# Patient Record
Sex: OTHER/UNKNOWN | Race: WHITE | Employment: UNEMPLOYED | URBAN - METROPOLITAN AREA
[De-identification: names, ages, dates, MRNs, and addresses within clinical notes are randomized per-mention and may not be internally consistent; named-entity substitution may affect disease eponyms.]

---

## 2017-01-05 ENCOUNTER — APPOINTMENT (OUTPATIENT)
Dept: LAB | Facility: MEDICAL CENTER | Age: 52
End: 2017-01-05
Payer: COMMERCIAL

## 2017-01-05 DIAGNOSIS — I71.2 THORACIC AORTIC ANEURYSM WITHOUT RUPTURE (HCC): ICD-10-CM

## 2017-01-05 LAB
BUN SERPL-MCNC: 15 MG/DL (ref 5–25)
CREAT SERPL-MCNC: 1.31 MG/DL (ref 0.6–1.3)
GFR SERPL CREATININE-BSD FRML MDRD: 57.5 ML/MIN/1.73SQ M

## 2017-01-05 PROCEDURE — 84520 ASSAY OF UREA NITROGEN: CPT

## 2017-01-05 PROCEDURE — 36415 COLL VENOUS BLD VENIPUNCTURE: CPT

## 2017-01-05 PROCEDURE — 82565 ASSAY OF CREATININE: CPT

## 2017-01-10 ENCOUNTER — HOSPITAL ENCOUNTER (OUTPATIENT)
Dept: RADIOLOGY | Facility: MEDICAL CENTER | Age: 52
Discharge: HOME | End: 2017-01-10
Payer: COMMERCIAL

## 2017-01-10 DIAGNOSIS — I71.2 THORACIC ANEURYSM WITHOUT MENTION OF RUPTURE: ICD-10-CM

## 2017-01-10 PROCEDURE — 71260 CT THORAX DX C+: CPT

## 2017-01-10 RX ADMIN — IOHEXOL 85 ML: 350 INJECTION, SOLUTION INTRAVENOUS at 13:12

## 2017-01-11 ENCOUNTER — ALLSCRIPTS OFFICE VISIT (OUTPATIENT)
Dept: OTHER | Facility: OTHER | Age: 52
End: 2017-01-11

## 2017-01-13 ENCOUNTER — ALLSCRIPTS OFFICE VISIT (OUTPATIENT)
Dept: OTHER | Facility: OTHER | Age: 52
End: 2017-01-13

## 2017-01-18 ENCOUNTER — HOSPITAL ENCOUNTER (OUTPATIENT)
Dept: RADIOLOGY | Facility: MEDICAL CENTER | Age: 52
Discharge: HOME | End: 2017-01-18
Payer: COMMERCIAL

## 2017-01-18 DIAGNOSIS — R10.11 RIGHT UPPER QUADRANT PAIN: ICD-10-CM

## 2017-01-18 PROCEDURE — 76705 ECHO EXAM OF ABDOMEN: CPT

## 2017-02-03 DIAGNOSIS — K76.0 FATTY (CHANGE OF) LIVER, NOT ELSEWHERE CLASSIFIED: ICD-10-CM

## 2017-02-04 ENCOUNTER — APPOINTMENT (OUTPATIENT)
Dept: LAB | Facility: MEDICAL CENTER | Age: 52
End: 2017-02-04
Payer: COMMERCIAL

## 2017-02-04 DIAGNOSIS — K76.0 FATTY (CHANGE OF) LIVER, NOT ELSEWHERE CLASSIFIED: ICD-10-CM

## 2017-02-04 LAB
ALBUMIN SERPL BCP-MCNC: 3.5 G/DL (ref 3.5–5)
ALP SERPL-CCNC: 89 U/L (ref 46–116)
ALT SERPL W P-5'-P-CCNC: 60 U/L (ref 12–78)
ANION GAP SERPL CALCULATED.3IONS-SCNC: 9 MMOL/L (ref 4–13)
AST SERPL W P-5'-P-CCNC: 36 U/L (ref 5–45)
BILIRUB SERPL-MCNC: 0.83 MG/DL (ref 0.2–1)
BUN SERPL-MCNC: 15 MG/DL (ref 5–25)
CALCIUM SERPL-MCNC: 9.2 MG/DL (ref 8.3–10.1)
CHLORIDE SERPL-SCNC: 104 MMOL/L (ref 100–108)
CHOLEST SERPL-MCNC: 140 MG/DL (ref 50–200)
CO2 SERPL-SCNC: 26 MMOL/L (ref 21–32)
CREAT SERPL-MCNC: 1.37 MG/DL (ref 0.6–1.3)
GFR SERPL CREATININE-BSD FRML MDRD: 54.6 ML/MIN/1.73SQ M
GLUCOSE SERPL-MCNC: 109 MG/DL (ref 65–140)
HDLC SERPL-MCNC: 33 MG/DL (ref 40–60)
LDLC SERPL CALC-MCNC: 49 MG/DL (ref 0–100)
POTASSIUM SERPL-SCNC: 4.1 MMOL/L (ref 3.5–5.3)
PROT SERPL-MCNC: 7.1 G/DL (ref 6.4–8.2)
SODIUM SERPL-SCNC: 139 MMOL/L (ref 136–145)
TRIGL SERPL-MCNC: 290 MG/DL

## 2017-02-04 PROCEDURE — 80053 COMPREHEN METABOLIC PANEL: CPT

## 2017-02-04 PROCEDURE — 36415 COLL VENOUS BLD VENIPUNCTURE: CPT

## 2017-02-04 PROCEDURE — 80061 LIPID PANEL: CPT

## 2017-02-07 ENCOUNTER — ALLSCRIPTS OFFICE VISIT (OUTPATIENT)
Dept: OTHER | Facility: OTHER | Age: 52
End: 2017-02-07

## 2017-03-08 ENCOUNTER — ALLSCRIPTS OFFICE VISIT (OUTPATIENT)
Dept: OTHER | Facility: OTHER | Age: 52
End: 2017-03-08

## 2017-04-03 DIAGNOSIS — E78.1 PURE HYPERGLYCERIDEMIA: ICD-10-CM

## 2017-04-06 ENCOUNTER — APPOINTMENT (OUTPATIENT)
Dept: LAB | Facility: MEDICAL CENTER | Age: 52
End: 2017-04-06
Payer: COMMERCIAL

## 2017-04-06 DIAGNOSIS — E78.1 PURE HYPERGLYCERIDEMIA: ICD-10-CM

## 2017-04-06 LAB
CHOLEST SERPL-MCNC: 149 MG/DL (ref 50–200)
HDLC SERPL-MCNC: 31 MG/DL (ref 40–60)
LDLC SERPL CALC-MCNC: 64 MG/DL (ref 0–100)
TRIGL SERPL-MCNC: 271 MG/DL

## 2017-04-06 PROCEDURE — 36415 COLL VENOUS BLD VENIPUNCTURE: CPT

## 2017-04-06 PROCEDURE — 80061 LIPID PANEL: CPT

## 2017-04-07 ENCOUNTER — ALLSCRIPTS OFFICE VISIT (OUTPATIENT)
Dept: OTHER | Facility: OTHER | Age: 52
End: 2017-04-07

## 2017-05-05 DIAGNOSIS — R42 DIZZINESS AND GIDDINESS: ICD-10-CM

## 2017-05-05 DIAGNOSIS — R51 HEADACHE(784.0): ICD-10-CM

## 2017-05-08 ENCOUNTER — ALLSCRIPTS OFFICE VISIT (OUTPATIENT)
Dept: OTHER | Facility: OTHER | Age: 52
End: 2017-05-08

## 2017-05-08 LAB
BILIRUB UR QL STRIP: NORMAL
CLARITY UR: NORMAL
COLOR UR: YELLOW
GLUCOSE (HISTORICAL): NORMAL
HGB UR QL STRIP.AUTO: NORMAL
KETONES UR STRIP-MCNC: NORMAL MG/DL
LEUKOCYTE ESTERASE UR QL STRIP: NORMAL
NITRITE UR QL STRIP: NORMAL
PH UR STRIP.AUTO: 5.5 [PH]
PROT UR STRIP-MCNC: NORMAL MG/DL
SP GR UR STRIP.AUTO: 1.03
UROBILINOGEN UR QL STRIP.AUTO: 0.2

## 2017-05-12 ENCOUNTER — HOSPITAL ENCOUNTER (OUTPATIENT)
Dept: RADIOLOGY | Facility: MEDICAL CENTER | Age: 52
Discharge: HOME | End: 2017-05-12
Payer: COMMERCIAL

## 2017-05-12 ENCOUNTER — GENERIC CONVERSION - ENCOUNTER (OUTPATIENT)
Dept: OTHER | Facility: OTHER | Age: 52
End: 2017-05-12

## 2017-05-12 ENCOUNTER — APPOINTMENT (OUTPATIENT)
Dept: PHYSICAL THERAPY | Facility: MEDICAL CENTER | Age: 52
End: 2017-05-12
Payer: COMMERCIAL

## 2017-05-12 DIAGNOSIS — R51 HEADACHE(784.0): ICD-10-CM

## 2017-05-12 PROCEDURE — 70220 X-RAY EXAM OF SINUSES: CPT

## 2017-05-12 PROCEDURE — 97162 PT EVAL MOD COMPLEX 30 MIN: CPT

## 2017-05-24 ENCOUNTER — APPOINTMENT (OUTPATIENT)
Dept: PHYSICAL THERAPY | Facility: MEDICAL CENTER | Age: 52
End: 2017-05-24
Payer: COMMERCIAL

## 2017-05-24 PROCEDURE — 97112 NEUROMUSCULAR REEDUCATION: CPT

## 2017-05-31 ENCOUNTER — APPOINTMENT (OUTPATIENT)
Dept: PHYSICAL THERAPY | Facility: MEDICAL CENTER | Age: 52
End: 2017-05-31
Payer: COMMERCIAL

## 2017-06-16 ENCOUNTER — HOSPITAL ENCOUNTER (EMERGENCY)
Facility: HOSPITAL | Age: 52
Discharge: HOME | End: 2017-06-16
Attending: EMERGENCY MEDICINE | Admitting: EMERGENCY MEDICINE
Payer: COMMERCIAL

## 2017-06-16 ENCOUNTER — APPOINTMENT (EMERGENCY)
Dept: CT IMAGING | Facility: HOSPITAL | Age: 52
End: 2017-06-16
Payer: COMMERCIAL

## 2017-06-16 VITALS
DIASTOLIC BLOOD PRESSURE: 77 MMHG | SYSTOLIC BLOOD PRESSURE: 110 MMHG | TEMPERATURE: 97 F | WEIGHT: 225 LBS | HEART RATE: 62 BPM | BODY MASS INDEX: 30.48 KG/M2 | RESPIRATION RATE: 17 BRPM | OXYGEN SATURATION: 98 % | HEIGHT: 72 IN

## 2017-06-16 DIAGNOSIS — R42 DIZZINESS: Primary | ICD-10-CM

## 2017-06-16 LAB
ALBUMIN SERPL BCP-MCNC: 3.7 G/DL (ref 3.5–5)
ALP SERPL-CCNC: 78 U/L (ref 46–116)
ALT SERPL W P-5'-P-CCNC: 41 U/L (ref 12–78)
AMPHETAMINES SERPL QL SCN: NEGATIVE
ANION GAP SERPL CALCULATED.3IONS-SCNC: 9 MMOL/L (ref 4–13)
AST SERPL W P-5'-P-CCNC: 29 U/L (ref 5–45)
ATRIAL RATE: 68 BPM
BARBITURATES UR QL: NEGATIVE
BASOPHILS # BLD AUTO: 0.06 THOUSANDS/ΜL (ref 0–0.1)
BASOPHILS NFR BLD AUTO: 1 % (ref 0–1)
BENZODIAZ UR QL: NEGATIVE
BILIRUB SERPL-MCNC: 0.6 MG/DL (ref 0.2–1)
BUN SERPL-MCNC: 23 MG/DL (ref 5–25)
CALCIUM SERPL-MCNC: 9.5 MG/DL (ref 8.3–10.1)
CHLORIDE SERPL-SCNC: 104 MMOL/L (ref 100–108)
CLARITY, POC: CLEAR
CO2 SERPL-SCNC: 26 MMOL/L (ref 21–32)
COCAINE UR QL: NEGATIVE
COLOR, POC: YELLOW
CREAT SERPL-MCNC: 1.56 MG/DL (ref 0.6–1.3)
EOSINOPHIL # BLD AUTO: 0.07 THOUSAND/ΜL (ref 0–0.61)
EOSINOPHIL NFR BLD AUTO: 1 % (ref 0–6)
ERYTHROCYTE [DISTWIDTH] IN BLOOD BY AUTOMATED COUNT: 12 % (ref 11.6–15.1)
ETHANOL SERPL-MCNC: <3 MG/DL (ref 0–3)
EXT BILIRUBIN, UA: NORMAL
EXT BLOOD URINE: NEGATIVE
EXT GLUCOSE, UA: NEGATIVE
EXT KETONES: NEGATIVE
EXT NITRITE, UA: NEGATIVE
EXT PH, UA: 5
EXT PROTEIN, UA: NEGATIVE
EXT SPECIFIC GRAVITY, UA: 1.02
EXT UROBILINOGEN: 0.2
GFR SERPL CREATININE-BSD FRML MDRD: 46.8 ML/MIN/1.73SQ M
GLUCOSE SERPL-MCNC: 112 MG/DL (ref 65–140)
HCT VFR BLD AUTO: 48.4 % (ref 36.5–49.3)
HGB BLD-MCNC: 16.5 G/DL (ref 12–17)
LYMPHOCYTES # BLD AUTO: 2.27 THOUSANDS/ΜL (ref 0.6–4.47)
LYMPHOCYTES NFR BLD AUTO: 36 % (ref 14–44)
MAGNESIUM SERPL-MCNC: 1.9 MG/DL (ref 1.6–2.6)
MCH RBC QN AUTO: 30.7 PG (ref 26.8–34.3)
MCHC RBC AUTO-ENTMCNC: 34.1 G/DL (ref 31.4–37.4)
MCV RBC AUTO: 90 FL (ref 82–98)
METHADONE UR QL: NEGATIVE
MONOCYTES # BLD AUTO: 0.52 THOUSAND/ΜL (ref 0.17–1.22)
MONOCYTES NFR BLD AUTO: 8 % (ref 4–12)
NEUTROPHILS # BLD AUTO: 3.44 THOUSANDS/ΜL (ref 1.85–7.62)
NEUTS SEG NFR BLD AUTO: 54 % (ref 43–75)
NRBC BLD AUTO-RTO: 0 /100 WBCS
OPIATES UR QL SCN: NEGATIVE
P AXIS: 60 DEGREES
PCP UR QL: NEGATIVE
PLATELET # BLD AUTO: 246 THOUSANDS/UL (ref 149–390)
PMV BLD AUTO: 10.5 FL (ref 8.9–12.7)
POTASSIUM SERPL-SCNC: 4.1 MMOL/L (ref 3.5–5.3)
PR INTERVAL: 154 MS
PROT SERPL-MCNC: 7.5 G/DL (ref 6.4–8.2)
QRS AXIS: 4 DEGREES
QRSD INTERVAL: 84 MS
QT INTERVAL: 384 MS
QTC INTERVAL: 408 MS
RBC # BLD AUTO: 5.38 MILLION/UL (ref 3.88–5.62)
SODIUM SERPL-SCNC: 139 MMOL/L (ref 136–145)
T WAVE AXIS: -55 DEGREES
THC UR QL: NEGATIVE
TROPONIN I SERPL-MCNC: <0.02 NG/ML
TSH SERPL DL<=0.05 MIU/L-ACNC: 2.8 UIU/ML (ref 0.36–3.74)
VENTRICULAR RATE: 68 BPM
WBC # BLD AUTO: 6.38 THOUSAND/UL (ref 4.31–10.16)
WBC # BLD EST: NEGATIVE 10*3/UL

## 2017-06-16 PROCEDURE — 80053 COMPREHEN METABOLIC PANEL: CPT | Performed by: EMERGENCY MEDICINE

## 2017-06-16 PROCEDURE — 84484 ASSAY OF TROPONIN QUANT: CPT | Performed by: EMERGENCY MEDICINE

## 2017-06-16 PROCEDURE — 81002 URINALYSIS NONAUTO W/O SCOPE: CPT | Performed by: EMERGENCY MEDICINE

## 2017-06-16 PROCEDURE — 80320 DRUG SCREEN QUANTALCOHOLS: CPT | Performed by: EMERGENCY MEDICINE

## 2017-06-16 PROCEDURE — 36415 COLL VENOUS BLD VENIPUNCTURE: CPT | Performed by: EMERGENCY MEDICINE

## 2017-06-16 PROCEDURE — 99285 EMERGENCY DEPT VISIT HI MDM: CPT

## 2017-06-16 PROCEDURE — 80307 DRUG TEST PRSMV CHEM ANLYZR: CPT | Performed by: EMERGENCY MEDICINE

## 2017-06-16 PROCEDURE — 83735 ASSAY OF MAGNESIUM: CPT | Performed by: EMERGENCY MEDICINE

## 2017-06-16 PROCEDURE — 84443 ASSAY THYROID STIM HORMONE: CPT | Performed by: EMERGENCY MEDICINE

## 2017-06-16 PROCEDURE — 85025 COMPLETE CBC W/AUTO DIFF WBC: CPT | Performed by: EMERGENCY MEDICINE

## 2017-06-16 PROCEDURE — 70450 CT HEAD/BRAIN W/O DYE: CPT

## 2017-06-16 PROCEDURE — 93005 ELECTROCARDIOGRAM TRACING: CPT | Performed by: EMERGENCY MEDICINE

## 2017-06-16 RX ORDER — CLINDAMYCIN PHOSPHATE 10 MG/G
GEL TOPICAL
Status: ON HOLD | COMMUNITY
Start: 2017-03-08 | End: 2017-10-31 | Stop reason: ALTCHOICE

## 2017-06-16 RX ORDER — ALBUTEROL SULFATE 90 UG/1
AEROSOL, METERED RESPIRATORY (INHALATION)
COMMUNITY
End: 2017-06-16 | Stop reason: SDUPTHER

## 2017-06-16 RX ORDER — MONTELUKAST SODIUM 10 MG/1
TABLET ORAL
COMMUNITY
End: 2019-04-10 | Stop reason: SDUPTHER

## 2017-06-16 RX ORDER — PANTOPRAZOLE SODIUM 40 MG/1
TABLET, DELAYED RELEASE ORAL
COMMUNITY
Start: 2016-04-20 | End: 2017-06-16 | Stop reason: SDUPTHER

## 2017-06-16 RX ORDER — BUPROPION HYDROCHLORIDE 75 MG/1
TABLET ORAL
COMMUNITY
End: 2018-02-26 | Stop reason: SDUPTHER

## 2017-06-16 RX ORDER — METOPROLOL SUCCINATE 25 MG/1
TABLET, EXTENDED RELEASE ORAL
Status: ON HOLD | COMMUNITY
Start: 2016-05-16 | End: 2017-10-31 | Stop reason: ALTCHOICE

## 2017-06-16 RX ORDER — MECLIZINE HCL 12.5 MG/1
12.5 TABLET ORAL 3 TIMES DAILY PRN
Qty: 30 TABLET | Refills: 0 | Status: ON HOLD | OUTPATIENT
Start: 2017-06-16 | End: 2017-10-31 | Stop reason: ALTCHOICE

## 2017-06-16 RX ORDER — HYDROCHLOROTHIAZIDE 12.5 MG/1
TABLET ORAL
Status: ON HOLD | COMMUNITY
End: 2017-10-31 | Stop reason: ALTCHOICE

## 2017-07-01 DIAGNOSIS — E78.1 PURE HYPERGLYCERIDEMIA: ICD-10-CM

## 2017-07-12 ENCOUNTER — APPOINTMENT (OUTPATIENT)
Dept: LAB | Facility: MEDICAL CENTER | Age: 52
End: 2017-07-12
Payer: COMMERCIAL

## 2017-07-12 DIAGNOSIS — E78.1 PURE HYPERGLYCERIDEMIA: ICD-10-CM

## 2017-07-12 LAB
ALT SERPL W P-5'-P-CCNC: 47 U/L (ref 12–78)
AST SERPL W P-5'-P-CCNC: 32 U/L (ref 5–45)
CHOLEST SERPL-MCNC: 120 MG/DL (ref 50–200)
HDLC SERPL-MCNC: 30 MG/DL (ref 40–60)
LDLC SERPL CALC-MCNC: 51 MG/DL (ref 0–100)
TRIGL SERPL-MCNC: 197 MG/DL

## 2017-07-12 PROCEDURE — 36415 COLL VENOUS BLD VENIPUNCTURE: CPT

## 2017-07-12 PROCEDURE — 80061 LIPID PANEL: CPT

## 2017-07-12 PROCEDURE — 84450 TRANSFERASE (AST) (SGOT): CPT

## 2017-07-12 PROCEDURE — 84460 ALANINE AMINO (ALT) (SGPT): CPT

## 2017-07-19 ENCOUNTER — ALLSCRIPTS OFFICE VISIT (OUTPATIENT)
Dept: OTHER | Facility: OTHER | Age: 52
End: 2017-07-19

## 2017-07-20 ENCOUNTER — GENERIC CONVERSION - ENCOUNTER (OUTPATIENT)
Dept: OTHER | Facility: OTHER | Age: 52
End: 2017-07-20

## 2017-08-02 ENCOUNTER — ALLSCRIPTS OFFICE VISIT (OUTPATIENT)
Dept: OTHER | Facility: OTHER | Age: 52
End: 2017-08-02

## 2017-08-06 ENCOUNTER — ANESTHESIA EVENT (OUTPATIENT)
Dept: PERIOP | Facility: HOSPITAL | Age: 52
End: 2017-08-06
Payer: COMMERCIAL

## 2017-08-07 ENCOUNTER — HOSPITAL ENCOUNTER (OUTPATIENT)
Facility: HOSPITAL | Age: 52
Setting detail: OUTPATIENT SURGERY
Discharge: HOME | End: 2017-08-07
Attending: INTERNAL MEDICINE | Admitting: INTERNAL MEDICINE
Payer: COMMERCIAL

## 2017-08-07 ENCOUNTER — GENERIC CONVERSION - ENCOUNTER (OUTPATIENT)
Dept: OTHER | Facility: OTHER | Age: 52
End: 2017-08-07

## 2017-08-07 ENCOUNTER — ANESTHESIA (OUTPATIENT)
Dept: PERIOP | Facility: HOSPITAL | Age: 52
End: 2017-08-07
Payer: COMMERCIAL

## 2017-08-07 VITALS
HEART RATE: 57 BPM | HEIGHT: 73 IN | SYSTOLIC BLOOD PRESSURE: 116 MMHG | TEMPERATURE: 97.5 F | DIASTOLIC BLOOD PRESSURE: 63 MMHG | RESPIRATION RATE: 24 BRPM | BODY MASS INDEX: 29.55 KG/M2 | OXYGEN SATURATION: 95 % | WEIGHT: 223 LBS

## 2017-08-07 DIAGNOSIS — Z12.11 ENCOUNTER FOR SCREENING FOR MALIGNANT NEOPLASM OF COLON: ICD-10-CM

## 2017-08-07 PROCEDURE — 88305 TISSUE EXAM BY PATHOLOGIST: CPT | Performed by: INTERNAL MEDICINE

## 2017-08-07 RX ORDER — SODIUM CHLORIDE, SODIUM LACTATE, POTASSIUM CHLORIDE, CALCIUM CHLORIDE 600; 310; 30; 20 MG/100ML; MG/100ML; MG/100ML; MG/100ML
125 INJECTION, SOLUTION INTRAVENOUS CONTINUOUS
Status: DISCONTINUED | OUTPATIENT
Start: 2017-08-07 | End: 2017-08-07 | Stop reason: HOSPADM

## 2017-08-07 RX ORDER — PROPOFOL 10 MG/ML
INJECTION, EMULSION INTRAVENOUS AS NEEDED
Status: DISCONTINUED | OUTPATIENT
Start: 2017-08-07 | End: 2017-08-07 | Stop reason: SURG

## 2017-08-07 RX ORDER — FENOFIBRATE 145 MG/1
145 TABLET, COATED ORAL DAILY
COMMUNITY
End: 2018-10-26 | Stop reason: SDUPTHER

## 2017-08-07 RX ORDER — GLYCOPYRROLATE 0.2 MG/ML
INJECTION INTRAMUSCULAR; INTRAVENOUS AS NEEDED
Status: DISCONTINUED | OUTPATIENT
Start: 2017-08-07 | End: 2017-08-07 | Stop reason: SURG

## 2017-08-07 RX ADMIN — PROPOFOL 50 MG: 10 INJECTION, EMULSION INTRAVENOUS at 11:28

## 2017-08-07 RX ADMIN — PROPOFOL 150 MG: 10 INJECTION, EMULSION INTRAVENOUS at 11:20

## 2017-08-07 RX ADMIN — GLYCOPYRROLATE 0.2 MG: 0.2 INJECTION, SOLUTION INTRAMUSCULAR; INTRAVENOUS at 11:20

## 2017-08-07 RX ADMIN — SODIUM CHLORIDE, SODIUM LACTATE, POTASSIUM CHLORIDE, AND CALCIUM CHLORIDE: .6; .31; .03; .02 INJECTION, SOLUTION INTRAVENOUS at 11:15

## 2017-08-07 RX ADMIN — SODIUM CHLORIDE, SODIUM LACTATE, POTASSIUM CHLORIDE, AND CALCIUM CHLORIDE 125 ML/HR: .6; .31; .03; .02 INJECTION, SOLUTION INTRAVENOUS at 11:10

## 2017-08-07 RX ADMIN — PROPOFOL 50 MG: 10 INJECTION, EMULSION INTRAVENOUS at 11:25

## 2017-08-30 ENCOUNTER — ALLSCRIPTS OFFICE VISIT (OUTPATIENT)
Dept: OTHER | Facility: OTHER | Age: 52
End: 2017-08-30

## 2017-08-30 ENCOUNTER — APPOINTMENT (OUTPATIENT)
Dept: LAB | Facility: MEDICAL CENTER | Age: 52
End: 2017-08-30
Payer: COMMERCIAL

## 2017-08-30 DIAGNOSIS — Z12.5 ENCOUNTER FOR SCREENING FOR MALIGNANT NEOPLASM OF PROSTATE: ICD-10-CM

## 2017-08-30 LAB — PSA SERPL-MCNC: 0.4 NG/ML (ref 0–4)

## 2017-08-30 PROCEDURE — 36415 COLL VENOUS BLD VENIPUNCTURE: CPT

## 2017-08-30 PROCEDURE — G0103 PSA SCREENING: HCPCS

## 2017-10-24 ENCOUNTER — APPOINTMENT (OUTPATIENT)
Dept: LAB | Facility: CLINIC | Age: 52
End: 2017-10-24
Payer: COMMERCIAL

## 2017-10-24 ENCOUNTER — TRANSCRIBE ORDERS (OUTPATIENT)
Dept: LAB | Facility: CLINIC | Age: 52
End: 2017-10-24

## 2017-10-24 ENCOUNTER — GENERIC CONVERSION - ENCOUNTER (OUTPATIENT)
Dept: OTHER | Facility: OTHER | Age: 52
End: 2017-10-24

## 2017-10-24 DIAGNOSIS — K76.0 FATTY (CHANGE OF) LIVER, NOT ELSEWHERE CLASSIFIED: ICD-10-CM

## 2017-10-24 DIAGNOSIS — G43.109 MIGRAINE WITH AURA AND WITHOUT STATUS MIGRAINOSUS, NOT INTRACTABLE: ICD-10-CM

## 2017-10-24 LAB
ALBUMIN SERPL BCP-MCNC: 3.9 G/DL (ref 3.5–5)
ALP SERPL-CCNC: 58 U/L (ref 46–116)
ALT SERPL W P-5'-P-CCNC: 59 U/L (ref 12–78)
AST SERPL W P-5'-P-CCNC: 32 U/L (ref 5–45)
BILIRUB DIRECT SERPL-MCNC: 0.12 MG/DL (ref 0–0.2)
BILIRUB SERPL-MCNC: 0.4 MG/DL (ref 0.2–1)
LIPASE SERPL-CCNC: 224 U/L (ref 73–393)
PROT SERPL-MCNC: 7.5 G/DL (ref 6.4–8.2)

## 2017-10-24 PROCEDURE — 83690 ASSAY OF LIPASE: CPT

## 2017-10-24 PROCEDURE — 36415 COLL VENOUS BLD VENIPUNCTURE: CPT

## 2017-10-24 PROCEDURE — 80076 HEPATIC FUNCTION PANEL: CPT

## 2017-10-30 ENCOUNTER — ANESTHESIA EVENT (OUTPATIENT)
Dept: PERIOP | Facility: HOSPITAL | Age: 52
End: 2017-10-30
Payer: COMMERCIAL

## 2017-10-30 RX ORDER — SILDENAFIL CITRATE 20 MG/1
20 TABLET ORAL 3 TIMES DAILY
Status: ON HOLD | COMMUNITY
End: 2017-10-31 | Stop reason: ALTCHOICE

## 2017-10-30 RX ORDER — TIZANIDINE 4 MG/1
4 TABLET ORAL EVERY 8 HOURS PRN
Status: ON HOLD | COMMUNITY
End: 2017-10-31 | Stop reason: ALTCHOICE

## 2017-10-31 ENCOUNTER — GENERIC CONVERSION - ENCOUNTER (OUTPATIENT)
Dept: OTHER | Facility: OTHER | Age: 52
End: 2017-10-31

## 2017-10-31 ENCOUNTER — ANESTHESIA (OUTPATIENT)
Dept: PERIOP | Facility: HOSPITAL | Age: 52
End: 2017-10-31
Payer: COMMERCIAL

## 2017-10-31 ENCOUNTER — HOSPITAL ENCOUNTER (OUTPATIENT)
Facility: HOSPITAL | Age: 52
Setting detail: OUTPATIENT SURGERY
Discharge: HOME | End: 2017-10-31
Attending: INTERNAL MEDICINE | Admitting: INTERNAL MEDICINE
Payer: COMMERCIAL

## 2017-10-31 VITALS
RESPIRATION RATE: 20 BRPM | DIASTOLIC BLOOD PRESSURE: 79 MMHG | HEIGHT: 73 IN | HEART RATE: 66 BPM | WEIGHT: 229.28 LBS | OXYGEN SATURATION: 95 % | SYSTOLIC BLOOD PRESSURE: 115 MMHG | BODY MASS INDEX: 30.39 KG/M2 | TEMPERATURE: 97.7 F

## 2017-10-31 DIAGNOSIS — K76.0 HEPATIC STEATOSIS: ICD-10-CM

## 2017-10-31 DIAGNOSIS — R10.11 ABDOMINAL PAIN, RUQ: ICD-10-CM

## 2017-10-31 PROCEDURE — 88305 TISSUE EXAM BY PATHOLOGIST: CPT | Performed by: INTERNAL MEDICINE

## 2017-10-31 PROCEDURE — 88342 IMHCHEM/IMCYTCHM 1ST ANTB: CPT | Performed by: INTERNAL MEDICINE

## 2017-10-31 RX ORDER — SODIUM CHLORIDE, SODIUM LACTATE, POTASSIUM CHLORIDE, CALCIUM CHLORIDE 600; 310; 30; 20 MG/100ML; MG/100ML; MG/100ML; MG/100ML
125 INJECTION, SOLUTION INTRAVENOUS CONTINUOUS
Status: DISCONTINUED | OUTPATIENT
Start: 2017-10-31 | End: 2017-10-31 | Stop reason: HOSPADM

## 2017-10-31 RX ORDER — FAMOTIDINE 20 MG/1
40 TABLET, FILM COATED ORAL DAILY
COMMUNITY
End: 2018-01-06 | Stop reason: ALTCHOICE

## 2017-10-31 RX ORDER — PROPOFOL 10 MG/ML
INJECTION, EMULSION INTRAVENOUS AS NEEDED
Status: DISCONTINUED | OUTPATIENT
Start: 2017-10-31 | End: 2017-10-31 | Stop reason: SURG

## 2017-10-31 RX ADMIN — SODIUM CHLORIDE, SODIUM LACTATE, POTASSIUM CHLORIDE, AND CALCIUM CHLORIDE: .6; .31; .03; .02 INJECTION, SOLUTION INTRAVENOUS at 10:19

## 2017-10-31 RX ADMIN — SODIUM CHLORIDE, SODIUM LACTATE, POTASSIUM CHLORIDE, AND CALCIUM CHLORIDE 125 ML/HR: .6; .31; .03; .02 INJECTION, SOLUTION INTRAVENOUS at 09:42

## 2017-10-31 RX ADMIN — PROPOFOL 30 MG: 10 INJECTION, EMULSION INTRAVENOUS at 10:25

## 2017-10-31 RX ADMIN — PROPOFOL 120 MG: 10 INJECTION, EMULSION INTRAVENOUS at 10:22

## 2017-10-31 NOTE — ANESTHESIA PREPROCEDURE EVALUATION
Review of Systems/Medical History  Patient summary reviewed        Cardiovascular  EKG reviewed, Negative cardio ROS Aortic disease,   Comment: SUMMARY     LEFT VENTRICLE:  Systolic function was normal  Ejection fraction was estimated to be 60 %  There were no regional wall motion abnormalities      AORTA:  The root exhibited dilatation, with a maximum measured diameter of 4 3 cm      HISTORY: PRIOR HISTORY: Aortic aneurysm  COPD  Former smoker  ,  Pulmonary  Negative pulmonary ROS COPD mild- PRN medicaiton , Asthma: , Sleep apnea , ,        GI/Hepatic  Negative GI/hepatic ROS          Negative  ROS        Endo/Other  Negative endo/other ROS      GYN  Negative gynecology ROS          Hematology  Negative hematology ROS      Musculoskeletal  Obesity ,        Neurology  Negative neurology ROS      Psychology   Negative psychology ROS            Physical Exam    Airway    Mallampati score: II  TM Distance: >3 FB  Neck ROM: full     Dental       Cardiovascular  Comment: Negative ROS, Cardiovascular exam normal    Pulmonary  Pulmonary exam normal     Other Findings        Anesthesia Plan  ASA Score- 2       Anesthesia Type- IV sedation with anesthesia with ASA Monitors  Additional Monitors:   Airway Plan:           Induction- intravenous  Informed Consent- Anesthetic plan and risks discussed with patient

## 2017-10-31 NOTE — DISCHARGE INSTRUCTIONS
Upper Endoscopy   WHAT YOU NEED TO KNOW:   An upper endoscopy is also called an upper gastrointestinal (GI) endoscopy, or an esophagogastroduodenoscopy (EGD)  You may feel bloated, gassy, or have some abdominal discomfort after your procedure  Your throat may be sore for 24 to 36 hours  You may burp or pass gas from air that is still inside your body  DISCHARGE INSTRUCTIONS:   Call 911 for any of the following:   · You have sudden chest pain or trouble breathing  Seek care immediately if:   · You feel dizzy or faint  · You have trouble swallowing  · Your bowel movements are very dark or black  · Your abdomen is hard and firm and you have severe pain  · You vomit blood  Contact your healthcare provider if:   · You feel full or bloated and cannot burp or pass gas  · You have not had a bowel movement for 3 days after your procedure  · You have neck pain  · You have a fever or chills  · You have nausea or are vomiting  · You have a rash or hives  · You have questions or concerns about your endoscopy  Relieve a sore throat:  Suck on throat lozenges or crushed ice  Gargle with a small amount of warm salt water  Mix 1 teaspoon of salt and 1 cup of warm water to make salt water  Relieve gas and discomfort from bloating:  Lie on your right side with a heating pad on your abdomen  Take short walks to help pass gas  Eat small meals until bloating is relieved  Rest after your procedure: You have been given medicine to relax you  Do not  drive or make important decisions until the day after your procedure  Return to your normal activity as directed  You can usually return to work the day after your procedure  Follow up with your healthcare provider as directed:  Write down your questions so you remember to ask them during your visits     © 2017 [address] is for End User's use only and may not be sold, redistributed or otherwise used for commercial purposes  All illustrations and images included in CareNotes® are the copyrighted property of A D A M [employer] Inc  or Telly Stokes  The above information is an  only  It is not intended as medical advice for individual conditions or treatments  Talk to your doctor, nurse or pharmacist before following any medical regimen to see if it is safe and effective for you

## 2017-10-31 NOTE — ANESTHESIA POSTPROCEDURE EVALUATION
Post-Op Assessment Note      CV Status:  Stable    Mental Status:  Awake    Hydration Status:  Stable    PONV Controlled:  None    Airway Patency:  Patent and adequate    Post Op Vitals Reviewed: Yes          Staff: Anesthesiologist, CRNA           BP   98/63   Temp      Pulse 88   Resp   18   SpO2 99%

## 2017-10-31 NOTE — OP NOTE
**** GI/ENDOSCOPY REPORT ****     PATIENT NAME: CRISTINO OH - VISIT ID:  Patient ID: [id number]   YOB: 1965     INTRODUCTION: Esophagogastroduodenoscopy - A 47 male patient presents for   an outpatient Esophagogastroduodenoscopy at [facility]  INDICATIONS: Pain located in the right upper quadrant of the abdomen  CONSENT: The benefits, risks, and alternatives to the procedure were   discussed and informed consent was obtained from the patient  PREPARATION:  EKG, pulse, pulse oximetry and blood pressure were monitored   throughout the procedure  MEDICATIONS:asa 2     PROCEDURE:  The endoscope was passed without difficulty through the mouth   under direct visualization and advanced to the 3rd portion of the   duodenum  The scope was withdrawn and the mucosa was carefully examined  FINDINGS:   Esophagus: The esophagus appeared to be normal  The Z line was   visualized at 39 cm from the entry site  There was a 2 cm hiatus hernia   visible in the esophagus  Stomach: A few small polyps was found in the   stomach  A biopsy was taken  The antrum, cardia, fundus, incisura, and   pylorus appeared to be normal  A biopsy was taken from the antrum, body of   the stomach, and fundus  Duodenum: The duodenal bulb, 2nd portion of the   duodenum, and 3rd portion of the duodenum appeared to be normal      COMPLICATIONS: There were no complications  IMPRESSIONS: Normal esophagus  Z line visualized  A hiatus hernia found  Gastric polyps  Biopsy taken  Normal antrum, cardia, fundus, incisura, and   pylorus  Biopsy taken  Normal duodenal bulb, 2nd portion of the duodenum,   and 3rd portion of the duodenum  RECOMMENDATIONS: Follow-up on the results of the biopsy specimens in 1   week  Continue current medications  ESTIMATED BLOOD LOSS: Insignificant  PATHOLOGY SPECIMENS: Biopsy taken  Associated finding: Polyp   Random   biopsy taken from the antrum, body of the stomach, and fundus  PROCEDURE CODES: 63604 - EGD flexible; with biopsy     ICD-9 Codes: 789 01 Abdominal pain, right upper quadrant 553 3   Diaphragmatic hernia without mention of obstruction or gangrene 211 1   Benign neoplasm of stomach     ICD-10 Codes: R10 11 Right upper quadrant pain K44 Diaphragmatic hernia   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

## 2017-11-01 ENCOUNTER — HOSPITAL ENCOUNTER (OUTPATIENT)
Dept: RADIOLOGY | Facility: MEDICAL CENTER | Age: 52
Discharge: HOME | End: 2017-11-01
Payer: COMMERCIAL

## 2017-11-01 DIAGNOSIS — K76.0 FATTY (CHANGE OF) LIVER, NOT ELSEWHERE CLASSIFIED: ICD-10-CM

## 2017-11-01 PROCEDURE — 76705 ECHO EXAM OF ABDOMEN: CPT

## 2017-11-21 ENCOUNTER — ALLSCRIPTS OFFICE VISIT (OUTPATIENT)
Dept: OTHER | Facility: OTHER | Age: 52
End: 2017-11-21

## 2017-11-21 ENCOUNTER — APPOINTMENT (OUTPATIENT)
Dept: LAB | Facility: CLINIC | Age: 52
End: 2017-11-21
Payer: COMMERCIAL

## 2017-11-21 ENCOUNTER — TRANSCRIBE ORDERS (OUTPATIENT)
Dept: ADMINISTRATIVE | Facility: HOSPITAL | Age: 52
End: 2017-11-21

## 2017-11-21 DIAGNOSIS — G43.109 MIGRAINE WITH AURA AND WITHOUT STATUS MIGRAINOSUS, NOT INTRACTABLE: Primary | ICD-10-CM

## 2017-11-21 DIAGNOSIS — G43.109 MIGRAINE WITH AURA AND WITHOUT STATUS MIGRAINOSUS, NOT INTRACTABLE: ICD-10-CM

## 2017-11-21 LAB — ERYTHROCYTE [SEDIMENTATION RATE] IN BLOOD: 2 MM/HOUR (ref 0–10)

## 2017-11-21 PROCEDURE — 36415 COLL VENOUS BLD VENIPUNCTURE: CPT

## 2017-11-21 PROCEDURE — 86617 LYME DISEASE ANTIBODY: CPT

## 2017-11-21 PROCEDURE — 85652 RBC SED RATE AUTOMATED: CPT

## 2017-11-21 PROCEDURE — 86038 ANTINUCLEAR ANTIBODIES: CPT

## 2017-11-22 ENCOUNTER — HOSPITAL ENCOUNTER (OUTPATIENT)
Dept: NEUROLOGY | Facility: HOSPITAL | Age: 52
Discharge: HOME | End: 2017-11-22
Attending: PSYCHIATRY & NEUROLOGY
Payer: COMMERCIAL

## 2017-11-22 DIAGNOSIS — G43.109 MIGRAINE WITH AURA AND WITHOUT STATUS MIGRAINOSUS, NOT INTRACTABLE: ICD-10-CM

## 2017-11-22 DIAGNOSIS — R42 DIZZINESS: ICD-10-CM

## 2017-11-22 LAB
B BURGDOR IGG PATRN SER IB-IMP: NEGATIVE
B BURGDOR IGM PATRN SER IB-IMP: NEGATIVE
B BURGDOR18KD IGG SER QL IB: NORMAL
B BURGDOR23KD IGG SER QL IB: NORMAL
B BURGDOR23KD IGM SER QL IB: NORMAL
B BURGDOR28KD IGG SER QL IB: NORMAL
B BURGDOR30KD IGG SER QL IB: NORMAL
B BURGDOR39KD IGG SER QL IB: NORMAL
B BURGDOR39KD IGM SER QL IB: NORMAL
B BURGDOR41KD IGG SER QL IB: NORMAL
B BURGDOR41KD IGM SER QL IB: NORMAL
B BURGDOR45KD IGG SER QL IB: NORMAL
B BURGDOR58KD IGG SER QL IB: NORMAL
B BURGDOR66KD IGG SER QL IB: NORMAL
B BURGDOR93KD IGG SER QL IB: NORMAL
RYE IGE QN: NEGATIVE

## 2017-11-22 PROCEDURE — 95819 EEG AWAKE AND ASLEEP: CPT
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
## 2017-11-28 ENCOUNTER — GENERIC CONVERSION - ENCOUNTER (OUTPATIENT)
Dept: OTHER | Facility: OTHER | Age: 52
End: 2017-11-28

## 2017-12-04 ENCOUNTER — ALLSCRIPTS OFFICE VISIT (OUTPATIENT)
Dept: OTHER | Facility: OTHER | Age: 52
End: 2017-12-04

## 2017-12-05 NOTE — PROGRESS NOTES
Assessment    1  Essential hypertriglyceridemia (272 1) (E78 1)   2  Hepatic steatosis (571 8) (K76 0)   3  Adjustment reaction with anxiety and depression (309 28) (F43 23)   4  Hiatal hernia (553 3) (K44 9)   5  Allergic rhinitis (477 9) (J30 9)    Plan  Adjustment reaction with anxiety and depression, Depression, Essentialhypertriglyceridemia, Hiatal hernia    · Follow-up visit in 3 months Evaluation and Treatment  Follow-up  Status: Hold For -Scheduling  Requested for: [id number]  Allergic rhinitis    · Fluticasone Propionate 50 MCG/ACT Nasal Suspension; USE 2 SPRAYS IN EACHNOSTRIL ONCE DAILY  Depression    · BuPROPion HCl - 75 MG Oral Tablet; TAKE 1 TABLET TWICE DAILY  Essential hypertriglyceridemia, Hepatic steatosis    · Fenofibrate 145 MG Oral Tablet; take 1 tablet by mouth once daily  Hiatal hernia    · Pantoprazole Sodium 40 MG Oral Tablet Delayed Release; take 1 tablet bymouth once daily    Chief Complaint  The patient is here today for a follow up  History of Present Illness  Patient is a 50-year-old  He is here today for routine follow-up   has an aortic root aneurysm with a bicuspid aortic valve  Because of this he was advised not to do any heavy lifting  Therefore he is not able to work  He follows up with Dr Mary Ann Hogan once a year  He also sees Cardiology regularly  His cardiac status is stable  has fatty liver  That was causing some significant right-sided flank and right upper quadrant discomfort  We have treated him with fenofibrate and he has been on a low carbon low fat diet  The pain in the right upper quadrant has been resolved  He does see Gastroenterology  And they are following that as well   has gastroesophageal reflux  He takes pantoprazole  This has been effective for controlling symptoms  has depression  What we are treating with Wellbutrin 75 mg b i d  He says that is somewhat helpful  We had tried a higher dose but he did not tolerated    is seeing a neurologist for vestibular migraines  He is taking topiramate and sumatriptan  These have been somewhat successful in controlling those bouts of vertigo that he gets  If it does not completely prevent the vestibular migraines, but he does feel that treatment has been helpful  is feeling lightheaded occasionally  He is wondering if his blood pressure is going low  He asked about buying a blood pressure cuff  He is not on any blood pressure medicine  However some of these medications could in fact dropped his blood pressure  sees a pulmonologist for COPD  He is complaining of waking up at night coughing and spitting and clearing his throat  He does have a history of allergic rhinitis  Active Problems  1  Abdominal cramping, generalized (789 07) (R10 84)   2  Abdominal pain, RUQ (789 01) (R10 11)   3  Acute bacterial prostatitis (601 0) (N41 0)   4  Adjustment reaction with anxiety and depression (309 28) (F43 23)   5  Allergic rhinitis (477 9) (J30 9)   6  Ascending aortic aneurysm (441 2) (I71 2)   7  Asthma (493 90) (J45 909)   8  Back pain (724 5) (M54 9)   9  Benign colon polyp (211 3) (K63 5)   10  Bicuspid aortic valve (746 4) (Q23 1)   11  Carpal tunnel syndrome, unspecified laterality (354 0) (G56 00)   12  Chest pain, precordial (786 51) (R07 2)   13  Chills (780 64) (R68 83)   14  Colon cancer screening (V76 51) (Z12 11)   15  COPD (chronic obstructive pulmonary disease) (496) (J44 9)   16  Depression (311) (F32 9)   17  Diarrhea (787 91) (R19 7)   18  Diverticulosis of large intestine without hemorrhage (562 10) (K57 30)   19  Dizziness (780 4) (R42)   20  Edema (782 3) (R60 9)   21  Encounter for screening colonoscopy (V76 51) (Z12 11)   22  Epididymitis (604 90) (N45 1)   23  Erectile dysfunction of non-organic origin (302 72) (F52 21)   24  Essential hypertriglyceridemia (272 1) (E78 1)   25  Facial rash (782 1) (R21)   26  Family history of malignant neoplasm of prostate (V16 42) (Z80 42)   27   Frontal headache (784 0) (R51)   28  Hard of hearing (389 9) (H91 90)   29  Hepatic steatosis (571 8) (K76 0)   30  Hiatal hernia (553 3) (K44 9)   31  Joint pain, knee (719 46) (M25 569)   32  Left-sided tinnitus (388 30) (H93 12)   33  Lyme disease (088 81) (A69 20)   34  Muscle tension headache (307 81) (G44 209)   35  Myalgia (729 1) (M79 1)   36  Other acute sinusitis (461 8) (J01 80)   37  PAC (premature atrial contraction) (427 61) (I49 1)   38  Palpitations (785 1) (R00 2)   39  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)   40  Right knee pain (719 46) (M25 561)   41  Screening for colon cancer (V76 51) (Z12 11)   42  Screening for prostate cancer (V76 44) (Z12 5)   43  Sleep disorder (780 50) (G47 9)   44  Sore throat (462) (J02 9)   45  Vertigo (780 4) (R42)   46  Vestibular migraine (346 00) (G43 109)    Past Medical History  1  History of Arm DVT (deep venous thromboembolism), acute (453 82) (I82 629)   2  History of Colon cancer screening (V76 51) (Z12 11)   3  History of Denial Of Any Significant Medical History   4  History of Flu vaccine need (V04 81) (Z23)   5  History of renal calculi (V13 01) (D86 253)    Surgical History  1  History of Foot Surgery   2  History of Hernia Repair   3  History of Neuroplasty Median Nerve At Carpal Tunnel    The surgical history was reviewed and updated today  Family History  Mother    1  Family history of Arthritis (V17 7)  Brother    2  Family history of malignant neoplasm of prostate (V16 42) (Z80 42)   3  Family history of Leukemia (V16 6)  Grandfather    4  Family history of abdominal aortic aneurysm (V17 49) (Z82 49)  Family History    5  Family history of Family Health Status Of Father - Alive   6  Family history of Family Health Status Of Mother - Alive    The family history was reviewed and updated today         Social History     · Denied: Alcohol use (V49 89) (Z78 9)   · Caffeine Use   ·    · Denied: Drug use (305 90) (F19 90)   · Former smoker (K16 21) (Z69 240)   · Living alone (V60 3) (Z60 2)   · Single   · Two children   · Unemployed (V62 0) (Z56 0)  The social history was reviewed and updated today  The social history was reviewed and is unchanged  Current Meds   1  BuPROPion HCl - 75 MG Oral Tablet; TAKE 1 TABLET TWICE DAILY  Requested for: 22Nov2017; Last Rx:20Nov2017 Ordered   2  Dulera 200-5 MCG/ACT Inhalation Aerosol; INHALE 2 PUFFS Twice daily Recorded   3  Fenofibrate 145 MG Oral Tablet; take 1 tablet by mouth once daily; Therapy: 27RKM7239 to (Byron Perez)  Requested for: 26Oct2017; Last Rx:26Oct2017 Ordered   4  Montelukast Sodium 10 MG Oral Tablet; Take 1 tablet daily Recorded   5  Pantoprazole Sodium 40 MG Oral Tablet Delayed Release; take 1 tablet by mouth once daily; Therapy: 20Apr2016 to (Evaluate:28Oyg8859)  Requested for: 26Oct2017; Last Rx:26Oct2017 Ordered   6  Sildenafil Citrate 20 MG Oral Tablet; take 2-3 tabs as needed no more than once a day; Therapy: 79Orw3093 to (Last Rx:30Aug2017) Ordered   7  SUMAtriptan Succinate 100 MG Oral Tablet; TAKE 1 TABLET FOR MIGRAINE RELIEF  MAY REPEAT 2 HOURS LATER  MAXIMUM 200MG/DAY; Therapy: 95ONQ6367 to (Evaluate:21Mar2018)  Requested for: 21Nov2017; Last Rx:21Nov2017 Ordered   8  Topiramate 25 MG Oral Tablet; TAKE 1 TABLET Bedtime; Therapy: 09DCI0985 to (Evaluate:20Jan2018)  Requested for: 21Nov2017; Last Rx:21Nov2017 Ordered   9  Ventolin  (90 Base) MCG/ACT Inhalation Aerosol Solution; INHALE 1 TO 2 PUFFS EVERY 4 TO 6 HOURS AS NEEDED Recorded    Allergies  1  No Known Drug Allergies  2  No Known Environmental Allergies   3  No Known Food Allergies    Vitals  Vital Signs    Recorded: 73FLO3195 10:46AM   Heart Rate 64   Respiration 16   Systolic 838   Diastolic 68   Weight 028 lb 8 oz   BMI Calculated 30 7   BSA Calculated 2 27       Physical Exam   Constitutional  General appearance: No acute distress, well appearing and well nourished     Ears, Nose, Mouth, and Throat  Otoscopic examination: Tympanic membrance translucent with normal light reflex  Canals patent without erythema  Nasal mucosa, septum, and turbinates: Normal without edema or erythema  Oropharynx: Normal with no erythema, edema, exudate or lesions  Pulmonary  Auscultation of lungs: Clear to auscultation, equal breath sounds bilaterally, no wheezes, no rales, no rhonci  Health Management  Colon cancer screening   COLONOSCOPY; every 5 years; Last 07Aug2017; Next Due: 93STU6692; Active    Future Appointments    Date/Time Provider Specialty Site   02/26/2018 02:00 PM Jose Luis Petersen MD [facility]   09/11/2018 08:00 AM ANN Leung   [facility]       Signatures   Electronically signed by : ANN Summers ; Dec  4 2017 11:26AM EST                       (Author)

## 2018-01-06 ENCOUNTER — APPOINTMENT (EMERGENCY)
Dept: RADIOLOGY | Facility: HOSPITAL | Age: 53
End: 2018-01-06
Payer: COMMERCIAL

## 2018-01-06 ENCOUNTER — APPOINTMENT (EMERGENCY)
Dept: CT IMAGING | Facility: HOSPITAL | Age: 53
End: 2018-01-06
Payer: COMMERCIAL

## 2018-01-06 ENCOUNTER — HOSPITAL ENCOUNTER (EMERGENCY)
Facility: HOSPITAL | Age: 53
Discharge: HOME | End: 2018-01-07
Attending: EMERGENCY MEDICINE | Admitting: EMERGENCY MEDICINE
Payer: COMMERCIAL

## 2018-01-06 DIAGNOSIS — G57.10 MERALGIA PARAESTHETICA: ICD-10-CM

## 2018-01-06 DIAGNOSIS — J01.90 ACUTE SINUSITIS: Primary | ICD-10-CM

## 2018-01-06 LAB
ALBUMIN SERPL BCP-MCNC: 3.4 G/DL (ref 3.5–5)
ALP SERPL-CCNC: 60 U/L (ref 46–116)
ALT SERPL W P-5'-P-CCNC: 58 U/L (ref 12–78)
ANION GAP SERPL CALCULATED.3IONS-SCNC: 7 MMOL/L (ref 4–13)
APTT PPP: 25 SECONDS (ref 23–35)
AST SERPL W P-5'-P-CCNC: 40 U/L (ref 5–45)
BASOPHILS # BLD AUTO: 0.03 THOUSANDS/ΜL (ref 0–0.1)
BASOPHILS NFR BLD AUTO: 0 % (ref 0–1)
BILIRUB SERPL-MCNC: 0.4 MG/DL (ref 0.2–1)
BUN SERPL-MCNC: 19 MG/DL (ref 5–25)
CALCIUM SERPL-MCNC: 9.2 MG/DL (ref 8.3–10.1)
CHLORIDE SERPL-SCNC: 105 MMOL/L (ref 100–108)
CLARITY, POC: CLEAR
CO2 SERPL-SCNC: 29 MMOL/L (ref 21–32)
COLOR, POC: YELLOW
CREAT SERPL-MCNC: 1.68 MG/DL (ref 0.6–1.3)
EOSINOPHIL # BLD AUTO: 0.19 THOUSAND/ΜL (ref 0–0.61)
EOSINOPHIL NFR BLD AUTO: 2 % (ref 0–6)
ERYTHROCYTE [DISTWIDTH] IN BLOOD BY AUTOMATED COUNT: 12.8 % (ref 11.6–15.1)
EXT BILIRUBIN, UA: NEGATIVE
EXT BLOOD URINE: NEGATIVE
EXT GLUCOSE, UA: NEGATIVE
EXT KETONES: NORMAL
EXT NITRITE, UA: NEGATIVE
EXT PH, UA: 6
EXT PROTEIN, UA: NEGATIVE
EXT SPECIFIC GRAVITY, UA: 1.03
EXT UROBILINOGEN: 0.2
GFR SERPL CREATININE-BSD FRML MDRD: 46 ML/MIN/1.73SQ M
GLUCOSE SERPL-MCNC: 122 MG/DL (ref 65–140)
HCT VFR BLD AUTO: 45.1 % (ref 36.5–49.3)
HGB BLD-MCNC: 14.8 G/DL (ref 12–17)
INR PPP: 0.98 (ref 0.86–1.16)
LYMPHOCYTES # BLD AUTO: 2.84 THOUSANDS/ΜL (ref 0.6–4.47)
LYMPHOCYTES NFR BLD AUTO: 36 % (ref 14–44)
MAGNESIUM SERPL-MCNC: 2.1 MG/DL (ref 1.6–2.6)
MCH RBC QN AUTO: 30.1 PG (ref 26.8–34.3)
MCHC RBC AUTO-ENTMCNC: 32.8 G/DL (ref 31.4–37.4)
MCV RBC AUTO: 92 FL (ref 82–98)
MONOCYTES # BLD AUTO: 0.74 THOUSAND/ΜL (ref 0.17–1.22)
MONOCYTES NFR BLD AUTO: 10 % (ref 4–12)
NEUTROPHILS # BLD AUTO: 4.03 THOUSANDS/ΜL (ref 1.85–7.62)
NEUTS SEG NFR BLD AUTO: 52 % (ref 43–75)
NT-PROBNP SERPL-MCNC: 51 PG/ML
PLATELET # BLD AUTO: 216 THOUSANDS/UL (ref 149–390)
PMV BLD AUTO: 10.7 FL (ref 8.9–12.7)
POTASSIUM SERPL-SCNC: 4.3 MMOL/L (ref 3.5–5.3)
PROT SERPL-MCNC: 6.9 G/DL (ref 6.4–8.2)
PROTHROMBIN TIME: 13.3 SECONDS (ref 12.1–14.4)
RBC # BLD AUTO: 4.92 MILLION/UL (ref 3.88–5.62)
SODIUM SERPL-SCNC: 141 MMOL/L (ref 136–145)
TROPONIN I SERPL-MCNC: <0.02 NG/ML
WBC # BLD AUTO: 7.83 THOUSAND/UL (ref 4.31–10.16)
WBC # BLD EST: NEGATIVE 10*3/UL

## 2018-01-06 PROCEDURE — 96360 HYDRATION IV INFUSION INIT: CPT

## 2018-01-06 PROCEDURE — 93005 ELECTROCARDIOGRAM TRACING: CPT

## 2018-01-06 PROCEDURE — 85730 THROMBOPLASTIN TIME PARTIAL: CPT | Performed by: EMERGENCY MEDICINE

## 2018-01-06 PROCEDURE — 70450 CT HEAD/BRAIN W/O DYE: CPT

## 2018-01-06 PROCEDURE — 83735 ASSAY OF MAGNESIUM: CPT | Performed by: EMERGENCY MEDICINE

## 2018-01-06 PROCEDURE — 83880 ASSAY OF NATRIURETIC PEPTIDE: CPT | Performed by: EMERGENCY MEDICINE

## 2018-01-06 PROCEDURE — 71045 X-RAY EXAM CHEST 1 VIEW: CPT

## 2018-01-06 PROCEDURE — 81002 URINALYSIS NONAUTO W/O SCOPE: CPT | Performed by: EMERGENCY MEDICINE

## 2018-01-06 PROCEDURE — 85610 PROTHROMBIN TIME: CPT | Performed by: EMERGENCY MEDICINE

## 2018-01-06 PROCEDURE — 84484 ASSAY OF TROPONIN QUANT: CPT | Performed by: EMERGENCY MEDICINE

## 2018-01-06 PROCEDURE — 93005 ELECTROCARDIOGRAM TRACING: CPT | Performed by: EMERGENCY MEDICINE

## 2018-01-06 PROCEDURE — 36415 COLL VENOUS BLD VENIPUNCTURE: CPT | Performed by: EMERGENCY MEDICINE

## 2018-01-06 PROCEDURE — 85025 COMPLETE CBC W/AUTO DIFF WBC: CPT | Performed by: EMERGENCY MEDICINE

## 2018-01-06 PROCEDURE — 80053 COMPREHEN METABOLIC PANEL: CPT | Performed by: EMERGENCY MEDICINE

## 2018-01-06 RX ORDER — GUAIFENESIN 600 MG
1200 TABLET, EXTENDED RELEASE 12 HR ORAL EVERY 12 HOURS SCHEDULED
Qty: 14 TABLET | Refills: 0 | Status: SHIPPED | OUTPATIENT
Start: 2018-01-06 | End: 2018-03-20 | Stop reason: ALTCHOICE

## 2018-01-06 RX ORDER — FLUTICASONE PROPIONATE 50 MCG
1 SPRAY, SUSPENSION (ML) NASAL DAILY
Qty: 16 G | Refills: 0 | Status: SHIPPED | OUTPATIENT
Start: 2018-01-06 | End: 2018-08-07 | Stop reason: ALTCHOICE

## 2018-01-06 RX ADMIN — SODIUM CHLORIDE 1000 ML: 0.9 INJECTION, SOLUTION INTRAVENOUS at 22:15

## 2018-01-07 VITALS
DIASTOLIC BLOOD PRESSURE: 79 MMHG | BODY MASS INDEX: 30.85 KG/M2 | OXYGEN SATURATION: 97 % | SYSTOLIC BLOOD PRESSURE: 130 MMHG | RESPIRATION RATE: 18 BRPM | TEMPERATURE: 97.9 F | WEIGHT: 232.81 LBS | HEIGHT: 73 IN | HEART RATE: 62 BPM

## 2018-01-07 PROCEDURE — 99284 EMERGENCY DEPT VISIT MOD MDM: CPT

## 2018-01-07 NOTE — DISCHARGE INSTRUCTIONS
Meralgia Paresthetica   WHAT YOU NEED TO KNOW:   Meralgia paresthetica (MP) is a condition that causes numbness, tingling, and burning pain in your outer thigh  MP occurs when the nerve that provides feeling to the area is pinched  DISCHARGE INSTRUCTIONS:   Medicines:   · Medicines  may be given to relieve pain or decrease inflammation  · Take your medicine as directed  Contact your healthcare provider if you think your medicine is not helping or if you have side effects  Tell him of her if you are allergic to any medicine  Keep a list of the medicines, vitamins, and herbs you take  Include the amounts, and when and why you take them  Bring the list or the pill bottles to follow-up visits  Carry your medicine list with you in case of an emergency  Follow up with your healthcare provider as directed:  Write down your questions so you remember to ask them during your visits  Self-care:   · Maintain a healthy weight  This will decrease pressure on your nerve  Ask your healthcare provider how much you should weigh  Ask him to help you create a weight loss plan if you are overweight  · Decrease pressure on your nerve  Wear loose clothing  Do not wear tight pants, belts, or other tight clothes  Do not walk or stand for long periods of time  · Go to physical therapy  A physical therapist teaches you exercises to help improve movement and strength, and to decrease pain  Contact your healthcare provider if:   · Your symptoms do not improve with treatment  · You have questions or concerns about your condition or care  Return to the emergency department if:   · You have severe leg pain  · You cannot feel or move your legs  © 2017 [address] [facility] Information is for End User's use only and may not be sold, redistributed or otherwise used for commercial purposes   All illustrations and images included in CareNotes® are the copyrighted property of A D A M , Inc  or [facility] [employer]  The above information is an  only  It is not intended as medical advice for individual conditions or treatments  Talk to your doctor, nurse or pharmacist before following any medical regimen to see if it is safe and effective for you  Sinusitis, Ambulatory Care   GENERAL INFORMATION:   Sinusitis  is inflammation or infection of your sinuses  It is most often caused by a virus  Acute sinusitis may last up to 12 weeks  Chronic sinusitis lasts longer than 12 weeks  Recurrent sinusitis is when you have 3 or more episodes of sinusitis in 1 year  Common symptoms include the following:   · Fever    · Pain, pressure, redness, or swelling around the forehead, cheeks, or eyes    · Thick yellow or green discharge from your nose    · Tenderness when you touch your face over your sinuses    · Dry cough that happens mostly at night or when you lie down    · Headache and face pain that is worse when you lean forward    · Teeth pain or pain when you chew  Seek immediate care for the following symptoms:   · Vision changes such as double vision    · Confusion or trouble thinking clearly    · Headache and stiff neck    · Trouble breathing  Treatment for sinusitis  may include medicines to relieve nasal and sinus congestion or to decrease pain and fever  Ask your healthcare provider which medicines you should take and how much is safe  Manage sinusitis:   · Drink liquids as directed  Ask your healthcare provider how much liquid to drink each day and which liquids are best for you  Liquids will help loosen and drain the mucus in your sinuses  · Breathe in steam   Heat a bowl of water until you see steam  Lean over the bowl and make a tent over your head with a large towel  Breathe deeply for about 20 minutes  Be careful not to get too close to the steam or burn yourself  Do this 3 times a day  You can also breathe deeply when you take a hot shower  · Rinse your sinuses    Use a sinus rinse device to rinse your nasal passages with a saline (salt water) solution  This will help thin the mucus in your nose and rinse away pollen and dirt  It will also help reduce swelling so you can breathe normally  Ask how often to do this  · Use heat on your sinuses  to decrease pain  Apply heat for 15 to 20 minutes every hour for as many days as directed  · Sleep with your head elevated  Place an extra pillow under your head before you go to sleep to help your sinuses drain  · Do not smoke and avoid secondhand smoke  If you smoke, it is never too late to quit  Ask for information about how to stop smoking if you need help  Prevent the spread of germs that cause sinusitis:  Wash your hands often with soap and water  Wash your hands after you use the bathroom, change a child's diaper, or sneeze  Wash your hands before you prepare or eat food  Follow up with your healthcare provider as directed:  Write down your questions so you remember to ask them during your visits  CARE AGREEMENT:   You have the right to help plan your care  Learn about your health condition and how it may be treated  Discuss treatment options with your caregivers to decide what care you want to receive  You always have the right to refuse treatment  The above information is an  only  It is not intended as medical advice for individual conditions or treatments  Talk to your doctor, nurse or pharmacist before following any medical regimen to see if it is safe and effective for you  © 2014 [address] is for End User's use only and may not be sold, redistributed or otherwise used for commercial purposes  All illustrations and images included in CareNotes® are the copyrighted property of [employer]  or Telly Stokes

## 2018-01-07 NOTE — ED PROVIDER NOTES
History  Chief Complaint   Patient presents with    Numbness     Pt presents to ED for evaluation and treatment of intermittent right lower leg numbness for the past few months  Worse this evening  Right arm numbness noted this evening which has since resolved     18-year-old male comes in with complaints of months of intermittent right lower leg numbness patient describes it as a sharp stabbing pain and then pins and needles like it fell asleep  Patient will not have to walk around to get it to feel normal   Patient can have multiple episodes a day or no episodes at all  Symptoms usually last less than 30 minutes  Became concerned today when he had symptoms and his right upper extremity that he had not had before  Patient has not had the symptoms evaluated prior to this        Medical Problem   Location:  Intermittent leg pain and numbness  Quality:  Sharp stabbing tingling  Severity:  Moderate  Onset quality:  Gradual  Timing:  Intermittent  Progression:  Waxing and waning  Chronicity:  Recurrent  Context:  Please see above  Ineffective treatments:  None tried  Associated symptoms: no abdominal pain, no chest pain, no congestion, no cough, no headaches, no nausea, no rash, no shortness of breath and no wheezing        Prior to Admission Medications   Prescriptions Last Dose Informant Patient Reported? Taking? Mometasone Furo-Formoterol Fum (DULERA) 200-5 MCG/ACT AERO 1/6/2018 at Unknown time  Yes Yes   Sig: Inhale 2 puffs 2 (two) times a day  albuterol (PROVENTIL HFA,VENTOLIN HFA) 90 mcg/act inhaler 1/6/2018 at Unknown time  Yes Yes   Sig: Inhale 2 puffs every 6 (six) hours as needed for wheezing     buPROPion [facility]) 75 mg tablet 1/6/2018 at Unknown time  Yes Yes   Sig: Take by mouth   fenofibrate (TRICOR) 145 mg tablet 1/6/2018 at Unknown time  Yes Yes   Sig: Take 145 mg by mouth daily   montelukast (SINGULAIR) 10 mg tablet 1/6/2018 at Unknown time  Yes Yes   Sig: Take by mouth   pantoprazole (PROTONIX) 40 mg tablet 1/6/2018 at Unknown time  Yes Yes   Sig: Take 40 mg by mouth daily  Facility-Administered Medications: None       Past Medical History:   Diagnosis Date    Aorta aneurysm ([address] )     4 3    Asthma     COPD (chronic obstructive pulmonary disease) (HCC)     Depression     GERD (gastroesophageal reflux disease)     Headache     Hiatal hernia     Liver disease     FATTY LIVER    Sleep apnea        Past Surgical History:   Procedure Laterality Date    CARPAL TUNNEL RELEASE Left     COLONOSCOPY      FOOT SURGERY Left     FOREIGN BODY REMOVAL    HERNIA REPAIR      MN COLONOSCOPY FLX DX W/COLLJ SPEC WHEN PFRMD N/A 8/7/2017    Procedure: COLONOSCOPY;  Surgeon: Bethany Warner MD;  Location: [facility]; Service: Gastroenterology    MN ESOPHAGOGASTRODUODENOSCOPY TRANSORAL DIAGNOSTIC N/A 10/31/2017    Procedure: ESOPHAGOGASTRODUODENOSCOPY (EGD); Surgeon: Bethany Warner MD;  Location: [facility]; Service: Gastroenterology       Family History   Problem Relation Age of Onset    Cancer Brother      I have reviewed and agree with the history as documented  Social History   Substance Use Topics    Smoking status: Former Smoker     Types: Cigars     Quit date: 8/7/2014    Smokeless tobacco: Never Used    Alcohol use No        Review of Systems   Constitutional: Negative for activity change and appetite change  HENT: Negative for congestion and facial swelling  Eyes: Negative for discharge and redness  Respiratory: Negative for cough, shortness of breath and wheezing  Cardiovascular: Negative for chest pain and leg swelling  Gastrointestinal: Negative for abdominal distention, abdominal pain, blood in stool and nausea  Endocrine: Negative for cold intolerance and polydipsia  Genitourinary: Negative for difficulty urinating and hematuria  Musculoskeletal: Negative for arthralgias and gait problem  Skin: Negative for color change and rash  Allergic/Immunologic: Negative for food allergies and immunocompromised state  Neurological: Negative for dizziness, seizures and headaches  Hematological: Negative for adenopathy  Does not bruise/bleed easily  Psychiatric/Behavioral: Negative for agitation, confusion and decreased concentration  All other systems reviewed and are negative  Physical Exam  ED Triage Vitals   Temperature Pulse Respirations Blood Pressure SpO2   01/07/18 0003 01/06/18 2057 01/06/18 2057 01/06/18 2057 01/06/18 2057   97 9 °F (36 6 °C) 69 18 127/80 98 %      Temp Source Heart Rate Source Patient Position - Orthostatic VS BP Location FiO2 (%)   01/07/18 0003 01/06/18 2057 01/06/18 2057 01/06/18 2057 --   Oral Monitor Lying Right arm       Pain Score       01/06/18 2057       No Pain           Orthostatic Vital Signs  Vitals:    01/06/18 2057 01/06/18 2258 01/06/18 2345   BP: 127/80 127/78 130/79   Pulse: 69 61 62   Patient Position - Orthostatic VS: Lying Lying Sitting       Physical Exam   Constitutional: He is oriented to person, place, and time  He appears well-developed and well-nourished  Non-toxic appearance  HENT:   Head: Normocephalic and atraumatic  Right Ear: Tympanic membrane normal    Left Ear: Tympanic membrane normal    Nose: Nose normal    Mouth/Throat: Oropharynx is clear and moist    Eyes: Conjunctivae, EOM and lids are normal  Pupils are equal, round, and reactive to light  Neck: Trachea normal and normal range of motion  Neck supple  No JVD present  Carotid bruit is not present  Cardiovascular: Normal rate, regular rhythm, normal heart sounds and intact distal pulses  No extrasystoles are present  Pulmonary/Chest: Effort normal  He has no decreased breath sounds  He has no wheezes  He has no rhonchi  He has no rales  He exhibits no tenderness and no deformity  Abdominal: Soft  Bowel sounds are normal  There is no tenderness  There is no rebound, no guarding and no CVA tenderness  Musculoskeletal:        Right shoulder: He exhibits normal range of motion, no tenderness, no swelling and no deformity  Cervical back: Normal  He exhibits normal range of motion, no tenderness, no bony tenderness and no deformity  Lymphadenopathy:     He has no cervical adenopathy  He has no axillary adenopathy  Neurological: He is alert and oriented to person, place, and time  He has normal strength and normal reflexes  No cranial nerve deficit or sensory deficit  He displays a negative Romberg sign  Skin: Skin is warm and dry  Psychiatric: He has a normal mood and affect  His speech is normal and behavior is normal  Judgment and thought content normal  Cognition and memory are normal    Nursing note and vitals reviewed  ED Medications  Medications   sodium chloride 0 9 % bolus 1,000 mL (0 mL Intravenous Stopped 1/6/18 5251)       Diagnostic Studies  Results Reviewed     Procedure Component Value Units Date/Time    Comprehensive metabolic panel [[id number]]  (Abnormal) Collected:  01/06/18 2212    Lab Status:  Final result Specimen:  Blood from Arm, Right Updated:  01/06/18 2309     Sodium 141 mmol/L      Potassium 4 3 mmol/L      Chloride 105 mmol/L      CO2 29 mmol/L      Anion Gap 7 mmol/L      BUN 19 mg/dL      Creatinine 1 68 (H) mg/dL      Glucose 122 mg/dL      Calcium 9 2 mg/dL      AST 40 U/L      ALT 58 U/L      Alkaline Phosphatase 60 U/L      Total Protein 6 9 g/dL      Albumin 3 4 (L) g/dL      Total Bilirubin 0 40 mg/dL      eGFR 46 ml/min/1 73sq m     Narrative:         National Kidney Disease Education Program recommendations are as follows:  GFR calculation is accurate only with a steady state creatinine  Chronic Kidney disease less than 60 ml/min/1 73 sq  meters  Kidney failure less than 15 ml/min/1 73 sq  meters      Magnesium [[id number]]  (Normal) Collected:  01/06/18 2212    Lab Status:  Final result Specimen:  Blood from Arm, Right Updated:  01/06/18 2302     Magnesium 2 1 mg/dL     B-type natriuretic peptide [[id number]]  (Normal) Collected:  01/06/18 2212    Lab Status:  Final result Specimen:  Blood from Arm, Right Updated:  01/06/18 2302     NT-proBNP 51 pg/mL     POCT urinalysis dipstick [[id number]]  (Normal) Resulted:  01/06/18 2256    Lab Status:  Final result Specimen:  Urine Updated:  01/06/18 2258     Color, UA yellow     Clarity, UA clear     EXT Glucose, UA negative     EXT Bilirubin, UA (Ref: Negative) negative     EXT Ketones, UA (Ref: Negative) small     EXT Spec Grav, UA 1 030     EXT Blood, UA (Ref: Negative) negative     EXT pH, UA 6 0     EXT Protein, UA (Ref: Negative) negative     EXT Urobilinogen, UA (Ref: 0 2- 1 0) 0 2     EXT Leukocytes, UA (Ref: Negative) negative     EXT Nitrite, UA (Ref: Negative) negative    Troponin I [[id number]]  (Normal) Collected:  01/06/18 2212    Lab Status:  Final result Specimen:  Blood from Arm, Right Updated:  01/06/18 2252     Troponin I <0 02 ng/mL     Narrative:         [employer] Chemistry analyzer 99% cutoff is > 0 04 ng/mL in network labs    o cTnI 99% cutoff is useful only when applied to patients in the clinical setting of myocardial ischemia  o cTnI 99% cutoff should be interpreted in the context of clinical history, ECG findings and possibly cardiac imaging to establish correct diagnosis  o cTnI 99% cutoff may be suggestive but clearly not indicative of a coronary event without the clinical setting of myocardial ischemia  Protime-INR [[id number]]  (Normal) Collected:  01/06/18 2212    Lab Status:  Final result Specimen:  Blood from Arm, Right Updated:  01/06/18 2245     Protime 13 3 seconds      INR 0 98    APTT [[id number]]  (Normal) Collected:  01/06/18 2212    Lab Status:  Final result Specimen:  Blood from Arm, Right Updated:  01/06/18 2245     PTT 25 seconds     Narrative:          Therapeutic Heparin Range = 60-90 seconds    CBC and differential [[id number]]  (Normal) Collected:  01/06/18 2212    Lab Status:  Final result Specimen:  Blood from Arm, Right Updated:  01/06/18 2235     WBC 7 83 Thousand/uL      RBC 4 92 Million/uL      Hemoglobin 14 8 g/dL      Hematocrit 45 1 %      MCV 92 fL      MCH 30 1 pg      MCHC 32 8 g/dL      RDW 12 8 %      MPV 10 7 fL      Platelets 382 Thousands/uL      Neutrophils Relative 52 %      Lymphocytes Relative 36 %      Monocytes Relative 10 %      Eosinophils Relative 2 %      Basophils Relative 0 %      Neutrophils Absolute 4 03 Thousands/µL      Lymphocytes Absolute 2 84 Thousands/µL      Monocytes Absolute 0 74 Thousand/µL      Eosinophils Absolute 0 19 Thousand/µL      Basophils Absolute 0 03 Thousands/µL                  XR chest 1 view portable   Final Result by John Kramer MD (01/07 7780)      No active pulmonary disease  Workstation performed: [id number]         CT head without contrast   Final Result by Glory Oviedo MD (01/07 7074)      No acute intracranial abnormality        Findings are consistent with the preliminary report from [facility] which was provided shortly after completion of the exam                       Workstation performed: [id number]                    Procedures  ECG 12 Lead Documentation  Date/Time: 1/7/2018 10:02 PM  Performed by: Luciano Handing by: Mohit WOOD     Rate:     ECG rate:  62  Rhythm:     Rhythm: sinus rhythm    Ectopy:     Ectopy: none    QRS:     QRS axis:  Normal           Phone Contacts  ED Phone Contact    ED Course  ED Course                                MDM  Number of Diagnoses or Management Options  Acute sinusitis: new and requires workup  Meralgia paraesthetica: new and requires workup     Amount and/or Complexity of Data Reviewed  Clinical lab tests: ordered and reviewed  Tests in the radiology section of CPT®: ordered and reviewed  Tests in the medicine section of CPT®: ordered and reviewed  Independent visualization of images, tracings, or specimens: yes (FINDINGS:  Brain: Mild, diffuse, age-related cortical atrophy and ventriculomegaly  No significant acute abnormality identified  No acute hemorrhage seen within the brain  No acute  extra-axial fluid collections visualized  No evidence of significant mass effect within the brain  Ventricles: See above  Bones/joints: Old fracture involving the left medial orbital wall  Soft tissues: No acute abnormality of the visualized soft tissues is seen  Sinuses: Small fluid level in the left maxillary sinus, suspicious for acute sinusitis  Remaining  visualized paranasal sinuses appear clear  Negative  Mastoid air cells: Mastoid air cells appear clear  IMPRESSION:  1  No acute findings seen within the brain  2  See above for remaining findings)    Patient Progress  Patient progress: improved    CritCare Time    Disposition  Final diagnoses:   Acute sinusitis   Meralgia paraesthetica     Time reflects when diagnosis was documented in both MDM as applicable and the Disposition within this note     Time User Action Codes Description Comment    1/6/2018 11:30 PM Prabha WOOD Add [J01 90] Acute sinusitis     1/6/2018 11:31 PM Prabha WOOD Add [G57 10] Meralgia paraesthetica       ED Disposition     ED Disposition Condition Comment    Discharge  [place] discharge to home/self care  Condition at discharge: Good        Follow-up Information     Follow up With Specialties Details Why Ara Fuller MD Family Medicine Schedule an appointment as soon as possible for a visit  [address] [place] [address] [address]  [phone]          Discharge Medication List as of 1/6/2018 11:32 PM      START taking these medications    Details   fluticasone (FLONASE) 50 mcg/act nasal spray 1 spray into each nostril daily, Starting Sat 1/6/2018, Print      guaiFENesin (MUCINEX) 600 mg 12 hr tablet Take 2 tablets by mouth every 12 (twelve) hours, Starting Sat 1/6/2018, Print         CONTINUE these medications which have NOT CHANGED    Details   albuterol (PROVENTIL HFA,VENTOLIN HFA) 90 mcg/act inhaler Inhale 2 puffs every 6 (six) hours as needed for wheezing , Until Discontinued, Historical Med      buPROPion (WELLBUTRIN) 75 mg tablet Take by mouth, Historical Med      fenofibrate (TRICOR) 145 mg tablet Take 145 mg by mouth daily, Historical Med      Mometasone Furo-Formoterol Fum (DULERA) 200-5 MCG/ACT AERO Inhale 2 puffs 2 (two) times a day , Until Discontinued, Historical Med      montelukast (SINGULAIR) 10 mg tablet Take by mouth, Historical Med      pantoprazole (PROTONIX) 40 mg tablet Take 40 mg by mouth daily  , Until Discontinued, Historical Med           No discharge procedures on file      ED Provider  Electronically Signed by           Marie Hatch DO  01/07/18 1077

## 2018-01-07 NOTE — ED NOTES
ORTHOSTATIC VITAL SIGNS BLOOD PRESSURE HEART RATE           LYING 127/78 61           SITTING 137/77 63           STANDING 134/77 72       Pt denied any dizziness or lightheadedness when placed in the laying, seated and standing position        Candelario Kirby  01/06/18 3742

## 2018-01-08 LAB
ATRIAL RATE: 65 BPM
P AXIS: 53 DEGREES
PR INTERVAL: 154 MS
QRS AXIS: 4 DEGREES
QRSD INTERVAL: 82 MS
QT INTERVAL: 404 MS
QTC INTERVAL: 411 MS
T WAVE AXIS: 14 DEGREES
VENTRICULAR RATE: 62 BPM

## 2018-01-09 ENCOUNTER — TRANSCRIBE ORDERS (OUTPATIENT)
Dept: ADMINISTRATIVE | Facility: HOSPITAL | Age: 53
End: 2018-01-09

## 2018-01-09 ENCOUNTER — ALLSCRIPTS OFFICE VISIT (OUTPATIENT)
Dept: OTHER | Facility: OTHER | Age: 53
End: 2018-01-09

## 2018-01-09 DIAGNOSIS — G57.52 TARSAL TUNNEL SYNDROME OF LEFT SIDE: ICD-10-CM

## 2018-01-09 DIAGNOSIS — R20.0 TACTILE ANESTHESIA: Primary | ICD-10-CM

## 2018-01-10 NOTE — PROGRESS NOTES
Assessment   1  Left leg numbness (782 0) (R20 0)    Plan   Left leg numbness    · EMG, performed same day as NCS, Limited Study 4 or less muscles; Status:Need    Information - Financial Authorization; Requested GUERA:[id number];     Chief Complaint   F/up ED visit on 1/6/18 for leg numbness in his left leg that had lasted for 30minutes  History of Present Illness   For the past four months, patient has been having intermittent numbness and heaviness in his left leg  It is usually worse after sitting for an extended time  Occasionally he gets severe sharp pain in the lateral aspect of his left ankle which is short-lived but intense  He was seen in the emergency department over the weekend  Evaluation of a CT scan of the head was normal  They also did a chest x-ray  precipitated his last trip to the emergency room was when his leg state numb and heavy for a longer period than usual  Approximately 30 minutes  history of ongoing back pain though he does get pain over the left sacroiliac  Review of Systems        Constitutional: No fever or chills, feels well, no tiredness, no recent weight gain or weight loss  ENT: no complaints of earache, no hearing loss, no nosebleeds, no nasal discharge, no sore throat, no hoarseness  Cardiovascular: No complaints of slow heart rate, no fast heart rate, no chest pain, no palpitations, no leg claudication, no lower extremity  Respiratory: No complaints of shortness of breath, no wheezing, no cough, no SOB on exertion, no orthopnea or PND  Gastrointestinal: No complaints of abdominal pain, no constipation, no nausea or vomiting, no diarrhea or bloody stools  Active Problems   1  Abdominal cramping, generalized (789 07) (R10 84)   2  Abdominal pain, RUQ (789 01) (R10 11)   3  Acute bacterial prostatitis (601 0) (N41 0)   4  Adjustment reaction with anxiety and depression (309 28) (F43 23)   5  Allergic rhinitis (477 9) (J30 9)   6   Ascending aortic aneurysm (441 2) (I71 2)   7  Asthma (493 90) (J45 909)   8  Back pain (724 5) (M54 9)   9  Benign colon polyp (211 3) (K63 5)   10  Bicuspid aortic valve (746 4) (Q23 1)   11  Carpal tunnel syndrome, unspecified laterality (354 0) (G56 00)   12  Chest pain, precordial (786 51) (R07 2)   13  Chills (780 64) (R68 83)   14  Colon cancer screening (V76 51) (Z12 11)   15  COPD (chronic obstructive pulmonary disease) (496) (J44 9)   16  Depression (311) (F32 9)   17  Diarrhea (787 91) (R19 7)   18  Diverticulosis of large intestine without hemorrhage (562 10) (K57 30)   19  Dizziness (780 4) (R42)   20  Edema (782 3) (R60 9)   21  Encounter for screening colonoscopy (V76 51) (Z12 11)   22  Epididymitis (604 90) (N45 1)   23  Erectile dysfunction of non-organic origin (302 72) (F52 21)   24  Essential hypertriglyceridemia (272 1) (E78 1)   25  Facial rash (782 1) (R21)   26  Family history of malignant neoplasm of prostate (V16 42) (Z80 42)   27  Frontal headache (784 0) (R51)   28  Hard of hearing (389 9) (H91 90)   29  Hepatic steatosis (571 8) (K76 0)   30  Hiatal hernia (553 3) (K44 9)   31  Joint pain, knee (719 46) (M25 569)   32  Left-sided tinnitus (388 30) (H93 12)   33  Lyme disease (088 81) (A69 20)   34  Muscle tension headache (307 81) (G44 209)   35  Myalgia (729 1) (M79 1)   36  Other acute sinusitis (461 8) (J01 80)   37  PAC (premature atrial contraction) (427 61) (I49 1)   38  Palpitations (785 1) (R00 2)   39  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)   40  Right knee pain (719 46) (M25 561)   41  Screening for colon cancer (V76 51) (Z12 11)   42  Screening for prostate cancer (V76 44) (Z12 5)   43  Sleep disorder (780 50) (G47 9)   44  Sore throat (462) (J02 9)   45  Vertigo (780 4) (R42)   46  Vestibular migraine (346 00) (G43 109)    Past Medical History   1  History of Arm DVT (deep venous thromboembolism), acute (453 82) (I82 629)   2  History of Colon cancer screening (V76 51) (Z12 11)   3  History of Denial Of Any Significant Medical History   4  History of Flu vaccine need (V04 81) (Z23)   5  History of renal calculi (V13 01) (L17 871)    Surgical History   1  History of Foot Surgery   2  History of Hernia Repair   3  History of Neuroplasty Median Nerve At Carpal Tunnel    Family History   Mother    1  Family history of Arthritis (V17 7)  Sister    2  Family history of Schizo-affective psychosis  Brother    3  Family history of malignant neoplasm of prostate (V16 42) (Z80 42)   4  Family history of Leukemia (V16 6)  Grandfather    5  Family history of abdominal aortic aneurysm (V17 49) (Z82 49)  Family History    6  Family history of Family Health Status Of Father - Alive   7  Family history of Family Health Status Of Mother - Alive    Social History    · Denied: Alcohol use (V49 89) (Z78 9)   · Caffeine Use   ·    · Denied: Drug use (305 90) (F19 90)   · Former smoker (V15 82) (Z87 891)   · Living alone (V60 3) (Z60 2)   · Single   · Two children   · Unemployed (V62 0) (Z56 0)    Current Meds    1  BuPROPion HCl - 75 MG Oral Tablet; TAKE 1 TABLET TWICE DAILY  Requested for:     65RIP4418; Last Rx:[id number] Ordered   2  Dulera 200-5 MCG/ACT Inhalation Aerosol; INHALE 2 PUFFS Twice daily Recorded   3  Fenofibrate 145 MG Oral Tablet; take 1 tablet by mouth once daily; Therapy: 26NBP8029 to (João Skinner)  Requested for: 77ZDG0808; Last     Rx:26Oct2017 Ordered   4  Fluticasone Propionate 50 MCG/ACT Nasal Suspension; USE 2 SPRAYS IN EACH     NOSTRIL ONCE DAILY; Therapy: 98KYV0006 to (Last Rx:34Wtq6015)  Requested for: 98MKI3250 Ordered   5  Montelukast Sodium 10 MG Oral Tablet; Take 1 tablet daily Recorded   6  Pantoprazole Sodium 40 MG Oral Tablet Delayed Release; take 1 tablet by mouth once     daily; Therapy: 20Apr2016 to (Evaluate:51Yym5269)  Requested for: 32YKK7374; Last     Rx:26Oct2017 Ordered   7   Sildenafil Citrate 20 MG Oral Tablet; take 2-3 tabs as needed no more than once a day; Therapy: 08Zrt2314 to (Last Rx:30Aug2017) Ordered   8  SUMAtriptan Succinate 100 MG Oral Tablet; TAKE 1 TABLET FOR MIGRAINE RELIEF  MAY     REPEAT 2 HOURS LATER  MAXIMUM 200MG/DAY; Therapy: 48RDF4244 to (Evaluate:21Mar2018)  Requested for: 09EHS1949; Last     Rx:21Nov2017 Ordered   9  Topiramate 25 MG Oral Tablet; TAKE 1 TABLET Bedtime; Therapy: 30ITW8547 to (Evaluate:20Jan2018)  Requested for: 21Nov2017; Last     Rx:21Nov2017 Ordered   10  Ventolin  (90 Base) MCG/ACT Inhalation Aerosol Solution; INHALE 1 TO 2 PUFFS      EVERY 4 TO 6 HOURS AS NEEDED Recorded    Allergies   1  No Known Drug Allergies  2  No Known Environmental Allergies   3  No Known Food Allergies    Vitals   Vital Signs    Recorded: 15OSV9421 07:50AM   Heart Rate 60   Respiration 16   Systolic 094   Diastolic 70   Height 6 ft 0 5 in   Weight 234 lb 8 oz   BMI Calculated 31 37   BSA Calculated 2 29     Physical Exam        Constitutional      General appearance: No acute distress, well appearing and well nourished  Pulmonary      Auscultation of lungs: Clear to auscultation, equal breath sounds bilaterally, no wheezes, no rales, no rhonci  Cardiovascular      Auscultation of heart: Normal rate and rhythm, normal S1 and S2, without murmurs  Musculoskeletal      Gait and station: Normal        Digits and nails: Normal without clubbing or cyanosis  Inspection/palpation of joints, bones, and muscles: Normal  -- SLR negative  Health Management   Colon cancer screening   COLONOSCOPY; every 5 years; Last 07Aug2017; Next Due: 01QKG3505; Active    Future Appointments      Date/Time Provider Specialty Site   02/26/2018 02:00 PM Manuel Galaviz MD Neurology [facility] [address] [place] [address]   03/20/2018 10:00 AM ANN Hart  [address] [facility]   09/11/2018 08:00 AM ANN Hart   [address] [facility] [employer]   01/23/2018 09:20 AM Sindy Roland ANN Roy    Electronically signed by : Quin Oppenheim, M D ; Jan 9 2018  8:27AM EST                       (Author)

## 2018-01-10 NOTE — PROGRESS NOTES
Assessment  Assessed    1  Ascending aortic aneurysm (441 2) (I71 2)   2  Bicuspid aortic valve (746 4) (Q23 1)   3  Palpitations (785 1) (R00 2)    Plan  Bicuspid aortic valve    · ECHO COMPLETE WITH CONTRAST IF INDICATED, TTE / TRANSTHORACIC;  Status:Need Information - Financial Authorization; Requested for:03May2016;    Perform:[facility]; BVA:[id number];GCRSPWM; For:Bicuspid aortic valve; Ordered By:Eric Bo;  Palpitations    · HOLTER MONITOR - 48 HOUR; Status:Hold For - Scheduling; Requested  for:03May2016;    Perform:[facility]; SHAN:[id number];ZFTFBAI; For:Palpitations; Ordered By:Eric Bo;   · Follow-up visit in 1 year Evaluation and Treatment  Follow-up  Status: Hold For -  Scheduling  Requested for: 21MIM3352   Ordered; For: Palpitations; Ordered By: Jeremias Reardon Performed:  Due: 47QRQ3522   · A diet that is low in fat, cholesterol, and sodium is considered a cardiac diet ;  Status:Complete;   Done: 17PNV6681 11:15AM   Ordered; For:Palpitations; Ordered By:Eric Bo;   · Begin or continue regular aerobic exercise  Gradually work up to at least count1  sessions of dur1 of exercise a week ; Status:Complete;   Done: 74JGP5391 11:15AM   Ordered; For:Palpitations; Ordered By:Eric Bo; Discussion/Summary  Cardiology Discussion Summary Free Text Note Form [facility]:   Bicuspid AV: with no evidence of stenosis or regurgitation of any significance  Continue surveillance with echocardiograms  Will recheck at this time with new symptoms of CP, SOB, and palpitations  Chest Pain: evaluated with a stress test that ruled out CAD  Further evaluation with echocardiogram     Ascending Aortic Aneurysm: measuring 4 3cm on echo, had repeat CTA iin 6 months as directed by Dr Uche Acevedo, with stable size  Palpitations: with COPD, will evaluate further with a Holter to rule out arrhythmias  I asked the patient to call in 2-3 days after the Holter for results  Counseling Documentation With Imm: The patient was counseled regarding diagnostic results, instructions for management, risk factor reductions, impressions  total time of encounter was 25 minutes and 15 minutes was spent counseling  Chief Complaint  Chief Complaint Free Text Note Form: Patient is here for twelve month follow up  Ordered stress test done 5/22/15  Patient is under a great deal of stress, he has center chest pain, and heart races  He also has daily dizziness and 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  He also has COPD  History of Present Illness  Cardiology (Follow-Up): The patient states he has been generally doing well since the last visit  Comorbid Illnesses: Diagnosed with a bicuspid AV and an ascending aortic root aneurysm measuring 4 3cm  Interval Events: Has been under a great deal of stress and has had chest pain, shortness of breath and palpitations that are new  His palpitations occur with SOB and can happen with exertion or rest  They happen almost every day  Symptoms: stable chest pain at rest, stable exertional chest pain, denies fatigue, denies exercise intolerance, new onset of palpitations, denies edema, denies orthopnea, denies claudication, denies dizziness and denies orthostatic dizziness    The patient presents with complaints of sudden onset of intermittent episodes of moderate stable dyspnea  Episodes started 2 months ago  Symptoms are improved by rest, but not by sitting up  Symptoms are made worse by walking, climbing stairs and exercise  Symptoms are unchanged  Associated symptoms: no syncope, no PND, no tendency for easy bleeding, no tendency for easy bruising, no TIA symptoms and no recent weight gain  Disease Monitoring:   Medications: The patient is not currently on any medications        Review of Systems  Cardiology Male ROS:     Cardiac: chest pain and palpitations present , but no rhythm problems, no fainting/blackouts, no heart murmur present, no signs of swelling, no syncope/fainting, no AM fatigue and no witnessed apnea episodes  Skin: No complaints of nonhealing sores or skin rash  Genitourinary: kidney stones and erectile dysfunction, but no recurrent urinary tract infections, no frequent urination at night, no difficult urination, no blood in urine, no loss of bladder control, no kidney problems and no prostate problems   Psychological: No complaints of feeling depressed, anxiety, panic attacks, or difficulty concentrating  General: trouble sleeping, appetite changes and lack of energy/fatigue, but no changes in weight, no fever, no night sweats and no frequent infections  Respiratory: No complaints of shortness of breath, cough with sputum, or wheezing  HEENT: No complaints of serious problems, hearing problems, nose problems, throat problems, or snoring  Gastrointestinal: nausea, vomiting, heartburn and diarrhea, but no liver problems, no bloody stools, no constipation, no abdonimal pain and no rectal bleeding   Hematologic: No complaints of bleeding disorders, anemia, blood clots, or excessive brusing  Neurological: numbnes, tingling, headaches and daytime sleepiness, but no weakness, no seizures, no dizziness and no diplopia   Musculoskeletal: back pain, but no arthritis and no swelling/pain   ROS Reviewed:   ROS reviewed  Active Problems  Problems    1  Acute bacterial prostatitis (601 0) (N41 0)   2  Adjustment reaction with anxiety and depression (309 28) (F43 23)   3  Allergic rhinitis (477 9) (J30 9)   4  Ascending aortic aneurysm (441 2) (I71 2)   5  Back pain (724 5) (M54 9)   6  Bicuspid aortic valve (746 4) (Q23 1)   7  Carpal tunnel syndrome, unspecified laterality (354 0) (G56 00)   8  Chest pain, precordial (786 51) (R07 2)   9  Chills (780 64) (R68 83)   10  COPD (chronic obstructive pulmonary disease) (496) (J44 9)   11  Diarrhea (787 91) (R19 7)   12   Dizziness (780 4) (R42)   13  Epididymitis (604 90) (N45 1)   14  Erectile dysfunction of non-organic origin (302 72) (F52 21)   15  Essential hypertriglyceridemia (272 1) (E78 1)   16  Hard of hearing (389 9) (H91 90)   17  Headache (784 0) (R51)   18  Hiatal hernia (553 3) (K44 9)   19  Joint pain, knee (719 46) (M25 569)   20  Left-sided tinnitus (388 30) (H93 12)   21  Lyme disease (088 81) (A69 20)   22  Myalgia (729 1) (M79 1)   23  Other acute sinusitis (461 8) (J01 80)   24  Palpitations (785 1) (R00 2)   25  Right knee pain (719 46) (M25 561)   26  Screening for colon cancer (V76 51) (Z12 11)   27  Sleep disorder (780 50) (G47 9)   28  Vertigo (780 4) (R42)    Past Medical History  Problems    1  History of Arm DVT (deep venous thromboembolism), acute (453 82) (I82 629)   2  History of Colon cancer screening (V76 51) (Z12 11)   3  History of Denial Of Any Significant Medical History   4  History of Flu vaccine need (V04 81) (Z23)   5  History of renal calculi (V13 01) (H93 214)  Active Problems And Past Medical History Reviewed: The active problems and past medical history were reviewed and updated today  Surgical History  Problems    1  History of Foot Surgery   2  History of Hernia Repair  Surgical History Reviewed: The surgical history was reviewed and updated today  Family History  Mother    1  Family history of Arthritis (V17 7)  Brother    2  Family history of Leukemia (V16 6)  Grandfather    3  Family history of abdominal aortic aneurysm (V17 49) (Z82 49)  Family History    4  Family history of Family Health Status Of Father - Alive   5  Family history of Family Health Status Of Mother - Alive  Family History Reviewed: The family history was reviewed and updated today  Social History  Problems    · Denied: Alcohol use (V49 89) (Z78 9)   · Caffeine Use   ·    · Denied: Drug use (305 90) (F19 90)   · Former smoker (I52 82) (A00 270)  Social History Reviewed: The social history was reviewed and updated today  Current Meds   1  Dulera 200-5 MCG/ACT Inhalation Aerosol; INHALE 2 PUFFS Twice daily Recorded   2  Pantoprazole Sodium 40 MG Oral Tablet Delayed Release; take 1 tablet by mouth every   day; Therapy: 20Apr2016 to (Evaluate:17Oct2016)  Requested for: 20Apr2016; Last   Rx:20Apr2016 Ordered   3  Triamterene-HCTZ 37 5-25 MG Oral Tablet; TAKE 1 TABLET DAILY AS DIRECTED; Therapy: 27Apr2016 to (Evaluate:84Ezo0013)  Requested for: 27Apr2016; Last   Rx:27Apr2016 Ordered   4  Ventolin  (90 Base) MCG/ACT Inhalation Aerosol Solution; INHALE 1 TO 2 PUFFS   EVERY 4 TO 6 HOURS AS NEEDED Recorded  Medication List Reviewed: The medication list was reviewed and updated today  Allergies  Medication    1  No Known Drug Allergies    Vitals  Vital Signs [Data Includes: Current Encounter]    Recorded: 77SGH6002 11:04AM   Heart Rate 82   Systolic 427, RUE, Sitting   Diastolic 84, RUE, Sitting   BP Cuff Size Large   Height 6 ft 1 in   Weight 226 lb 5 oz   BMI Calculated 29 86   BSA Calculated 2 27     Physical Exam    Constitutional - General appearance: No acute distress, well appearing and well nourished  Eyes - Conjunctiva and Sclera examination: Conjunctiva pink, sclera anicteric  Neck - Normal, no JVD   Pulmonary - Respiratory effort: No signs of respiratory distress  Auscultation of lungs: Clear to auscultation  Cardiovascular - Auscultation of heart: Normal rate and rhythm, normal S1 and S2, no murmurs  Pedal pulses: Normal, 2+ bilaterally  Examination of extremities for edema and/or varicosities: Normal     Abdomen - Soft  Musculoskeletal - Gait and station: Normal gait  Skin - Skin: Normal without rashes  Skin is warm and well perfused  Neurologic - Speech normal  No focal deficits  Psychiatric - Orientation to person, place, and time: Normal       Results/Data  ECG Report: A 12 lead ECG was performed and was normal    Rhythm and rate:  normal sinus rhythm  P-waves:   the P wave is normal  NH interval is normal    Axis: the QRS axis is normal    QRS: the QRS is normal   ST segment: the ST segments are normal    T waves:   normal  Results   CT Angiography Chest With and Without 86NAC4048 09:49AM [place] Holter     Test Name Result Flag Reference   CT Angio Chest W & W/O (Report)     [facility];[address];;[place];[address]   12/09/2015 1013   12/09/2015 1025       CT ANGIOGRAM OF THE CHEST, WITH AND WITHOUT IV CONTRAST     INDICATION- Follow up thoracic aortic aneurysm      COMPARISON- September 8, 2015     TECHNIQUE- CT angiogram examination of the chest was performed   according to standard protocol  Contrast as well as noncontrast images   were obtained  100 ml of Omnipaque 350 was injected intravenously  3D reconstructions   were performed an independent workstation, and are supplied for review  FINDINGS-     VASCULAR STRUCTURES- Ascending aorta is aneurysmal measuring a maximum   of 42 1 mm in transverse diameter which is unchanged from the previous   study  The aortic arch and descending thoracic aorta are   nonaneurysmal  A bovine branching pattern of the aortic arch is   present without stenoses at the origins of the great vessels  The   visualized abdominal aorta is unremarkable  OTHER FINDINGS-    HEART-   Normal cardiac size  No pericardial effusion  LUNGS- Unremarkable  PLEURA- No pleural effusion  MEDIASTINUM AND CHAYO- No mass or significant lymphadenopathy  CHEST WALL AND LOWER NECK- Unremarkable  VISUALIZED STRUCTURES IN THE UPPER ABDOMEN- Large hiatal hernia,   unchanged  Hypovascular mass in the medial segment of the left lobe of   the liver measuring 27 0 mm, unchanged  IMPRESSION-      Stable ascending aortic aneurysm             Transcribed on- RYAN Ruvalcbaa MD   Reading Radiologist- RYAN Fletcher MD   Electronically RYAN Julien MD Released Date Time- 12/09/15 1518   ------------------------------------------------------------------------------   9006^FARRAH MÉNDEZ   9006^FARRAH Esquivel 1465     Message  Return to work or school:   Mitch Kay is under my professional care  He was seen in my office on May 12, 2015       Weight Bearing Status: Partial Weight-Bearing  Patient may not lift over 50 lbs at this time  Eric Armas MD       Health Management  Screening for colon cancer   COLONOSCOPY; every 10 years; Next Due: 64WTV4201; Overdue    Future Appointments    Date/Time Provider Specialty Site   05/10/2016 08:30 AM ANN Harrington   [address]   09/15/2016 12:10 PM Specialty Clinic, ENT  [facility]     Signatures   Electronically signed by : ANN Celeste ; May  3 2016 11:21AM EST                       (Author)

## 2018-01-12 VITALS
HEART RATE: 70 BPM | BODY MASS INDEX: 29.68 KG/M2 | TEMPERATURE: 97.4 F | RESPIRATION RATE: 18 BRPM | SYSTOLIC BLOOD PRESSURE: 116 MMHG | DIASTOLIC BLOOD PRESSURE: 76 MMHG | WEIGHT: 225 LBS

## 2018-01-12 VITALS
SYSTOLIC BLOOD PRESSURE: 102 MMHG | RESPIRATION RATE: 16 BRPM | HEART RATE: 68 BPM | WEIGHT: 227.25 LBS | BODY MASS INDEX: 29.98 KG/M2 | DIASTOLIC BLOOD PRESSURE: 74 MMHG

## 2018-01-12 VITALS
BODY MASS INDEX: 29.62 KG/M2 | SYSTOLIC BLOOD PRESSURE: 110 MMHG | RESPIRATION RATE: 16 BRPM | DIASTOLIC BLOOD PRESSURE: 70 MMHG | HEART RATE: 60 BPM | WEIGHT: 224.5 LBS

## 2018-01-12 VITALS
HEIGHT: 73 IN | HEART RATE: 68 BPM | BODY MASS INDEX: 30.48 KG/M2 | WEIGHT: 230 LBS | DIASTOLIC BLOOD PRESSURE: 90 MMHG | SYSTOLIC BLOOD PRESSURE: 122 MMHG

## 2018-01-12 VITALS
HEIGHT: 73 IN | HEART RATE: 64 BPM | DIASTOLIC BLOOD PRESSURE: 78 MMHG | WEIGHT: 232 LBS | BODY MASS INDEX: 30.75 KG/M2 | SYSTOLIC BLOOD PRESSURE: 108 MMHG

## 2018-01-12 VITALS
BODY MASS INDEX: 29.87 KG/M2 | HEART RATE: 58 BPM | DIASTOLIC BLOOD PRESSURE: 82 MMHG | HEIGHT: 73 IN | SYSTOLIC BLOOD PRESSURE: 104 MMHG | WEIGHT: 225.38 LBS

## 2018-01-12 VITALS
SYSTOLIC BLOOD PRESSURE: 112 MMHG | HEART RATE: 68 BPM | DIASTOLIC BLOOD PRESSURE: 80 MMHG | HEIGHT: 73 IN | RESPIRATION RATE: 16 BRPM | WEIGHT: 232.25 LBS | BODY MASS INDEX: 30.78 KG/M2

## 2018-01-12 NOTE — CONSULTS
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  He also has COPD  History of Present Illness  The patient states he has been generally doing well since the last visit  Comorbid Illnesses: Diagnosed with a bicuspid AV and an ascending aortic root aneurysm measuring 4 3cm  Interval Events: Has been under a great deal of stress and has had chest pain, shortness of breath and palpitations that are new  His palpitations occur with SOB and can happen with exertion or rest  They happen almost every day  Symptoms: stable chest pain at rest, stable exertional chest pain, denies fatigue, denies exercise intolerance, new onset of palpitations, denies edema, denies orthopnea, denies claudication, denies dizziness and denies orthostatic dizziness    The patient presents with complaints of sudden onset of intermittent episodes of moderate stable dyspnea  Episodes started 2 months ago  Symptoms are improved by rest, but not by sitting up  Symptoms are made worse by walking, climbing stairs and exercise  Symptoms are unchanged  Associated symptoms: no syncope, no PND, no tendency for easy bleeding, no tendency for easy bruising, no TIA symptoms and no recent weight gain  Disease Monitoring:   Medications: The patient is not currently on any medications  Review of Systems      Cardiac: chest pain and palpitations present , but no rhythm problems, no fainting/blackouts, no heart murmur present, no signs of swelling, no syncope/fainting, no AM fatigue and no witnessed apnea episodes  Skin: No complaints of nonhealing sores or skin rash     Genitourinary: kidney stones and erectile dysfunction, but no recurrent urinary tract infections, no frequent urination at night, no difficult urination, no blood in urine, no loss of bladder control, no kidney problems and no prostate problems   Psychological: No complaints of feeling depressed, anxiety, panic attacks, or difficulty concentrating  General: trouble sleeping, appetite changes and lack of energy/fatigue, but no changes in weight, no fever, no night sweats and no frequent infections  Respiratory: No complaints of shortness of breath, cough with sputum, or wheezing  HEENT: No complaints of serious problems, hearing problems, nose problems, throat problems, or snoring  Gastrointestinal: nausea, vomiting, heartburn and diarrhea, but no liver problems, no bloody stools, no constipation, no abdonimal pain and no rectal bleeding   Hematologic: No complaints of bleeding disorders, anemia, blood clots, or excessive brusing  Neurological: numbnes, tingling, headaches and daytime sleepiness, but no weakness, no seizures, no dizziness and no diplopia   Musculoskeletal: back pain, but no arthritis and no swelling/pain     ROS reviewed  Active Problems    1  Acute bacterial prostatitis (601 0) (N41 0)   2  Adjustment reaction with anxiety and depression (309 28) (F43 23)   3  Allergic rhinitis (477 9) (J30 9)   4  Ascending aortic aneurysm (441 2) (I71 2)   5  Back pain (724 5) (M54 9)   6  Bicuspid aortic valve (746 4) (Q23 1)   7  Carpal tunnel syndrome, unspecified laterality (354 0) (G56 00)   8  Chest pain, precordial (786 51) (R07 2)   9  Chills (780 64) (R68 83)   10  COPD (chronic obstructive pulmonary disease) (496) (J44 9)   11  Diarrhea (787 91) (R19 7)   12  Dizziness (780 4) (R42)   13  Epididymitis (604 90) (N45 1)   14  Erectile dysfunction of non-organic origin (302 72) (F52 21)   15  Essential hypertriglyceridemia (272 1) (E78 1)   16  Hard of hearing (389 9) (H91 90)   17  Headache (784 0) (R51)   18   Hiatal hernia (553 3) (K44 9)   19  Joint pain, knee (719 46) (M25 569)   20  Left-sided tinnitus (388 30) (H93 12)   21  Lyme disease (088 81) (A69 20)   22  Myalgia (729 1) (M79 1)   23  Other acute sinusitis (461 8) (J01 80)   24  Palpitations (785 1) (R00 2)   25  Right knee pain (719 46) (M25 561)   26  Screening for colon cancer (V76 51) (Z12 11)   27  Sleep disorder (780 50) (G47 9)   28  Vertigo (780 4) (R42)    Past Medical History    · History of Arm DVT (deep venous thromboembolism), acute (453 82) (I82 629)   · History of Colon cancer screening (V76 51) (Z12 11)   · History of Denial Of Any Significant Medical History   · History of Flu vaccine need (V04 81) (Z23)   · History of renal calculi (V13 01) (H96 326)    The active problems and past medical history were reviewed and updated today  Surgical History    · History of Foot Surgery   · History of Hernia Repair    The surgical history was reviewed and updated today  Family History    · Family history of Arthritis (V17 7)    · Family history of Leukemia (V16 6)    · Family history of abdominal aortic aneurysm (V17 49) (Z82 49)    · Family history of Family Health Status Of Father - Alive   · Family history of Family Health Status Of Mother - Alive    The family history was reviewed and updated today  Social History    · Denied: Alcohol use (V49 89) (Z78 9)   · Caffeine Use   ·    · Denied: Drug use (305 90) (F19 90)   · Former smoker (K49 71) (Z87 891)  The social history was reviewed and updated today  Current Meds   1  Dulera 200-5 MCG/ACT Inhalation Aerosol; INHALE 2 PUFFS Twice daily Recorded   2  Pantoprazole Sodium 40 MG Oral Tablet Delayed Release; take 1 tablet by mouth every   day; Therapy: 22Fgg0326 to (Evaluate:17Oct2016)  Requested for: 20Apr2016; Last   Rx:20Apr2016 Ordered   3  Triamterene-HCTZ 37 5-25 MG Oral Tablet; TAKE 1 TABLET DAILY AS DIRECTED;    Therapy: 27Apr2016 to (Evaluate:00Aty9899)  Requested for: 27Apr2016; Last   Rx:27Apr2016 Ordered   4  Ventolin  (90 Base) MCG/ACT Inhalation Aerosol Solution; INHALE 1 TO 2 PUFFS   EVERY 4 TO 6 HOURS AS NEEDED Recorded    The medication list was reviewed and updated today  Allergies    1  No Known Drug Allergies    Vitals   Recorded: 56XBM6629 11:04AM   Heart Rate 82   Systolic 493, RUE, Sitting   Diastolic 84, RUE, Sitting   BP Cuff Size Large   Height 6 ft 1 in   Weight 226 lb 5 oz   BMI Calculated 29 86   BSA Calculated 2 27     Physical Exam    Constitutional - General appearance: No acute distress, well appearing and well nourished  Eyes - Conjunctiva and Sclera examination: Conjunctiva pink, sclera anicteric  Neck - Normal, no JVD   Pulmonary - Respiratory effort: No signs of respiratory distress  Auscultation of lungs: Clear to auscultation  Cardiovascular - Auscultation of heart: Normal rate and rhythm, normal S1 and S2, no murmurs  Pedal pulses: Normal, 2+ bilaterally  Examination of extremities for edema and/or varicosities: Normal     Abdomen - Soft  Musculoskeletal - Gait and station: Normal gait  Skin - Skin: Normal without rashes  Skin is warm and well perfused  Neurologic - Speech normal  No focal deficits  Psychiatric - Orientation to person, place, and time: Normal       Results/Data  A 12 lead ECG was performed and was normal    Rhythm and rate:  normal sinus rhythm  P-waves:   the P wave is normal  OR interval is normal    Axis: the QRS axis is normal    QRS: the QRS is normal   ST segment: the ST segments are normal    T waves:   normal  CT Angiography Chest With and Without 37VKC8412 09:49AM [facility]     Test Name Result Flag Reference   CT Angio Chest W & W/O (Report)     [facility];[address] [place] [address] Road;;[place];[address]   12/09/2015 1013   12/09/2015 1025       CT ANGIOGRAM OF THE CHEST, WITH AND WITHOUT IV CONTRAST     INDICATION- Follow up thoracic aortic aneurysm      COMPARISON- September 8, 2015 TECHNIQUE- CT angiogram examination of the chest was performed   according to standard protocol  Contrast as well as noncontrast images   were obtained  100 ml of Omnipaque 350 was injected intravenously  3D reconstructions   were performed an independent workstation, and are supplied for review  FINDINGS-     VASCULAR STRUCTURES- Ascending aorta is aneurysmal measuring a maximum   of 42 1 mm in transverse diameter which is unchanged from the previous   study  The aortic arch and descending thoracic aorta are   nonaneurysmal  A bovine branching pattern of the aortic arch is   present without stenoses at the origins of the great vessels  The   visualized abdominal aorta is unremarkable  OTHER FINDINGS-    HEART-   Normal cardiac size  No pericardial effusion  LUNGS- Unremarkable  PLEURA- No pleural effusion  MEDIASTINUM AND CHAYO- No mass or significant lymphadenopathy  CHEST WALL AND LOWER NECK- Unremarkable  VISUALIZED STRUCTURES IN THE UPPER ABDOMEN- Large hiatal hernia,   unchanged  Hypovascular mass in the medial segment of the left lobe of   the liver measuring 27 0 mm, unchanged  IMPRESSION-      Stable ascending aortic aneurysm  Transcribed on- RYAN Rodriguez MD   Reading Radiologist- RYAN Demarco MD   Electronically Signed- RYAN Demarco MD   Released Date Time- 12/09/15 1518   ------------------------------------------------------------------------------   9006^FARRAH MÉNDEZ   9006^FARRAH MÉNDEZ     Assessment    1  Ascending aortic aneurysm (441 2) (I71 2)   2  Bicuspid aortic valve (746 4) (Q23 1)   3  Palpitations (785 1) (R00 2)    Plan  Bicuspid aortic valve    · ECHO COMPLETE WITH CONTRAST IF INDICATED, TTE / TRANSTHORACIC;  Status:Need Information - Financial Authorization; Requested for:[id number];    Perform:[facility]; QXY:[id number];ZQZNFYQ;   For:Bicuspid aortic valve; Ordered By:Anupam Eric;  Palpitations    · HOLTER MONITOR - 48 HOUR; Status:Hold For - Scheduling; Requested  for:34Njs5400;    Perform:[facility]; ZWQ:[id number];BTYFDSS; For:Palpitations; Ordered By:Eric Bo;   · Follow-up visit in 1 year Evaluation and Treatment  Follow-up  Status: Hold For -  Scheduling  Requested for: 21AZO3378   Ordered; For: Palpitations; Ordered By: Aaliyah Alfred Performed:  Due: 47XPG3159   · A diet that is low in fat, cholesterol, and sodium is considered a cardiac diet ;  Status:Complete;   Done: 58TTU5086 11:15AM   Ordered; For:Palpitations; Ordered By:Eric Bo;   · Begin or continue regular aerobic exercise  Gradually work up to at least count1  sessions of dur1 of exercise a week ; Status:Complete;   Done: 50NGL7858 11:15AM   Ordered; For:Palpitations; Ordered By:Eric Bo; Discussion/Summary    Bicuspid AV: with no evidence of stenosis or regurgitation of any significance  Continue surveillance with echocardiograms  Will recheck at this time with new symptoms of CP, SOB, and palpitations  Chest Pain: evaluated with a stress test that ruled out CAD  Further evaluation with echocardiogram     Ascending Aortic Aneurysm: measuring 4 3cm on echo, had repeat CTA iin 6 months as directed by Dr Matthew Fink, with stable size  Palpitations: with COPD, will evaluate further with a Holter to rule out arrhythmias  I asked the patient to call in 2-3 days after the Holter for results  The patient was counseled regarding diagnostic results, instructions for management, risk factor reductions, impressions  total time of encounter was 25 minutes and 15 minutes was spent counseling  Thank you very much for allowing me to participate in the care of this patient  If you have any questions, please do not hesitate to contact me        Future Appointments    Signatures   Electronically signed by : ANN Jarrell ; May  3 2016 11:21AM EST (Author)

## 2018-01-13 VITALS
SYSTOLIC BLOOD PRESSURE: 122 MMHG | HEART RATE: 78 BPM | HEIGHT: 73 IN | WEIGHT: 235.38 LBS | DIASTOLIC BLOOD PRESSURE: 70 MMHG | TEMPERATURE: 98 F | BODY MASS INDEX: 31.2 KG/M2 | RESPIRATION RATE: 16 BRPM

## 2018-01-13 VITALS
BODY MASS INDEX: 31.41 KG/M2 | DIASTOLIC BLOOD PRESSURE: 80 MMHG | HEIGHT: 73 IN | HEART RATE: 63 BPM | OXYGEN SATURATION: 96 % | SYSTOLIC BLOOD PRESSURE: 115 MMHG | WEIGHT: 237 LBS | TEMPERATURE: 96.7 F

## 2018-01-13 VITALS
HEART RATE: 60 BPM | WEIGHT: 225 LBS | DIASTOLIC BLOOD PRESSURE: 70 MMHG | RESPIRATION RATE: 16 BRPM | BODY MASS INDEX: 30.52 KG/M2 | SYSTOLIC BLOOD PRESSURE: 104 MMHG

## 2018-01-15 ENCOUNTER — HOSPITAL ENCOUNTER (OUTPATIENT)
Dept: RADIOLOGY | Facility: CLINIC | Age: 53
Discharge: HOME | End: 2018-01-15
Attending: PHYSICAL MEDICINE & REHABILITATION
Payer: COMMERCIAL

## 2018-01-15 DIAGNOSIS — R20.0 TACTILE ANESTHESIA: ICD-10-CM

## 2018-01-15 PROCEDURE — 95909 NRV CNDJ TST 5-6 STUDIES: CPT

## 2018-01-15 PROCEDURE — 95886 MUSC TEST DONE W/N TEST COMP: CPT

## 2018-01-15 NOTE — PROGRESS NOTES
Assessment    1  Muscle tension headache (307 81) (G44 209)   2  Erectile dysfunction of non-organic origin (302 72) (F52 21)   3  Diverticulosis of large intestine without hemorrhage (562 10) (K57 30)   4  Benign colon polyp (211 3) (K63 5)   5  Screening for prostate cancer (V76 44) (Z12 5)   6  Family history of malignant neoplasm of prostate (V16 42) (W32 42) : Brother   · Brother  of Prostate Cancer at 55 yo    Plan  COPD (chronic obstructive pulmonary disease)    · Dulera 200-5 MCG/ACT Inhalation Aerosol; INHALE 2 PUFFS Twice daily   · Ventolin  (90 Base) MCG/ACT Inhalation Aerosol Solution; INHALE 1  TO 2 PUFFS EVERY 4 TO 6 HOURS AS NEEDED  Depression    · BuPROPion HCl - 75 MG Oral Tablet; TAKE 1 TABLET TWICE DAILY  Depression, Essential hypertriglyceridemia, Hepatic steatosis, Muscle tension headache    · Follow-up visit in 1 year Evaluation and Treatment  Follow-up  Status: Hold For -  Scheduling  Requested for: 16Nxq3120   · Follow-up visit in 3 months Evaluation and Treatment  Follow-up  Status: Hold For -  Scheduling  Requested for: 54Yyl5283  Erectile dysfunction of non-organic origin    · Sildenafil Citrate 20 MG Oral Tablet; take 2-3 tabs as needed no more than once a  day  Essential hypertriglyceridemia, Hepatic steatosis    · Fenofibrate 145 MG Oral Tablet; Take 1 tablet daily  Hiatal hernia    · Pantoprazole Sodium 40 MG Oral Tablet Delayed Release; TAKE 1 TABLET  DAILY  Muscle tension headache    · TiZANidine HCl - 4 MG Oral Tablet; TAKE 1 OR 2 TABLETS EVERY 8 HOURS AS  NEEDED  Screening for prostate cancer    · (1) PSA (SCREEN) (Dx V76 44 Screen for Prostate Cancer); Status:Active; Requested  for:66Gtv6137;     Discussion/Summary  health maintenance visit Currently, he eats a healthy diet and has an inadequate exercise regimen   Prostate cancer screening: the risks and benefits of prostate cancer screening were discussed, prostate cancer screening is managed by Myself and PSA was ordered  Testicular cancer screening: monthly self testicular exam was advised  Colorectal cancer screening: the risks and benefits of colorectal cancer screening were discussed, colorectal cancer screening is current, colonoscopy is needed every five years, colorectal cancer screening is managed by Janette Aviles and the next colonoscopy is due 2022  History of Present Illness  HM, Adult Male: The patient is being seen for a health maintenance evaluation  Social History: Household members include adult children  He is   Work status: currently on disability and Dilated Aortic Root  General Health: The patient's health since the last visit is described as good  Lifestyle:  He consumes a diverse and healthy diet  He has weight concerns  He does not exercise regularly  He does not use tobacco  He denies alcohol use  He denies drug use  Reproductive health:  the patient is not sexually active  He complains of erectile dysfunction  Screening:      Review of Systems    Constitutional: feeling tired, but no fever and no chills  Eyes: No complaints of eye pain, no red eyes, no discharge from eyes, no itchy eyes  ENT: no complaints of earache, no hearing loss, no nosebleeds, no nasal discharge, no sore throat, no hoarseness  Cardiovascular: No complaints of slow heart rate, no fast heart rate, no chest pain, no palpitations, no leg claudication, no lower extremity  Respiratory: No complaints of shortness of breath, no wheezing, no cough, no SOB on exertion, no orthopnea or PND  Gastrointestinal: No complaints of abdominal pain, no constipation, no nausea or vomiting, no diarrhea or bloody stools  Genitourinary: No complaints of dysuria, no incontinence, no hesitancy, no nocturia, no genital lesion, no testicular pain  Musculoskeletal: No complaints of arthralgia, no myalgias, no joint swelling or stiffness, no limb pain or swelling     The patient presents with complaints of gradual onset of moderate bilateral frontal and bilateral occipital headache, described as tight, aching and band-like  Active Problems    1  Abdominal pain, RUQ (789 01) (R10 11)   2  Acute bacterial prostatitis (601 0) (N41 0)   3  Adjustment reaction with anxiety and depression (309 28) (F43 23)   4  Allergic rhinitis (477 9) (J30 9)   5  Ascending aortic aneurysm (441 2) (I71 2)   6  Asthma (493 90) (J45 909)   7  Back pain (724 5) (M54 9)   8  Bicuspid aortic valve (746 4) (Q23 1)   9  Carpal tunnel syndrome, unspecified laterality (354 0) (G56 00)   10  Chest pain, precordial (786 51) (R07 2)   11  Chills (780 64) (R68 83)   12  Colon cancer screening (V76 51) (Z12 11)   13  COPD (chronic obstructive pulmonary disease) (496) (J44 9)   14  Depression (311) (F32 9)   15  Diarrhea (787 91) (R19 7)   16  Dizziness (780 4) (R42)   17  Edema (782 3) (R60 9)   18  Encounter for screening colonoscopy (V76 51) (Z12 11)   19  Epididymitis (604 90) (N45 1)   20  Erectile dysfunction of non-organic origin (302 72) (F52 21)   21  Essential hypertriglyceridemia (272 1) (E78 1)   22  Facial rash (782 1) (R21)   23  Frontal headache (784 0) (R51)   24  Hard of hearing (389 9) (H91 90)   25  Hepatic steatosis (571 8) (K76 0)   26  Hiatal hernia (553 3) (K44 9)   27  Joint pain, knee (719 46) (M25 569)   28  Left-sided tinnitus (388 30) (H93 12)   29  Lyme disease (088 81) (A69 20)   30  Myalgia (729 1) (M79 1)   31  Other acute sinusitis (461 8) (J01 80)   32  PAC (premature atrial contraction) (427 61) (I49 1)   33  Palpitations (785 1) (R00 2)   34  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)   35  Right knee pain (719 46) (M25 561)   36  Screening for colon cancer (V76 51) (Z12 11)   37  Sleep disorder (780 50) (G47 9)   38  Sore throat (462) (J02 9)   39   Vertigo (780 4) (R42)    Past Medical History    · History of Arm DVT (deep venous thromboembolism), acute (453 82) (I82 629)   · History of Colon cancer screening (V76 51) (Z12 11)   · History of Denial Of Any Significant Medical History   · History of Flu vaccine need (V04 81) (Z23)   · History of renal calculi (V13 01) (Y10 375)    The past medical history was reviewed and updated today  Surgical History    · History of Foot Surgery   · History of Hernia Repair    The surgical history was reviewed and updated today  Family History  Mother    · Family history of Arthritis (V17 7)  Brother    · Family history of malignant neoplasm of prostate (V16 42) (Z80 45)   · Family history of Leukemia (V16 6)  Grandfather    · Family history of abdominal aortic aneurysm (V17 49) (Z82 49)  Family History    · Family history of Family Health Status Of Father - Alive   · Family history of Family Health Status Of Mother - Alive    The family history was reviewed and updated today  Social History    · Denied: Alcohol use (V49 89) (Z78 9)   · Caffeine Use   ·    · Denied: Drug use (305 90) (F19 90)   · Former smoker (G31 95) (E53 541)    Current Meds   1  BuPROPion HCl - 75 MG Oral Tablet; TAKE 1 TABLET TWICE DAILY  Requested for:   74Sfa9505; Last Rx:[id number] Ordered   2  Dulera 200-5 MCG/ACT Inhalation Aerosol; INHALE 2 PUFFS Twice daily Recorded   3  Fenofibrate 145 MG Oral Tablet; Take 1 tablet daily; Therapy: 14XTW2639 to (Juliette Jennings)  Requested for: 74Spz0091; Last   MH:[id number] Ordered   4  Montelukast Sodium 10 MG Oral Tablet; Take 1 tablet daily Recorded   5  Pantoprazole Sodium 40 MG Oral Tablet Delayed Release; TAKE 1 TABLET DAILY; Therapy: 20Apr2016 to (Evaluate:04Oct2017)  Requested for: 53Ufp3235; Last   Rx:07Apr2017 Ordered   6  Ventolin  (90 Base) MCG/ACT Inhalation Aerosol Solution; INHALE 1 TO 2   PUFFS EVERY 4 TO 6 HOURS AS NEEDED Recorded    Allergies    1   No Known Drug Allergies    Vitals   Recorded: 30Aug2017 12:43PM   Heart Rate 60   Respiration 16   Systolic 039   Diastolic 70   Weight 422 lb 2 oz   BMI Calculated 29 97   BSA Calculated 2 27     Physical Exam    Constitutional   General appearance: No acute distress, well appearing and well nourished  Eyes   Conjunctiva and lids: No erythema, swelling or discharge  Pupils and irises: Equal, round, reactive to light  Ophthalmoscopic examination: Normal fundi and optic discs  Ears, Nose, Mouth, and Throat   External inspection of ears and nose: Normal     Otoscopic examination: Tympanic membranes translucent with normal light reflex  Canals patent without erythema  Hearing: Normal     Nasal mucosa, septum, and turbinates: Normal without edema or erythema  Lips, teeth, and gums: Normal, good dentition  Oropharynx: Normal with no erythema, edema, exudate or lesions  Neck   Thyroid: Normal, no thyromegaly  Pulmonary   Percussion of chest: Normal     Cardiovascular   Auscultation of heart: Normal rate and rhythm, normal S1 and S2, no murmurs  Abdomen   Abdomen: Non-tender, no masses  Liver and spleen: No hepatomegaly or splenomegaly  Skin   Skin and subcutaneous tissue: Normal without rashes or lesions  Health Management  Colon cancer screening   COLONOSCOPY; every 5 years; Last 34Gii3803; Next Due: 65VRH6758; Active    Future Appointments    Date/Time Provider Specialty Site   10/23/2017 03:00 PM ANN Delgado   [address] [facility]     Signatures   Electronically signed by : ANN Mcahado ; Aug 30 2017  1:36PM EST                       (Author)

## 2018-01-22 VITALS
SYSTOLIC BLOOD PRESSURE: 110 MMHG | DIASTOLIC BLOOD PRESSURE: 70 MMHG | BODY MASS INDEX: 29.97 KG/M2 | WEIGHT: 227.13 LBS | RESPIRATION RATE: 16 BRPM | HEART RATE: 60 BPM

## 2018-01-22 VITALS
BODY MASS INDEX: 30.35 KG/M2 | SYSTOLIC BLOOD PRESSURE: 104 MMHG | DIASTOLIC BLOOD PRESSURE: 66 MMHG | HEIGHT: 73 IN | WEIGHT: 229 LBS

## 2018-01-22 VITALS
WEIGHT: 230 LBS | DIASTOLIC BLOOD PRESSURE: 70 MMHG | BODY MASS INDEX: 30.48 KG/M2 | HEIGHT: 73 IN | SYSTOLIC BLOOD PRESSURE: 120 MMHG

## 2018-01-22 VITALS
DIASTOLIC BLOOD PRESSURE: 68 MMHG | BODY MASS INDEX: 30.7 KG/M2 | HEART RATE: 64 BPM | SYSTOLIC BLOOD PRESSURE: 126 MMHG | RESPIRATION RATE: 16 BRPM | WEIGHT: 229.5 LBS

## 2018-01-23 ENCOUNTER — ALLSCRIPTS OFFICE VISIT (OUTPATIENT)
Dept: OTHER | Facility: OTHER | Age: 53
End: 2018-01-23

## 2018-01-23 ENCOUNTER — TRANSCRIBE ORDERS (OUTPATIENT)
Dept: ADMINISTRATIVE | Facility: HOSPITAL | Age: 53
End: 2018-01-23

## 2018-01-23 VITALS
HEIGHT: 73 IN | DIASTOLIC BLOOD PRESSURE: 70 MMHG | SYSTOLIC BLOOD PRESSURE: 102 MMHG | RESPIRATION RATE: 16 BRPM | WEIGHT: 234.5 LBS | BODY MASS INDEX: 31.08 KG/M2 | HEART RATE: 60 BPM

## 2018-01-23 DIAGNOSIS — Q23.1 CONGENITAL INSUFFICIENCY OF AORTIC VALVE: Primary | ICD-10-CM

## 2018-01-24 NOTE — CONSULTS
I had the pleasure of evaluating your patient, Jacquelyn Hill  My full evaluation follows:      Chief Complaint  Patient is here today for 12 mo f/u  Patient c/o SOB, occ Palpitations, lightheadedness and Headache  EKG today  History of Present Illness  The patient states he has been generally doing well since the last visit  Comorbid Illnesses: Diagnosed with a bicuspid AV and an ascending aortic root aneurysm measuring 4 3cm  Interval Events: feels well, no complaints; fatty liver disease diagnosed and renal function has been declining  Symptoms: denies chest pain at rest, denies exertional chest pain, denies dyspnea, denies fatigue, denies exercise intolerance, denies palpitations, denies edema, denies orthopnea, denies claudication, denies dizziness and denies orthostatic dizziness  Associated symptoms: no syncope, no PND, no tendency for easy bleeding, no tendency for easy bruising, no TIA symptoms and no recent weight gain  Disease Monitoring:   Medications: The patient is not currently on any medications  Review of Systems      Cardiac: chest pain and palpitations present , but no rhythm problems, no fainting/blackouts, no heart murmur present, no signs of swelling, no syncope/fainting, no AM fatigue and no witnessed apnea episodes  Skin: No complaints of nonhealing sores or skin rash  Genitourinary: kidney stones and erectile dysfunction, but no recurrent urinary tract infections, no frequent urination at night, no difficult urination, no blood in urine, no loss of bladder control, no kidney problems and no prostate problems   Psychological: No complaints of feeling depressed, anxiety, panic attacks, or difficulty concentrating  General: trouble sleeping, appetite changes and lack of energy/fatigue, but no changes in weight, no fever, no night sweats and no frequent infections  Respiratory: No complaints of shortness of breath, cough with sputum, or wheezing     HEENT: No complaints of serious problems, hearing problems, nose problems, throat problems, or snoring  Gastrointestinal: nausea, vomiting, heartburn and diarrhea, but no liver problems, no bloody stools, no constipation, no abdonimal pain and no rectal bleeding   Hematologic: No complaints of bleeding disorders, anemia, blood clots, or excessive brusing  Neurological: numbnes, tingling, headaches and daytime sleepiness, but no weakness, no seizures, no dizziness and no diplopia   Musculoskeletal: back pain, but no arthritis and no swelling/pain     ROS reviewed  Active Problems    1  Abdominal cramping, generalized (789 07) (R10 84)   2  Abdominal pain, RUQ (789 01) (R10 11)   3  Acute bacterial prostatitis (601 0) (N41 0)   4  Adjustment reaction with anxiety and depression (309 28) (F43 23)   5  Allergic rhinitis (477 9) (J30 9)   6  Ascending aortic aneurysm (441 2) (I71 2)   7  Asthma (493 90) (J45 909)   8  Back pain (724 5) (M54 9)   9  Benign colon polyp (211 3) (K63 5)   10  Bicuspid aortic valve (746 4) (Q23 1)   11  Carpal tunnel syndrome, unspecified laterality (354 0) (G56 00)   12  Chest pain, precordial (786 51) (R07 2)   13  Chills (780 64) (R68 83)   14  Colon cancer screening (V76 51) (Z12 11)   15  COPD (chronic obstructive pulmonary disease) (496) (J44 9)   16  Depression (311) (F32 9)   17  Diarrhea (787 91) (R19 7)   18  Diverticulosis of large intestine without hemorrhage (562 10) (K57 30)   19  Dizziness (780 4) (R42)   20  Edema (782 3) (R60 9)   21  Encounter for screening colonoscopy (V76 51) (Z12 11)   22  Epididymitis (604 90) (N45 1)   23  Erectile dysfunction of non-organic origin (302 72) (F52 21)   24  Essential hypertriglyceridemia (272 1) (E78 1)   25  Facial rash (782 1) (R21)   26  Family history of malignant neoplasm of prostate (V16 42) (Z80 42)   27  Frontal headache (784 0) (R51)   28  Hard of hearing (389 9) (H91 90)   29  Hepatic steatosis (571 8) (K76 0)   30   Hiatal hernia (553 3) (K44 9)   31  Joint pain, knee (719 46) (M25 569)   32  Left leg numbness (782 0) (R20 0)   33  Left-sided tinnitus (388 30) (H93 12)   34  Lyme disease (088 81) (A69 20)   35  Muscle tension headache (307 81) (G44 209)   36  Myalgia (729 1) (M79 1)   37  Other acute sinusitis (461 8) (J01 80)   38  PAC (premature atrial contraction) (427 61) (I49 1)   39  Palpitations (785 1) (R00 2)   40  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)   41  Right knee pain (719 46) (M25 561)   42  Screening for colon cancer (V76 51) (Z12 11)   43  Screening for prostate cancer (V76 44) (Z12 5)   44  Sleep disorder (780 50) (G47 9)   45  Sore throat (462) (J02 9)   46  Tarsal tunnel syndrome, left (355 5) (G57 52)   47  Vertigo (780 4) (R42)   48  Vestibular migraine (346 00) (G43 109)    Past Medical History    · History of Arm DVT (deep venous thromboembolism), acute (453 82) (I82 629)   · History of Colon cancer screening (V76 51) (Z12 11)   · History of Denial Of Any Significant Medical History   · History of Flu vaccine need (V04 81) (Z23)   · History of renal calculi (V13 01) (L69 024)    The active problems and past medical history were reviewed and updated today  Surgical History    · History of Foot Surgery   · History of Hernia Repair   · History of Neuroplasty Median Nerve At Carpal Tunnel    The surgical history was reviewed and updated today  Family History    · Family history of Arthritis (V17 7)    · Family history of Schizo-affective psychosis    · Family history of malignant neoplasm of prostate (V16 42) (Z80 45)   · Family history of Leukemia (V16 6)    · Family history of abdominal aortic aneurysm (V17 49) (Z82 49)    · Family history of Family Health Status Of Father - Alive   · Family history of Family Health Status Of Mother - Alive    The family history was reviewed and updated today         Social History    · Denied: Alcohol use (V49 89) (Z78 9)   · Caffeine Use   ·    · Denied: Drug use (305 90) (F19 90)   · Former smoker (V15 82) (F30 121)   · Living alone (V60 3) (Z60 2)   · Single   · Two children   · Unemployed (V62 0) (Z56 0)  The social history was reviewed and updated today  The social history was reviewed and is unchanged  Current Meds   1  BuPROPion HCl - 75 MG Oral Tablet; TAKE 1 TABLET TWICE DAILY  Requested for:   60INO0021; Last Rx:20Nov2017 Ordered   2  Dulera 200-5 MCG/ACT Inhalation Aerosol; INHALE 2 PUFFS Twice daily Recorded   3  Fenofibrate 145 MG Oral Tablet; take 1 tablet by mouth once daily; Therapy: 01DMW3436 to ([id number])  Requested for: 56GDV8795; Last   Rx:26Oct2017 Ordered   4  Fluticasone Propionate 50 MCG/ACT Nasal Suspension; USE 2 SPRAYS IN EACH   NOSTRIL ONCE DAILY; Therapy: 11RLK0021 to (Last Rx:56Chl1265)  Requested for: 49GAH7865 Ordered   5  Montelukast Sodium 10 MG Oral Tablet; Take 1 tablet daily Recorded   6  Pantoprazole Sodium 40 MG Oral Tablet Delayed Release; take 1 tablet by mouth once   daily; Therapy: 90Uas1692 to (Evaluate:28Biz4414)  Requested for: 24YEZ1750; Last   Rx:26Oct2017 Ordered   7  Sildenafil Citrate 20 MG Oral Tablet; take 2-3 tabs as needed no more than once a day; Therapy: 49Ndf5604 to (Last Rx:79Jny3170) Ordered   8  SUMAtriptan Succinate 100 MG Oral Tablet; TAKE 1 TABLET FOR MIGRAINE RELIEF  MAY   REPEAT 2 HOURS LATER  MAXIMUM 200MG/DAY; Therapy: 87DXC0730 to (Evaluate:21Mar2018)  Requested for: 02IXG8005; Last   Rx:21Nov2017 Ordered   9  Ventolin  (90 Base) MCG/ACT Inhalation Aerosol Solution; INHALE 1 TO 2 PUFFS   EVERY 4 TO 6 HOURS AS NEEDED Recorded    The medication list was reviewed and updated today  Allergies    1  No Known Drug Allergies    2  No Known Environmental Allergies   3   No Known Food Allergies    Vitals   Recorded: 71BMP3319 09:12AM   Heart Rate 65, Apical   Systolic 744, LUE, Sitting   Diastolic 66, LUE, Sitting   Height 6 ft 0 5 in   Weight 235 lb 1 oz   BMI Calculated 31 44 BSA Calculated 2 29     Physical Exam    Constitutional - General appearance: No acute distress, well appearing and well nourished  Eyes - Conjunctiva and Sclera examination: Conjunctiva pink, sclera anicteric  Neck - Normal, no JVD   Pulmonary - Respiratory effort: No signs of respiratory distress  Auscultation of lungs: Clear to auscultation  Cardiovascular - Auscultation of heart: Normal rate and rhythm, normal S1 and S2, no murmurs  Pedal pulses: Normal, 2+ bilaterally  Examination of extremities for edema and/or varicosities: Normal     Abdomen - Soft  Musculoskeletal - Gait and station: Normal gait  Skin - Skin: Normal without rashes  Skin is warm and well perfused  Neurologic - Speech normal  No focal deficits  Psychiatric - Orientation to person, place, and time: Normal       Results/Data  A 12 lead ECG was performed and was normal    Rhythm and rate:  normal sinus rhythm  P-waves:   the P wave is normal  ID interval is normal    Axis: the QRS axis is normal    QRS: the QRS is normal   ST segment: the ST segments are normal    T waves:   normal  CT CHEST W CONTRAST [id number] 11:50AM Rose Hines     Test Name Result Flag Reference   CT CHEST W CONTRAST (Report)     CT CHEST WITH IV CONTRAST     INDICATION: Follow-up for ascending aortic aneurysm  COMPARISON: CT 12/9/2015     TECHNIQUE: CT examination of the chest was performed  Axial, sagittal and coronal reformatted projections were created  This examination, like all CT scans performed in the [facility], was performed utilizing techniques to minimize    radiation dose exposure, including the use of iterative reconstruction and automated exposure control  IV Contrast: iohexol (OMNIPAQUE) 350 MG/ML injection (SINGLE-DOSE) 85 mL Note: (SINGLE DOSE/MULTI DOSE) information refers to the container from which the contrast was acquired   Contrast was injected one time intravenously without immediate complication  FINDINGS:     LUNGS: Stable 2 mm right middle lobe nodule series 3 image 35  No endotracheal/endobronchial lesion  PLEURA: Unremarkable  HEART/GREAT VESSELS: Heart is stable in size  Stable 4 3 cm ascending thoracic aortic aneurysm measured at the level of the right main pulmonary artery  MEDIASTINUM AND CHAYO: Unremarkable  CHEST WALL AND LOWER NECK: Unremarkable  VISUALIZED STRUCTURES IN THE UPPER ABDOMEN: Stable hiatal hernia containing a portion of the stomach and abdominal fat  Stable hepatic cysts  Otherwise unremarkable  OSSEOUS STRUCTURES: No acute fracture  No destructive osseous lesion  IMPRESSION:     Stable ascending thoracic aortic aneurysm measuring 4 3 cm  Stable hiatal hernia containing a portion of the stomach and abdominal fat  Stable simple hepatic cysts  Workstation performed: [id number]     Signed by: Mo Barrera MD   17     ECHO COMPLETE WITH CONTRAST IF INDICATED 64YXD3155 07:46AM Pj Cuevas     Test Name Result Flag Reference   ECHO COMPLETE WITH CONTRAST IF INDICATED (Report)     [place] [address] [address] [place] [address]   [phone]     Transthoracic Echocardiogram   2D, M-mode, Doppler, and Color Doppler     Study date: 12-May-2016     Patient: Pinky Thomas   MR number: [id number]   Account number: [de-identified]   : 1965   Age: 45 years   Gender: Male   Status: Outpatient   Location: [place] [address]    Height: 73 in   Weight: 226 lb   BP: 110/ 84 mmHg     Indications: Bicuspid aortic valve  Diagnoses: Q23 1 - Congenital insufficiency of aortic valve     Sonographer: Rapp RCS   Primary Physician: Rissa Nation MD   Referring Physician: Natalie Green MD   Group: [facility]   Interpreting Physician: Natalie Green MD     SUMMARY     LEFT VENTRICLE:   Systolic function was normal  Ejection fraction was estimated to be 60 %     There were no regional wall motion abnormalities  AORTA:   The root exhibited dilatation, with a maximum measured diameter of 4 3 cm  HISTORY: PRIOR HISTORY: Aortic aneurysm  COPD  Former smoker  PROCEDURE: The study was performed in the [place] [place]  This was a   routine study  The transthoracic approach was used  The study included complete   2D imaging, M-mode, complete spectral Doppler, and color Doppler  The heart   rate was 50 bpm, at the start of the study  Images were obtained from the   parasternal, apical, subcostal, and suprasternal notch acoustic windows  Image   quality was adequate  LEFT VENTRICLE: Size was normal  Systolic function was normal  Ejection   fraction was estimated to be 60 %  There were no regional wall motion   abnormalities  Wall thickness was normal  DOPPLER: The transmitral flow pattern   was normal  Left ventricular diastolic function parameters were normal      RIGHT VENTRICLE: The size was normal  Systolic function was normal  Wall   thickness was normal      LEFT ATRIUM: Size was normal      RIGHT ATRIUM: Size was normal      MITRAL VALVE: Valve structure was normal  There was normal leaflet separation  DOPPLER: The transmitral velocity was within the normal range  There was no   evidence for stenosis  There was no significant regurgitation  AORTIC VALVE: The valve was trileaflet  Leaflets exhibited normal thickness and   normal cuspal separation  DOPPLER: Transaortic velocity was within the normal   range  There was no evidence for stenosis  There was no significant   regurgitation  TRICUSPID VALVE: The valve structure was normal  There was normal leaflet   separation  DOPPLER: The transtricuspid velocity was within the normal range  There was no evidence for stenosis  There was trace regurgitation  Pulmonary   artery systolic pressure was within the normal range  Estimated peak PA   pressure was 22 mmHg       PULMONIC VALVE: Leaflets exhibited normal thickness, no calcification, and   normal cuspal separation  DOPPLER: The transpulmonic velocity was within the   normal range  There was trace regurgitation  PERICARDIUM: There was no pericardial effusion  The pericardium was normal in   appearance  AORTA: The root exhibited dilatation, with a maximum measured diameter of 4 3   cm  SYSTEMIC VEINS: IVC: The inferior vena cava was not well visualized  SYSTEM MEASUREMENT TABLES     2D   %FS: 31 79 %   AV Diam: 4 18 cm   EDV(Teich): 94 56 ml   EF(Cube): 68 26 %   EF(Teich): 59 92 %   ESV(Cube): 29 78 ml   ESV(Teich): 37 9 ml   IVSd: 1 02 cm   LA Area: 19 79 cm2   LA Diam: 3 1 cm   LVEDV MOD A4C: 68 16 ml   LVEF MOD A4C: 68 74 %   LVESV MOD A4C: 21 31 ml   LVIDd: 4 54 cm   LVIDs: 3 1 cm   LVLd A4C: 8 23 cm   LVLs A4C: 6 64 cm   LVPWd: 0 96 cm   RA Area: 17 58 cm2   RV Diam: 3 46 cm   SV MOD A4C: 46 86 ml   SV(Cube): 64 03 ml   SV(Teich): 56 67 ml     CW   TR MaxP 15 mmHg   TR Vmax: 2 19 m/s     MM   TAPSE: 2 17 cm     PW   E': 0 09 m/s   MV A Juan: 0 53 m/s   MV Dec Waldo: 2 91 m/s2   MV DecT: 265 17 ms   MV E Juan: 0 77 m/s   MV E/A Ratio: 1 46   MV PHT: 73 18 ms   MVA By PHT: 3 01 cm2     [facility] Accredited Echocardiography Laboratory     Prepared and electronically signed by     Christopher Magana MD   Signed 12-May-2016 09:35:52     Assessment    1  Ascending aortic aneurysm (441 2) (I71 2)   2  Bicuspid aortic valve (746 4) (Q23 1)   3  Essential hypertriglyceridemia (272 1) (E78 1)   4  PAC (premature atrial contraction) (427 61) (I49 1)    Plan  Bicuspid aortic valve    · ECHO COMPLETE WITH CONTRAST IF INDICATED; Status:Need Information - Financial  Authorization; Requested for:[id number]; Perform:[facility]; KQF:[id number];QFMBLVB;   For:Bicuspid aortic valve; Ordered By:Anupam, Dwithiya;   · Follow-up visit in 1 year Evaluation and Treatment  Follow-up  Status: Hold For -  Scheduling  Requested for: 2018   Ordered; For: Bicuspid aortic valve; Ordered By: Alexis Chauhan Performed:  Due: 15UFC1241   · A diet that is low in fat, cholesterol, and sodium is considered a cardiac diet ;  Status:Complete;   Done: 31RXW9485 09:28AM   Ordered; For:Bicuspid aortic valve; Ordered By:[place] Bo;   · Begin or continue regular aerobic exercise  Gradually work up to at least {count1}  sessions of {dur1} of exercise a week ; Status:Complete;   Done: 71UVV6817 09:28AM   Ordered; For:Bicuspid aortic valve; Ordered By:[place] Bo; Dizziness, Health Maintenance, Palpitations    · EKG/ECG- POC; Status:Complete;   Done: 13ERB5881   Perform: In Office; YCQ:48DXN5253; Last Updated Earleen Lamp; 1/23/2018 9:17:10 AM;Ordered; For:Dizziness, Health Maintenance, Palpitations; Ordered By:[place] Bo; Discussion/Summary    Bicuspid AV: with no evidence of stenosis or regurgitation of any significance  Continue surveillance with echocardiograms  Will recheck in 2 years time - May 2018  Chest Pain: evaluated with a stress test that ruled out CAD  Further evaluation with echocardiogram and Holter was also unrevealing except for frequent PACs (which did not correlate with symptoms)  Ascending Aortic Aneurysm: measuring 4 3cm on echo, had repeat CTA in 6 months as directed by Dr Bebe Douglass, with stable size  Stable at 4 3cm in Jan 2017 Jan 2019 is when he'll be due again to have this evaluated  Palpitations: with COPD, we evaluated further with a Holter that ruled out significant arrhythmias - he did have PACs and 1 brief 7 beat run of PAT, which is not unexpected with his lung disease  Lipids: , LDL 51, HDL 30, and   Would continue dietary efforts to reduce carbohydrates and sugars in diet to help with TG levels  The patient was counseled regarding diagnostic results, instructions for management, risk factor reductions, impressions   total time of encounter was 25 minutes and 15 minutes was spent counseling  Thank you very much for allowing me to participate in the care of this patient  If you have any questions, please do not hesitate to contact me        Future Appointments    Signatures   Electronically signed by : ANN Garrison ; Jan 23 2018  9:40AM EST                       (Author)

## 2018-01-24 NOTE — PROGRESS NOTES
Assessment  Assessed    1  Ascending aortic aneurysm (441 2) (I71 2)   2  Bicuspid aortic valve (746 4) (Q23 1)   3  Essential hypertriglyceridemia (272 1) (E78 1)   4  PAC (premature atrial contraction) (427 61) (I49 1)    Plan  Bicuspid aortic valve    · ECHO COMPLETE WITH CONTRAST IF INDICATED; Status:Need Information - Financial  Authorization; Requested for:[id number]; Perform:[facility] Radiology; JWC:[id number];PODOVRV; For:Bicuspid aortic valve; Ordered By:Eric Bo;   · Follow-up visit in 1 year Evaluation and Treatment  Follow-up  Status: Hold For -  Scheduling  Requested for: 23Jan2018   Ordered; For: Bicuspid aortic valve; Ordered By: Jay Pina Performed:  Due: 22ECU5756   · A diet that is low in fat, cholesterol, and sodium is considered a cardiac diet ;  Status:Complete;   Done: 49TPT7236 09:28AM   Ordered; For:Bicuspid aortic valve; Ordered By:Eric Bo;   · Begin or continue regular aerobic exercise  Gradually work up to at least {count1}  sessions of {dur1} of exercise a week ; Status:Complete;   Done: 70RFI6765 09:28AM   Ordered; For:Bicuspid aortic valve; Ordered By:Eric Bo; Dizziness, Health Maintenance, Palpitations    · EKG/ECG- POC; Status:Complete;   Done: 88JYU8385   Perform: In Office; SDB:47NHE0932; Last Updated Tiera Newell; 1/23/2018 9:17:10 AM;Ordered; For:Dizziness, Health Maintenance, Palpitations; Ordered By:Eric Bo; Discussion/Summary  Cardiology Discussion Summary Free Text Note Form [facility]:   Bicuspid AV: with no evidence of stenosis or regurgitation of any significance  Continue surveillance with echocardiograms  Will recheck in 2 years time - May 2018  Chest Pain: evaluated with a stress test that ruled out CAD  Further evaluation with echocardiogram and Holter was also unrevealing except for frequent PACs (which did not correlate with symptoms)      Ascending Aortic Aneurysm: measuring 4 3cm on echo, had repeat CTA in 6 months as directed by Dr Alisa Marie, with stable size  Stable at 4 3cm in Jan 2017 Jan 2019 is when he'll be due again to have this evaluated  Palpitations: with COPD, we evaluated further with a Holter that ruled out significant arrhythmias - he did have PACs and 1 brief 7 beat run of PAT, which is not unexpected with his lung disease  Lipids: , LDL 51, HDL 30, and   Would continue dietary efforts to reduce carbohydrates and sugars in diet to help with TG levels  Counseling Documentation With Imm: The patient was counseled regarding diagnostic results, instructions for management, risk factor reductions, impressions  total time of encounter was 25 minutes and 15 minutes was spent counseling  Chief Complaint  Chief Complaint Free Text Note Form: Patient is here today for 12 mo f/u  Patient c/o SOB, occ Palpitations, lightheadedness and Headache  EKG today  History of Present Illness  Cardiology (Follow-Up): The patient states he has been generally doing well since the last visit  Comorbid Illnesses: Diagnosed with a bicuspid AV and an ascending aortic root aneurysm measuring 4 3cm  Interval Events: feels well, no complaints; fatty liver disease diagnosed and renal function has been declining  Symptoms: denies chest pain at rest, denies exertional chest pain, denies dyspnea, denies fatigue, denies exercise intolerance, denies palpitations, denies edema, denies orthopnea, denies claudication, denies dizziness and denies orthostatic dizziness  Associated symptoms: no syncope, no PND, no tendency for easy bleeding, no tendency for easy bruising, no TIA symptoms and no recent weight gain  Disease Monitoring:   Medications: The patient is not currently on any medications        Review of Systems  Cardiology Male ROS:     Cardiac: chest pain and palpitations present , but no rhythm problems, no fainting/blackouts, no heart murmur present, no signs of swelling, no syncope/fainting, no AM fatigue and no witnessed apnea episodes  Skin: No complaints of nonhealing sores or skin rash  Genitourinary: kidney stones and erectile dysfunction, but no recurrent urinary tract infections, no frequent urination at night, no difficult urination, no blood in urine, no loss of bladder control, no kidney problems and no prostate problems   Psychological: No complaints of feeling depressed, anxiety, panic attacks, or difficulty concentrating  General: trouble sleeping, appetite changes and lack of energy/fatigue, but no changes in weight, no fever, no night sweats and no frequent infections  Respiratory: No complaints of shortness of breath, cough with sputum, or wheezing  HEENT: No complaints of serious problems, hearing problems, nose problems, throat problems, or snoring  Gastrointestinal: nausea, vomiting, heartburn and diarrhea, but no liver problems, no bloody stools, no constipation, no abdonimal pain and no rectal bleeding   Hematologic: No complaints of bleeding disorders, anemia, blood clots, or excessive brusing  Neurological: numbnes, tingling, headaches and daytime sleepiness, but no weakness, no seizures, no dizziness and no diplopia   Musculoskeletal: back pain, but no arthritis and no swelling/pain   ROS Reviewed:   ROS reviewed  Active Problems  Problems    1  Abdominal cramping, generalized (789 07) (R10 84)   2  Abdominal pain, RUQ (789 01) (R10 11)   3  Acute bacterial prostatitis (601 0) (N41 0)   4  Adjustment reaction with anxiety and depression (309 28) (F43 23)   5  Allergic rhinitis (477 9) (J30 9)   6  Ascending aortic aneurysm (441 2) (I71 2)   7  Asthma (493 90) (J45 909)   8  Back pain (724 5) (M54 9)   9  Benign colon polyp (211 3) (K63 5)   10  Bicuspid aortic valve (746 4) (Q23 1)   11  Carpal tunnel syndrome, unspecified laterality (354 0) (G56 00)   12  Chest pain, precordial (786 51) (R07 2)   13  Chills (780 64) (R68 83)   14   Colon cancer screening (V76 51) (Z12 11)   15  COPD (chronic obstructive pulmonary disease) (496) (J44 9)   16  Depression (311) (F32 9)   17  Diarrhea (787 91) (R19 7)   18  Diverticulosis of large intestine without hemorrhage (562 10) (K57 30)   19  Dizziness (780 4) (R42)   20  Edema (782 3) (R60 9)   21  Encounter for screening colonoscopy (V76 51) (Z12 11)   22  Epididymitis (604 90) (N45 1)   23  Erectile dysfunction of non-organic origin (302 72) (F52 21)   24  Essential hypertriglyceridemia (272 1) (E78 1)   25  Facial rash (782 1) (R21)   26  Family history of malignant neoplasm of prostate (V16 42) (Z80 42)   27  Frontal headache (784 0) (R51)   28  Hard of hearing (389 9) (H91 90)   29  Hepatic steatosis (571 8) (K76 0)   30  Hiatal hernia (553 3) (K44 9)   31  Joint pain, knee (719 46) (M25 569)   32  Left leg numbness (782 0) (R20 0)   33  Left-sided tinnitus (388 30) (H93 12)   34  Lyme disease (088 81) (A69 20)   35  Muscle tension headache (307 81) (G44 209)   36  Myalgia (729 1) (M79 1)   37  Other acute sinusitis (461 8) (J01 80)   38  PAC (premature atrial contraction) (427 61) (I49 1)   39  Palpitations (785 1) (R00 2)   40  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)   41  Right knee pain (719 46) (M25 561)   42  Screening for colon cancer (V76 51) (Z12 11)   43  Screening for prostate cancer (V76 44) (Z12 5)   44  Sleep disorder (780 50) (G47 9)   45  Sore throat (462) (J02 9)   46  Tarsal tunnel syndrome, left (355 5) (G57 52)   47  Vertigo (780 4) (R42)   48  Vestibular migraine (346 00) (G43 109)    Past Medical History  Problems    1  History of Arm DVT (deep venous thromboembolism), acute (453 82) (I82 629)   2  History of Colon cancer screening (V76 51) (Z12 11)   3  History of Denial Of Any Significant Medical History   4  History of Flu vaccine need (V04 81) (Z23)   5  History of renal calculi (V13 01) (I84 082)  Active Problems And Past Medical History Reviewed:    The active problems and past medical history were reviewed and updated today  Surgical History  Problems    1  History of Foot Surgery   2  History of Hernia Repair   3  History of Neuroplasty Median Nerve At Carpal Tunnel  Surgical History Reviewed: The surgical history was reviewed and updated today  Family History  Mother    1  Family history of Arthritis (V17 7)  Sister    2  Family history of Schizo-affective psychosis  Brother    3  Family history of malignant neoplasm of prostate (V16 42) (Z80 42)   4  Family history of Leukemia (V16 6)  Grandfather    5  Family history of abdominal aortic aneurysm (V17 49) (Z82 49)  Family History    6  Family history of Family Health Status Of Father - Alive   7  Family history of Family Health Status Of Mother - Alive  Family History Reviewed: The family history was reviewed and updated today  Social History  Problems    · Denied: Alcohol use (V49 89) (Z78 9)   · Caffeine Use   ·    · Denied: Drug use (305 90) (F19 90)   · Former smoker (V15 82) (Z87 891)   · Living alone (V60 3) (Z60 2)   · Single   · Two children   · Unemployed (V62 0) (Z56 0)  Social History Reviewed: The social history was reviewed and updated today  The social history was reviewed and is unchanged  Current Meds   1  BuPROPion HCl - 75 MG Oral Tablet; TAKE 1 TABLET TWICE DAILY  Requested for:   80QAM3629; Last Rx:20Nov2017 Ordered   2  Dulera 200-5 MCG/ACT Inhalation Aerosol; INHALE 2 PUFFS Twice daily Recorded   3  Fenofibrate 145 MG Oral Tablet; take 1 tablet by mouth once daily; Therapy: 34FKK4700 to (Skyla Rutledge)  Requested for: 40LBO8138; Last   Rx:26Oct2017 Ordered   4  Fluticasone Propionate 50 MCG/ACT Nasal Suspension; USE 2 SPRAYS IN EACH   NOSTRIL ONCE DAILY; Therapy: 27EDU3960 to (Last Rx:06Aep5557)  Requested for: 25SSD8495 Ordered   5  Montelukast Sodium 10 MG Oral Tablet; Take 1 tablet daily Recorded   6   Pantoprazole Sodium 40 MG Oral Tablet Delayed Release; take 1 tablet by mouth once daily; Therapy: 30Pfo8819 to (Evaluate:39Jqe5668)  Requested for: 60CMD2339; Last   Rx:26Oct2017 Ordered   7  Sildenafil Citrate 20 MG Oral Tablet; take 2-3 tabs as needed no more than once a day; Therapy: 47Emh3343 to (Last Rx:84Wkp1196) Ordered   8  SUMAtriptan Succinate 100 MG Oral Tablet; TAKE 1 TABLET FOR MIGRAINE RELIEF  MAY   REPEAT 2 HOURS LATER  MAXIMUM 200MG/DAY; Therapy: 61DCR5400 to (Evaluate:21Mar2018)  Requested for: 65ZGT4865; Last   Rx:21Nov2017 Ordered   9  Ventolin  (90 Base) MCG/ACT Inhalation Aerosol Solution; INHALE 1 TO 2 PUFFS   EVERY 4 TO 6 HOURS AS NEEDED Recorded  Medication List Reviewed: The medication list was reviewed and updated today  Allergies  Medication    1  No Known Drug Allergies  Non-Medication    2  No Known Environmental Allergies   3  No Known Food Allergies    Vitals  Vital Signs    Recorded: 73IEO9322 09:12AM   Heart Rate 65, Apical   Systolic 198, LUE, Sitting   Diastolic 66, LUE, Sitting   Height 6 ft 0 5 in   Weight 235 lb 1 oz   BMI Calculated 31 44   BSA Calculated 2 29     Physical Exam    Constitutional - General appearance: No acute distress, well appearing and well nourished  Eyes - Conjunctiva and Sclera examination: Conjunctiva pink, sclera anicteric  Neck - Normal, no JVD   Pulmonary - Respiratory effort: No signs of respiratory distress  Auscultation of lungs: Clear to auscultation  Cardiovascular - Auscultation of heart: Normal rate and rhythm, normal S1 and S2, no murmurs  Pedal pulses: Normal, 2+ bilaterally  Examination of extremities for edema and/or varicosities: Normal     Abdomen - Soft  Musculoskeletal - Gait and station: Normal gait  Skin - Skin: Normal without rashes  Skin is warm and well perfused  Neurologic - Speech normal  No focal deficits     Psychiatric - Orientation to person, place, and time: Normal       Results/Data  ECG Report: A 12 lead ECG was performed and was normal    Rhythm and rate:  normal sinus rhythm  P-waves:   the P wave is normal  SC interval is normal    Axis: the QRS axis is normal    QRS: the QRS is normal   ST segment: the ST segments are normal    T waves:   normal  CT CHEST W CONTRAST 33CJF0433 11:50AM Bry Gordon     Test Name Result Flag Reference   CT CHEST W CONTRAST (Report)     CT CHEST WITH IV CONTRAST     INDICATION: Follow-up for ascending aortic aneurysm  COMPARISON: CT 12/9/2015     TECHNIQUE: CT examination of the chest was performed  Axial, sagittal and coronal reformatted projections were created  This examination, like all CT scans performed in the [facility], was performed utilizing techniques to minimize    radiation dose exposure, including the use of iterative reconstruction and automated exposure control  IV Contrast: iohexol (OMNIPAQUE) 350 MG/ML injection (SINGLE-DOSE) 85 mL Note: (SINGLE DOSE/MULTI DOSE) information refers to the container from which the contrast was acquired  Contrast was injected one time intravenously without immediate    complication  FINDINGS:     LUNGS: Stable 2 mm right middle lobe nodule series 3 image 35  No endotracheal/endobronchial lesion  PLEURA: Unremarkable  HEART/GREAT VESSELS: Heart is stable in size  Stable 4 3 cm ascending thoracic aortic aneurysm measured at the level of the right main pulmonary artery  MEDIASTINUM AND CHAYO: Unremarkable  CHEST WALL AND LOWER NECK: Unremarkable  VISUALIZED STRUCTURES IN THE UPPER ABDOMEN: Stable hiatal hernia containing a portion of the stomach and abdominal fat  Stable hepatic cysts  Otherwise unremarkable  OSSEOUS STRUCTURES: No acute fracture  No destructive osseous lesion  IMPRESSION:     Stable ascending thoracic aortic aneurysm measuring 4 3 cm  Stable hiatal hernia containing a portion of the stomach and abdominal fat  Stable simple hepatic cysts  Workstation performed: [id number]     Signed by:    J Luis Palumbo Yadi Garcia MD   17     ECHO COMPLETE WITH CONTRAST IF INDICATED 35FGA4819 07:46AM Mariaelena Holding     Test Name Result Flag Reference   ECHO COMPLETE WITH CONTRAST IF INDICATED (Report)     [address] [facility]   [phone]     Transthoracic Echocardiogram   2D, M-mode, Doppler, and Color Doppler     Study date: 12-May-2016     Patient: Edwar Garcia   MR number: [id number]   Account number: [de-identified]   : 1965   Age: 45 years   Gender: Male   Status: Outpatient   Location: [place] [address]    Height: 73 in   Weight: 226 lb   BP: 110/ 84 mmHg     Indications: Bicuspid aortic valve  Diagnoses: Q23 1 - Congenital insufficiency of aortic valve     Sonographer: Ching RCS   Primary Physician: Rex Garzon MD   Referring Physician: Janie Delacruz MD   Group: [facility]   Interpreting Physician: Jnaie Delacruz MD     SUMMARY     LEFT VENTRICLE:   Systolic function was normal  Ejection fraction was estimated to be 60 %  There were no regional wall motion abnormalities  AORTA:   The root exhibited dilatation, with a maximum measured diameter of 4 3 cm  HISTORY: PRIOR HISTORY: Aortic aneurysm  COPD  Former smoker  PROCEDURE: The study was performed in the [place]  This was a   routine study  The transthoracic approach was used  The study included complete   2D imaging, M-mode, complete spectral Doppler, and color Doppler  The heart   rate was 50 bpm, at the start of the study  Images were obtained from the   parasternal, apical, subcostal, and suprasternal notch acoustic windows  Image   quality was adequate  LEFT VENTRICLE: Size was normal  Systolic function was normal  Ejection   fraction was estimated to be 60 %  There were no regional wall motion   abnormalities   Wall thickness was normal  DOPPLER: The transmitral flow pattern   was normal  Left ventricular diastolic function parameters were normal      RIGHT VENTRICLE: The size was normal  Systolic function was normal  Wall   thickness was normal      LEFT ATRIUM: Size was normal      RIGHT ATRIUM: Size was normal      MITRAL VALVE: Valve structure was normal  There was normal leaflet separation  DOPPLER: The transmitral velocity was within the normal range  There was no   evidence for stenosis  There was no significant regurgitation  AORTIC VALVE: The valve was trileaflet  Leaflets exhibited normal thickness and   normal cuspal separation  DOPPLER: Transaortic velocity was within the normal   range  There was no evidence for stenosis  There was no significant   regurgitation  TRICUSPID VALVE: The valve structure was normal  There was normal leaflet   separation  DOPPLER: The transtricuspid velocity was within the normal range  There was no evidence for stenosis  There was trace regurgitation  Pulmonary   artery systolic pressure was within the normal range  Estimated peak PA   pressure was 22 mmHg  PULMONIC VALVE: Leaflets exhibited normal thickness, no calcification, and   normal cuspal separation  DOPPLER: The transpulmonic velocity was within the   normal range  There was trace regurgitation  PERICARDIUM: There was no pericardial effusion  The pericardium was normal in   appearance  AORTA: The root exhibited dilatation, with a maximum measured diameter of 4 3   cm  SYSTEMIC VEINS: IVC: The inferior vena cava was not well visualized       SYSTEM MEASUREMENT TABLES     2D   %FS: 31 79 %   AV Diam: 4 18 cm   EDV(Teich): 94 56 ml   EF(Cube): 68 26 %   EF(Teich): 59 92 %   ESV(Cube): 29 78 ml   ESV(Teich): 37 9 ml   IVSd: 1 02 cm   LA Area: 19 79 cm2   LA Diam: 3 1 cm   LVEDV MOD A4C: 68 16 ml   LVEF MOD A4C: 68 74 %   LVESV MOD A4C: 21 31 ml   LVIDd: 4 54 cm   LVIDs: 3 1 cm   LVLd A4C: 8 23 cm   LVLs A4C: 6 64 cm   LVPWd: 0 96 cm   RA Area: 17 58 cm2   RV Diam: 3 46 cm   SV MOD A4C: 46 86 ml   SV(Cube): 64 03 ml SV(Teich): 56 67 ml     CW   TR MaxP 15 mmHg   TR Vmax: 2 19 m/s     MM   TAPSE: 2 17 cm     PW   E': 0 09 m/s   MV A Juan: 0 53 m/s   MV Dec Parker: 2 91 m/s2   MV DecT: 265 17 ms   MV E Juan: 0 77 m/s   MV E/A Ratio: 1 46   MV PHT: 73 18 ms   MVA By PHT: 3 01 cm2     [employer] Accredited Echocardiography Laboratory     Prepared and electronically signed by     Prudence Selby MD   Signed 12-May-2016 09:35:52     Message  Return to work or school:   Catarino Shaffer is under my professional care  He was seen in my office on May 12, 2015       Weight Bearing Status: Partial Weight-Bearing  Patient may not lift over 50 lbs at this time  Eric Pastor MD       Health Management  Colon cancer screening   COLONOSCOPY; every 5 years; Last 29Lsk1031; Next Due: 89UCK5758; Active    Future Appointments    Date/Time Provider Specialty Site   2018 02:00 PM Sosa Rangel MD Neurology [facility]   2018 10:00 AM ANN Hitchcock  [address] [facility]   2018 08:00 AM ANN Hitchcock   [address] [facility]     Signatures   Electronically signed by : ANN Larose ; 2018  9:40AM EST                       (Author)

## 2018-01-31 ENCOUNTER — APPOINTMENT (OUTPATIENT)
Dept: RADIOLOGY | Facility: MEDICAL CENTER | Age: 53
End: 2018-01-31
Payer: COMMERCIAL

## 2018-01-31 ENCOUNTER — OFFICE VISIT (OUTPATIENT)
Dept: OBGYN CLINIC | Facility: MEDICAL CENTER | Age: 53
End: 2018-01-31
Payer: COMMERCIAL

## 2018-01-31 VITALS
HEIGHT: 73 IN | SYSTOLIC BLOOD PRESSURE: 110 MMHG | HEART RATE: 71 BPM | DIASTOLIC BLOOD PRESSURE: 72 MMHG | RESPIRATION RATE: 20 BRPM | BODY MASS INDEX: 31.41 KG/M2 | WEIGHT: 237 LBS

## 2018-01-31 DIAGNOSIS — G57.52 TARSAL TUNNEL SYNDROME OF LEFT SIDE: Primary | ICD-10-CM

## 2018-01-31 DIAGNOSIS — M25.572 PAIN, JOINT, ANKLE AND FOOT, LEFT: ICD-10-CM

## 2018-01-31 PROCEDURE — 73610 X-RAY EXAM OF ANKLE: CPT

## 2018-01-31 PROCEDURE — 99203 OFFICE O/P NEW LOW 30 MIN: CPT | Performed by: ORTHOPAEDIC SURGERY

## 2018-01-31 NOTE — PATIENT INSTRUCTIONS
Weight bearing as tolerated left lower extremity    [facility] PRN    Follow up with Dr Justine Erazo PRN    Follow up with [address] [place] [address]

## 2018-02-26 ENCOUNTER — OFFICE VISIT (OUTPATIENT)
Dept: NEUROLOGY | Facility: CLINIC | Age: 53
End: 2018-02-26
Payer: COMMERCIAL

## 2018-02-26 VITALS
SYSTOLIC BLOOD PRESSURE: 114 MMHG | WEIGHT: 235 LBS | DIASTOLIC BLOOD PRESSURE: 82 MMHG | BODY MASS INDEX: 31 KG/M2 | HEART RATE: 62 BPM

## 2018-02-26 DIAGNOSIS — G43.809 VESTIBULAR MIGRAINE: Primary | ICD-10-CM

## 2018-02-26 DIAGNOSIS — F43.23 ADJUSTMENT REACTION WITH ANXIETY AND DEPRESSION: Primary | ICD-10-CM

## 2018-02-26 PROCEDURE — 99213 OFFICE O/P EST LOW 20 MIN: CPT | Performed by: PSYCHIATRY & NEUROLOGY

## 2018-02-26 RX ORDER — BUPROPION HYDROCHLORIDE 75 MG/1
TABLET ORAL
Qty: 60 TABLET | Refills: 3 | Status: SHIPPED | OUTPATIENT
Start: 2018-02-26 | End: 2018-07-30 | Stop reason: SDUPTHER

## 2018-02-26 RX ORDER — SUMATRIPTAN 100 MG/1
100 TABLET, FILM COATED ORAL ONCE AS NEEDED
Qty: 9 TABLET | Refills: 3 | Status: SHIPPED | OUTPATIENT
Start: 2018-02-26 | End: 2018-05-29 | Stop reason: SDUPTHER

## 2018-02-26 RX ORDER — AMITRIPTYLINE HYDROCHLORIDE 25 MG/1
25 TABLET, FILM COATED ORAL
Qty: 30 TABLET | Refills: 3 | Status: SHIPPED | OUTPATIENT
Start: 2018-02-26 | End: 2018-05-29 | Stop reason: SDUPTHER

## 2018-02-26 NOTE — PROGRESS NOTES
Progress Note - Neurology   Jeremiah Rosraio 47 y o  male MRN: [id number]  Unit/Bed#:  Encounter: [id number]      Subjective:   Patient is here for a follow-up visit with a history of vestibular migraine headaches and continues to experience headaches on a frequent basis  During his previous visit he was started on topiramate 25 mg at bedtime which he did take for 1 month and then discontinued the same  Patient claims he was experiencing and continues to experience difficulty with short-term memory  He also had a recent CT scan of the brain in the emergency room which was unremarkable  Patient continues to experience headaches at least twice a week for which she uses sumatriptan and gets prompt relief of symptoms  Patient also admits to feeling depressed has been on Wellbutrin 75 mg which he takes in the mornings  He claims he does not sleep well at all  Since his last visit he also had a sed rate VILMA and a Lyme titer which were all unremarkable, EEG was also normal  Patient denies any other new neurological symptoms  ROS:   Review of Systems   Constitutional: Negative  HENT: Negative  Eyes: Negative  Respiratory: Positive for shortness of breath  Cardiovascular: Negative  Gastrointestinal: Negative  Endocrine: Positive for heat intolerance  Genitourinary: Negative  Musculoskeletal: Positive for joint swelling  Skin: Negative  Allergic/Immunologic: Negative  Neurological: Positive for dizziness, light-headedness, numbness and headaches  Hematological: Negative  Psychiatric/Behavioral: Positive for confusion and sleep disturbance  Vitals:   Vitals:    02/26/18 1322   BP: 114/82   Pulse: 62   ,Body mass index is 31 kg/m²      MEDS:      Current Outpatient Prescriptions:     albuterol (PROVENTIL HFA,VENTOLIN HFA) 90 mcg/act inhaler, Inhale 2 puffs every 6 (six) hours as needed for wheezing , Disp: , Rfl:     buPROPion (WELLBUTRIN) 75 mg tablet, Take by mouth, Disp: , Rfl:     fenofibrate (TRICOR) 145 mg tablet, Take 145 mg by mouth daily, Disp: , Rfl:     fluticasone (FLONASE) 50 mcg/act nasal spray, 1 spray into each nostril daily, Disp: 16 g, Rfl: 0    guaiFENesin (MUCINEX) 600 mg 12 hr tablet, Take 2 tablets by mouth every 12 (twelve) hours, Disp: 14 tablet, Rfl: 0    Mometasone Furo-Formoterol Fum (DULERA) 200-5 MCG/ACT AERO, Inhale 2 puffs 2 (two) times a day , Disp: , Rfl:     montelukast (SINGULAIR) 10 mg tablet, Take by mouth, Disp: , Rfl:     pantoprazole (PROTONIX) 40 mg tablet, Take 40 mg by mouth daily  , Disp: , Rfl:   :    Physical Exam:  General appearance: alert, appears stated age and cooperative  Head: Normocephalic, without obvious abnormality, atraumatic    Neurologic:  His examination shows no evidence of any cranial nerve deficit, no evidence of any motor or sensory deficits, has adequate strength in both upper and lower extremities, no dysmetria was noted, and there is no evidence of any spine tenderness  Lab Results: I have personally reviewed pertinent reports  Imaging Studies: I have personally reviewed pertinent reports  Assessment:  1  Vestibular migraine  Plan:  Patient is now advised to start Elavil 25 mg p o  at bedtime as a prophylactic agent for these headaches, and will continue with Imitrex on a p r n  basis  Patient will return back to see me in 2-3 months      2/26/2018,1:26 PM    Dictation voice to text software has been used in the creation of this document  Please consider this in light of any contextual or grammatical errors

## 2018-03-13 ENCOUNTER — EVALUATION (OUTPATIENT)
Dept: PHYSICAL THERAPY | Facility: CLINIC | Age: 53
End: 2018-03-13
Payer: COMMERCIAL

## 2018-03-13 DIAGNOSIS — G57.52 TARSAL TUNNEL SYNDROME, LEFT: Primary | ICD-10-CM

## 2018-03-13 PROCEDURE — G8979 MOBILITY GOAL STATUS: HCPCS | Performed by: PHYSICAL THERAPIST

## 2018-03-13 PROCEDURE — 97530 THERAPEUTIC ACTIVITIES: CPT | Performed by: PHYSICAL THERAPIST

## 2018-03-13 PROCEDURE — 97140 MANUAL THERAPY 1/> REGIONS: CPT | Performed by: PHYSICAL THERAPIST

## 2018-03-13 PROCEDURE — G8978 MOBILITY CURRENT STATUS: HCPCS | Performed by: PHYSICAL THERAPIST

## 2018-03-13 PROCEDURE — 97162 PT EVAL MOD COMPLEX 30 MIN: CPT | Performed by: PHYSICAL THERAPIST

## 2018-03-13 NOTE — PROGRESS NOTES
PT Evaluation     Today's date: 3/13/2018  Patient name: Mary Jane Hartley  : 1965  MRN: [id number]  Referring provider: Bairon Garcia DPM  Dx:   Encounter Diagnosis     ICD-10-CM    1  Tarsal tunnel syndrome, left G57 52                   Assessment  Impairments: abnormal gait and pain with function    Patient is irritable  Assessment details: Patient is a 47 y o  Male referred with diagnoses of peroneal tendonitis and tarsal tunnel syndrome  He presents with reports of constant ankle pain and plantar foot numbness, as well as, decreased gastroc flexibility and significant prontation with gait  He will benefit from trial of PT services in order to improve flexibility, quality of gait, and to decrease pain with standing and walking  Understanding of Dx/Px/POC: good   Prognosis: fair    Goals  STG (2 weeks):  1  Patient will report pain as a 6/10 at worst with ambulation  2  Patient demonstrate good technique with HEP in order to maintain gains made with skilled PT services  LTG (4-6 weeks):  1  Patient will report pain as a 3-4/10 at worst with stationary standing and walking to promote return to previous level of function  2  Patient will report numbness in left foot decreased by 50%    Plan  Patient would benefit from: skilled PT  Planned therapy interventions: joint mobilization, manual therapy, patient education, strengthening, stretching, therapeutic activities, therapeutic exercise and home exercise program  Frequency: 2x week  Duration in weeks: 6  Treatment plan discussed with: patient        Subjective Evaluation    History of Present Illness  Date of onset: 2017  Mechanism of injury: Patient reports insidious onset left foot numbness and lateral ankle pain  He reports seeing multiple physicians and had an EMG which was positive for tarsal tunnel syndrome  Patient is also to have a custom orthotic made  He is referred now to outpatient PT services    Recurrent probem    Quality of life: fair    Pain  Current pain ratin  At best pain ratin  At worst pain ratin  Location: Left lateral ankle pain  Numbness dorsum of foot at toes is constant  Aggravating factors: standing and walking  Progression: worsening    Social Support  Steps to enter house: yes  3  Lives in: [facility]  Lives with: alone    Employment status: not working  Hand dominance: right  Exercise history: sedentary      Diagnostic Tests  EMG: abnormal (Left tarsal tunnel)  Treatments  No previous or current treatments  Current treatment: physical therapy  Patient Goals  Patient goal: To decrease the pain        Objective     Static Posture     Ankle/Foot   Ankle/Foot (Left): Pes planus  Ankle/Foot (Right): Pes planus  Palpation     Additional Palpation Details  TTP left peroneus longus and tertius    Neurological Testing     Sensation     Ankle/Foot   Left Ankle/Foot   Diminished: light touch    Ambulation     Ambulation: Level Surfaces   Ambulation without assistive device: independent    Quality of Movement During Gait     Ankle    Ankle (Left): Positive pronated  Ankle (Right): Positive pronated  Foot Alignment    Foot Alignment (Left): Positive cavus  Foot Alignment (Right): Positive cavus             Precautions: COPD, Cardiac    Daily Treatment Diary     Manual  3/13            IASTM peroneal tendons 8'            Gastroc stretch                                                        Exercise Diary  3/13            Nustep L5x10'            Standing gastroc stretch             Heel raises             T-band ankle ex             Standing soleus stretch             Standing prostretch             Seated prostretch             Towel toe curls             [place]                                                                                                                                                                 Modalities

## 2018-03-13 NOTE — LETTER
2018    Ebonie Leija, [place] [address] [id number]    Patient: Lavonne Molina   YOB: 1965   Date of Visit: 3/13/2018     Encounter Diagnosis     ICD-10-CM    1  Tarsal tunnel syndrome, left G57 52        Dear Dr Bishnu Parsons:    Please review the attached Plan of Care from [facility] recent visit  Please verify that you agree therapy should continue by signing the attached document and sending it back to our office  If you have any questions or concerns, please don't hesitate to call  Sincerely,    Edmond Trevino, PT      Referring Provider:      I certify that I have read the below Plan of Care and certify the need for these services furnished under this plan of treatment while under my care  Ebonie Leija, [id number] [facility]: [phone]          PT Evaluation     Today's date: 3/13/2018  Patient name: Lavonne Molina  : 1965  MRN: [id number]  Referring provider: Terrance Mulligan DPM  Dx:   Encounter Diagnosis     ICD-10-CM    1  Tarsal tunnel syndrome, left G57 52                   Assessment  Impairments: abnormal gait and pain with function    Patient is irritable  Assessment details: Patient is a 47 y o  Male referred with diagnoses of peroneal tendonitis and tarsal tunnel syndrome  He presents with reports of constant ankle pain and plantar foot numbness, as well as, decreased gastroc flexibility and significant prontation with gait  He will benefit from trial of PT services in order to improve flexibility, quality of gait, and to decrease pain with standing and walking  Understanding of Dx/Px/POC: good   Prognosis: fair    Goals  STG (2 weeks):  1  Patient will report pain as a 6/10 at worst with ambulation  2  Patient demonstrate good technique with HEP in order to maintain gains made with skilled PT services  LTG (4-6 weeks):  1   Patient will report pain as a 3-4/10 at worst with stationary standing and walking to promote return to previous level of function  2  Patient will report numbness in left foot decreased by 50%    Plan  Patient would benefit from: skilled PT  Planned therapy interventions: joint mobilization, manual therapy, patient education, strengthening, stretching, therapeutic activities, therapeutic exercise and home exercise program  Frequency: 2x week  Duration in weeks: 6  Treatment plan discussed with: patient        Subjective Evaluation    History of Present Illness  Date of onset: 2017  Mechanism of injury: Patient reports insidious onset left foot numbness and lateral ankle pain  He reports seeing multiple physicians and had an EMG which was positive for tarsal tunnel syndrome  Patient is also to have a custom orthotic made  He is referred now to outpatient PT services  Recurrent probem    Quality of life: fair    Pain  Current pain ratin  At best pain ratin  At worst pain ratin  Location: Left lateral ankle pain  Numbness dorsum of foot at toes is constant  Aggravating factors: standing and walking  Progression: worsening    Social Support  Steps to enter house: yes  3  Lives in: [facility]  Lives with: alone    Employment status: not working  Hand dominance: right  Exercise history: sedentary      Diagnostic Tests  EMG: abnormal (Left tarsal tunnel)  Treatments  No previous or current treatments  Current treatment: physical therapy  Patient Goals  Patient goal: To decrease the pain        Objective     Static Posture     Ankle/Foot   Ankle/Foot (Left): Pes planus  Ankle/Foot (Right): Pes planus  Palpation     Additional Palpation Details  TTP left peroneus longus and tertius    Neurological Testing     Sensation     Ankle/Foot   Left Ankle/Foot   Diminished: light touch    Ambulation     Ambulation: Level Surfaces   Ambulation without assistive device: independent    Quality of Movement During Gait     Ankle    Ankle (Left): Positive pronated  Ankle (Right): Positive pronated  Foot Alignment    Foot Alignment (Left): Positive cavus  Foot Alignment (Right): Positive cavus             Precautions: COPD, Cardiac    Daily Treatment Diary     Manual  3/13            IASTM peroneal tendons 8'            Gastroc stretch                                                        Exercise Diary  3/13            Nustep L5x10'            Standing gastroc stretch             Heel raises             T-band ankle ex             Standing soleus stretch             Standing prostretch             Seated prostretch             Towel toe curls             [place]                                                                                                                                                                 Modalities

## 2018-03-14 ENCOUNTER — TRANSCRIBE ORDERS (OUTPATIENT)
Dept: PHYSICAL THERAPY | Facility: CLINIC | Age: 53
End: 2018-03-14

## 2018-03-14 DIAGNOSIS — M79.672 PAIN IN LEFT FOOT: ICD-10-CM

## 2018-03-14 DIAGNOSIS — G57.52 TARSAL TUNNEL SYNDROME OF LEFT SIDE: Primary | ICD-10-CM

## 2018-03-15 ENCOUNTER — OFFICE VISIT (OUTPATIENT)
Dept: PHYSICAL THERAPY | Facility: CLINIC | Age: 53
End: 2018-03-15
Payer: COMMERCIAL

## 2018-03-15 DIAGNOSIS — G57.52 TARSAL TUNNEL SYNDROME, LEFT: Primary | ICD-10-CM

## 2018-03-15 PROCEDURE — 97140 MANUAL THERAPY 1/> REGIONS: CPT | Performed by: PHYSICAL THERAPIST

## 2018-03-15 PROCEDURE — 97110 THERAPEUTIC EXERCISES: CPT | Performed by: PHYSICAL THERAPIST

## 2018-03-15 NOTE — PROGRESS NOTES
Daily Note     Today's date: 3/15/2018  Patient name: Shyla Ulloa  : 1965  MRN: [id number]  Referring provider: Deanna Mcmillan DPM  Dx:   Encounter Diagnosis     ICD-10-CM    1  Tarsal tunnel syndrome, left G57 52                   Subjective: Patient reports waking up at 1am due to pulsing pain at left lateral ankle  Pian at lateral ankle 10 today  Still has numbness plantar surface of toes    Objective: See treatment diary below  Manual  3/13  3/15                   IASTM peroneal tendons 8'  8'                   Gastroc stretch    3x30"                                                                                                 Exercise Diary  3/13  3/15                   Nustep L5x10' L5x10'                   Standing gastroc stretch    step 3x30"                   Heel raises   x20                   T-band ankle ex    yellow x20                   Standing soleus stretch    3x 30"                   Standing prostretch   Step 3x30"                   Seated prostretch    3x30"                   Towel toe curls                       [place]                                                                                                                                                                                                                                                                                                      Modalities                                                                                                      Assessment: Tolerated treatment well  Patient would benefit from continued PT  Greatly decreased pain after session today  Reports left lateral ankle pain as a 1/10 after IASTM  Plan: Continue per plan of care

## 2018-03-19 ENCOUNTER — OFFICE VISIT (OUTPATIENT)
Dept: PHYSICAL THERAPY | Facility: CLINIC | Age: 53
End: 2018-03-19
Payer: COMMERCIAL

## 2018-03-19 DIAGNOSIS — G57.52 TARSAL TUNNEL SYNDROME, LEFT: Primary | ICD-10-CM

## 2018-03-19 PROCEDURE — 97140 MANUAL THERAPY 1/> REGIONS: CPT | Performed by: PHYSICAL THERAPIST

## 2018-03-19 PROCEDURE — 97110 THERAPEUTIC EXERCISES: CPT | Performed by: PHYSICAL THERAPIST

## 2018-03-19 NOTE — PROGRESS NOTES
Daily Note     Today's date: 3/19/2018  Patient name: Keena Acevedo  : 1965  MRN: [id number]  Referring provider: Belkis Mora DPM  Dx:   Encounter Diagnosis     ICD-10-CM    1  Tarsal tunnel syndrome, left G57 52                   Subjective: Patient states he was able to walk a mile yesterday before having to stop  Pain wasn't as intense as previously and he rated it as a 2/10  Overall, there has been a decrease in the intensity of his pain  Rates left lateral ankle pain today as a 2/10      Objective: See treatment diary below  Manual  3/13  3/15  3/19                 IASTM peroneal tendons 8'  8'  8'                 Gastroc stretch    3x30" 3x30"                                                                                               Exercise Diary  3/13  3/15  3/19                 Nustep L5x10' L5x10' L5x10'                 Standing gastroc stretch    step 3x30"  step 3x30"                 Heel raises   x20 x20                 T-band ankle ex    yellow x20 yellow x20                 Standing soleus stretch    3x 30"  step 3x30"                 Standing prostretch   Step 3x30"  NP                 Seated prostretch    3x30"  3x30"                 Towel toe curls                       [place]                                                                                                                                                                                                                                                                                                      Modalities                                                                                                        Assessment: Tolerated treatment well  Patient would benefit from continued PT   Required VC's for correct performance of all Te  Reported muscle burn in bilateral calves after heel raises  Sympoms 3/10 after session    Plan: Progress treatment as tolerated

## 2018-03-20 ENCOUNTER — OFFICE VISIT (OUTPATIENT)
Dept: FAMILY MEDICINE CLINIC | Facility: MEDICAL CENTER | Age: 53
End: 2018-03-20
Payer: COMMERCIAL

## 2018-03-20 VITALS
DIASTOLIC BLOOD PRESSURE: 70 MMHG | BODY MASS INDEX: 31.06 KG/M2 | HEART RATE: 56 BPM | WEIGHT: 235.4 LBS | SYSTOLIC BLOOD PRESSURE: 110 MMHG | RESPIRATION RATE: 16 BRPM

## 2018-03-20 DIAGNOSIS — F43.23 ADJUSTMENT REACTION WITH ANXIETY AND DEPRESSION: Primary | ICD-10-CM

## 2018-03-20 DIAGNOSIS — I71.2 ASCENDING AORTIC ANEURYSM (HCC): ICD-10-CM

## 2018-03-20 DIAGNOSIS — J44.9 CHRONIC OBSTRUCTIVE PULMONARY DISEASE, UNSPECIFIED COPD TYPE (HCC): ICD-10-CM

## 2018-03-20 DIAGNOSIS — Q23.1 BICUSPID AORTIC VALVE: ICD-10-CM

## 2018-03-20 PROCEDURE — 99213 OFFICE O/P EST LOW 20 MIN: CPT | Performed by: FAMILY MEDICINE

## 2018-03-20 RX ORDER — SILDENAFIL CITRATE 20 MG/1
TABLET ORAL
COMMUNITY
Start: 2017-08-30 | End: 2018-08-07 | Stop reason: ALTCHOICE

## 2018-03-20 NOTE — ASSESSMENT & PLAN NOTE
Patient's last aortic root diameter was 4 3 cm  He also has the bicuspid valve  This is what has caused him to be disabled because his job involved loss of lifting    He is going to be due soon for another CT scan to major diameter of the aortic root      Continue follow-up with cardiothoracic surgeon

## 2018-03-20 NOTE — ASSESSMENT & PLAN NOTE
Patient is not smoking  He currently takes no medications except for Proventil  His use is rare and it has not increased lately    Continue p r n   Proventil

## 2018-03-20 NOTE — ASSESSMENT & PLAN NOTE
This has been a particularly difficult thing to treat with Felix Floor  His disability and new lifestyle as a result his causing him difficulty in adjusting  He does get some benefit from the Wellbutrin but it is difficult for him to tolerate therapeutic doses as it interferes with sleep    Since he is now taking amitriptyline at night for his migraine headaches, I suggested that he try again to get up to 150 mg of bupropion during the day  I have also urged him to begin and continue some type of a row big exercise program   He complains of sleepiness as well  I think this would helpful on many levels

## 2018-03-20 NOTE — PROGRESS NOTES
Assessment/Plan:    COPD (chronic obstructive pulmonary disease) ([facility] )  Patient is not smoking  He currently takes no medications except for Proventil  His use is rare and it has not increased lately    Continue p r n  Proventil    Ascending aortic aneurysm (HCC)  Patient's last aortic root diameter was 4 3 cm  He also has the bicuspid valve  This is what has caused him to be disabled because his job involved loss of lifting    He is going to be due soon for another CT scan to major diameter of the aortic root  Continue follow-up with cardiothoracic surgeon    Bicuspid aortic valve  Patient sees Cardiology  He has not had any significant shortness of breath or chest pain  Continue follow-up with Cardiology    Adjustment reaction with anxiety and depression  This has been a particularly difficult thing to treat with Devante Pena  His disability and new lifestyle as a result his causing him difficulty in adjusting  He does get some benefit from the Wellbutrin but it is difficult for him to tolerate therapeutic doses as it interferes with sleep    Since he is now taking amitriptyline at night for his migraine headaches, I suggested that he try again to get up to 150 mg of bupropion during the day  I have also urged him to begin and continue some type of a row big exercise program   He complains of sleepiness as well  I think this would helpful on many levels  Diagnoses and all orders for this visit:    Adjustment reaction with anxiety and depression    Ascending aortic aneurysm (HCC)    Bicuspid aortic valve    Chronic obstructive pulmonary disease, unspecified COPD type ([facility] )    Other orders  -     sildenafil (REVATIO) 20 mg tablet; Take by mouth          Subjective:      Patient ID: Natalie Boo is a 47 y o  male      HPI    The following portions of the patient's history were reviewed and updated as appropriate: allergies, current medications, past family history, past medical history, past social history, past surgical history and problem list     Review of Systems   Constitutional: Positive for fatigue (Patient has long-term history of fatigue  That has been fairly recalcitrant difficult to treat but)  Negative for activity change and fever  HENT: Negative for congestion, ear discharge, ear pain, postnasal drip, rhinorrhea, sinus pain, sneezing and sore throat  Eyes: Negative for photophobia, pain, discharge and redness  Respiratory: Positive for shortness of breath ( patient does get some dyspnea on exertion, there is some exercise deconditioning)  Negative for apnea, cough and wheezing  Cardiovascular: Negative for chest pain and palpitations  Gastrointestinal: Negative for abdominal pain, blood in stool, constipation, diarrhea, nausea and vomiting  Endocrine: Negative for polydipsia, polyphagia and polyuria  Genitourinary: Negative for decreased urine volume, difficulty urinating, discharge, dysuria, frequency, penile pain and urgency  Musculoskeletal: Negative for arthralgias, gait problem, joint swelling and neck pain  Skin: Negative for color change and rash  Neurological: Positive for headaches  Negative for dizziness, tremors, seizures and weakness  Psychiatric/Behavioral: Negative for agitation and sleep disturbance  The patient is not nervous/anxious  Objective:      /70   Pulse 56   Resp 16   Wt 107 kg (235 lb 6 4 oz)   BMI 31 06 kg/m²          Physical Exam   Constitutional: He is oriented to person, place, and time  Vital signs are normal  He appears well-developed and well-nourished  He is cooperative  HENT:   Head: Normocephalic  Right Ear: External ear normal    Left Ear: External ear normal    Nose: Nose normal    Mouth/Throat: Oropharynx is clear and moist    Eyes: Conjunctivae, EOM and lids are normal  Pupils are equal, round, and reactive to light  Neck: Normal range of motion  Neck supple  Carotid bruit is not present   No thyromegaly present  Cardiovascular: Normal rate, regular rhythm, S1 normal, S2 normal, normal heart sounds, intact distal pulses and normal pulses  No murmur heard  Pulmonary/Chest: Effort normal and breath sounds normal  No respiratory distress  He has no wheezes  He has no rales  Abdominal: Soft  Normal appearance and bowel sounds are normal  He exhibits no mass  There is no hepatosplenomegaly  There is no tenderness  Musculoskeletal: Normal range of motion  Lymphadenopathy:     He has no cervical adenopathy  Neurological: He is alert and oriented to person, place, and time  He has normal strength and normal reflexes  No cranial nerve deficit or sensory deficit  Skin: Skin is warm, dry and intact  No rash noted  No pallor  Psychiatric: He has a normal mood and affect  His behavior is normal  Judgment and thought content normal  Cognition and memory are normal    Nursing note and vitals reviewed

## 2018-03-20 NOTE — ASSESSMENT & PLAN NOTE
Patient sees Cardiology  He has not had any significant shortness of breath or chest pain      Continue follow-up with Cardiology

## 2018-03-22 ENCOUNTER — APPOINTMENT (OUTPATIENT)
Dept: PHYSICAL THERAPY | Facility: CLINIC | Age: 53
End: 2018-03-22
Payer: COMMERCIAL

## 2018-03-26 ENCOUNTER — OFFICE VISIT (OUTPATIENT)
Dept: PHYSICAL THERAPY | Facility: CLINIC | Age: 53
End: 2018-03-26
Payer: COMMERCIAL

## 2018-03-26 DIAGNOSIS — G57.52 TARSAL TUNNEL SYNDROME, LEFT: Primary | ICD-10-CM

## 2018-03-26 PROCEDURE — 97110 THERAPEUTIC EXERCISES: CPT | Performed by: PHYSICAL THERAPIST

## 2018-03-26 PROCEDURE — 97140 MANUAL THERAPY 1/> REGIONS: CPT | Performed by: PHYSICAL THERAPIST

## 2018-03-26 NOTE — PROGRESS NOTES
Daily Note     Today's date: 3/26/2018  Patient name: Shyla Ulloa  : 1965  MRN: [id number]  Referring provider: Deanna Mcmillan DPM  Dx:   Encounter Diagnosis     ICD-10-CM    1  Tarsal tunnel syndrome, left G57 52        Start Time: 915  Stop Time:   Total time in clinic (min): 40 minutes    Subjective: Patient is having pain only a 2/10 today  Symptoms have been at anterior ankle in region of peroneus tertius  Objective: See treatment diary below  Manual  3/13  3/15  3/19  3/26               IASTM peroneal tendons 8'  8'  8'  8'               Gastroc stretch    3x30" 3x30"  3x30"                                                                                             Exercise Diary  3/13  3/15  3/19  3/26               Nustep L5x10' L5x10' L5x10'  L5x10'               Standing gastroc stretch    step 3x30"  step 3x30"  step 3x30"               Heel raises   x20 x20  x20               T-band ankle ex    yellow x20 yellow x20  yellow x20               Standing soleus stretch    3x 30"  step 3x30" Step 3x30"               Standing prostretch   Step 3x30"  NP                 Seated prostretch    3x30"  3x30"  3x30"               Towel toe curls                       [place]                                                                                                                                                                                                                                                                                                      Modalities                                                                                                        Assessment: Tolerated treatment well  Patient would benefit from continued PT  Pain decreased to a 1/10 after session today  Plan: Continue per plan of care

## 2018-03-29 ENCOUNTER — APPOINTMENT (OUTPATIENT)
Dept: PHYSICAL THERAPY | Facility: CLINIC | Age: 53
End: 2018-03-29
Payer: COMMERCIAL

## 2018-03-29 NOTE — PROGRESS NOTES
Daily Note     Today's date: 3/29/2018  Patient name: Purvi Bello  : 1965  MRN: [id number]  Referring provider: Macario Farris DPM  Dx:   Encounter Diagnosis     ICD-10-CM    1  Tarsal tunnel syndrome, left G57 52                   Subjective: ***      Objective: See treatment diary below  Manual  3/13  3/15  3/19  3/26  3/29             IASTM peroneal tendons 8'  8'  8'  8'               Gastroc stretch    3x30" 3x30"  3x30"                                                                                             Exercise Diary  3/13  3/15  3/19  3/26  3/29             Nustep L5x10' L5x10' L5x10'  L5x10'               Standing gastroc stretch    step 3x30"  step 3x30"  step 3x30"               Heel raises   x20 x20  x20               T-band ankle ex    yellow x20 yellow x20  yellow x20               Standing soleus stretch    3x 30"  step 3x30" Step 3x30"               Standing prostretch   Step 3x30"  NP                 Seated prostretch    3x30"  3x30"  3x30"               Towel toe curls                       [place]                                                                                                                                                                                                                                                                                                      Modalities                                                                                                        Assessment: Tolerated treatment {Tolerated treatment :[id number]}   Patient {assessment:[id number]}      Plan: {PLAN:[id number]}

## 2018-03-30 ENCOUNTER — OFFICE VISIT (OUTPATIENT)
Dept: PHYSICAL THERAPY | Facility: CLINIC | Age: 53
End: 2018-03-30
Payer: COMMERCIAL

## 2018-03-30 DIAGNOSIS — G57.52 TARSAL TUNNEL SYNDROME, LEFT: Primary | ICD-10-CM

## 2018-03-30 PROCEDURE — 97530 THERAPEUTIC ACTIVITIES: CPT | Performed by: PHYSICAL THERAPIST

## 2018-03-30 PROCEDURE — 97140 MANUAL THERAPY 1/> REGIONS: CPT | Performed by: PHYSICAL THERAPIST

## 2018-03-30 PROCEDURE — 97110 THERAPEUTIC EXERCISES: CPT | Performed by: PHYSICAL THERAPIST

## 2018-03-30 NOTE — PROGRESS NOTES
Daily Note     Today's date: 3/30/2018  Patient name: Alberto Garrido  : 1965  MRN: [id number]  Referring provider: Caty Moss DPM  Dx:   Encounter Diagnosis     ICD-10-CM    1  Tarsal tunnel syndrome, left G57 52                   Subjective: Still has some pain with walking and doing stairs, but intensity is greatly decreased  Worst pain is a 5/10 and it resolves quickly with rest     Objective: See treatment diary below  Manual  3/13  3/15  3/19  3/26  3/30             IASTM peroneal tendons 8'  8'  8'  8'  8'             Gastroc stretch    3x30" 3x30"  3x30"  3x30"                                                                                           Exercise Diary  3/13  3/15  3/19  3/26  3/30             Nustep L5x10' L5x10' L5x10'  L5x10' L5x10'             Standing gastroc stretch    step 3x30"  step 3x30"  step 3x30"  3x30"             Heel raises   x20 x20  x20  x20             T-band ankle ex    yellow x20 yellow x20  yellow x20  [id number]             Standing soleus stretch    3x 30"  step 3x30" Step 3x30" 3x30"             Standing prostretch   Step 3x30"  NP                 Seated prostretch    3x30"  3x30"  3x30"  3x30"             Towel toe curls                       [place]                                                                                                                                                                                                                                                                                                      Modalities                                                                                                        Assessment: Tolerated treatment well  Patient would benefit from continued PT  Minimal tenderness at anterior tib  Tendon  No pain after session  Has some numbness in plantar aspect of foot  Plan: Progress treatment as tolerated

## 2018-04-02 ENCOUNTER — APPOINTMENT (OUTPATIENT)
Dept: PHYSICAL THERAPY | Facility: CLINIC | Age: 53
End: 2018-04-02
Payer: COMMERCIAL

## 2018-04-03 ENCOUNTER — EVALUATION (OUTPATIENT)
Dept: PHYSICAL THERAPY | Facility: CLINIC | Age: 53
End: 2018-04-03
Payer: COMMERCIAL

## 2018-04-03 DIAGNOSIS — G57.52 TARSAL TUNNEL SYNDROME, LEFT: Primary | ICD-10-CM

## 2018-04-03 PROCEDURE — 97164 PT RE-EVAL EST PLAN CARE: CPT | Performed by: PHYSICAL THERAPIST

## 2018-04-03 PROCEDURE — L3010 FOOT LONGITUDINAL ARCH SUPPO: HCPCS | Performed by: PHYSICAL THERAPIST

## 2018-04-03 NOTE — PROGRESS NOTES
PT Re-Evaluation     Today's date: 4/3/2018  Patient name: Rj Degroot  : 1965  MRN: [id number]  Referring provider: Kodak Sepulveda DPM  Dx:   Encounter Diagnosis     ICD-10-CM    1  Tarsal tunnel syndrome, left G57 52                   Assessment/Plan Pt should benefit from custom orthotics to reduce LE pain and improve L LE alignment  Subjective this pt presents for custom orthotics  He c/o pain and numbness in L forefoot and lateral ankle  Insidious onset about 4 mo  Ago  He has tried OTC inserts but these have caused pain in R heel and arch      Objective   Severe pes planus L, mild R  Forefoot squeeze (-)  Mild TTP 2nd met head    PROM ankle/STJ WFL  FF Varus, L > R    Hallux DF  R 80 deg  L 82 deg     LMI R 0 deg, L 8 deg ev    Precautions: none

## 2018-04-05 ENCOUNTER — OFFICE VISIT (OUTPATIENT)
Dept: PHYSICAL THERAPY | Facility: CLINIC | Age: 53
End: 2018-04-05
Payer: COMMERCIAL

## 2018-04-05 DIAGNOSIS — G57.52 TARSAL TUNNEL SYNDROME, LEFT: Primary | ICD-10-CM

## 2018-04-05 PROCEDURE — 97110 THERAPEUTIC EXERCISES: CPT

## 2018-04-05 PROCEDURE — 97140 MANUAL THERAPY 1/> REGIONS: CPT

## 2018-04-05 NOTE — PROGRESS NOTES
Daily Note     Today's date: 2018  Patient name: King Chou  : 1965  MRN: [id number]  Referring provider: Valeriy Zavala DPM  Dx:   Encounter Diagnosis     ICD-10-CM    1  Tarsal tunnel syndrome, left G57 52        Start Time: 846          Subjective: Pt c/o 2/10 in lateral L ankle with ambulation  He states that he feels something similar to a bee sting on the bottom of his L ft sometimes  Objective: See treatment diary below  Manual  3/13  3/15  3/19  3/26  3/30  4           IASTM peroneal tendons 8'  8'  8'  8'  8'  8'           Gastroc stretch    3x30" 3x30"  3x30"  3x30"  30" x3                                                                                         Exercise Diary  3/13  3/15  3/19  3/26  3/30  4/5           Nustep L5x10' L5x10' L5x10'  L5x10' L5x10'  l5 x10'           Standing gastroc stretch    step 3x30"  step 3x30"  step 3x30"  3x30"  30" 3x           Heel raises   x20 x20  x20  x20  20x           T-band ankle ex    yellow x20 yellow x20  yellow x20  [id number]  orange 20x           Standing soleus stretch    3x 30"  step 3x30" Step 3x30" 3x30"  30" 3x           Standing prostretch   Step 3x30"  NP      30" 3x           Seated prostretch    3x30"  3x30"  3x30"  3x30"  30" 3x           Towel toe curls                       [place]                                                                                                                                                                                                                                                                                                      Modalities                                                                                                             Assessment: Pt noted having minimal to no pain post [place] manual treatment today  He was educated on the importance of compliance with stretches @ home to decrease tension in L gastroc ms and numbness on the bottom of L ft   He will benefit from continued skilled therapy to decrease exacerbation of symptoms with ambulation  Plan: Progress treatment as tolerated

## 2018-04-09 ENCOUNTER — OFFICE VISIT (OUTPATIENT)
Dept: PHYSICAL THERAPY | Facility: CLINIC | Age: 53
End: 2018-04-09
Payer: COMMERCIAL

## 2018-04-09 DIAGNOSIS — G57.52 TARSAL TUNNEL SYNDROME, LEFT: Primary | ICD-10-CM

## 2018-04-09 PROCEDURE — 97110 THERAPEUTIC EXERCISES: CPT | Performed by: PHYSICAL THERAPIST

## 2018-04-09 PROCEDURE — 97530 THERAPEUTIC ACTIVITIES: CPT | Performed by: PHYSICAL THERAPIST

## 2018-04-09 PROCEDURE — 97140 MANUAL THERAPY 1/> REGIONS: CPT | Performed by: PHYSICAL THERAPIST

## 2018-04-09 NOTE — PROGRESS NOTES
Daily Note     Today's date: 2018  Patient name: Segun Don  : 1965  MRN: [id number]  Referring provider: David Medrano DPM  Dx:   Encounter Diagnosis     ICD-10-CM    1  Tarsal tunnel syndrome, left G57 52                   Subjective: No pain when arrived at therapy today  Occasionally gets shooting pain in bottom of his foot  Objective: See treatment diary below  Manual  3/13  3/15  3/19  3/26  3/30  4/5  4/9         IASTM peroneal tendons 8'  8'  8'  8'  8'  8'  plantar fascia x8'         Gastroc stretch    3x30" 3x30"  3x30"  3x30"  30" x3  3x30"                                                                                       Exercise Diary  3/13  3/15  3/19  3/26  3/30  4/5  4/9         Nustep L5x10' L5x10' L5x10'  L5x10' L5x10'  l5 x10'  L6x10'         Standing gastroc stretch    step 3x30"  step 3x30"  step 3x30"  3x30"  30" 3x  03"x3         Heel raises   x20 x20  x20  x20  20x  x20         T-band ankle ex    yellow x20 yellow x20  yellow x20  [id number]  orange 20x orange x 20         Standing soleus stretch    3x 30"  step 3x30" Step 3x30" 3x30"  30" 3x  3x30"         Standing prostretch   Step 3x30"  NP      30" 3x  3x30"         Seated prostretch    3x30"  3x30"  3x30"  3x30"  30" 3x  3x30"         Towel toe curls                       [place]                                                                                                                                                                                                                                                                                                      Modalities                                                                                                        Assessment: Tolerated treatment well  Patient would benefit from continued PT  Initiated IASTM on plantar fascia today  No reports of pain after session  Plan: Progress note during next visit

## 2018-04-12 ENCOUNTER — OFFICE VISIT (OUTPATIENT)
Dept: PHYSICAL THERAPY | Facility: CLINIC | Age: 53
End: 2018-04-12
Payer: COMMERCIAL

## 2018-04-12 DIAGNOSIS — G57.52 TARSAL TUNNEL SYNDROME, LEFT: Primary | ICD-10-CM

## 2018-04-12 PROCEDURE — G8979 MOBILITY GOAL STATUS: HCPCS | Performed by: PHYSICAL THERAPIST

## 2018-04-12 PROCEDURE — 97110 THERAPEUTIC EXERCISES: CPT | Performed by: PHYSICAL THERAPIST

## 2018-04-12 PROCEDURE — 97140 MANUAL THERAPY 1/> REGIONS: CPT | Performed by: PHYSICAL THERAPIST

## 2018-04-12 PROCEDURE — 97530 THERAPEUTIC ACTIVITIES: CPT | Performed by: PHYSICAL THERAPIST

## 2018-04-12 PROCEDURE — G8978 MOBILITY CURRENT STATUS: HCPCS | Performed by: PHYSICAL THERAPIST

## 2018-04-12 NOTE — PROGRESS NOTES
PT Re-Evaluation     Today's date: 2018  Patient name: Anderson You  : 1965  MRN: [id number]  Referring provider: Benjamín Miller DPM  Dx:   Encounter Diagnosis     ICD-10-CM    1  Tarsal tunnel syndrome, left G57 52                   Assessment  Impairments: pain with function    Patient is irritable  Assessment details: Patient has made significant gains since starting PT services  He reports an overall decrease in frequency, intensity and duration of his pain  [facility] and he demonstrates a normal gait pattern  He continues to have numbness at the metatarsal heads on the plantar aspect of his left foot  Patient requires occasional VC's for correct performance of a HEP and will benefit from continuation of skilled PT services in order to further decrease his pain and promote improved tolerance to gait and functional activities at home and in the community  Understanding of Dx/Px/POC: good   Prognosis: fair    Goals  STG (2 weeks):  1  Patient will report pain as a 6/10 at worst with ambulation - met  2  Patient demonstrate good technique with HEP in order to maintain gains made with skilled PT services - not met  Occasional VC's needed  LTG (4-6 weeks):  1  Patient will report pain as a 3-4/10 at worst with stationary standing and walking to promote return to previous level of function - met  2  Patient will report numbness in left foot decreased by 50% - not met    Plan  Patient would benefit from: skilled PT  Planned therapy interventions: joint mobilization, manual therapy, patient education, strengthening, stretching, therapeutic activities, therapeutic exercise and home exercise program  Frequency: 2x week  Duration in weeks: 4  Treatment plan discussed with: patient        Subjective Evaluation    History of Present Illness  Date of onset: 2017  Mechanism of injury: Patient reports feeling 70% improved since starting PT services    Pain is less intense, is of shorter duration, and occurs less frequently  Still has numbness at plantar surface of met heads  Recurrent probem    Quality of life: fair    Pain  No pain reported  Current pain ratin  At best pain ratin  At worst pain ratin  Location: Left lateral ankle pain  Numbness dorsum of foot at toes is constant  Quality: bee sting feeling  Aggravating factors: standing and walking  Progression: worsening    Social Support  Steps to enter house: yes  3  L[facility] in: [facility]  Lives with: alone    Employment status: not working  Hand dominance: right  Exercise history: sedentary      Diagnostic Tests  EMG: abnormal (Left tarsal tunnel)  Treatments  No previous or current treatments  Current treatment: physical therapy  Patient Goals  Patient goal: To decrease the pain        Objective     Static Posture     Ankle/Foot   Ankle/Foot (Left): Pes planus  Ankle/Foot (Right): Pes planus  Palpation     Additional Palpation Details  Minimal TTP left peroneus tertius    Tenderness   Left Ankle/Foot   Tenderness in the peroneal tendon  Neurological Testing     Sensation     Ankle/Foot   Left Ankle/Foot   Diminished: light touch    Active Range of Motion   Left Ankle/Foot   Dorsiflexion (ke): 10 degrees   Plantar flexion: WFL  Inversion: 15 degrees with pain  Eversion: 20 degrees     Strength/Myotome Testing     Left Ankle/Foot   Dorsiflexion: 4+  Plantar flexion: 4+  Inversion: 4+  Eversion: 4+    Ambulation     Ambulation: Level Surfaces   Ambulation without assistive device: independent    Quality of Movement During Gait     Ankle    Ankle (Left): Positive pronated  Ankle (Right): Positive pronated  Foot Alignment    Foot Alignment (Left): Positive cavus  Foot Alignment (Right): Positive cavus             Precautions: COPD, Cardiac    Daily Treatment Diary     Exercise Diary  3/13  3/15  3/19  3/26  3/30  4/5  4/9  4/12       Nustep L5x10' L5x10' L5x10'  L5x10' L5x10'  l5 x10'  L6x10' L6x10'       Standing gastroc stretch    step 3x30"  step 3x30"  step 3x30"  3x30"  30" 3x  03"x3  3x30"       Heel raises   x20 x20  x20  x20  20x  x20  x20       T-band ankle ex    yellow x20 yellow x20  yellow x20  [id number]  orange 20x orange x 20 Orange x20       Standing soleus stretch    3x 30"  step 3x30" Step 3x30" 3x30"  30" 3x  3x30"  3x30"       Standing prostretch   Step 3x30"  NP      30" 3x  3x30"  3x30"       Seated prostretch    3x30"  3x30"  3x30"  3x30"  30" 3x  3x30"  3x30"       Towel toe curls                       [place]                                                                                                                                                                                                                                                                                                      Modalities

## 2018-04-16 ENCOUNTER — APPOINTMENT (OUTPATIENT)
Dept: PHYSICAL THERAPY | Facility: CLINIC | Age: 53
End: 2018-04-16
Payer: COMMERCIAL

## 2018-04-18 ENCOUNTER — OFFICE VISIT (OUTPATIENT)
Dept: PHYSICAL THERAPY | Facility: CLINIC | Age: 53
End: 2018-04-18
Payer: COMMERCIAL

## 2018-04-18 DIAGNOSIS — G57.52 TARSAL TUNNEL SYNDROME, LEFT: Primary | ICD-10-CM

## 2018-04-18 PROCEDURE — 97110 THERAPEUTIC EXERCISES: CPT

## 2018-04-18 PROCEDURE — 97140 MANUAL THERAPY 1/> REGIONS: CPT

## 2018-04-18 NOTE — PROGRESS NOTES
Daily Note     Today's date: 2018  Patient name: Segun Don  : 1965  MRN: [id number]  Referring provider: David Medrano DPM  Dx:   Encounter Diagnosis     ICD-10-CM    1  Tarsal tunnel syndrome, left G57 52                   Subjective: Patient denies pain in ankle, complains of continued paresthesia in dorsal aspect of foot  Objective: See treatment diary below  Precautions: COPD, Cardiac     Daily Treatment Diary      Exercise Diary  3/13  3/15  3/19  3/26  3/30  4/5  4/9  4/12  4 18     Nustep L5x10' L5x10' L5x10'  L5x10' L5x10'  l5 x10'  L6x10' L6x10' L6x10'     Standing gastroc stretch    step 3x30"  step 3x30"  step 3x30"  3x30"  30" 3x  03"x3  3x30"  3x30"     Heel raises   x20 x20  x20  x20  20x  x20  x20  x20     T-band ankle ex    yellow x20 yellow x20  yellow x20  [id number]  orange 20x orange x 20 Orange x20 Orange x20 ea     Standing soleus stretch    3x 30"  step 3x30" Step 3x30" 3x30"  30" 3x  3x30"  3x30"  3x30"     Standing prostretch   Step 3x30"  NP      30" 3x  3x30"  3x30"  3x30"     Seated prostretch    3x30"  3x30"  3x30"  3x30"  30" 3x  3x30"  3x30"  NP     Towel toe curls                       [place]                                                                                                                                                                                                                                                                                                      Modalities                                                                               Assessment: Tolerated treatment well  Patient denies tenderness along peroneals with IASTM  Patient exhibited good technique with therapeutic exercises  No increase in pain throughout session  *unsupervised exercise for 15 minutes of session      Plan: Progress treatment as tolerated

## 2018-04-25 ENCOUNTER — OFFICE VISIT (OUTPATIENT)
Dept: PHYSICAL THERAPY | Facility: CLINIC | Age: 53
End: 2018-04-25
Payer: COMMERCIAL

## 2018-04-25 DIAGNOSIS — G57.52 TARSAL TUNNEL SYNDROME, LEFT: Primary | ICD-10-CM

## 2018-04-25 PROCEDURE — 97110 THERAPEUTIC EXERCISES: CPT

## 2018-04-25 NOTE — PROGRESS NOTES
Daily Note     Today's date: 2018  Patient name: Raymon Altamirano  : 1965  MRN: [id number]  Referring provider: Keke Macedo DPM  Dx:   Encounter Diagnosis     ICD-10-CM    1  Tarsal tunnel syndrome, left G57 52                   Subjective: Pt c/o 2/10 pain in L ankle when sitting  He states that when he is standing he feels fine  Objective: See treatment diary below   Precautions: COPD, Cardiac     Daily Treatment Diary      Exercise Diary  4/25  3/15  3/19  3/26  3/30  4/5  4/9  4/12  4 18     Nustep L5x10' L5x10' L5x10'  L5x10' L5x10'  l5 x10'  L6x10' L6x10' L6x10'     Standing gastroc stretch  30" 3x  step 3x30"  step 3x30"  step 3x30"  3x30"  30" 3x  03"x3  3x30"  3x30"     Heel raises  20x x20 x20  x20  x20  20x  x20  x20  x20     T-band ankle ex  green 20x  yellow x20 yellow x20  yellow x20  [id number]  orange 20x orange x 20 Orange x20 Orange x20 ea     Standing soleus stretch  30" 3x  3x 30"  step 3x30" Step 3x30" 3x30"  30" 3x  3x30"  3x30"  3x30"     Standing prostretch  NP Step 3x30"  NP      30" 3x  3x30"  3x30"  3x30"     Seated prostretch  30" 3x  3x30"  3x30"  3x30"  3x30"  30" 3x  3x30"  3x30"  NP     Towel toe curls                       [place]                                                                                                                                                                                                                                                                                                      Modalities                                                                                  Assessment: Pt had no change in pain post treatment today  He noted feeling as if the green theraband was more challenging today  He was educated on stretches to decrease tension and improve ROM  Pt will benefit from continued skilled PT to improve L ankle ROM  Plan: Progress treatment as tolerated

## 2018-04-26 ENCOUNTER — TRANSCRIBE ORDERS (OUTPATIENT)
Dept: PHYSICAL THERAPY | Facility: CLINIC | Age: 53
End: 2018-04-26

## 2018-04-26 DIAGNOSIS — G57.52 TARSAL TUNNEL SYNDROME OF LEFT SIDE: Primary | ICD-10-CM

## 2018-04-27 ENCOUNTER — OFFICE VISIT (OUTPATIENT)
Dept: PHYSICAL THERAPY | Facility: CLINIC | Age: 53
End: 2018-04-27
Payer: COMMERCIAL

## 2018-04-27 DIAGNOSIS — G57.52 TARSAL TUNNEL SYNDROME, LEFT: Primary | ICD-10-CM

## 2018-04-27 PROCEDURE — 97110 THERAPEUTIC EXERCISES: CPT | Performed by: PHYSICAL THERAPIST

## 2018-04-27 PROCEDURE — 97112 NEUROMUSCULAR REEDUCATION: CPT | Performed by: PHYSICAL THERAPIST

## 2018-04-27 PROCEDURE — 97530 THERAPEUTIC ACTIVITIES: CPT | Performed by: PHYSICAL THERAPIST

## 2018-04-30 ENCOUNTER — OFFICE VISIT (OUTPATIENT)
Dept: PHYSICAL THERAPY | Facility: CLINIC | Age: 53
End: 2018-04-30
Payer: COMMERCIAL

## 2018-04-30 DIAGNOSIS — G57.52 TARSAL TUNNEL SYNDROME, LEFT: Primary | ICD-10-CM

## 2018-04-30 PROCEDURE — 97530 THERAPEUTIC ACTIVITIES: CPT

## 2018-04-30 PROCEDURE — 97110 THERAPEUTIC EXERCISES: CPT

## 2018-04-30 PROCEDURE — 97112 NEUROMUSCULAR REEDUCATION: CPT

## 2018-04-30 NOTE — PROGRESS NOTES
Daily Note     Today's date: 2018  Patient name: Juan Lee  : 1965  MRN: [id number]  Referring provider: Veronika Dial DPM  Dx:   Encounter Diagnosis     ICD-10-CM    1  Tarsal tunnel syndrome, left G57 52                   Subjective: Pt c/o 3/10 pain in L ankle upon arrival today  He states that he has a non-consistent ache  Objective: See treatment diary below     Exercise Diary  4/25  4/26  4/30  3/26  3/30  4/5  4/9  4/12  4 18     Nustep L5x10' L5x10' L6x10'  L5x10' L5x10'  l5 x10'  L6x10' L6x10' L6x10'     Standing gastroc stretch  30" 3x  step 3x30"  step 3x30"  step 3x30"  3x30"  30" 3x  03"x3  3x30"  3x30"     Heel raises  20x x20 x20  x20  x20  20x  x20  x20  x20     T-band ankle ex  green 20x  Green x20 Blue   x20  yellow x20  [id number]  orange 20x orange x 20 Orange x20 Orange x20 ea     Standing soleus stretch  30" 3x  3x 30"  step 3x30" Step 3x30" 3x30"  30" 3x  3x30"  3x30"  3x30"     Standing prostretch  NP Step 3x30"  NP      30" 3x  3x30"  3x30"  3x30"     Seated prostretch  30" 3x  3x30"  3x30"  3x30"  3x30"  30" 3x  3x30"  3x30"  NP     Towel toe curls                       [place]                                                                                                                                                                                                                                                                                                      Modalities                                                                                   Assessment: Pt had no change in pain throughout nor post treatment  He noted that he will be getting insoles this week and he is hopeful that they will make a difference  He was educated on DC to HEP next week  Plan: Potential discharge next visit

## 2018-05-01 ENCOUNTER — OFFICE VISIT (OUTPATIENT)
Dept: PHYSICAL THERAPY | Facility: CLINIC | Age: 53
End: 2018-05-01
Payer: COMMERCIAL

## 2018-05-01 DIAGNOSIS — G57.52 TARSAL TUNNEL SYNDROME, LEFT: Primary | ICD-10-CM

## 2018-05-01 PROCEDURE — 97760 ORTHOTIC MGMT&TRAING 1ST ENC: CPT | Performed by: PHYSICAL THERAPIST

## 2018-05-01 NOTE — PROGRESS NOTES
Daily Note     Today's date: 2018  Patient name: Anderson You  : 1965  MRN: [id number]  Referring provider: Benjamín Miller DPM  Dx:   Encounter Diagnosis     ICD-10-CM    1  Tarsal tunnel syndrome, left G57 52                   Subjective: no new reports      Objective:  Dispensed orthotics, IP in break in period  Observed good fit with good correction of L foot position  Plan:  f/u for orthotics adjustment prn

## 2018-05-02 ENCOUNTER — APPOINTMENT (OUTPATIENT)
Dept: PHYSICAL THERAPY | Facility: CLINIC | Age: 53
End: 2018-05-02
Payer: COMMERCIAL

## 2018-05-03 ENCOUNTER — HOSPITAL ENCOUNTER (OUTPATIENT)
Dept: NON INVASIVE DIAGNOSTICS | Facility: MEDICAL CENTER | Age: 53
Discharge: HOME | End: 2018-05-03
Payer: COMMERCIAL

## 2018-05-03 ENCOUNTER — TELEPHONE (OUTPATIENT)
Dept: CARDIOLOGY CLINIC | Facility: CLINIC | Age: 53
End: 2018-05-03

## 2018-05-03 DIAGNOSIS — Q23.1 CONGENITAL INSUFFICIENCY OF AORTIC VALVE: ICD-10-CM

## 2018-05-03 PROCEDURE — 93306 TTE W/DOPPLER COMPLETE: CPT

## 2018-05-03 PROCEDURE — 93306 TTE W/DOPPLER COMPLETE: CPT | Performed by: INTERNAL MEDICINE

## 2018-05-03 NOTE — TELEPHONE ENCOUNTER
Spoke with patient regard echo test results  ----- Message from Josefina Sibley MD sent at 5/3/2018 11:27 AM EDT -----  Please call the patient regarding his echo result  Please let him know that there has been no change to his aortic valve from prior studies

## 2018-05-07 ENCOUNTER — OFFICE VISIT (OUTPATIENT)
Dept: FAMILY MEDICINE CLINIC | Facility: CLINIC | Age: 53
End: 2018-05-07
Payer: COMMERCIAL

## 2018-05-07 ENCOUNTER — EVALUATION (OUTPATIENT)
Dept: PHYSICAL THERAPY | Facility: CLINIC | Age: 53
End: 2018-05-07
Payer: COMMERCIAL

## 2018-05-07 ENCOUNTER — APPOINTMENT (OUTPATIENT)
Dept: LAB | Facility: CLINIC | Age: 53
End: 2018-05-07
Payer: COMMERCIAL

## 2018-05-07 ENCOUNTER — TRANSCRIBE ORDERS (OUTPATIENT)
Dept: ADMINISTRATIVE | Facility: HOSPITAL | Age: 53
End: 2018-05-07

## 2018-05-07 VITALS — HEART RATE: 72 BPM | OXYGEN SATURATION: 98 % | BODY MASS INDEX: 31.02 KG/M2 | WEIGHT: 235.13 LBS

## 2018-05-07 DIAGNOSIS — R53.83 FATIGUE, UNSPECIFIED TYPE: ICD-10-CM

## 2018-05-07 DIAGNOSIS — J30.1 SEASONAL ALLERGIC RHINITIS DUE TO POLLEN: ICD-10-CM

## 2018-05-07 DIAGNOSIS — R10.84 GENERALIZED ABDOMINAL PAIN: ICD-10-CM

## 2018-05-07 DIAGNOSIS — K21.9 GASTROESOPHAGEAL REFLUX DISEASE WITHOUT ESOPHAGITIS: ICD-10-CM

## 2018-05-07 DIAGNOSIS — N18.30 CKD (CHRONIC KIDNEY DISEASE) STAGE 3, GFR 30-59 ML/MIN (HCC): ICD-10-CM

## 2018-05-07 DIAGNOSIS — R73.03 PREDIABETES: ICD-10-CM

## 2018-05-07 DIAGNOSIS — J30.1 NON-SEASONAL ALLERGIC RHINITIS DUE TO POLLEN: ICD-10-CM

## 2018-05-07 DIAGNOSIS — E78.2 HYPERLIPIDEMIA, MIXED: ICD-10-CM

## 2018-05-07 DIAGNOSIS — Z76.89 ENCOUNTER TO ESTABLISH CARE: Primary | ICD-10-CM

## 2018-05-07 DIAGNOSIS — R63.5 WEIGHT GAIN: ICD-10-CM

## 2018-05-07 DIAGNOSIS — K44.9 HIATAL HERNIA: ICD-10-CM

## 2018-05-07 DIAGNOSIS — R20.0 NUMBNESS OF FINGERS OF BOTH HANDS: ICD-10-CM

## 2018-05-07 DIAGNOSIS — G57.52 TARSAL TUNNEL SYNDROME, LEFT: Primary | ICD-10-CM

## 2018-05-07 DIAGNOSIS — Z76.89 ENCOUNTER TO ESTABLISH CARE: ICD-10-CM

## 2018-05-07 DIAGNOSIS — J91.8 PLEURAL EFFUSION ASSOCIATED WITH HEPATIC DISORDER: Primary | ICD-10-CM

## 2018-05-07 DIAGNOSIS — K76.9 PLEURAL EFFUSION ASSOCIATED WITH HEPATIC DISORDER: Primary | ICD-10-CM

## 2018-05-07 DIAGNOSIS — J45.20 MILD INTERMITTENT ASTHMA WITHOUT COMPLICATION: ICD-10-CM

## 2018-05-07 DIAGNOSIS — K76.9 LIVER DISEASE: ICD-10-CM

## 2018-05-07 DIAGNOSIS — F41.8 DEPRESSION WITH ANXIETY: ICD-10-CM

## 2018-05-07 DIAGNOSIS — R14.0 ABDOMINAL BLOATING: ICD-10-CM

## 2018-05-07 PROBLEM — Z13.220 LIPID SCREENING: Status: ACTIVE | Noted: 2018-05-07

## 2018-05-07 LAB
25(OH)D3 SERPL-MCNC: 16.9 NG/ML (ref 30–100)
ALBUMIN SERPL BCP-MCNC: 3.8 G/DL (ref 3.5–5)
ALP SERPL-CCNC: 52 U/L (ref 46–116)
ALT SERPL W P-5'-P-CCNC: 47 U/L (ref 12–78)
AMMONIA PLAS-SCNC: 11 UMOL/L (ref 11–35)
ANION GAP SERPL CALCULATED.3IONS-SCNC: 9 MMOL/L (ref 4–13)
AST SERPL W P-5'-P-CCNC: 43 U/L (ref 5–45)
BASOPHILS # BLD AUTO: 0.03 THOUSANDS/ΜL (ref 0–0.1)
BASOPHILS NFR BLD AUTO: 1 % (ref 0–1)
BILIRUB DIRECT SERPL-MCNC: 0.16 MG/DL (ref 0–0.2)
BILIRUB SERPL-MCNC: 0.67 MG/DL (ref 0.2–1)
BUN SERPL-MCNC: 18 MG/DL (ref 5–25)
CALCIUM SERPL-MCNC: 9.1 MG/DL (ref 8.3–10.1)
CHLORIDE SERPL-SCNC: 105 MMOL/L (ref 100–108)
CHOLEST SERPL-MCNC: 124 MG/DL (ref 50–200)
CO2 SERPL-SCNC: 24 MMOL/L (ref 21–32)
CREAT SERPL-MCNC: 1.56 MG/DL (ref 0.6–1.3)
EOSINOPHIL # BLD AUTO: 0.08 THOUSAND/ΜL (ref 0–0.61)
EOSINOPHIL NFR BLD AUTO: 1 % (ref 0–6)
ERYTHROCYTE [DISTWIDTH] IN BLOOD BY AUTOMATED COUNT: 13.1 % (ref 11.6–15.1)
EST. AVERAGE GLUCOSE BLD GHB EST-MCNC: 131 MG/DL
GFR SERPL CREATININE-BSD FRML MDRD: 50 ML/MIN/1.73SQ M
GLUCOSE P FAST SERPL-MCNC: 88 MG/DL (ref 65–99)
HBA1C MFR BLD: 6.2 % (ref 4.2–6.3)
HCT VFR BLD AUTO: 47.4 % (ref 36.5–49.3)
HDLC SERPL-MCNC: 31 MG/DL (ref 40–60)
HGB BLD-MCNC: 16 G/DL (ref 12–17)
LDLC SERPL CALC-MCNC: 52 MG/DL (ref 0–100)
LYMPHOCYTES # BLD AUTO: 2.4 THOUSANDS/ΜL (ref 0.6–4.47)
LYMPHOCYTES NFR BLD AUTO: 39 % (ref 14–44)
MCH RBC QN AUTO: 30.5 PG (ref 26.8–34.3)
MCHC RBC AUTO-ENTMCNC: 33.8 G/DL (ref 31.4–37.4)
MCV RBC AUTO: 91 FL (ref 82–98)
MONOCYTES # BLD AUTO: 0.63 THOUSAND/ΜL (ref 0.17–1.22)
MONOCYTES NFR BLD AUTO: 10 % (ref 4–12)
NEUTROPHILS # BLD AUTO: 2.97 THOUSANDS/ΜL (ref 1.85–7.62)
NEUTS SEG NFR BLD AUTO: 49 % (ref 43–75)
NONHDLC SERPL-MCNC: 93 MG/DL
NRBC BLD AUTO-RTO: 0 /100 WBCS
PLATELET # BLD AUTO: 228 THOUSANDS/UL (ref 149–390)
PMV BLD AUTO: 11.4 FL (ref 8.9–12.7)
POTASSIUM SERPL-SCNC: 4.2 MMOL/L (ref 3.5–5.3)
PROT SERPL-MCNC: 7.4 G/DL (ref 6.4–8.2)
RBC # BLD AUTO: 5.24 MILLION/UL (ref 3.88–5.62)
SODIUM SERPL-SCNC: 138 MMOL/L (ref 136–145)
TRIGL SERPL-MCNC: 207 MG/DL
TSH SERPL DL<=0.05 MIU/L-ACNC: 2.35 UIU/ML (ref 0.36–3.74)
WBC # BLD AUTO: 6.13 THOUSAND/UL (ref 4.31–10.16)

## 2018-05-07 PROCEDURE — 97140 MANUAL THERAPY 1/> REGIONS: CPT | Performed by: PHYSICAL THERAPIST

## 2018-05-07 PROCEDURE — 82306 VITAMIN D 25 HYDROXY: CPT

## 2018-05-07 PROCEDURE — 82140 ASSAY OF AMMONIA: CPT

## 2018-05-07 PROCEDURE — 85025 COMPLETE CBC W/AUTO DIFF WBC: CPT

## 2018-05-07 PROCEDURE — G8979 MOBILITY GOAL STATUS: HCPCS | Performed by: PHYSICAL THERAPIST

## 2018-05-07 PROCEDURE — G8980 MOBILITY D/C STATUS: HCPCS | Performed by: PHYSICAL THERAPIST

## 2018-05-07 PROCEDURE — 83036 HEMOGLOBIN GLYCOSYLATED A1C: CPT

## 2018-05-07 PROCEDURE — 80061 LIPID PANEL: CPT

## 2018-05-07 PROCEDURE — 82248 BILIRUBIN DIRECT: CPT

## 2018-05-07 PROCEDURE — 36415 COLL VENOUS BLD VENIPUNCTURE: CPT

## 2018-05-07 PROCEDURE — 80053 COMPREHEN METABOLIC PANEL: CPT

## 2018-05-07 PROCEDURE — 97110 THERAPEUTIC EXERCISES: CPT | Performed by: PHYSICAL THERAPIST

## 2018-05-07 PROCEDURE — 84443 ASSAY THYROID STIM HORMONE: CPT

## 2018-05-07 PROCEDURE — 99204 OFFICE O/P NEW MOD 45 MIN: CPT | Performed by: NURSE PRACTITIONER

## 2018-05-07 NOTE — PROGRESS NOTES
PT Discharge    Today's date: 2018  Patient name: Corry Ross  : 1965  MRN: [id number]  Referring provider: Natalie Kramer DPM  Dx:   Encounter Diagnosis     ICD-10-CM    1  Tarsal tunnel syndrome, left G57 52                   Assessment  Impairments: pain with function    Patient is irritable  Assessment details: Patient has made significant gains since starting PT services, however,  Progress has plateaued over the past month  He reports an overall decrease in frequency, intensity and duration of his pain  Ankle ROM and strength is [facility] and he demonstrates a normal gait pattern  He continues to have numbness at the metatarsal heads on the plantar aspect of his left foot  Patient is independent and compliant and has maximized the benefit of skilled PT services at this time  Understanding of Dx/Px/POC: good   Prognosis: fair    Goals  STG (2 weeks):  1  Patient will report pain as a 6/10 at worst with ambulation - met  2  Patient demonstrate good technique with HEP in order to maintain gains made with skilled PT services - not met  Occasional VC's needed  LTG (4-6 weeks):  1  Patient will report pain as a 3-4/10 at worst with stationary standing and walking to promote return to previous level of function - met  2  Patient will report numbness in left foot decreased by 50% - not met    Plan  Treatment plan discussed with: patient        Subjective Evaluation    History of Present Illness  Date of onset: 2017  Mechanism of injury: Patient's Patient reports feeling 60% improved since starting PT services  Pain is less intense, is of shorter duration, and occurs less frequently  Still has numbness at plantar surface of met heads  Patient does not have pain at night anymore  Just got custom orthotics last week and has noticed some discomfort with wearing them    Recurrent probem    Quality of life: fair    Pain  Current pain ratin  At best pain ratin  At worst pain rating: 7  Location: Left lateral ankle pain  Numbness dorsum of foot at toes is constant  Quality: sharp (bee sting feeling)  Aggravating factors: standing and walking  Progression: worsening    Social Support  Steps to enter house: yes  3  Lives in: [place] [facility]  Lives with: alone    Employment status: not working  Hand dominance: right  Exercise history: sedentary      Diagnostic Tests  EMG: abnormal (Left tarsal tunnel)  Treatments  No previous or current treatments  Current treatment: physical therapy  Patient Goals  Patient goal: To decrease the pain        Objective     Static Posture     Ankle/Foot   Ankle/Foot (Left): Pes planus  Ankle/Foot (Right): Pes planus  Palpation     Additional Palpation Details  No TTP peroneal tendons    Neurological Testing     Sensation     Ankle/Foot   Left Ankle/Foot   Diminished: light touch    Active Range of Motion   Left Ankle/Foot   Dorsiflexion (ke): 15 degrees   Plantar flexion: WFL  Inversion: 25 degrees with pain  Eversion: 15 degrees     Strength/Myotome Testing     Left Ankle/Foot   Dorsiflexion: 5  Plantar flexion: 5  Inversion: 5  Eversion: 5    Ambulation     Ambulation: Level Surfaces   Ambulation without assistive device: independent    Quality of Movement During Gait     Ankle    Ankle (Left): Positive pronated  Ankle (Right): Positive pronated  Foot Alignment    Foot Alignment (Left): Positive cavus  Foot Alignment (Right): Positive cavus             Precautions: COPD, Cardiac    Daily Treatment Diary     Exercise Diary  4/25  4/26  4/30  5/7  3/30  4/5  4/9  4/12  4 18     Nustep L5x10' L5x10' L6x10'  L5x10' L5x10'  l5 x10'  L6x10' L6x10' L6x10'     Standing gastroc stretch  30" 3x  step 3x30"  step 3x30"  step 3x30"  3x30"  30" 3x  03"x3  3x30"  3x30"     Heel raises  20x x20 x20  x20  x20  20x  x20  x20  x20     T-band ankle ex  green 20x  Green x20 Blue   x20  yellow x20  [id number]  orange 20x orange x 20 Orange x20 Orange x20 ea     Standing soleus stretch  30" 3x  3x 30"  step 3x30" Step 3x30" 3x30"  30" 3x  3x30"  3x30"  3x30"     Standing prostretch  NP Step 3x30"  NP      30" 3x  3x30"  3x30"  3x30"     Seated prostretch  30" 3x  3x30"  3x30"  3x30"  3x30"  30" 3x  3x30"  3x30"  NP     Towel toe curkye [place]                                                                                                                                                                                                                                                                                                      Modalities

## 2018-05-07 NOTE — PROGRESS NOTES
Assessment/Plan:    No problem-specific Assessment & Plan notes found for this encounter  Diagnoses and all orders for this visit:    Encounter to establish care  -     CBC and differential; Future  -     Comprehensive metabolic panel; Future  -     Lipid panel; Future  -     Vitamin D 25 hydroxy; Future  -     TSH, 3rd generation; Future    Gastroesophageal reflux disease without esophagitis  -     CBC and differential; Future  -     Comprehensive metabolic panel; Future  -     Lipid panel; Future  -     Vitamin D 25 hydroxy; Future  -     TSH, 3rd generation; Future    Liver disease  -     CBC and differential; Future  -     Comprehensive metabolic panel; Future  -     Lipid panel; Future  -     Vitamin D 25 hydroxy; Future  -     TSH, 3rd generation; Future  -     CT abdomen pelvis w wo contrast; Future  -     Ammonia; Future    Hiatal hernia  -     CBC and differential; Future  -     Comprehensive metabolic panel; Future  -     Lipid panel; Future  -     Vitamin D 25 hydroxy; Future  -     TSH, 3rd generation; Future    Prediabetes  -     CBC and differential; Future  -     Comprehensive metabolic panel; Future  -     HEMOGLOBIN A1C W/ EAG ESTIMATION; Future  -     Lipid panel; Future  -     Vitamin D 25 hydroxy; Future  -     TSH, 3rd generation; Future    CKD (chronic kidney disease) stage 3, GFR 30-59 ml/min  -     CBC and differential; Future  -     Comprehensive metabolic panel; Future  -     Lipid panel; Future  -     Vitamin D 25 hydroxy; Future  -     TSH, 3rd generation; Future    Hyperlipidemia, mixed  -     CBC and differential; Future  -     Comprehensive metabolic panel; Future  -     Lipid panel; Future  -     Vitamin D 25 hydroxy; Future  -     TSH, 3rd generation; Future    Weight gain  -     Vitamin D 25 hydroxy; Future  -     TSH, 3rd generation; Future  -     CT abdomen pelvis w wo contrast; Future    Fatigue, unspecified type  -     Vitamin D 25 hydroxy;  Future  -     TSH, 3rd generation; Future  -     CT abdomen pelvis w wo contrast; Future    Generalized abdominal pain  -     CT abdomen pelvis w wo contrast; Future    Mild intermittent asthma without complication    Seasonal allergic rhinitis due to pollen    Numbness of fingers of both hands  -     EMG 2 Limb Upper Extremity; Future    Non-seasonal allergic rhinitis due to pollen    Depression with anxiety    Abdominal bloating          Subjective:      Patient ID: Purvi Bello is a 47 y o  male  Pt is here to become established  Pt explains that 6 months ago, he went to see Dr Bina Blanton because of bloating  He underwent EGD  He has a large hiatal hernia  He feels like he eats and feels very bloated  This feeling lasts for many hours  Last week, on his left side, below the rib cage, he had pain and every time he ate, it made it worse  He had pain on the right side over the past few days, but the pain is gone today  He does not notice of foods trigger this  He has pending blood work  BY hx, pt has AAA-managed by Cardiology  Pt also reports that he was recently told he had kidney problems, but he doesn't know anything more  Asthma- pt reports that he was told he had COPD, but testing indicated he actually had asthma  HLD- he was advised to follow a low carb diet  Migraine headaches- followed by neurology  Numbness of both hands-present for " along time"  H/O prior carpal tunnel surgery on left hand  Pt says he walks on occasion  Diet- tries "my best"  Drinks a 6 pack of soda daily  Pt reports that his prior PCP told him this was OK  Pt reports that his previous PCP told him his dizziness was "because I am tall"  The following portions of the patient's history were reviewed and updated as appropriate:   He  has a past medical history of Aorta aneurysm ([facility] [address] ); Arm DVT (deep venous thromboembolism), acute ([facility] [address] ) (2015); Asthma; COPD (chronic obstructive pulmonary disease) ([facility] [address] );  Depression; GERD (gastroesophageal reflux disease); Headache; Hiatal hernia; Hyperlipidemia, mixed (5/7/2018); Liver disease; Renal calculi; and Sleep apnea  He   Patient Active Problem List    Diagnosis Date Noted    GERD (gastroesophageal reflux disease) 05/07/2018    Liver disease 05/07/2018    Hiatal hernia 05/07/2018    Encounter to establish care 05/07/2018    Prediabetes 05/07/2018    Lipid screening 05/07/2018    CKD (chronic kidney disease) stage 3, GFR 30-59 ml/min 05/07/2018    Hyperlipidemia, mixed 05/07/2018    Weight gain 05/07/2018    Fatigue 05/07/2018    Generalized abdominal pain 05/07/2018    Mild intermittent asthma without complication 07/95/6556    Seasonal allergic rhinitis due to pollen 05/07/2018    Numbness of fingers of both hands 05/07/2018    Depression with anxiety 05/07/2018    Abdominal bloating 05/07/2018    Dizziness 05/06/2016    COPD (chronic obstructive pulmonary disease) ([facility] [address] ) 05/06/2016    Ascending aortic aneurysm (HCC) 05/06/2016    Bicuspid aortic valve 05/06/2016    Adjustment reaction with anxiety and depression 07/10/2015    Allergic rhinitis 09/23/2013     He  has a past surgical history that includes Carpal tunnel release (Left); Hernia repair; pr colonoscopy flx dx w/collj spec when pfrmd (N/A, 8/7/2017); Colonoscopy; Foot surgery (Left); and pr esophagogastroduodenoscopy transoral diagnostic (N/A, 10/31/2017)  His family history includes Aortic aneurysm in his other; Arthritis in his mother; Cancer in his brother; Leukemia in his brother; Prostate cancer in his brother; Schizoaffective Disorder  in his sister  He  reports that he quit smoking about 3 years ago  His smoking use included Cigars  He has never used smokeless tobacco  He reports that he does not drink alcohol or use drugs  Current Outpatient Prescriptions   Medication Sig Dispense Refill    albuterol (PROVENTIL HFA,VENTOLIN HFA) 90 mcg/act inhaler Inhale 2 puffs every 6 (six) hours as needed for wheezing        amitriptyline (ELAVIL) 25 mg tablet Take 1 tablet (25 mg total) by mouth daily at bedtime for 30 days 30 tablet 3    buPROPion (WELLBUTRIN) 75 mg tablet take 1 tablet by mouth twice a day 60 tablet 3    fenofibrate (TRICOR) 145 mg tablet Take 145 mg by mouth daily      fluticasone (FLONASE) 50 mcg/act nasal spray 1 spray into each nostril daily 16 g 0    Mometasone Furo-Formoterol Fum (DULERA) 200-5 MCG/ACT AERO Inhale 2 puffs 2 (two) times a day   montelukast (SINGULAIR) 10 mg tablet Take by mouth      pantoprazole (PROTONIX) 40 mg tablet Take 40 mg by mouth daily   sildenafil (REVATIO) 20 mg tablet Take by mouth      SUMAtriptan (IMITREX) 100 mg tablet Take 1 tablet (100 mg total) by mouth once as needed for migraine for up to 1 dose 9 tablet 3     No current facility-administered medications for this visit  Current Outpatient Prescriptions on File Prior to Visit   Medication Sig    albuterol (PROVENTIL HFA,VENTOLIN HFA) 90 mcg/act inhaler Inhale 2 puffs every 6 (six) hours as needed for wheezing   amitriptyline (ELAVIL) 25 mg tablet Take 1 tablet (25 mg total) by mouth daily at bedtime for 30 days    buPROPion (WELLBUTRIN) 75 mg tablet take 1 tablet by mouth twice a day    fenofibrate (TRICOR) 145 mg tablet Take 145 mg by mouth daily    fluticasone (FLONASE) 50 mcg/act nasal spray 1 spray into each nostril daily    Mometasone Furo-Formoterol Fum (DULERA) 200-5 MCG/ACT AERO Inhale 2 puffs 2 (two) times a day   montelukast (SINGULAIR) 10 mg tablet Take by mouth    pantoprazole (PROTONIX) 40 mg tablet Take 40 mg by mouth daily   sildenafil (REVATIO) 20 mg tablet Take by mouth    SUMAtriptan (IMITREX) 100 mg tablet Take 1 tablet (100 mg total) by mouth once as needed for migraine for up to 1 dose     No current facility-administered medications on file prior to visit  He has No Known Allergies       Review of Systems   Constitutional: Positive for fatigue and unexpected weight change  Negative for fever  Weight gain and fatigue   HENT: Positive for postnasal drip and rhinorrhea  Negative for congestion  Eyes: Negative for visual disturbance  Respiratory: Negative  Negative for cough and shortness of breath  Cardiovascular: Negative  Negative for chest pain, palpitations and leg swelling  Gastrointestinal: Positive for abdominal distention and abdominal pain  Negative for constipation, diarrhea, nausea and vomiting  Endocrine: Negative for cold intolerance, polydipsia and polyuria  Genitourinary: Negative  Negative for difficulty urinating  Musculoskeletal: Negative for back pain and joint swelling  Skin: Negative for color change and rash  Allergic/Immunologic: Negative  Negative for immunocompromised state  Neurological: Positive for dizziness, numbness and headaches  Numbness of both hands and both feet  Hematological: Negative  Negative for adenopathy  Psychiatric/Behavioral: Negative for behavioral problems and sleep disturbance  The patient is nervous/anxious  All other systems reviewed and are negative  Objective:      Pulse 72   Wt 107 kg (235 lb 2 oz)   SpO2 98%   BMI 31 02 kg/m²          Physical Exam   Constitutional: He is oriented to person, place, and time  He appears well-developed and well-nourished  No distress  HENT:   Head: Normocephalic and atraumatic  Right Ear: External ear normal    Left Ear: External ear normal    Nose: Nose normal    Mouth/Throat: Oropharynx is clear and moist  No oropharyngeal exudate  Eyes: Conjunctivae and EOM are normal  Pupils are equal, round, and reactive to light  Right eye exhibits no discharge  Left eye exhibits no discharge  No scleral icterus  Neck: Normal range of motion  Neck supple  No JVD present  No thyromegaly present  Cardiovascular: Normal rate, regular rhythm and normal heart sounds  Exam reveals no gallop and no friction rub      No murmur heard   Pulmonary/Chest: Effort normal and breath sounds normal  No respiratory distress  Abdominal: Soft  Bowel sounds are normal  He exhibits distension  He exhibits no mass  There is no tenderness  There is no rebound and no guarding  Musculoskeletal: Normal range of motion  He exhibits no edema, tenderness or deformity  Lymphadenopathy:     He has no cervical adenopathy  Neurological: He is alert and oriented to person, place, and time  Skin: Skin is warm and dry  He is not diaphoretic  Psychiatric: He has a normal mood and affect  His behavior is normal  Judgment and thought content normal    Nursing note and vitals reviewed

## 2018-05-07 NOTE — PATIENT INSTRUCTIONS
Abdominal pain- I am not seeing labs showing elevated liver enzymes, but pt does have dx in his PMH  I will check labs and CT of abdomen  Liver disease- by hx, check ammonia level  Hyperglycemia- pt was unaware of this  Check A1C for possible diabetes  Chronic kidney disease- check current labs  MAY need to see Nephrology  Fatigue and weight gain- check labs including diabetes testing, thyroid and liver function  Numbness of hands- check EMG  Abdominal bloating- limit carbonated beverages  Drink more water

## 2018-05-09 ENCOUNTER — APPOINTMENT (OUTPATIENT)
Dept: PHYSICAL THERAPY | Facility: CLINIC | Age: 53
End: 2018-05-09
Payer: COMMERCIAL

## 2018-05-09 DIAGNOSIS — R10.84 GENERALIZED ABDOMINAL PAIN: Primary | ICD-10-CM

## 2018-05-14 ENCOUNTER — HOSPITAL ENCOUNTER (OUTPATIENT)
Dept: RADIOLOGY | Facility: MEDICAL CENTER | Age: 53
Discharge: HOME | End: 2018-05-14
Payer: COMMERCIAL

## 2018-05-14 ENCOUNTER — APPOINTMENT (OUTPATIENT)
Dept: PHYSICAL THERAPY | Facility: CLINIC | Age: 53
End: 2018-05-14
Payer: COMMERCIAL

## 2018-05-14 DIAGNOSIS — R10.84 GENERALIZED ABDOMINAL PAIN: ICD-10-CM

## 2018-05-14 PROCEDURE — 76700 US EXAM ABDOM COMPLETE: CPT

## 2018-05-15 ENCOUNTER — TELEPHONE (OUTPATIENT)
Dept: FAMILY MEDICINE CLINIC | Facility: CLINIC | Age: 53
End: 2018-05-15

## 2018-05-15 ENCOUNTER — OFFICE VISIT (OUTPATIENT)
Dept: FAMILY MEDICINE CLINIC | Facility: CLINIC | Age: 53
End: 2018-05-15
Payer: COMMERCIAL

## 2018-05-15 VITALS
DIASTOLIC BLOOD PRESSURE: 98 MMHG | RESPIRATION RATE: 18 BRPM | BODY MASS INDEX: 30.35 KG/M2 | WEIGHT: 229 LBS | HEART RATE: 66 BPM | OXYGEN SATURATION: 98 % | HEIGHT: 73 IN | SYSTOLIC BLOOD PRESSURE: 130 MMHG

## 2018-05-15 DIAGNOSIS — K76.89 HEPATIC CYST: ICD-10-CM

## 2018-05-15 DIAGNOSIS — N18.30 CKD (CHRONIC KIDNEY DISEASE) STAGE 3, GFR 30-59 ML/MIN (HCC): Primary | ICD-10-CM

## 2018-05-15 DIAGNOSIS — G89.29 CHRONIC PAIN OF RIGHT KNEE: ICD-10-CM

## 2018-05-15 DIAGNOSIS — K76.0 FATTY LIVER DISEASE, NONALCOHOLIC: ICD-10-CM

## 2018-05-15 DIAGNOSIS — M25.561 CHRONIC PAIN OF RIGHT KNEE: ICD-10-CM

## 2018-05-15 DIAGNOSIS — F41.8 DEPRESSION WITH ANXIETY: ICD-10-CM

## 2018-05-15 DIAGNOSIS — N28.1 RENAL CYST, LEFT: ICD-10-CM

## 2018-05-15 DIAGNOSIS — R14.0 ABDOMINAL BLOATING: ICD-10-CM

## 2018-05-15 DIAGNOSIS — R20.0 NUMBNESS OF FINGERS OF BOTH HANDS: ICD-10-CM

## 2018-05-15 DIAGNOSIS — R10.84 GENERALIZED ABDOMINAL PAIN: ICD-10-CM

## 2018-05-15 DIAGNOSIS — N52.9 ERECTILE DYSFUNCTION, UNSPECIFIED ERECTILE DYSFUNCTION TYPE: ICD-10-CM

## 2018-05-15 DIAGNOSIS — G56.03 BILATERAL CARPAL TUNNEL SYNDROME: ICD-10-CM

## 2018-05-15 DIAGNOSIS — E78.2 HYPERLIPIDEMIA, MIXED: ICD-10-CM

## 2018-05-15 DIAGNOSIS — E11.9 TYPE 2 DIABETES MELLITUS WITHOUT COMPLICATION, WITHOUT LONG-TERM CURRENT USE OF INSULIN (HCC): ICD-10-CM

## 2018-05-15 DIAGNOSIS — E55.9 VITAMIN D DEFICIENCY: ICD-10-CM

## 2018-05-15 DIAGNOSIS — I71.2 ASCENDING AORTIC ANEURYSM (HCC): ICD-10-CM

## 2018-05-15 PROCEDURE — 99214 OFFICE O/P EST MOD 30 MIN: CPT | Performed by: NURSE PRACTITIONER

## 2018-05-15 RX ORDER — ERGOCALCIFEROL 1.25 MG/1
50000 CAPSULE ORAL WEEKLY
Qty: 4 CAPSULE | Refills: 3 | Status: SHIPPED | OUTPATIENT
Start: 2018-05-15 | End: 2018-08-06

## 2018-05-15 RX ORDER — METFORMIN HYDROCHLORIDE 500 MG/1
500 TABLET, EXTENDED RELEASE ORAL
Qty: 30 TABLET | Refills: 3 | Status: SHIPPED | OUTPATIENT
Start: 2018-05-15 | End: 2018-05-15

## 2018-05-15 RX ORDER — SILDENAFIL CITRATE 20 MG/1
20 TABLET ORAL DAILY
Qty: 30 TABLET | Refills: 2 | Status: SHIPPED | OUTPATIENT
Start: 2018-05-15 | End: 2018-06-18

## 2018-05-15 NOTE — PROGRESS NOTES
Assessment/Plan:    No problem-specific Assessment & Plan notes found for this encounter  Diagnoses and all orders for this visit:    CKD (chronic kidney disease) stage 3, GFR 30-59 ml/min  -     Comprehensive metabolic panel; Future  -     HEMOGLOBIN A1C W/ EAG ESTIMATION; Future  -     Ambulatory referral to Nephrology; Future    Hyperlipidemia, mixed  -     Comprehensive metabolic panel; Future  -     HEMOGLOBIN A1C W/ EAG ESTIMATION; Future  -     Lipid panel; Future    Generalized abdominal pain  -     Comprehensive metabolic panel; Future  -     HEMOGLOBIN A1C W/ EAG ESTIMATION; Future    Numbness of fingers of both hands  -     Comprehensive metabolic panel; Future  -     HEMOGLOBIN A1C W/ EAG ESTIMATION; Future    Depression with anxiety  -     Comprehensive metabolic panel; Future  -     HEMOGLOBIN A1C W/ EAG ESTIMATION; Future    Abdominal bloating  -     Comprehensive metabolic panel; Future  -     HEMOGLOBIN A1C W/ EAG ESTIMATION; Future    Ascending aortic aneurysm (HCC)  -     Comprehensive metabolic panel; Future  -     HEMOGLOBIN A1C W/ EAG ESTIMATION; Future    Type 2 diabetes mellitus without complication, without long-term current use of insulin (HCC)  -     Discontinue: metFORMIN (GLUCOPHAGE-XR) 500 mg 24 hr tablet; Take 1 tablet (500 mg total) by mouth daily with dinner for 30 days  -     Comprehensive metabolic panel; Future  -     HEMOGLOBIN A1C W/ EAG ESTIMATION; Future    Vitamin D deficiency  -     ergocalciferol (VITAMIN D2) 50,000 units; Take 1 capsule (50,000 Units total) by mouth once a week for 30 days  -     Comprehensive metabolic panel; Future  -     HEMOGLOBIN A1C W/ EAG ESTIMATION; Future  -     Vitamin D 25 hydroxy; Future    Renal cyst, left    Hepatic cyst    Fatty liver disease, nonalcoholic    Erectile dysfunction, unspecified erectile dysfunction type  -     sildenafil (REVATIO) 20 mg tablet;  Take 1 tablet (20 mg total) by mouth daily for 30 days    Bilateral carpal tunnel syndrome    Chronic pain of right knee  -     XR knee 3 vw right non injury; Future          Subjective:      Patient ID: Shanta Simon is a 47 y o  male  Pt is here for follow up and review of labs and diagnostics  He was seen as a new pateint last week with complaints of abdominal bloating after eating any meal, which lasts for many hours  He also had developed pain on his left side under the rib cage and newer pain on the right side  No pain at time of exam  He was advised to limit his caffeine/soda intake  BY hx, pt has AAA-managed by Cardiology  CKD- stage 3  Asthma- pt reports that he was told he had COPD, but testing indicated he actually had asthma  HLD- he was advised to follow a low carb diet  Migraine headaches- followed by neurology  Numbness of both hands-present for " along time"  H/O prior carpal tunnel surgery on left hand   ED- pt started using Sildenafil over the past few months  He has had problems since his double hernia operation  He saw the Podiatrist last week  He says the foot doctor "never touched my foot"  He did get Orthotics  The following portions of the patient's history were reviewed and updated as appropriate:   He  has a past medical history of Aorta aneurysm ([facility] [address] ); Arm DVT (deep venous thromboembolism), acute ([address] ) (2015); Asthma; COPD (chronic obstructive pulmonary disease) ([facility] [address] ); Depression; GERD (gastroesophageal reflux disease); Headache; Hiatal hernia; Hyperlipidemia, mixed (5/7/2018); Liver disease; Renal calculi; and Sleep apnea    He   Patient Active Problem List    Diagnosis Date Noted    Type 2 diabetes mellitus without complication, without long-term current use of insulin ([facility] [address] ) 05/15/2018    Vitamin D deficiency 05/15/2018    Renal cyst, left 05/15/2018    Hepatic cyst 05/15/2018    Fatty liver disease, nonalcoholic 77/75/1272    Erectile dysfunction 05/15/2018    Bilateral carpal tunnel syndrome 05/15/2018    Chronic pain of right knee 05/15/2018    GERD (gastroesophageal reflux disease) 05/07/2018    Liver disease 05/07/2018    Hiatal hernia 05/07/2018    Encounter to establish care 05/07/2018    Prediabetes 05/07/2018    Lipid screening 05/07/2018    CKD (chronic kidney disease) stage 3, GFR 30-59 ml/min 05/07/2018    Hyperlipidemia, mixed 05/07/2018    Weight gain 05/07/2018    Fatigue 05/07/2018    Generalized abdominal pain 05/07/2018    Mild intermittent asthma without complication 54/28/3305    Seasonal allergic rhinitis due to pollen 05/07/2018    Numbness of fingers of both hands 05/07/2018    Depression with anxiety 05/07/2018    Abdominal bloating 05/07/2018    Dizziness 05/06/2016    [facility] 05/06/2016    Ascending aortic aneurysm (HCC) 05/06/2016    Bicuspid aortic valve 05/06/2016    Adjustment reaction with anxiety and depression 07/10/2015    Allergic rhinitis 09/23/2013     He  has a past surgical history that includes Carpal tunnel release (Left); Hernia repair; pr colonoscopy flx dx w/collj spec when pfrmd (N/A, 8/7/2017); Colonoscopy; Foot surgery (Left); and pr esophagogastroduodenoscopy transoral diagnostic (N/A, 10/31/2017)  His family history includes Aortic aneurysm in his other; Arthritis in his mother; Cancer in his brother; Leukemia in his brother; Prostate cancer in his brother; Schizoaffective Disorder  in his sister  He  reports that he quit smoking about 3 years ago  His smoking use included Cigars  He has never used smokeless tobacco  He reports that he does not drink alcohol or use drugs  Current Outpatient Prescriptions   Medication Sig Dispense Refill    albuterol (PROVENTIL HFA,VENTOLIN HFA) 90 mcg/act inhaler Inhale 2 puffs every 6 (six) hours as needed for wheezing        amitriptyline (ELAVIL) 25 mg tablet Take 1 tablet (25 mg total) by mouth daily at bedtime for 30 days 30 tablet 3    buPROPion (WELLBUTRIN) 75 mg tablet take 1 tablet by mouth twice a day 60 tablet 3    ergocalciferol (VITAMIN D2) 50,000 units Take 1 capsule (50,000 Units total) by mouth once a week for 30 days 4 capsule 3    fenofibrate (TRICOR) 145 mg tablet Take 145 mg by mouth daily      fluticasone (FLONASE) 50 mcg/act nasal spray 1 spray into each nostril daily 16 g 0    Mometasone Furo-Formoterol Fum (DULERA) 200-5 MCG/ACT AERO Inhale 2 puffs 2 (two) times a day   montelukast (SINGULAIR) 10 mg tablet Take by mouth      pantoprazole (PROTONIX) 40 mg tablet Take 40 mg by mouth daily   sildenafil (REVATIO) 20 mg tablet Take by mouth      sildenafil (REVATIO) 20 mg tablet Take 1 tablet (20 mg total) by mouth daily for 30 days 30 tablet 2    SUMAtriptan (IMITREX) 100 mg tablet Take 1 tablet (100 mg total) by mouth once as needed for migraine for up to 1 dose 9 tablet 3     No current facility-administered medications for this visit  Current Outpatient Prescriptions on File Prior to Visit   Medication Sig    albuterol (PROVENTIL HFA,VENTOLIN HFA) 90 mcg/act inhaler Inhale 2 puffs every 6 (six) hours as needed for wheezing   amitriptyline (ELAVIL) 25 mg tablet Take 1 tablet (25 mg total) by mouth daily at bedtime for 30 days    buPROPion (WELLBUTRIN) 75 mg tablet take 1 tablet by mouth twice a day    fenofibrate (TRICOR) 145 mg tablet Take 145 mg by mouth daily    fluticasone (FLONASE) 50 mcg/act nasal spray 1 spray into each nostril daily    Mometasone Furo-Formoterol Fum (DULERA) 200-5 MCG/ACT AERO Inhale 2 puffs 2 (two) times a day   montelukast (SINGULAIR) 10 mg tablet Take by mouth    pantoprazole (PROTONIX) 40 mg tablet Take 40 mg by mouth daily   sildenafil (REVATIO) 20 mg tablet Take by mouth    SUMAtriptan (IMITREX) 100 mg tablet Take 1 tablet (100 mg total) by mouth once as needed for migraine for up to 1 dose     No current facility-administered medications on file prior to visit  He has No Known Allergies       Review of Systems Constitutional: Negative for fatigue and unexpected weight change  HENT: Negative  Eyes: Negative  Cardiovascular: Negative  Gastrointestinal: Positive for abdominal distention and abdominal pain  Endocrine: Negative  Musculoskeletal: Negative  Allergic/Immunologic: Negative  Neurological: Positive for numbness  Hematological: Negative  Psychiatric/Behavioral: Negative  All other systems reviewed and are negative  Objective:      /98 (BP Location: Left arm, Patient Position: Sitting)   Pulse 66   Resp 18   Ht 6' 1" (1 854 m)   Wt 104 kg (229 lb)   SpO2 98%   BMI 30 21 kg/m²          Physical Exam   Constitutional: He is oriented to person, place, and time  He appears well-developed and well-nourished  No distress  HENT:   Head: Normocephalic and atraumatic  Eyes: Conjunctivae and EOM are normal  Pupils are equal, round, and reactive to light  Neck: Normal range of motion  Cardiovascular: Normal rate  Exam reveals no gallop and no friction rub  No murmur heard  Pulmonary/Chest: Effort normal    Abdominal: Soft  Musculoskeletal: Normal range of motion  Neurological: He is alert and oriented to person, place, and time  Skin: Skin is warm and dry  Psychiatric: He has a normal mood and affect  His behavior is normal  Judgment and thought content normal    Vitals reviewed

## 2018-05-15 NOTE — TELEPHONE ENCOUNTER
Pharmacy called and Sildenafil is not covered nor are any other ED meds on [facility] insurance

## 2018-05-15 NOTE — PATIENT INSTRUCTIONS
CKD- refer to Nephrology for preliminary workup  Elevated creat with decreased GFR  Renal cyst- benign appearing  Diabetes- new dx  Hold off on starting metformin  Change diet first--cut out soda and increase veggies  Limit carbohydrates in diet  Limit sugar, breads, pasta  Encourage daily exercise  Elevated triglycerides- limit soda, breads, pasta  Strive for 5 servings of fruits and veggies daily  AAA- f/b Cardiology;  Kidney disease- follow up with Nephrologist  Referral given  Vitamin d def- start supplementation  ED- refill Sildenafil  Chronic right knee pain- suspect arthritis- check xray  Carpal tunnel syndrome- both hands  Will have re-evaluated when pt is ready  Obtain labs in 4 months  Chronic Kidney Disease Diet   WHAT YOU NEED TO KNOW:   A chronic kidney disease diet limits protein, phosphorus, sodium, and potassium  Liquids may also need to be limited in later stages of chronic kidney disease  This diet can help slow down the rate of damage to your kidneys  Your diet may change over time as your health condition changes  You may also need to make other diet changes if you have other health problems, such as diabetes  DISCHARGE INSTRUCTIONS:   Diet changes: There are 5 stages of chronic kidney disease  The diet changes you need to make are based on your stage of kidney disease  Work with your dietitian or healthcare provider to plan meals that are right for you  You may need any of the following:  · Limit protein  in all stages of kidney disease  Limit the portion sizes of protein you eat to limit the amount of work your kidneys have to do  Foods that are high in protein are meat, poultry (chicken and turkey), fish, eggs, and dairy (milk, cheese, yogurt)  Your healthcare provider will tell you how much protein to eat each day  · Limit sodium  if you have high blood pressure  Limit your sodium intake to less than 2,300 milligrams (mg) each day   Ask your dietitian or healthcare provider how much sodium you can have each day  The amount of sodium you should have depends on your stage of kidney disease  Table salt, canned foods, soups, salted snacks, and processed meats, like deli meats and sausage, are high in sodium  · Limit the amount of phosphorus  you eat  Your kidneys cannot get rid of extra phosphorus that builds up in your blood  This may cause your bones to lose calcium and weaken  Foods that are high in phosphorus are dairy products, beans, peas, nuts, and whole grains  Phosphorus is also found in cocoa, beer, and cola drinks  Your healthcare provider will tell you how much phosphorus you can have each day  · Limit potassium  if your potassium blood levels are too high  Your dietitian or healthcare provider will tell you if you need to limit potassium  Potassium is found in fruits and vegetables  · Limit liquids  as directed  Your healthcare provider may recommend that you limit liquids in stages 4 and 5 of kidney disease  If your body retains fluids, you will have swelling and fluid may build up in your lungs  This can cause other health problems, such as shortness of breath  Foods you may include: Your dietitian will tell you how many servings you can have from each of the food groups below  The approximate amount of these nutrients is listed next to each food group  Read the food label to find the exact amount  · Bread, cereal, and grains: These foods contain about 80 calories, 2 grams (g) of protein, 150 mg of sodium, 50 mg of potassium, and 30 mg of phosphorus      ¨ 1 slice (1 ounce) of bread ([place], [place], raisin, light rye, or sourdough white), small dinner roll, or 6-inch tortilla    ¨ ½ of a hamburger bun, hot dog bun, or English muffin or ¼ of a bagel    ¨ 1 cup of unsweetened cereal or ½ cup of cooked cereal, such as cream of wheat    ¨ ? cup of cooked pasta (noodles, macaroni, or spaghetti) or rice    ¨ 4 (2-inch) unsalted crackers or 3 squares of massiel crackers    ¨ 3 cups of air-popped, unsalted popcorn    ¨ ¾ ounce of unsalted pretzels    · Vegetables:  A serving of these foods contains about 30 calories, 2 g of protein, 50 mg of sodium, and 50 mg of phosphorus  ¨ Low potassium (less than 150 mg):      ¨ ½ cup cooked green beans, cabbage, cauliflower, beets, or corn    ¨ 1 cup raw cucumber, endive, alfalfa sprouts, cabbage, cauliflower, or watercress    ¨ 1 cup of all types of lettuce    ¨ ¼ cup cooked or ½ cup raw mushrooms or onions    ¨ 1 cup cooked eggplant    ¨ Medium potassium (150 to 250 mg):      ¨ 1 cup raw broccoli, celery, or zucchini    ¨ ½ cup cooked asparagus, broccoli, celery, green peas, summer squash, zucchini, or peppers    ¨ 1 cup cooked kale or turnips    · Fruits:  A serving of these foods contains about 60 calories, 0 g protein, 0 mg sodium, and 150 mg of phosphorus  Each serving is ½ cup, unless another amount is given  ¨ Low potassium (less than 150 mg): ¨ Apple juice, applesauce, or 1 small apple    ¨ Blueberries    ¨ Cranberries or cranberry juice cocktail    ¨ Fresh or canned pears (light syrup or packed in water)    ¨ Grapes or grape juice    ¨ Canned peaches (light syrup or packed in water)    ¨ Pineapple or strawberries    ¨ 1 tangerine     ¨ Watermelon    ¨ Medium potassium (150 to 250 mg):      ¨ Fresh peaches or pears    ¨ Cherries    ¨ Cantaloupe, janie, or papaya    ¨ Grapefruit or grapefruit juice    · Meat, poultry, and fish: These foods have about 75 calories, 7 g of protein, an average of 65 mg of sodium, 115 mg of potassium, and 70 mg of phosphorus  Do not use salt to prepare these foods  ¨ 1 ounce of cooked beef, pork, or poultry    ¨ 1 ounce of any fresh or frozen fish, lobster, shrimp, crab, clams, tuna, unsalted canned salmon, or unsalted sardines    · Other protein foods: These foods have about 90 calories, 7 g of protein, an average of 100 mg of sodium, 100 mg of potassium, and 120 mg of phosphorus  ¨ 1 large whole egg or ¼ cup of low-cholesterol egg substitute    ¨ 1 ounce of cheese    ¨ ¼ cup of cottage cheese or tofu    ¨ 1 ounce of unsalted nuts or 2 tablespoons of peanut butter    · Fats: These foods have very little protein and about 45 calories, 55 milligrams of sodium, 10 milligrams of potassium, and 5 milligrams of phosphorus  Include healthy fats, such as unsaturated fats, which are listed below  ¨ 1 teaspoon margarine or mayonnaise     ¨ 1 teaspoon oil (safflower, sunflower, corn, soybean, olive, peanut, canola)    ¨ 1 tablespoon oil-based salad dressing (such as [place]) or 2 tablespoons mayonnaise-based salad dressing (such as ranch)  Foods to limit or avoid:   · Starches: The following foods have higher amounts of sodium, potassium, or phosphorus  ¨ Biscuits, muffins, pancakes, and waffles     ¨ Cake and cornbread from boxed mixes    ¨ Oatmeal and whole-wheat cereals    ¨ Salted pretzel sticks or rings and sandwich cookies    · Meat and protein foods: The following are high in sodium and phosphorus  ¨ Deli-style meat, such as roast beef, ham, and turkey    ¨ Canned salmon and sardines    ¨ Processed cheese, such as American cheese and cheese spreads    ¨ Smoked or cured meat, such as corned beef, jeong, ham, hot dogs, and sausage    · Legumes: These foods have about 90 calories, 6 g of protein, less than 10 mg of sodium, 250 mg of potassium, and 100 mg of phosphorus  ¨ ? cup of black beans, red kidney beans, black-eye peas, garbanzos, and lentils    ¨ ¼ cup of green or mature soybeans    · Dairy: The following foods have about 8 g of protein, an average of 120 mg of sodium, 350 mg of potassium, and 220 mg of phosphorus  ¨ 1 cup of milk (fat-free, low-fat, whole, buttermilk, or chocolate milk)    ¨ 1 cup of low-fat plain or sugar-free yogurt or ice cream    ¨ ½ cup of pudding or custard    ¨ Nondairy milk substitutes:   These foods have 75 calories, 1 gram of protein, and an average of 40 mg of sodium, 60 mg of potassium, and 60 mg of phosphorus  A serving is ½ cup of almond, rice, or soy milk, or nondairy creamer  · Vegetables: The following vegetables are high in potassium  Each serving has more than 250 mg of potassium  A serving is ½ cup, unless another amount is given  ¨ Artichoke or ¼ of a medium avocado    ¨ [place] sprouts, beets, chard, ern[place] or mustard greens    ¨ Potatoes, sweet potatoes, pumpkin, and yams    ¨ ¾ cup of okra    ¨ Raw tomatoes and low-sodium tomato juice, or tomato sauce    ¨ Winter squash, cooked asparagus, and cooked spinach    · Fruit:  The following fruits are high in potassium  Each serving has more than 250 mg of potassium  ¨ 3 fresh apricots    ¨ 1 small nectarine (2 inches across)    ¨ 1 small orange and ½ cup of orange juice    ¨ ¼ cup of dates     ¨ ? of a small honeydew melon    ¨ 1 six-inch banana     ¨ ½ cup of prune juice or prunes and kiwifruit    · Fats:  Limit unhealthy fats, such as saturated fats, which are listed below  ¨ Butter, lard, cream cheese, whipped cream, and sour cream    ¨ Powdered coffee creamer    · Other: The following foods are high in sodium  ¨ Frozen dinners, soups, and fast foods, such as hamburgers and pizza (see the food label for serving sizes)    ¨ Table salt and seasoned salts, such as onion or garlic salt    ¨ Barbecue sauce, ketchup, mustard, soy sauce, steak sauce, and teriyaki sauce  Contact your healthcare provider if:   · You are gaining or losing weight very quickly  · You have shortness of breath  · You have nausea and are vomiting  · You feel very weak and tired  · You are having trouble following the chronic kidney disease diet  © 2017 [employer] Information is for End User's use only and may not be sold, redistributed or otherwise used for commercial purposes   All illustrations and images included in CareNotes® are the copyrighted property of [employer]  or Telly Stokes  The above information is an  only  It is not intended as medical advice for individual conditions or treatments  Talk to your doctor, nurse or pharmacist before following any medical regimen to see if it is safe and effective for you  Vitamin D Deficiency   WHAT YOU NEED TO KNOW:   What is vitamin D deficiency? Vitamin D deficiency is a low level of vitamin D in your body  Vitamin D helps your body absorb calcium from foods  Your body makes vitamin D when your skin is exposed to sunlight  You can also get vitamin D from certain foods such as fish, eggs, and meat  Most of the vitamin D in your body comes from sunlight exposure  What increases my risk for vitamin D deficiency? · Low amount of sun exposure    · Low intake of foods that contain vitamin D    · Having dark skin    · Age older than 72    · Pregnancy or breastfeeding    · Infants who are     · Inability to absorb vitamin D from food    · Obesity    · Use of certain medicines such as antiseizure, steroid, or antifungal medicines  What are the signs and symptoms of vitamin D deficiency? Low levels of vitamin D can lead to weak and brittle bones that are more likely to fracture  You may not have any signs and symptoms, or you may have any of the following:  · Bone pain or discomfort in your lower back, pelvis, or legs    · Muscle aches and weakness    · Low back pain in women    · Poor growth, irritability, and frequent respiratory tract infections in infants    · Deformed bones and slow growth in children  How is vitamin D deficiency diagnosed and treated? Blood tests will be done to measure the amount of vitamin D in your blood  Your healthcare provider may give you high doses of vitamin D for 8 to 12 weeks to increase your levels  Your levels will then be rechecked  If your levels are still low, you will need to take vitamin D supplements for another 8 weeks   After your levels have gone back to normal, you may need to continue to take a vitamin D supplement  How much vitamin D do I need each day? The amount of vitamin D you need depends on your age  You may need more than the recommended amounts below if you take certain medicines or you are obese  Ask your healthcare provider how much vitamin D you need  · Infants up to 1 year of age: 0 international units (IU)    · Children 1 year and older: 600 IU    · Adults aged 23to 79years old: 600 IU    · Adults older than 70 years: 800 IU  How can I help prevent vitamin D deficiency? · Eat foods that are high in vitamin D  Fatty fish such as mackerel, canned tuna and sardines, and salmon are good sources of vitamin D  Certain foods such as milk, juice, and cereal are fortified with vitamin D      · Give your  infant a vitamin D supplement  of 400 IU each day  · Take vitamin D supplements as directed  High doses of vitamin D can be toxic  Your healthcare provider will tell you how much vitamin D you should take each day  Vitamin D is best absorbed when taken with food  · Expose your skin to sunlight as directed  Ask your healthcare provider how you can safely expose your skin to sunlight and for how long  Too much exposure to sunlight can cause skin cancer  When should I contact my healthcare provider? · You or your child still have symptoms, or your symptoms get worse  · You think you took too much of a vitamin D supplement, and you have nausea, vomiting, or a headache  · You have questions or concerns about your condition or care  CARE AGREEMENT:   You have the right to help plan your care  Learn about your health condition and how it may be treated  Discuss treatment options with your caregivers to decide what care you want to receive  You always have the right to refuse treatment  The above information is an  only  It is not intended as medical advice for individual conditions or treatments   Talk to your doctor, nurse or pharmacist before following any medical regimen to see if it is safe and effective for you  © 2017 [employer] [employer] Information is for End User's use only and may not be sold, redistributed or otherwise used for commercial purposes  All illustrations and images included in CareNotes® are the copyrighted property of [employer]  or Telly Stokes  Non-Alcoholic Fatty Liver Disease   WHAT YOU NEED TO KNOW:   Non-alcoholic fatty liver disease (NAFLD) is a buildup of fat in your liver from a condition other than alcoholism  DISCHARGE INSTRUCTIONS:   Medicines:   · Medicines  may be given to manage blood sugar or cholesterol levels  · Take your medicine as directed  Contact your healthcare provider if you think your medicine is not helping or if you have side effects  Tell him or her if you are allergic to any medicine  Keep a list of the medicines, vitamins, and herbs you take  Include the amounts, and when and why you take them  Bring the list or the pill bottles to follow-up visits  Carry your medicine list with you in case of an emergency  Follow up with your healthcare provider as directed: You may need to return for more tests  You may also be referred to a specialist  Write down your questions so you remember to ask them during your visits  Manage NAFLD:   · Maintain a healthy weight  Ask your healthcare provider how much you should weigh  Ask him to help you create a weight loss plan if you are overweight  · Exercise  Aerobic exercise 3 times a week for 20 to 45 minutes can help decrease fat buildup in your liver  Examples are cycling, brisk walking, and jogging  Ask your healthcare provider about the best exercise plan for you  · Eat healthy foods  Examples are vegetables, fruit, whole-grain breads, low-fat dairy products, beans, lean meats, and fish   Foods low in simple carbohydrates, high fructose corn syrup, and trans fat may help decrease fat buildup in your liver  · Do not drink alcohol  Alcohol may make NAFLD worse and harm your liver  Contact your healthcare provider if:   · You have increased pain or swelling in your abdomen  · You feel more tired than usual     · You bruise or bleed easily  · Your skin or the whites of your eyes look yellow  · You have questions or concerns about your condition or care  Return to the emergency department if:   · You have shortness of breath  · You have trouble thinking clearly or are confused  · You feel lightheaded or faint  · You have shaking, chills, and a fever  © 2017 [address] [employer]  Information is for End User's use only and may not be sold, redistributed or otherwise used for commercial purposes  All illustrations and images included in CareNotes® are the copyrighted property of [employer]  or Telly Stokes  The above information is an  only  It is not intended as medical advice for individual conditions or treatments  Talk to your doctor, nurse or pharmacist before following any medical regimen to see if it is safe and effective for you

## 2018-05-15 NOTE — TELEPHONE ENCOUNTER
Please inform patient that Sildenafil and no other ED meds are covered under his insurance   He can ask the pharmacist to fill a portion of his script but I have no idea how much this cost

## 2018-05-16 ENCOUNTER — APPOINTMENT (OUTPATIENT)
Dept: PHYSICAL THERAPY | Facility: CLINIC | Age: 53
End: 2018-05-16
Payer: COMMERCIAL

## 2018-05-22 ENCOUNTER — APPOINTMENT (OUTPATIENT)
Dept: RADIOLOGY | Facility: CLINIC | Age: 53
End: 2018-05-22
Payer: COMMERCIAL

## 2018-05-22 DIAGNOSIS — G89.29 CHRONIC PAIN OF RIGHT KNEE: ICD-10-CM

## 2018-05-22 DIAGNOSIS — M25.561 CHRONIC PAIN OF RIGHT KNEE: ICD-10-CM

## 2018-05-22 PROCEDURE — 73562 X-RAY EXAM OF KNEE 3: CPT

## 2018-05-28 DIAGNOSIS — K76.0 FATTY (CHANGE OF) LIVER, NOT ELSEWHERE CLASSIFIED: ICD-10-CM

## 2018-05-29 ENCOUNTER — OFFICE VISIT (OUTPATIENT)
Dept: NEUROLOGY | Facility: CLINIC | Age: 53
End: 2018-05-29
Payer: COMMERCIAL

## 2018-05-29 VITALS
HEIGHT: 74 IN | WEIGHT: 228 LBS | BODY MASS INDEX: 29.26 KG/M2 | DIASTOLIC BLOOD PRESSURE: 80 MMHG | HEART RATE: 61 BPM | SYSTOLIC BLOOD PRESSURE: 106 MMHG

## 2018-05-29 DIAGNOSIS — G43.809 VESTIBULAR MIGRAINE: ICD-10-CM

## 2018-05-29 DIAGNOSIS — M25.562 ACUTE PAIN OF LEFT KNEE: Primary | ICD-10-CM

## 2018-05-29 PROCEDURE — 99213 OFFICE O/P EST LOW 20 MIN: CPT | Performed by: PSYCHIATRY & NEUROLOGY

## 2018-05-29 RX ORDER — SUMATRIPTAN 100 MG/1
100 TABLET, FILM COATED ORAL ONCE AS NEEDED
Qty: 9 TABLET | Refills: 3 | Status: SHIPPED | OUTPATIENT
Start: 2018-05-29 | End: 2019-02-14

## 2018-05-29 RX ORDER — AMITRIPTYLINE HYDROCHLORIDE 25 MG/1
25 TABLET, FILM COATED ORAL
Qty: 30 TABLET | Refills: 6 | Status: SHIPPED | OUTPATIENT
Start: 2018-05-29 | End: 2018-08-06

## 2018-05-29 NOTE — PROGRESS NOTES
Progress Note - Neurology   Dustin Jauregui 46 y o  male MRN: [id number]  Unit/Bed#:  Encounter: [id number]      Subjective:   Patient is here for a follow-up visit With a history of vestibular migraines, with significant improvement with the help of Elavil 25 mg at bedtime and uses sumatriptan on a p r n  basis  He has noted relief of symptoms, has been sleeping better, and since his last visit he was also detected to have vitamin-D deficiency, borderline diabetes, and CKD  Patient denies any new neurological symptoms  ROS:   Review of Systems   Constitutional: Negative  HENT: Negative  Eyes: Negative  Respiratory: Positive for shortness of breath  Cardiovascular: Negative  Gastrointestinal: Negative  Endocrine: Positive for heat intolerance  Genitourinary: Negative  Musculoskeletal: Positive for joint swelling  Skin: Negative  Allergic/Immunologic: Negative  Neurological: Positive for dizziness, light-headedness, numbness and headaches  Hematological: Negative  Psychiatric/Behavioral: Negative  Vitals:   Vitals:    05/29/18 0841   BP: 106/80   Pulse: 61   ,Body mass index is 29 67 kg/m²      MEDS:      Current Outpatient Prescriptions:     albuterol (PROVENTIL HFA,VENTOLIN HFA) 90 mcg/act inhaler, Inhale 2 puffs every 6 (six) hours as needed for wheezing , Disp: , Rfl:     amitriptyline (ELAVIL) 25 mg tablet, Take 1 tablet (25 mg total) by mouth daily at bedtime for 30 days, Disp: 30 tablet, Rfl: 3    buPROPion (WELLBUTRIN) 75 mg tablet, take 1 tablet by mouth twice a day, Disp: 60 tablet, Rfl: 3    ergocalciferol (VITAMIN D2) 50,000 units, Take 1 capsule (50,000 Units total) by mouth once a week for 30 days, Disp: 4 capsule, Rfl: 3    fenofibrate (TRICOR) 145 mg tablet, Take 145 mg by mouth daily, Disp: , Rfl:     fluticasone (FLONASE) 50 mcg/act nasal spray, 1 spray into each nostril daily, Disp: 16 g, Rfl: 0    Mometasone Furo-Formoterol Fum (DULERA) 200-5 MCG/ACT AERO, Inhale 2 puffs 2 (two) times a day , Disp: , Rfl:     montelukast (SINGULAIR) 10 mg tablet, Take by mouth, Disp: , Rfl:     pantoprazole (PROTONIX) 40 mg tablet, Take 40 mg by mouth daily  , Disp: , Rfl:     sildenafil (REVATIO) 20 mg tablet, Take 1 tablet (20 mg total) by mouth daily for 30 days, Disp: 30 tablet, Rfl: 2    SUMAtriptan (IMITREX) 100 mg tablet, Take 1 tablet (100 mg total) by mouth once as needed for migraine for up to 1 dose, Disp: 9 tablet, Rfl: 3    sildenafil (REVATIO) 20 mg tablet, Take by mouth, Disp: , Rfl:   :    Physical Exam:  General appearance: alert, appears stated age and cooperative  Head: Normocephalic, without obvious abnormality, atraumatic    Neurologic:  His examination shows no new deficits on motor and sensory exam, his gait is normal based and no evidence of any dysmetria  Lab Results: I have personally reviewed pertinent reports  Imaging Studies: I have personally reviewed pertinent reports  Assessment:  1  Vestibular migraine  Plan:  Patient is advised to continue Elavil 25 mg at bedtime, continue sumatriptan on a p r n  basis, overall has done well from the neuro standpoint, and will return back to see me in 4 months     5/29/2018,8:47 AM    Dictation voice to text software has been used in the creation of this document  Please consider this in light of any contextual or grammatical errors

## 2018-06-18 ENCOUNTER — TELEPHONE (OUTPATIENT)
Dept: OBGYN CLINIC | Facility: HOSPITAL | Age: 53
End: 2018-06-18

## 2018-06-18 ENCOUNTER — OFFICE VISIT (OUTPATIENT)
Dept: OBGYN CLINIC | Facility: MEDICAL CENTER | Age: 53
End: 2018-06-18
Payer: COMMERCIAL

## 2018-06-18 VITALS
WEIGHT: 224.8 LBS | DIASTOLIC BLOOD PRESSURE: 86 MMHG | HEART RATE: 67 BPM | BODY MASS INDEX: 29.79 KG/M2 | HEIGHT: 73 IN | SYSTOLIC BLOOD PRESSURE: 121 MMHG

## 2018-06-18 DIAGNOSIS — M62.89 HAMSTRING TIGHTNESS OF RIGHT LOWER EXTREMITY: ICD-10-CM

## 2018-06-18 DIAGNOSIS — M25.562 ACUTE PAIN OF LEFT KNEE: ICD-10-CM

## 2018-06-18 DIAGNOSIS — M76.51 PATELLAR TENDINITIS OF RIGHT KNEE: Primary | ICD-10-CM

## 2018-06-18 PROCEDURE — 99213 OFFICE O/P EST LOW 20 MIN: CPT | Performed by: FAMILY MEDICINE

## 2018-06-18 NOTE — PROGRESS NOTES
Assessment:     1  Patellar tendinitis of right knee    2  Acute pain of left knee    3  Hamstring tightness of right lower extremity        Plan:     Problem List Items Addressed This Visit     Patellar tendinitis of right knee - Primary    Relevant Medications    diclofenac sodium (VOLTAREN) 1 %    Other Relevant Orders    Ambulatory referral to Physical Therapy    Hamstring tightness of right lower extremity      Other Visit Diagnoses     Acute pain of left knee        Relevant Medications    diclofenac sodium (VOLTAREN) 1 %    Other Relevant Orders    Ambulatory referral to Physical Therapy         Subjective:     Patient ID: Daryn Davis is a 46 y o  male  Chief Complaint:  Patient is a 50-year-old male presenting today for evaluation of right knee pain  He reports having pain along the anterior medial aspect of the right knee for the past few months  Pain is a throbbing, achy pain that is reproduced with prolonged sitting and when lying in the supine position  He does not report any injuries and states that symptoms began gradually with no clear precipitating factors  He denies any swelling in the knee  He denies any locking, clicking or giving out of the knee  He has been using ibuprofen which has provided relief for about 2 hours and then his pain returns  He denies any numbness or tingling  He denies any warmth or crepitus        Allergy:  No Known Allergies  Medications:  all current active meds have been reviewed  Past Medical History:  Past Medical History:   Diagnosis Date    Aorta aneurysm ([facility] [address] )     4 3    Arm DVT (deep venous thromboembolism), acute ([facility] [address] ) 7594    complications of cardiac cath    Asthma     CKD (chronic kidney disease), stage III     COPD (chronic obstructive pulmonary disease) (HCC)     Depression     GERD (gastroesophageal reflux disease)     Headache     Hiatal hernia     Hyperlipidemia, mixed 5/7/2018    Liver disease     FATTY LIVER    Prediabetes     Renal calculi     Sleep apnea     Vestibular migraine      Past Surgical History:  Past Surgical History:   Procedure Laterality Date    CARPAL TUNNEL RELEASE Left     COLONOSCOPY      FOOT SURGERY Left     FOREIGN BODY REMOVAL    HERNIA REPAIR      [place] COLONOSCOPY FLX DX W/COLLJ SPEC WHEN PFRMD N/A 8/7/2017    Procedure: COLONOSCOPY;  Surgeon: West Velasquez MD;  Location: [facility]; Service: Gastroenterology    [place] ESOPHAGOGASTRODUODENOSCOPY TRANSORAL DIAGNOSTIC N/A 10/31/2017    Procedure: ESOPHAGOGASTRODUODENOSCOPY (EGD); Surgeon: West Velasquez MD;  Location: [facility]; Service: Gastroenterology     Family History:  Family History   Problem Relation Age of Onset    Cancer Brother     Prostate cancer Brother     Leukemia Brother         his only brother dies from [address] or leukemia pt unsure he was only 46    Arthritis Mother     Heart attack Father     Clotting disorder Father     Schizoaffective Disorder  Sister     Aortic aneurysm Other         abdominal     Social History:  History   Alcohol Use No     History   Drug Use No     History   Smoking Status    Former Smoker    Types: Cigars    Quit date: 8/7/2014   Smokeless Tobacco    Never Used     Review of Systems   Constitutional: Negative  HENT: Negative  Eyes: Negative  Respiratory: Negative  Cardiovascular: Negative  Gastrointestinal: Negative  Genitourinary: Negative  Musculoskeletal: Positive for arthralgias and myalgias  Skin: Negative  Allergic/Immunologic: Negative  Neurological: Negative  Hematological: Negative  Psychiatric/Behavioral: Negative  Objective:  BP Readings from Last 1 Encounters:   06/18/18 121/86      Wt Readings from Last 1 Encounters:   06/18/18 102 kg (224 lb 12 8 oz)      BMI:   Estimated body mass index is 29 66 kg/m² as calculated from the following:    Height as of this encounter: 6' 1" (1 854 m)  Weight as of this encounter: 102 kg (224 lb 12 8 oz)    BSA: Estimated body surface area is 2 26 meters squared as calculated from the following:    Height as of this encounter: 6' 1" (1 854 m)  Weight as of this encounter: 102 kg (224 lb 12 8 oz)  Physical Exam   Constitutional: He is oriented to person, place, and time  He appears well-developed and well-nourished  HENT:   Head: Normocephalic  Eyes: Pupils are equal, round, and reactive to light  Neck: Normal range of motion  Pulmonary/Chest: Effort normal    Musculoskeletal: He exhibits tenderness  He exhibits no edema or deformity  Right knee: He exhibits no effusion  Neurological: He is alert and oriented to person, place, and time  Skin: Skin is warm  Psychiatric: He has a normal mood and affect  Right Knee Exam     Tenderness   The patient is experiencing tenderness in the MCL and medial joint line  Range of Motion   Extension: abnormal   Flexion: normal     Tests   Zachary:  Medial - negative Lateral - negative  Lachman:  Anterior - negative    Posterior - negative  Drawer:       Anterior - negative    Posterior - negative  Varus: negative  Valgus: negative  Pivot Shift: negative  Patellar Apprehension: trace    Other   Erythema: absent  Sensation: normal  Pulse: present  Swelling: none  Other tests: no effusion present            I have personally reviewed pertinent films in PACS

## 2018-07-02 ENCOUNTER — EVALUATION (OUTPATIENT)
Dept: PHYSICAL THERAPY | Facility: CLINIC | Age: 53
End: 2018-07-02
Payer: COMMERCIAL

## 2018-07-02 DIAGNOSIS — M76.51 PATELLAR TENDINITIS OF RIGHT KNEE: Primary | ICD-10-CM

## 2018-07-02 DIAGNOSIS — M25.562 ACUTE PAIN OF LEFT KNEE: ICD-10-CM

## 2018-07-02 PROCEDURE — G8978 MOBILITY CURRENT STATUS: HCPCS | Performed by: PHYSICAL THERAPIST

## 2018-07-02 PROCEDURE — G8979 MOBILITY GOAL STATUS: HCPCS | Performed by: PHYSICAL THERAPIST

## 2018-07-02 PROCEDURE — 97162 PT EVAL MOD COMPLEX 30 MIN: CPT | Performed by: PHYSICAL THERAPIST

## 2018-07-02 PROCEDURE — 97110 THERAPEUTIC EXERCISES: CPT | Performed by: PHYSICAL THERAPIST

## 2018-07-02 PROCEDURE — 97530 THERAPEUTIC ACTIVITIES: CPT | Performed by: PHYSICAL THERAPIST

## 2018-07-02 NOTE — PROGRESS NOTES
PT Evaluation     Today's date: 2018  Patient name: Salvatore Moe  : 1965  MRN: [id number]  Referring provider: Martha Spraks DO  Dx:   Encounter Diagnosis     ICD-10-CM    1  Patellar tendinitis of right knee M76 51 Ambulatory referral to Physical Therapy   2  Acute pain of left knee M25 562 Ambulatory referral to Physical Therapy                  Assessment  Impairments: pain with function  Functional limitations: Pain with prolonged standing and walking  Assessment details: Patient is a 46 y o  Male referred with diagnosis of acute onset patellar tendonitis right knee  He demonstrates right knee strength and ROM WFL, although he does have significant tightness in his hamstrings and quadricep  Ligamentous and meniscal testing was negative  Symptoms consistent with lateral patellofemoral joint irritation  Patient will benefit from physical therapy services to decrease pain and promote a return to his previous level of function  Understanding of Dx/Px/POC: good   Prognosis: good    Goals  STG (3-4 weeks)  1  Patient will report pain as a 5/10 at worst with waking  2  Patient will report the ability to sleep through the night without disturbances related to pain  LTG (6-8 weeks)  1  Patient will report pain as a 0-1/10 at worst with normal activity  2   Patient will be independent and compliant with a HEP in order to maintain gains made with physical therapy    Plan  Patient would benefit from: PT eval  Planned modality interventions: cryotherapy  Planned therapy interventions: manual therapy, joint mobilization, home exercise program, therapeutic exercise, therapeutic activities, stretching, strengthening and patient education  Frequency: 2x week  Duration in weeks: 8  Plan of Care beginning date: 2018  Plan of Care expiration date: 2018  Treatment plan discussed with: patient        Subjective Evaluation    History of Present Illness  Date of onset: 2018  Mechanism of injury: Patient reports insidious onset of right knee pain about 2 months ago  He has a history of right knee pain about 8 years ago which went away on it's own  He saw his physician and had an x-ray which showed minor arthritis of his right knee  He is referred now to outpatient PT services  Pain  Current pain ratin  At best pain ratin  At worst pain rating: 10  Location: medial and lateral knee joint, patellar tendon  Quality: sharp  Relieving factors: medications  Progression: worsening    Social Support  Steps to enter house: yes  2  Stairs in house: no     Exercise history: Ride bicycle minh other day for 10-15 minutes  Diagnostic Tests  No diagnostic tests performed  X-ray: abnormal  Treatments  No previous or current treatments  Patient Goals  Patient goals for therapy: decreased pain  Patient goal: To resume his previous level of function  Objective     Tenderness     Right Knee   Tenderness in the lateral patella  No tenderness in the lateral joint line, medial joint line and medial patella  Active Range of Motion     Right Knee   Normal active range of motion    Mobility   Patellar Mobility:     Right Knee   WFL: medial, lateral, superior and inferior  Tibiofemoral Mobility:   Right knee Tibiofemoral tendons within functional limits include the anterior and posterior  Tibiofibular Mobility:   Right knee Tibiofibular tendons within functional limits include the anterior and posterior  Strength/Myotome Testing     Left Knee   Flexion: 4+  Extension: 4+  Quadriceps contraction: good    Right Knee   Flexion: 4+  Extension: 4+  Quadriceps contraction: good    Tests     Right Knee   Negative active quad, anterior drawer, Apley's compression, Apley's distraction, lateral Zachary, medial Zachary, valgus stress test at 0 degrees and varus stress test at 0 degrees             Precautions COPD, Aortic aneurysm, h/o DVT, Anxiety, depression, kidney disease, pre-diabetes    Specialty Daily Treatment Diary     Manual  7/2       Prone quad stretch right 3x30"       Patellar mobs for lateral tightness                                    Exercise Diary  7/2       Bike  L1x8'       Nustep        Total gym squats        Standing HS stretch right 3x30"       Standing HS curls        Seated LAQ right        Seated T-band HS curls        QS        SLR        Prone quad stretch with strap                                                                                            Modalities        CP prn right knee

## 2018-07-05 ENCOUNTER — OFFICE VISIT (OUTPATIENT)
Dept: PHYSICAL THERAPY | Facility: CLINIC | Age: 53
End: 2018-07-05
Payer: COMMERCIAL

## 2018-07-05 DIAGNOSIS — M25.562 ACUTE PAIN OF LEFT KNEE: ICD-10-CM

## 2018-07-05 DIAGNOSIS — M76.51 PATELLAR TENDINITIS OF RIGHT KNEE: Primary | ICD-10-CM

## 2018-07-05 PROCEDURE — 97530 THERAPEUTIC ACTIVITIES: CPT

## 2018-07-05 PROCEDURE — 97110 THERAPEUTIC EXERCISES: CPT

## 2018-07-05 PROCEDURE — 97140 MANUAL THERAPY 1/> REGIONS: CPT

## 2018-07-05 NOTE — PROGRESS NOTES
Daily Note     Today's date: 2018  Patient name: Raymon Altamirano  : 1965  MRN: [id number]  Referring provider: Rock Isabela DO  Dx:   Encounter Diagnosis     ICD-10-CM    1  Patellar tendinitis of right knee M76 51    2  Acute pain of left knee M25 562                   Subjective: Pt c/o 4/10 pain in R knee upon arrival today  He states that his R knee always aches and sometimes makes it hard to walk  Objective: See treatment diary below  Precautions COPD, Aortic aneurysm, h/o DVT, Anxiety, depression, kidney disease, pre-diabetes     Specialty Daily Treatment Diary      Manual           Prone quad stretch right 3x30"  SA         Patellar mobs for lateral tightness                                                             Exercise Diary           Bike  L1x8'  L1 10'         Nustep             Total gym squats    3x10         Standing HS stretch right 3x30"  30" 3x         Standing HS curls    20x         Seated LAQ right    20x         Seated T-band HS curls             QS    5" 20x         SLR    20x         Prone quad stretch with strap    30" 3x                                                                                                                                                           Modalities             CP prn right knee                                                Assessment: Pt had an increase in pain post Anna and manual today noting a 6/10  Pt was heavily educated on the importance of mobility and HEP to decrease knee pain outside of therapy  Pt was advised to increase physical activity outside of therapy to decrease risk of further deconditioning  Plan: Progress treatment as tolerated

## 2018-07-10 ENCOUNTER — OFFICE VISIT (OUTPATIENT)
Dept: FAMILY MEDICINE CLINIC | Facility: CLINIC | Age: 53
End: 2018-07-10
Payer: COMMERCIAL

## 2018-07-10 ENCOUNTER — OFFICE VISIT (OUTPATIENT)
Dept: PHYSICAL THERAPY | Facility: CLINIC | Age: 53
End: 2018-07-10
Payer: COMMERCIAL

## 2018-07-10 VITALS
BODY MASS INDEX: 29.74 KG/M2 | DIASTOLIC BLOOD PRESSURE: 84 MMHG | HEART RATE: 68 BPM | OXYGEN SATURATION: 98 % | TEMPERATURE: 99.1 F | WEIGHT: 224.38 LBS | RESPIRATION RATE: 18 BRPM | SYSTOLIC BLOOD PRESSURE: 122 MMHG | HEIGHT: 73 IN

## 2018-07-10 DIAGNOSIS — E11.9 TYPE 2 DIABETES MELLITUS WITHOUT COMPLICATION, WITHOUT LONG-TERM CURRENT USE OF INSULIN (HCC): Primary | ICD-10-CM

## 2018-07-10 DIAGNOSIS — J45.20 MILD INTERMITTENT ASTHMA WITHOUT COMPLICATION: ICD-10-CM

## 2018-07-10 DIAGNOSIS — H81.13 BENIGN PAROXYSMAL POSITIONAL VERTIGO DUE TO BILATERAL VESTIBULAR DISORDER: Primary | ICD-10-CM

## 2018-07-10 DIAGNOSIS — K76.0 FATTY LIVER DISEASE, NONALCOHOLIC: ICD-10-CM

## 2018-07-10 DIAGNOSIS — M76.51 PATELLAR TENDINITIS OF RIGHT KNEE: Primary | ICD-10-CM

## 2018-07-10 DIAGNOSIS — R42 DIZZINESS: ICD-10-CM

## 2018-07-10 DIAGNOSIS — J44.9 CHRONIC OBSTRUCTIVE PULMONARY DISEASE, UNSPECIFIED COPD TYPE (HCC): ICD-10-CM

## 2018-07-10 DIAGNOSIS — K21.9 GERD WITHOUT ESOPHAGITIS: ICD-10-CM

## 2018-07-10 PROCEDURE — 97140 MANUAL THERAPY 1/> REGIONS: CPT

## 2018-07-10 PROCEDURE — 99214 OFFICE O/P EST MOD 30 MIN: CPT | Performed by: NURSE PRACTITIONER

## 2018-07-10 PROCEDURE — 97530 THERAPEUTIC ACTIVITIES: CPT

## 2018-07-10 PROCEDURE — 97110 THERAPEUTIC EXERCISES: CPT

## 2018-07-10 NOTE — PATIENT INSTRUCTIONS
Refer to Phys therapy for treatment    Benign Paroxysmal Positional Vertigo   Jing Bejarano, Martha TOLBERT, Samara Cole, et al: Clinical Practice Guideline: benign paroxysmal positional vertigo (update)  Otolaryngol Head Neck Surg 2017; 156(3 suppl):S1-S47  Eric Dowd: Benign paroxysmal positional vertigo and its variants  Handb Clin Neurol 2016; 313:644-355   familydoctor  org: Benign paroxysmal positional vertigo (BPPV)  American Academy of Family Physicians (AAFP)  Yaa Branch  2014  Available from URL: [email or web address] [email or web address]  As accessed 2017-03-03  Haja Pinzon MA , & Lizzette T : Vertigo, Benign Paroxysmal Positional  In: Lv OG, ed  The 5 Minute Clinical Consult Standard 2015, 23rd ed  [address] [facility] [place], [place], 2014 © 2017 [address] [facility] Information is for End User's use only and may not be sold, redistributed or otherwise used for commercial purposes  All illustrations and images included in CareNotes® are the copyrighted property of [employer]  or Telly Stokes  The above information is an  only  It is not intended as medical advice for individual conditions or treatments  Talk to your doctor, nurse or pharmacist before following any medical regimen to see if it is safe and effective for you

## 2018-07-10 NOTE — PROGRESS NOTES
Daily Note     Today's date: 7/10/2018  Patient name: King Chou  : 1965  MRN: [id number]  Referring provider: Solo See DO  Dx:   Encounter Diagnosis     ICD-10-CM    1  Patellar tendinitis of right knee M76 51                    Subjective: Pt c/o 2/10 pain in R knee today  He states that his pain is always worse at night but he is only having pain his knee about twice a week  Objective: See treatment diary below  Precautions COPD, Aortic aneurysm, h/o DVT, Anxiety, depression, kidney disease, pre-diabetes     Specialty Daily Treatment Diary      Manual  7/2  7/5  7/10       Prone quad stretch right 3x30"  SA  SA       Patellar mobs for lateral tightness                                                             Exercise Diary  7/2  7/5  7/10       Bike  L1x8'  L1 10'  L1 10'       Nustep             Total gym squats    3x10  3x10       Standing HS stretch right 3x30"  30" 3x  30" 3x       Standing HS curls    20x  20x       Seated LAQ right    20x  20x       Seated T-band HS curls             QS    5" 20x  5" 20x       SLR    20x  20x       Prone quad stretch with strap    30" 3x  30" 3x                                                                                                                                                         Modalities             CP prn right knee                                               Assessment: Pt had no change in pain throughout nor post treatment today  He noted that right after therapy he feels tight but an hr later he feels much better than he had felt all day  He was educated on the importance of compliance with HEP and mobility to decrease tension in knee  Plan: Progress treatment as tolerated

## 2018-07-10 NOTE — PROGRESS NOTES
Assessment/Plan:    No problem-specific Assessment & Plan notes found for this encounter  Diagnoses and all orders for this visit:    Benign paroxysmal positional vertigo due to bilateral vestibular disorder  -     Ambulatory referral to Physical Therapy; Future    Dizziness          Subjective:      Patient ID: Lizzy Moreno is a 46 y o  male  Pt is here today because he is having dizziness  This started yesterday am   When he awakened from sleep, he noticed his head was spinning, he walked to the bathroom and he felt nauseated  This last about 3 hours  Today, he awakened from sleep and he felt very dizzy  A year ago, he had similar symptoms and went to PT and he improved  The following portions of the patient's history were reviewed and updated as appropriate:   He  has a past medical history of Aorta aneurysm ([facility] [address] ); Arm DVT (deep venous thromboembolism), acute ([address] ) (2015); Asthma; CKD (chronic kidney disease), stage III; COPD (chronic obstructive pulmonary disease) ([facility] [address] ); Depression; GERD (gastroesophageal reflux disease); Headache; Hiatal hernia; Hyperlipidemia, mixed (5/7/2018); Liver disease; Prediabetes; Renal calculi; Sleep apnea; and Vestibular migraine    He   Patient Active Problem List    Diagnosis Date Noted    Benign paroxysmal positional vertigo due to bilateral vestibular disorder 07/10/2018    Patellar tendinitis of right knee 06/18/2018    Hamstring tightness of right lower extremity 06/18/2018    Vestibular migraine 05/29/2018    Type 2 diabetes mellitus without complication, without long-term current use of insulin ([facility] [address] ) 05/15/2018    Vitamin D deficiency 05/15/2018    Renal cyst, left 05/15/2018    Hepatic cyst 05/15/2018    Fatty liver disease, nonalcoholic 82/52/1770    Erectile dysfunction 05/15/2018    Bilateral carpal tunnel syndrome 05/15/2018    Chronic pain of right knee 05/15/2018    GERD (gastroesophageal reflux disease) 05/07/2018    Liver disease 05/07/2018    Hiatal hernia 05/07/2018    Encounter to establish care 05/07/2018    Prediabetes 05/07/2018    Lipid screening 05/07/2018    CKD (chronic kidney disease) stage 3, GFR 30-59 ml/min 05/07/2018    Hyperlipidemia, mixed 05/07/2018    Weight gain 05/07/2018    Fatigue 05/07/2018    Generalized abdominal pain 05/07/2018    Mild intermittent asthma without complication 94/57/7477    Seasonal allergic rhinitis due to pollen 05/07/2018    Numbness of fingers of both hands 05/07/2018    Depression with anxiety 05/07/2018    Abdominal bloating 05/07/2018    Dizziness 05/06/2016    COPD (chronic obstructive pulmonary disease) ([facility] [address] ) 05/06/2016    Ascending aortic aneurysm (HCC) 05/06/2016    Bicuspid aortic valve 05/06/2016    Adjustment reaction with anxiety and depression 07/10/2015    Allergic rhinitis 09/23/2013     He  has a past surgical history that includes Carpal tunnel release (Left); Hernia repair; pr colonoscopy flx dx w/collj spec when pfrmd (N/A, 8/7/2017); Colonoscopy; Foot surgery (Left); and pr esophagogastroduodenoscopy transoral diagnostic (N/A, 10/31/2017)  His family history includes Aortic aneurysm in his other; Arthritis in his mother; Cancer in his brother; Clotting disorder in his father; Heart attack in his father; Leukemia in his brother; Prostate cancer in his brother; Schizoaffective Disorder  in his sister  He  reports that he quit smoking about 3 years ago  His smoking use included Cigars  He has never used smokeless tobacco  He reports that he does not drink alcohol or use drugs  Current Outpatient Prescriptions   Medication Sig Dispense Refill    albuterol (PROVENTIL HFA,VENTOLIN HFA) 90 mcg/act inhaler Inhale 2 puffs every 6 (six) hours as needed for wheezing        amitriptyline (ELAVIL) 25 mg tablet Take 1 tablet (25 mg total) by mouth daily at bedtime for 30 days 30 tablet 6    buPROPion (WELLBUTRIN) 75 mg tablet take 1 tablet by mouth twice a day 60 tablet 3    diclofenac sodium (VOLTAREN) 1 % Apply 2 g topically 4 (four) times a day 1 Tube 0    ergocalciferol (VITAMIN D2) 50,000 units Take 1 capsule (50,000 Units total) by mouth once a week for 30 days 4 capsule 3    fenofibrate (TRICOR) 145 mg tablet Take 145 mg by mouth daily      fluticasone (FLONASE) 50 mcg/act nasal spray 1 spray into each nostril daily 16 g 0    Mometasone Furo-Formoterol Fum (DULERA) 200-5 MCG/ACT AERO Inhale 2 puffs 2 (two) times a day   montelukast (SINGULAIR) 10 mg tablet Take by mouth      pantoprazole (PROTONIX) 40 mg tablet Take 40 mg by mouth daily   sildenafil (REVATIO) 20 mg tablet Take by mouth      SUMAtriptan (IMITREX) 100 mg tablet Take 1 tablet (100 mg total) by mouth once as needed for migraine for up to 1 dose 9 tablet 3     No current facility-administered medications for this visit  Current Outpatient Prescriptions on File Prior to Visit   Medication Sig    albuterol (PROVENTIL HFA,VENTOLIN HFA) 90 mcg/act inhaler Inhale 2 puffs every 6 (six) hours as needed for wheezing   amitriptyline (ELAVIL) 25 mg tablet Take 1 tablet (25 mg total) by mouth daily at bedtime for 30 days    buPROPion (WELLBUTRIN) 75 mg tablet take 1 tablet by mouth twice a day    diclofenac sodium (VOLTAREN) 1 % Apply 2 g topically 4 (four) times a day    ergocalciferol (VITAMIN D2) 50,000 units Take 1 capsule (50,000 Units total) by mouth once a week for 30 days    fenofibrate (TRICOR) 145 mg tablet Take 145 mg by mouth daily    fluticasone (FLONASE) 50 mcg/act nasal spray 1 spray into each nostril daily    Mometasone Furo-Formoterol Fum (DULERA) 200-5 MCG/ACT AERO Inhale 2 puffs 2 (two) times a day   montelukast (SINGULAIR) 10 mg tablet Take by mouth    pantoprazole (PROTONIX) 40 mg tablet Take 40 mg by mouth daily      sildenafil (REVATIO) 20 mg tablet Take by mouth    SUMAtriptan (IMITREX) 100 mg tablet Take 1 tablet (100 mg total) by mouth once as needed for migraine for up to 1 dose     No current facility-administered medications on file prior to visit  He has No Known Allergies       Review of Systems   Constitutional: Positive for fatigue  Negative for fever  HENT: Negative  Eyes: Negative for photophobia and visual disturbance  Respiratory: Negative  Negative for chest tightness, shortness of breath and wheezing  Cardiovascular: Negative  Negative for chest pain, palpitations and leg swelling  Gastrointestinal: Negative  Endocrine: Negative  Musculoskeletal: Negative  Skin: Negative  Allergic/Immunologic: Negative for immunocompromised state  Neurological: Positive for dizziness  Negative for syncope, numbness and headaches  Hematological: Negative  Negative for adenopathy  All other systems reviewed and are negative  Objective:      /84 (BP Location: Left arm, Patient Position: Sitting)   Pulse 68   Temp 99 1 °F (37 3 °C)   Resp 18   Ht 6' 1" (1 854 m)   Wt 102 kg (224 lb 6 oz)   SpO2 98%   BMI 29 60 kg/m²          Physical Exam   Constitutional: He is oriented to person, place, and time  He appears well-developed and well-nourished  No distress  HENT:   Head: Normocephalic and atraumatic  Right Ear: External ear normal    Left Ear: External ear normal    Mouth/Throat: Oropharynx is clear and moist    Eyes: Conjunctivae and EOM are normal  Pupils are equal, round, and reactive to light  Neck: Normal range of motion  Cardiovascular: Normal rate, regular rhythm and normal heart sounds  Exam reveals no gallop and no friction rub  No murmur heard  Pulmonary/Chest: Effort normal and breath sounds normal  No respiratory distress  Abdominal: Soft  There is no tenderness  Musculoskeletal: Normal range of motion  Lymphadenopathy:     He has no cervical adenopathy  Neurological: He is alert and oriented to person, place, and time  No cranial nerve deficit   Coordination normal    Skin: Skin is warm and dry  Psychiatric: He has a normal mood and affect  His behavior is normal  Judgment and thought content normal    Nursing note and vitals reviewed

## 2018-07-12 ENCOUNTER — EVALUATION (OUTPATIENT)
Dept: PHYSICAL THERAPY | Facility: CLINIC | Age: 53
End: 2018-07-12
Payer: COMMERCIAL

## 2018-07-12 DIAGNOSIS — H81.13 BENIGN PAROXYSMAL POSITIONAL VERTIGO DUE TO BILATERAL VESTIBULAR DISORDER: Primary | ICD-10-CM

## 2018-07-12 PROCEDURE — 97164 PT RE-EVAL EST PLAN CARE: CPT | Performed by: PHYSICAL THERAPIST

## 2018-07-12 PROCEDURE — 97140 MANUAL THERAPY 1/> REGIONS: CPT | Performed by: PHYSICAL THERAPIST

## 2018-07-12 NOTE — PROGRESS NOTES
PT Evaluation     Today's date: 2018  Patient name: Sukumar Rivero  : 1965  MRN: [id number]  Referring provider: Madalyn Ansari DO  Dx:   No diagnosis found  Assessment  Impairments: pain with function  Functional limitations: Pain with prolonged standing and walking  Assessment details: Patient is a 46 y o  Male referred with diagnosis of vertigo  He had insidious onset of dizziness and vertigo three days ago  Since that time, symptoms have been getting better  No significant findings during evaluation today  Left CRT performed  Patient declined treatment on knees today due to not feeling well  Will follow up with patient today to determine additional need for therapy to address issues of vertigo  Understanding of Dx/Px/POC: good   Prognosis: good    Goals  STG (3-4 weeks)  1  Patient will report pain as a 5/10 at worst with waking  2  Patient will report the ability to sleep through the night without disturbances related to pain  3  Patient will report the ability to lay on his back and look up at his car without getting dizzy  LTG (6-8 weeks)  1  Patient will report pain as a 0-1/10 at worst with normal activity  2  Patient will be independent and compliant with a HEP in order to maintain gains made with physical therapy    Plan  Patient would benefit from: PT eval  Planned modality interventions: cryotherapy  Planned therapy interventions: manual therapy, joint mobilization, home exercise program, therapeutic exercise, therapeutic activities, stretching, strengthening and patient education  Frequency: 2x week  Duration in weeks: 8  Plan of Care beginning date: 2018  Plan of Care expiration date: 2018  Treatment plan discussed with: patient        Subjective Evaluation    History of Present Illness  Date of onset: 2018  Mechanism of injury: He states he noticed spinning and nausea when lying in bed on 7/10/18  He is feeling a little bit better today    No longer getting nausea when moving  He saw his PCP who referred him to outpatient PT services  Patient reports insidious onset of right knee pain about 2 months ago  He has a history of right knee pain about 8 years ago which went away on it's own  He saw his physician and had an x-ray which showed minor arthritis of his right knee  He is referred now to outpatient PT services  Recurrent probem    Quality of life: fair    Pain  Current pain ratin  At best pain ratin  At worst pain rating: 10  Location: medial and lateral knee joint, patellar tendon  Quality: sharp  Relieving factors: medications  Progression: worsening    Social Support  Steps to enter house: yes  2  Stairs in house: no     Employment status: not working  Hand dominance: right  Exercise history: Ride bicycle minh other day for 10-15 minutes  Diagnostic Tests  No diagnostic tests performed  X-ray: abnormal  Treatments  No previous or current treatments  Patient Goals  Patient goals for therapy: decreased pain  Patient goal: To resume his previous level of function  Objective     Tenderness     Right Knee   Tenderness in the lateral patella  No tenderness in the lateral joint line, medial joint line and medial patella  Active Range of Motion   Cervical/Thoracic Spine   Normal active range of motion    Right Knee   Normal active range of motion    Mobility   Patellar Mobility:     Right Knee   WFL: medial, lateral, superior and inferior  Tibiofemoral Mobility:   Right knee Tibiofemoral tendons within functional limits include the anterior and posterior  Tibiofibular Mobility:   Right knee Tibiofibular tendons within functional limits include the anterior and posterior       Strength/Myotome Testing     Left Knee   Flexion: 4+  Extension: 4+  Quadriceps contraction: good    Right Knee   Flexion: 4+  Extension: 4+  Quadriceps contraction: good    Tests     Right Knee   Negative active quad, anterior drawer, Apley's compression, Apley's distraction, lateral Zachary, medial Zachary, valgus stress test at 0 degrees and varus stress test at 0 degrees  Additional Tests Details  (-) smooth pursuit, saccadic eye movements, VOR fast/slow and head thrust   Horizontal roll left and right, right karoline hallpike    Questionable (+) left karoline hallpike without nystagmus or vertigo    Didn't feel "right" with test           Precautions COPD, Aortic aneurysm, h/o DVT, Anxiety, depression, kidney disease, pre-diabetes     Specialty Daily Treatment Diary      Manual  7/2  7/5  7/10  7/12     Prone quad stretch right 3x30"  SA  SA       Patellar mobs for lateral tightness              Left CRT       JF                                       Exercise Diary  7/2  7/5  7/10       Bike  L1x8'  L1 10'  L1 10'       Nustep             Total gym squats    3x10  3x10       Standing HS stretch right 3x30"  30" 3x  30" 3x       Standing HS curls    20x  20x       Seated LAQ right    20x  20x       Seated T-band HS curls             QS    5" 20x  5" 20x       SLR    20x  20x       Prone quad stretch with strap    30" 3x  30" 3x                                                                                                                                                         Modalities             CP prn right knee

## 2018-07-17 ENCOUNTER — APPOINTMENT (OUTPATIENT)
Dept: PHYSICAL THERAPY | Facility: CLINIC | Age: 53
End: 2018-07-17
Payer: COMMERCIAL

## 2018-07-19 ENCOUNTER — OFFICE VISIT (OUTPATIENT)
Dept: PHYSICAL THERAPY | Facility: CLINIC | Age: 53
End: 2018-07-19
Payer: COMMERCIAL

## 2018-07-19 DIAGNOSIS — M76.51 PATELLAR TENDINITIS OF RIGHT KNEE: Primary | ICD-10-CM

## 2018-07-19 DIAGNOSIS — M25.562 ACUTE PAIN OF LEFT KNEE: ICD-10-CM

## 2018-07-19 PROCEDURE — 97110 THERAPEUTIC EXERCISES: CPT | Performed by: PHYSICAL THERAPIST

## 2018-07-19 PROCEDURE — 97112 NEUROMUSCULAR REEDUCATION: CPT | Performed by: PHYSICAL THERAPIST

## 2018-07-19 NOTE — PROGRESS NOTES
Daily Note     Today's date: 2018  Patient name: King Chou  : 1965  MRN: [id number]  Referring provider: Solo See DO  Dx:   Encounter Diagnosis     ICD-10-CM    1  Patellar tendinitis of right knee M76 51    2  Acute pain of left knee M25 562                   Subjective: Patient stated no dizziness today; c/o soreness from working on his driveway at home  Objective: See treatment diary below  Precautions COPD, Aortic aneurysm, h/o DVT, Anxiety, depression, kidney disease, pre-diabetes     Specialty Daily Treatment Diary      Manual  7/2  7/5  7/10  7/12     Prone quad stretch right 3x30"  SA  SA       Patellar mobs for lateral tightness              Left CRT       JF                                       Exercise Diary  7/2  7/5  7/10  7/19     Bike  L1x8'  L1 10'  L1 10'  L1 10'     Nustep             Total gym squats    3x10  3x10  3x10     Standing HS stretch right 3x30"  30" 3x  30" 3x  30" 3x     Standing HS curls    20x  20x 30x     Seated LAQ right    20x  20x  30x     Seated T-band HS curls             QS    5" 20x  5" 20x  5" 20x     SLR    20x  20x  20x     Prone quad stretch with strap    30" 3x  30" 3x  3 x 30"                                                                                                                                                       Modalities             CP prn right knee                                                 Assessment: Patient c/o muscle cramping in his left Quadriceps after performing TG squats; performed increased reps with exercises as listed without c/o pain  Plan: Continue per plan of care

## 2018-07-24 ENCOUNTER — OFFICE VISIT (OUTPATIENT)
Dept: PHYSICAL THERAPY | Facility: CLINIC | Age: 53
End: 2018-07-24
Payer: COMMERCIAL

## 2018-07-24 DIAGNOSIS — M76.51 PATELLAR TENDINITIS OF RIGHT KNEE: Primary | ICD-10-CM

## 2018-07-24 PROCEDURE — 97112 NEUROMUSCULAR REEDUCATION: CPT

## 2018-07-24 PROCEDURE — 97110 THERAPEUTIC EXERCISES: CPT

## 2018-07-24 NOTE — PROGRESS NOTES
Daily Note     Today's date: 2018  Patient name: Javy Tyler  : 1965  MRN: [id number]  Referring provider: Karen Senior DO  Dx:   Encounter Diagnosis     ICD-10-CM    1  Patellar tendinitis of right knee M76 51                   Subjective: Pt c/o 5/10 in R knee  He states that he only does his exercises sometimes  Objective: See treatment diary below  Precautions COPD, Aortic aneurysm, h/o DVT, Anxiety, depression, kidney disease, pre-diabetes     Specialty Daily Treatment Diary      Manual  7/2  7/5  7/10  7/12  7/24   Prone quad stretch right 3x30"  SA  SA    SA   Patellar mobs for lateral tightness              Left CRT       JF                                       Exercise Diary  7/2  7/5  7/10  7/19  7/24   Bike  L1x8'  L1 10'  L1 10'  L1 10'     Nustep          L7 10'   Total gym squats    3x10  3x10  3x10  3x10   Standing HS stretch right 3x30"  30" 3x  30" 3x  30" 3x  30" 3x   Standing HS curls    20x  20x 30x  30x   Seated LAQ right    20x  20x  30x  30x   Seated T-band HS curls             QS    5" 20x  5" 20x  5" 20x  5" 20x   SLR    20x  20x  20x  20x   Prone quad stretch with strap    30" 3x  30" 3x  3 x 30"  30" 3x                                                                                                                                                     Modalities             CP prn right knee                                               Assessment: Pt had no pain post treatment today despite noting some ms soreness  He was educated on the importance of compliance with HEP to decrease knee pain  Pt will DC to HEP next week  Plan: Potential discharge next visit

## 2018-07-26 ENCOUNTER — OFFICE VISIT (OUTPATIENT)
Dept: PHYSICAL THERAPY | Facility: CLINIC | Age: 53
End: 2018-07-26
Payer: COMMERCIAL

## 2018-07-26 DIAGNOSIS — H81.13 BENIGN PAROXYSMAL POSITIONAL VERTIGO DUE TO BILATERAL VESTIBULAR DISORDER: ICD-10-CM

## 2018-07-26 DIAGNOSIS — M76.51 PATELLAR TENDINITIS OF RIGHT KNEE: Primary | ICD-10-CM

## 2018-07-26 PROCEDURE — 97110 THERAPEUTIC EXERCISES: CPT | Performed by: PHYSICAL THERAPIST

## 2018-07-26 PROCEDURE — 97112 NEUROMUSCULAR REEDUCATION: CPT | Performed by: PHYSICAL THERAPIST

## 2018-07-26 NOTE — PROGRESS NOTES
Daily Note     Today's date: 2018  Patient name: Negro Sen  : 1965  MRN: [id number]  Referring provider: Skyler Monet DO  Dx:   Encounter Diagnosis     ICD-10-CM    1  Patellar tendinitis of right knee M76 51    2  Benign paroxysmal positional vertigo due to bilateral vestibular disorder H81 13                   Subjective: Patient states knee pain was a 2/10 when he came in today  Objective: See treatment diary below  Precautions COPD, Aortic aneurysm, h/o DVT, Anxiety, depression, kidney disease, pre-diabetes     Specialty Daily Treatment Diary      Manual  7/2  7/5  7/10  7/12  7/24   Prone quad stretch right 3x30"  SA  SA    SA   Patellar mobs for lateral tightness              Left CRT       JF                                       Exercise Diary  7/26  7/5  7/10  7/19  7/24   Bike    L1 10'  L1 10'  L1 10'     Nustep  L7x10'        L7 10'   Total gym squats  3x10  3x10  3x10  3x10  3x10   Standing HS stretch right 3x30"  30" 3x  30" 3x  30" 3x  30" 3x   Standing HS curls  x30  20x  20x 30x  30x   Seated LAQ right  30x 2#  20x  20x  30x  30x   Seated T-band HS curls             QS  5"x20  5" 20x  5" 20x  5" 20x  5" 20x   SLR x20  20x  20x  20x  20x   Prone quad stretch with strap  3x30"  30" 3x  30" 3x  3 x 30"  30" 3x                                                                                                                                                     Modalities             CP prn right knee                                               Assessment: Tolerated treatment poor  Patient demonstrated fatigue post treatment  Patient struggled to perform SLR x20 and had to take frequent rest breaks to complete them  Pain in knee 2/10 after sesion today  Plan: Continue per plan of care

## 2018-07-27 DIAGNOSIS — F43.23 ADJUSTMENT REACTION WITH ANXIETY AND DEPRESSION: ICD-10-CM

## 2018-07-30 DIAGNOSIS — F43.23 ADJUSTMENT REACTION WITH ANXIETY AND DEPRESSION: ICD-10-CM

## 2018-07-30 RX ORDER — BUPROPION HYDROCHLORIDE 75 MG/1
TABLET ORAL
Qty: 60 TABLET | Refills: 3 | OUTPATIENT
Start: 2018-07-30

## 2018-07-30 RX ORDER — BUPROPION HYDROCHLORIDE 75 MG/1
TABLET ORAL
Qty: 60 TABLET | Refills: 3 | Status: SHIPPED | OUTPATIENT
Start: 2018-07-30 | End: 2018-11-26 | Stop reason: SDUPTHER

## 2018-07-31 ENCOUNTER — EVALUATION (OUTPATIENT)
Dept: PHYSICAL THERAPY | Facility: CLINIC | Age: 53
End: 2018-07-31
Payer: COMMERCIAL

## 2018-07-31 DIAGNOSIS — M76.51 PATELLAR TENDINITIS OF RIGHT KNEE: Primary | ICD-10-CM

## 2018-07-31 DIAGNOSIS — H81.13 BENIGN PAROXYSMAL POSITIONAL VERTIGO DUE TO BILATERAL VESTIBULAR DISORDER: ICD-10-CM

## 2018-07-31 DIAGNOSIS — M25.562 ACUTE PAIN OF LEFT KNEE: ICD-10-CM

## 2018-07-31 PROCEDURE — G8980 MOBILITY D/C STATUS: HCPCS | Performed by: PHYSICAL THERAPIST

## 2018-07-31 PROCEDURE — 97112 NEUROMUSCULAR REEDUCATION: CPT | Performed by: PHYSICAL THERAPIST

## 2018-07-31 PROCEDURE — 97110 THERAPEUTIC EXERCISES: CPT | Performed by: PHYSICAL THERAPIST

## 2018-07-31 PROCEDURE — G8979 MOBILITY GOAL STATUS: HCPCS | Performed by: PHYSICAL THERAPIST

## 2018-07-31 NOTE — PROGRESS NOTES
PT Discharge    Today's date: 2018  Patient name: Shanta Simon  : 1965  MRN: [id number]  Referring provider: Silva Jane DO  Dx:   Encounter Diagnosis     ICD-10-CM    1  Patellar tendinitis of right knee M76 51    2  Benign paroxysmal positional vertigo due to bilateral vestibular disorder H81 13    3  Acute pain of left knee M25 562                   Assessment  Impairments: pain with function  Functional limitations: Pain with prolonged standing and walking  Assessment details: Patient feels 50% improved since starting physical therapy  Pain is no longer constant and is overall decreased in intensity  He demonstrates normal gait pattern  All meniscal and ligamentous testing is negative  He demonstrates normal ROM and strength in his right knee  Further progress has been limited by continued non-adherence with HEP  Patient requests DC of PT services at this time  Understanding of Dx/Px/POC: good  Goals  STG (3-4 weeks)  1  Patient will report pain as a 5/10 at worst with waking - not met  2  Patient will report the ability to sleep through the night without disturbances related to pain - met  LTG (6-8 weeks)  1  Patient will report pain as a 0-1/10 at worst with normal activity - not met  2  Patient will be independent and compliant with a HEP in order to maintain gains made with physical therapy - not adherent with a HEP    Plan  Patient would benefit from: PT eval  Planned therapy interventions: home exercise program and patient education  Frequency: 2x week  Plan of Care beginning date: 2018  Plan of Care expiration date: 2018  Treatment plan discussed with: patient        Subjective Evaluation    History of Present Illness  Date of onset: 2018  Mechanism of injury: Patient feels 50% improved since starting physical therapy  He no longer has sleep disturbances  Pain is intermittent now as opposed to being constant when he started physical therapy    Patient also notices an overall decrease in intensity of his pain  He requests DC of PT services at this time  Pain  Current pain rating: 3  At best pain ratin  At worst pain ratin  Location: medial and lateral knee joint, patellar tendon  Quality: sharp  Relieving factors: medications  Progression: worsening    Social Support  Steps to enter house: yes  2  Stairs in house: no     Exercise history: Ride bicycle minh other day for 10-15 minutes  Diagnostic Tests  No diagnostic tests performed  X-ray: abnormal  Treatments  No previous or current treatments  Patient Goals  Patient goals for therapy: decreased pain  Patient goal: To resume his previous level of function  Objective     Tenderness     Right Knee   No tenderness in the lateral joint line, lateral patella, medial joint line and medial patella  Active Range of Motion     Right Knee   Normal active range of motion    Mobility   Patellar Mobility:     Right Knee   WFL: medial, lateral, superior and inferior  Tibiofemoral Mobility:   Right knee Tibiofemoral tendons within functional limits include the anterior and posterior  Tibiofibular Mobility:   Right knee Tibiofibular tendons within functional limits include the anterior and posterior  Strength/Myotome Testing     Left Knee   Flexion: 4+  Extension: 4+  Quadriceps contraction: good    Right Knee   Flexion: 4+  Extension: 4+  Quadriceps contraction: good    Tests     Right Knee   Negative active quad, anterior drawer, Apley's compression, Apley's distraction, lateral Zachary, medial Zachary, valgus stress test at 0 degrees and varus stress test at 0 degrees             Precautions COPD, Aortic aneurysm, h/o DVT, Anxiety, depression, kidney disease, pre-diabetes     Specialty Daily Treatment Diary      Manual  7/2  7/5  7/10  7/12  7/24   Prone quad stretch right 3x30"  SA  SA    SA   Patellar mobs for lateral tightness              Left CRT       JF                                     Exercise Diary  7/26  7/31  7/10  7/19  7/24   Bike     L1 10'  L1 10'  L1 10'     Nustep  L7x10'        L7 10'   Total gym squats  3x10  3x10  3x10  3x10  3x10   Standing HS stretch right 3x30"  30" 3x  30" 3x  30" 3x  30" 3x   Standing HS curls  x30  20x  20x 30x  30x   Seated LAQ right  30x 2#  2# x30  20x  30x  30x   Seated T-band HS curls             QS  5"x20  5" 20x  5" 20x  5" 20x  5" 20x   SLR x20  20x  20x  20x  20x   Prone quad stretch with strap  3x30"  30" 3x  30" 3x  3 x 30"  30" 3x                                                                                                                                                     Modalities             CP prn right knee

## 2018-08-06 ENCOUNTER — APPOINTMENT (OUTPATIENT)
Dept: RADIOLOGY | Facility: MEDICAL CENTER | Age: 53
End: 2018-08-06
Payer: COMMERCIAL

## 2018-08-06 ENCOUNTER — OFFICE VISIT (OUTPATIENT)
Dept: OBGYN CLINIC | Facility: MEDICAL CENTER | Age: 53
End: 2018-08-06
Payer: COMMERCIAL

## 2018-08-06 VITALS — HEIGHT: 73 IN | BODY MASS INDEX: 30.06 KG/M2 | WEIGHT: 226.8 LBS

## 2018-08-06 DIAGNOSIS — M25.552 BILATERAL HIP PAIN: Primary | ICD-10-CM

## 2018-08-06 DIAGNOSIS — M25.552 BILATERAL HIP PAIN: ICD-10-CM

## 2018-08-06 DIAGNOSIS — M76.51 PATELLAR TENDINITIS OF RIGHT KNEE: ICD-10-CM

## 2018-08-06 DIAGNOSIS — M25.551 BILATERAL HIP PAIN: ICD-10-CM

## 2018-08-06 DIAGNOSIS — M62.89 HAMSTRING TIGHTNESS OF RIGHT LOWER EXTREMITY: ICD-10-CM

## 2018-08-06 DIAGNOSIS — M25.551 BILATERAL HIP PAIN: Primary | ICD-10-CM

## 2018-08-06 DIAGNOSIS — M25.851 HIP IMPINGEMENT SYNDROME, RIGHT: ICD-10-CM

## 2018-08-06 PROCEDURE — 73502 X-RAY EXAM HIP UNI 2-3 VIEWS: CPT

## 2018-08-06 PROCEDURE — 99214 OFFICE O/P EST MOD 30 MIN: CPT | Performed by: FAMILY MEDICINE

## 2018-08-06 RX ORDER — AZELASTINE HYDROCHLORIDE, FLUTICASONE PROPIONATE 137; 50 UG/1; UG/1
1 SPRAY, METERED NASAL
COMMUNITY
Start: 2018-07-17 | End: 2018-10-01 | Stop reason: ALTCHOICE

## 2018-08-06 RX ORDER — AMITRIPTYLINE HYDROCHLORIDE 25 MG/1
25 TABLET, FILM COATED ORAL
Refills: 0 | COMMUNITY
Start: 2018-07-23 | End: 2018-12-27 | Stop reason: SDUPTHER

## 2018-08-06 RX ORDER — ERGOCALCIFEROL 1.25 MG/1
50000 CAPSULE ORAL WEEKLY
COMMUNITY
Start: 2018-05-15 | End: 2019-04-08 | Stop reason: ALTCHOICE

## 2018-08-06 NOTE — PROGRESS NOTES
Assessment:     1  Bilateral hip pain    2  Patellar tendinitis of right knee    3  Hamstring tightness of right lower extremity    4  Hip impingement syndrome, right        Plan:     Problem List Items Addressed This Visit     Patellar tendinitis of right knee    Relevant Orders    MRI arthrogram right hip    FL injection right hip (arthrogram)    Hamstring tightness of right lower extremity    Hip impingement syndrome, right     Right knee pain with findings of right hip impingement likely the source of his knee pain  X-ray demonstrates extensive osteophyte formation along the superior and inferior margins of the right glenoid labrum  Will recommend MRI arthrogram of the right hip at this time to evaluate for possible labrum tear  Follow-up after completion of MRI  Other Visit Diagnoses     Bilateral hip pain    -  Primary    Relevant Orders    XR hip/pelv 2-3 vws left if performed    XR hip/pelv 2-3 vws right if performed    MRI arthrogram right hip    FL injection right hip (arthrogram)         Subjective:     Patient ID: Shanta Simon is a 46 y o  male  Chief Complaint:  Patient today reports less than 50% improvement of his right knee pain since completing physical therapy  He states that he continues to have pain in the knee most noticeable during the hours of sleep  He also does report having pain in the right hip radiating into the right groin  Symptoms are reproduced with prolonged standing ambulation  He reports no alleviating factors including physical therapy and NSAIDs  He denies any locking or clicking in the hip or knee  He denies any instability or giving out of the knee  He denies any joint swelling        Allergy:  No Known Allergies  Medications:  all current active meds have been reviewed  Past Medical History:  Past Medical History:   Diagnosis Date    Aorta aneurysm ([facility] [address] )     4 3    Arm DVT (deep venous thromboembolism), acute ([facility] [address] ) 2177    complications of cardiac cath   Shanta Thornton Asthma     CKD (chronic kidney disease), stage III     COPD (chronic obstructive pulmonary disease) (HCC)     Depression     GERD (gastroesophageal reflux disease)     Headache     Hiatal hernia     Hyperlipidemia, mixed 5/7/2018    Liver disease     FATTY LIVER    Prediabetes     Renal calculi     Sleep apnea     Vestibular migraine      Past Surgical History:  Past Surgical History:   Procedure Laterality Date    CARPAL TUNNEL RELEASE Left     COLONOSCOPY      FOOT SURGERY Left     FOREIGN BODY REMOVAL    HERNIA REPAIR      ME COLONOSCOPY FLX DX W/COLLJ SPEC WHEN PFRMD N/A 8/7/2017    Procedure: COLONOSCOPY;  Surgeon: Abelino Lee MD;  Location: [facility]; Service: Gastroenterology    ME ESOPHAGOGASTRODUODENOSCOPY TRANSORAL DIAGNOSTIC N/A 10/31/2017    Procedure: ESOPHAGOGASTRODUODENOSCOPY (EGD); Surgeon: Abelino Lee MD;  Location: [facility]; Service: Gastroenterology     Family History:  Family History   Problem Relation Age of Onset    Cancer Brother     Prostate cancer Brother     Leukemia Brother         his only brother dies from [address] or leukemia pt unsure he was only 46    Arthritis Mother     Heart attack Father     Clotting disorder Father     Schizoaffective Disorder  Sister     Aortic aneurysm Other         abdominal     Social History:  History   Alcohol Use No     History   Drug Use No     History   Smoking Status    Former Smoker    Types: Cigars    Quit date: 8/7/2014   Smokeless Tobacco    Never Used     Review of Systems   Constitutional: Negative  HENT: Negative  Eyes: Negative  Respiratory: Negative  Cardiovascular: Negative  Gastrointestinal: Negative  Genitourinary: Negative  Musculoskeletal: Positive for arthralgias and myalgias  Skin: Negative  Allergic/Immunologic: Negative  Neurological: Negative  Hematological: Negative  Psychiatric/Behavioral: Negative            Objective:  BP Readings from Last 1 Encounters:   07/10/18 122/84      Wt Readings from Last 1 Encounters:   08/06/18 103 kg (226 lb 12 8 oz)      BMI:   Estimated body mass index is 29 92 kg/m² as calculated from the following:    Height as of this encounter: 6' 1" (1 854 m)  Weight as of this encounter: 103 kg (226 lb 12 8 oz)  BSA:   Estimated body surface area is 2 27 meters squared as calculated from the following:    Height as of this encounter: 6' 1" (1 854 m)  Weight as of this encounter: 103 kg (226 lb 12 8 oz)  Physical Exam   Constitutional: He appears well-developed  Eyes: Pupils are equal, round, and reactive to light  Neck: Normal range of motion  Pulmonary/Chest: Effort normal    Musculoskeletal: He exhibits tenderness  He exhibits no edema  Right knee: He exhibits no effusion  Neurological: He is alert  Skin: Skin is warm  Psychiatric: He has a normal mood and affect  Right Knee Exam     Tenderness   The patient is experiencing tenderness in the medial joint line and lateral joint line  Range of Motion   Extension: normal   Flexion: normal     Tests   Zachary:  Medial - negative Lateral - negative  Lachman:  Anterior - negative    Posterior - negative  Drawer:       Anterior - negative    Posterior - negative  Varus: negative  Valgus: negative  Pivot Shift: negative  Patellar Apprehension: negative    Other   Erythema: absent  Pulse: present  Swelling: mild  Other tests: no effusion present      Left Knee Exam   Left knee exam is normal     Tenderness   The patient is experiencing no tenderness  Range of Motion   The patient has normal left knee ROM  Muscle Strength     The patient has normal left knee strength  Right Hip Exam     Tenderness   The patient is experiencing tenderness in the anterior      Range of Motion   Internal Rotation: 20   External Rotation: 20     Muscle Strength   Abduction: 4/5   Adduction: 4/5   Flexion: 4/5     Tests   ALICIA: positive    Other   Erythema: absent  Sensation: normal  Pulse: present      Left Hip Exam     Tenderness   The patient is experiencing no tenderness  Range of Motion   Internal Rotation: 20   External Rotation: 20     Muscle Strength   Abduction: 5/5   Adduction: 5/5   Flexion: 5/5     Tests   ALICIA: negative    Other   Erythema: absent  Sensation: normal  Pulse: present            I have personally reviewed pertinent films in PACS  Joint space narrowing with osteophyte formation along the superior and inferior glenoid labrum

## 2018-08-07 ENCOUNTER — OFFICE VISIT (OUTPATIENT)
Dept: NEPHROLOGY | Facility: CLINIC | Age: 53
End: 2018-08-07
Payer: COMMERCIAL

## 2018-08-07 VITALS
WEIGHT: 225 LBS | DIASTOLIC BLOOD PRESSURE: 70 MMHG | RESPIRATION RATE: 16 BRPM | HEART RATE: 80 BPM | TEMPERATURE: 99.5 F | BODY MASS INDEX: 29.82 KG/M2 | HEIGHT: 73 IN | SYSTOLIC BLOOD PRESSURE: 100 MMHG

## 2018-08-07 DIAGNOSIS — N28.1 RENAL CYST, LEFT: ICD-10-CM

## 2018-08-07 DIAGNOSIS — K21.9 GASTROESOPHAGEAL REFLUX DISEASE WITHOUT ESOPHAGITIS: ICD-10-CM

## 2018-08-07 DIAGNOSIS — R42 DIZZINESS: ICD-10-CM

## 2018-08-07 DIAGNOSIS — E11.9 TYPE 2 DIABETES MELLITUS WITHOUT COMPLICATION, WITHOUT LONG-TERM CURRENT USE OF INSULIN (HCC): ICD-10-CM

## 2018-08-07 DIAGNOSIS — N18.30 CKD (CHRONIC KIDNEY DISEASE) STAGE 3, GFR 30-59 ML/MIN (HCC): Primary | ICD-10-CM

## 2018-08-07 DIAGNOSIS — J44.9 CHRONIC OBSTRUCTIVE PULMONARY DISEASE, UNSPECIFIED COPD TYPE (HCC): ICD-10-CM

## 2018-08-07 PROCEDURE — 99244 OFF/OP CNSLTJ NEW/EST MOD 40: CPT | Performed by: INTERNAL MEDICINE

## 2018-08-07 NOTE — PROGRESS NOTES
NEPHROLOGY OFFICE CONSULT  Jeane Oakley 46 y o  male MRN: [id number]    Encounter: [id number] 8/7/2018    REASON FOR VISIT: Jeane Oakley is a 46 y o male who was referred by Todd Ford MD for evaluation of Consult and CKD III       HPI:    Js Pierce came in today for evaluation and management of CKD  60-year-old gentleman who do not have much significant medical history except some orthopedic pain  Recently was hospitalized because of pain in ankle  At that time he was told that he has some kidney problem which was persistent the past   He went to see his primary doctor and lateral switch his primary doctor who advised him to see me  He is overall feeling well  Seems to be anxious  No acute complaint other than pain in the joint both in the back and some numbness in both lower extremity  He denies taking any nonsteroidal pain killer  He denies alcohol or any smoking  He does have some abdominal complain with nausea vomiting and workup in past has been negative  He is complaining some dizziness of and on        REVIEW OF SYSTEMS:    Review of Systems   Constitutional: Negative for activity change, appetite change, chills, fatigue and fever  HENT: Negative for congestion, ear discharge, rhinorrhea and sinus pain  Eyes: Negative for photophobia, pain and visual disturbance  Respiratory: Negative for apnea, cough, choking, chest tightness, shortness of breath and stridor  Cardiovascular: Negative for chest pain, palpitations and leg swelling  Gastrointestinal: Negative for abdominal distention, abdominal pain, blood in stool, constipation, diarrhea, nausea and vomiting  Endocrine: Negative for heat intolerance and polyphagia  Genitourinary: Negative for flank pain and urgency  Musculoskeletal: Negative for neck pain and neck stiffness  Skin: Negative for color change and wound  Allergic/Immunologic: Negative for food allergies and immunocompromised state     Neurological: Positive for light-headedness, numbness and headaches  Negative for seizures, facial asymmetry and weakness  Hematological: Negative for adenopathy  Does not bruise/bleed easily  Psychiatric/Behavioral: Negative for self-injury and suicidal ideas  PAST MEDICAL HISTORY:  Past Medical History:   Diagnosis Date    Aorta aneurysm ([facility] )     4 3    Arm DVT (deep venous thromboembolism), acute ([facility] [facility] ) 5429    complications of cardiac cath    Asthma     CKD (chronic kidney disease), stage III     COPD (chronic obstructive pulmonary disease) (HCC)     Depression     GERD (gastroesophageal reflux disease)     Headache     Hiatal hernia     Hyperlipidemia, mixed 5/7/2018    Liver disease     FATTY LIVER    Prediabetes     Renal calculi     Sleep apnea     Vestibular migraine        PAST SURGICAL HISTORY:  Past Surgical History:   Procedure Laterality Date    CARPAL TUNNEL RELEASE Left     COLONOSCOPY      FOOT SURGERY Left     FOREIGN BODY REMOVAL    HERNIA REPAIR      ME COLONOSCOPY FLX DX W/COLLJ SPEC WHEN PFRMD N/A 8/7/2017    Procedure: COLONOSCOPY;  Surgeon: Marques Gil MD;  [facility]; Service: Gastroenterology    ME ESOPHAGOGASTRODUODENOSCOPY TRANSORAL DIAGNOSTIC N/A 10/31/2017    Procedure: ESOPHAGOGASTRODUODENOSCOPY (EGD); Surgeon: Marques Gil MD;  [facility];   Service: Gastroenterology       SOCIAL HISTORY:  History   Alcohol Use No     History   Drug Use No     History   Smoking Status    Former Smoker    Types: Cigars    Quit date: 8/7/2014   Smokeless Tobacco    Never Used       FAMILY HISTORY:  Family History   Problem Relation Age of Onset    Cancer Brother     Prostate cancer Brother     Leukemia Brother         his only brother dies from lymphoma or leukemia pt unsure he was only 47    Arthritis Mother     Heart attack Father     Clotting disorder Father     Schizoaffective Disorder  Sister     Aortic aneurysm Other         abdominal MEDICATIONS:    Current Outpatient Prescriptions:     albuterol (PROVENTIL HFA,VENTOLIN HFA) 90 mcg/act inhaler, Inhale 2 puffs every 6 (six) hours as needed for wheezing , Disp: , Rfl:     amitriptyline (ELAVIL) 25 mg tablet, , Disp: , Rfl: 0    Azelastine-Fluticasone 137-50 MCG/ACT SUSP, 1 spray into each nostril, Disp: , Rfl:     buPROPion (WELLBUTRIN) 75 mg tablet, take 1 tablet by mouth twice a day, Disp: 60 tablet, Rfl: 3    ergocalciferol (VITAMIN D2) 50,000 units, Take 50,000 Units by mouth, Disp: , Rfl:     fenofibrate (TRICOR) 145 mg tablet, Take 145 mg by mouth daily, Disp: , Rfl:     mometasone-formoterol (DULERA) 200-5 MCG/ACT inhaler, Inhale 2 puffs 2 (two) times a day , Disp: , Rfl:     montelukast (SINGULAIR) 10 mg tablet, Take by mouth, Disp: , Rfl:     pantoprazole (PROTONIX) 40 mg tablet, Take 40 mg by mouth daily  , Disp: , Rfl:     SUMAtriptan (IMITREX) 100 mg tablet, Take 1 tablet (100 mg total) by mouth once as needed for migraine for up to 1 dose, Disp: 9 tablet, Rfl: 3    PHYSICAL EXAM:  Vitals:    08/07/18 1516   BP: 100/70   BP Location: Right arm   Patient Position: Sitting   Pulse: 80   Resp: 16   Temp: 99 5 °F (37 5 °C)   TempSrc: Tympanic   Weight: 102 kg (225 lb)   Height: 6' 1" (1 854 m)     Body mass index is 29 69 kg/m²  Physical Exam   Constitutional: He is oriented to person, place, and time  He appears well-developed and well-nourished  No distress  HENT:   Head: Normocephalic and atraumatic  Right Ear: External ear normal    Mouth/Throat: Oropharynx is clear and moist    Eyes: Conjunctivae and EOM are normal  Pupils are equal, round, and reactive to light  No scleral icterus  Neck: Normal range of motion  Neck supple  No JVD present  Cardiovascular: Normal rate, regular rhythm and normal heart sounds  No murmur heard  Pulmonary/Chest: Effort normal and breath sounds normal  No respiratory distress  He has no wheezes  He has no rales     Abdominal: Soft  Bowel sounds are normal  He exhibits no mass  There is no tenderness  Musculoskeletal: Normal range of motion  He exhibits no edema  Neurological: He is alert and oriented to person, place, and time  Skin: Skin is warm  No rash noted  Psychiatric: He has a normal mood and affect   His behavior is normal        LAB RESULTS:  Results for orders placed or performed in visit on 05/07/18   CBC and differential   Result Value Ref Range    WBC 6 13 4 31 - 10 16 Thousand/uL    RBC 5 24 3 88 - 5 62 Million/uL    Hemoglobin 16 0 12 0 - 17 0 g/dL    Hematocrit 47 4 36 5 - 49 3 %    MCV 91 82 - 98 fL    MCH 30 5 26 8 - 34 3 pg    MCHC 33 8 31 4 - 37 4 g/dL    RDW 13 1 11 6 - 15 1 %    MPV 11 4 8 9 - 12 7 fL    Platelets 391 653 - 665 Thousands/uL    nRBC 0 /100 WBCs    Neutrophils Relative 49 43 - 75 %    Lymphocytes Relative 39 14 - 44 %    Monocytes Relative 10 4 - 12 %    Eosinophils Relative 1 0 - 6 %    Basophils Relative 1 0 - 1 %    Neutrophils Absolute 2 97 1 85 - 7 62 Thousands/µL    Lymphocytes Absolute 2 40 0 60 - 4 47 Thousands/µL    Monocytes Absolute 0 63 0 17 - 1 22 Thousand/µL    Eosinophils Absolute 0 08 0 00 - 0 61 Thousand/µL    Basophils Absolute 0 03 0 00 - 0 10 Thousands/µL   Comprehensive metabolic panel   Result Value Ref Range    Sodium 138 136 - 145 mmol/L    Potassium 4 2 3 5 - 5 3 mmol/L    Chloride 105 100 - 108 mmol/L    CO2 24 21 - 32 mmol/L    Anion Gap 9 4 - 13 mmol/L    BUN 18 5 - 25 mg/dL    Creatinine 1 56 (H) 0 60 - 1 30 mg/dL    Glucose, Fasting 88 65 - 99 mg/dL    Calcium 9 1 8 3 - 10 1 mg/dL    AST 43 5 - 45 U/L    ALT 47 12 - 78 U/L    Alkaline Phosphatase 52 46 - 116 U/L    Total Protein 7 4 6 4 - 8 2 g/dL    Albumin 3 8 3 5 - 5 0 g/dL    Total Bilirubin 0 67 0 20 - 1 00 mg/dL    eGFR 50 ml/min/1 73sq m   Lipid panel   Result Value Ref Range    Cholesterol 124 50 - 200 mg/dL    Triglycerides 207 (H) <=150 mg/dL    HDL, Direct 31 (L) 40 - 60 mg/dL    LDL Calculated 52 0 - 100 mg/dL    Non-HDL-Chol (CHOL-HDL) 93 mg/dl   Vitamin D 25 hydroxy   Result Value Ref Range    Vit D, 25-Hydroxy 16 9 (L) 30 0 - 100 0 ng/mL   TSH, 3rd generation   Result Value Ref Range    TSH 3RD GENERATON 2 350 0 358 - 3 740 uIU/mL   Ammonia   Result Value Ref Range    Ammonia 11 11 - 35 umol/L   HEMOGLOBIN A1C W/ EAG ESTIMATION   Result Value Ref Range    Hemoglobin A1C 6 2 4 2 - 6 3 %     mg/dl   Bilirubin, direct   Result Value Ref Range    Bilirubin, Direct 0 16 0 00 - 0 20 mg/dL       ASSESSMENT and PLAN:    CKD (chronic kidney disease) stage 3, GFR 30-59 ml/min  He does seems to have CKD stage 3 with baseline GFR of about 50  Etiology seems unclear  Kidney ultrasound in past has been negative  Couple of urine test did not show any proteinuria or hematuria suggesting some sort of glomerulonephritis  He does not have any risk factor of kidney disease like diabetes or high blood pressure  He is also denied taking any nonsteroidal pain killer or any other nephrotoxic medicine  I advise hydration with him  Pathophysiology of kidney disease discussed with him at length  I will repeat blood test and urine test in month or so after hydration  Asymptomatic and progressive nature of kidney disease discussed with him also    GERD (gastroesophageal reflux disease)  He does have some stomach discomfort with seems to be likely secondary to GERD or irritable bowel syndrome  Advised hydration    Type 2 diabetes mellitus without complication, without long-term current use of insulin ([facility] )  Lab Results   Component Value Date    HGBA1C 6 2 05/07/2018       No results for input(s): POCGLU in the last 72 hours  Blood Sugar Average: Last 72 hrs:     Seems to be very well controlled without medication        He does seems to have CKD  I advised hydration with electrolytes  I will repeat blood test and urine test in 1 month  I will see him back in 2 months    Thank you very much for the consultation I will monitor the patient with you        Portions of the record may have been created with voice recognition software  Occasional wrong word or "sound a like" substitutions may have occurred due to the inherent limitations of voice recognition software  Read the chart carefully and recognize, using context, where substitutions have occurred  If you have any questions, please contact the dictating provider

## 2018-08-07 NOTE — LETTER
August 7, 2018     Mimi Shah MD  [address]  [address] [facility]  [address]    Patient: Daryn Davis   YOB: 1965   Date of Visit: 8/7/2018       Dear Dr Khalil Sit: Thank you for referring Clark Parra to me for evaluation  Below are my notes for this consultation  If you have questions, please do not hesitate to call me  I look forward to following your patient along with you  Sincerely,        Angel Means MD        CC: No Recipients  Angel Means MD  8/7/2018  4:25 PM  Sign at close encounter  [address] A Osiris 46 y o  male MRN: [id number]    Encounter: [id number] 8/7/2018    REASON FOR VISIT: Daryn Davis is a 46 y o male who was referred by Mimi Shah MD for evaluation of Consult and CKD III       HPI:    Giselle Ingram came in today for evaluation and management of CKD  65-year-old gentleman who do not have much significant medical history except some orthopedic pain  Recently was hospitalized because of pain in ankle  At that time he was told that he has some kidney problem which was persistent the past   He went to see his primary doctor and lateral switch his primary doctor who advised him to see me  He is overall feeling well  Seems to be anxious  No acute complaint other than pain in the joint both in the back and some numbness in both lower extremity  He denies taking any nonsteroidal pain killer  He denies alcohol or any smoking  He does have some abdominal complain with nausea vomiting and workup in past has been negative  He is complaining some dizziness of and on        REVIEW OF SYSTEMS:    Review of Systems   Constitutional: Negative for activity change, appetite change, chills, fatigue and fever  HENT: Negative for congestion, ear discharge, rhinorrhea and sinus pain  Eyes: Negative for photophobia, pain and visual disturbance     Respiratory: Negative for apnea, cough, choking, chest tightness, shortness of breath and stridor  Cardiovascular: Negative for chest pain, palpitations and leg swelling  Gastrointestinal: Negative for abdominal distention, abdominal pain, blood in stool, constipation, diarrhea, nausea and vomiting  Endocrine: Negative for heat intolerance and polyphagia  Genitourinary: Negative for flank pain and urgency  Musculoskeletal: Negative for neck pain and neck stiffness  Skin: Negative for color change and wound  Allergic/Immunologic: Negative for food allergies and immunocompromised state  Neurological: Positive for light-headedness, numbness and headaches  Negative for seizures, facial asymmetry and weakness  Hematological: Negative for adenopathy  Does not bruise/bleed easily  Psychiatric/Behavioral: Negative for self-injury and suicidal ideas  PAST MEDICAL HISTORY:  Past Medical History:   Diagnosis Date    Aorta aneurysm ([facility] [address] )     4 3    [facility] 3937    complications of cardiac cath    Asthma     CKD (chronic kidney disease), stage III     COPD (chronic obstructive pulmonary disease) (HCC)     Depression     GERD (gastroesophageal reflux disease)     Headache     Hiatal hernia     Hyperlipidemia, mixed 5/7/2018    Liver disease     FATTY LIVER    Prediabetes     Renal calculi     Sleep apnea     Vestibular migraine        PAST SURGICAL HISTORY:  Past Surgical History:   Procedure Laterality Date    CARPAL TUNNEL RELEASE Left     COLONOSCOPY      FOOT SURGERY Left     FOREIGN BODY REMOVAL    HERNIA REPAIR      MS COLONOSCOPY FLX DX W/COLLJ SPEC WHEN PFRMD N/A 8/7/2017    Procedure: COLONOSCOPY;  Surgeon: David Chino MD;  [facility]: [facility]; Service: Gastroenterology    MS ESOPHAGOGASTRODUODENOSCOPY TRANSORAL DIAGNOSTIC N/A 10/31/2017    Procedure: ESOPHAGOGASTRODUODENOSCOPY (EGD); Surgeon: David Chino MD;  [facility];   Service: Gastroenterology       SOCIAL HISTORY:  History   Alcohol Use No     History   Drug Use No     History   Smoking Status    Former Smoker    Types: Cigars    Quit date: 8/7/2014   Smokeless Tobacco    Never Used       FAMILY HISTORY:  Family History   Problem Relation Age of Onset    Cancer Brother     Prostate cancer Brother     Leukemia Brother         his only brother dies from [address] or leukemia pt unsure he was only 47    Arthritis Mother     Heart attack Father     Clotting disorder Father     Schizoaffective Disorder  Sister     Aortic aneurysm Other         abdominal       MEDICATIONS:    Current Outpatient Prescriptions:     albuterol (PROVENTIL HFA,VENTOLIN HFA) 90 mcg/act inhaler, Inhale 2 puffs every 6 (six) hours as needed for wheezing , Disp: , Rfl:     amitriptyline (ELAVIL) 25 mg tablet, , Disp: , Rfl: 0    Azelastine-Fluticasone 137-50 MCG/ACT SUSP, 1 spray into each nostril, Disp: , Rfl:     buPROPion (WELLBUTRIN) 75 mg tablet, take 1 tablet by mouth twice a day, Disp: 60 tablet, Rfl: 3    ergocalciferol (VITAMIN D2) 50,000 units, Take 50,000 Units by mouth, Disp: , Rfl:     fenofibrate (TRICOR) 145 mg tablet, Take 145 mg by mouth daily, Disp: , Rfl:     mometasone-formoterol (DULERA) 200-5 MCG/ACT inhaler, Inhale 2 puffs 2 (two) times a day , Disp: , Rfl:     montelukast (SINGULAIR) 10 mg tablet, Take by mouth, Disp: , Rfl:     pantoprazole (PROTONIX) 40 mg tablet, Take 40 mg by mouth daily  , Disp: , Rfl:     SUMAtriptan (IMITREX) 100 mg tablet, Take 1 tablet (100 mg total) by mouth once as needed for migraine for up to 1 dose, Disp: 9 tablet, Rfl: 3    PHYSICAL EXAM:  Vitals:    08/07/18 1516   BP: 100/70   BP Location: Right arm   Patient Position: Sitting   Pulse: 80   Resp: 16   Temp: 99 5 °F (37 5 °C)   TempSrc: Tympanic   Weight: 102 kg (225 lb)   Height: 6' 1" (1 854 m)     Body mass index is 29 69 kg/m²  Physical Exam   Constitutional: He is oriented to person, place, and time  He appears well-developed and well-nourished  No distress  HENT:   Head: Normocephalic and atraumatic  Right Ear: External ear normal    Mouth/Throat: Oropharynx is clear and moist    Eyes: Conjunctivae and EOM are normal  Pupils are equal, round, and reactive to light  No scleral icterus  Neck: Normal range of motion  Neck supple  No JVD present  Cardiovascular: Normal rate, regular rhythm and normal heart sounds  No murmur heard  Pulmonary/Chest: Effort normal and breath sounds normal  No respiratory distress  He has no wheezes  He has no rales  Abdominal: Soft  Bowel sounds are normal  He exhibits no mass  There is no tenderness  Musculoskeletal: Normal range of motion  He exhibits no edema  Neurological: He is alert and oriented to person, place, and time  Skin: Skin is warm  No rash noted  Psychiatric: He has a normal mood and affect   His behavior is normal        LAB RESULTS:  Results for orders placed or performed in visit on 05/07/18   CBC and differential   Result Value Ref Range    WBC 6 13 4 31 - 10 16 Thousand/uL    RBC 5 24 3 88 - 5 62 Million/uL    Hemoglobin 16 0 12 0 - 17 0 g/dL    Hematocrit 47 4 36 5 - 49 3 %    MCV 91 82 - 98 fL    MCH 30 5 26 8 - 34 3 pg    MCHC 33 8 31 4 - 37 4 g/dL    RDW 13 1 11 6 - 15 1 %    MPV 11 4 8 9 - 12 7 fL    Platelets 858 911 - 426 Thousands/uL    nRBC 0 /100 WBCs    Neutrophils Relative 49 43 - 75 %    Lymphocytes Relative 39 14 - 44 %    Monocytes Relative 10 4 - 12 %    Eosinophils Relative 1 0 - 6 %    Basophils Relative 1 0 - 1 %    Neutrophils Absolute 2 97 1 85 - 7 62 Thousands/µL    Lymphocytes Absolute 2 40 0 60 - 4 47 Thousands/µL    Monocytes Absolute 0 63 0 17 - 1 22 Thousand/µL    Eosinophils Absolute 0 08 0 00 - 0 61 Thousand/µL    Basophils Absolute 0 03 0 00 - 0 10 Thousands/µL   Comprehensive metabolic panel   Result Value Ref Range    Sodium 138 136 - 145 mmol/L    Potassium 4 2 3 5 - 5 3 mmol/L    Chloride 105 100 - 108 mmol/L    CO2 24 21 - 32 mmol/L    Anion Gap 9 4 - 13 mmol/L    BUN 18 5 - 25 mg/dL    Creatinine 1 56 (H) 0 60 - 1 30 mg/dL    Glucose, Fasting 88 65 - 99 mg/dL    Calcium 9 1 8 3 - 10 1 mg/dL    AST 43 5 - 45 U/L    ALT 47 12 - 78 U/L    Alkaline Phosphatase 52 46 - 116 U/L    Total Protein 7 4 6 4 - 8 2 g/dL    Albumin 3 8 3 5 - 5 0 g/dL    Total Bilirubin 0 67 0 20 - 1 00 mg/dL    eGFR 50 ml/min/1 73sq m   Lipid panel   Result Value Ref Range    Cholesterol 124 50 - 200 mg/dL    Triglycerides 207 (H) <=150 mg/dL    HDL, Direct 31 (L) 40 - 60 mg/dL    LDL Calculated 52 0 - 100 mg/dL    Non-HDL-Chol (CHOL-HDL) 93 mg/dl   Vitamin D 25 hydroxy   Result Value Ref Range    Vit D, 25-Hydroxy 16 9 (L) 30 0 - 100 0 ng/mL   TSH, 3rd generation   Result Value Ref Range    TSH 3RD GENERATON 2 350 0 358 - 3 740 uIU/mL   Ammonia   Result Value Ref Range    Ammonia 11 11 - 35 umol/L   HEMOGLOBIN A1C W/ EAG ESTIMATION   Result Value Ref Range    Hemoglobin A1C 6 2 4 2 - 6 3 %     mg/dl   Bilirubin, direct   Result Value Ref Range    Bilirubin, Direct 0 16 0 00 - 0 20 mg/dL       ASSESSMENT and PLAN:    CKD (chronic kidney disease) stage 3, GFR 30-59 ml/min  He does seems to have CKD stage 3 with baseline GFR of about 50  Etiology seems unclear  Kidney ultrasound in past has been negative  Couple of urine test did not show any proteinuria or hematuria suggesting some sort of glomerulonephritis  He does not have any risk factor of kidney disease like diabetes or high blood pressure  He is also denied taking any nonsteroidal pain killer or any other nephrotoxic medicine  I advise hydration with him  Pathophysiology of kidney disease discussed with him at length  I will repeat blood test and urine test in month or so after hydration  Asymptomatic and progressive nature of kidney disease discussed with him also    GERD (gastroesophageal reflux disease)  He does have some stomach discomfort with seems to be likely secondary to GERD or irritable bowel syndrome  Advised hydration    Type 2 diabetes mellitus without complication, without long-term current use of insulin ([facility] [address] )  Lab Results   Component Value Date    HGBA1C 6 2 05/07/2018       No results for input(s): POCGLU in the last 72 hours  Blood Sugar Average: Last 72 hrs:     Seems to be very well controlled without medication        He does seems to have CKD  I advised hydration with electrolytes  I will repeat blood test and urine test in 1 month  I will see him back in 2 months  Thank you very much for the consultation I will monitor the patient with you        Portions of the record may have been created with voice recognition software  Occasional wrong word or "sound a like" substitutions may have occurred due to the inherent limitations of voice recognition software  Read the chart carefully and recognize, using context, where substitutions have occurred  If you have any questions, please contact the dictating provider

## 2018-08-07 NOTE — ASSESSMENT & PLAN NOTE
He does seems to have CKD stage 3 with baseline GFR of about 50  Etiology seems unclear  Kidney ultrasound in past has been negative  Couple of urine test did not show any proteinuria or hematuria suggesting some sort of glomerulonephritis  He does not have any risk factor of kidney disease like diabetes or high blood pressure  He is also denied taking any nonsteroidal pain killer or any other nephrotoxic medicine  I advise hydration with him  Pathophysiology of kidney disease discussed with him at length  I will repeat blood test and urine test in month or so after hydration    Asymptomatic and progressive nature of kidney disease discussed with him also

## 2018-08-07 NOTE — PATIENT INSTRUCTIONS
Chronic Kidney Disease   AMBULATORY CARE:   Chronic kidney disease (CKD)  is the gradual and permanent loss of kidney function  Normally, the kidneys remove fluid, chemicals, and waste from your blood  These wastes are turned into urine by your kidneys  CKD may worsen over time and lead to kidney failure  Common symptoms include the following:   · Changes in how often you need to urinate    · Swelling in your arms, legs, or feet    · Shortness of breath    · Fatigue or weakness    · Bad or bitter taste in your mouth    · Nausea, vomiting, or loss of appetite  Seek care immediately if:   · You are confused and very drowsy  · You have a seizure  · You have shortness of breath  Contact your healthcare provider if:   · You suddenly gain or lose more weight than your healthcare provider has told you is okay  · You have itchy skin or a rash  · You urinate more or less than you normally do  · You have blood in your urine  · You have nausea and repeated vomiting  · You have fatigue or muscle weakness  · You have hiccups that will not stop  · You have questions or concerns about your condition or care  Treatment for CKD:  Medicines may be given to decrease blood pressure and get rid of extra fluid  You may also receive medicine to manage health conditions that may occur with CKD  Dialysis is a treatment to remove chemicals and waste from your blood when your kidneys can no longer do this  Surgery may be needed to create an arteriovenous fistula (AVF) in your arm or insert a catheter into your abdomen so that you can receive dialysis  A kidney transplant may be done if your CKD becomes severe  Manage CKD:   · Maintain a healthy weight  Ask your healthcare provider how much you should weigh  Ask him to help you create a weight loss plan if you are overweight  · Exercise 30 to 60 minutes a day, 4 to 7 times a week, or as directed  Ask about the best exercise plan for you   Regular exercise can help you manage CKD, high blood pressure, and diabetes  · Follow your healthcare provider's advice about what to eat and drink  He may tell you to eat food low in sodium (salt), potassium, phosphorus, or protein  You may need to see a dietitian if you need help planning meals  Ask how much liquid to drink each day and which liquids are best for you  · Limit alcohol  Ask how much alcohol is safe for you to drink  A drink of alcohol is 12 ounces of beer, 5 ounces of wine, or 1½ ounces of liquor  · Do not smoke  Nicotine and other chemicals in cigarettes and cigars can cause lung and kidney damage  Ask your healthcare provider for information if you currently smoke and need help to quit  E-cigarettes or smokeless tobacco still contain nicotine  Talk to your healthcare provider before you use these products  · Ask your healthcare provider if you need vaccines  Infections such as pneumonia, influenza, and hepatitis can be more harmful or more likely to occur in a person who has CKD  Vaccines reduce your risk of infection with these viruses  Follow up with your healthcare provider as directed:  Write down your questions so you remember to ask them during your visits  © 2017 [address] [employer] Information is for End User's use only and may not be sold, redistributed or otherwise used for commercial purposes  All illustrations and images included in CareNotes® are the copyrighted property of [employer]  or Telly Stokes  The above information is an  only  It is not intended as medical advice for individual conditions or treatments  Talk to your doctor, nurse or pharmacist before following any medical regimen to see if it is safe and effective for you

## 2018-08-07 NOTE — ASSESSMENT & PLAN NOTE
Lab Results   Component Value Date    HGBA1C 6 2 05/07/2018       No results for input(s): POCGLU in the last 72 hours      Blood Sugar Average: Last 72 hrs:     Seems to be very well controlled without medication

## 2018-08-07 NOTE — ASSESSMENT & PLAN NOTE
He does have some stomach discomfort with seems to be likely secondary to GERD or irritable bowel syndrome    Advised hydration

## 2018-08-10 DIAGNOSIS — Z77.018: Primary | ICD-10-CM

## 2018-08-13 ENCOUNTER — TELEPHONE (OUTPATIENT)
Dept: OBGYN CLINIC | Facility: MEDICAL CENTER | Age: 53
End: 2018-08-13

## 2018-08-13 NOTE — TELEPHONE ENCOUNTER
Left message as a reminder to patient to have orbital xray done before MRI  Walk in an hour earlier before schedule visit to be cleared for poss metal in the eyes

## 2018-08-20 ENCOUNTER — HOSPITAL ENCOUNTER (OUTPATIENT)
Dept: RADIOLOGY | Facility: HOSPITAL | Age: 53
Discharge: HOME | End: 2018-08-20
Attending: FAMILY MEDICINE
Payer: COMMERCIAL

## 2018-08-20 ENCOUNTER — HOSPITAL ENCOUNTER (OUTPATIENT)
Dept: MRI IMAGING | Facility: HOSPITAL | Age: 53
Discharge: HOME | End: 2018-08-20
Attending: FAMILY MEDICINE
Payer: COMMERCIAL

## 2018-08-20 ENCOUNTER — HOSPITAL ENCOUNTER (OUTPATIENT)
Dept: RADIOLOGY | Facility: HOSPITAL | Age: 53
Discharge: HOME | End: 2018-08-20
Attending: FAMILY MEDICINE | Admitting: RADIOLOGY
Payer: COMMERCIAL

## 2018-08-20 DIAGNOSIS — M76.51 PATELLAR TENDINITIS OF RIGHT KNEE: ICD-10-CM

## 2018-08-20 DIAGNOSIS — Z77.018: ICD-10-CM

## 2018-08-20 DIAGNOSIS — M25.551 BILATERAL HIP PAIN: ICD-10-CM

## 2018-08-20 DIAGNOSIS — M25.552 BILATERAL HIP PAIN: ICD-10-CM

## 2018-08-20 PROCEDURE — A9585 GADOBUTROL INJECTION: HCPCS | Performed by: RADIOLOGY

## 2018-08-20 PROCEDURE — 70030 X-RAY EYE FOR FOREIGN BODY: CPT

## 2018-08-20 PROCEDURE — 73722 MRI JOINT OF LWR EXTR W/DYE: CPT

## 2018-08-20 PROCEDURE — 27095 INJECTION FOR HIP X-RAY: CPT

## 2018-08-20 PROCEDURE — 77002 NEEDLE LOCALIZATION BY XRAY: CPT

## 2018-08-20 RX ORDER — LIDOCAINE HYDROCHLORIDE 10 MG/ML
15 INJECTION, SOLUTION EPIDURAL; INFILTRATION; INTRACAUDAL; PERINEURAL
Status: COMPLETED | OUTPATIENT
Start: 2018-08-20 | End: 2018-08-20

## 2018-08-20 RX ORDER — 0.9 % SODIUM CHLORIDE 0.9 %
50 VIAL (ML) INJECTION
Status: COMPLETED | OUTPATIENT
Start: 2018-08-20 | End: 2018-08-20

## 2018-08-20 RX ADMIN — IOHEXOL 2 ML: 300 INJECTION, SOLUTION INTRAVENOUS at 11:00

## 2018-08-20 RX ADMIN — LIDOCAINE HYDROCHLORIDE 11 ML: 10 INJECTION, SOLUTION EPIDURAL; INFILTRATION; INTRACAUDAL; PERINEURAL at 11:00

## 2018-08-20 RX ADMIN — SODIUM CHLORIDE 12 ML: 9 INJECTION, SOLUTION INTRAMUSCULAR; INTRAVENOUS; SUBCUTANEOUS at 11:00

## 2018-08-20 RX ADMIN — GADOBUTROL 2 ML: 604.72 INJECTION INTRAVENOUS at 11:58

## 2018-08-21 ENCOUNTER — HOSPITAL ENCOUNTER (EMERGENCY)
Facility: HOSPITAL | Age: 53
Discharge: HOME | End: 2018-08-21
Attending: EMERGENCY MEDICINE
Payer: COMMERCIAL

## 2018-08-21 ENCOUNTER — APPOINTMENT (EMERGENCY)
Dept: ULTRASOUND IMAGING | Facility: HOSPITAL | Age: 53
End: 2018-08-21
Payer: COMMERCIAL

## 2018-08-21 ENCOUNTER — APPOINTMENT (EMERGENCY)
Dept: CT IMAGING | Facility: HOSPITAL | Age: 53
End: 2018-08-21
Payer: COMMERCIAL

## 2018-08-21 VITALS
HEART RATE: 64 BPM | RESPIRATION RATE: 16 BRPM | WEIGHT: 224.87 LBS | OXYGEN SATURATION: 97 % | BODY MASS INDEX: 29.67 KG/M2 | DIASTOLIC BLOOD PRESSURE: 77 MMHG | TEMPERATURE: 97.7 F | SYSTOLIC BLOOD PRESSURE: 120 MMHG

## 2018-08-21 DIAGNOSIS — M25.559 HIP PAIN: ICD-10-CM

## 2018-08-21 DIAGNOSIS — I86.1 VARICOCELE: ICD-10-CM

## 2018-08-21 DIAGNOSIS — N43.40 SPERMATOCELE: Primary | ICD-10-CM

## 2018-08-21 DIAGNOSIS — N43.3 HYDROCELE: ICD-10-CM

## 2018-08-21 LAB
BILIRUB UR QL STRIP: NEGATIVE
CLARITY UR: CLEAR
COLOR UR: YELLOW
GLUCOSE UR STRIP-MCNC: NEGATIVE MG/DL
HGB UR QL STRIP.AUTO: NEGATIVE
KETONES UR STRIP-MCNC: NEGATIVE MG/DL
LEUKOCYTE ESTERASE UR QL STRIP: NEGATIVE
NITRITE UR QL STRIP: NEGATIVE
PH UR STRIP.AUTO: 5.5 [PH] (ref 4.5–8)
PROT UR STRIP-MCNC: NEGATIVE MG/DL
SP GR UR STRIP.AUTO: >=1.03 (ref 1–1.03)
UROBILINOGEN UR QL STRIP.AUTO: 0.2 E.U./DL

## 2018-08-21 PROCEDURE — 76870 US EXAM SCROTUM: CPT

## 2018-08-21 PROCEDURE — 81003 URINALYSIS AUTO W/O SCOPE: CPT

## 2018-08-21 PROCEDURE — 99284 EMERGENCY DEPT VISIT MOD MDM: CPT

## 2018-08-21 PROCEDURE — 87591 N.GONORRHOEAE DNA AMP PROB: CPT | Performed by: EMERGENCY MEDICINE

## 2018-08-21 PROCEDURE — 74176 CT ABD & PELVIS W/O CONTRAST: CPT

## 2018-08-21 PROCEDURE — 87491 CHLMYD TRACH DNA AMP PROBE: CPT | Performed by: EMERGENCY MEDICINE

## 2018-08-21 RX ORDER — ACETAMINOPHEN 325 MG/1
650 TABLET ORAL ONCE
Status: COMPLETED | OUTPATIENT
Start: 2018-08-21 | End: 2018-08-21

## 2018-08-21 RX ORDER — OXYCODONE HYDROCHLORIDE AND ACETAMINOPHEN 5; 325 MG/1; MG/1
1 TABLET ORAL EVERY 6 HOURS PRN
Qty: 9 TABLET | Refills: 0 | Status: SHIPPED | OUTPATIENT
Start: 2018-08-21 | End: 2018-08-24

## 2018-08-21 RX ADMIN — ACETAMINOPHEN 650 MG: 325 TABLET, FILM COATED ORAL at 14:48

## 2018-08-21 NOTE — ED NOTES
Discharge instruction given by provider  Patient able to ambulate out without difficulty        Santa Banegas RN  08/21/18 6228

## 2018-08-21 NOTE — DISCHARGE INSTRUCTIONS
Hip Pain   WHAT YOU NEED TO KNOW:   Hip pain can be caused by a number of conditions, such as bursitis, arthritis, or muscle or tendon strain  X-rays do not show broken bones  You may have swelling in the fluid-filled sacs that protect your muscles and tendons  Hip pain can also be caused by a lower back problem  Hip pain may be caused by trauma, playing sports, or running  Your pain may start in your hip and go to your thigh, buttock, or groin  DISCHARGE INSTRUCTIONS:   Medicines:   · NSAIDs , such as ibuprofen, help decrease swelling, pain, and fever  This medicine is available with or without a doctor's order  NSAIDs can cause stomach bleeding or kidney problems in certain people  If you take blood thinner medicine, always ask your healthcare provider if NSAIDs are safe for you  Always read the medicine label and follow directions  · Take your medicine as directed  Contact your healthcare provider if you think your medicine is not helping or if you have side effects  Tell him of her if you are allergic to any medicine  Keep a list of the medicines, vitamins, and herbs you take  Include the amounts, and when and why you take them  Bring the list or the pill bottles to follow-up visits  Carry your medicine list with you in case of an emergency  Return to the emergency department if:   · Your pain gets worse  · You have numbness in your leg or toes  · You cannot put any weight on or move your hip  Contact your healthcare provider if:   · You have a fever  · Your pain does not decrease, even after treatment  · You have questions or concerns about your condition or care  Follow up with your healthcare provider as directed: You may need physical therapy, an injection, or more testing  You may need to see an orthopedic specialist  Write down your questions so you remember to ask them during your visits    Manage your hip pain:   · Rest  your injured hip so that it can heal  You may need to avoid putting any weight on your hip for at least 48 hours  Return to normal activities as directed  · Ice  the injury for 20 minutes every 4 hours, or as directed  Use an ice pack, or put crushed ice in a plastic bag  Cover it with a towel to protect your skin  Ice helps prevent tissue damage and decreases swelling and pain  · Elevate  your injured hip above the level of your heart as often as you can  This will help decrease swelling and pain  If possible, prop your hip and leg on pillows or blankets to keep the area elevated comfortably  · Maintain a healthy weight  Extra body weight can cause pressure and pain in your hip, knee, and ankle joints  Ask your healthcare provider how much you should weigh  Ask him to help you create a weight loss plan if you are overweight  · Use assistive devices as directed  You may need to use a cane or crutches  Assistive devices help decrease pain and pressure on your hip when you walk  Ask your healthcare provider for more information about assistive devices and how to use them correctly  © 2017 26[address] [facility] Information is for End User's use only and may not be sold, redistributed or otherwise used for commercial purposes  All illustrations and images included in CareNotes® are the copyrighted property of A D A M , Inc  or [employer]  The above information is an  only  It is not intended as medical advice for individual conditions or treatments  Talk to your doctor, nurse or pharmacist before following any medical regimen to see if it is safe and effective for you  Hydrocele   WHAT YOU NEED TO KNOW:   A hydrocele is a collection of fluid inside the scrotum  The scrotum holds the testicles  Hydroceles are simple or communicating  A simple hydrocele stays the same size  A communicating hydrocele gets bigger and smaller as fluid flows into and out of the scrotum    DISCHARGE INSTRUCTIONS:   Medicines:   · Pain medicine: You may need medicine to take away or decrease pain  ¨ Learn how to take your medicine  Ask what medicine and how much you should take  Be sure you know how, when, and how often to take it  ¨ Do not wait until the pain is severe before you take your medicine  Tell caregivers if your pain does not decrease  ¨ Pain medicine can make you dizzy or sleepy  Prevent falls by calling someone when you get out of bed or if you need help  · Take your medicine as directed  Contact your healthcare provider if you think your medicine is not helping or if you have side effects  Tell him of her if you are allergic to any medicine  Keep a list of the medicines, vitamins, and herbs you take  Include the amounts, and when and why you take them  Bring the list or the pill bottles to follow-up visits  Carry your medicine list with you in case of an emergency  Follow up with your healthcare provider or urologist as directed:  Write down your questions so you remember to ask them during your visits  Support: You may need to wear a fabric support device similar to a jock strap to decrease swelling  Contact your healthcare provider or urologist if:   · The swelling gets worse or does not go away  · You have questions or concerns about your condition or care  Return to the emergency department if:   · You have severe pain in your scrotum  Hydrocele   WHAT YOU NEED TO KNOW:   A hydrocele is a collection of fluid inside the scrotum  The scrotum holds the testicles  Hydroceles are simple or communicating  A simple hydrocele stays the same size  A communicating hydrocele gets bigger and smaller as fluid flows into and out of the scrotum  DISCHARGE INSTRUCTIONS:   Medicines:   Pain medicine: You may need medicine to take away or decrease pain  Learn how to take your medicine  Ask what medicine and how much you should take  Be sure you know how, when, and how often to take it      Do not wait until the pain is severe before you take your medicine  Tell caregivers if your pain does not decrease  Pain medicine can make you dizzy or sleepy  Prevent falls by calling someone when you get out of bed or if you need help  Take your medicine as directed  Contact your healthcare provider if you think your medicine is not helping or if you have side effects  Tell him of her if you are allergic to any medicine  Keep a list of the medicines, vitamins, and herbs you take  Include the amounts, and when and why you take them  Bring the list or the pill bottles to follow-up visits  Carry your medicine list with you in case of an emergency  Follow up with your healthcare provider or urologist as directed:  Write down your questions so you remember to ask them during your visits  Support: You may need to wear a fabric support device similar to a jock strap to decrease swelling  Contact your healthcare provider or urologist if:   The swelling gets worse or does not go away  You have questions or concerns about your condition or care  Return to the emergency department if:   You have severe pain in your scrotum  You have a fever and your scrotum is red and swollen  © 2017 [address] [employer] Information is for End User's use only and may not be sold, redistributed or otherwise used for commercial purposes  All illustrations and images included in CareNotes® are the copyrighted property of A D A M , Inc  or [employer]  The above information is an  only  It is not intended as medical advice for individual conditions or treatments  Talk to your doctor, nurse or pharmacist before following any medical regimen to see if it is safe and effective for you  ·     · You have a fever and your scrotum is red and swollen  © 2017 [address] [employer] Information is for End User's use only and may not be sold, redistributed or otherwise used for commercial purposes   All illustrations and images included in CareNotes® are the copyrighted property of A D A M , Inc  or Telly Stokes  The above information is an  only  It is not intended as medical advice for individual conditions or treatments  Talk to your doctor, nurse or pharmacist before following any medical regimen to see if it is safe and effective for you

## 2018-08-21 NOTE — ED PROVIDER NOTES
History  Chief Complaint   Patient presents with    Hip Pain     right hip pain, pt had MRI and injection of same yesterday no better       History provided by:  Patient   used: No    Hip Pain   Associated symptoms: no abdominal pain, no chest pain, no congestion, no cough, no diarrhea, no fever, no headaches, no nausea, no rash, no shortness of breath, no sore throat, no vomiting and no wheezing      Patient is a 62-year-old male presenting to emergency department with pain and swelling in his scrotum  Pain in the inguinal area  No fevers or chills  No nausea vomiting  No abdominal pain  No back pain  Patient had MRI of the hip done yesterday, had an injection done with MRI  Patient to follow up with his orthopedics doctor in 2 days  MDM ultrasound scrotum, CT to evaluate the right hip and pelvic region after recent injection  Check UA and gonorrhea chlamydia          Prior to Admission Medications   Prescriptions Last Dose Informant Patient Reported? Taking? Azelastine-Fluticasone 137-50 MCG/ACT SUSP 2018 at Unknown time Self Yes Yes   Si spray into each nostril   SUMAtriptan (IMITREX) 100 mg tablet 2018 at Unknown time Self No Yes   Sig: Take 1 tablet (100 mg total) by mouth once as needed for migraine for up to 1 dose   albuterol (PROVENTIL HFA,VENTOLIN HFA) 90 mcg/act inhaler Past Week at Unknown time Self Yes Yes   Sig: Inhale 2 puffs every 6 (six) hours as needed for wheezing     amitriptyline (ELAVIL) 25 mg tablet 2018 at Unknown time Self Yes Yes   buPROPion (WELLBUTRIN) 75 mg tablet 2018 at Unknown time Self No Yes   Sig: take 1 tablet by mouth twice a day   ergocalciferol (VITAMIN D2) 50,000 units 2018 at Unknown time Self Yes Yes   Sig: Take 50,000 Units by mouth   fenofibrate (TRICOR) 145 mg tablet 2018 at Unknown time Self Yes Yes   Sig: Take 145 mg by mouth daily   mometasone-formoterol (DULERA) 200-5 MCG/ACT inhaler 2018 at Unknown time Self Yes Yes   Sig: Inhale 2 puffs 2 (two) times a day  montelukast (SINGULAIR) 10 mg tablet 8/21/2018 at Unknown time Self Yes Yes   Sig: Take by mouth   pantoprazole (PROTONIX) 40 mg tablet 8/21/2018 at Unknown time Self Yes Yes   Sig: Take 40 mg by mouth daily  Facility-Administered Medications: None       Past Medical History:   Diagnosis Date    Aorta aneurysm ([facility] [address] )     4 3    Arm DVT (deep venous thromboembolism), acute ([facility] [address] ) 2640    complications of cardiac cath    Asthma     CKD (chronic kidney disease), stage III     COPD (chronic obstructive pulmonary disease) (HCC)     Depression     GERD (gastroesophageal reflux disease)     Headache     Hiatal hernia     Hyperlipidemia, mixed 5/7/2018    Liver disease     FATTY LIVER    Prediabetes     Renal calculi     Sleep apnea     Vestibular migraine        Past Surgical History:   Procedure Laterality Date    CARPAL TUNNEL RELEASE Left     COLONOSCOPY      FL INJECTION RIGHT HIP (ARTHROGRAM)  8/20/2018    FOOT SURGERY Left     FOREIGN BODY REMOVAL    HERNIA REPAIR      TN COLONOSCOPY FLX DX W/COLLJ SPEC WHEN PFRMD N/A 8/7/2017    Procedure: COLONOSCOPY;  Surgeon: Vianca Marcum MD;  Location: [facility]; Service: Gastroenterology    TN ESOPHAGOGASTRODUODENOSCOPY TRANSORAL DIAGNOSTIC N/A 10/31/2017    Procedure: ESOPHAGOGASTRODUODENOSCOPY (EGD); Surgeon: Vianca Marcum MD;  Location: [facility]; Service: Gastroenterology       Family History   Problem Relation Age of Onset    Cancer Brother     Prostate cancer Brother     Leukemia Brother         his only brother dies from [address] or leukemia pt unsure he was only 46    Arthritis Mother     Heart attack Father     Clotting disorder Father     Schizoaffective Disorder  Sister     Aortic aneurysm Other         abdominal     I have reviewed and agree with the history as documented      Social History   Substance Use Topics    Smoking status: Former Smoker Types: Cigars     Quit date: 8/7/2014    Smokeless tobacco: Never Used    Alcohol use No        Review of Systems   Constitutional: Negative for chills, diaphoresis and fever  HENT: Negative for congestion and sore throat  Respiratory: Negative for cough, shortness of breath, wheezing and stridor  Cardiovascular: Negative for chest pain, palpitations and leg swelling  Gastrointestinal: Negative for abdominal pain, blood in stool, diarrhea, nausea and vomiting  Genitourinary: Positive for testicular pain  Negative for dysuria, frequency and urgency  Musculoskeletal: Negative for neck pain and neck stiffness  Skin: Negative for pallor and rash  Neurological: Negative for dizziness, syncope, weakness, light-headedness and headaches  All other systems reviewed and are negative  Physical Exam  Physical Exam   Constitutional: He is oriented to person, place, and time  He appears well-developed and well-nourished  HENT:   Head: Normocephalic and atraumatic  Eyes: Pupils are equal, round, and reactive to light  Neck: Neck supple  Cardiovascular: Normal rate, regular rhythm, normal heart sounds and intact distal pulses  Pulmonary/Chest: Effort normal and breath sounds normal  No respiratory distress  Abdominal: Soft  Bowel sounds are normal  There is no tenderness  Genitourinary: Penis normal    Genitourinary Comments: No erythema or swelling of the scrotum, no masses appreciated exam   Musculoskeletal: Normal range of motion  He exhibits no edema or tenderness  Neurological: He is alert and oriented to person, place, and time  Skin: Skin is warm and dry  Capillary refill takes less than 2 seconds  No erythema  No pallor  Vitals reviewed        Vital Signs  ED Triage Vitals   Temperature Pulse Respirations Blood Pressure SpO2   08/21/18 1428 08/21/18 1423 08/21/18 1423 08/21/18 1423 08/21/18 1423   97 7 °F (36 5 °C) 67 16 120/77 98 %      Temp Source Heart Rate Source Patient Position - Orthostatic VS BP Location FiO2 (%)   08/21/18 1428 08/21/18 1430 -- 08/21/18 1430 --   Oral Monitor  Right arm       Pain Score       08/21/18 1423       8           Vitals:    08/21/18 1423 08/21/18 1430   BP: 120/77 120/77   Pulse: 67 64       Visual Acuity      ED Medications  Medications   acetaminophen (TYLENOL) tablet 650 mg (650 mg Oral Given 8/21/18 1448)       Diagnostic Studies  Results Reviewed     Procedure Component Value Units Date/Time    Chlamydia/GC amplified DNA by PCR [[id number]] Collected:  08/21/18 1608    Lab Status: In process Specimen:  Urine from Urine, Other Updated:  08/21/18 1615    ED Urine Macroscopic [[id number]] Collected:  08/21/18 1615    Lab Status:  Final result Specimen:  Urine Updated:  08/21/18 1606     Color, UA Yellow     Clarity, UA Clear     pH, UA 5 5     Leukocytes, UA Negative     Nitrite, UA Negative     Protein, UA Negative mg/dl      Glucose, UA Negative mg/dl      Ketones, UA Negative mg/dl      Urobilinogen, UA 0 2 E U /dl      Bilirubin, UA Negative     Blood, UA Negative     Specific Gravity, UA >=1 030    Narrative:       CLINITEK RESULT                 CT abdomen pelvis wo contrast   Final Result by Slime Cherry MD (08/21 1617)      No acute inflammatory stranding seen   No evidence of bowel obstruction               Workstation performed: [id number]         US scrotum and testicles   Final Result by Jaya Mon DO (08/21 1606)   Normal testicles  No evidence for testicular torsion, orchitis, or testicular mass  Bilateral epididymal head cysts or spermatoceles  No sonographic evidence for epididymitis  Small bilateral mildly complex hydroceles  Borderline right side varicocele (scrotal veins with maximal diameter of 2 5 mm with Valsalva)  Small fatty lymph node in the right groin  Otherwise no hernia or adenopathy seen        Workstation performed: [id number]                    Procedures  Procedures       Phone Contacts  ED Phone Contact    ED Course  ED Course as of Aug 21 1639   Tue Aug 21, 2018   1447 Mri 8/20 - 1  Moderate to severe chondrosis of the right hip  2  Degenerative tearing throughout the right labrum                                [facility]  CritCare Time    Disposition  Final diagnoses:   Spermatocele   Hydrocele   Varicocele   Hip pain     Time reflects when diagnosis was documented in both MDM as applicable and the Disposition within this note     Time User Action Codes Description Comment    8/21/2018  4:20 PM Tadevosyan, Betsy Add [N43 40] Spermatocele     8/21/2018  4:20 PM Tadevosyan, Betsy Add [N43 3] Hydrocele     8/21/2018  4:20 PM Tadevosyan, Betsy Add [I86 1] Varicocele     8/21/2018  4:20 PM Tadevosyan, Betsy Add [M25 559] Hip pain       ED Disposition     ED Disposition Condition Comment    Discharge  [place] discharge to home/self care      Condition at discharge: Good        Follow-up Information     Follow up With Specialties Details Why Contact Info Additional Information    NICK Salinas Nurse Practitioner In 3 days re-evaluation [facility] [address] Nurse Practitioner   21   [address] [facility]  [address] [place] [address] Emergency Department Emergency Medicine  As needed, If symptoms worsen [address] [facility]  AN ED, [address], [place], [place], [address] [facility]  testicular discomfort/abnormal US findings [address]  [phone] [address] [facility]  please follow up with your orthopedics doctor, if unable you can call ours [address] [facility]  [phone]           Discharge Medication List as of 8/21/2018  4:24 PM      CONTINUE these medications which have NOT CHANGED    Details   albuterol (PROVENTIL HFA,VENTOLIN HFA) 90 mcg/act inhaler Inhale 2 puffs every 6 (six) hours as needed for wheezing , Historical Med      amitriptyline (ELAVIL) 25 mg tablet Starting Mon 7/23/2018, Historical Med      Azelastine-Fluticasone 137-50 MCG/ACT SUSP 1 spray into each nostril, Starting Tue 7/17/2018, Historical Med      buPROPion (WELLBUTRIN) 75 mg tablet take 1 tablet by mouth twice a day, Normal      ergocalciferol (VITAMIN D2) 50,000 units Take 50,000 Units by mouth, Starting Tue 5/15/2018, Historical Med      fenofibrate (TRICOR) 145 mg tablet Take 145 mg by mouth daily, Historical Med      mometasone-formoterol (DULERA) 200-5 MCG/ACT inhaler Inhale 2 puffs 2 (two) times a day , Historical Med      montelukast (SINGULAIR) 10 mg tablet Take by mouth, Historical Med      pantoprazole (PROTONIX) 40 mg tablet Take 40 mg by mouth daily  , Historical Med      SUMAtriptan (IMITREX) 100 mg tablet Take 1 tablet (100 mg total) by mouth once as needed for migraine for up to 1 dose, Starting Tue 5/29/2018, Normal           No discharge procedures on file      ED Provider  Electronically Signed by           Teto Machuca MD  08/21/18 7652

## 2018-08-21 NOTE — PROGRESS NOTES
Mr Amanda Manuel presented to [address] [place] [address] on 8/20/18 to undergo a right hip arthrogram due to persistent right hip pain  Please see separate dictation for full procedure details  The patient called today stating that since his right hip arthrogram he has had increased right hip pain  He has taken several doses of NSAIDs  since yesterday  He states the NSAIDs have not improved his pain complaints  He has also noted increased erythema at the site of the hip injection as well as scrotal erythema  He believes the scrotal erythema was not present before the hip injection  The patient denies any acute fevers, chills, skin induration, or drainage from the injection site  Due to the patient's above complaints I recommend that he report to the ED for an evaluation  The patient asked, "if it would be ok to watch it and see what happens " I instructed him, that due to the recent injection and his new symptoms I would recommend that he be evaluated today due to the possibility of an underlying infection  The patient verbalized his understanding and concurred  I will discuss the above with Dr Conchis Hopkins

## 2018-08-23 ENCOUNTER — TELEPHONE (OUTPATIENT)
Dept: OBGYN CLINIC | Facility: MEDICAL CENTER | Age: 53
End: 2018-08-23

## 2018-08-23 ENCOUNTER — TELEPHONE (OUTPATIENT)
Dept: UROLOGY | Facility: MEDICAL CENTER | Age: 53
End: 2018-08-23

## 2018-08-23 LAB
CHLAMYDIA DNA CVX QL NAA+PROBE: NORMAL
N GONORRHOEA DNA GENITAL QL NAA+PROBE: NORMAL

## 2018-08-23 NOTE — TELEPHONE ENCOUNTER
Spoke w/ pt and rescheduled his appt  Also verified his pharmacy and called in the medication as directed by Dr Titus Flores  Thank you

## 2018-08-23 NOTE — TELEPHONE ENCOUNTER
Patient should be scheduled for the next new patient appointment at his desired location  No urgency to appointment per his recent imaging and urine sample  Thank you

## 2018-08-23 NOTE — TELEPHONE ENCOUNTER
Spoke w/ Dr Rell Fritz  Per Dr CORNELIUS will call in Tramadol 50 mg tid prn #90  Will call pt and schedule for a f/u in 2 wks

## 2018-08-23 NOTE — TELEPHONE ENCOUNTER
CT abdomen pelvis wo contrast   Final Result by Jenifer Poole MD (08/21 1617)       No acute inflammatory stranding seen   No evidence of bowel obstruction                   Workstation performed: [id number]           US scrotum and testicles   Final Result by Daisy Hahn DO (08/21 1606)   Normal testicles  No evidence for testicular torsion, orchitis, or testicular mass        Bilateral epididymal head cysts or spermatoceles  No sonographic evidence for epididymitis        Small bilateral mildly complex hydroceles        Borderline right side varicocele (scrotal veins with maximal diameter of 2 5 mm with Valsalva)        Small fatty lymph node in the right groin  Otherwise no hernia or adenopathy seen

## 2018-08-23 NOTE — TELEPHONE ENCOUNTER
Complaint/Diagnosis:Hydrocele/Varicocele    Insurance:[employer]    History of Cancer:Family HX CAP    Previous Urologist:no    Outside testing/where:     If yes,what kind:    Records requested/where:    [facility]

## 2018-08-24 ENCOUNTER — TELEPHONE (OUTPATIENT)
Dept: OBGYN CLINIC | Facility: HOSPITAL | Age: 53
End: 2018-08-24

## 2018-08-24 DIAGNOSIS — M25.562 ACUTE PAIN OF LEFT KNEE: Primary | ICD-10-CM

## 2018-08-24 RX ORDER — HYDROCODONE BITARTRATE AND ACETAMINOPHEN 5; 325 MG/1; MG/1
1 TABLET ORAL EVERY 6 HOURS PRN
Qty: 30 TABLET | Refills: 0 | Status: SHIPPED | OUTPATIENT
Start: 2018-08-24 | End: 2018-08-28 | Stop reason: SDUPTHER

## 2018-08-24 NOTE — TELEPHONE ENCOUNTER
Caller: Juan [facility]  Callback# [phone]  Dr Chantel Hunt        I received a voice message from [facility] 08/24 need prior authorization for Tramadol 50mg please advise thanks

## 2018-08-26 RX ORDER — PANTOPRAZOLE SODIUM 40 MG/1
TABLET, DELAYED RELEASE ORAL
Qty: 90 TABLET | Refills: 1 | OUTPATIENT
Start: 2018-08-26

## 2018-08-27 ENCOUNTER — TELEPHONE (OUTPATIENT)
Dept: FAMILY MEDICINE CLINIC | Facility: CLINIC | Age: 53
End: 2018-08-27

## 2018-08-27 NOTE — TELEPHONE ENCOUNTER
Called patient giving him an update just letting him know that the doctor is out of the office for a personal matter  I told him if he had any questions to call me back

## 2018-08-27 NOTE — TELEPHONE ENCOUNTER
Edith Herrera states that dr Adelita Christie is out of the office for personal reasons and they changed his appt to the 6 of next month and then changed it again to the 10th  He said that he is in a lot of pain and wondered if you would call in percocet for him

## 2018-08-28 ENCOUNTER — TELEPHONE (OUTPATIENT)
Dept: FAMILY MEDICINE CLINIC | Facility: CLINIC | Age: 53
End: 2018-08-28

## 2018-08-28 DIAGNOSIS — M25.562 ACUTE PAIN OF LEFT KNEE: ICD-10-CM

## 2018-08-28 RX ORDER — HYDROCODONE BITARTRATE AND ACETAMINOPHEN 5; 325 MG/1; MG/1
1 TABLET ORAL EVERY 6 HOURS PRN
Qty: 30 TABLET | Refills: 0 | Status: SHIPPED | OUTPATIENT
Start: 2018-08-28 | End: 2018-08-29 | Stop reason: SDUPTHER

## 2018-08-28 NOTE — TELEPHONE ENCOUNTER
Pt has  rx for 5/325 hydrocodone w/ acephetmetacine it needs a prior auth - he has [employer]  rx'ed by Srinivasa Roland his  id [id number] phone [phone]

## 2018-08-28 NOTE — TELEPHONE ENCOUNTER
I did reorder the medicine, this ONE time  It looks like Ortho gave him enough for 7 days and he ran out early  I have refilled the amount they gave him  He should take  mg in between if needed

## 2018-08-29 DIAGNOSIS — M25.562 ACUTE PAIN OF LEFT KNEE: ICD-10-CM

## 2018-08-29 RX ORDER — HYDROCODONE BITARTRATE AND ACETAMINOPHEN 5; 325 MG/1; MG/1
1 TABLET ORAL EVERY 8 HOURS PRN
Qty: 30 TABLET | Refills: 0 | Status: SHIPPED | OUTPATIENT
Start: 2018-08-29 | End: 2018-09-10

## 2018-08-29 NOTE — TELEPHONE ENCOUNTER
Patient is calling back stating that the medication was not approved  He has been on the phone with his insurance and they are still needing additional information in order to approve it for him

## 2018-08-29 NOTE — TELEPHONE ENCOUNTER
Spoke w/ pharmacist at the [facility] who states that the PCP's office misread the form from the insurance company so as of now the medication has not been approved  He also stated that he deleted the scripts from our office and this is being handled primarily by the pt's PCP and this is still being worked on  Please advise the pt that his PCP is taking care of this and to contact them w/ any issues and we will see him at his appt on 8/10 w/ Dr Henna Horn  Thank you

## 2018-08-29 NOTE — TELEPHONE ENCOUNTER
LMOM for pt to call the office  It looks like his PCP was able to get pain medication approved for him  I just want to touch base w/ the pt

## 2018-08-30 ENCOUNTER — OFFICE VISIT (OUTPATIENT)
Dept: UROLOGY | Facility: AMBULATORY SURGERY CENTER | Age: 53
End: 2018-08-30
Payer: COMMERCIAL

## 2018-08-30 VITALS
BODY MASS INDEX: 30.11 KG/M2 | WEIGHT: 227.2 LBS | HEIGHT: 73 IN | SYSTOLIC BLOOD PRESSURE: 132 MMHG | DIASTOLIC BLOOD PRESSURE: 70 MMHG | HEART RATE: 74 BPM

## 2018-08-30 DIAGNOSIS — N52.9 ERECTILE DYSFUNCTION, UNSPECIFIED ERECTILE DYSFUNCTION TYPE: Primary | ICD-10-CM

## 2018-08-30 PROCEDURE — 99244 OFF/OP CNSLTJ NEW/EST MOD 40: CPT | Performed by: UROLOGY

## 2018-08-30 RX ORDER — SILDENAFIL CITRATE 20 MG/1
TABLET ORAL
Qty: 30 TABLET | Refills: 2 | Status: SHIPPED | OUTPATIENT
Start: 2018-08-30 | End: 2018-09-17

## 2018-08-30 RX ORDER — TADALAFIL 20 MG/1
20 TABLET ORAL DAILY PRN
Qty: 10 TABLET | Refills: 12 | Status: SHIPPED | OUTPATIENT
Start: 2018-08-30 | End: 2018-09-17

## 2018-08-30 NOTE — PROGRESS NOTES
8/30/2018    Syble Bodily  1965  [id number]    Discussion and Plan    Etiology of patient's testicular and lower pelvic discomfort unclear however symptoms have resolved completely at this time  Physical examination is otherwise unremarkable except for the presence of a small varicocele along with bilateral testicular atrophy  Etiology of his erectile dysfunction are reviewed at length including side effects of his existing medications  Risks and benefits of Cialis reviewed and script for 20 mg provided  He will otherwise return in 1 year with PSA prior to visit  He is to call office if symptoms recur  All questions answered at this time  1  Erectile dysfunction, unspecified erectile dysfunction type  - PSA Total, Diagnostic; Future  - tadalafil (CIALIS) 20 MG tablet; Take 1 tablet (20 mg total) by mouth daily as needed for erectile dysfunction  Dispense: 10 tablet;  Refill: 12    Assessment      Patient Active Problem List   Diagnosis    Dizziness    COPD (chronic obstructive pulmonary disease) ([facility] )    Ascending aortic aneurysm (HCC)    Bicuspid aortic valve    Adjustment reaction with anxiety and depression    Allergic rhinitis    GERD (gastroesophageal reflux disease)    Liver disease    Hiatal hernia    Encounter to establish care    Prediabetes    Lipid screening    CKD (chronic kidney disease) stage 3, GFR 30-59 ml/min    Hyperlipidemia, mixed    Weight gain    Fatigue    Generalized abdominal pain    Mild intermittent asthma without complication    Seasonal allergic rhinitis due to pollen    Numbness of fingers of both hands    Depression with anxiety    Abdominal bloating    Type 2 diabetes mellitus without complication, without long-term current use of insulin (HCC)    Vitamin D deficiency    Renal cyst, left    Hepatic cyst    Fatty liver disease, nonalcoholic    Erectile dysfunction    Bilateral carpal tunnel syndrome    Chronic pain of right knee    Vestibular migraine    Patellar tendinitis of right knee    Hamstring tightness of right lower extremity    Benign paroxysmal positional vertigo due to bilateral vestibular disorder    Pain in right hip    Hip impingement syndrome, right       History of Present Illness    Jessica Lea is a 46 y o  male seen today in regards to a history of bilateral groin and testicular pain  This apparently occurred shortly after having an MRI performed  He denies any associated urinary complaints noting fair flow with complete bladder emptying sensation and nocturia times 1-2  No history of urinary tract infection or hematuria  Notes the symptoms radiated slightly to the suprapubic region but have since resolved completely  No prior such episodes  CT scan and scrotal ultrasound was performed in the emergency room this demonstrated no significant urologic findings other than small epididymal cysts and a mild right varicocele  Testes were otherwise normal   Patient has a history of longstanding erectile dysfunction      Urinary Symptom Assessment        Past Medical History  Past Medical History:   Diagnosis Date    Aorta aneurysm ([address] )     4 3    Arm DVT (deep venous thromboembolism), acute ([address] ) 9486    complications of cardiac cath    Asthma     CKD (chronic kidney disease), stage III     COPD (chronic obstructive pulmonary disease) (HCC)     Depression     GERD (gastroesophageal reflux disease)     Headache     Hiatal hernia     Hyperlipidemia, mixed 5/7/2018    Liver disease     FATTY LIVER    Prediabetes     Renal calculi     Sleep apnea     Vestibular migraine        Past Social History  Past Surgical History:   Procedure Laterality Date    CARPAL TUNNEL RELEASE Left     COLONOSCOPY      FL INJECTION RIGHT HIP (ARTHROGRAM)  8/20/2018    FOOT SURGERY Left     FOREIGN BODY REMOVAL    HERNIA REPAIR      [place] COLONOSCOPY FLX DX W/COLLJ SPEC WHEN PFRMD N/A 8/7/2017    Procedure: COLONOSCOPY;  Surgeon: Harsha Bronson MD;  Location: [facility]; Service: Gastroenterology    MO ESOPHAGOGASTRODUODENOSCOPY TRANSORAL DIAGNOSTIC N/A 10/31/2017    Procedure: ESOPHAGOGASTRODUODENOSCOPY (EGD); Surgeon: Harsha Bronson MD;  Location: [facility]; Service: Gastroenterology       Past Family History  Family History   Problem Relation Age of Onset    Cancer Brother     Prostate cancer Brother     Leukemia Brother         his only brother dies from [address] or leukemia pt unsure he was only 46    Arthritis Mother     Heart attack Father     Clotting disorder Father     Schizoaffective Disorder  Sister     Aortic aneurysm Other         abdominal       Past Social history  Social History     Social History    Marital status:      Spouse name: N/A    Number of children: 2    Years of education: N/A     Occupational History    unemployed      Social History Main Topics    Smoking status: Former Smoker     Types: Cigars     Quit date: 8/7/2014    Smokeless tobacco: Never Used    Alcohol use No    Drug use: No    Sexual activity: Not on file     Other Topics Concern    Not on file     Social History Narrative    Caffeine use    Lives alone           Current Medications  Current Outpatient Prescriptions   Medication Sig Dispense Refill    albuterol (PROVENTIL HFA,VENTOLIN HFA) 90 mcg/act inhaler Inhale 2 puffs every 6 (six) hours as needed for wheezing        amitriptyline (ELAVIL) 25 mg tablet   0    Azelastine-Fluticasone 137-50 MCG/ACT SUSP 1 spray into each nostril      buPROPion (WELLBUTRIN) 75 mg tablet take 1 tablet by mouth twice a day 60 tablet 3    ergocalciferol (VITAMIN D2) 50,000 units Take 50,000 Units by mouth      fenofibrate (TRICOR) 145 mg tablet Take 145 mg by mouth daily      HYDROcodone-acetaminophen (NORCO) 5-325 mg per tablet Take 1 tablet by mouth every 8 (eight) hours as needed for pain for up to 30 days Max Daily Amount: 3 tablets 30 tablet 0    mometasone-formoterol (DULERA) 200-5 MCG/ACT inhaler Inhale 2 puffs 2 (two) times a day   montelukast (SINGULAIR) 10 mg tablet Take by mouth      pantoprazole (PROTONIX) 40 mg tablet Take 40 mg by mouth daily   SUMAtriptan (IMITREX) 100 mg tablet Take 1 tablet (100 mg total) by mouth once as needed for migraine for up to 1 dose 9 tablet 3    tadalafil (CIALIS) 20 MG tablet Take 1 tablet (20 mg total) by mouth daily as needed for erectile dysfunction 10 tablet 12     No current facility-administered medications for this visit  Allergies  No Known Allergies    Past Medical History, Social History, Family History, medications and allergies were reviewed  Review of Systems  Review of Systems   Constitutional: Negative  HENT: Negative  Eyes: Negative  Respiratory: Negative  Cardiovascular: Negative  Gastrointestinal: Negative  Endocrine: Negative  Genitourinary: Negative for decreased urine volume, difficulty urinating, hematuria and urgency  Musculoskeletal: Negative  Skin: Negative  Neurological: Negative  Hematological: Negative  Psychiatric/Behavioral: Negative  Vitals  Vitals:    08/30/18 0952   BP: 132/70   BP Location: Left arm   Patient Position: Sitting   Cuff Size: Adult   Pulse: 74   Weight: 103 kg (227 lb 3 2 oz)   Height: 6' 1" (1 854 m)         Physical Exam    Physical Exam   Constitutional: He is oriented to person, place, and time  He appears well-developed and well-nourished  HENT:   Head: Normocephalic and atraumatic  Eyes: Pupils are equal, round, and reactive to light  Neck: Normal range of motion  Cardiovascular: Normal rate, regular rhythm and normal heart sounds  Pulmonary/Chest: Effort normal and breath sounds normal  No accessory muscle usage  No respiratory distress  Abdominal: Soft  Normal appearance and bowel sounds are normal  There is no tenderness     Genitourinary: Prostate normal    Genitourinary Comments: Bilateral testicular atrophy noted with no masses appreciable  Mild right varicocele appreciated  Nontender  Musculoskeletal: Normal range of motion  Neurological: He is alert and oriented to person, place, and time  Skin: Skin is warm, dry and intact  Psychiatric: He has a normal mood and affect  His speech is normal  Cognition and memory are normal    Nursing note and vitals reviewed  Results    Below listed labs, pathology results, and radiology images were personally reviewed:    Lab Results   Component Value Date/Time    PSA 0 4 08/30/2017 01:52 PM     Lab Results   Component Value Date    GLUCOSE 92 03/17/2015    CALCIUM 9 1 05/07/2018     05/07/2018    K 4 2 05/07/2018    CO2 24 05/07/2018     05/07/2018    BUN 18 05/07/2018    CREATININE 1 56 (H) 05/07/2018     Lab Results   Component Value Date    WBC 6 13 05/07/2018    HGB 16 0 05/07/2018    HCT 47 4 05/07/2018    MCV 91 05/07/2018     05/07/2018       No results found for this or any previous visit (from the past 1 hour(s)) ]    CT ABDOMEN AND PELVIS WITHOUT IV CONTRAST     INDICATION:   lower abdominal pain/pelvic pain/recent joint injection      COMPARISON:  None      TECHNIQUE:  CT examination of the abdomen and pelvis was performed without intravenous contrast   Axial, sagittal, and coronal 2D reformatted images were created from the source data and submitted for interpretation       Radiation dose length product (DLP) for this visit:  819 mGy-cm     This examination, like all CT scans performed in the [facility], was performed utilizing techniques to minimize radiation dose exposure, including the use of iterative   reconstruction and automated exposure control       Enteric contrast was administered       FINDINGS:     ABDOMEN     LOWER CHEST:  No clinically significant abnormality identified in the visualized lower chest      LIVER/BILIARY TREE:  A rounded hypodensity seen in the left hepatic lobe which measures about 3 cm with attenuation of fluid, suggest cyst ,  Stable  Additional hypodensity seen in the segment 6 of the liver with attenuation of fluid suggest cyst   A too small to x-rays hypodensity seen in the lateral segment of the left hepatic lobe could statistically represent cyst      GALLBLADDER:  No calcified gallstones  No pericholecystic inflammatory change      SPLEEN:  Unremarkable      PANCREAS:  Unremarkable      ADRENAL GLANDS:  Unremarkable      KIDNEYS/URETERS:  A rounded hypodensity seen in the lower pole of the left kidney with attenuation of fluid suggest cyst      STOMACH AND BOWEL:  Diverticulosis seen  Hiatal hernia seen containing fat  APPENDIX:  No findings to suggest appendicitis      ABDOMINOPELVIC CAVITY:  No significant inguinal or pelvic sidewall lymph node enlargement seen  No significant retroperitoneal lymph node enlargement seen     VESSELS:  The visualized abdominal aorta is nonaneurysmal     PELVIS     REPRODUCTIVE ORGANS:  Unremarkable for patient's age      URINARY BLADDER:  Unremarkable      ABDOMINAL WALL/INGUINAL REGIONS:  Unremarkable      OSSEOUS STRUCTURES:  No acute fracture or destructive osseous lesion  Degenerative changes are seen within the hip joints  No hip effusion seen  IMPRESSION:     No acute inflammatory stranding seen  No evidence of bowel obstruction    SCROTAL ULTRASOUND     INDICATION:    testicular and groin pain 1 day  Groin pain which radiates into the scrotum (right greater than left)  Swelling      COMPARISON: None     TECHNIQUE:   Ultrasound the scrotal contents was performed with a high frequency linear transducer utilizing volumetric sweep imaging as well as standard still image techniques  Imaging performed in longitudinal and transverse orientation   Color and   spectral Doppler evaluation also performed bilaterally      FINDINGS:  TESTES:   Testes are symmetric and normal in size      RIGHT testis = 3 5 x 2 7 x 2 3 cm   Normal contour with homogeneous smooth echotexture  No intratesticular mass lesion or calcifications      LEFT testis = 3 2 x 3 0 x 2 2 cm  Normal contour with homogeneous smooth echotexture  No intratesticular mass lesion or calcifications      Doppler flow within both testes is present and symmetric      EPIDIDYMIDES:   Right epididymal head measures 1 0 x 1 3 x 1 2 cm  Right side epididymal head cyst or spermatocele measuring 5 mm      Left epididymal head measures 2 7 x 2 4 x 1 5 cm  Left side epididymal head cyst or spermatocele measuring up to 2 2 cm  Doppler ultrasound demonstrates normal blood flow  No epididymal lesions      HYDROCELE:  Small mildly complex right side hydrocele  Small mildly complex left side hydrocele      VARICOCELE:  Right side scrotal veins measuring up to 2 5 mm with Valsalva      SCROTUM:  Scrotal thickness and appearance within normal limits  No evidence for extratesticular mass or hernia demonstrated      Small fatty lymph node observed in the right groin at the site of pain measuring only 1 2 x 1 0 x 0 7 cm         IMPRESSION:  Normal testicles  No evidence for testicular torsion, orchitis, or testicular mass      Bilateral epididymal head cysts or spermatoceles  No sonographic evidence for epididymitis      Small bilateral mildly complex hydroceles      Borderline right side varicocele (scrotal veins with maximal diameter of 2 5 mm with Valsalva)      Small fatty lymph node in the right groin    Otherwise no hernia or adenopathy seen      Workstation performed: [id number]      Imaging     US scrotum and testicles (Order #[id number]) on 8/21/2018 - Imaging Information

## 2018-09-07 ENCOUNTER — TELEPHONE (OUTPATIENT)
Dept: UROLOGY | Facility: AMBULATORY SURGERY CENTER | Age: 53
End: 2018-09-07

## 2018-09-07 NOTE — TELEPHONE ENCOUNTER
[place] [employer] [place] signed medical record release for records for 2018-present  Records faxed to [phone]   Request to be scanned

## 2018-09-10 ENCOUNTER — OFFICE VISIT (OUTPATIENT)
Dept: OBGYN CLINIC | Facility: MEDICAL CENTER | Age: 53
End: 2018-09-10
Payer: COMMERCIAL

## 2018-09-10 ENCOUNTER — TELEPHONE (OUTPATIENT)
Dept: OBGYN CLINIC | Facility: HOSPITAL | Age: 53
End: 2018-09-10

## 2018-09-10 VITALS — BODY MASS INDEX: 30.51 KG/M2 | HEIGHT: 73 IN | WEIGHT: 230.2 LBS

## 2018-09-10 DIAGNOSIS — M25.851 HIP IMPINGEMENT SYNDROME, RIGHT: Primary | ICD-10-CM

## 2018-09-10 DIAGNOSIS — M25.551 PAIN IN RIGHT HIP: ICD-10-CM

## 2018-09-10 DIAGNOSIS — G89.29 CHRONIC PAIN OF RIGHT KNEE: ICD-10-CM

## 2018-09-10 DIAGNOSIS — M25.561 CHRONIC PAIN OF RIGHT KNEE: ICD-10-CM

## 2018-09-10 PROCEDURE — 99214 OFFICE O/P EST MOD 30 MIN: CPT | Performed by: FAMILY MEDICINE

## 2018-09-10 RX ORDER — TRAMADOL HYDROCHLORIDE 50 MG/1
50 TABLET ORAL EVERY 8 HOURS PRN
Qty: 90 TABLET | Refills: 0 | Status: SHIPPED | OUTPATIENT
Start: 2018-09-10 | End: 2018-09-17

## 2018-09-10 NOTE — TELEPHONE ENCOUNTER
Needs prior auth  Phone number to do this is 1 [phone] ([employer])  Pt's ID# is [id number]

## 2018-09-10 NOTE — TELEPHONE ENCOUNTER
Spoke w/ pt and told him I did fax the request to his insurance and I will get back to him when I hear back  Also he wanted to know what to do in the meantime and per Dr Luis Loredo he can take OTC Nsaids for pain relief

## 2018-09-10 NOTE — PROGRESS NOTES
Assessment:     1  Hip impingement syndrome, right    2  Chronic pain of right knee    3  Pain in right hip        Plan:     Problem List Items Addressed This Visit     Chronic pain of right knee    Pain in right hip    Hip impingement syndrome, right - Primary         Subjective:     Patient ID: Ying Flores is a 46 y o  male  Chief Complaint:  Patient presents today for follow-up of right hip MRI with pain described as sharp, stabbing pain in the right groin  He has been unable to refill his tramadol prescription due to insurance authorization  He has been using ibuprofen which provides no relief  Pain continues as a sharp, stabbing pain made worse during sex  He reports no alleviating factors  He denies any numbness or tingling  He denies any warmth or crepitus  He denies any hip snapping or clicking  Allergy:  No Known Allergies  Medications:  all current active meds have been reviewed  Past Medical History:  Past Medical History:   Diagnosis Date    Aorta aneurysm ([facility] [address] )     4 3    Arm DVT (deep venous thromboembolism), acute ([facility] [address] ) 7674    complications of cardiac cath    Asthma     CKD (chronic kidney disease), stage III     COPD (chronic obstructive pulmonary disease) (HCC)     Depression     GERD (gastroesophageal reflux disease)     Headache     Hiatal hernia     Hyperlipidemia, mixed 5/7/2018    Liver disease     FATTY LIVER    Prediabetes     Renal calculi     Sleep apnea     Vestibular migraine      Past Surgical History:  Past Surgical History:   Procedure Laterality Date    CARPAL TUNNEL RELEASE Left     COLONOSCOPY      FL INJECTION RIGHT HIP (ARTHROGRAM)  8/20/2018    FOOT SURGERY Left     FOREIGN BODY REMOVAL    HERNIA REPAIR      [place] COLONOSCOPY FLX DX W/COLLJ SPEC WHEN PFRMD N/A 8/7/2017    Procedure: COLONOSCOPY;  Surgeon: Tate Rai MD;  Location: [facility];   Service: Gastroenterology    GA ESOPHAGOGASTRODUODENOSCOPY TRANSORAL DIAGNOSTIC N/A 10/31/2017 Procedure: ESOPHAGOGASTRODUODENOSCOPY (EGD); Surgeon: Lobo Meier MD;  Location: [facility]; Service: Gastroenterology     Family History:  Family History   Problem Relation Age of Onset    Cancer Brother     Prostate cancer Brother     Leukemia Brother         his only brother dies from [address] or leukemia pt unsure he was only 46    Arthritis Mother     Heart attack Father     Clotting disorder Father     Schizoaffective Disorder  Sister     Aortic aneurysm Other         abdominal     Social History:  History   Alcohol Use No     History   Drug Use No     History   Smoking Status    Former Smoker    Types: Cigars    Quit date: 8/7/2014   Smokeless Tobacco    Never Used     Review of Systems   Constitutional: Negative  HENT: Negative  Eyes: Negative  Respiratory: Negative  Cardiovascular: Negative  Gastrointestinal: Negative  Genitourinary: Negative  Musculoskeletal: Positive for arthralgias and myalgias  Skin: Negative  Allergic/Immunologic: Negative  Neurological: Negative  Hematological: Negative  Psychiatric/Behavioral: Negative  Objective:  BP Readings from Last 1 Encounters:   08/30/18 132/70      Wt Readings from Last 1 Encounters:   09/10/18 104 kg (230 lb 3 2 oz)      BMI:   Estimated body mass index is 30 37 kg/m² as calculated from the following:    Height as of this encounter: 6' 1" (1 854 m)  Weight as of this encounter: 104 kg (230 lb 3 2 oz)  BSA:   Estimated body surface area is 2 28 meters squared as calculated from the following:    Height as of this encounter: 6' 1" (1 854 m)  Weight as of this encounter: 104 kg (230 lb 3 2 oz)  Physical Exam   Constitutional: He appears well-developed  Eyes: Pupils are equal, round, and reactive to light  Neck: Normal range of motion  Pulmonary/Chest: Effort normal    Musculoskeletal: Normal range of motion  Right knee: He exhibits no effusion  Neurological: He is alert  Skin: Skin is warm  Psychiatric: He has a normal mood and affect  Right Knee Exam     Tenderness   The patient is experiencing tenderness in the medial joint line and lateral joint line  Range of Motion   Extension: normal   Flexion: normal     Tests   Zachary:  Medial - negative Lateral - negative  Lachman:  Anterior - negative    Posterior - negative  Drawer:       Anterior - negative    Posterior - negative  Varus: negative  Valgus: negative  Pivot Shift: negative  Patellar Apprehension: negative    Other   Erythema: absent  Pulse: present  Swelling: mild  Other tests: no effusion present      Left Knee Exam   Left knee exam is normal     Tenderness   The patient is experiencing no tenderness  Range of Motion   The patient has normal left knee ROM  Muscle Strength     The patient has normal left knee strength  Right Hip Exam     Tenderness   The patient is experiencing tenderness in the anterior  Range of Motion   Internal Rotation: 20   External Rotation: 20     Muscle Strength   Abduction: 4/5   Adduction: 4/5   Flexion: 4/5     Tests   ALICIA: positive    Other   Erythema: absent  Sensation: normal  Pulse: present      Left Hip Exam     Tenderness   The patient is experiencing no tenderness  Range of Motion   Internal Rotation: 20   External Rotation: 20     Muscle Strength   Abduction: 5/5   Adduction: 5/5   Flexion: 5/5     Tests   ALICIA: negative    Other   Erythema: absent  Sensation: normal  Pulse: present            I have personally reviewed pertinent films in PACS  Moderate to severe chondrosis of the right hip  Degenerative tear through the right labrum

## 2018-09-10 NOTE — TELEPHONE ENCOUNTER
Caller: patient  Callback# [phone]  Dr Matheus Jim        Patient called stated pharmacy need more information for Tramadol ?  Patient didn't know what information needed please advise thanks

## 2018-09-11 NOTE — TELEPHONE ENCOUNTER
Spoke w/ pt and informed him that the insurance has denied this request  [facility] him to continue using OTC Nsaids prn until his appt with pain management on 9/17/2018  I will scan denial letter into the chart

## 2018-09-14 DIAGNOSIS — E55.9 VITAMIN D DEFICIENCY: ICD-10-CM

## 2018-09-17 ENCOUNTER — CONSULT (OUTPATIENT)
Dept: PAIN MEDICINE | Facility: CLINIC | Age: 53
End: 2018-09-17
Payer: COMMERCIAL

## 2018-09-17 VITALS
WEIGHT: 233 LBS | DIASTOLIC BLOOD PRESSURE: 82 MMHG | BODY MASS INDEX: 30.88 KG/M2 | HEART RATE: 67 BPM | HEIGHT: 73 IN | SYSTOLIC BLOOD PRESSURE: 131 MMHG

## 2018-09-17 DIAGNOSIS — G89.29 CHRONIC PAIN OF RIGHT KNEE: ICD-10-CM

## 2018-09-17 DIAGNOSIS — M25.561 CHRONIC PAIN OF RIGHT KNEE: ICD-10-CM

## 2018-09-17 DIAGNOSIS — M16.11 PRIMARY OSTEOARTHRITIS OF RIGHT HIP: Primary | ICD-10-CM

## 2018-09-17 PROCEDURE — 99204 OFFICE O/P NEW MOD 45 MIN: CPT | Performed by: ANESTHESIOLOGY

## 2018-09-17 RX ORDER — IBUPROFEN 800 MG/1
800 TABLET ORAL EVERY 4 HOURS PRN
COMMUNITY
End: 2018-10-01 | Stop reason: ALTCHOICE

## 2018-09-17 RX ORDER — ERGOCALCIFEROL 1.25 MG/1
CAPSULE ORAL
Qty: 4 CAPSULE | Refills: 3 | Status: SHIPPED | OUTPATIENT
Start: 2018-09-17 | End: 2018-09-17

## 2018-09-17 NOTE — PROGRESS NOTES
Assessment:  1  Primary osteoarthritis of right hip - Right  FL spine and pain procedure   2  Chronic pain of right knee - Right         Plan: This is a 50-year-old male who presents today for initial consultation for management of right-sided hip and knee pain  Patient has completed PT and is on NSAIDS without much pain  MRI of the right hip demonstrates degenerative arthritis  Xray of the right knee also shows evidence of osteoarthritis  At this time, I recommend  a trial of a right hip injection under fluoroscopy with cortisone to help relieve inflammation  In addition, I will also perform a right knee injection at a later time to help relieve right knee pain  He verbalizes understanding  Complete risks and benefits including bleeding, infection, tissue reaction, nerve injury and allergic reaction were discussed  The approach was demonstrated using models and literature was provided  Verbal and written consent was obtained  My impressions and treatment recommendations were discussed in detail with the patient who verbalized understanding and had no further questions  Discharge instructions were provided  I personally saw and examined the patient and I agree with the above discussed plan of care  New Medications Ordered This Visit   Medications    ibuprofen (MOTRIN) 800 mg tablet     Sig: Take 800 mg by mouth every 4 (four) hours as needed for mild pain       History of Present Illness:    Epifanio Lua is a 46 y o  male who presents with right knee and hip pain  Today, patient reports severe right-sided hip and knee pain  His pain is nearly constant, with no typical pattern  Patient further describes pain as sharp, shooting, cutting in nature  Patient states his pain is aggravated with prolonged standing, bending, sitting, walking  He states that he has done 4 weeks of PT w/o much pain relief  He states that he is on nsaids as needed   He states that he has imaging studies of the knee and the hip which demonstrated degenerative arthritis and labrum tear  He states that he was referred to me for an injection  Pain is rated 10/10  He had hip MRI of the hip shows Moderate to severe arthritis/chondrosis of the right hip  I have personally reviewed and/or updated the patient's past medical history, past surgical history, family history, social history, current medications, allergies, and vital signs today  Review of Systems:    Review of Systems   Constitutional: Negative for fever and unexpected weight change  HENT: Negative for trouble swallowing  Eyes: Negative for visual disturbance  Respiratory: Negative for shortness of breath and wheezing  Cardiovascular: Negative for chest pain and palpitations  Gastrointestinal: Negative for constipation, diarrhea, nausea and vomiting  Endocrine: Negative for cold intolerance, heat intolerance and polydipsia  Genitourinary: Negative for difficulty urinating and frequency  Musculoskeletal: Negative for arthralgias, gait problem, joint swelling and myalgias  Skin: Negative for rash  Neurological: Negative for dizziness, seizures, syncope, weakness and headaches  Hematological: Does not bruise/bleed easily  Psychiatric/Behavioral: Negative for dysphoric mood  All other systems reviewed and are negative        Patient Active Problem List   Diagnosis    Dizziness    COPD (chronic obstructive pulmonary disease) ([address] )    Ascending aortic aneurysm (HCC)    Bicuspid aortic valve    Adjustment reaction with anxiety and depression    Allergic rhinitis    GERD (gastroesophageal reflux disease)    Liver disease    Hiatal hernia    Encounter to establish care    Prediabetes    Lipid screening    CKD (chronic kidney disease) stage 3, GFR 30-59 ml/min    Hyperlipidemia, mixed    Weight gain    Fatigue    Generalized abdominal pain    Mild intermittent asthma without complication    Seasonal allergic rhinitis due to pollen   [facility] Numbness of fingers of both hands    Depression with anxiety    Abdominal bloating    Type 2 diabetes mellitus without complication, without long-term current use of insulin (HCC)    Vitamin D deficiency    Renal cyst, left    Hepatic cyst    Fatty liver disease, nonalcoholic    Erectile dysfunction    Bilateral carpal tunnel syndrome    Chronic pain of right knee    Vestibular migraine    Patellar tendinitis of right knee    Hamstring tightness of right lower extremity    Benign paroxysmal positional vertigo due to bilateral vestibular disorder    Pain in right hip    Hip impingement syndrome, right       Past Medical History:   Diagnosis Date    Aorta aneurysm (HCC)     4 3    Arm DVT (deep venous thromboembolism), acute ([facility] [address] ) 6223    complications of cardiac cath    Asthma     CKD (chronic kidney disease), stage III     COPD (chronic obstructive pulmonary disease) (HCC)     Depression     GERD (gastroesophageal reflux disease)     Headache     Hiatal hernia     Hyperlipidemia, mixed 5/7/2018    Liver disease     FATTY LIVER    Prediabetes     Renal calculi     Sleep apnea     Vestibular migraine        Past Surgical History:   Procedure Laterality Date    CARPAL TUNNEL RELEASE Left     COLONOSCOPY      FL INJECTION RIGHT HIP (ARTHROGRAM)  8/20/2018    FOOT SURGERY Left     FOREIGN BODY REMOVAL    HERNIA REPAIR      [place] COLONOSCOPY FLX DX W/COLLJ SPEC WHEN PFRMD N/A 8/7/2017    Procedure: COLONOSCOPY;  Surgeon: Mony Mcduffie MD;  Location: [facility]; Service: Gastroenterology    [place] ESOPHAGOGASTRODUODENOSCOPY TRANSORAL DIAGNOSTIC N/A 10/31/2017    Procedure: ESOPHAGOGASTRODUODENOSCOPY (EGD); Surgeon: Mony Mcduffie MD;  Location: [facility];   Service: Gastroenterology       Family History   Problem Relation Age of Onset    Cancer Brother     Prostate cancer Brother     Leukemia Brother         his only brother dies from lymphoma or leukemia pt unsure he was only 47  Arthritis Mother     Heart attack Father     Clotting disorder Father     Schizoaffective Disorder  Sister     Aortic aneurysm Other         abdominal       Social History     Occupational History    unemployed      Social History Main Topics    Smoking status: Former Smoker     Types: Cigars     Quit date: 8/7/2014    Smokeless tobacco: Never Used    Alcohol use Yes      Comment: occasional    Drug use: No    Sexual activity: Not on file       Current Outpatient Prescriptions on File Prior to Visit   Medication Sig    albuterol (PROVENTIL HFA,VENTOLIN HFA) 90 mcg/act inhaler Inhale 2 puffs every 6 (six) hours as needed for wheezing   amitriptyline (ELAVIL) 25 mg tablet     Azelastine-Fluticasone 137-50 MCG/ACT SUSP 1 spray into each nostril    buPROPion (WELLBUTRIN) 75 mg tablet take 1 tablet by mouth twice a day    ergocalciferol (VITAMIN D2) 50,000 units Take 50,000 Units by mouth    fenofibrate (TRICOR) 145 mg tablet Take 145 mg by mouth daily    mometasone-formoterol (DULERA) 200-5 MCG/ACT inhaler Inhale 2 puffs 2 (two) times a day   montelukast (SINGULAIR) 10 mg tablet Take by mouth    pantoprazole (PROTONIX) 40 mg tablet Take 40 mg by mouth daily   SUMAtriptan (IMITREX) 100 mg tablet Take 1 tablet (100 mg total) by mouth once as needed for migraine for up to 1 dose    [DISCONTINUED] ergocalciferol (VITAMIN D2) 50,000 units take 1 capsule by mouth every week    [DISCONTINUED] sildenafil (REVATIO) 20 mg tablet TAKE 1 TABLET EVERY DAY AS DIRECTED    [DISCONTINUED] tadalafil (CIALIS) 20 MG tablet Take 1 tablet (20 mg total) by mouth daily as needed for erectile dysfunction    [DISCONTINUED] traMADol (ULTRAM) 50 mg tablet Take 1 tablet (50 mg total) by mouth every 8 (eight) hours as needed for moderate pain or severe pain     No current facility-administered medications on file prior to visit          No Known Allergies    Physical Exam:    /82   Pulse 67   Ht 6' 1" (1 854 m)   Wt 106 kg (233 lb)   BMI 30 74 kg/m²     Constitutional: normal, well developed, well nourished, alert, in no distress and non-toxic and no overt pain behavior  Eyes: anicteric  HEENT: grossly intact  Neck: supple, symmetric, trachea midline and no masses   Pulmonary:even and unlabored  Cardiovascular:No edema or pitting edema present  Skin:Normal without rashes or lesions and well hydrated  Psychiatric:Mood and affect appropriate  Neurologic:Cranial Nerves II-XII grossly intact  Musculoskeletal:   Pain with internal rotation of the right hip  Imaging  MRI ARTHROGRAM RIGHT HIP     INDICATION:   M25 551: Pain in right hip  M25 552: Pain in left hip  M76 51: Patellar tendinitis, right knee      COMPARISON: X-ray 8/6/2018     TECHNIQUE:  MR sequences were obtained of the right hip and pelvis including: Localizer, coronal pelvis T1, coronal pelvis T2 fat sat  Smaller field of view sequences of the affected hip were obtained with axial oblique T1 fat sat, axial oblique T2 fat   sat, coronal T1 fat sat, sagittal T2 fat sat, sagittal T1 fat sat  Images were acquired on a 1 5 Jeanie unit  Images were acquired after intra-articular injection of gadolinium utilizing direct MR arthrography technique      FINDINGS:     RIGHT HIP:     -JOINT EFFUSION: There is good distention of the right hip joint with injected contrast      -BONES: Normal marrow signal demonstrated without hip fracture or AVN     -ARTICULAR SURFACES: There is full-thickness loss of cartilage at the superior femoral head and superior lateral acetabulum     -ACETABULAR LABRUM: Tearing is seen throughout the labrum with posterior para labral cysts     -TROCHANTERIC BURSA: Normal      LEFT HIP: (please note dedicated small field of view images were not made of the left hip joint limiting its evaluation )      -JOINT EFFUSION: None      -BONES: Normal marrow signal demonstrated without hip fracture or AVN        -ARTICULAR SURFACES: There is mild osteoarthritis      -ACETABULAR LABRUM: No gross abnormalities although evaluation is very limited      -TROCHANTERIC BURSA: Normal      REST OF PELVIS:     -BONES: Normal      -SI JOINTS AND SYMPHYSIS PUBIS:  Intact      -VISUALIZED LUMBAR SPINE:  unremarkable      -MUSCULATURE: Intact with intact hamstring origins bilaterally      -PELVIC SOFT TISSUES: Normal      SUBCUTANEOUS TISSUES: Normal     IMPRESSION:     1  Moderate to severe chondrosis of the right hip  2    Degenerative tearing throughout the right labrum

## 2018-09-20 DIAGNOSIS — K21.9 GASTROESOPHAGEAL REFLUX DISEASE WITHOUT ESOPHAGITIS: Primary | ICD-10-CM

## 2018-09-20 RX ORDER — PANTOPRAZOLE SODIUM 40 MG/1
40 TABLET, DELAYED RELEASE ORAL DAILY
Qty: 30 TABLET | Refills: 5 | Status: SHIPPED | OUTPATIENT
Start: 2018-09-20 | End: 2019-01-25

## 2018-09-26 ENCOUNTER — APPOINTMENT (OUTPATIENT)
Dept: LAB | Facility: MEDICAL CENTER | Age: 53
End: 2018-09-26
Payer: COMMERCIAL

## 2018-09-26 ENCOUNTER — HOSPITAL ENCOUNTER (OUTPATIENT)
Dept: NEUROLOGY | Facility: AMBULATORY SURGERY CENTER | Age: 53
Discharge: HOME | End: 2018-09-26
Payer: COMMERCIAL

## 2018-09-26 DIAGNOSIS — R20.0 NUMBNESS OF FINGERS OF BOTH HANDS: ICD-10-CM

## 2018-09-26 DIAGNOSIS — R10.84 GENERALIZED ABDOMINAL PAIN: ICD-10-CM

## 2018-09-26 DIAGNOSIS — E55.9 VITAMIN D DEFICIENCY: ICD-10-CM

## 2018-09-26 DIAGNOSIS — R14.0 ABDOMINAL BLOATING: ICD-10-CM

## 2018-09-26 DIAGNOSIS — N18.30 CKD (CHRONIC KIDNEY DISEASE) STAGE 3, GFR 30-59 ML/MIN (HCC): ICD-10-CM

## 2018-09-26 DIAGNOSIS — K76.0 FATTY LIVER DISEASE, NONALCOHOLIC: ICD-10-CM

## 2018-09-26 DIAGNOSIS — E11.9 TYPE 2 DIABETES MELLITUS WITHOUT COMPLICATION, WITHOUT LONG-TERM CURRENT USE OF INSULIN (HCC): ICD-10-CM

## 2018-09-26 DIAGNOSIS — J44.9 CHRONIC OBSTRUCTIVE PULMONARY DISEASE, UNSPECIFIED COPD TYPE (HCC): ICD-10-CM

## 2018-09-26 DIAGNOSIS — K21.9 GERD WITHOUT ESOPHAGITIS: ICD-10-CM

## 2018-09-26 DIAGNOSIS — F41.8 DEPRESSION WITH ANXIETY: ICD-10-CM

## 2018-09-26 DIAGNOSIS — K76.0 FATTY (CHANGE OF) LIVER, NOT ELSEWHERE CLASSIFIED: ICD-10-CM

## 2018-09-26 DIAGNOSIS — J45.20 MILD INTERMITTENT ASTHMA WITHOUT COMPLICATION: ICD-10-CM

## 2018-09-26 DIAGNOSIS — E78.2 HYPERLIPIDEMIA, MIXED: ICD-10-CM

## 2018-09-26 DIAGNOSIS — I71.2 ASCENDING AORTIC ANEURYSM (HCC): ICD-10-CM

## 2018-09-26 PROBLEM — G56.01 CARPAL TUNNEL SYNDROME OF RIGHT WRIST: Status: ACTIVE | Noted: 2018-05-15

## 2018-09-26 LAB
25(OH)D3 SERPL-MCNC: 47.7 NG/ML (ref 30–100)
ALBUMIN SERPL BCP-MCNC: 3.7 G/DL (ref 3.5–5)
ALP SERPL-CCNC: 50 U/L (ref 46–116)
ALT SERPL W P-5'-P-CCNC: 36 U/L (ref 12–78)
ANION GAP SERPL CALCULATED.3IONS-SCNC: 8 MMOL/L (ref 4–13)
AST SERPL W P-5'-P-CCNC: 27 U/L (ref 5–45)
BASOPHILS # BLD AUTO: 0.04 THOUSANDS/ΜL (ref 0–0.1)
BASOPHILS NFR BLD AUTO: 1 % (ref 0–1)
BILIRUB DIRECT SERPL-MCNC: 0.07 MG/DL (ref 0–0.2)
BILIRUB SERPL-MCNC: 0.31 MG/DL (ref 0.2–1)
BILIRUB UR QL STRIP: NEGATIVE
BUN SERPL-MCNC: 16 MG/DL (ref 5–25)
CALCIUM SERPL-MCNC: 9.2 MG/DL (ref 8.3–10.1)
CHLORIDE SERPL-SCNC: 110 MMOL/L (ref 100–108)
CHOLEST SERPL-MCNC: 127 MG/DL (ref 50–200)
CLARITY UR: CLEAR
CO2 SERPL-SCNC: 25 MMOL/L (ref 21–32)
COLOR UR: YELLOW
CREAT SERPL-MCNC: 1.5 MG/DL (ref 0.6–1.3)
CREAT UR-MCNC: 212 MG/DL
EOSINOPHIL # BLD AUTO: 0.1 THOUSAND/ΜL (ref 0–0.61)
EOSINOPHIL NFR BLD AUTO: 2 % (ref 0–6)
ERYTHROCYTE [DISTWIDTH] IN BLOOD BY AUTOMATED COUNT: 13 % (ref 11.6–15.1)
EST. AVERAGE GLUCOSE BLD GHB EST-MCNC: 123 MG/DL
GFR SERPL CREATININE-BSD FRML MDRD: 52 ML/MIN/1.73SQ M
GLUCOSE P FAST SERPL-MCNC: 110 MG/DL (ref 65–99)
GLUCOSE UR STRIP-MCNC: NEGATIVE MG/DL
HBA1C MFR BLD: 5.9 % (ref 4.2–6.3)
HCT VFR BLD AUTO: 47.8 % (ref 36.5–49.3)
HDLC SERPL-MCNC: 33 MG/DL (ref 40–60)
HGB BLD-MCNC: 15.4 G/DL (ref 12–17)
HGB UR QL STRIP.AUTO: NEGATIVE
IMM GRANULOCYTES # BLD AUTO: 0.02 THOUSAND/UL (ref 0–0.2)
IMM GRANULOCYTES NFR BLD AUTO: 0 % (ref 0–2)
KETONES UR STRIP-MCNC: NEGATIVE MG/DL
LDLC SERPL CALC-MCNC: 53 MG/DL (ref 0–100)
LEUKOCYTE ESTERASE UR QL STRIP: NEGATIVE
LYMPHOCYTES # BLD AUTO: 1.6 THOUSANDS/ΜL (ref 0.6–4.47)
LYMPHOCYTES NFR BLD AUTO: 30 % (ref 14–44)
MCH RBC QN AUTO: 30.9 PG (ref 26.8–34.3)
MCHC RBC AUTO-ENTMCNC: 32.2 G/DL (ref 31.4–37.4)
MCV RBC AUTO: 96 FL (ref 82–98)
MONOCYTES # BLD AUTO: 0.59 THOUSAND/ΜL (ref 0.17–1.22)
MONOCYTES NFR BLD AUTO: 11 % (ref 4–12)
NEUTROPHILS # BLD AUTO: 2.99 THOUSANDS/ΜL (ref 1.85–7.62)
NEUTS SEG NFR BLD AUTO: 56 % (ref 43–75)
NITRITE UR QL STRIP: NEGATIVE
NONHDLC SERPL-MCNC: 94 MG/DL
NRBC BLD AUTO-RTO: 0 /100 WBCS
PH UR STRIP.AUTO: 6 [PH] (ref 4.5–8)
PHOSPHATE SERPL-MCNC: 1.9 MG/DL (ref 2.7–4.5)
PLATELET # BLD AUTO: 205 THOUSANDS/UL (ref 149–390)
PMV BLD AUTO: 11.3 FL (ref 8.9–12.7)
POTASSIUM SERPL-SCNC: 4.3 MMOL/L (ref 3.5–5.3)
PROT SERPL-MCNC: 6.9 G/DL (ref 6.4–8.2)
PROT UR STRIP-MCNC: NEGATIVE MG/DL
PROT UR-MCNC: 11 MG/DL
PROT/CREAT UR: 0.05 MG/G{CREAT} (ref 0–0.1)
PTH-INTACT SERPL-MCNC: 66.4 PG/ML (ref 18.4–80.1)
RBC # BLD AUTO: 4.98 MILLION/UL (ref 3.88–5.62)
SODIUM SERPL-SCNC: 143 MMOL/L (ref 136–145)
SP GR UR STRIP.AUTO: 1.02 (ref 1–1.03)
TRIGL SERPL-MCNC: 203 MG/DL
UROBILINOGEN UR QL STRIP.AUTO: 1 E.U./DL
WBC # BLD AUTO: 5.34 THOUSAND/UL (ref 4.31–10.16)

## 2018-09-26 PROCEDURE — 82306 VITAMIN D 25 HYDROXY: CPT

## 2018-09-26 PROCEDURE — 80061 LIPID PANEL: CPT

## 2018-09-26 PROCEDURE — 95912 NRV CNDJ TEST 11-12 STUDIES: CPT | Performed by: PSYCHIATRY & NEUROLOGY

## 2018-09-26 PROCEDURE — 95886 MUSC TEST DONE W/N TEST COMP: CPT | Performed by: PSYCHIATRY & NEUROLOGY

## 2018-09-26 PROCEDURE — 83036 HEMOGLOBIN GLYCOSYLATED A1C: CPT

## 2018-09-26 PROCEDURE — 85025 COMPLETE CBC W/AUTO DIFF WBC: CPT

## 2018-09-26 PROCEDURE — 84156 ASSAY OF PROTEIN URINE: CPT | Performed by: INTERNAL MEDICINE

## 2018-09-26 PROCEDURE — 82248 BILIRUBIN DIRECT: CPT

## 2018-09-26 PROCEDURE — 80053 COMPREHEN METABOLIC PANEL: CPT

## 2018-09-26 PROCEDURE — 84100 ASSAY OF PHOSPHORUS: CPT

## 2018-09-26 PROCEDURE — 95912 NRV CNDJ TEST 11-12 STUDIES: CPT

## 2018-09-26 PROCEDURE — 83970 ASSAY OF PARATHORMONE: CPT

## 2018-09-26 PROCEDURE — 95886 MUSC TEST DONE W/N TEST COMP: CPT

## 2018-09-26 PROCEDURE — 36415 COLL VENOUS BLD VENIPUNCTURE: CPT

## 2018-09-26 PROCEDURE — 81003 URINALYSIS AUTO W/O SCOPE: CPT | Performed by: INTERNAL MEDICINE

## 2018-09-26 PROCEDURE — 82570 ASSAY OF URINE CREATININE: CPT | Performed by: INTERNAL MEDICINE

## 2018-10-01 ENCOUNTER — OFFICE VISIT (OUTPATIENT)
Dept: NEUROLOGY | Facility: CLINIC | Age: 53
End: 2018-10-01
Payer: COMMERCIAL

## 2018-10-01 ENCOUNTER — APPOINTMENT (OUTPATIENT)
Dept: RADIOLOGY | Facility: CLINIC | Age: 53
End: 2018-10-01
Payer: COMMERCIAL

## 2018-10-01 VITALS
BODY MASS INDEX: 30.48 KG/M2 | WEIGHT: 230 LBS | HEART RATE: 58 BPM | SYSTOLIC BLOOD PRESSURE: 136 MMHG | DIASTOLIC BLOOD PRESSURE: 86 MMHG | HEIGHT: 73 IN

## 2018-10-01 DIAGNOSIS — G43.809 VESTIBULAR MIGRAINE: Primary | ICD-10-CM

## 2018-10-01 DIAGNOSIS — G57.51 TARSAL TUNNEL SYNDROME OF RIGHT SIDE: ICD-10-CM

## 2018-10-01 DIAGNOSIS — S39.012A LUMBOSACRAL STRAIN, INITIAL ENCOUNTER: ICD-10-CM

## 2018-10-01 PROCEDURE — 99214 OFFICE O/P EST MOD 30 MIN: CPT | Performed by: PSYCHIATRY & NEUROLOGY

## 2018-10-01 PROCEDURE — 72110 X-RAY EXAM L-2 SPINE 4/>VWS: CPT

## 2018-10-01 RX ORDER — FENOFIBRATE 145 MG/1
TABLET, COATED ORAL
Qty: 30 TABLET | Refills: 6 | OUTPATIENT
Start: 2018-10-01

## 2018-10-01 RX ORDER — METHOCARBAMOL 750 MG/1
750 TABLET, FILM COATED ORAL 3 TIMES DAILY PRN
Qty: 90 TABLET | Refills: 1 | Status: SHIPPED | OUTPATIENT
Start: 2018-10-01 | End: 2018-10-26

## 2018-10-01 RX ORDER — MELOXICAM 15 MG/1
15 TABLET ORAL DAILY
Qty: 30 TABLET | Refills: 1 | Status: SHIPPED | OUTPATIENT
Start: 2018-10-01 | End: 2018-12-14 | Stop reason: SDUPTHER

## 2018-10-01 NOTE — PROGRESS NOTES
Progress Note - Neurology   Melinda Antonio 46 y o  male MRN: [id number]  Unit/Bed#:  Encounter: [id number]      Subjective:   Patient is here for a follow-up visit with a history of vestibular migraine which is generally under decent control with the help of amitriptyline 25 mg at bedtime and sumatriptan on a p r n  basis  Patient has been having significant difficulty with his gait, as well as pain in the right hip, radiating to the right groin and describes low back pain  He has been followed up with orthopedics as well as pain management and is scheduled to have an injection in the right hip and the right knee in the near future  Patient also has been detected muscle tunnel syndrome with tingling numbness and burning pain along the lateral foot had EMG studies done in January of this year which confirmed right tarsal tunnel syndrome with no evidence of any lumbar radiculopathy  Patient has been having significant pain and has been using 100 ibuprofens in a week  He also has a history of renal disease, and is advised to discontinue ibuprofen at this time  Patient denies any bladder bowel symptoms or any recent low back injuries  ROS:   Review of Systems   Constitutional: Positive for fatigue  Negative for appetite change and fever  HENT: Positive for tinnitus  Negative for hearing loss, trouble swallowing and voice change  Eyes: Positive for visual disturbance  Negative for photophobia and pain  Respiratory: Positive for shortness of breath  Cardiovascular: Negative  Negative for palpitations  Gastrointestinal: Positive for constipation  Negative for nausea and vomiting  Endocrine: Negative  Negative for cold intolerance and heat intolerance  Genitourinary: Negative  Negative for dysuria, frequency and urgency  Musculoskeletal: Positive for arthralgias and back pain  Negative for myalgias and neck pain  Right hip   Skin: Negative  Negative for rash     Neurological: Positive for dizziness, weakness, numbness and headaches  Negative for tremors, seizures, syncope, facial asymmetry, speech difficulty and light-headedness  Hematological: Negative  Does not bruise/bleed easily  Psychiatric/Behavioral: Positive for confusion  Negative for hallucinations and sleep disturbance       Vitals:    10/01/18 1015   BP: 136/86   BP Location: Left arm   Patient Position: Sitting   Cuff Size: Large   Pulse: 58   Weight: 104 kg (230 lb)   Height: 6' 1" (1 854 m)     MEDS:      Current Outpatient Prescriptions:     albuterol (PROVENTIL HFA,VENTOLIN HFA) 90 mcg/act inhaler, Inhale 2 puffs every 6 (six) hours as needed for wheezing , Disp: , Rfl:     amitriptyline (ELAVIL) 25 mg tablet, 25 mg daily at bedtime  , Disp: , Rfl: 0    buPROPion (WELLBUTRIN) 75 mg tablet, take 1 tablet by mouth twice a day, Disp: 60 tablet, Rfl: 3    ergocalciferol (VITAMIN D2) 50,000 units, Take 50,000 Units by mouth once a week  , Disp: , Rfl:     fenofibrate (TRICOR) 145 mg tablet, Take 145 mg by mouth daily, Disp: , Rfl:     ibuprofen (MOTRIN) 800 mg tablet, Take 800 mg by mouth every 4 (four) hours as needed for mild pain, Disp: , Rfl:     mometasone-formoterol (DULERA) 200-5 MCG/ACT inhaler, Inhale 2 puffs 2 (two) times a day , Disp: , Rfl:     montelukast (SINGULAIR) 10 mg tablet, Take by mouth, Disp: , Rfl:     pantoprazole (PROTONIX) 40 mg tablet, Take 1 tablet (40 mg total) by mouth daily, Disp: 30 tablet, Rfl: 5    SUMAtriptan (IMITREX) 100 mg tablet, Take 1 tablet (100 mg total) by mouth once as needed for migraine for up to 1 dose, Disp: 9 tablet, Rfl: 3  :    Physical Exam:  General appearance: alert, appears stated age and cooperative  Head: Normocephalic, without obvious abnormality, atraumatic    Neurologic:  On neurological examination patient has evidence of right-sided lumbosacral tenderness, severe tenderness in the right hip, proximal muscle guarding in the right leg, no evidence of any disease will weakness was noted the lower extremity, has sensory changes along the lateral aspect of the right foot and he ambulates with an antalgic pattern on the right  In the upper extremities there is no changes on motor and sensory exam     Lab Results: I have personally reviewed pertinent reports  Imaging Studies: I have personally reviewed pertinent reports  Assessment:  1  Gait dysfunction multifactorial with right hip labral tear, low back pain  2  Right-sided tarsal tunnel syndrome  3  Vestibular migraine headaches  Plan:  Patient is advised to discontinue ibuprofen at this time  Patient was given a prescription for meloxicam 15 mg daily, Robaxin 750 mg 3 times a day, and x-ray of the lumbar spine was requested, he has a follow-up appointment for and hip injection in the near future, does not wish to consider physical therapy for his low back pain at this time  Patient is also advised to discontinue meloxicam and Robaxin once he sees adequate relief of pain  He will continue with amitriptyline 25 mg as a migraine prophylactic as well as sumatriptan on a p r n  basis and his headaches under decent control  Patient will return back to see me in 2-3 months  10/1/2018,10:28 AM    Dictation voice to text software has been used in the creation of this document  Please consider this in light of any contextual or grammatical errors

## 2018-10-02 ENCOUNTER — OFFICE VISIT (OUTPATIENT)
Dept: FAMILY MEDICINE CLINIC | Facility: CLINIC | Age: 53
End: 2018-10-02
Payer: COMMERCIAL

## 2018-10-02 VITALS
BODY MASS INDEX: 30.75 KG/M2 | HEIGHT: 73 IN | SYSTOLIC BLOOD PRESSURE: 124 MMHG | OXYGEN SATURATION: 96 % | HEART RATE: 73 BPM | DIASTOLIC BLOOD PRESSURE: 84 MMHG | WEIGHT: 232 LBS | RESPIRATION RATE: 18 BRPM

## 2018-10-02 DIAGNOSIS — K76.0 FATTY LIVER DISEASE, NONALCOHOLIC: ICD-10-CM

## 2018-10-02 DIAGNOSIS — F41.8 DEPRESSION WITH ANXIETY: ICD-10-CM

## 2018-10-02 DIAGNOSIS — G43.809 VESTIBULAR MIGRAINE: ICD-10-CM

## 2018-10-02 DIAGNOSIS — G56.23 CUBITAL TUNNEL SYNDROME, BILATERAL: ICD-10-CM

## 2018-10-02 DIAGNOSIS — R20.0 NUMBNESS OF FINGERS OF BOTH HANDS: ICD-10-CM

## 2018-10-02 DIAGNOSIS — G56.01 CARPAL TUNNEL SYNDROME OF RIGHT WRIST: ICD-10-CM

## 2018-10-02 DIAGNOSIS — N52.9 ERECTILE DYSFUNCTION, UNSPECIFIED ERECTILE DYSFUNCTION TYPE: ICD-10-CM

## 2018-10-02 DIAGNOSIS — N18.30 CKD (CHRONIC KIDNEY DISEASE) STAGE 3, GFR 30-59 ML/MIN (HCC): ICD-10-CM

## 2018-10-02 DIAGNOSIS — G56.23 ULNAR NEUROPATHY OF BOTH UPPER EXTREMITIES: ICD-10-CM

## 2018-10-02 DIAGNOSIS — J45.20 MILD INTERMITTENT ASTHMA WITHOUT COMPLICATION: ICD-10-CM

## 2018-10-02 DIAGNOSIS — E55.9 VITAMIN D DEFICIENCY: ICD-10-CM

## 2018-10-02 DIAGNOSIS — K21.9 GASTROESOPHAGEAL REFLUX DISEASE WITHOUT ESOPHAGITIS: ICD-10-CM

## 2018-10-02 DIAGNOSIS — J30.1 SEASONAL ALLERGIC RHINITIS DUE TO POLLEN: ICD-10-CM

## 2018-10-02 DIAGNOSIS — M16.11 PRIMARY OSTEOARTHRITIS OF RIGHT HIP: ICD-10-CM

## 2018-10-02 DIAGNOSIS — Z23 NEED FOR INFLUENZA VACCINATION: ICD-10-CM

## 2018-10-02 DIAGNOSIS — E11.9 TYPE 2 DIABETES MELLITUS WITHOUT COMPLICATION, WITHOUT LONG-TERM CURRENT USE OF INSULIN (HCC): Primary | ICD-10-CM

## 2018-10-02 DIAGNOSIS — H81.13 BENIGN PAROXYSMAL POSITIONAL VERTIGO DUE TO BILATERAL VESTIBULAR DISORDER: ICD-10-CM

## 2018-10-02 PROCEDURE — 99214 OFFICE O/P EST MOD 30 MIN: CPT | Performed by: NURSE PRACTITIONER

## 2018-10-02 PROCEDURE — 90471 IMMUNIZATION ADMIN: CPT

## 2018-10-02 PROCEDURE — 90682 RIV4 VACC RECOMBINANT DNA IM: CPT

## 2018-10-02 NOTE — PATIENT INSTRUCTIONS
Cubital tunnel syndrome  Elbow pad or night splinting for 3 months, if no imprvmnt try splinting during the daytime for 3 weeks  This should resolve by relieving excess pressure on elbows  DM-A1C is imprved to 5 9  Hyperlipidemia- Triglycerides elevated  Continue to improve diet  CKD- f/b Dr Sanjuana Domínguez  Has follow up for lab review this month  Please review pain medication with Dr Sanjuana Domínguez to be sure you can take the meloxicam   BPV- f/b Dr Mita Zuñiga  OA of the right hip - injections planned for this month by Pain Mng[facility]nt  ED- seen by Urology, Dr Mallory Ibarra  COPD- stable  Sees Dr Mauricio Loya  Allergic rhinitis- continue with singulair

## 2018-10-02 NOTE — PROGRESS NOTES
Assessment/Plan:    No problem-specific Assessment & Plan notes found for this encounter  Diagnoses and all orders for this visit:    Type 2 diabetes mellitus without complication, without long-term current use of insulin ([facility])  -     Comprehensive metabolic panel; Future  -     Hemoglobin A1C; Future  -     Lipid panel; Future  -     Vitamin D 25 hydroxy; Future    Seasonal allergic rhinitis due to pollen  -     Comprehensive metabolic panel; Future  -     Hemoglobin A1C; Future  -     Lipid panel; Future  -     Vitamin D 25 hydroxy; Future    CKD (chronic kidney disease) stage 3, GFR 30-59 ml/min ([facility])  -     Comprehensive metabolic panel; Future  -     Hemoglobin A1C; Future  -     Lipid panel; Future  -     Vitamin D 25 hydroxy; Future    Erectile dysfunction, unspecified erectile dysfunction type  -     Comprehensive metabolic panel; Future  -     Hemoglobin A1C; Future  -     Lipid panel; Future  -     Vitamin D 25 hydroxy; Future    Primary osteoarthritis of right hip  -     Comprehensive metabolic panel; Future  -     Hemoglobin A1C; Future  -     Lipid panel; Future  -     Vitamin D 25 hydroxy; Future    Ulnar neuropathy of both upper extremities  -     Comprehensive metabolic panel; Future  -     Hemoglobin A1C; Future  -     Lipid panel; Future  -     Vitamin D 25 hydroxy; Future    Benign paroxysmal positional vertigo due to bilateral vestibular disorder  -     Comprehensive metabolic panel; Future  -     Hemoglobin A1C; Future  -     Lipid panel; Future  -     Vitamin D 25 hydroxy; Future    Carpal tunnel syndrome of right wrist  -     Comprehensive metabolic panel; Future  -     Hemoglobin A1C; Future  -     Lipid panel; Future  -     Vitamin D 25 hydroxy; Future    Vestibular migraine  -     Comprehensive metabolic panel; Future  -     Hemoglobin A1C; Future  -     Lipid panel; Future  -     Vitamin D 25 hydroxy;  Future    Gastroesophageal reflux disease without esophagitis  -     Comprehensive metabolic panel; Future  -     Hemoglobin A1C; Future  -     Lipid panel; Future  -     Vitamin D 25 hydroxy; Future    Fatty liver disease, nonalcoholic  -     Comprehensive metabolic panel; Future  -     Hemoglobin A1C; Future  -     Lipid panel; Future  -     Vitamin D 25 hydroxy; Future    Depression with anxiety  -     Comprehensive metabolic panel; Future  -     Hemoglobin A1C; Future  -     Lipid panel; Future  -     Vitamin D 25 hydroxy; Future    Vitamin D deficiency  -     Comprehensive metabolic panel; Future  -     Hemoglobin A1C; Future  -     Lipid panel; Future  -     Vitamin D 25 hydroxy; Future    Cubital tunnel syndrome, bilateral  -     Comprehensive metabolic panel; Future  -     Hemoglobin A1C; Future  -     Lipid panel; Future  -     Vitamin D 25 hydroxy; Future    Need for influenza vaccination  -     influenza vaccine, 1974-1867, quadrivalent, recombinant, PF, 0 5 mL, for patients 18 yr+ ([id number])  -     Comprehensive metabolic panel; Future  -     Hemoglobin A1C; Future  -     Lipid panel; Future  -     Vitamin D 25 hydroxy; Future    Numbness of fingers of both hands  -     Comprehensive metabolic panel; Future  -     Hemoglobin A1C; Future  -     Lipid panel; Future  -     Vitamin D 25 hydroxy; Future    Mild intermittent asthma without complication  -     Comprehensive metabolic panel; Future  -     Hemoglobin A1C; Future  -     Lipid panel; Future  -     Vitamin D 25 hydroxy; Future          Subjective:      Patient ID: Prabhjot Jones is a 46 y o  male  Pt is here for follow up and review of labs  EMG of hands 9/26/18: right median mononeuropathy- carpal tunnel                                        Bilateral ulnar neuropathy- cubital tunnel syndrome  IMPRESSION:   Arthrogram of the right hip:  1  Moderate to severe chondrosis of the right hip  2  Degenerative tearing throughout the right labrum      He has been seeing Dr Bharat Cho for vestiubular migraines   Yesterday he saw neurology who ordered an xray of the lower back--he is now having pain in the low back  Neurology stopped his IBU and changed to Meloxicam and muscle relaxant  He continues to have pain in the right hip and has injection scheduled with Pain [facility]ng[facility]  He is having a lot of pain in the right hip  He appears uncomfortable in the exam room  He is confused because Pain Mng[facility] told him he needs surgery  Pt states that Ortho is not following him up unless there is no relief with injections  The following labs have been reviewed:  CBC with diff- wnl  Phosphorus- 1 9  CMP: glucose- 110, chloride 110 creat 1 5 GFR- 52, A1C 5 9, Vit D- 47  Lipid panel: , HDL- 33, LDL- 53  Vit d- 47 7  PTH- 66 4    Obesity- weight unchanged  DM- A1C now 5 9   HLD- triglycerides not at goal  ED- seen by Urology  BPV- followed by Neurology  CKD- evaluated by Renal  Continues to be ongoing  The following portions of the patient's history were reviewed and updated as appropriate:   He  has a past medical history of Aorta aneurysm ([address] ); Arm DVT (deep venous thromboembolism), acute ([address] ) (2015); Asthma; CKD (chronic kidney disease), stage III ([address] ); COPD (chronic obstructive pulmonary disease) ([address] ); Depression; GERD (gastroesophageal reflux disease); Headache; Hiatal hernia; Hyperlipidemia, mixed (5/7/2018); Liver disease; Prediabetes; Renal calculi; Sleep apnea; and Vestibular migraine    He   Patient Active Problem List    Diagnosis Date Noted    Cubital tunnel syndrome, bilateral 10/02/2018    Need for influenza vaccination 10/02/2018    Lumbosacral strain 10/01/2018    Tarsal tunnel syndrome of right side 10/01/2018    Ulnar neuropathy of both upper extremities     Primary osteoarthritis of right hip 09/17/2018    Pain in right hip 08/06/2018    Hip impingement syndrome, right 08/06/2018    Benign paroxysmal positional vertigo due to bilateral vestibular disorder 07/10/2018    Patellar tendinitis of right knee 06/18/2018    Hamstring tightness of right lower extremity 06/18/2018    Vestibular migraine 05/29/2018    Type 2 diabetes mellitus without complication, without long-term current use of insulin ([facility] ) 05/15/2018    Vitamin D deficiency 05/15/2018    Renal cyst, left 05/15/2018    Hepatic cyst 05/15/2018    Fatty liver disease, nonalcoholic 35/62/4466    Erectile dysfunction 05/15/2018    Carpal tunnel syndrome of right wrist 05/15/2018    Chronic pain of right knee 05/15/2018    GERD (gastroesophageal reflux disease) 05/07/2018    Liver disease 05/07/2018    Hiatal hernia 05/07/2018    Encounter to establish care 05/07/2018    Prediabetes 05/07/2018    Lipid screening 05/07/2018    CKD (chronic kidney disease) stage 3, GFR 30-59 ml/min ([facility] ) 05/07/2018    Hyperlipidemia, mixed 05/07/2018    Weight gain 05/07/2018    Fatigue 05/07/2018    Generalized abdominal pain 05/07/2018    Mild intermittent asthma without complication 73/74/5856    Seasonal allergic rhinitis due to pollen 05/07/2018    Numbness of fingers of both hands 05/07/2018    Depression with anxiety 05/07/2018    Abdominal bloating 05/07/2018    Dizziness 05/06/2016    COPD (chronic obstructive pulmonary disease) ([facility] ) 05/06/2016    Ascending aortic aneurysm (HCC) 05/06/2016    Bicuspid aortic valve 05/06/2016    Adjustment reaction with anxiety and depression 07/10/2015    Allergic rhinitis 09/23/2013     He  has a past surgical history that includes Carpal tunnel release (Left); Hernia repair; pr colonoscopy flx dx w/collj spec when pfrmd (N/A, 8/7/2017); Colonoscopy; Foot surgery (Left); pr esophagogastroduodenoscopy transoral diagnostic (N/A, 10/31/2017); and FL injection right hip (arthrogram) (8/20/2018)    His family history includes Aortic aneurysm in his other; Arthritis in his mother; Cancer in his brother; Clotting disorder in his father; Heart attack in his father; Leukemia in his brother; Prostate cancer in his brother; Schizoaffective Disorder  in his sister  He  reports that he quit smoking about 4 years ago  His smoking use included Cigars  He has never used smokeless tobacco  He reports that he drinks alcohol  He reports that he does not use drugs  Current Outpatient Prescriptions   Medication Sig Dispense Refill    albuterol (PROVENTIL HFA,VENTOLIN HFA) 90 mcg/act inhaler Inhale 2 puffs every 6 (six) hours as needed for wheezing   amitriptyline (ELAVIL) 25 mg tablet 25 mg daily at bedtime    0    buPROPion (WELLBUTRIN) 75 mg tablet take 1 tablet by mouth twice a day 60 tablet 3    ergocalciferol (VITAMIN D2) 50,000 units Take 50,000 Units by mouth once a week        fenofibrate (TRICOR) 145 mg tablet Take 145 mg by mouth daily      meloxicam (MOBIC) 15 mg tablet Take 1 tablet (15 mg total) by mouth daily 30 tablet 1    methocarbamol (ROBAXIN) 750 mg tablet Take 1 tablet (750 mg total) by mouth 3 (three) times a day as needed for muscle spasms 90 tablet 1    mometasone-formoterol (DULERA) 200-5 MCG/ACT inhaler Inhale 2 puffs 2 (two) times a day   montelukast (SINGULAIR) 10 mg tablet Take by mouth      pantoprazole (PROTONIX) 40 mg tablet Take 1 tablet (40 mg total) by mouth daily 30 tablet 5    SUMAtriptan (IMITREX) 100 mg tablet Take 1 tablet (100 mg total) by mouth once as needed for migraine for up to 1 dose 9 tablet 3     No current facility-administered medications for this visit  Current Outpatient Prescriptions on File Prior to Visit   Medication Sig    albuterol (PROVENTIL HFA,VENTOLIN HFA) 90 mcg/act inhaler Inhale 2 puffs every 6 (six) hours as needed for wheezing      amitriptyline (ELAVIL) 25 mg tablet 25 mg daily at bedtime      buPROPion (WELLBUTRIN) 75 mg tablet take 1 tablet by mouth twice a day    ergocalciferol (VITAMIN D2) 50,000 units Take 50,000 Units by mouth once a week      fenofibrate (TRICOR) 145 mg tablet Take 145 mg by mouth daily    meloxicam (MOBIC) 15 mg tablet Take 1 tablet (15 mg total) by mouth daily    methocarbamol (ROBAXIN) 750 mg tablet Take 1 tablet (750 mg total) by mouth 3 (three) times a day as needed for muscle spasms    mometasone-formoterol (DULERA) 200-5 MCG/ACT inhaler Inhale 2 puffs 2 (two) times a day   montelukast (SINGULAIR) 10 mg tablet Take by mouth    pantoprazole (PROTONIX) 40 mg tablet Take 1 tablet (40 mg total) by mouth daily    SUMAtriptan (IMITREX) 100 mg tablet Take 1 tablet (100 mg total) by mouth once as needed for migraine for up to 1 dose     No current facility-administered medications on file prior to visit  He has No Known Allergies       Review of Systems      Objective:      /84 (BP Location: Left arm, Patient Position: Sitting)   Pulse 73   Resp 18   Ht 6' 1" (1 854 m)   Wt 105 kg (232 lb)   SpO2 96%   BMI 30 61 kg/m²          Physical Exam   Constitutional: He is oriented to person, place, and time  He appears well-developed and well-nourished  HENT:   Head: Normocephalic and atraumatic  Mouth/Throat: Oropharynx is clear and moist    Eyes: Pupils are equal, round, and reactive to light  Conjunctivae and EOM are normal    Neck: Normal range of motion  Cardiovascular: Normal rate, regular rhythm and normal heart sounds  Exam reveals no gallop and no friction rub  No murmur heard  Pulmonary/Chest: Effort normal and breath sounds normal  No respiratory distress  Abdominal: Soft  There is no tenderness  Musculoskeletal: Normal range of motion  Lymphadenopathy:     He has no cervical adenopathy  Neurological: He is alert and oriented to person, place, and time  Skin: Skin is warm and dry  Psychiatric: He has a normal mood and affect  His behavior is normal  Judgment and thought content normal    Nursing note and vitals reviewed

## 2018-10-09 ENCOUNTER — OFFICE VISIT (OUTPATIENT)
Dept: NEPHROLOGY | Facility: CLINIC | Age: 53
End: 2018-10-09
Payer: COMMERCIAL

## 2018-10-09 ENCOUNTER — TELEPHONE (OUTPATIENT)
Dept: NEUROLOGY | Facility: CLINIC | Age: 53
End: 2018-10-09

## 2018-10-09 VITALS
WEIGHT: 237 LBS | HEIGHT: 73 IN | RESPIRATION RATE: 16 BRPM | BODY MASS INDEX: 31.41 KG/M2 | DIASTOLIC BLOOD PRESSURE: 70 MMHG | TEMPERATURE: 99.4 F | HEART RATE: 64 BPM | SYSTOLIC BLOOD PRESSURE: 120 MMHG

## 2018-10-09 DIAGNOSIS — N18.30 CKD (CHRONIC KIDNEY DISEASE) STAGE 3, GFR 30-59 ML/MIN (HCC): Primary | ICD-10-CM

## 2018-10-09 DIAGNOSIS — E11.9 TYPE 2 DIABETES MELLITUS WITHOUT COMPLICATION, WITHOUT LONG-TERM CURRENT USE OF INSULIN (HCC): ICD-10-CM

## 2018-10-09 PROCEDURE — 99213 OFFICE O/P EST LOW 20 MIN: CPT | Performed by: INTERNAL MEDICINE

## 2018-10-09 NOTE — PATIENT INSTRUCTIONS
Chronic Kidney Disease   AMBULATORY CARE:   Chronic kidney disease (CKD)  is the gradual and permanent loss of kidney function  Normally, the kidneys remove fluid, chemicals, and waste from your blood  These wastes are turned into urine by your kidneys  CKD may worsen over time and lead to kidney failure  Common symptoms include the following:   · Changes in how often you need to urinate    · Swelling in your arms, legs, or feet    · Shortness of breath    · Fatigue or weakness    · Bad or bitter taste in your mouth    · Nausea, vomiting, or loss of appetite  Seek care immediately if:   · You are confused and very drowsy  · You have a seizure  · You have shortness of breath  Contact your healthcare provider if:   · You suddenly gain or lose more weight than your healthcare provider has told you is okay  · You have itchy skin or a rash  · You urinate more or less than you normally do  · You have blood in your urine  · You have nausea and repeated vomiting  · You have fatigue or muscle weakness  · You have hiccups that will not stop  · You have questions or concerns about your condition or care  Treatment for CKD:  Medicines may be given to decrease blood pressure and get rid of extra fluid  You may also receive medicine to manage health conditions that may occur with CKD  Dialysis is a treatment to remove chemicals and waste from your blood when your kidneys can no longer do this  Surgery may be needed to create an arteriovenous fistula (AVF) in your arm or insert a catheter into your abdomen so that you can receive dialysis  A kidney transplant may be done if your CKD becomes severe  Manage CKD:   · Maintain a healthy weight  Ask your healthcare provider how much you should weigh  Ask him to help you create a weight loss plan if you are overweight  · Exercise 30 to 60 minutes a day, 4 to 7 times a week, or as directed  Ask about the best exercise plan for you   Regular exercise can help you manage CKD, high blood pressure, and diabetes  · Follow your healthcare provider's advice about what to eat and drink  He may tell you to eat food low in sodium (salt), potassium, phosphorus, or protein  You may need to see a dietitian if you need help planning meals  Ask how much liquid to drink each day and which liquids are best for you  · Limit alcohol  Ask how much alcohol is safe for you to drink  A drink of alcohol is 12 ounces of beer, 5 ounces of wine, or 1½ ounces of liquor  · Do not smoke  Nicotine and other chemicals in cigarettes and cigars can cause lung and kidney damage  Ask your healthcare provider for information if you currently smoke and need help to quit  E-cigarettes or smokeless tobacco still contain nicotine  Talk to your healthcare provider before you use these products  · Ask your healthcare provider if you need vaccines  Infections such as pneumonia, influenza, and hepatitis can be more harmful or more likely to occur in a person who has CKD  Vaccines reduce your risk of infection with these viruses  Follow up with your healthcare provider as directed:  Write down your questions so you remember to ask them during your visits  © 2017 [address] [employer] Information is for End User's use only and may not be sold, redistributed or otherwise used for commercial purposes  All illustrations and images included in CareNotes® are the copyrighted property of [employer]  or [employer]  The above information is an  only  It is not intended as medical advice for individual conditions or treatments  Talk to your doctor, nurse or pharmacist before following any medical regimen to see if it is safe and effective for you

## 2018-10-09 NOTE — LETTER
October 9, 2018     Elli Vega, [address]  [address] [facility]  [address]    Patient: Prerna Gomez   YOB: 1965   Date of Visit: 10/9/2018       Dear Dr Sofie Roach:    Thank you for referring Arsitides Cervantes to me for evaluation  Below are my notes for this consultation  If you have questions, please do not hesitate to call me  I look forward to following your patient along with you  Sincerely,        Zoie Peña MD        CC: No Recipients  Zoie Peña MD  10/9/2018  4:50 PM  Sign at close encounter  [address] [facility] RACHEAL Newell 46 y o  male MRN: [id number]    Encounter: [id number] 10/9/2018    REASON FOR VISIT: Prerna Gomez is a 46 y o  male who is here on 10/9/2018 for Follow-up and CKD III       HPI:    Nemo Robles came in today for follow-up of CKD  He is overall doing well  Has a problem with the hip and lot of pain because of that  He was prescribed ibuprofen and later on Mobic by the orthopedic surgeon  He will be getting possible injection in the hip for pain management  He denies any other complaint        REVIEW OF SYSTEMS:    Review of Systems   Constitutional: Negative for activity change and fatigue  HENT: Negative for congestion, ear discharge and sinus pressure  Eyes: Negative for photophobia and pain  Respiratory: Negative for apnea, choking, chest tightness and shortness of breath  Cardiovascular: Negative for chest pain, palpitations and leg swelling  Gastrointestinal: Negative for abdominal distention, abdominal pain, blood in stool, constipation and diarrhea  Endocrine: Negative for heat intolerance and polyphagia  Genitourinary: Negative for flank pain and urgency  Musculoskeletal: Positive for arthralgias  Negative for neck pain and neck stiffness  Skin: Negative for color change and wound  Allergic/Immunologic: Negative for food allergies and immunocompromised state     Neurological: Negative for seizures and facial asymmetry  Hematological: Negative for adenopathy  Does not bruise/bleed easily  Psychiatric/Behavioral: Negative for self-injury and suicidal ideas  PAST MEDICAL HISTORY:  Past Medical History:   Diagnosis Date    Aorta aneurysm (HCC)     4 3    Arm DVT (deep venous thromboembolism), acute ([facility] [address] ) 5088    complications of cardiac cath    Asthma     CKD (chronic kidney disease), stage III (HCC)     COPD (chronic obstructive pulmonary disease) (HCC)     Depression     GERD (gastroesophageal reflux disease)     Headache     Hiatal hernia     Hyperlipidemia, mixed 5/7/2018    Liver disease     FATTY LIVER    Prediabetes     Renal calculi     Sleep apnea     Vestibular migraine        PAST SURGICAL HISTORY:  Past Surgical History:   Procedure Laterality Date    CARPAL TUNNEL RELEASE Left     COLONOSCOPY      FL INJECTION RIGHT HIP (ARTHROGRAM)  8/20/2018    FOOT SURGERY Left     FOREIGN BODY REMOVAL    HERNIA REPAIR      AL COLONOSCOPY FLX DX W/COLLJ SPEC WHEN PFRMD N/A 8/7/2017    Procedure: COLONOSCOPY;  Surgeon: Karis Oliver MD;  [facility]: [facility]; Service: Gastroenterology    AL ESOPHAGOGASTRODUODENOSCOPY TRANSORAL DIAGNOSTIC N/A 10/31/2017    Procedure: ESOPHAGOGASTRODUODENOSCOPY (EGD); Surgeon: Karis Oliver MD;  Location: [facility];   Service: Gastroenterology       SOCIAL HISTORY:  History   Alcohol Use    Yes     Comment: occasional     History   Drug Use No     History   Smoking Status    Former Smoker    Types: Cigars    Quit date: 8/7/2014   Smokeless Tobacco    Never Used       FAMILY HISTORY:  Family History   Problem Relation Age of Onset    Cancer Brother     Prostate cancer Brother     Leukemia Brother         his only brother dies from lymphoma or leukemia pt unsure he was only 47    Arthritis Mother     Heart attack Father     Clotting disorder Father     Schizoaffective Disorder  Sister     Aortic aneurysm Other         abdominal MEDICATIONS:    Current Outpatient Prescriptions:     albuterol (PROVENTIL HFA,VENTOLIN HFA) 90 mcg/act inhaler, Inhale 2 puffs every 6 (six) hours as needed for wheezing , Disp: , Rfl:     amitriptyline (ELAVIL) 25 mg tablet, 25 mg daily at bedtime  , Disp: , Rfl: 0    buPROPion (WELLBUTRIN) 75 mg tablet, take 1 tablet by mouth twice a day, Disp: 60 tablet, Rfl: 3    ergocalciferol (VITAMIN D2) 50,000 units, Take 50,000 Units by mouth once a week  , Disp: , Rfl:     fenofibrate (TRICOR) 145 mg tablet, Take 145 mg by mouth daily, Disp: , Rfl:     meloxicam (MOBIC) 15 mg tablet, Take 1 tablet (15 mg total) by mouth daily, Disp: 30 tablet, Rfl: 1    methocarbamol (ROBAXIN) 750 mg tablet, Take 1 tablet (750 mg total) by mouth 3 (three) times a day as needed for muscle spasms, Disp: 90 tablet, Rfl: 1    mometasone-formoterol (DULERA) 200-5 MCG/ACT inhaler, Inhale 2 puffs 2 (two) times a day , Disp: , Rfl:     montelukast (SINGULAIR) 10 mg tablet, Take by mouth, Disp: , Rfl:     pantoprazole (PROTONIX) 40 mg tablet, Take 1 tablet (40 mg total) by mouth daily, Disp: 30 tablet, Rfl: 5    SUMAtriptan (IMITREX) 100 mg tablet, Take 1 tablet (100 mg total) by mouth once as needed for migraine for up to 1 dose, Disp: 9 tablet, Rfl: 3    PHYSICAL EXAM:  Vitals:    10/09/18 1611   BP: 120/70   BP Location: Right arm   Patient Position: Sitting   Pulse: 64   Resp: 16   Temp: 99 4 °F (37 4 °C)   TempSrc: Tympanic   Weight: 108 kg (237 lb)   Height: 6' 1" (1 854 m)     Body mass index is 31 27 kg/m²  Physical Exam   Constitutional: He is oriented to person, place, and time  He appears well-developed  No distress  HENT:   Head: Normocephalic and atraumatic  Mouth/Throat: Oropharynx is clear and moist    Eyes: Conjunctivae and EOM are normal  No scleral icterus  Neck: Neck supple  No JVD present  Cardiovascular: Normal rate and normal heart sounds  No murmur heard    Pulmonary/Chest: Effort normal and breath sounds normal  No respiratory distress  He has no wheezes  He has no rales  Abdominal: Soft  There is no tenderness  Musculoskeletal: Normal range of motion  He exhibits no edema  Neurological: He is alert and oriented to person, place, and time  Skin: Skin is warm  No rash noted  Psychiatric: He has a normal mood and affect   His behavior is normal        LAB RESULTS:  Results for orders placed or performed in visit on 09/26/18   CBC and differential   Result Value Ref Range    WBC 5 34 4 31 - 10 16 Thousand/uL    RBC 4 98 3 88 - 5 62 Million/uL    Hemoglobin 15 4 12 0 - 17 0 g/dL    Hematocrit 47 8 36 5 - 49 3 %    MCV 96 82 - 98 fL    MCH 30 9 26 8 - 34 3 pg    MCHC 32 2 31 4 - 37 4 g/dL    RDW 13 0 11 6 - 15 1 %    MPV 11 3 8 9 - 12 7 fL    Platelets 304 654 - 068 Thousands/uL    nRBC 0 /100 WBCs    Neutrophils Relative 56 43 - 75 %    Immat GRANS % 0 0 - 2 %    Lymphocytes Relative 30 14 - 44 %    Monocytes Relative 11 4 - 12 %    Eosinophils Relative 2 0 - 6 %    Basophils Relative 1 0 - 1 %    Neutrophils Absolute 2 99 1 85 - 7 62 Thousands/µL    Immature Grans Absolute 0 02 0 00 - 0 20 Thousand/uL    Lymphocytes Absolute 1 60 0 60 - 4 47 Thousands/µL    Monocytes Absolute 0 59 0 17 - 1 22 Thousand/µL    Eosinophils Absolute 0 10 0 00 - 0 61 Thousand/µL    Basophils Absolute 0 04 0 00 - 0 10 Thousands/µL   Phosphorus   Result Value Ref Range    Phosphorus 1 9 (L) 2 7 - 4 5 mg/dL   Comprehensive metabolic panel   Result Value Ref Range    Sodium 143 136 - 145 mmol/L    Potassium 4 3 3 5 - 5 3 mmol/L    Chloride 110 (H) 100 - 108 mmol/L    CO2 25 21 - 32 mmol/L    ANION GAP 8 4 - 13 mmol/L    BUN 16 5 - 25 mg/dL    Creatinine 1 50 (H) 0 60 - 1 30 mg/dL    Glucose, Fasting 110 (H) 65 - 99 mg/dL    Calcium 9 2 8 3 - 10 1 mg/dL    AST 27 5 - 45 U/L    ALT 36 12 - 78 U/L    Alkaline Phosphatase 50 46 - 116 U/L    Total Protein 6 9 6 4 - 8 2 g/dL    Albumin 3 7 3 5 - 5 0 g/dL    Total Bilirubin 0 31 0 20 - 1 00 mg/dL    eGFR 52 ml/min/1 73sq m   HEMOGLOBIN A1C W/ EAG ESTIMATION   Result Value Ref Range    Hemoglobin A1C 5 9 4 2 - 6 3 %     mg/dl   Vitamin D 25 hydroxy   Result Value Ref Range    Vit D, 25-Hydroxy 47 7 30 0 - 100 0 ng/mL   Lipid panel   Result Value Ref Range    Cholesterol 127 50 - 200 mg/dL    Triglycerides 203 (H) <=150 mg/dL    HDL, Direct 33 (L) 40 - 60 mg/dL    LDL Calculated 53 0 - 100 mg/dL    Non-HDL-Chol (CHOL-HDL) 94 mg/dl   Bilirubin, direct   Result Value Ref Range    Bilirubin, Direct 0 07 0 00 - 0 20 mg/dL   PTH, intact   Result Value Ref Range    PTH 66 4 18 4 - 80 1 pg/mL       ASSESSMENT and PLAN:      CKD (chronic kidney disease) stage 3, GFR 30-59 ml/min  He does have stage III CKD with GFR of about 50  I discussed at length with him  Advised to avoid any nephrotoxic medicine like ibuprofen or Mobic  He is going to get procedure on Thursday so  I advised him to take for couple of days but not for the long-term  If he need more pain killer Tylenol or other narcotic pain killer in may need to be prescribed  Type 2 diabetes mellitus without complication, without long-term current use of insulin (HCC)  Lab Results   Component Value Date    HGBA1C 5 9 09/26/2018       No results for input(s): POCGLU in the last 72 hours  Blood Sugar Average: Last 72 hrs:       Diabetes is well controlled  Advised to continue what is doing  Importance of sugar control and kidney disease discussed with him        I will see him back in 6 months  Will get blood and urine test before that visit      Portions of the record may have been created with voice recognition software  Occasional wrong word or "sound a like" substitutions may have occurred due to the inherent limitations of voice recognition software  Read the chart carefully and recognize, using context, where substitutions have occurred  If you have any questions, please contact the dictating provider

## 2018-10-09 NOTE — PROGRESS NOTES
NEPHROLOGY OFFICE FOLLOW UP  Tanner Newell 46 y o  male MRN: [id number]    Encounter: [id number] 10/9/2018    REASON FOR VISIT: Shyla Ulloa is a 46 y o  male who is here on 10/9/2018 for Follow-up and CKD III       HPI:    [facility] came in today for follow-up of CKD  He is overall doing well  Has a problem with the hip and lot of pain because of that  He was prescribed ibuprofen and later on Mobic by the orthopedic surgeon  He will be getting possible injection in the hip for pain management  He denies any other complaint        REVIEW OF SYSTEMS:    Review of Systems   Constitutional: Negative for activity change and fatigue  HENT: Negative for congestion, ear discharge and sinus pressure  Eyes: Negative for photophobia and pain  Respiratory: Negative for apnea, choking, chest tightness and shortness of breath  Cardiovascular: Negative for chest pain, palpitations and leg swelling  Gastrointestinal: Negative for abdominal distention, abdominal pain, blood in stool, constipation and diarrhea  Endocrine: Negative for heat intolerance and polyphagia  Genitourinary: Negative for flank pain and urgency  Musculoskeletal: Positive for arthralgias  Negative for neck pain and neck stiffness  Skin: Negative for color change and wound  Allergic/Immunologic: Negative for food allergies and immunocompromised state  Neurological: Negative for seizures and facial asymmetry  Hematological: Negative for adenopathy  Does not bruise/bleed easily  Psychiatric/Behavioral: Negative for self-injury and suicidal ideas           PAST MEDICAL HISTORY:  Past Medical History:   Diagnosis Date    Aorta aneurysm ([facility] [address] )     4 3    Arm DVT (deep venous thromboembolism), acute ([facility] [address] ) 4002    complications of cardiac cath    Asthma     CKD (chronic kidney disease), stage III (HCC)     COPD (chronic obstructive pulmonary disease) (HCC)     Depression     GERD (gastroesophageal reflux disease)     Headache     Hiatal hernia     Hyperlipidemia, mixed 5/7/2018    Liver disease     FATTY LIVER    Prediabetes     Renal calculi     Sleep apnea     Vestibular migraine        PAST SURGICAL HISTORY:  Past Surgical History:   Procedure Laterality Date    CARPAL TUNNEL RELEASE Left     COLONOSCOPY      FL INJECTION RIGHT HIP (ARTHROGRAM)  8/20/2018    FOOT SURGERY Left     FOREIGN BODY REMOVAL    HERNIA REPAIR      LA COLONOSCOPY FLX DX W/COLLJ SPEC WHEN PFRMD N/A 8/7/2017    Procedure: COLONOSCOPY;  Surgeon: Zuri Bianchi MD;  Location: [facility]; Service: Gastroenterology    LA ESOPHAGOGASTRODUODENOSCOPY TRANSORAL DIAGNOSTIC N/A 10/31/2017    Procedure: ESOPHAGOGASTRODUODENOSCOPY (EGD); Surgeon: Zuri Bianchi MD;  [facility]: [facility];   Service: Gastroenterology       SOCIAL HISTORY:  History   Alcohol Use    Yes     Comment: occasional     History   Drug Use No     History   Smoking Status    Former Smoker    Types: Cigars    Quit date: 8/7/2014   Smokeless Tobacco    Never Used       FAMILY HISTORY:  Family History   Problem Relation Age of Onset    Cancer Brother     Prostate cancer Brother     Leukemia Brother         his only brother dies from [address] or leukemia pt unsure he was only 46    Arthritis Mother     Heart attack Father     Clotting disorder Father     Schizoaffective Disorder  Sister     Aortic aneurysm Other         abdominal       MEDICATIONS:    Current Outpatient Prescriptions:     albuterol (PROVENTIL HFA,VENTOLIN HFA) 90 mcg/act inhaler, Inhale 2 puffs every 6 (six) hours as needed for wheezing , Disp: , Rfl:     amitriptyline (ELAVIL) 25 mg tablet, 25 mg daily at bedtime  , Disp: , Rfl: 0    buPROPion (WELLBUTRIN) 75 mg tablet, take 1 tablet by mouth twice a day, Disp: 60 tablet, Rfl: 3    ergocalciferol (VITAMIN D2) 50,000 units, Take 50,000 Units by mouth once a week  , Disp: , Rfl:     fenofibrate (TRICOR) 145 mg tablet, Take 145 mg by mouth daily, Disp: , Rfl:   meloxicam (MOBIC) 15 mg tablet, Take 1 tablet (15 mg total) by mouth daily, Disp: 30 tablet, Rfl: 1    methocarbamol (ROBAXIN) 750 mg tablet, Take 1 tablet (750 mg total) by mouth 3 (three) times a day as needed for muscle spasms, Disp: 90 tablet, Rfl: 1    mometasone-formoterol (DULERA) 200-5 MCG/ACT inhaler, Inhale 2 puffs 2 (two) times a day , Disp: , Rfl:     montelukast (SINGULAIR) 10 mg tablet, Take by mouth, Disp: , Rfl:     pantoprazole (PROTONIX) 40 mg tablet, Take 1 tablet (40 mg total) by mouth daily, Disp: 30 tablet, Rfl: 5    SUMAtriptan (IMITREX) 100 mg tablet, Take 1 tablet (100 mg total) by mouth once as needed for migraine for up to 1 dose, Disp: 9 tablet, Rfl: 3    PHYSICAL EXAM:  Vitals:    10/09/18 1611   BP: 120/70   BP Location: Right arm   Patient Position: Sitting   Pulse: 64   Resp: 16   Temp: 99 4 °F (37 4 °C)   TempSrc: Tympanic   Weight: 108 kg (237 lb)   Height: 6' 1" (1 854 m)     Body mass index is 31 27 kg/m²  Physical Exam   Constitutional: He is oriented to person, place, and time  He appears well-developed  No distress  HENT:   Head: Normocephalic and atraumatic  Mouth/Throat: Oropharynx is clear and moist    Eyes: Conjunctivae and EOM are normal  No scleral icterus  Neck: Neck supple  No JVD present  Cardiovascular: Normal rate and normal heart sounds  No murmur heard  Pulmonary/Chest: Effort normal and breath sounds normal  No respiratory distress  He has no wheezes  He has no rales  Abdominal: Soft  There is no tenderness  Musculoskeletal: Normal range of motion  He exhibits no edema  Neurological: He is alert and oriented to person, place, and time  Skin: Skin is warm  No rash noted  Psychiatric: He has a normal mood and affect   His behavior is normal        LAB RESULTS:  Results for orders placed or performed in visit on 09/26/18   CBC and differential   Result Value Ref Range    WBC 5 34 4 31 - 10 16 Thousand/uL    RBC 4 98 3 88 - 5 62 Million/uL    Hemoglobin 15 4 12 0 - 17 0 g/dL    Hematocrit 47 8 36 5 - 49 3 %    MCV 96 82 - 98 fL    MCH 30 9 26 8 - 34 3 pg    MCHC 32 2 31 4 - 37 4 g/dL    RDW 13 0 11 6 - 15 1 %    MPV 11 3 8 9 - 12 7 fL    Platelets 402 749 - 029 Thousands/uL    nRBC 0 /100 WBCs    Neutrophils Relative 56 43 - 75 %    Immat GRANS % 0 0 - 2 %    Lymphocytes Relative 30 14 - 44 %    Monocytes Relative 11 4 - 12 %    Eosinophils Relative 2 0 - 6 %    Basophils Relative 1 0 - 1 %    Neutrophils Absolute 2 99 1 85 - 7 62 Thousands/µL    Immature Grans Absolute 0 02 0 00 - 0 20 Thousand/uL    Lymphocytes Absolute 1 60 0 60 - 4 47 Thousands/µL    Monocytes Absolute 0 59 0 17 - 1 22 Thousand/µL    Eosinophils Absolute 0 10 0 00 - 0 61 Thousand/µL    Basophils Absolute 0 04 0 00 - 0 10 Thousands/µL   Phosphorus   Result Value Ref Range    Phosphorus 1 9 (L) 2 7 - 4 5 mg/dL   Comprehensive metabolic panel   Result Value Ref Range    Sodium 143 136 - 145 mmol/L    Potassium 4 3 3 5 - 5 3 mmol/L    Chloride 110 (H) 100 - 108 mmol/L    CO2 25 21 - 32 mmol/L    ANION GAP 8 4 - 13 mmol/L    BUN 16 5 - 25 mg/dL    Creatinine 1 50 (H) 0 60 - 1 30 mg/dL    Glucose, Fasting 110 (H) 65 - 99 mg/dL    Calcium 9 2 8 3 - 10 1 mg/dL    AST 27 5 - 45 U/L    ALT 36 12 - 78 U/L    Alkaline Phosphatase 50 46 - 116 U/L    Total Protein 6 9 6 4 - 8 2 g/dL    Albumin 3 7 3 5 - 5 0 g/dL    Total Bilirubin 0 31 0 20 - 1 00 mg/dL    eGFR 52 ml/min/1 73sq m   HEMOGLOBIN A1C W/ EAG ESTIMATION   Result Value Ref Range    Hemoglobin A1C 5 9 4 2 - 6 3 %     mg/dl   Vitamin D 25 hydroxy   Result Value Ref Range    Vit D, 25-Hydroxy 47 7 30 0 - 100 0 ng/mL   Lipid panel   Result Value Ref Range    Cholesterol 127 50 - 200 mg/dL    Triglycerides 203 (H) <=150 mg/dL    HDL, Direct 33 (L) 40 - 60 mg/dL    LDL Calculated 53 0 - 100 mg/dL    Non-HDL-Chol (CHOL-HDL) 94 mg/dl   Bilirubin, direct   Result Value Ref Range    Bilirubin, Direct 0 07 0 00 - 0 20 mg/dL PTH, intact   Result Value Ref Range    PTH 66 4 18 4 - 80 1 pg/mL       ASSESSMENT and PLAN:      CKD (chronic kidney disease) stage 3, GFR 30-59 ml/min  He does have stage III CKD with GFR of about 50  I discussed at length with him  Advised to avoid any nephrotoxic medicine like ibuprofen or Mobic  He is going to get procedure on Thursday so  I advised him to take for couple of days but not for the long-term  If he need more pain killer Tylenol or other narcotic pain killer in may need to be prescribed  Type 2 diabetes mellitus without complication, without long-term current use of insulin ([facility])  Lab Results   Component Value Date    HGBA1C 5 9 09/26/2018       No results for input(s): POCGLU in the last 72 hours  Blood Sugar Average: Last 72 hrs:       Diabetes is well controlled  Advised to continue what is doing  Importance of sugar control and kidney disease discussed with him        I will see him back in 6 months  Will get blood and urine test before that visit      Portions of the record may have been created with voice recognition software  Occasional wrong word or "sound a like" substitutions may have occurred due to the inherent limitations of voice recognition software  Read the chart carefully and recognize, using context, where substitutions have occurred  If you have any questions, please contact the dictating provider

## 2018-10-09 NOTE — ASSESSMENT & PLAN NOTE
Lab Results   Component Value Date    HGBA1C 5 9 09/26/2018       No results for input(s): POCGLU in the last 72 hours  Blood Sugar Average: Last 72 hrs:       Diabetes is well controlled  Advised to continue what is doing    Importance of sugar control and kidney disease discussed with him

## 2018-10-11 ENCOUNTER — HOSPITAL ENCOUNTER (OUTPATIENT)
Dept: RADIOLOGY | Facility: CLINIC | Age: 53
Discharge: HOME | End: 2018-10-11
Attending: ANESTHESIOLOGY | Admitting: ANESTHESIOLOGY
Payer: COMMERCIAL

## 2018-10-11 VITALS
TEMPERATURE: 97.8 F | HEART RATE: 57 BPM | DIASTOLIC BLOOD PRESSURE: 83 MMHG | RESPIRATION RATE: 18 BRPM | OXYGEN SATURATION: 96 % | SYSTOLIC BLOOD PRESSURE: 130 MMHG

## 2018-10-11 DIAGNOSIS — M16.11 PRIMARY OSTEOARTHRITIS OF RIGHT HIP: ICD-10-CM

## 2018-10-11 PROCEDURE — 77002 NEEDLE LOCALIZATION BY XRAY: CPT

## 2018-10-11 PROCEDURE — 20610 DRAIN/INJ JOINT/BURSA W/O US: CPT | Performed by: ANESTHESIOLOGY

## 2018-10-11 PROCEDURE — 77002 NEEDLE LOCALIZATION BY XRAY: CPT | Performed by: ANESTHESIOLOGY

## 2018-10-11 RX ORDER — LIDOCAINE HYDROCHLORIDE 10 MG/ML
5 INJECTION, SOLUTION EPIDURAL; INFILTRATION; INTRACAUDAL; PERINEURAL ONCE
Status: COMPLETED | OUTPATIENT
Start: 2018-10-11 | End: 2018-10-11

## 2018-10-11 RX ORDER — BUPIVACAINE HCL/PF 2.5 MG/ML
10 VIAL (ML) INJECTION ONCE
Status: COMPLETED | OUTPATIENT
Start: 2018-10-11 | End: 2018-10-11

## 2018-10-11 RX ORDER — METHYLPREDNISOLONE ACETATE 80 MG/ML
80 INJECTION, SUSPENSION INTRA-ARTICULAR; INTRALESIONAL; INTRAMUSCULAR; PARENTERAL; SOFT TISSUE ONCE
Status: COMPLETED | OUTPATIENT
Start: 2018-10-11 | End: 2018-10-11

## 2018-10-11 RX ADMIN — METHYLPREDNISOLONE ACETATE 80 MG: 80 INJECTION, SUSPENSION INTRA-ARTICULAR; INTRALESIONAL; INTRAMUSCULAR; PARENTERAL; SOFT TISSUE at 10:04

## 2018-10-11 RX ADMIN — LIDOCAINE HYDROCHLORIDE 5 ML: 10 INJECTION, SOLUTION EPIDURAL; INFILTRATION; INTRACAUDAL; PERINEURAL at 10:03

## 2018-10-11 RX ADMIN — IOHEXOL 1 ML: 300 INJECTION, SOLUTION INTRAVENOUS at 10:04

## 2018-10-11 RX ADMIN — BUPIVACAINE HYDROCHLORIDE 10 ML: 2.5 INJECTION, SOLUTION EPIDURAL; INFILTRATION; INTRACAUDAL at 10:03

## 2018-10-11 NOTE — INTERVAL H&P NOTE
Update: (This section must be completed if the H&P was completed greater than 24 hrs to procedure or admission)    H&P reviewed  After examining the patient, I find no changed to the H&P since it had been written  Patient re-evaluated   Accept as history and physical     Laurie Webber MD/October 11, 2018/9:59 AM

## 2018-10-11 NOTE — DISCHARGE INSTR - LAB
Steroid Joint Injection   WHAT YOU NEED TO KNOW:   A steroid joint injection is a procedure to inject steroid medicine into a joint  Steroid medicine decreases pain and inflammation  The injection may also contain an anesthetic (numbing medicine) to decrease pain  It may be done to treat conditions such as arthritis, gout, or carpal tunnel syndrome  The injections may be given in your knee, ankle, shoulder, elbow, wrist, ankle or sacroiliac joint  1  Do not apply heat to any area that is numb  If you have discomfort or soreness at the injection site, you may apply ice today, 20 minutes on and 20 minutes off  Tomorrow you may use ice or warm, moist heat  Do not apply ice or heat directly to the skin  2  You may have an increase or change in the discomfort for 36-48 hours after your treatment  Apply ice and continue with any pain medicine you have been prescribed  3  Do not do anything strenuous today  You may shower, but no tub baths or hot tubs today  You may resume your normal activities tomorrow, but do not overdo it  Resume normal activities slowly when you are feeling better  4  If you experience redness, drainage or swelling at the injection site, or if you develop a fever above 100 degrees, please call [facility] at [phone] or go to the Emergency Room  5  Continue to take all routine medicines prescribed by your primary care physician unless otherwise instructed by our staff  Most blood thinners should be started again according to your regularly scheduled dosing  If you have any questions, please give our office a call  If you have a problem specifically related to your procedure, please call our office at [phone]  Problems not related to your procedure should be directed to your primary care physician

## 2018-10-11 NOTE — H&P (VIEW-ONLY)
Assessment:  1  Primary osteoarthritis of right hip - Right  FL spine and pain procedure   2  Chronic pain of right knee - Right         Plan: This is a 62-year-old male who presents today for initial consultation for management of right-sided hip and knee pain  Patient has completed PT and is on NSAIDS without much pain  MRI of the right hip demonstrates degenerative arthritis  Xray of the right knee also shows evidence of osteoarthritis  At this time, I recommend  a trial of a right hip injection under fluoroscopy with cortisone to help relieve inflammation  In addition, I will also perform a right knee injection at a later time to help relieve right knee pain  He verbalizes understanding  Complete risks and benefits including bleeding, infection, tissue reaction, nerve injury and allergic reaction were discussed  The approach was demonstrated using models and literature was provided  Verbal and written consent was obtained  My impressions and treatment recommendations were discussed in detail with the patient who verbalized understanding and had no further questions  Discharge instructions were provided  I personally saw and examined the patient and I agree with the above discussed plan of care  New Medications Ordered This Visit   Medications    ibuprofen (MOTRIN) 800 mg tablet     Sig: Take 800 mg by mouth every 4 (four) hours as needed for mild pain       History of Present Illness:    Caroline Botello is a 46 y o  male who presents with right knee and hip pain  Today, patient reports severe right-sided hip and knee pain  His pain is nearly constant, with no typical pattern  Patient further describes pain as sharp, shooting, cutting in nature  Patient states his pain is aggravated with prolonged standing, bending, sitting, walking  He states that he has done 4 weeks of PT w/o much pain relief  He states that he is on nsaids as needed   He states that he has imaging studies of the knee and the hip which demonstrated degenerative arthritis and labrum tear  He states that he was referred to me for an injection  Pain is rated 10/10  He had hip MRI of the hip shows Moderate to severe arthritis/chondrosis of the right hip  I have personally reviewed and/or updated the patient's past medical history, past surgical history, family history, social history, current medications, allergies, and vital signs today  Review of Systems:    Review of Systems   Constitutional: Negative for fever and unexpected weight change  HENT: Negative for trouble swallowing  Eyes: Negative for visual disturbance  Respiratory: Negative for shortness of breath and wheezing  Cardiovascular: Negative for chest pain and palpitations  Gastrointestinal: Negative for constipation, diarrhea, nausea and vomiting  Endocrine: Negative for cold intolerance, heat intolerance and polydipsia  Genitourinary: Negative for difficulty urinating and frequency  Musculoskeletal: Negative for arthralgias, gait problem, joint swelling and myalgias  Skin: Negative for rash  Neurological: Negative for dizziness, seizures, syncope, weakness and headaches  Hematological: Does not bruise/bleed easily  Psychiatric/Behavioral: Negative for dysphoric mood  All other systems reviewed and are negative        Patient Active Problem List   Diagnosis    Dizziness    COPD (chronic obstructive pulmonary disease) ([facility] [address] )    Ascending aortic aneurysm (HCC)    Bicuspid aortic valve    Adjustment reaction with anxiety and depression    Allergic rhinitis    GERD (gastroesophageal reflux disease)    Liver disease    Hiatal hernia    Encounter to establish care    Prediabetes    Lipid screening    CKD (chronic kidney disease) stage 3, GFR 30-59 ml/min    Hyperlipidemia, mixed    Weight gain    Fatigue    Generalized abdominal pain    Mild intermittent asthma without complication    Seasonal allergic rhinitis due to pollen   Jamil Gallant Numbness of fingers of both hands    Depression with anxiety    Abdominal bloating    Type 2 diabetes mellitus without complication, without long-term current use of insulin (HCC)    Vitamin D deficiency    Renal cyst, left    Hepatic cyst    Fatty liver disease, nonalcoholic    Erectile dysfunction    Bilateral carpal tunnel syndrome    Chronic pain of right knee    Vestibular migraine    Patellar tendinitis of right knee    Hamstring tightness of right lower extremity    Benign paroxysmal positional vertigo due to bilateral vestibular disorder    Pain in right hip    Hip impingement syndrome, right       Past Medical History:   Diagnosis Date    Aorta aneurysm (HCC)     4 3    Arm DVT (deep venous thromboembolism), acute ([facility] [address] ) 6932    complications of cardiac cath    Asthma     CKD (chronic kidney disease), stage III     COPD (chronic obstructive pulmonary disease) (HCC)     Depression     GERD (gastroesophageal reflux disease)     Headache     Hiatal hernia     Hyperlipidemia, mixed 5/7/2018    Liver disease     FATTY LIVER    Prediabetes     Renal calculi     Sleep apnea     Vestibular migraine        Past Surgical History:   Procedure Laterality Date    CARPAL TUNNEL RELEASE Left     COLONOSCOPY      FL INJECTION RIGHT HIP (ARTHROGRAM)  8/20/2018    FOOT SURGERY Left     FOREIGN BODY REMOVAL    HERNIA REPAIR      CT COLONOSCOPY FLX DX W/COLLJ SPEC WHEN PFRMD N/A 8/7/2017    Procedure: COLONOSCOPY;  Surgeon: Kiah Tamez MD;  Location: [facility]; Service: Gastroenterology    CT ESOPHAGOGASTRODUODENOSCOPY TRANSORAL DIAGNOSTIC N/A 10/31/2017    Procedure: ESOPHAGOGASTRODUODENOSCOPY (EGD); Surgeon: Kiah Tamez MD;  Location: [facility];   Service: Gastroenterology       Family History   Problem Relation Age of Onset    Cancer Brother     Prostate cancer Brother     Leukemia Brother         his only brother dies from lymphoma or leukemia pt unsure he was only 47  Arthritis Mother     Heart attack Father     Clotting disorder Father     Schizoaffective Disorder  Sister     Aortic aneurysm Other         abdominal       Social History     Occupational History    unemployed      Social History Main Topics    Smoking status: Former Smoker     Types: Cigars     Quit date: 8/7/2014    Smokeless tobacco: Never Used    Alcohol use Yes      Comment: occasional    Drug use: No    Sexual activity: Not on file       Current Outpatient Prescriptions on File Prior to Visit   Medication Sig    albuterol (PROVENTIL HFA,VENTOLIN HFA) 90 mcg/act inhaler Inhale 2 puffs every 6 (six) hours as needed for wheezing   amitriptyline (ELAVIL) 25 mg tablet     Azelastine-Fluticasone 137-50 MCG/ACT SUSP 1 spray into each nostril    buPROPion (WELLBUTRIN) 75 mg tablet take 1 tablet by mouth twice a day    ergocalciferol (VITAMIN D2) 50,000 units Take 50,000 Units by mouth    fenofibrate (TRICOR) 145 mg tablet Take 145 mg by mouth daily    mometasone-formoterol (DULERA) 200-5 MCG/ACT inhaler Inhale 2 puffs 2 (two) times a day   montelukast (SINGULAIR) 10 mg tablet Take by mouth    pantoprazole (PROTONIX) 40 mg tablet Take 40 mg by mouth daily   SUMAtriptan (IMITREX) 100 mg tablet Take 1 tablet (100 mg total) by mouth once as needed for migraine for up to 1 dose    [DISCONTINUED] ergocalciferol (VITAMIN D2) 50,000 units take 1 capsule by mouth every week    [DISCONTINUED] sildenafil (REVATIO) 20 mg tablet TAKE 1 TABLET EVERY DAY AS DIRECTED    [DISCONTINUED] tadalafil (CIALIS) 20 MG tablet Take 1 tablet (20 mg total) by mouth daily as needed for erectile dysfunction    [DISCONTINUED] traMADol (ULTRAM) 50 mg tablet Take 1 tablet (50 mg total) by mouth every 8 (eight) hours as needed for moderate pain or severe pain     No current facility-administered medications on file prior to visit          No Known Allergies    Physical Exam:    /82   Pulse 67   Ht 6' 1" (1 854 m)   Wt 106 kg (233 lb)   BMI 30 74 kg/m²     Constitutional: normal, well developed, well nourished, alert, in no distress and non-toxic and no overt pain behavior  Eyes: anicteric  HEENT: grossly intact  Neck: supple, symmetric, trachea midline and no masses   Pulmonary:even and unlabored  Cardiovascular:No edema or pitting edema present  Skin:Normal without rashes or lesions and well hydrated  Psychiatric:Mood and affect appropriate  Neurologic:Cranial Nerves II-XII grossly intact  Musculoskeletal:   Pain with internal rotation of the right hip  Imaging  MRI ARTHROGRAM RIGHT HIP     INDICATION:   M25 551: Pain in right hip  M25 552: Pain in left hip  M76 51: Patellar tendinitis, right knee      COMPARISON: X-ray 8/6/2018     TECHNIQUE:  MR sequences were obtained of the right hip and pelvis including: Localizer, coronal pelvis T1, coronal pelvis T2 fat sat  Smaller field of view sequences of the affected hip were obtained with axial oblique T1 fat sat, axial oblique T2 fat   sat, coronal T1 fat sat, sagittal T2 fat sat, sagittal T1 fat sat  Images were acquired on a 1 5 Jeanie unit  Images were acquired after intra-articular injection of gadolinium utilizing direct MR arthrography technique      FINDINGS:     RIGHT HIP:     -JOINT EFFUSION: There is good distention of the right hip joint with injected contrast      -BONES: Normal marrow signal demonstrated without hip fracture or AVN     -ARTICULAR SURFACES: There is full-thickness loss of cartilage at the superior femoral head and superior lateral acetabulum     -ACETABULAR LABRUM: Tearing is seen throughout the labrum with posterior para labral cysts     -TROCHANTERIC BURSA: Normal      LEFT HIP: (please note dedicated small field of view images were not made of the left hip joint limiting its evaluation )      -JOINT EFFUSION: None      -BONES: Normal marrow signal demonstrated without hip fracture or AVN        -ARTICULAR SURFACES: There is mild osteoarthritis      -ACETABULAR LABRUM: No gross abnormalities although evaluation is very limited      -TROCHANTERIC BURSA: Normal      REST OF PELVIS:     -BONES: Normal      -SI JOINTS AND SYMPHYSIS PUBIS:  Intact      -VISUALIZED LUMBAR SPINE:  unremarkable      -MUSCULATURE: Intact with intact hamstring origins bilaterally      -PELVIC SOFT TISSUES: Normal      SUBCUTANEOUS TISSUES: Normal     IMPRESSION:     1  Moderate to severe chondrosis of the right hip  2    Degenerative tearing throughout the right labrum

## 2018-10-16 ENCOUNTER — TELEPHONE (OUTPATIENT)
Dept: PAIN MEDICINE | Facility: MEDICAL CENTER | Age: 53
End: 2018-10-16

## 2018-10-16 DIAGNOSIS — M79.18 MYOFASCIAL PAIN SYNDROME: Primary | ICD-10-CM

## 2018-10-16 DIAGNOSIS — M79.18 MYOFASCIAL PAIN: ICD-10-CM

## 2018-10-16 NOTE — TELEPHONE ENCOUNTER
Pt is s/p right intra-articular hip injection 10/11  Pt states felt good until about Sunday then pain returned to lower back, hip, groin and knee on right side  Pt using heat with minimal relief  Pt saw nephrologist who advised against use of mobic and other NSAIDs  Pt states taking nothing for pain  Advised to give injection more time to be effective and will make SI aware for any additional recommendations

## 2018-10-16 NOTE — TELEPHONE ENCOUNTER
Pt is calling stating that last night he was in a lot of pain, when asked pain level pt stated it was a 10/10  Pt had a procedure done on 10/11 and that pain came back the last 2 days but last night it was the worst to the point of not being able to sleep   Pt can be reached at [phone]

## 2018-10-26 DIAGNOSIS — E78.2 ELEVATED TRIGLYCERIDES WITH HIGH CHOLESTEROL: Primary | ICD-10-CM

## 2018-10-26 RX ORDER — TIZANIDINE 2 MG/1
2 TABLET ORAL EVERY 8 HOURS PRN
Qty: 90 TABLET | Refills: 0 | Status: SHIPPED | OUTPATIENT
Start: 2018-10-26 | End: 2019-01-25

## 2018-10-26 NOTE — TELEPHONE ENCOUNTER
Patient is discontinue methocarbamol and will trial tizanidine 2 mg q 8 hours p r n   Prescription sent to pharmacy

## 2018-10-26 NOTE — TELEPHONE ENCOUNTER
Dr Reymundo Apley patient    S/w pt  States pain is 10/10 today in right hip, buttock, groin  Pt not taking meloxicam due to nephrologist recommendation against NSAIDs  Pt took a methocarbamol today which he states has not helped  Tried OTC "arthritis cream" without relief  Pt is s/p right hip injection 10/11 with f/u ov 12/7  Advised SI out of office til Monday, will check with on call for any recs today

## 2018-10-26 NOTE — TELEPHONE ENCOUNTER
Pt left message in voicemail at 12:26 this afternoon  States that he tried the cream, but it is not helping  States that he is having more pain in his butt and groin area and it is radiating down his leg  States that his current pain level is a 10 and wants to know if Dr Mary Diaz has any other suggestions  Pt left a call back number of [phone]

## 2018-10-27 RX ORDER — FENOFIBRATE 145 MG/1
TABLET, COATED ORAL
Qty: 30 TABLET | Refills: 6 | Status: SHIPPED | OUTPATIENT
Start: 2018-10-27 | End: 2019-05-19 | Stop reason: SDUPTHER

## 2018-10-29 NOTE — TELEPHONE ENCOUNTER
S/w pt and advised the same  PT states did not take yet because pain has not been as bad the past 2 days and he has been using heat to area  Advised not to take methocarbamol at this time and to use tizanidine should pain become severe again  Pt will cb if no improvement  Pt has f/u appt in December

## 2018-11-01 ENCOUNTER — TELEPHONE (OUTPATIENT)
Dept: PAIN MEDICINE | Facility: CLINIC | Age: 53
End: 2018-11-01

## 2018-11-02 NOTE — TELEPHONE ENCOUNTER
Pt called back, stating that he did take a pill yesterday (tizanidine) and he had about 50% relief after he took the medication  States it took several hours to work though  His pain level went from a 9 down to a 4  States he is having stiffness this morning, but feeling ok  Pt just wanted to give an update  He will call back if he starts feeling worse

## 2018-11-03 ENCOUNTER — HOSPITAL ENCOUNTER (EMERGENCY)
Facility: HOSPITAL | Age: 53
Discharge: HOME | End: 2018-11-03
Attending: EMERGENCY MEDICINE | Admitting: EMERGENCY MEDICINE
Payer: COMMERCIAL

## 2018-11-03 ENCOUNTER — APPOINTMENT (EMERGENCY)
Dept: ULTRASOUND IMAGING | Facility: HOSPITAL | Age: 53
End: 2018-11-03
Payer: COMMERCIAL

## 2018-11-03 VITALS
HEART RATE: 68 BPM | RESPIRATION RATE: 16 BRPM | TEMPERATURE: 97.9 F | DIASTOLIC BLOOD PRESSURE: 81 MMHG | OXYGEN SATURATION: 98 % | SYSTOLIC BLOOD PRESSURE: 141 MMHG

## 2018-11-03 DIAGNOSIS — M19.90 OSTEOARTHRITIS: ICD-10-CM

## 2018-11-03 DIAGNOSIS — M25.561 RIGHT KNEE PAIN: ICD-10-CM

## 2018-11-03 DIAGNOSIS — M25.851 HIP IMPINGEMENT SYNDROME, RIGHT: ICD-10-CM

## 2018-11-03 DIAGNOSIS — M25.551 PAIN IN RIGHT HIP: ICD-10-CM

## 2018-11-03 DIAGNOSIS — R10.31 RIGHT GROIN PAIN: Primary | ICD-10-CM

## 2018-11-03 PROCEDURE — 81003 URINALYSIS AUTO W/O SCOPE: CPT | Performed by: EMERGENCY MEDICINE

## 2018-11-03 PROCEDURE — 99284 EMERGENCY DEPT VISIT MOD MDM: CPT

## 2018-11-03 PROCEDURE — 93971 EXTREMITY STUDY: CPT

## 2018-11-03 PROCEDURE — 93971 EXTREMITY STUDY: CPT | Performed by: SURGERY

## 2018-11-03 RX ORDER — TRAMADOL HYDROCHLORIDE 50 MG/1
50 TABLET ORAL EVERY 6 HOURS PRN
Qty: 15 TABLET | Refills: 0 | Status: SHIPPED | OUTPATIENT
Start: 2018-11-03 | End: 2018-11-06 | Stop reason: ALTCHOICE

## 2018-11-03 NOTE — DISCHARGE INSTRUCTIONS
Hip Pain   WHAT YOU NEED TO KNOW:   What causes hip pain? Hip pain can be caused by a number of conditions, such as bursitis, arthritis, or muscle or tendon strain  You may have swelling in the fluid-filled sacs that protect your muscles and tendons  Hip pain can also be caused by a lower back problem  Hip pain may be caused by trauma, playing sports, or running  Your pain may start in your hip and go to your thigh, buttock, or groin  How is hip pain treated? You may need x-rays to make sure there are no broken bones  You may be given NSAIDs to help decrease pain and swelling  How can I manage hip pain? · Rest  your injured hip so that it can heal  You may need to avoid putting any weight on your hip for at least 48 hours  Return to normal activities as directed  · Ice  the injury for 20 minutes every 4 hours, or as directed  Use an ice pack, or put crushed ice in a plastic bag  Cover it with a towel to protect your skin  Ice helps prevent tissue damage and decreases swelling and pain  · Elevate  your injured hip above the level of your heart as often as you can  This will help decrease swelling and pain  If possible, prop your hip and leg on pillows or blankets to keep the area elevated comfortably  · Maintain a healthy weight  Extra body weight can cause pressure and pain in your hip, knee, and ankle joints  Ask your healthcare provider how much you should weigh  Ask him to help you create a weight loss plan if you are overweight  · Use assistive devices as directed  You may need to use a cane or crutches  Assistive devices help decrease pain and pressure on your hip when you walk  Ask your healthcare provider for more information about assistive devices and how to use them correctly  When should I seek immediate care? · Your pain gets worse  · You have numbness in your leg or toes  · You cannot put any weight on or move your hip  When should I contact my healthcare provider?    · You have a fever  · Your pain does not decrease, even after treatment  · You have questions or concerns about your condition or care  CARE AGREEMENT:   You have the right to help plan your care  Learn about your health condition and how it may be treated  Discuss treatment options with your caregivers to decide what care you want to receive  You always have the right to refuse treatment  The above information is an  only  It is not intended as medical advice for individual conditions or treatments  Talk to your doctor, nurse or pharmacist before following any medical regimen to see if it is safe and effective for you  © 2017 [facility] [place] Information is for End User's use only and may not be sold, redistributed or otherwise used for commercial purposes  All illustrations and images included in CareNotes® are the copyrighted property of [employer]  or [employer]  Knee Pain   WHAT YOU NEED TO KNOW:   Knee pain may start suddenly, or it may be a long-term problem  You may have pain on the side, front, or back of your knee  You may have knee stiffness and swelling  You may hear popping sounds or feel like your knee is giving way or locking up as you walk  You may feel pain when you sit, stand, walk, or climb up and down stairs  Knee pain can be caused by conditions such as obesity, inflammation, or strains or tears in ligaments or tendons  DISCHARGE INSTRUCTIONS:   Follow up with your healthcare provider within 24 hours or as directed: You may need follow-up treatments, such as steroid injections to decrease pain  Write down your questions so you remember to ask them during your visits  Self-care:   · Rest  your knee so it can heal  Limit activities that increase your pain  · Ice  can help reduce swelling  Wrap ice in a towel and put it on your knee for as long and as often as directed  · Compression  with a brace or bandage can help reduce swelling   Use a brace or bandage only as directed  · Elevation  helps decrease pain and swelling  Elevate your knee while you are sitting or lying down  Prop your leg on pillows to keep your knee above the level of your heart  Medicines:   · NSAIDs  help decrease swelling and pain or fever  This medicine is available with or without a doctor's order  NSAIDs can cause stomach bleeding or kidney problems in certain people  If you take blood thinner medicine, always ask your healthcare provider if NSAIDs are safe for you  Always read the medicine label and follow directions  · Acetaminophen  decreases pain and fever  It is available without a doctor's order  Ask how much to take and when to take it  Follow directions  Acetaminophen can cause liver damage if not taken correctly  · Take your medicine as directed  Contact your healthcare provider if you think your medicine is not helping or if you have side effects  Tell him or her if you are allergic to any medicine  Keep a list of the medicines, vitamins, and herbs you take  Include the amounts, and when and why you take them  Bring the list or the pill bottles to follow-up visits  Carry your medicine list with you in case of an emergency  Exercise as directed: You may need to see a physical therapist or do recommended exercises to improve movement and decrease your pain  You may be directed to walk, swim, or ride a bike  Follow your exercise plan exactly as directed to avoid further injury  Contact your healthcare provider if:   · You have questions or concerns about your condition or care  Return to the emergency department if:   · Your pain is worse, even after treatment  · You cannot bend or straighten your leg completely  · The swelling around your knee does not go down even with treatment  · Your knee is painful and hot to the touch    © 2017 [address] Information is for End User's use only and may not be sold, redistributed or otherwise used for commercial purposes  All illustrations and images included in CareNotes® are the copyrighted property of [employer]  or [employer]  The above information is an  only  It is not intended as medical advice for individual conditions or treatments  Talk to your doctor, nurse or pharmacist before following any medical regimen to see if it is safe and effective for you

## 2018-11-03 NOTE — ED PROVIDER NOTES
History  Chief Complaint   Patient presents with    Groin Pain     pt co of groin and knee pian onset 2 days ago, -n/v/d     Knee Pain     HPI  77-year-old male presents to the emergency department at the referral of his pain management staff for evaluation of chronic right groin pain  Patient states that he has had severe pain radiating from his right groin to knee for many months  He has been taking muscle relaxers and Tylenol without improvement in pain and states that he was told that he cannot take NSAIDs due to his CKD  He has also received injections from Pain Management without long term improvement in pain  He states that when pain becomes intolerable, he calls Pain Management  Today he was advised to come to the ED for evaluation of possible DVT given severe pain  On presentation, he states that pain is severe, but that it is not worse than it has been in the past   He denies any complaints other than stated above  Of note, patient has history of bilateral inguinal hernias status post repair and does not feel that current pain is similar to symptoms he has had with hernias  Prior to Admission Medications   Prescriptions Last Dose Informant Patient Reported? Taking? SUMAtriptan (IMITREX) 100 mg tablet  Self No No   Sig: Take 1 tablet (100 mg total) by mouth once as needed for migraine for up to 1 dose   albuterol (PROVENTIL HFA,VENTOLIN HFA) 90 mcg/act inhaler  Self Yes No   Sig: Inhale 2 puffs every 6 (six) hours as needed for wheezing     amitriptyline (ELAVIL) 25 mg tablet  Self Yes No   Si mg daily at bedtime     buPROPion (WELLBUTRIN) 75 mg tablet  Self No No   Sig: take 1 tablet by mouth twice a day   ergocalciferol (VITAMIN D2) 50,000 units  Self Yes No   Sig: Take 50,000 Units by mouth once a week     fenofibrate (TRICOR) 145 mg tablet   No No   Sig: take 1 tablet by mouth once daily   meloxicam (MOBIC) 15 mg tablet   No No   Sig: Take 1 tablet (15 mg total) by mouth daily mometasone-formoterol (DULERA) 200-5 MCG/ACT inhaler  Self Yes No   Sig: Inhale 2 puffs 2 (two) times a day  montelukast (SINGULAIR) 10 mg tablet  Self Yes No   Sig: Take by mouth   pantoprazole (PROTONIX) 40 mg tablet   No No   Sig: Take 1 tablet (40 mg total) by mouth daily   tiZANidine (ZANAFLEX) 2 mg tablet   No No   Sig: Take 1 tablet (2 mg total) by mouth every 8 (eight) hours as needed for muscle spasms      Facility-Administered Medications: None       Past Medical History:   Diagnosis Date    Aorta aneurysm (HCC)     4 3    [facility]deep [facility] throm[facility]embolism[facility] ) 5252    complications of cardiac cath    Asthma     CKD (chronic kidney disease), stage III (HCC)     COPD (chronic obstructive pulmonary disease) (HCC)     Depression     GERD (gastroesophageal reflux disease)     Headache     Hiatal hernia     Hyperlipidemia, mixed 5/7/2018    Liver disease     FATTY LIVER    Prediabetes     Renal calculi     Sleep apnea     Vestibular migraine        Past Surgical History:   Procedure Laterality Date    CARPAL TUNNEL RELEASE Left     COLONOSCOPY      FL INJECTION RIGHT HIP (ARTHROGRAM)  8/20/2018    FOOT SURGERY Left     FOREIGN BODY REMOVAL    HERNIA REPAIR      TN COLONOSCOPY FLX DX W/COLLJ SPEC WHEN PFRMD N/A 8/7/2017    Procedure: COLONOSCOPY;  Surgeon: Lilliana Siddiqi MD;  Location: [facility]; Service: Gastroenterology    TN ESOPHAGOGASTRODUODENOSCOPY TRANSORAL DIAGNOSTIC N/A 10/31/2017    Procedure: ESOPHAGOGASTRODUODENOSCOPY (EGD); Surgeon: Lilliana Siddiqi MD;  Location: [facility];   Service: Gastroenterology       Family History   Problem Relation Age of Onset    Cancer Brother     Prostate cancer Brother     Leukemia Brother         his only brother dies from lymphoma or leukemia pt unsure he was only 47    Arthritis Mother     Heart attack Father     Clotting disorder Father     Schizoaffective Disorder  Sister     Aortic aneurysm Other abdominal     I have reviewed and agree with the history as documented  Social History   Substance Use Topics    Smoking status: Former Smoker     Types: Cigars     Quit date: 8/7/2014    Smokeless tobacco: Never Used    Alcohol use Yes      Comment: occasional        Review of Systems   Constitutional: Negative for chills and fever  Respiratory: Negative for shortness of breath  Cardiovascular: Negative for chest pain  Gastrointestinal: Negative for abdominal pain, nausea and vomiting  Musculoskeletal: Positive for arthralgias  Negative for back pain  Skin: Negative for rash and wound  Allergic/Immunologic: Negative for immunocompromised state  Neurological: Negative for headaches  Psychiatric/Behavioral: The patient is not nervous/anxious  All other systems reviewed and are negative  Physical Exam  Physical Exam   Constitutional: He is oriented to person, place, and time  He appears well-nourished  No distress  HENT:   Head: Normocephalic and atraumatic  Eyes: EOM are normal    Neck: Normal range of motion  Neck supple  Cardiovascular: Normal rate and regular rhythm  Pulmonary/Chest: Effort normal and breath sounds normal  No respiratory distress  Abdominal: Soft  He exhibits no distension  There is no tenderness  Musculoskeletal: Normal range of motion  Right hip: He exhibits tenderness  Legs:  Bilateral groin scars s/p inguinal hernia repairs   Neurological: He is alert and oriented to person, place, and time  Skin: Skin is warm and dry  He is not diaphoretic  Psychiatric: He has a normal mood and affect  His behavior is normal    Nursing note and vitals reviewed        Vital Signs  ED Triage Vitals   Temperature Pulse Respirations Blood Pressure SpO2   11/03/18 1242 11/03/18 1241 11/03/18 1241 11/03/18 1241 11/03/18 1241   97 9 °F (36 6 °C) 68 16 141/81 98 %      Temp Source Heart Rate Source Patient Position - Orthostatic VS BP Location FiO2 (%) 11/03/18 1242 11/03/18 1241 11/03/18 1241 11/03/18 1241 --   Oral Monitor Sitting Right arm       Pain Score       11/03/18 1241       Worst Possible Pain           Vitals:    11/03/18 1241   BP: 141/81   Pulse: 68   Patient Position - Orthostatic VS: Sitting       Visual Acuity      ED Medications  Medications - No data to display    Diagnostic Studies  Results Reviewed     Procedure Component Value Units Date/Time    UA w Reflex to Microscopic w Reflex to Culture [[id number]] Collected:  11/03/18 1511    Lab Status:  Final result Specimen:  Urine from Urine, Clean Catch Updated:  11/03/18 1518     Color, UA Yellow     Clarity, UA Clear     Specific Gravity, UA >=1 030     pH, UA 5 5     Leukocytes, UA Negative     Nitrite, UA Negative     Protein, UA Negative mg/dl      Glucose, UA Negative mg/dl      Ketones, UA Negative mg/dl      Urobilinogen, UA 0 2 E U /dl      Bilirubin, UA Negative     Blood, UA Negative                 VAS lower limb venous duplex study, unilateral/limited   Final Result by Sanjeev Peoples MD (11/03 6311)                 Procedures  Procedures       Phone Contacts  ED Phone Contact    ED Course                               MDM  Number of Diagnoses or Management Options  Hip impingement syndrome, right:   Osteoarthritis:   Pain in right hip:   Right groin pain:   Right knee pain:   Diagnosis management comments: 59-year-old male with chronic right hip/knee pain, sent for rule out DVT  Sx likely due to known OA, hip impingement syndrome  Will obtain duplex, UA to rule out other etiology  Will provide analgesia and reassess          CritCare Time    Disposition  Final diagnoses:   Right groin pain   Right knee pain   Osteoarthritis   Pain in right hip   Hip impingement syndrome, right     Time reflects when diagnosis was documented in both MDM as applicable and the Disposition within this note     Time User Action Codes Description Comment    11/3/2018  3:36 PM Skye Valdez [R10 31] Right groin pain     11/3/2018  3:36 PM Rico Thomas Add [M25 561] Right knee pain     11/3/2018  3:36 PM Rico Thomas Add [M19 90] Osteoarthritis     11/3/2018  3:41 PM Kate Mcclendon Add [M25 551] Pain in right hip     11/3/2018  3:41 PM Rico Thomas Add [M25 851] Hip impingement syndrome, right       ED Disposition     ED Disposition Condition Comment    Discharge  Gilbert Newell discharge to home/self care      Condition at discharge: Good        Follow-up Information     Follow up With Specialties Details Why Contact Info Additional [address], [address] Nurse Practitioner  As needed, If symptoms worsen [address] [facility],[address]  [address] [facility] Emergency Department Emergency Medicine  If symptoms worsen [address] [facility] [address]  [phone] [place] [facility], [address], [place], [address]          Discharge Medication List as of 11/3/2018  3:41 PM      START taking these medications    Details   traMADol (ULTRAM) 50 mg tablet Take 1 tablet (50 mg total) by mouth every 6 (six) hours as needed for moderate pain for up to 3 days, Starting Sat 11/3/2018, Until Tue 11/6/2018, Print         CONTINUE these medications which have NOT CHANGED    Details   albuterol (PROVENTIL HFA,VENTOLIN HFA) 90 mcg/act inhaler Inhale 2 puffs every 6 (six) hours as needed for wheezing , Historical Med      amitriptyline (ELAVIL) 25 mg tablet 25 mg daily at bedtime  , Starting Mon 7/23/2018, Historical Med      buPROPion (WELLBUTRIN) 75 mg tablet take 1 tablet by mouth twice a day, Normal      ergocalciferol (VITAMIN D2) 50,000 units Take 50,000 Units by mouth once a week  , Starting Tue 5/15/2018, Historical Med      fenofibrate (TRICOR) 145 mg tablet take 1 tablet by mouth once daily, Normal      meloxicam (MOBIC) 15 mg tablet Take 1 tablet (15 mg total) by mouth daily, Starting Mon 10/1/2018, Normal mometasone-formoterol (DULERA) 200-5 MCG/ACT inhaler Inhale 2 puffs 2 (two) times a day , Historical Med      montelukast (SINGULAIR) 10 mg tablet Take by mouth, Historical Med      pantoprazole (PROTONIX) 40 mg tablet Take 1 tablet (40 mg total) by mouth daily, Starting Thu 9/20/2018, Normal      SUMAtriptan (IMITREX) 100 mg tablet Take 1 tablet (100 mg total) by mouth once as needed for migraine for up to 1 dose, Starting Tue 5/29/2018, Normal      tiZANidine (ZANAFLEX) 2 mg tablet Take 1 tablet (2 mg total) by mouth every 8 (eight) hours as needed for muscle spasms, Starting Fri 10/26/2018, Normal           No discharge procedures on file      ED Provider  Electronically Signed by           Jodi Mann MD  11/05/18 0008

## 2018-11-05 ENCOUNTER — TELEPHONE (OUTPATIENT)
Dept: PAIN MEDICINE | Facility: MEDICAL CENTER | Age: 53
End: 2018-11-05

## 2018-11-05 NOTE — TELEPHONE ENCOUNTER
Spoke with patient, states he is calling to provide update with pain, it became so severe we went to the ED over the weekend, states all pain meds provided by Dr Heena Noguera have not been working, when in the ED they gave him Primadol 50mg ( not exactly sure         Call back number for patient is [phone], please call him today after 2pm to assure he will be available

## 2018-11-05 NOTE — TELEPHONE ENCOUNTER
Late entry:  Message # [phone]         2018 11:35a   [TK]  TO:  [facility]  CALLER:  [address] #:  [phone] Ext  HOSP NAME:  ----------------------------------------------------------------------  Dr:  Pt Name:CRISTION Frost Lilian  :1965  Emerg?:N  Msg:  HIP, GROIN AND KNEE PAIN        (Message Delivered)  ELÍAS E L I V E R I E S :  2018 07:31a           [facility]           E- Mailed    Name:ADWO[facility] [address]                      2018 7:30:48 AM  ProfileId:  [id number]                   [facility]  [place]

## 2018-11-05 NOTE — TELEPHONE ENCOUNTER
S/w pt  Still c/o severe right groin and hip pain  Pt states it is constant and now spreads farther down leg to knee  Pt states ED did venous duplex which was negative and gave short term script of tramadol  Pt states tramadol is the first thing that he has taken that takes the edge off  Pt asking if he can get a prescription  Advised tramadol requires an ov  Advised will make SI aware of request  Per another task, pt was to schedule repeat injection  Pt agreeable and scheduled for 11/8 at [place] [address]  to only take tramadol when pain is severe and take tylenol or ibuprofen in between  Pt states can not take NSAIDs due to kidneys  Any recommendations until procedure?

## 2018-11-07 NOTE — TELEPHONE ENCOUNTER
Pt left message in voicemail on 11/07 at 3:40 pm stating he was in so much pain today and nothing he has done today has been helping at all  Pt has an injection scheduled for tomorrow

## 2018-11-08 ENCOUNTER — HOSPITAL ENCOUNTER (OUTPATIENT)
Dept: RADIOLOGY | Facility: CLINIC | Age: 53
Discharge: HOME | End: 2018-11-08
Attending: ANESTHESIOLOGY | Admitting: ANESTHESIOLOGY
Payer: COMMERCIAL

## 2018-11-08 VITALS
OXYGEN SATURATION: 97 % | TEMPERATURE: 97.8 F | SYSTOLIC BLOOD PRESSURE: 135 MMHG | RESPIRATION RATE: 18 BRPM | HEART RATE: 57 BPM | DIASTOLIC BLOOD PRESSURE: 87 MMHG

## 2018-11-08 DIAGNOSIS — M16.11 PRIMARY OSTEOARTHRITIS OF RIGHT HIP: ICD-10-CM

## 2018-11-08 PROCEDURE — 77002 NEEDLE LOCALIZATION BY XRAY: CPT | Performed by: ANESTHESIOLOGY

## 2018-11-08 PROCEDURE — 20610 DRAIN/INJ JOINT/BURSA W/O US: CPT | Performed by: ANESTHESIOLOGY

## 2018-11-08 PROCEDURE — 77002 NEEDLE LOCALIZATION BY XRAY: CPT

## 2018-11-08 RX ORDER — BUPIVACAINE HYDROCHLORIDE 5 MG/ML
5 INJECTION, SOLUTION EPIDURAL; INTRACAUDAL ONCE
Status: DISCONTINUED | OUTPATIENT
Start: 2018-11-08 | End: 2018-11-12 | Stop reason: HOSPADM

## 2018-11-08 RX ORDER — LIDOCAINE HYDROCHLORIDE 10 MG/ML
5 INJECTION, SOLUTION EPIDURAL; INFILTRATION; INTRACAUDAL; PERINEURAL ONCE
Status: DISCONTINUED | OUTPATIENT
Start: 2018-11-08 | End: 2018-11-12 | Stop reason: HOSPADM

## 2018-11-08 RX ORDER — METHYLPREDNISOLONE ACETATE 80 MG/ML
80 INJECTION, SUSPENSION INTRA-ARTICULAR; INTRALESIONAL; INTRAMUSCULAR; PARENTERAL; SOFT TISSUE ONCE
Status: DISCONTINUED | OUTPATIENT
Start: 2018-11-08 | End: 2018-11-12 | Stop reason: HOSPADM

## 2018-11-08 NOTE — DISCHARGE INSTR - LAB
Steroid Joint Injection   WHAT YOU NEED TO KNOW:   A steroid joint injection is a procedure to inject steroid medicine into a joint  Steroid medicine decreases pain and inflammation  The injection may also contain an anesthetic (numbing medicine) to decrease pain  It may be done to treat conditions such as arthritis, gout, or carpal tunnel syndrome  The injections may be given in your knee, ankle, shoulder, elbow, wrist, ankle or sacroiliac joint  1  Do not apply heat to any area that is numb  If you have discomfort or soreness at the injection site, you may apply ice today, 20 minutes on and 20 minutes off  Tomorrow you may use ice or warm, moist heat  Do not apply ice or heat directly to the skin  2  You may have an increase or change in the discomfort for 36-48 hours after your treatment  Apply ice and continue with any pain medicine you have been prescribed  3  Do not do anything strenuous today  You may shower, but no tub baths or hot tubs today  You may resume your normal activities tomorrow, but do not overdo it  Resume normal activities slowly when you are feeling better  4  If you experience redness, drainage or swelling at the injection site, or if you develop a fever above 100 degrees, please call [facility] at [phone] or go to the Emergency Room  5  Continue to take all routine medicines prescribed by your primary care physician unless otherwise instructed by our staff  Most blood thinners should be started again according to your regularly scheduled dosing  If you have any questions, please give our office a call  If you have a problem specifically related to your procedure, please call our office at [phone]  Problems not related to your procedure should be directed to your primary care physician

## 2018-11-13 ENCOUNTER — TELEPHONE (OUTPATIENT)
Dept: PAIN MEDICINE | Facility: CLINIC | Age: 53
End: 2018-11-13

## 2018-11-13 NOTE — TELEPHONE ENCOUNTER
Dr Vickie Ruvalcaba - patient said he was to call with any changes in his pain level  Patient is having pain 5 days after a [place] spine and pain procedure (11/8/18)    Patient's location of pain is:  R Knee pain, R buttock cheek, R groin, R testical    Patient's pain scale is  8  out of 10  Patient is taking medication(s):  Muscle relaxer - tiZANidine (ZANAFLEX) 2 mg tablet     Patient is not icing  Patient denies have calf pain  Patient denies  have chest pain  Patient denies have shortness of breath

## 2018-11-13 NOTE — TELEPHONE ENCOUNTER
S/p R hip injection #2 11/8  Pt already aware may take 1-2 weeks for full benefit  Please advise if there are any other recommendations at this time    1/4 f/u ov

## 2018-11-14 DIAGNOSIS — G89.29 ABDOMINAL PAIN, CHRONIC, RIGHT LOWER QUADRANT: Primary | ICD-10-CM

## 2018-11-14 DIAGNOSIS — R10.31 ABDOMINAL PAIN, CHRONIC, RIGHT LOWER QUADRANT: Primary | ICD-10-CM

## 2018-11-14 NOTE — TELEPHONE ENCOUNTER
Patient states that has right-sided abdominal pain and testicular pain  Of note, he has a history of right inguinal hernia repair with mesh placement as needed I had discussed with patient regarding performing a transversus abdominal plane block on the right side  Patient is agreeable  Will go ahead and schedule her for her right TAP block to relieve abdominal pain and right groin pain  Risk includes bleeding, infection and nerve injury    Order in epic

## 2018-11-14 NOTE — TELEPHONE ENCOUNTER
S/w patient states as today moves on, pain is increasing, no medication is helping, states Dr Ivonne Garcia told him he may need a procedure into his stomach that will help the nerve pain that is causing the pain into his groin and testicles      Pt would like to speak w/ a nurse, call back # [phone]

## 2018-11-15 ENCOUNTER — TELEPHONE (OUTPATIENT)
Dept: PAIN MEDICINE | Facility: CLINIC | Age: 53
End: 2018-11-15

## 2018-11-15 NOTE — TELEPHONE ENCOUNTER
S/P Right Intra-Articular Hip  11/8/18  F/U  1/4/19    Patient talked to doctor yesterday    He is still having pain

## 2018-11-20 ENCOUNTER — OFFICE VISIT (OUTPATIENT)
Dept: FAMILY MEDICINE CLINIC | Facility: CLINIC | Age: 53
End: 2018-11-20
Payer: COMMERCIAL

## 2018-11-20 VITALS
RESPIRATION RATE: 18 BRPM | BODY MASS INDEX: 29.95 KG/M2 | SYSTOLIC BLOOD PRESSURE: 130 MMHG | OXYGEN SATURATION: 97 % | HEART RATE: 68 BPM | WEIGHT: 227 LBS | DIASTOLIC BLOOD PRESSURE: 78 MMHG

## 2018-11-20 DIAGNOSIS — Z01.00 DIABETIC EYE EXAM (HCC): Primary | ICD-10-CM

## 2018-11-20 DIAGNOSIS — Z23 NEED FOR PNEUMOCOCCAL VACCINATION: ICD-10-CM

## 2018-11-20 DIAGNOSIS — E11.9 DIABETIC EYE EXAM (HCC): Primary | ICD-10-CM

## 2018-11-20 DIAGNOSIS — E11.9 TYPE 2 DIABETES MELLITUS WITHOUT COMPLICATION, UNSPECIFIED WHETHER LONG TERM INSULIN USE (HCC): ICD-10-CM

## 2018-11-20 DIAGNOSIS — J45.30 MILD PERSISTENT ASTHMA WITHOUT COMPLICATION: ICD-10-CM

## 2018-11-20 DIAGNOSIS — Z11.59 NEED FOR HEPATITIS C SCREENING TEST: ICD-10-CM

## 2018-11-20 DIAGNOSIS — K44.9 HIATAL HERNIA: ICD-10-CM

## 2018-11-20 DIAGNOSIS — E78.2 HYPERLIPIDEMIA, MIXED: ICD-10-CM

## 2018-11-20 DIAGNOSIS — R10.31 GROIN PAIN, CHRONIC, RIGHT: ICD-10-CM

## 2018-11-20 DIAGNOSIS — G89.29 GROIN PAIN, CHRONIC, RIGHT: ICD-10-CM

## 2018-11-20 PROCEDURE — 1036F TOBACCO NON-USER: CPT | Performed by: NURSE PRACTITIONER

## 2018-11-20 PROCEDURE — 90732 PPSV23 VACC 2 YRS+ SUBQ/IM: CPT

## 2018-11-20 PROCEDURE — 90471 IMMUNIZATION ADMIN: CPT

## 2018-11-20 PROCEDURE — 99214 OFFICE O/P EST MOD 30 MIN: CPT | Performed by: NURSE PRACTITIONER

## 2018-11-20 RX ORDER — SILDENAFIL CITRATE 20 MG/1
20 TABLET ORAL DAILY
Refills: 2 | COMMUNITY
Start: 2018-10-26 | End: 2018-11-21 | Stop reason: SDUPTHER

## 2018-11-20 NOTE — PATIENT INSTRUCTIONS
Groin pain- proceed with injection by Dr Tonya Barragan  Suspected pain is from nerve  Prediabetes- obtain labs in January  Referral to Opthamology  Check urine for microalbumen  Asthma- persistent- Refer to Pulmonary  Pneumonia vaccination given   Follow up in January and obtain labs prior to visit

## 2018-11-20 NOTE — PROGRESS NOTES
Assessment/Plan:    No problem-specific Assessment & Plan notes found for this encounter  Diagnoses and all orders for this visit:    Diabetic eye exam [facility])  -     Ambulatory referral to Ophthalmology; Future    Groin pain, chronic, right  -     Comprehensive metabolic panel; Future  -     Lipid panel; Future    Need for pneumococcal vaccination  -     PNEUMOCOCCAL POLYSACCHARIDE VACCINE 23-VALENT =>3YO SQ IM    Type 2 diabetes mellitus without complication, unspecified whether long term insulin use ([facility] )  -     Microalbumin,Urine  -     Ambulatory referral to Ophthalmology; Future  -     Comprehensive metabolic panel; Future  -     Hemoglobin A1C; Future  -     Lipid panel; Future    Need for hepatitis C screening test  -     Hepatitis C antibody; Future    Hyperlipidemia, mixed  -     Comprehensive metabolic panel; Future  -     Hemoglobin A1C; Future  -     Lipid panel; Future    Hiatal hernia  -     Comprehensive metabolic panel; Future  -     Hemoglobin A1C; Future  -     Lipid panel; Future    Mild persistent asthma without complication  -     Comprehensive metabolic panel; Future  -     Hemoglobin A1C; Future  -     Lipid panel; Future  -     Ambulatory referral to Pulmonology; Future    Other orders  -     Discontinue: mometasone-formoterol (DULERA) 200-5 MCG/ACT inhaler; Inhale 2 puffs  -     sildenafil (REVATIO) 20 mg tablet; Take 20 mg by mouth daily As directed          Subjective:      Patient ID: Prabhjot Jones is a 46 y o  male  Pt is here to discuss a concern about his hip pain  He has been seeing Pain Mngmnt for his right hip pain  He underwent steroid injection about a month and a half ago  This helped for 2 5 days  He then had his second injection a week ago, and this is "helping better"  He had inguinal hernia repair 10 years ago  Since that time, he has had a "deep pain in the groin area which occurs intermittently"   Pt reports that Dr Nasim Vargas believes some of his pain is related to this    I have reviewed the CT scan done in August , as well as US  No abnormalities in the groin region detected  Pt does experience the pain weekly, and it is severe enough to limit his mobility  Pt is due for his diabetic foot exam   Pt is agreeable to having his immunizations done today as well  Pt is reporting intermittent abdominal bloating  No relation to what he is eating or the quantity  He did undergo EGD and results did not explain his symptoms  Prediabetes- A1C was > 6, in Sept <5 9  Asthma- mild persistent- needs referral to new pulmonologist  His Pulmonologist left the area  The following portions of the patient's history were reviewed and updated as appropriate:   He  has a past medical history of Aorta aneurysm ([facility] ); Arm DVT (deep venous thromboembolism), acute ([facility] ) (2015); Asthma; CKD (chronic kidney disease), stage III ([facility] ); COPD (chronic obstructive pulmonary disease) ([facility] ); Depression; GERD (gastroesophageal reflux disease); Headache; Hiatal hernia; Hyperlipidemia, mixed (5/7/2018); Liver disease; Prediabetes; Renal calculi; Sleep apnea; and Vestibular migraine    He   Patient Active Problem List    Diagnosis Date Noted    Need for pneumococcal vaccination 11/20/2018    Need for hepatitis C screening test 11/20/2018    Groin pain, chronic, right 11/20/2018    Cubital tunnel syndrome, bilateral 10/02/2018    Need for influenza vaccination 10/02/2018    Lumbosacral strain 10/01/2018    Tarsal tunnel syndrome of right side 10/01/2018    Ulnar neuropathy of both upper extremities     Primary osteoarthritis of right hip 09/17/2018    Pain in right hip 08/06/2018    Hip impingement syndrome, right 08/06/2018    Benign paroxysmal positional vertigo due to bilateral vestibular disorder 07/10/2018    Patellar tendinitis of right knee 06/18/2018    Hamstring tightness of right lower extremity 06/18/2018    Vestibular migraine 05/29/2018    Type 2 diabetes mellitus without complication ([facility] ) 55/94/2891    Vitamin D deficiency 05/15/2018    Renal cyst, left 05/15/2018    Hepatic cyst 05/15/2018    Fatty liver disease, nonalcoholic 01/78/1547    Erectile dysfunction 05/15/2018    Carpal tunnel syndrome of right wrist 05/15/2018    Chronic pain of right knee 05/15/2018    GERD (gastroesophageal reflux disease) 05/07/2018    Liver disease 05/07/2018    Hiatal hernia 05/07/2018    Encounter to establish care 05/07/2018    Prediabetes 05/07/2018    [facility] ) 05/07/2018    CKD (chronic kidney disease) stage 3, GFR 30-59 ml/min ([facility] ) 05/07/2018    Hyperlipidemia, mixed 05/07/2018    Weight gain 05/07/2018    Fatigue 05/07/2018    Generalized abdominal pain 05/07/2018    Mild intermittent asthma without complication 16/89/1731    Seasonal allergic rhinitis due to pollen 05/07/2018    Numbness of fingers of both hands 05/07/2018    Depression with anxiety 05/07/2018    Abdominal bloating 05/07/2018    Dizziness 05/06/2016    COPD (chronic obstructive pulmonary disease) ([facility] ) 05/06/2016    Ascending aortic aneurysm (HCC) 05/06/2016    Bicuspid aortic valve 05/06/2016    Adjustment reaction with anxiety and depression 07/10/2015    Allergic rhinitis 09/23/2013     He  has a past surgical history that includes Carpal tunnel release (Left); Hernia repair; pr colonoscopy flx dx w/collj spec when pfrmd (N/A, 8/7/2017); Colonoscopy; Foot surgery (Left); pr esophagogastroduodenoscopy transoral diagnostic (N/A, 10/31/2017); and FL injection right hip (arthrogram) (8/20/2018)  His family history includes Aortic aneurysm in his other; Arthritis in his mother; Cancer in his brother; Clotting disorder in his father; Heart attack in his father; Leukemia in his brother; Prostate cancer in his brother; Schizoaffective Disorder  in his sister  He  reports that he quit smoking about 4 years ago  His smoking use included Cigars   He has never used smokeless tobacco  He reports that he drinks alcohol  He reports that he does not use drugs  Current Outpatient Prescriptions   Medication Sig Dispense Refill    albuterol (PROVENTIL HFA,VENTOLIN HFA) 90 mcg/act inhaler Inhale 2 puffs every 6 (six) hours as needed for wheezing   amitriptyline (ELAVIL) 25 mg tablet 25 mg daily at bedtime    0    buPROPion (WELLBUTRIN) 75 mg tablet take 1 tablet by mouth twice a day 60 tablet 3    ergocalciferol (VITAMIN D2) 50,000 units Take 50,000 Units by mouth once a week        fenofibrate (TRICOR) 145 mg tablet take 1 tablet by mouth once daily 30 tablet 6    meloxicam (MOBIC) 15 mg tablet Take 1 tablet (15 mg total) by mouth daily 30 tablet 1    mometasone-formoterol (DULERA) 200-5 MCG/ACT inhaler Inhale 2 puffs 2 (two) times a day   montelukast (SINGULAIR) 10 mg tablet Take by mouth      pantoprazole (PROTONIX) 40 mg tablet Take 1 tablet (40 mg total) by mouth daily 30 tablet 5    sildenafil (REVATIO) 20 mg tablet Take 20 mg by mouth daily As directed  2    SUMAtriptan (IMITREX) 100 mg tablet Take 1 tablet (100 mg total) by mouth once as needed for migraine for up to 1 dose 9 tablet 3    tiZANidine (ZANAFLEX) 2 mg tablet Take 1 tablet (2 mg total) by mouth every 8 (eight) hours as needed for muscle spasms 90 tablet 0     No current facility-administered medications for this visit  Current Outpatient Prescriptions on File Prior to Visit   Medication Sig    albuterol (PROVENTIL HFA,VENTOLIN HFA) 90 mcg/act inhaler Inhale 2 puffs every 6 (six) hours as needed for wheezing      amitriptyline (ELAVIL) 25 mg tablet 25 mg daily at bedtime      buPROPion (WELLBUTRIN) 75 mg tablet take 1 tablet by mouth twice a day    ergocalciferol (VITAMIN D2) 50,000 units Take 50,000 Units by mouth once a week      fenofibrate (TRICOR) 145 mg tablet take 1 tablet by mouth once daily    meloxicam (MOBIC) 15 mg tablet Take 1 tablet (15 mg total) by mouth daily    mometasone-formoterol (DULERA) 200-5 MCG/ACT inhaler Inhale 2 puffs 2 (two) times a day   montelukast (SINGULAIR) 10 mg tablet Take by mouth    pantoprazole (PROTONIX) 40 mg tablet Take 1 tablet (40 mg total) by mouth daily    SUMAtriptan (IMITREX) 100 mg tablet Take 1 tablet (100 mg total) by mouth once as needed for migraine for up to 1 dose    tiZANidine (ZANAFLEX) 2 mg tablet Take 1 tablet (2 mg total) by mouth every 8 (eight) hours as needed for muscle spasms     No current facility-administered medications on file prior to visit  He has No Known Allergies       Review of Systems   Constitutional: Negative for fatigue and fever  HENT: Negative for congestion  Eyes: Negative for visual disturbance  Respiratory: Negative for cough and shortness of breath  Cardiovascular: Negative for chest pain, palpitations and leg swelling  Gastrointestinal: Positive for abdominal distention  Negative for abdominal pain  Endocrine: Negative for cold intolerance, polydipsia and polyuria  Genitourinary: Negative for difficulty urinating  Groin pain   Musculoskeletal: Positive for arthralgias and myalgias  Negative for back pain and joint swelling  Right hip pain and groin  Skin: Negative for color change and rash  Allergic/Immunologic: Negative for immunocompromised state  Neurological: Negative for dizziness and headaches  Hematological: Negative for adenopathy  Psychiatric/Behavioral: Negative for behavioral problems and sleep disturbance  All other systems reviewed and are negative  Objective:      /78 (BP Location: Left arm, Patient Position: Sitting)   Pulse 68   Resp 18   Wt 103 kg (227 lb)   SpO2 97%   BMI 29 95 kg/m²          Physical Exam   Constitutional: He is oriented to person, place, and time  He appears well-developed and well-nourished  No distress  HENT:   Head: Normocephalic and atraumatic     Right Ear: External ear normal    Left Ear: External ear normal  Mouth/Throat: Oropharynx is clear and moist  No oropharyngeal exudate  Eyes: Pupils are equal, round, and reactive to light  Conjunctivae are normal  Right eye exhibits no discharge  Left eye exhibits no discharge  No scleral icterus  Neck: Normal range of motion  Neck supple  Cardiovascular: Normal rate, regular rhythm, normal heart sounds and intact distal pulses  Exam reveals no gallop and no friction rub  Pulses are no weak pulses  No murmur heard  Pulses:       Dorsalis pedis pulses are 2+ on the right side, and 2+ on the left side  Posterior tibial pulses are 2+ on the right side, and 2+ on the left side  Pulmonary/Chest: Effort normal and breath sounds normal  No respiratory distress  He has no wheezes  Abdominal: Soft  Bowel sounds are normal  He exhibits no distension  There is no tenderness  Musculoskeletal: Normal range of motion  He exhibits no edema, tenderness or deformity  Feet:   Right Foot:   Skin Integrity: Positive for warmth and callus  Negative for ulcer, skin breakdown, erythema or dry skin  Left Foot:   Skin Integrity: Positive for warmth and callus  Negative for ulcer, skin breakdown, erythema or dry skin  Lymphadenopathy:     He has no cervical adenopathy  Neurological: He is alert and oriented to person, place, and time  He exhibits normal muscle tone  Coordination normal    Skin: Skin is warm and dry  No rash noted  He is not diaphoretic  No erythema  Psychiatric: He has a normal mood and affect  His behavior is normal  Judgment and thought content normal    Nursing note and vitals reviewed  Diabetic Foot Exam    Patient's shoes and socks removed  Right Foot/Ankle   Right Foot Inspection  Skin Exam: skin normal, skin intact, warmth, callus and callus no dry skin, no erythema, no maceration, no abnormal color and no ulcer                      Callus Size (cm):1    Toe Exam: ROM and strength within normal limitsno swelling, no tenderness, erythema and no right toe deformity  Sensory   Vibration: intact  Proprioception: intact   Monofilament testing: diminished  Vascular  Capillary refills: < 3 seconds  The right DP pulse is 2+  The right PT pulse is 2+  Right Toe  - Comprehensive Exam  Ecchymosis: none  Arch: normal  Hammertoes: absent  Claw Toes: absent  Swelling: none   Tenderness: none         Left Foot/Ankle  Left Foot Inspection  Skin Exam: skin normal, skin intact, warmth and callusno dry skin, no erythema, no maceration, normal color, no pre-ulcer and no ulcer                     Callus Size (cm): 1    Toe Exam: ROM and strength within normal limitsno swelling, no tenderness, no erythema and no left toe deformity                   Sensory   Vibration: intact  Proprioception: intact  Monofilament: diminished  Vascular  Capillary refills: < 3 seconds  The left DP pulse is 2+  The left PT pulse is 2+  Left Toe  - Comprehensive Exam  Ecchymosis: none  Arch: normal  Hammertoes: absent  Claw toes: absent  Swelling: none   Tenderness: none       Assign Risk Category:  No deformity present; No loss of protective sensation;  No weak pulses       Risk: 1

## 2018-11-21 DIAGNOSIS — N52.9 ED (ERECTILE DYSFUNCTION) OF ORGANIC ORIGIN: Primary | ICD-10-CM

## 2018-11-21 RX ORDER — SILDENAFIL CITRATE 20 MG/1
20 TABLET ORAL DAILY
Qty: 90 TABLET | Refills: 3 | Status: SHIPPED | OUTPATIENT
Start: 2018-11-21 | End: 2019-02-19

## 2018-11-22 NOTE — PROGRESS NOTES
47 y o male presents with 1 month of left ankle pain and foot numbness  The patient states that approximately 1 month ago he experienced pain in his ankle as well as significant numbness in his leg particularly in his foot  Patient was evaluated emergency department and subsequently seen by his family doctor who ordered nerve conduction studies  These nerve conduction studies revealed tarsal tunnel syndrome with no conduction to medial plantar nerve  Patient states that his pain has resolved and now he only complains of numbness the distal aspect of the plantar foot  Numbness is unrelated activity  He states that is worse in the morning when he wakes up and improved somewhat throughout the day however he states he has constant numbness  He denies any change in activity or trauma to his foot over the last month  He denies history of back pain or radicular symptoms  Review of Systems  Review of systems negative unless otherwise specified in HPI    Past Medical History  Past Medical History:   Diagnosis Date    Aorta aneurysm ([address] )     4 3    Asthma     COPD (chronic obstructive pulmonary disease) (HCC)     Depression     GERD (gastroesophageal reflux disease)     Headache     Hiatal hernia     Liver disease     FATTY LIVER    Sleep apnea        Past Surgical History  Past Surgical History:   Procedure Laterality Date    CARPAL TUNNEL RELEASE Left     COLONOSCOPY      FOOT SURGERY Left     FOREIGN BODY REMOVAL    HERNIA REPAIR      [place] COLONOSCOPY FLX DX W/COLLJ SPEC WHEN PFRMD N/A 8/7/2017    Procedure: COLONOSCOPY;  Surgeon: Werner Robertson MD;  Location: [facility]; Service: Gastroenterology    W[place] ESOPHAGOGASTRODUODENOSCOPY TRANSORAL DIAGNOSTIC N/A 10/31/2017    Procedure: ESOPHAGOGASTRODUODENOSCOPY (EGD); Surgeon: Werner Robertson MD;  Location: [facility];   Service: Gastroenterology       Current Medications  Current Outpatient Prescriptions on File Prior to Visit   Medication Sig Reason For Visit     A venous draw was performed on 17.  INR Follow-Up. Discussed the following information via telephone with the nurse. Boubacar - nurse.       Referred By   Dr. Weathers.      History of Present Illness    Symptoms:.   The patient is currently asymptomatic.   Additional Screening:. No changes per nursing home RN.    No. Missed dose(s).    No: Extra dose(s).   No: Significant medication changes since last visit .   No: Vitamin K dietary changes.    No: S/Sx(s) of bleeding or bruising.    No: ETOH Intake.    No: Falls/Injuries.    No: Acute Illness.    No: Procedure/Hospital/ER Visit.      Current Meds   1. Warfarin Sodium 5 MG Oral Tablet (Coumadin); TAKE 1 to 1 & 1/2 tablets by mouth daily as   directed;   Therapy: [id number] to (Evaluate:[id number])  Requested for: 2017; Last   Rx:2017 Ordered    Results/Data  Coumadin New    ** Printed in Appendix #1 below.   INR 2.6.      Assessment    Indication: Atrial fibrillation.   Goal INR Range: 2.0 - 3.0.   Estimated Duration: long term.   Anticoagulation Clinic Order Date: 11.10.16.   INR is therapeutic today.      Orders      Follow-up: 1 month.   Comments: Provided nurse Boubacar with result, dosing and recheck instructions. He verbalized understanding and repeated back.      Signatures   Electronically signed by : Soledad Grullon R.N.; 2017  2:01PM CST    Appendix #1     Coumadin New   Patient: RIVERA RICO; : 1933; MRN: [id number]      [id number]   PROTHROMBIN TIME  27.1     INR 2.6  2.3  3.0    INR ([id number])      PROTHROMBIN TIME, FINGERSTICK      INR, FINGERSTICK      CURRENT DOSE 5 mg daily  Old dose: Warfarin 5mg daily  Old dose 7.5mg Mon and 5mg the ROW.    COMMENT recheck INR 1 month  recheck in 2 weeks  Recheck 10-25-17    RECOMMENDED DOSE continue current dose  New dose: continue same dose  New dose 5mg daily.     Dispense Refill    albuterol (PROVENTIL HFA,VENTOLIN HFA) 90 mcg/act inhaler Inhale 2 puffs every 6 (six) hours as needed for wheezing   buPROPion (WELLBUTRIN) 75 mg tablet Take by mouth      fenofibrate (TRICOR) 145 mg tablet Take 145 mg by mouth daily      fluticasone (FLONASE) 50 mcg/act nasal spray 1 spray into each nostril daily 16 g 0    guaiFENesin (MUCINEX) 600 mg 12 hr tablet Take 2 tablets by mouth every 12 (twelve) hours 14 tablet 0    Mometasone Furo-Formoterol Fum (DULERA) 200-5 MCG/ACT AERO Inhale 2 puffs 2 (two) times a day   montelukast (SINGULAIR) 10 mg tablet Take by mouth      pantoprazole (PROTONIX) 40 mg tablet Take 40 mg by mouth daily  No current facility-administered medications on file prior to visit  Recent Labs [facility])  @LABALLVALUEIP(HCT:3,HGB:3,PT,INR,WBC:3,ESR,CRP,GLUCOSE,HGBA1C)@      Physical exam  · General: Awake, Alert, Oriented  · Eyes: Pupils equal, round and reactive to light  · Heart: regular rate and rhythm  · Lungs: No audible wheezing  · Abdomen: soft  left Foot  · Skin intact, no erythema, no ecchymosis  · Pes planus, too many toe sign  ·  sensation intact to light touch in sural, saphenous, superficial peroneal, deep peroneal, tibial nerve distributions  · Mild positive Tinel sign over tarsal tunnel  · 5/5 strength with ankle dorsiflexion, plantar flexion, eversion  · 4/5 strength with ankle inversion  · Plus two dorsalis pedis      Imaging  X-rays of left ankle obtained reviewed at today's visit  They revealed no fractures and well-aligned tibiotalar subtalar joint without evidence of hindfoot or midfoot degenerative changes    Assessment:   47 y o male with persistent numbness in left foot and nerve conduction studies consistent with tarsal tunnel syndrome    Plan  · Weightbearing:  As tolerated  · Activity:   As tolerated  · Discussion was had with patient concerning treatment of his tarsal tunnel syndrome    [facility] was contacted the and referral was placed  The patient can continue taking over-the-counter pain medication as needed for pain relief should pain returned    Patient instructed to follow up with Podiatry for treatment of tarsal tunnel syndrome  · The patient follow up with Dr Dread Melendez on as-needed basis only

## 2018-11-26 DIAGNOSIS — F43.23 ADJUSTMENT REACTION WITH ANXIETY AND DEPRESSION: ICD-10-CM

## 2018-11-27 ENCOUNTER — TELEPHONE (OUTPATIENT)
Dept: PAIN MEDICINE | Facility: CLINIC | Age: 53
End: 2018-11-27

## 2018-11-27 RX ORDER — BUPROPION HYDROCHLORIDE 75 MG/1
TABLET ORAL
Qty: 60 TABLET | Refills: 3 | Status: SHIPPED | OUTPATIENT
Start: 2018-11-27 | End: 2019-05-14 | Stop reason: SDUPTHER

## 2018-12-04 ENCOUNTER — PROCEDURE VISIT (OUTPATIENT)
Dept: PAIN MEDICINE | Facility: CLINIC | Age: 53
End: 2018-12-04
Payer: COMMERCIAL

## 2018-12-04 VITALS
DIASTOLIC BLOOD PRESSURE: 76 MMHG | RESPIRATION RATE: 16 BRPM | HEIGHT: 73 IN | BODY MASS INDEX: 30.09 KG/M2 | SYSTOLIC BLOOD PRESSURE: 110 MMHG | WEIGHT: 227 LBS | HEART RATE: 74 BPM

## 2018-12-04 DIAGNOSIS — R10.9 CHRONIC ABDOMINAL PAIN: Primary | ICD-10-CM

## 2018-12-04 DIAGNOSIS — G89.29 CHRONIC ABDOMINAL PAIN: Primary | ICD-10-CM

## 2018-12-04 PROCEDURE — 64486 TAP BLOCK UNIL BY INJECTION: CPT | Performed by: ANESTHESIOLOGY

## 2018-12-04 PROCEDURE — 76942 ECHO GUIDE FOR BIOPSY: CPT | Performed by: ANESTHESIOLOGY

## 2018-12-04 PROCEDURE — 99213 OFFICE O/P EST LOW 20 MIN: CPT | Performed by: ANESTHESIOLOGY

## 2018-12-04 RX ORDER — BUPIVACAINE HYDROCHLORIDE 2.5 MG/ML
10 INJECTION, SOLUTION EPIDURAL; INFILTRATION; INTRACAUDAL ONCE
Status: COMPLETED | OUTPATIENT
Start: 2018-12-04 | End: 2018-12-04

## 2018-12-04 RX ORDER — METHYLPREDNISOLONE ACETATE 80 MG/ML
80 INJECTION, SUSPENSION INTRA-ARTICULAR; INTRALESIONAL; INTRAMUSCULAR; SOFT TISSUE ONCE
Status: COMPLETED | OUTPATIENT
Start: 2018-12-04 | End: 2018-12-04

## 2018-12-04 RX ORDER — LIDOCAINE HYDROCHLORIDE 10 MG/ML
5 INJECTION, SOLUTION INFILTRATION; PERINEURAL ONCE
Status: COMPLETED | OUTPATIENT
Start: 2018-12-04 | End: 2018-12-04

## 2018-12-04 RX ADMIN — BUPIVACAINE HYDROCHLORIDE 10 ML: 2.5 INJECTION, SOLUTION EPIDURAL; INFILTRATION; INTRACAUDAL at 16:43

## 2018-12-04 RX ADMIN — LIDOCAINE HYDROCHLORIDE 5 ML: 10 INJECTION, SOLUTION INFILTRATION; PERINEURAL at 16:46

## 2018-12-04 RX ADMIN — METHYLPREDNISOLONE ACETATE 80 MG: 80 INJECTION, SUSPENSION INTRA-ARTICULAR; INTRALESIONAL; INTRAMUSCULAR; SOFT TISSUE at 16:47

## 2018-12-04 NOTE — PROGRESS NOTES
Assessment:  1  Chronic abdominal pain  Nerve block    bupivacaine (PF) (MARCAINE) 0 25 % injection 10 mL    lidocaine (XYLOCAINE) 1 % injection 5 mL    methylPREDNISolone acetate (DEPO-MEDROL) injection 80 mg         Plan: This is a 39-year-old male who presents today with right-sided abdominal pain  Patient is here today for a TAP block  Complete risks and benefits including bleeding, infection, tissue reaction, nerve injury and allergic reaction were discussed  The approach was demonstrated using models and literature was provided  Verbal and written consent was obtained  My impressions and treatment recommendations were discussed in detail with the patient who verbalized understanding and had no further questions  Discharge instructions were provided  I personally saw and examined the patient and I agree with the above discussed plan of care  History of Present Illness:  Shyla Ulloa is a 46 y o  male who presents for a follow up office visit in regards to Abdominal Pain  The patients current symptoms include right sided abdominal pain  Patient is here today for a TAP block  I have personally reviewed and/or updated the patient's past medical history, past surgical history, family history, social history, current medications, allergies, and vital signs today  Review of Systems   Respiratory: Negative for shortness of breath  Cardiovascular: Negative for chest pain  Gastrointestinal: Negative for constipation, diarrhea, nausea and vomiting  Musculoskeletal: Negative for arthralgias, gait problem, joint swelling and myalgias  Skin: Negative for rash  Neurological: Negative for dizziness, seizures and weakness  All other systems reviewed and are negative        Patient Active Problem List   Diagnosis    Dizziness    COPD [facility]) [facility]    Ascending aortic aneurysm (HCC)    Bicuspid aortic valve    Adjustment reaction with anxiety and depression  Allergic rhinitis    GERD (gastroesophageal reflux disease)    Liver disease    Hiatal hernia    Encounter to establish care    Prediabetes    Diabetic eye exam ([facility] )    CKD (chronic kidney disease) stage 3, GFR 30-59 ml/min (HCC)    Hyperlipidemia, mixed    Weight gain    Fatigue    Generalized abdominal pain    Mild intermittent asthma without complication    Seasonal allergic rhinitis due to pollen    Numbness of fingers of both hands    Depression with anxiety    Abdominal bloating    Type 2 diabetes mellitus without complication (HCC)    Vitamin D deficiency    Renal cyst, left    Hepatic cyst    Fatty liver disease, nonalcoholic    Erectile dysfunction    Carpal tunnel syndrome of right wrist    Chronic pain of right knee    Vestibular migraine    Patellar tendinitis of right knee    Hamstring tightness of right lower extremity    Benign paroxysmal positional vertigo due to bilateral vestibular disorder    Pain in right hip    Hip impingement syndrome, right    Primary osteoarthritis of right hip    Ulnar neuropathy of both upper extremities    Lumbosacral strain    Tarsal tunnel syndrome of right side    Cubital tunnel syndrome, bilateral    Need for influenza vaccination    Need for pneumococcal vaccination    Need for hepatitis C screening test    Groin pain, chronic, right       Past Medical History:   Diagnosis Date    Aorta aneurysm (HCC)     4 3    Arm DVT (deep venous thromboembolism), acute ([facility] [address] ) 0117    complications of cardiac cath    Asthma     CKD (chronic kidney disease), stage III (HCC)     COPD (chronic obstructive pulmonary disease) (HCC)     Depression     GERD (gastroesophageal reflux disease)     Headache     Hiatal hernia     Hyperlipidemia, mixed 5/7/2018    Liver disease     FATTY LIVER    Prediabetes     Renal calculi     Sleep apnea     Vestibular migraine        Past Surgical History:   Procedure Laterality Date    CARPAL TUNNEL RELEASE Left     COLONOSCOPY      FL INJECTION RIGHT HIP (ARTHROGRAM)  8/20/2018    FOOT SURGERY Left     FOREIGN BODY REMOVAL    HERNIA REPAIR      VT COLONOSCOPY FLX DX W/COLLJ SPEC WHEN PFRMD N/A 8/7/2017    Procedure: COLONOSCOPY;  Surgeon: Edilia Dodd MD;  Location: [facility]; Service: Gastroenterology    VT ESOPHAGOGASTRODUODENOSCOPY TRANSORAL DIAGNOSTIC N/A 10/31/2017    Procedure: ESOPHAGOGASTRODUODENOSCOPY (EGD); Surgeon: Edilia Dodd MD;  Location: [facility]; Service: Gastroenterology       Family History   Problem Relation Age of Onset    Cancer Brother     Prostate cancer Brother     Leukemia Brother         his only brother dies from [address] or leukemia pt unsure he was only 46    Arthritis Mother     Heart attack Father     Clotting disorder Father     Schizoaffective Disorder  Sister     Aortic aneurysm Other         abdominal       Social History     Occupational History    unemployed      Social History Main Topics    Smoking status: Former Smoker     Types: Cigars     Quit date: 8/7/2014    Smokeless tobacco: Never Used    Alcohol use Yes      Comment: occasional    Drug use: No    Sexual activity: Not on file       Current Outpatient Prescriptions on File Prior to Visit   Medication Sig    albuterol (PROVENTIL HFA,VENTOLIN HFA) 90 mcg/act inhaler Inhale 2 puffs every 6 (six) hours as needed for wheezing   amitriptyline (ELAVIL) 25 mg tablet 25 mg daily at bedtime      buPROPion (WELLBUTRIN) 75 mg tablet take 1 tablet by mouth twice a day    ergocalciferol (VITAMIN D2) 50,000 units Take 50,000 Units by mouth once a week      fenofibrate (TRICOR) 145 mg tablet take 1 tablet by mouth once daily    meloxicam (MOBIC) 15 mg tablet Take 1 tablet (15 mg total) by mouth daily    mometasone-formoterol (DULERA) 200-5 MCG/ACT inhaler Inhale 2 puffs 2 (two) times a day      montelukast (SINGULAIR) 10 mg tablet Take by mouth    pantoprazole (PROTONIX) 40 mg tablet Take 1 tablet (40 mg total) by mouth daily    sildenafil (REVATIO) 20 mg tablet Take 1 tablet (20 mg total) by mouth daily As directed    SUMAtriptan (IMITREX) 100 mg tablet Take 1 tablet (100 mg total) by mouth once as needed for migraine for up to 1 dose    tiZANidine (ZANAFLEX) 2 mg tablet Take 1 tablet (2 mg total) by mouth every 8 (eight) hours as needed for muscle spasms     No current facility-administered medications on file prior to visit  No Known Allergies    Physical Exam:    /76   Pulse 74   Resp 16   Ht 6' 1" (1 854 m)   Wt 103 kg (227 lb)   BMI 29 95 kg/m²     Constitutional:normal, well developed, well nourished, alert, in no distress and non-toxic and no overt pain behavior  Eyes:anicteric  HEENT:grossly intact  Neck:supple, symmetric, trachea midline and no masses   Pulmonary:even and unlabored  Cardiovascular:No edema or pitting edema present  Skin:Normal without rashes or lesions and well hydrated  Psychiatric:Mood and affect appropriate  Neurologic:Cranial Nerves II-XII grossly intact  Musculoskeletal:normal    Nerve block  Date/Time: 12/4/2018 4:37 PM  Performed by: Bud Liner  Authorized by: Bud Liner     Consent:     Consent obtained:  Verbal and written    Consent given by:  Patient    Risks discussed: Allergic reaction, infection, nerve damage, pain, intravenous injection, bleeding and unsuccessful block    Alternatives discussed:  No treatment and alternative treatment  Universal protocol:     Procedure explained and questions answered to patient or proxy's satisfaction: yes      Relevant documents present and verified: yes      Test results available and properly labeled: no     Radiology Images displayed and confirmed   If images not available, report reviewed: no      Required blood products, implants, devices, and special equipment available: no      Site marked: yes      Time out called: yes      Patient identity confirmed:  Verbally with patient and provided demographic data  Indications:     Indications:  Pain relief  Location:     Laterality:  Right  Pre-procedure details:     Skin preparation:  2% chlorhexidine    Preparation: Patient was prepped and draped in usual sterile fashion    Skin anesthesia (see MAR for exact dosages):     Skin anesthesia method:  Local infiltration    Local anesthetic:  Lidocaine 1% w/o epi  Procedure details (see MAR for exact dosages): Block needle gauge:  20 G    Guidance: ultrasound      Anesthetic injected:  Bupivacaine 0 25% w/o epi    Steroid injected:  Methylprednisolone    Injection procedure:  Anatomic landmarks identified, incremental injection, negative aspiration for blood, anatomic landmarks palpated and introduced needle  Post-procedure details:     Dressing:  Sterile dressing    Outcome:  Pain relieved    Patient tolerance of procedure: Tolerated well, no immediate complications  Comments:        Pre-procedure Diagnosis: Chronic Abdominal Pain   Post-procedure Diagnosis: Chronic Abdominal Pain   Procedure Title(s):  1  RIGHT US guided Transversus Abdominal Plane         Block       2  Ultrasound Guided   Attending Surgeon:   Mariely Guevara MD  Anesthesia:   Local    Procedure Note:  The patients history and physical exam were reviewed  I explained the details of the procedure to the patient including the risks, benefits and other alternatives  We had an informed discussion and the patient verbalized understanding and signed the consent form  All questions were answered appropriately  The ultrasound probe is placed in a transverse plane to the lateral abdominal wall in the midaxillary line, between the lower costal margin and iliac crest  The use of ultrasound allows for accurate deposition of the local anaesthetic in the correct neurovascular plane - between the internal oblique muscle and the transversus abdominus muscle       Requirements: Ultrasound machine with a high frequency probe (10 MHz); Needle: 50mm needle; 10 ml syringe; 10 ml local anaesthetic 0 25% bupivacaine and 3cc's 1% Lidocaine; Depomedrol 80mg 1cc  Procedure: The desired area of target (between the coastal margin and the iliac crest) was sterilely prepped and draped  3cc of 1% lidocaine was used to anesthesized the region  The stimuplex needle was then introduced in plane of the ultrasound probe directly under the probe and advanced until it reaches the plane between the internal oblique and transversus abdominis muscles  Upon reaching the plane, 2 ml of 1% lidocaine was injected to confirm correct needle position after which 10 ml of local anaesthetic solution (0 25% bupivacaine) with 80mg depomedrol was slowly injected  The transversus abdominis plane was visualized expanding with the injection (this appeared as a hypoechoic space)  Disposition:  The patient reported immediate pain relief  There was no complications  He was discharged home to follow up in 4 weeks               Imaging

## 2018-12-06 DIAGNOSIS — M25.551 ARTHRALGIA OF RIGHT HIP: Primary | ICD-10-CM

## 2018-12-06 NOTE — TELEPHONE ENCOUNTER
Patient called in requesting a call back  He would like to talk to someone and discuss options  He stated he is in a lot of pain  Best contact# [phone]   Please advise, thanks

## 2018-12-06 NOTE — TELEPHONE ENCOUNTER
S/w pt  States pain is severe "above groin " States it is a 10/10  States right sided pain improved with 12/4 TAP block but this pain  Has increased  Pt states he took a muscle relaxer and ES tylenol 45 minutes ago and wanted SI to be aware for any other recommendations at this time   Advised pt if pain is severe he should seek ED eval  Pt states ED does not do anything for his pain and would like input from SI

## 2018-12-06 NOTE — TELEPHONE ENCOUNTER
I spoke with patient and he states that the TAP Block helped with his RLQ pain  He has had right hip inj in the past which helped  I advised patient that his pain is likely coming from the right hip and that a repeat rt hip injection will help  Pls schedule  Order in system

## 2018-12-14 ENCOUNTER — TELEPHONE (OUTPATIENT)
Dept: PAIN MEDICINE | Facility: CLINIC | Age: 53
End: 2018-12-14

## 2018-12-14 ENCOUNTER — TELEPHONE (OUTPATIENT)
Dept: PAIN MEDICINE | Facility: MEDICAL CENTER | Age: 53
End: 2018-12-14

## 2018-12-14 DIAGNOSIS — S39.012A LUMBOSACRAL STRAIN, INITIAL ENCOUNTER: ICD-10-CM

## 2018-12-14 DIAGNOSIS — M16.9 LOCALIZED OSTEOARTHRITIS OF HIP: Primary | ICD-10-CM

## 2018-12-14 RX ORDER — TRAMADOL HYDROCHLORIDE 50 MG/1
50 TABLET ORAL EVERY 8 HOURS PRN
Qty: 21 TABLET | Refills: 0 | Status: SHIPPED | OUTPATIENT
Start: 2018-12-14 | End: 2018-12-21 | Stop reason: ALTCHOICE

## 2018-12-14 RX ORDER — MELOXICAM 15 MG/1
15 TABLET ORAL DAILY
Qty: 30 TABLET | Refills: 0 | Status: SHIPPED | OUTPATIENT
Start: 2018-12-14 | End: 2019-01-25

## 2018-12-14 NOTE — TELEPHONE ENCOUNTER
S/w pt  States groin pain is severe today  Taking ES tylenol  Pt scheduled for hip injection 12/20  Any recommendations at this time?

## 2018-12-14 NOTE — TELEPHONE ENCOUNTER
Caller: Patient  Call Back #:[phone]    Pt called stating Dr Bonnie Varner told him that if he gets pain in the groin area  States it is a 10+/10 level of pain  Please call back to advise

## 2018-12-14 NOTE — TELEPHONE ENCOUNTER
I sent a 7 day supply for tramadol 50 mg p o  t i d  to his pharmacy, 21 tablets to hold him over until injection   He should disregard meloxicam

## 2018-12-14 NOTE — TELEPHONE ENCOUNTER
shauna- [facility]  [phone]  Dr Shyann Simon is calling in from [facility] stating that traMADol (ULTRAM) 50 mg tablet needs per auth   Please assist

## 2018-12-14 NOTE — TELEPHONE ENCOUNTER
Per pt, can not take NSAIDs due to kidney function  Advised if pain worsens, he should seek ED eval  Advised will cb if any further recommendations today

## 2018-12-14 NOTE — TELEPHONE ENCOUNTER
S/w pt and advised him to disregard mobic prescription, as it is an NSAID  Advised of new script for tramadol  Pt verbalized understanding

## 2018-12-17 ENCOUNTER — TELEPHONE (OUTPATIENT)
Dept: PAIN MEDICINE | Facility: CLINIC | Age: 53
End: 2018-12-17

## 2018-12-18 ENCOUNTER — HOSPITAL ENCOUNTER (OUTPATIENT)
Dept: RADIOLOGY | Facility: CLINIC | Age: 53
Discharge: HOME | End: 2018-12-18
Attending: ANESTHESIOLOGY | Admitting: ANESTHESIOLOGY
Payer: COMMERCIAL

## 2018-12-18 VITALS
DIASTOLIC BLOOD PRESSURE: 82 MMHG | TEMPERATURE: 97.9 F | OXYGEN SATURATION: 95 % | SYSTOLIC BLOOD PRESSURE: 116 MMHG | HEART RATE: 77 BPM | RESPIRATION RATE: 20 BRPM

## 2018-12-18 DIAGNOSIS — M25.551 ARTHRALGIA OF RIGHT HIP: ICD-10-CM

## 2018-12-18 PROCEDURE — 77002 NEEDLE LOCALIZATION BY XRAY: CPT

## 2018-12-18 PROCEDURE — 77002 NEEDLE LOCALIZATION BY XRAY: CPT | Performed by: ANESTHESIOLOGY

## 2018-12-18 PROCEDURE — 20610 DRAIN/INJ JOINT/BURSA W/O US: CPT | Performed by: ANESTHESIOLOGY

## 2018-12-18 RX ORDER — BUPIVACAINE HCL/PF 2.5 MG/ML
5 VIAL (ML) INJECTION ONCE
Status: COMPLETED | OUTPATIENT
Start: 2018-12-18 | End: 2018-12-18

## 2018-12-18 RX ORDER — METHYLPREDNISOLONE ACETATE 80 MG/ML
80 INJECTION, SUSPENSION INTRA-ARTICULAR; INTRALESIONAL; INTRAMUSCULAR; PARENTERAL; SOFT TISSUE ONCE
Status: COMPLETED | OUTPATIENT
Start: 2018-12-18 | End: 2018-12-18

## 2018-12-18 RX ORDER — LIDOCAINE HYDROCHLORIDE 10 MG/ML
5 INJECTION, SOLUTION EPIDURAL; INFILTRATION; INTRACAUDAL; PERINEURAL ONCE
Status: COMPLETED | OUTPATIENT
Start: 2018-12-18 | End: 2018-12-18

## 2018-12-18 RX ADMIN — BUPIVACAINE HYDROCHLORIDE 5 ML: 2.5 INJECTION, SOLUTION EPIDURAL; INFILTRATION; INTRACAUDAL at 13:14

## 2018-12-18 RX ADMIN — IOHEXOL 1 ML: 300 INJECTION, SOLUTION INTRAVENOUS at 13:14

## 2018-12-18 RX ADMIN — LIDOCAINE HYDROCHLORIDE 5 ML: 10 INJECTION, SOLUTION EPIDURAL; INFILTRATION; INTRACAUDAL; PERINEURAL at 13:13

## 2018-12-18 RX ADMIN — METHYLPREDNISOLONE ACETATE 80 MG: 80 INJECTION, SUSPENSION INTRA-ARTICULAR; INTRALESIONAL; INTRAMUSCULAR; PARENTERAL; SOFT TISSUE at 13:15

## 2018-12-18 NOTE — TELEPHONE ENCOUNTER
Spoke with pharmacy following up to status on prior authorization for tramadol   Ins co # [id number]    Call back number is [phone]

## 2018-12-18 NOTE — DISCHARGE INSTR - LAB
Steroid Joint Injection   WHAT YOU NEED TO KNOW:   A steroid joint injection is a procedure to inject steroid medicine into a joint  Steroid medicine decreases pain and inflammation  The injection may also contain an anesthetic (numbing medicine) to decrease pain  It may be done to treat conditions such as arthritis, gout, or carpal tunnel syndrome  The injections may be given in your knee, ankle, shoulder, elbow, wrist, ankle or sacroiliac joint  1  Do not apply heat to any area that is numb  If you have discomfort or soreness at the injection site, you may apply ice today, 20 minutes on and 20 minutes off  Tomorrow you may use ice or warm, moist heat  Do not apply ice or heat directly to the skin  2  You may have an increase or change in the discomfort for 36-48 hours after your treatment  Apply ice and continue with any pain medicine you have been prescribed  3  Do not do anything strenuous today  You may shower, but no tub baths or hot tubs today  You may resume your normal activities tomorrow, but do not overdo it  Resume normal activities slowly when you are feeling better  4  If you experience redness, drainage or swelling at the injection site, or if you develop a fever above 100 degrees, please call [facility] at [phone] or go to the Emergency Room  5  Continue to take all routine medicines prescribed by your primary care physician unless otherwise instructed by our staff  Most blood thinners should be started again according to your regularly scheduled dosing  If you have any questions, please give our office a call  If you have a problem specifically related to your procedure, please call our office at [phone]  Problems not related to your procedure should be directed to your primary care physician

## 2018-12-18 NOTE — H&P
History of Present Illness: The patient is a 46 y o  male who presents with complaints of RIGHT Hip Pain  Patient is here for a right hip injection      Patient Active Problem List   Diagnosis    Dizziness    [facility]    Ascending aortic aneurysm (HCC)    Bicuspid aortic valve    Adjustment reaction with anxiety and depression    Allergic rhinitis    GERD (gastroesophageal reflux disease)    Liver disease    Hiatal hernia    Encounter to establish care    Prediabetes    Diabetic eye exam ([place] [address] )    CKD (chronic kidney disease) stage 3, GFR 30-59 ml/min ([facility])    Hyperlipidemia, mixed    Weight gain    Fatigue    Generalized abdominal pain    Mild intermittent asthma without complication    Seasonal allergic rhinitis due to pollen    Numbness of fingers of both hands    Depression with anxiety    Abdominal bloating    Type 2 diabetes mellitus without complication ([facility])    Vitamin D deficiency    Renal cyst, left    Hepatic cyst    Fatty liver disease, nonalcoholic    Erectile dysfunction    Carpal tunnel syndrome of right wrist    Chronic pain of right knee    Vestibular migraine    Patellar tendinitis of right knee    Hamstring tightness of right lower extremity    Benign paroxysmal positional vertigo due to bilateral vestibular disorder    Pain in right hip    Hip impingement syndrome, right    Primary osteoarthritis of right hip    Ulnar neuropathy of both upper extremities    Lumbosacral strain    Tarsal tunnel syndrome of right side    Cubital tunnel syndrome, bilateral    Need for influenza vaccination    Need for pneumococcal vaccination    Need for hepatitis C screening test    Groin pain, chronic, right       Past Medical History:   Diagnosis Date    Aorta aneurysm ([facility] )     4 3    Arm DVT (deep venous thromboembolism), acute ([facility] ) 5057    complications of cardiac cath    Asthma     [facility]     COPD (chronic obstructive pulmonary disease) (HCC)     Depression     GERD (gastroesophageal reflux disease)     Headache     Hiatal hernia     Hyperlipidemia, mixed 5/7/2018    Liver disease     FATTY LIVER    Prediabetes     Renal calculi     Sleep apnea     Vestibular migraine        Past Surgical History:   Procedure Laterality Date    CARPAL TUNNEL RELEASE Left     COLONOSCOPY      FL INJECTION RIGHT HIP (ARTHROGRAM)  8/20/2018    FOOT SURGERY Left     FOREIGN BODY REMOVAL    HERNIA REPAIR      VA COLONOSCOPY FLX DX W/COLLJ SPEC WHEN PFRMD N/A 8/7/2017    Procedure: COLONOSCOPY;  Surgeon: Faye Guillen MD;  Location: [facility]; Service: Gastroenterology    VA ESOPHAGOGASTRODUODENOSCOPY TRANSORAL DIAGNOSTIC N/A 10/31/2017    Procedure: ESOPHAGOGASTRODUODENOSCOPY (EGD); Surgeon: Faye Guillen MD;  Location: [facility];   Service: Gastroenterology         Current Outpatient Prescriptions:     albuterol (PROVENTIL HFA,VENTOLIN HFA) 90 mcg/act inhaler, Inhale 2 puffs every 6 (six) hours as needed for wheezing , Disp: , Rfl:     amitriptyline (ELAVIL) 25 mg tablet, 25 mg daily at bedtime  , Disp: , Rfl: 0    buPROPion (WELLBUTRIN) 75 mg tablet, take 1 tablet by mouth twice a day, Disp: 60 tablet, Rfl: 3    ergocalciferol (VITAMIN D2) 50,000 units, Take 50,000 Units by mouth once a week  , Disp: , Rfl:     fenofibrate (TRICOR) 145 mg tablet, take 1 tablet by mouth once daily, Disp: 30 tablet, Rfl: 6    meloxicam (MOBIC) 15 mg tablet, Take 1 tablet (15 mg total) by mouth daily, Disp: 30 tablet, Rfl: 0    mometasone-formoterol (DULERA) 200-5 MCG/ACT inhaler, Inhale 2 puffs 2 (two) times a day , Disp: , Rfl:     montelukast (SINGULAIR) 10 mg tablet, Take by mouth, Disp: , Rfl:     pantoprazole (PROTONIX) 40 mg tablet, Take 1 tablet (40 mg total) by mouth daily, Disp: 30 tablet, Rfl: 5    sildenafil (REVATIO) 20 mg tablet, Take 1 tablet (20 mg total) by mouth daily As directed, Disp: 90 tablet, Rfl: 3    SUMAtriptan (IMITREX) 100 mg tablet, Take 1 tablet (100 mg total) by mouth once as needed for migraine for up to 1 dose, Disp: 9 tablet, Rfl: 3    tiZANidine (ZANAFLEX) 2 mg tablet, Take 1 tablet (2 mg total) by mouth every 8 (eight) hours as needed for muscle spasms, Disp: 90 tablet, Rfl: 0    traMADol (ULTRAM) 50 mg tablet, Take 1 tablet (50 mg total) by mouth every 8 (eight) hours as needed for moderate pain for up to 7 days, Disp: 21 tablet, Rfl: 0    No Known Allergies    Physical Exam:   Vitals:    12/18/18 1253   BP: 147/52   Pulse: 77   Resp: 20   Temp: 97 9 °F (36 6 °C)   SpO2: 95%     General: Awake, Alert, Oriented x 3, Mood and affect appropriate  Respiratory: Respirations even and unlabored  Cardiovascular: Peripheral pulses intact; no edema  Musculoskeletal Exam: wnl     ASA Score: 2    Patient/Chart Verification  Patient ID Verified: Verbal  ID Band Applied: No  Consents Confirmed: To be obtained in the Pre-Procedure area  H&P( within 30 days) Verified: To be obtained in the Pre-Procedure area  Interval H&P(within 24 hr) Complete (required for Outpatients and Surgery Admit only): To be obtained in the Pre-Procedure area  Allergies Reviewed: Yes  Anticoag/NSAID held?: NA  Currently on antibiotics?: No  Pregnancy denied?: NA    Assessment:   1   Arthralgia of right hip        Plan: rt hip inj

## 2018-12-19 NOTE — TELEPHONE ENCOUNTER
I spoke with patient regarding denial I received  He needs to sign opioid contract  I will resend Nani Back after contract is signed

## 2018-12-20 NOTE — TELEPHONE ENCOUNTER
Informed pt and pharmacy  Patient did question amount to take daily  I did tell him to take it as prescribed and if it is not helping to cb  He said he thinks he took this medication in the past and in order for it to work he needed to take 2 at a time

## 2018-12-20 NOTE — TELEPHONE ENCOUNTER
S/W pt and made aware that Tramadol is short-term and to take only as prescribed  States pain is 7-8/10, mostly in groin  Advised pt that injection could take up to 2 weeks for maximum benefit  Advised that we would call him next week for update  Pt agreeable

## 2018-12-20 NOTE — TELEPHONE ENCOUNTER
FYI> Please see below  SI had ordered a short-term 7 day supply of Tramadol before injection which ended up needing Prior Auth  Had injection 12/18  Next OV f/u on 1/14/19  Will get post procedure F/U call next week

## 2018-12-26 ENCOUNTER — TELEPHONE (OUTPATIENT)
Dept: PAIN MEDICINE | Facility: CLINIC | Age: 53
End: 2018-12-26

## 2018-12-27 DIAGNOSIS — G43.809 VESTIBULAR MIGRAINE: Primary | ICD-10-CM

## 2018-12-27 RX ORDER — AMITRIPTYLINE HYDROCHLORIDE 25 MG/1
TABLET, FILM COATED ORAL
Qty: 30 TABLET | Refills: 6 | Status: SHIPPED | OUTPATIENT
Start: 2018-12-27 | End: 2019-02-14 | Stop reason: ALTCHOICE

## 2018-12-27 NOTE — TELEPHONE ENCOUNTER
Pt returned call stating that he did have relief for a few days, but the pain is coming back now  States about 50% relief  His current pain level is about a 4  Pt is aware of his f/u visit and will continue taking his pain meds if needed

## 2018-12-27 NOTE — TELEPHONE ENCOUNTER
Patient called and said his pain is now currently twice as bad in his groin area now  Please advise  He took a pain pill   Tramadol

## 2018-12-28 NOTE — TELEPHONE ENCOUNTER
S/w pt and he said he took to pills yesterday for pain and today it is better  He said he does not want to change his appt yet

## 2019-01-11 ENCOUNTER — TELEPHONE (OUTPATIENT)
Dept: OBGYN CLINIC | Facility: HOSPITAL | Age: 54
End: 2019-01-11

## 2019-01-11 NOTE — TELEPHONE ENCOUNTER
Patient is calling to let us know that he is experiencing pain again, it is in his groin on the right side and right knee  Says that he was told to call whenever he is having pain and wanted to make the doctor aware  He says that he has had this pain from new years senthil to current      Margarita Horne pt

## 2019-01-14 ENCOUNTER — OFFICE VISIT (OUTPATIENT)
Dept: PAIN MEDICINE | Facility: CLINIC | Age: 54
End: 2019-01-14
Payer: COMMERCIAL

## 2019-01-14 VITALS
SYSTOLIC BLOOD PRESSURE: 118 MMHG | HEART RATE: 82 BPM | DIASTOLIC BLOOD PRESSURE: 80 MMHG | HEIGHT: 73 IN | RESPIRATION RATE: 16 BRPM | BODY MASS INDEX: 30.09 KG/M2 | WEIGHT: 227 LBS

## 2019-01-14 DIAGNOSIS — M16.11 PRIMARY OSTEOARTHRITIS OF RIGHT HIP: Primary | ICD-10-CM

## 2019-01-14 PROCEDURE — 99214 OFFICE O/P EST MOD 30 MIN: CPT | Performed by: ANESTHESIOLOGY

## 2019-01-14 RX ORDER — TRAMADOL HYDROCHLORIDE 50 MG/1
50 TABLET ORAL 2 TIMES DAILY PRN
Qty: 30 TABLET | Refills: 2 | Status: SHIPPED | OUTPATIENT
Start: 2019-01-14 | End: 2019-04-12 | Stop reason: SDUPTHER

## 2019-01-14 NOTE — PROGRESS NOTES
Assessment:  1  Primary osteoarthritis of right hip  Ambulatory referral to Orthopedic Surgery    traMADol (ULTRAM) 50 mg tablet         Plan: This is a 62-year-old male who presents today for follow-up office visit for management of right-sided hip pain  We performed a series of pain interventions without significant relief pain  Patient is managed currently with tramadol 50 mg 1-2 tablets daily p r n  Pain  Patient denies aberrant behavior  Patient reports adequate pain relief  Patient continues to perform ADLs without difficulty  Patient denies adverse side effects  He cannot take NSAIDs due to past history of kidney issues  Will proceed forward for a provide a refill of tramadol 50 mg twice a day p r n  For moderate to severe pain  He will receive 2 refills  We will follow up in 12 weeks  I will refer to Dr Eugene Mcguire for surgical re-evaluation  An opioid contract was reviewed with the patient  The patient was made aware they are only to receive opioid medication from our office, and must take the medication as prescribed  If the medication is lost or stolen, it will not be replaced  We also do not condone the use of illegal substances as well as the use of alcohol with opioid medication  Random urine drug screens will also be performed at office visits  Lastly, he was informed that office visits are needed for refills  Patient was agreeable and signed the contract  The risk of opioid medications, including dependence, addiction and tolerance were explained to the patient  The patient understands and agrees to use these medications only as prescribed  I have fully discussed the potential side effects of the medication with the patient, which include, but are not limited to, constipation, drowsiness, addiction, impaired judgment and risk of fatal overdose as not taken as prescribed  I have warned the patient that sharing medications is a felony   I warned against driving while taking sedating medications  At this point in time, the patient is showing no signs of addiction, abuse, diversion or suicidal ideation  My impressions and treatment recommendations were discussed in detail with the patient who verbalized understanding and had no further questions  Discharge instructions were provided  I personally saw and examined the patient and I agree with the above discussed plan of care  History of Present Illness:  Sabra Shearer is a 46 y o  male who presents for a follow up office visit in regards to Groin Pain; Leg Pain; and Hip Pain  The patients current symptoms include right hip and groin pain  He had a right hip injection which helped for 5 days  He states that he had no pain - 100% pain relief  Have performed 3 right hip injections  Today he reports intermittent, sharp pain which is shooting in nature radiating across the hip down to the right knee  Pain is rated 10/10  Patient is currently on tramadol 50 mg which he takes 1 a day p r n  For severe episodes of pain  Pain is rated 10/10 today  Urine drug screen dated September 5, 2018  Opiate contract December 19, 2018    I have personally reviewed and/or updated the patient's past medical history, past surgical history, family history, social history, current medications, allergies, and vital signs today  Review of Systems   Respiratory: Negative for shortness of breath  Cardiovascular: Negative for chest pain  Gastrointestinal: Positive for nausea  Negative for constipation, diarrhea and vomiting  Musculoskeletal: Positive for gait problem  Negative for arthralgias, joint swelling and myalgias  Decreased ROM, Joint stiffness   Skin: Negative for rash  Neurological: Negative for dizziness, seizures and weakness  All other systems reviewed and are negative        Patient Active Problem List   Diagnosis    Dizziness    [facility]    Ascending aortic aneurysm ([facility] )    Bicuspid aortic valve    Adjustment reaction with anxiety and depression    Allergic rhinitis    GERD (gastroesophageal reflux disease)    Liver disease    Hiatal hernia    Encounter to establish care    Prediabetes    [facility]abetic [facility] exam [facility] )    CKD (chronic kidney disease) stage 3, GFR 30-59 ml/min (HCC)    Hyperlipidemia, mixed    Weight gain    Fatigue    Generalized abdominal pain    Mild intermittent asthma without complication    Seasonal allergic rhinitis due to pollen    Numbness of fingers of both hands    Depression with anxiety    Abdominal bloating    Type 2 diabetes mellitus without complication (HCC)    Vitamin D deficiency    Renal cyst, left    Hepatic cyst    Fatty liver disease, nonalcoholic    Erectile dysfunction    Carpal tunnel syndrome of right wrist    Chronic pain of right knee    Vestibular migraine    Patellar tendinitis of right knee    Hamstring tightness of right lower extremity    Benign paroxysmal positional vertigo due to bilateral vestibular disorder    Pain in right hip    Hip impingement syndrome, right    Primary osteoarthritis of right hip    Ulnar neuropathy of both upper extremities    Lumbosacral strain    Tarsal tunnel syndrome of right side    Cubital tunnel syndrome, bilateral    Need for influenza vaccination    Need for pneumococcal vaccination    Need for hepatitis C screening test    Groin pain, chronic, right       Past Medical History:   Diagnosis Date    Aorta aneurysm (HCC)     4 3    Arm DVT (deep venous thromboembolism), acute ([facility] ) [address]20    complications of cardiac cath    Asthma     CKD (chronic kidney disease), stage III (HCC)     COPD (chronic obstructive pulmonary disease) (HCC)     Depression     GERD (gastroesophageal reflux disease)     Headache     Hiatal hernia     Hyperlipidemia, mixed 5/7/2018    Liver disease     FATTY LIVER    Prediabetes     Renal calculi     Sleep apnea     Vestibular migraine Past Surgical History:   Procedure Laterality Date    CARPAL TUNNEL RELEASE Left     COLONOSCOPY      FL INJECTION RIGHT HIP (ARTHROGRAM)  8/20/2018    FOOT SURGERY Left     FOREIGN BODY REMOVAL    HERNIA REPAIR      NH COLONOSCOPY FLX DX W/COLLJ SPEC WHEN PFRMD N/A 8/7/2017    Procedure: COLONOSCOPY;  Surgeon: Bethany Warner MD;  Location: [facility]; Service: Gastroenterology    NH ESOPHAGOGASTRODUODENOSCOPY TRANSORAL DIAGNOSTIC N/A 10/31/2017    Procedure: ESOPHAGOGASTRODUODENOSCOPY (EGD); Surgeon: Bethany Warner MD;  Location: [facility]; Service: Gastroenterology       Family History   Problem Relation Age of Onset    Cancer Brother     Prostate cancer Brother     Leukemia Brother         his only brother dies from [address] or leukemia pt unsure he was only 46    Arthritis Mother     Heart attack Father     Clotting disorder Father     Schizoaffective Disorder  Sister     Aortic aneurysm Other         abdominal       Social History     Occupational History    unemployed      Social History Main Topics    Smoking status: Former Smoker     Types: Cigars     Quit date: 8/7/2014    Smokeless tobacco: Never Used    Alcohol use Yes      Comment: occasional    Drug use: No    Sexual activity: Not on file       Current Outpatient Prescriptions on File Prior to Visit   Medication Sig    albuterol (PROVENTIL HFA,VENTOLIN HFA) 90 mcg/act inhaler Inhale 2 puffs every 6 (six) hours as needed for wheezing   amitriptyline (ELAVIL) 25 mg tablet take 1 tablet by mouth daily at bedtime    buPROPion (WELLBUTRIN) 75 mg tablet take 1 tablet by mouth twice a day    ergocalciferol (VITAMIN D2) 50,000 units Take 50,000 Units by mouth once a week      fenofibrate (TRICOR) 145 mg tablet take 1 tablet by mouth once daily    meloxicam (MOBIC) 15 mg tablet Take 1 tablet (15 mg total) by mouth daily    mometasone-formoterol (DULERA) 200-5 MCG/ACT inhaler Inhale 2 puffs 2 (two) times a day      montelukast (SINGULAIR) 10 mg tablet Take by mouth    pantoprazole (PROTONIX) 40 mg tablet Take 1 tablet (40 mg total) by mouth daily    sildenafil (REVATIO) 20 mg tablet Take 1 tablet (20 mg total) by mouth daily As directed    SUMAtriptan (IMITREX) 100 mg tablet Take 1 tablet (100 mg total) by mouth once as needed for migraine for up to 1 dose    tiZANidine (ZANAFLEX) 2 mg tablet Take 1 tablet (2 mg total) by mouth every 8 (eight) hours as needed for muscle spasms     No current facility-administered medications on file prior to visit  No Known Allergies    Physical Exam:    /80   Pulse 82   Resp 16   Ht 6' 1" (1 854 m)   Wt 103 kg (227 lb)   BMI 29 95 kg/m²     Constitutional:normal, well developed, well nourished, alert, in no distress and non-toxic and no overt pain behavior    Eyes:anicteric  HEENT:grossly intact  Neck:supple, symmetric, trachea midline and no masses   Pulmonary:even and unlabored  Cardiovascular:No edema or pitting edema present  Skin:Normal without rashes or lesions and well hydrated  Psychiatric:Mood and affect appropriate  Neurologic:Cranial Nerves II-XII grossly intact  Musculoskeletal:normal    Imaging

## 2019-01-25 ENCOUNTER — OFFICE VISIT (OUTPATIENT)
Dept: OBGYN CLINIC | Facility: CLINIC | Age: 54
End: 2019-01-25
Payer: COMMERCIAL

## 2019-01-25 ENCOUNTER — APPOINTMENT (OUTPATIENT)
Dept: RADIOLOGY | Facility: CLINIC | Age: 54
End: 2019-01-25
Payer: COMMERCIAL

## 2019-01-25 VITALS
DIASTOLIC BLOOD PRESSURE: 79 MMHG | HEIGHT: 73 IN | WEIGHT: 230 LBS | HEART RATE: 80 BPM | SYSTOLIC BLOOD PRESSURE: 115 MMHG | BODY MASS INDEX: 30.48 KG/M2

## 2019-01-25 DIAGNOSIS — Z87.19 S/P HERNIA REPAIR: ICD-10-CM

## 2019-01-25 DIAGNOSIS — M16.11 PRIMARY OSTEOARTHRITIS OF RIGHT HIP: ICD-10-CM

## 2019-01-25 DIAGNOSIS — Z98.890 S/P HERNIA REPAIR: ICD-10-CM

## 2019-01-25 DIAGNOSIS — G62.9 NEUROPATHY: Primary | ICD-10-CM

## 2019-01-25 PROCEDURE — 99213 OFFICE O/P EST LOW 20 MIN: CPT | Performed by: ORTHOPAEDIC SURGERY

## 2019-01-25 PROCEDURE — 72170 X-RAY EXAM OF PELVIS: CPT

## 2019-01-25 RX ORDER — ACETAMINOPHEN 500 MG
3 TABLET ORAL AS NEEDED
COMMUNITY
End: 2021-03-08

## 2019-01-31 ENCOUNTER — APPOINTMENT (OUTPATIENT)
Dept: LAB | Facility: MEDICAL CENTER | Age: 54
End: 2019-01-31
Payer: COMMERCIAL

## 2019-01-31 DIAGNOSIS — G89.29 GROIN PAIN, CHRONIC, RIGHT: ICD-10-CM

## 2019-01-31 DIAGNOSIS — K76.0 FATTY LIVER DISEASE, NONALCOHOLIC: ICD-10-CM

## 2019-01-31 DIAGNOSIS — E55.9 VITAMIN D DEFICIENCY: ICD-10-CM

## 2019-01-31 DIAGNOSIS — K21.9 GASTROESOPHAGEAL REFLUX DISEASE WITHOUT ESOPHAGITIS: ICD-10-CM

## 2019-01-31 DIAGNOSIS — H81.13 BENIGN PAROXYSMAL POSITIONAL VERTIGO DUE TO BILATERAL VESTIBULAR DISORDER: ICD-10-CM

## 2019-01-31 DIAGNOSIS — G56.01 CARPAL TUNNEL SYNDROME OF RIGHT WRIST: ICD-10-CM

## 2019-01-31 DIAGNOSIS — M16.11 PRIMARY OSTEOARTHRITIS OF RIGHT HIP: ICD-10-CM

## 2019-01-31 DIAGNOSIS — J30.1 SEASONAL ALLERGIC RHINITIS DUE TO POLLEN: ICD-10-CM

## 2019-01-31 DIAGNOSIS — K44.9 HIATAL HERNIA: ICD-10-CM

## 2019-01-31 DIAGNOSIS — E78.2 HYPERLIPIDEMIA, MIXED: ICD-10-CM

## 2019-01-31 DIAGNOSIS — E11.9 TYPE 2 DIABETES MELLITUS WITHOUT COMPLICATION, UNSPECIFIED WHETHER LONG TERM INSULIN USE (HCC): ICD-10-CM

## 2019-01-31 DIAGNOSIS — N52.9 ERECTILE DYSFUNCTION, UNSPECIFIED ERECTILE DYSFUNCTION TYPE: ICD-10-CM

## 2019-01-31 DIAGNOSIS — Z11.59 NEED FOR HEPATITIS C SCREENING TEST: ICD-10-CM

## 2019-01-31 DIAGNOSIS — R10.31 GROIN PAIN, CHRONIC, RIGHT: ICD-10-CM

## 2019-01-31 DIAGNOSIS — N18.30 CKD (CHRONIC KIDNEY DISEASE) STAGE 3, GFR 30-59 ML/MIN (HCC): ICD-10-CM

## 2019-01-31 DIAGNOSIS — J45.30 MILD PERSISTENT ASTHMA WITHOUT COMPLICATION: ICD-10-CM

## 2019-01-31 DIAGNOSIS — G56.23 CUBITAL TUNNEL SYNDROME, BILATERAL: ICD-10-CM

## 2019-01-31 DIAGNOSIS — G56.23 ULNAR NEUROPATHY OF BOTH UPPER EXTREMITIES: ICD-10-CM

## 2019-01-31 DIAGNOSIS — F41.8 DEPRESSION WITH ANXIETY: ICD-10-CM

## 2019-01-31 DIAGNOSIS — Z23 NEED FOR INFLUENZA VACCINATION: ICD-10-CM

## 2019-01-31 DIAGNOSIS — R20.0 NUMBNESS OF FINGERS OF BOTH HANDS: ICD-10-CM

## 2019-01-31 DIAGNOSIS — G43.809 VESTIBULAR MIGRAINE: ICD-10-CM

## 2019-01-31 DIAGNOSIS — J45.20 MILD INTERMITTENT ASTHMA WITHOUT COMPLICATION: ICD-10-CM

## 2019-01-31 DIAGNOSIS — E11.9 TYPE 2 DIABETES MELLITUS WITHOUT COMPLICATION, WITHOUT LONG-TERM CURRENT USE OF INSULIN (HCC): ICD-10-CM

## 2019-01-31 LAB
25(OH)D3 SERPL-MCNC: 46.8 NG/ML (ref 30–100)
ALBUMIN SERPL BCP-MCNC: 3.8 G/DL (ref 3.5–5)
ALP SERPL-CCNC: 75 U/L (ref 46–116)
ALT SERPL W P-5'-P-CCNC: 50 U/L (ref 12–78)
ANION GAP SERPL CALCULATED.3IONS-SCNC: 7 MMOL/L (ref 4–13)
AST SERPL W P-5'-P-CCNC: 30 U/L (ref 5–45)
BILIRUB SERPL-MCNC: 0.56 MG/DL (ref 0.2–1)
BUN SERPL-MCNC: 21 MG/DL (ref 5–25)
CALCIUM SERPL-MCNC: 9.1 MG/DL (ref 8.3–10.1)
CHLORIDE SERPL-SCNC: 109 MMOL/L (ref 100–108)
CHOLEST SERPL-MCNC: 165 MG/DL (ref 50–200)
CO2 SERPL-SCNC: 25 MMOL/L (ref 21–32)
CREAT SERPL-MCNC: 1.22 MG/DL (ref 0.6–1.3)
CREAT UR-MCNC: 220 MG/DL
EST. AVERAGE GLUCOSE BLD GHB EST-MCNC: 114 MG/DL
GFR SERPL CREATININE-BSD FRML MDRD: 67 ML/MIN/1.73SQ M
GLUCOSE P FAST SERPL-MCNC: 109 MG/DL (ref 65–99)
HBA1C MFR BLD: 5.6 % (ref 4.2–6.3)
HCV AB SER QL: NORMAL
HDLC SERPL-MCNC: 34 MG/DL (ref 40–60)
LDLC SERPL CALC-MCNC: 59 MG/DL (ref 0–100)
MICROALBUMIN UR-MCNC: 7.6 MG/L (ref 0–20)
MICROALBUMIN/CREAT 24H UR: 3 MG/G CREATININE (ref 0–30)
NONHDLC SERPL-MCNC: 131 MG/DL
POTASSIUM SERPL-SCNC: 3.8 MMOL/L (ref 3.5–5.3)
PROT SERPL-MCNC: 7.2 G/DL (ref 6.4–8.2)
SODIUM SERPL-SCNC: 141 MMOL/L (ref 136–145)
TRIGL SERPL-MCNC: 362 MG/DL

## 2019-01-31 PROCEDURE — 3061F NEG MICROALBUMINURIA REV: CPT | Performed by: NURSE PRACTITIONER

## 2019-01-31 PROCEDURE — 83036 HEMOGLOBIN GLYCOSYLATED A1C: CPT

## 2019-01-31 PROCEDURE — 82306 VITAMIN D 25 HYDROXY: CPT

## 2019-01-31 PROCEDURE — 80053 COMPREHEN METABOLIC PANEL: CPT

## 2019-01-31 PROCEDURE — 82043 UR ALBUMIN QUANTITATIVE: CPT

## 2019-01-31 PROCEDURE — 80061 LIPID PANEL: CPT

## 2019-01-31 PROCEDURE — 82570 ASSAY OF URINE CREATININE: CPT

## 2019-01-31 PROCEDURE — 36415 COLL VENOUS BLD VENIPUNCTURE: CPT

## 2019-01-31 PROCEDURE — 86803 HEPATITIS C AB TEST: CPT

## 2019-02-05 ENCOUNTER — CONSULT (OUTPATIENT)
Dept: SURGERY | Facility: CLINIC | Age: 54
End: 2019-02-05
Payer: COMMERCIAL

## 2019-02-05 ENCOUNTER — OFFICE VISIT (OUTPATIENT)
Dept: FAMILY MEDICINE CLINIC | Facility: CLINIC | Age: 54
End: 2019-02-05
Payer: COMMERCIAL

## 2019-02-05 VITALS
BODY MASS INDEX: 30.48 KG/M2 | WEIGHT: 230 LBS | RESPIRATION RATE: 18 BRPM | SYSTOLIC BLOOD PRESSURE: 122 MMHG | DIASTOLIC BLOOD PRESSURE: 80 MMHG | HEART RATE: 70 BPM | HEIGHT: 73 IN | OXYGEN SATURATION: 98 %

## 2019-02-05 VITALS
RESPIRATION RATE: 14 BRPM | TEMPERATURE: 98.6 F | DIASTOLIC BLOOD PRESSURE: 76 MMHG | HEIGHT: 73 IN | SYSTOLIC BLOOD PRESSURE: 114 MMHG | BODY MASS INDEX: 30.49 KG/M2 | HEART RATE: 70 BPM | WEIGHT: 230.04 LBS

## 2019-02-05 DIAGNOSIS — Z87.19 S/P HERNIA REPAIR: ICD-10-CM

## 2019-02-05 DIAGNOSIS — F43.23 ADJUSTMENT REACTION WITH ANXIETY AND DEPRESSION: ICD-10-CM

## 2019-02-05 DIAGNOSIS — K76.0 FATTY LIVER DISEASE, NONALCOHOLIC: ICD-10-CM

## 2019-02-05 DIAGNOSIS — R10.31 RIGHT GROIN PAIN: Primary | ICD-10-CM

## 2019-02-05 DIAGNOSIS — R10.31 GROIN PAIN, CHRONIC, RIGHT: ICD-10-CM

## 2019-02-05 DIAGNOSIS — E55.9 VITAMIN D DEFICIENCY: ICD-10-CM

## 2019-02-05 DIAGNOSIS — E78.2 ELEVATED TRIGLYCERIDES WITH HIGH CHOLESTEROL: ICD-10-CM

## 2019-02-05 DIAGNOSIS — F41.8 DEPRESSION WITH ANXIETY: ICD-10-CM

## 2019-02-05 DIAGNOSIS — R61 DIAPHORESIS: ICD-10-CM

## 2019-02-05 DIAGNOSIS — G89.29 GROIN PAIN, CHRONIC, RIGHT: ICD-10-CM

## 2019-02-05 DIAGNOSIS — Z98.890 S/P HERNIA REPAIR: ICD-10-CM

## 2019-02-05 DIAGNOSIS — J30.1 NON-SEASONAL ALLERGIC RHINITIS DUE TO POLLEN: ICD-10-CM

## 2019-02-05 DIAGNOSIS — R20.0 NUMBNESS OF FINGERS OF BOTH HANDS: ICD-10-CM

## 2019-02-05 DIAGNOSIS — T73.3XXA FATIGUE DUE TO EXCESSIVE EXERTION, INITIAL ENCOUNTER: ICD-10-CM

## 2019-02-05 DIAGNOSIS — J45.20 MILD INTERMITTENT ASTHMA WITHOUT COMPLICATION: ICD-10-CM

## 2019-02-05 DIAGNOSIS — N18.30 CKD (CHRONIC KIDNEY DISEASE) STAGE 3, GFR 30-59 ML/MIN (HCC): ICD-10-CM

## 2019-02-05 DIAGNOSIS — E78.2 HYPERLIPIDEMIA, MIXED: ICD-10-CM

## 2019-02-05 DIAGNOSIS — G62.9 NEUROPATHY: ICD-10-CM

## 2019-02-05 DIAGNOSIS — J44.9 CHRONIC OBSTRUCTIVE PULMONARY DISEASE, UNSPECIFIED COPD TYPE (HCC): Primary | ICD-10-CM

## 2019-02-05 PROBLEM — E11.9 TYPE 2 DIABETES MELLITUS WITHOUT COMPLICATION (HCC): Status: RESOLVED | Noted: 2018-05-15 | Resolved: 2019-02-05

## 2019-02-05 PROCEDURE — 99244 OFF/OP CNSLTJ NEW/EST MOD 40: CPT | Performed by: SURGERY

## 2019-02-05 PROCEDURE — 99214 OFFICE O/P EST MOD 30 MIN: CPT | Performed by: NURSE PRACTITIONER

## 2019-02-05 RX ORDER — OMEGA-3-ACID ETHYL ESTERS 1 G/1
2 CAPSULE, LIQUID FILLED ORAL 2 TIMES DAILY
Qty: 120 CAPSULE | Refills: 5 | Status: SHIPPED | OUTPATIENT
Start: 2019-02-05 | End: 2019-02-19

## 2019-02-05 NOTE — PATIENT INSTRUCTIONS
Fatigue and sweating- advised patient to call his Cardiologist and a stress test would be beneficial  Osteoarthritis of right hiip- followed by ortho and likely will need surgery  Hyperglycemia- A1C is normal  COPD- has to make appmnt with Pulmonary  Elevated triglycerides- limited activity due to pain of hip  Add Lovaza  Vitamin D Def- stable CKD- numbers improving   Followed by Renal  Follow up in 4 months

## 2019-02-05 NOTE — PROGRESS NOTES
Assessment/Plan:    No problem-specific Assessment & Plan notes found for this encounter  Diagnoses and all orders for this visit:    Chronic obstructive pulmonary disease, unspecified COPD type ([facility] )  -     Comprehensive metabolic panel; Future  -     Lipid panel; Future    Non-seasonal allergic rhinitis due to pollen  -     Comprehensive metabolic panel; Future  -     Lipid panel; Future    Mild intermittent asthma without complication  -     Comprehensive metabolic panel; Future  -     Lipid panel; Future    CKD (chronic kidney disease) stage 3, GFR 30-59 ml/min (HCC)  -     Comprehensive metabolic panel; Future  -     Lipid panel; Future    Adjustment reaction with anxiety and depression  -     Comprehensive metabolic panel; Future  -     Lipid panel; Future    Hyperlipidemia, mixed  -     Comprehensive metabolic panel; Future  -     Lipid panel; Future    Numbness of fingers of both hands  -     Comprehensive metabolic panel; Future  -     Lipid panel; Future    Depression with anxiety  -     Comprehensive metabolic panel; Future  -     Lipid panel; Future    Groin pain, chronic, right  -     Comprehensive metabolic panel; Future  -     Lipid panel; Future    Fatty liver disease, nonalcoholic  -     Comprehensive metabolic panel; Future  -     Lipid panel; Future    Elevated triglycerides with high cholesterol  -     omega-3-acid ethyl esters (LOVAZA) 1 g capsule; Take 2 capsules (2 g total) by mouth 2 (two) times a day for 30 days  -     Comprehensive metabolic panel; Future  -     Lipid panel; Future    Fatigue due to excessive exertion, initial encounter  -     Comprehensive metabolic panel; Future  -     Lipid panel; Future    Diaphoresis  -     Comprehensive metabolic panel; Future  -     Lipid panel; Future    Vitamin D deficiency  -     Comprehensive metabolic panel; Future  -     Lipid panel; Future  -     Vitamin D 25 hydroxy;  Future          Subjective:      Patient ID: Gwynda Richelle is a 46 y o  male  Patient is here for follow up and lab review:    Pain left hip - + osteoarthritis of left hip  F/B Dr Salgado Never  He was referred for surgery, but has additional work up in progress  Not seeing Dr Cookie Roberts anymore  Seeing Dr Vicky Rodriguez for migraine headaches  Saw eye doctor for diabetic eye exam  He now has bifocals  CKD Stage 3- stable and has improved  Vit D def- stable at 46 8  COPD- hasnt yet made appmnt with Pulmonary  A1C- 5 6  HLD- triglycerides high  He feels very fatigued with any activity  He has diaphoresis with minimal activity  Results for Kamila Calzada (MRN [id number]) as of 2019 08:40    2019 09:08  eGFR: 67  Sodium: 141  Potassium: 3 8  Chloride: 109 (H)  CO2: 25  Anion Gap: 7  BUN: 21  Creatinine: 1 22  GLUCOSE FASTIN (H)  Calcium: 9 1  AST: 30  ALT: 50  Alkaline Phosphatase: 75  Total Protein: 7 2  Albumin: 3 8  TOTAL BILIRUBIN: 0 56  Cholesterol: 165  Triglycerides: 362 (H)  HDL: 34 (L)  Non-HDL Cholesterol: 131  LDL Direct: 59  Hemoglobin A1C: 5 6  EA  HEPATITIS C ANTIBODY: Non-reactive  Results for Kamila Calzada (MRN [id number]) as of 2019 08:40    2019 09:17  EXT Creatinine Urine: 220 0  MICROALBUMIN/CREATININE RATIO: 3  MICROALBUM ,U,RANDOM: 7 6    2019 09:20  Vit D, 25-Hydroxy: 46 8          The following portions of the patient's history were reviewed and updated as appropriate:   He  has a past medical history of Aorta aneurysm ([address] ); Arm DVT (deep venous thromboembolism), acute ([address] ) (); Asthma; CKD (chronic kidney disease), stage III ([address] ); COPD (chronic obstructive pulmonary disease) ([address] ); Depression; GERD (gastroesophageal reflux disease); Headache; Hiatal hernia; Hyperlipidemia, mixed (2018); Liver disease; Prediabetes; Renal calculi; Sleep apnea; and Vestibular migraine    He   Patient Active Problem List    Diagnosis Date Noted    Elevated triglycerides with high cholesterol 2019    Diaphoresis 2019    Need for pneumococcal vaccination 11/20/2018    Need for hepatitis C screening test 11/20/2018    Groin pain, chronic, right 11/20/2018    Cubital tunnel syndrome, bilateral 10/02/2018    Need for influenza vaccination 10/02/2018    Lumbosacral strain 10/01/2018    Tarsal tunnel syndrome of right side 10/01/2018    Ulnar neuropathy of both upper extremities     Primary osteoarthritis of right hip 09/17/2018    Pain in right hip 08/06/2018    Hip impingement syndrome, right 08/06/2018    Benign paroxysmal positional vertigo due to bilateral vestibular disorder 07/10/2018    Patellar tendinitis of right knee 06/18/2018    Hamstring tightness of right lower extremity 06/18/2018    Vestibular migraine 05/29/2018    Vitamin D deficiency 05/15/2018    Renal cyst, left 05/15/2018    Hepatic cyst 05/15/2018    Fatty liver disease, nonalcoholic 47/20/3938    Erectile dysfunction 05/15/2018    Carpal tunnel syndrome of right wrist 05/15/2018    Chronic pain of right knee 05/15/2018    GERD (gastroesophageal reflux disease) 05/07/2018    Liver disease 05/07/2018    Hiatal hernia 05/07/2018    Encounter to establish care 05/07/2018    Prediabetes 05/07/2018    Diabetic eye exam ([facility] ) 05/07/2018    CKD (chronic kidney disease) stage 3, GFR 30-59 ml/min ([address] ) 05/07/2018    Hyperlipidemia, mixed 05/07/2018    Weight gain 05/07/2018    Fatigue 05/07/2018    Generalized abdominal pain 05/07/2018    Mild intermittent asthma without complication 34/55/2277    Seasonal allergic rhinitis due to pollen 05/07/2018    Numbness of fingers of both hands 05/07/2018    Depression with anxiety 05/07/2018    Abdominal bloating 05/07/2018    Dizziness 05/06/2016    COPD (chronic obstructive pulmonary disease) ([address] ) 05/06/2016    Ascending aortic aneurysm ([address] ) 05/06/2016    Bicuspid aortic valve 05/06/2016    Adjustment reaction with anxiety and depression 07/10/2015    Allergic rhinitis 09/23/2013     He has a past surgical history that includes Carpal tunnel release (Left); Hernia repair; pr colonoscopy flx dx w/collj spec when pfrmd (N/A, 8/7/2017); Colonoscopy; Foot surgery (Left); pr esophagogastroduodenoscopy transoral diagnostic (N/A, 10/31/2017); and FL injection right hip (arthrogram) (8/20/2018)  His family history includes Aortic aneurysm in his other; Arthritis in his mother; Cancer in his brother; Clotting disorder in his father; Heart attack in his father; Leukemia in his brother; Prostate cancer in his brother; Schizoaffective Disorder  in his sister  He  reports that he quit smoking about 4 years ago  His smoking use included Cigars  He has never used smokeless tobacco  He reports that he drinks alcohol  He reports that he does not use drugs  Current Outpatient Prescriptions   Medication Sig Dispense Refill    Acetaminophen (TYLENOL EXTRA STRENGTH PO) Take by mouth      albuterol (PROVENTIL HFA,VENTOLIN HFA) 90 mcg/act inhaler Inhale 2 puffs every 6 (six) hours as needed for wheezing   amitriptyline (ELAVIL) 25 mg tablet take 1 tablet by mouth daily at bedtime 30 tablet 6    buPROPion (WELLBUTRIN) 75 mg tablet take 1 tablet by mouth twice a day 60 tablet 3    ergocalciferol (VITAMIN D2) 50,000 units Take 50,000 Units by mouth once a week        fenofibrate (TRICOR) 145 mg tablet take 1 tablet by mouth once daily 30 tablet 6    mometasone-formoterol (DULERA) 200-5 MCG/ACT inhaler Inhale 2 puffs 2 (two) times a day        montelukast (SINGULAIR) 10 mg tablet Take by mouth      omega-3-acid ethyl esters (LOVAZA) 1 g capsule Take 2 capsules (2 g total) by mouth 2 (two) times a day for 30 days 120 capsule 5    sildenafil (REVATIO) 20 mg tablet Take 1 tablet (20 mg total) by mouth daily As directed 90 tablet 3    SUMAtriptan (IMITREX) 100 mg tablet Take 1 tablet (100 mg total) by mouth once as needed for migraine for up to 1 dose 9 tablet 3    traMADol (ULTRAM) 50 mg tablet Take 1 tablet (50 mg total) by mouth 2 (two) times a day as needed for moderate pain for up to 30 days 30 tablet 2     No current facility-administered medications for this visit  Current Outpatient Prescriptions on File Prior to Visit   Medication Sig    Acetaminophen (TYLENOL EXTRA STRENGTH PO) Take by mouth    albuterol (PROVENTIL HFA,VENTOLIN HFA) 90 mcg/act inhaler Inhale 2 puffs every 6 (six) hours as needed for wheezing   amitriptyline (ELAVIL) 25 mg tablet take 1 tablet by mouth daily at bedtime    buPROPion (WELLBUTRIN) 75 mg tablet take 1 tablet by mouth twice a day    ergocalciferol (VITAMIN D2) 50,000 units Take 50,000 Units by mouth once a week      fenofibrate (TRICOR) 145 mg tablet take 1 tablet by mouth once daily    mometasone-formoterol (DULERA) 200-5 MCG/ACT inhaler Inhale 2 puffs 2 (two) times a day   montelukast (SINGULAIR) 10 mg tablet Take by mouth    sildenafil (REVATIO) 20 mg tablet Take 1 tablet (20 mg total) by mouth daily As directed    SUMAtriptan (IMITREX) 100 mg tablet Take 1 tablet (100 mg total) by mouth once as needed for migraine for up to 1 dose    traMADol (ULTRAM) 50 mg tablet Take 1 tablet (50 mg total) by mouth 2 (two) times a day as needed for moderate pain for up to 30 days     No current facility-administered medications on file prior to visit  He has No Known Allergies       Review of Systems   Constitutional: Positive for fatigue  Negative for fever  HENT: Negative for congestion  Eyes: Negative for visual disturbance  Respiratory: Negative for cough and shortness of breath  Cardiovascular: Negative for chest pain, palpitations and leg swelling  Gastrointestinal: Negative for abdominal distention and abdominal pain  Endocrine: Negative for cold intolerance, polydipsia and polyuria  Genitourinary: Negative for difficulty urinating  Musculoskeletal: Negative for back pain and joint swelling  Skin: Negative for color change and rash  Allergic/Immunologic: Negative for immunocompromised state  Neurological: Negative for dizziness and headaches  Hematological: Negative for adenopathy  Psychiatric/Behavioral: Negative for behavioral problems and sleep disturbance  All other systems reviewed and are negative  Objective:      /80 (BP Location: Left arm, Patient Position: Sitting)   Pulse 70   Resp 18   Ht 6' 1" (1 854 m)   Wt 104 kg (230 lb)   SpO2 98%   BMI 30 34 kg/m²          Physical Exam   Constitutional: He is oriented to person, place, and time  He appears well-developed and well-nourished  No distress  HENT:   Head: Normocephalic and atraumatic  Mouth/Throat: Oropharynx is clear and moist  No oropharyngeal exudate  Eyes: Pupils are equal, round, and reactive to light  Conjunctivae are normal  No scleral icterus  Neck: Normal range of motion  Neck supple  Cardiovascular: Normal rate, regular rhythm, normal heart sounds and intact distal pulses  Exam reveals no gallop and no friction rub  No murmur heard  Pulmonary/Chest: Effort normal and breath sounds normal  No respiratory distress  Abdominal: Soft  Bowel sounds are normal  He exhibits no distension  There is no tenderness  Musculoskeletal: He exhibits no edema or tenderness  Limited ROM right hip   Lymphadenopathy:     He has no cervical adenopathy  Neurological: He is alert and oriented to person, place, and time  Skin: Skin is warm and dry  He is not diaphoretic  Psychiatric: He has a normal mood and affect  His behavior is normal  Judgment and thought content normal    Nursing note and vitals reviewed

## 2019-02-05 NOTE — PROGRESS NOTES
Consult- General Surgery   Dev Guzmán 46 y o  male MRN: [id number]  Unit/Bed#:  Encounter: [id number]    Assessment/Plan     Assessment:  Right groin pain, not improved after injection of xylocaine for regional block of the ileoinguinal nerve  Suspect musculoskeletal origin of the pain  Plan:  Nothing to add from the general surgery standpoint  There is no evidence of hernia or evidence of pinched nerve at this time  The patient is discharged from my care  History of Present Illness     HPI:  Dev Guzmán is a 46 y o  male who presents to my office complaining of right groin pain for several months  The patient is well known to have problems from the right hip  Patient also complains of pain from the right groin radiated to the testicle and to the knee cap  The pain is constant not exacerbated with any type of physical activities  He has a history of right inguinal hernia repair with mesh 12 years ago  He denies seeing bulge from the right groin  I review CT scan report and films in the office with the patient from Aug 21, 2018  Review of Systems   Constitutional: Negative for chills and fever  HENT: Negative for nosebleeds and sore throat  Eyes: Negative for pain and discharge  Respiratory: Negative for cough and shortness of breath  Cardiovascular: Negative for chest pain and palpitations  Gastrointestinal: Negative for abdominal pain, blood in stool, constipation and diarrhea  Endocrine: Negative for cold intolerance and heat intolerance  Genitourinary: Negative for dysuria and hematuria  Neurological: Negative for seizures and headaches  Hematological: Negative for adenopathy  Does not bruise/bleed easily  Psychiatric/Behavioral: Negative for confusion  The patient is not nervous/anxious          Historical Information   Past Medical History:   Diagnosis Date    Aorta aneurysm ([facility] [address] )     4 3    Arm DVT (deep venous thromboembolism), acute ([facility] [address] ) 4099    complications of cardiac cath    Asthma     CKD (chronic kidney disease), stage III (HCC)     COPD (chronic obstructive pulmonary disease) (HCC)     Depression     GERD (gastroesophageal reflux disease)     Headache     Hiatal hernia     Hyperlipidemia, mixed 5/7/2018    Liver disease     FATTY LIVER    Prediabetes     Renal calculi     Sleep apnea     Vestibular migraine      Past Surgical History:   Procedure Laterality Date    CARPAL TUNNEL RELEASE Left     COLONOSCOPY      FL INJECTION RIGHT HIP (ARTHROGRAM)  8/20/2018    FOOT SURGERY Left     FOREIGN BODY REMOVAL    HERNIA REPAIR      TX COLONOSCOPY FLX DX W/COLLJ SPEC WHEN PFRMD N/A 8/7/2017    Procedure: COLONOSCOPY;  Surgeon: Francesca Duque MD;  Location: [facility]; Service: Gastroenterology    TX ESOPHAGOGASTRODUODENOSCOPY TRANSORAL DIAGNOSTIC N/A 10/31/2017    Procedure: ESOPHAGOGASTRODUODENOSCOPY (EGD); Surgeon: Francesca Duque MD;  Location: [facility];   Service: Gastroenterology     Social History   History   Alcohol Use    Yes     Comment: occasional     History   Drug Use No     History   Smoking Status    Former Smoker    Types: Cigars, Cigarettes    Quit date: 8/7/2014   Smokeless Tobacco    Never Used     Family History: non-contributory    Meds/Allergies   all medications and allergies reviewed     Current Outpatient Prescriptions:     Acetaminophen (TYLENOL EXTRA STRENGTH PO), Take by mouth, Disp: , Rfl:     albuterol (PROVENTIL HFA,VENTOLIN HFA) 90 mcg/act inhaler, Inhale 2 puffs every 6 (six) hours as needed for wheezing , Disp: , Rfl:     amitriptyline (ELAVIL) 25 mg tablet, take 1 tablet by mouth daily at bedtime, Disp: 30 tablet, Rfl: 6    buPROPion (WELLBUTRIN) 75 mg tablet, take 1 tablet by mouth twice a day, Disp: 60 tablet, Rfl: 3    ergocalciferol (VITAMIN D2) 50,000 units, Take 50,000 Units by mouth once a week  , Disp: , Rfl:     fenofibrate (TRICOR) 145 mg tablet, take 1 tablet by mouth once daily, Disp: 30 tablet, Rfl: 6    mometasone-formoterol (DULERA) 200-5 MCG/ACT inhaler, Inhale 2 puffs 2 (two) times a day , Disp: , Rfl:     montelukast (SINGULAIR) 10 mg tablet, Take by mouth, Disp: , Rfl:     omega-3-acid ethyl esters (LOVAZA) 1 g capsule, Take 2 capsules (2 g total) by mouth 2 (two) times a day for 30 days, Disp: 120 capsule, Rfl: 5    SUMAtriptan (IMITREX) 100 mg tablet, Take 1 tablet (100 mg total) by mouth once as needed for migraine for up to 1 dose, Disp: 9 tablet, Rfl: 3    traMADol (ULTRAM) 50 mg tablet, Take 1 tablet (50 mg total) by mouth 2 (two) times a day as needed for moderate pain for up to 30 days, Disp: 30 tablet, Rfl: 2    sildenafil (REVATIO) 20 mg tablet, Take 1 tablet (20 mg total) by mouth daily As directed, Disp: 90 tablet, Rfl: 3  No Known Allergies    Objective     Current Vitals:   Blood Pressure: 114/76 (02/05/19 1059)  Pulse: 70 (02/05/19 1059)  Temperature: 98 6 °F (37 °C) (02/05/19 1059)  Temp Source: Tympanic (02/05/19 1059)  Respirations: 14 (02/05/19 1059)  Height: 6' 1" (185 4 cm) (02/05/19 1059)  Weight - Scale: 104 kg (230 lb 0 6 oz) (02/05/19 1059)      Invasive Devices          No matching active lines, drains, or airways          Physical Exam   Constitutional: He is oriented to person, place, and time  He appears well-developed and well-nourished  No distress  HENT:   Head: Normocephalic  Mouth/Throat: No oropharyngeal exudate  Eyes: Pupils are equal, round, and reactive to light  No scleral icterus  Neck: Normal range of motion  Neck supple  Cardiovascular: Normal rate and regular rhythm  No murmur heard  Pulmonary/Chest: Effort normal and breath sounds normal  No respiratory distress  Abdominal: Soft  Bowel sounds are normal  He exhibits no mass  1% lidocaine with epinephrine was injected on the right superior iliac spine, approximately 15 mL of local anesthetic was injected     Genitourinary:   Genitourinary Comments: Examination of the right and left inguinal canal under Valsalva maneuver failed to revealed inguinal hernia  Musculoskeletal: He exhibits no edema or tenderness  Lymphadenopathy:     He has no cervical adenopathy  Neurological: He is alert and oriented to person, place, and time  No cranial nerve deficit  Skin: No rash noted  No erythema  Psychiatric: He has a normal mood and affect  His behavior is normal      CT ABDOMEN AND PELVIS WITHOUT IV CONTRAST     INDICATION:   lower abdominal pain/pelvic pain/recent joint injection      COMPARISON:  None      TECHNIQUE:  CT examination of the abdomen and pelvis was performed without intravenous contrast   Axial, sagittal, and coronal 2D reformatted images were created from the source data and submitted for interpretation       Radiation dose length product (DLP) for this visit:  819 mGy-cm   This examination, like all CT scans performed in the [facility], was performed utilizing techniques to minimize radiation dose exposure, including the use of iterative   reconstruction and automated exposure control       Enteric contrast was administered       FINDINGS:     ABDOMEN     LOWER CHEST:  No clinically significant abnormality identified in the visualized lower chest      LIVER/BILIARY TREE:  A rounded hypodensity seen in the left hepatic lobe which measures about 3 cm with attenuation of fluid, suggest cyst ,  Stable  Additional hypodensity seen in the segment 6 of the liver with attenuation of fluid suggest cyst   A too small to x-rays hypodensity seen in the lateral segment of the left hepatic lobe could statistically represent cyst      GALLBLADDER:  No calcified gallstones   No pericholecystic inflammatory change      SPLEEN:  Unremarkable      PANCREAS:  Unremarkable      ADRENAL GLANDS:  Unremarkable      KIDNEYS/URETERS:  A rounded hypodensity seen in the lower pole of the left kidney with attenuation of fluid suggest cyst      STOMACH AND BOWEL:  Diverticulosis seen  Hiatal hernia seen containing fat  APPENDIX:  No findings to suggest appendicitis      ABDOMINOPELVIC CAVITY:  No significant inguinal or pelvic sidewall lymph node enlargement seen  No significant retroperitoneal lymph node enlargement seen     VESSELS:  The visualized abdominal aorta is nonaneurysmal     PELVIS     REPRODUCTIVE ORGANS:  Unremarkable for patient's age      URINARY BLADDER:  Unremarkable      ABDOMINAL WALL/INGUINAL REGIONS:  Unremarkable      OSSEOUS STRUCTURES:  No acute fracture or destructive osseous lesion  Degenerative changes are seen within the hip joints    No hip effusion seen  IMPRESSION:     No acute inflammatory stranding seen  No evidence of bowel obstruction

## 2019-02-13 ENCOUNTER — TELEPHONE (OUTPATIENT)
Dept: CARDIOLOGY CLINIC | Facility: CLINIC | Age: 54
End: 2019-02-13

## 2019-02-13 DIAGNOSIS — R61 DIAPHORESIS: ICD-10-CM

## 2019-02-13 DIAGNOSIS — R06.00 DYSPNEA ON EXERTION: Primary | ICD-10-CM

## 2019-02-13 DIAGNOSIS — R53.83 FATIGUE, UNSPECIFIED TYPE: ICD-10-CM

## 2019-02-13 NOTE — PROGRESS NOTES
HPI:  Patient is a 46y o  year old RHD male who presents with chief complaint of Hip Pain  Patient complains of right hip pain  Onset of the symptoms was several months ago  Inciting event: none  The patient reports the hip pain goes from RLQ and groin to testicles  Patient has a history of inguinal hernia and hernia repair  Evaluation to date:  Patient has been seen by Dr Bonnie Varner in Pain Management and has been to the emergency room a couple times  Treatment to date:  Patient has had injections by Pain Management  He has tried physical therapy that made him worse        ROS:   General: No fever, no chills, no weight loss, no weight gain  HEENT:  POSITIVE loss of hearing, no nose bleeds, no sore throat  Eyes:  No eye pain, POSITIVE red eyes, no visual disturbance  Respiratory:  No cough, no shortness of breath, POSITIVE wheezing  Cardiovascular:  No chest pain, no palpitations, no edema  GI: No abdominal pain, no nausea, POSITIVE vomiting  Endocrine: No frequent urination, no excessive thirst  Urinary:  No dysuria, no hematuria, no incontinence  Musculoskeletal: see HPI and PE  Skin:  No rash, no wounds  Neurological:  POSITIVE  dizziness, headache, and numbness  Psychiatric:  No difficulty concentrating, no depression, no suicide thoughts, POSITIVE anxiety  Review of all other systems is negative    PMH:  Past Medical History:   Diagnosis Date    Aorta aneurysm (HCC)     4 3    Arm DVT (deep venous thromboembolism), acute ([facility] [address] ) 2423    complications of cardiac cath    Asthma     CKD (chronic kidney disease), stage III (HCC)     COPD (chronic obstructive pulmonary disease) (HCC)     Depression     Essential hypertriglyceridemia     GERD (gastroesophageal reflux disease)     Headache     Hiatal hernia     Hyperlipidemia, mixed 5/7/2018    Liver disease     FATTY LIVER    PAC (premature atrial contraction)     Prediabetes     Renal calculi     Sleep apnea     Vestibular migraine        PSH:  Past Surgical History:   Procedure Laterality Date    CARPAL TUNNEL RELEASE Left     COLONOSCOPY      FL INJECTION RIGHT HIP (ARTHROGRAM)  8/20/2018    FOOT SURGERY Left     FOREIGN BODY REMOVAL    HERNIA REPAIR      NM COLONOSCOPY FLX DX W/COLLJ SPEC WHEN PFRMD N/A 8/7/2017    Procedure: COLONOSCOPY;  Surgeon: Isaias Oh MD;  Location: [facility]; Service: Gastroenterology    NM ESOPHAGOGASTRODUODENOSCOPY TRANSORAL DIAGNOSTIC N/A 10/31/2017    Procedure: ESOPHAGOGASTRODUODENOSCOPY (EGD); Surgeon: Isaias Oh MD;  Location: [facility]; Service: Gastroenterology       Medications:  Current Outpatient Medications   Medication Sig Dispense Refill    Acetaminophen (TYLENOL EXTRA STRENGTH PO) Take by mouth      albuterol (PROVENTIL HFA,VENTOLIN HFA) 90 mcg/act inhaler Inhale 2 puffs every 6 (six) hours as needed for wheezing   amitriptyline (ELAVIL) 25 mg tablet take 1 tablet by mouth daily at bedtime 30 tablet 6    buPROPion (WELLBUTRIN) 75 mg tablet take 1 tablet by mouth twice a day 60 tablet 3    ergocalciferol (VITAMIN D2) 50,000 units Take 50,000 Units by mouth once a week        fenofibrate (TRICOR) 145 mg tablet take 1 tablet by mouth once daily 30 tablet 6    mometasone-formoterol (DULERA) 200-5 MCG/ACT inhaler Inhale 2 puffs 2 (two) times a day   montelukast (SINGULAIR) 10 mg tablet Take by mouth      sildenafil (REVATIO) 20 mg tablet Take 1 tablet (20 mg total) by mouth daily As directed 90 tablet 3    SUMAtriptan (IMITREX) 100 mg tablet Take 1 tablet (100 mg total) by mouth once as needed for migraine for up to 1 dose 9 tablet 3    traMADol (ULTRAM) 50 mg tablet Take 1 tablet (50 mg total) by mouth 2 (two) times a day as needed for moderate pain for up to 30 days 30 tablet 2    omega-3-acid ethyl esters (LOVAZA) 1 g capsule Take 2 capsules (2 g total) by mouth 2 (two) times a day for 30 days 120 capsule 5     No current facility-administered medications for this visit  Allergies:  No Known Allergies    Family History:  Family History   Problem Relation Age of Onset    Cancer Brother     Prostate cancer Brother     Leukemia Brother         his only brother dies from lymphoma or leukemia pt unsure he was only 47    Arthritis Mother     Heart attack Father     Clotting disorder Father     Schizoaffective Disorder  Sister     Aortic aneurysm Other         abdominal       Social History:  Social History     Occupational History    Occupation: unemployed   Tobacco Use    Smoking status: Former Smoker     Types: Cigars, Cigarettes     Last attempt to quit: 2014     Years since quittin 5    Smokeless tobacco: Never Used   Substance and Sexual Activity    Alcohol use: Yes     Comment: occasional    Drug use: No    Sexual activity: Not Currently       Physical Exam:  General :  Alert, cooperative, no distress, appears stated age  Blood pressure 115/79, pulse 80, height 6' 1" (1 854 m), weight 104 kg (230 lb)  Head:  Normocephalic, without obvious abnormality, atraumatic   Eyes:  Conjunctiva/corneas clear, EOM's intact,   Ears: Both ears normal appearance, no hearing deficits  Nose: Nares normal, septum midline, no drainage    Neck: Supple,  trachea midline, no adenopathy, no tenderness, no mass   Back:   Symmetric, no curvature, ROM normal, no tenderness   Lungs:   Respirations unlabored   Chest Wall:  No tenderness or deformity   Extremities: Extremities normal, atraumatic, no cyanosis or edema      Pulses: 2+ and symmetric   Skin: Skin color, texture, turgor normal, no rashes or lesions      Neurologic: Normal           Right Hip Exam     Tenderness   The patient is experiencing tenderness in the anterior  Range of Motion   The patient has normal right hip ROM  Muscle Strength   The patient has normal right hip strength      Other   Scars: present  Sensation: normal  Pulse: present    Comments:  Pain with range of motion of the hip especially abduction and flexion and internal rotation  Imaging Studies: The following imaging studies were reviewed in office today  My findings are noted  AP pelvis x-rays 01/25/2019: Moderate to severe arthritic changes right hip with narrowing of the superior lateral aspect right hip joint  No fracture  Doppler study 11/03/2018:  No evidence of DVT    Lumbar spine x-rays 10/01/2018:  Mild degenerative spondylosis and levoscoliosis    Ultrasound scrotum and testicle is a 01/20/2018:  Normal testicles    MRI arthrogram 08/20/2018: Moderate to severe chondrosis right hip with degenerative tearing right labrum  Assessment  Encounter Diagnoses   Name Primary?  Primary osteoarthritis of right hip     Neuropathy Yes    S/P hernia repair          Plan:  The patient does have arthritis of the right hip and certainly some of his symptoms are from that  However the right hip arthritis is unlikely to be the cause of the  pain radiating to his testicles  His groin pain arm for could certainly be significantly improved with a total hip replacement  We are going to refer patient to general surgery for evaluation of his hernia and evaluation of any etiology of pain radiating to the testicles  We did discuss risks and benefits of total hip replacement  Patient will follow up in 3 weeks for repeat clinical evaluation

## 2019-02-14 ENCOUNTER — OFFICE VISIT (OUTPATIENT)
Dept: NEUROLOGY | Facility: CLINIC | Age: 54
End: 2019-02-14
Payer: COMMERCIAL

## 2019-02-14 VITALS
WEIGHT: 228 LBS | SYSTOLIC BLOOD PRESSURE: 118 MMHG | BODY MASS INDEX: 30.22 KG/M2 | DIASTOLIC BLOOD PRESSURE: 82 MMHG | HEART RATE: 62 BPM | HEIGHT: 73 IN

## 2019-02-14 DIAGNOSIS — R51.9 NEW ONSET OF HEADACHES AFTER AGE 50: Primary | ICD-10-CM

## 2019-02-14 DIAGNOSIS — G43.709 CHRONIC MIGRAINE WITHOUT AURA WITHOUT STATUS MIGRAINOSUS, NOT INTRACTABLE: ICD-10-CM

## 2019-02-14 PROCEDURE — 99214 OFFICE O/P EST MOD 30 MIN: CPT | Performed by: PSYCHIATRY & NEUROLOGY

## 2019-02-14 RX ORDER — OMEGA-3 FATTY ACIDS CAP DELAYED RELEASE 1000 MG 1000 MG
CAPSULE DELAYED RELEASE ORAL DAILY
COMMUNITY
End: 2020-08-26 | Stop reason: ALTCHOICE

## 2019-02-14 RX ORDER — TIZANIDINE 4 MG/1
4 TABLET ORAL
Qty: 30 TABLET | Refills: 0 | Status: SHIPPED | OUTPATIENT
Start: 2019-02-14 | End: 2019-03-14 | Stop reason: SDUPTHER

## 2019-02-14 NOTE — PROGRESS NOTES
Progress Note - Neurology   Giovana Gilbert 46 y o  male MRN: [id number]  Unit/Bed#:  Encounter: [id number]      Subjective:   Patient is here for a follow-up visit and since his last visit he was evaluated by Orthopedics and is now recommended right hip replacement  He has not been following up with  pain management at this time and also claims in the recent past has been experiencing different kinds of headaches which he describes at times in the bifrontal and at times in the occipital head region not associated with vascular features  Patient had a history of chronic migraines for which he has been on amitriptyline and sumatriptan, but these headaches are different  He does see relief with the help of sumatriptan but takes longer to get relief  Patient denies any blurred vision diplopia speech difficulty but has been experiencing short-term memory difficulty and forgetfulness  Patient has been on Wellbutrin tramadol and amitriptyline  In the recent past he also has been experiencing symptoms of easy fatigability with reveal tasks and claims he breaks out into a sweat easily  Patient has been scheduled for a stress test in the near future  ROS:   Review of Systems   Constitutional: Positive for diaphoresis and fatigue  Negative for appetite change and fever  HENT: Positive for hearing loss and tinnitus  Negative for trouble swallowing and voice change  Eyes: Negative  Negative for photophobia and pain  Respiratory: Positive for shortness of breath  Cardiovascular: Negative  Negative for chest pain, palpitations and leg swelling  Gastrointestinal: Positive for vomiting  Negative for abdominal pain, constipation and nausea  Endocrine: Negative  Negative for cold intolerance and heat intolerance  Genitourinary: Negative  Negative for dysuria, flank pain, frequency and urgency  Musculoskeletal: Positive for arthralgias, back pain and gait problem  Negative for myalgias and neck pain  Shoulder pain  Right hip pain   Skin: Negative  Negative for rash  Neurological: Positive for speech difficulty and numbness  Negative for dizziness, tremors, seizures, syncope, facial asymmetry, weakness, light-headedness and headaches  Hematological: Negative  Does not bruise/bleed easily  Psychiatric/Behavioral: Positive for confusion, dysphoric mood and sleep disturbance  Negative for hallucinations  The patient is nervous/anxious  Vitals:   Vitals:    02/14/19 1546   BP: 118/82   BP Location: Left arm   Patient Position: Sitting   Cuff Size: Large   Pulse: 62   Weight: 103 kg (228 lb)   Height: 6' 1" (1 854 m)   ,Body mass index is 30 08 kg/m²      MEDS:      Current Outpatient Medications:     acetaminophen (TYLENOL) 500 mg tablet, Take 3 tablets by mouth daily as needed , Disp: , Rfl:     albuterol (PROVENTIL HFA,VENTOLIN HFA) 90 mcg/act inhaler, Inhale 2 puffs every 6 (six) hours as needed for wheezing , Disp: , Rfl:     amitriptyline (ELAVIL) 25 mg tablet, take 1 tablet by mouth daily at bedtime, Disp: 30 tablet, Rfl: 6    buPROPion (WELLBUTRIN) 75 mg tablet, take 1 tablet by mouth twice a day, Disp: 60 tablet, Rfl: 3    ergocalciferol (VITAMIN D2) 50,000 units, Take 50,000 Units by mouth once a week  , Disp: , Rfl:     fenofibrate (TRICOR) 145 mg tablet, take 1 tablet by mouth once daily, Disp: 30 tablet, Rfl: 6    mometasone-formoterol (DULERA) 200-5 MCG/ACT inhaler, Inhale 2 puffs 2 (two) times a day , Disp: , Rfl:     montelukast (SINGULAIR) 10 mg tablet, Take by mouth, Disp: , Rfl:     Omega-3 Fatty Acids (FISH OIL) 1000 MG CPDR, Take by mouth daily, Disp: , Rfl:     SUMAtriptan (IMITREX) 100 mg tablet, Take 1 tablet (100 mg total) by mouth once as needed for migraine for up to 1 dose, Disp: 9 tablet, Rfl: 3    omega-3-acid ethyl esters (LOVAZA) 1 g capsule, Take 2 capsules (2 g total) by mouth 2 (two) times a day for 30 days (Patient not taking: Reported on 2/14/2019), Disp: 120 capsule, Rfl: 5    sildenafil (REVATIO) 20 mg tablet, Take 1 tablet (20 mg total) by mouth daily As directed (Patient not taking: Reported on 2/14/2019), Disp: 90 tablet, Rfl: 3    traMADol (ULTRAM) 50 mg tablet, Take 1 tablet (50 mg total) by mouth 2 (two) times a day as needed for moderate pain for up to 30 days, Disp: 30 tablet, Rfl: 2  :    Physical Exam:  General appearance: alert, appears stated age and cooperative  Head: Normocephalic, without obvious abnormality, atraumatic    Patient is alert awake oriented, high functions are intact, speech is fluent  No evidence of any aphasia or dysarthria  Cranial nerve examination reveals visual fields reveal evidence of left inferior field cut which is new, pupils equal and reactive, extraocular movements intact, fundi showed sharp disc margins, sensation in the V1 V2 V3 distribution is symmetric, no obvious facial asymmetry noted,Hearing is preserved, tongue is midline and gag is adequate, shoulder shrug is symmetric bilaterally  Motor examination reveals normal tone and bulk, no evidence of any drift to the outstretched extremities, strength is 5/5 preserved bilaterally in both upper and lower extremities, deep tendon reflexes are intact, toes are downgoing  Sensory examination to pinprick light touch proprioception and vibration is preserved bilaterally, patient does not extinguish double simultaneous stimuli  Coordination no evidence of any finger-to-nose dysmetria  Gait is normal based Romberg sign is negative  Patient has evidence of left suboccipital in upper cervical tenderness, there is no evidence of any sinus tenderness, no bruits were appreciable in the neck and has mild lumbosacral tenderness on the right side  Lab Results: I have personally reviewed pertinent reports  Imaging Studies: I have personally reviewed pertinent reports  Assessment:  1  New onset of headaches unlike his chronic migraine headaches      Plan:  Patient is now advised to discontinue amitriptyline, due to the cognitive and memory issues he has been experiencing,he is also advised to discontinue sumatriptan due to suspected coronary artery disease and patient is advised an MRI of the brain, sed rate and a Lyme titer  Will also start the patient on Zanaflex 4 mg at bedtime to see if it helps with these headaches which could be tension type  Patient will return back to see me in 2-3 months  2/14/2019,3:50 PM    Dictation voice to text software has been used in the creation of this document  Please consider this in light of any contextual or grammatical errors

## 2019-02-15 ENCOUNTER — APPOINTMENT (OUTPATIENT)
Dept: LAB | Facility: MEDICAL CENTER | Age: 54
End: 2019-02-15
Payer: COMMERCIAL

## 2019-02-15 DIAGNOSIS — R51.9 NEW ONSET OF HEADACHES AFTER AGE 50: ICD-10-CM

## 2019-02-15 LAB — ERYTHROCYTE [SEDIMENTATION RATE] IN BLOOD: 9 MM/HOUR (ref 0–10)

## 2019-02-15 PROCEDURE — 86617 LYME DISEASE ANTIBODY: CPT

## 2019-02-15 PROCEDURE — 85652 RBC SED RATE AUTOMATED: CPT

## 2019-02-15 PROCEDURE — 36415 COLL VENOUS BLD VENIPUNCTURE: CPT

## 2019-02-17 LAB
B BURGDOR IGG PATRN SER IB-IMP: NEGATIVE
B BURGDOR IGM PATRN SER IB-IMP: NEGATIVE
B BURGDOR18KD IGG SER QL IB: NORMAL
B BURGDOR23KD IGG SER QL IB: NORMAL
B BURGDOR23KD IGM SER QL IB: NORMAL
B BURGDOR28KD IGG SER QL IB: NORMAL
B BURGDOR30KD IGG SER QL IB: NORMAL
B BURGDOR39KD IGG SER QL IB: NORMAL
B BURGDOR39KD IGM SER QL IB: NORMAL
B BURGDOR41KD IGG SER QL IB: NORMAL
B BURGDOR41KD IGM SER QL IB: NORMAL
B BURGDOR45KD IGG SER QL IB: NORMAL
B BURGDOR58KD IGG SER QL IB: NORMAL
B BURGDOR66KD IGG SER QL IB: NORMAL
B BURGDOR93KD IGG SER QL IB: NORMAL

## 2019-02-19 ENCOUNTER — TELEPHONE (OUTPATIENT)
Dept: CARDIOLOGY CLINIC | Facility: CLINIC | Age: 54
End: 2019-02-19

## 2019-02-19 ENCOUNTER — OFFICE VISIT (OUTPATIENT)
Dept: OBGYN CLINIC | Facility: CLINIC | Age: 54
End: 2019-02-19
Payer: COMMERCIAL

## 2019-02-19 VITALS
DIASTOLIC BLOOD PRESSURE: 83 MMHG | HEIGHT: 73 IN | WEIGHT: 235 LBS | SYSTOLIC BLOOD PRESSURE: 137 MMHG | HEART RATE: 67 BPM | BODY MASS INDEX: 31.14 KG/M2

## 2019-02-19 DIAGNOSIS — M16.11 PRIMARY OSTEOARTHRITIS OF RIGHT HIP: Primary | ICD-10-CM

## 2019-02-19 PROCEDURE — 99213 OFFICE O/P EST LOW 20 MIN: CPT | Performed by: PHYSICIAN ASSISTANT

## 2019-02-19 NOTE — TELEPHONE ENCOUNTER
Insurance denied patient's myoview scheduled for 3/1/19  For peer to peer call [phone] ref# [id number]       [facility]ostic please call patient to cancel test if no peer to peer is done or if Dr orders a different test

## 2019-02-19 NOTE — PROGRESS NOTES
Chief Complaint   Patient presents with    Right Hip - Follow-up, Pain         Subjective   Patient here follow-up right hip pain  Patient did see general surgery who had an evaluation and thought he did not have hernia  But that the groin pain was more muscular in nature  Patient having continued right hip pain on and off  Today it is not so bad tells me  Other days he has right groin pain that is severe  At times the groin pain does radiate down to the knee but never below the knee  Denies any numbness or tingling lower extremity  He tells me he has had hip joint injections which does help but only short-lived  He is thinking about scheduling the right hip replacement but would like to follow-up with his cardiologist 1st   Patient is scheduled for a stress test to follow-up on his aortic aneurysm        ROS:   General: no fever, no chills  Respiratory:  Positive for cough and wheezing  Cardiovascular:  No chest pain, no palpitations  Musculoskeletal: see HPI and PE  SKIN:  No skin rash, no dry skin  Gastrointestinal:  Positive for vomiting  Neurological:  Positive for dizziness, headache and numbness  Psychiatric:  No suicide thoughts, positive for anxiety, no depression  Review of all other systems is negative    Past Medical History:   Diagnosis Date    Aorta aneurysm ([facility] [address] )     4 3    Arm DVT (deep venous thromboembolism), acute ([facility] [address] ) 9377    complications of cardiac cath    Asthma     CKD (chronic kidney disease), stage III (HCC)     COPD (chronic obstructive pulmonary disease) (HCC)     Depression     Essential hypertriglyceridemia     GERD (gastroesophageal reflux disease)     Headache     Hiatal hernia     Hyperlipidemia, mixed 5/7/2018    Liver disease     FATTY LIVER    PAC (premature atrial contraction)     Prediabetes     Renal calculi     Sleep apnea     Vestibular migraine        Current Outpatient Medications on File Prior to Visit   Medication Sig Dispense Refill    acetaminophen (TYLENOL) 500 mg tablet Take 3 tablets by mouth daily as needed       albuterol (PROVENTIL HFA,VENTOLIN HFA) 90 mcg/act inhaler Inhale 2 puffs every 6 (six) hours as needed for wheezing   buPROPion (WELLBUTRIN) 75 mg tablet take 1 tablet by mouth twice a day 60 tablet 3    ergocalciferol (VITAMIN D2) 50,000 units Take 50,000 Units by mouth once a week        fenofibrate (TRICOR) 145 mg tablet take 1 tablet by mouth once daily 30 tablet 6    mometasone-formoterol (DULERA) 200-5 MCG/ACT inhaler Inhale 2 puffs 2 (two) times a day   montelukast (SINGULAIR) 10 mg tablet Take by mouth      Omega-3 Fatty Acids (FISH OIL) 1000 MG CPDR Take by mouth daily      tiZANidine (ZANAFLEX) 4 mg tablet Take 1 tablet (4 mg total) by mouth daily at bedtime 30 tablet 0    traMADol (ULTRAM) 50 mg tablet Take 1 tablet (50 mg total) by mouth 2 (two) times a day as needed for moderate pain for up to 30 days 30 tablet 2    [DISCONTINUED] omega-3-acid ethyl esters (LOVAZA) 1 g capsule Take 2 capsules (2 g total) by mouth 2 (two) times a day for 30 days (Patient not taking: Reported on 2/14/2019) 120 capsule 5    [DISCONTINUED] sildenafil (REVATIO) 20 mg tablet Take 1 tablet (20 mg total) by mouth daily As directed (Patient not taking: Reported on 2/14/2019) 90 tablet 3     No current facility-administered medications on file prior to visit          No Known Allergies      Physical Exam:    Vitals:    02/19/19 1036   BP: 137/83   Pulse: 67   Weight: 107 kg (235 lb)   Height: 6' 1" (1 854 m)       General Appearance:  Alert, cooperative, no distress, appears stated age   Lungs:   respirations unlabored   Heart:  Normal heart rate noted   Abdomen:   Soft, non-tender,  no masses   Extremities: Extremities normal, atraumatic, no cyanosis or edema   Pulses: 2+ and symmetric   Skin: Skin color, texture, turgor normal, no rashes or lesions   Neurologic: Normal         Ortho Exam  Examination right hip skin intact with no erythema noted     Patient has normal active range of motion of the hip with slight pain on internal rotation  Sensation intact light touch L1-S1 distributions  Normal strength hip flexion extension    MRI arthrogram did show moderate to severe chondrosis of the right hip was degenerative tearing of the right labrum    X-rays of the pelvis shows moderate to severe arthritic changes of the right hip with narrowing of the superior lateral aspect of the right hip joint    ASSESSMENT:    Eder Morris was seen today for follow-up and pain  Diagnoses and all orders for this visit:    Primary osteoarthritis of right hip          PLAN:  Patient with moderate to severe right hip arthritis  Patient has tried injections, medications and therapy with little improvement  Patient is considering hip replacement  Patient does have history of aortic aneurysm as well as asthma which he takes medications on a daily basis for  Patient is scheduled for a cardiac stress test on March 1, 2019 and a follow-up with his cardiologist on March 21, 2019  He will obtain cardiac clearance at that time and then make an appointment to see Dr Osmar Smith for a follow-up to discuss possible hip replacement

## 2019-02-22 DIAGNOSIS — R06.00 DOE (DYSPNEA ON EXERTION): Primary | ICD-10-CM

## 2019-02-22 DIAGNOSIS — R53.83 FATIGUE, UNSPECIFIED TYPE: ICD-10-CM

## 2019-03-05 ENCOUNTER — HOSPITAL ENCOUNTER (OUTPATIENT)
Dept: MRI IMAGING | Facility: CLINIC | Age: 54
Discharge: HOME | End: 2019-03-05
Payer: COMMERCIAL

## 2019-03-05 DIAGNOSIS — R51.9 NEW ONSET OF HEADACHES AFTER AGE 50: ICD-10-CM

## 2019-03-05 PROCEDURE — 70551 MRI BRAIN STEM W/O DYE: CPT

## 2019-03-14 DIAGNOSIS — R51.9 NEW ONSET OF HEADACHES AFTER AGE 50: ICD-10-CM

## 2019-03-14 RX ORDER — TIZANIDINE 4 MG/1
TABLET ORAL
Qty: 30 TABLET | Refills: 0 | Status: SHIPPED | OUTPATIENT
Start: 2019-03-14 | End: 2019-04-08 | Stop reason: ALTCHOICE

## 2019-03-28 ENCOUNTER — HOSPITAL ENCOUNTER (OUTPATIENT)
Dept: NON INVASIVE DIAGNOSTICS | Facility: CLINIC | Age: 54
Discharge: HOME | End: 2019-03-28
Payer: COMMERCIAL

## 2019-03-28 ENCOUNTER — TELEPHONE (OUTPATIENT)
Dept: CARDIOLOGY CLINIC | Facility: CLINIC | Age: 54
End: 2019-03-28

## 2019-03-28 DIAGNOSIS — R06.00 DOE (DYSPNEA ON EXERTION): ICD-10-CM

## 2019-03-28 DIAGNOSIS — R53.83 FATIGUE, UNSPECIFIED TYPE: Primary | ICD-10-CM

## 2019-03-28 DIAGNOSIS — R53.83 FATIGUE, UNSPECIFIED TYPE: ICD-10-CM

## 2019-03-28 PROCEDURE — 93018 CV STRESS TEST I&R ONLY: CPT | Performed by: INTERNAL MEDICINE

## 2019-03-28 PROCEDURE — 93017 CV STRESS TEST TRACING ONLY: CPT

## 2019-03-28 PROCEDURE — 93016 CV STRESS TEST SUPVJ ONLY: CPT | Performed by: INTERNAL MEDICINE

## 2019-03-28 NOTE — TELEPHONE ENCOUNTER
Lexiscan stress ordered  Pt aware and will schedule  [employer] aware will check to see if covered once scheduled

## 2019-03-28 NOTE — TELEPHONE ENCOUNTER
Patient had a stress test today  Called to say he couldn't complete it as he should have been able to,  due to hip pain    He is asking what to do next, if you want to order another type of test

## 2019-03-29 ENCOUNTER — CONSULT (OUTPATIENT)
Dept: PULMONOLOGY | Facility: CLINIC | Age: 54
End: 2019-03-29
Payer: COMMERCIAL

## 2019-03-29 VITALS
WEIGHT: 233 LBS | BODY MASS INDEX: 30.88 KG/M2 | HEIGHT: 73 IN | DIASTOLIC BLOOD PRESSURE: 84 MMHG | SYSTOLIC BLOOD PRESSURE: 104 MMHG | OXYGEN SATURATION: 96 % | HEART RATE: 84 BPM

## 2019-03-29 DIAGNOSIS — J45.30 MILD PERSISTENT ASTHMA WITHOUT COMPLICATION: ICD-10-CM

## 2019-03-29 DIAGNOSIS — J41.0 SIMPLE CHRONIC BRONCHITIS (HCC): Primary | ICD-10-CM

## 2019-03-29 DIAGNOSIS — E66.9 OBESITY (BMI 30-39.9): ICD-10-CM

## 2019-03-29 DIAGNOSIS — G47.33 OSA (OBSTRUCTIVE SLEEP APNEA): ICD-10-CM

## 2019-03-29 LAB
CHEST PAIN STATEMENT: NORMAL
MAX DIASTOLIC BP: 92 MMHG
MAX HEART RATE: 116 BPM
MAX PREDICTED HEART RATE: 166 BPM
MAX. SYSTOLIC BP: 168 MMHG
PROTOCOL NAME: NORMAL
REASON FOR TERMINATION: NORMAL
TARGET HR FORMULA: NORMAL
TEST INDICATION: NORMAL
TIME IN EXERCISE PHASE: NORMAL

## 2019-03-29 PROCEDURE — 99244 OFF/OP CNSLTJ NEW/EST MOD 40: CPT | Performed by: INTERNAL MEDICINE

## 2019-04-01 ENCOUNTER — TELEPHONE (OUTPATIENT)
Dept: PULMONOLOGY | Facility: CLINIC | Age: 54
End: 2019-04-01

## 2019-04-01 DIAGNOSIS — G47.33 OSA (OBSTRUCTIVE SLEEP APNEA): Primary | ICD-10-CM

## 2019-04-02 ENCOUNTER — TELEPHONE (OUTPATIENT)
Dept: CARDIOLOGY CLINIC | Facility: CLINIC | Age: 54
End: 2019-04-02

## 2019-04-02 ENCOUNTER — APPOINTMENT (OUTPATIENT)
Dept: LAB | Facility: MEDICAL CENTER | Age: 54
End: 2019-04-02
Payer: COMMERCIAL

## 2019-04-02 DIAGNOSIS — E78.2 MIXED HYPERLIPIDEMIA: Primary | ICD-10-CM

## 2019-04-02 DIAGNOSIS — J30.1 NON-SEASONAL ALLERGIC RHINITIS DUE TO POLLEN: ICD-10-CM

## 2019-04-02 DIAGNOSIS — R61 DIAPHORESIS: ICD-10-CM

## 2019-04-02 DIAGNOSIS — E78.2 HYPERLIPIDEMIA, MIXED: ICD-10-CM

## 2019-04-02 DIAGNOSIS — F41.8 DEPRESSION WITH ANXIETY: ICD-10-CM

## 2019-04-02 DIAGNOSIS — E78.2 ELEVATED TRIGLYCERIDES WITH HIGH CHOLESTEROL: ICD-10-CM

## 2019-04-02 DIAGNOSIS — K76.0 FATTY LIVER DISEASE, NONALCOHOLIC: ICD-10-CM

## 2019-04-02 DIAGNOSIS — J45.20 MILD INTERMITTENT ASTHMA WITHOUT COMPLICATION: ICD-10-CM

## 2019-04-02 DIAGNOSIS — J45.30 MILD PERSISTENT ASTHMA WITHOUT COMPLICATION: ICD-10-CM

## 2019-04-02 DIAGNOSIS — F43.23 ADJUSTMENT REACTION WITH ANXIETY AND DEPRESSION: ICD-10-CM

## 2019-04-02 DIAGNOSIS — R20.0 NUMBNESS OF FINGERS OF BOTH HANDS: ICD-10-CM

## 2019-04-02 DIAGNOSIS — R10.31 GROIN PAIN, CHRONIC, RIGHT: ICD-10-CM

## 2019-04-02 DIAGNOSIS — J44.9 CHRONIC OBSTRUCTIVE PULMONARY DISEASE, UNSPECIFIED COPD TYPE (HCC): ICD-10-CM

## 2019-04-02 DIAGNOSIS — G89.29 GROIN PAIN, CHRONIC, RIGHT: ICD-10-CM

## 2019-04-02 DIAGNOSIS — N18.30 CKD (CHRONIC KIDNEY DISEASE) STAGE 3, GFR 30-59 ML/MIN (HCC): ICD-10-CM

## 2019-04-02 DIAGNOSIS — T73.3XXA FATIGUE DUE TO EXCESSIVE EXERTION, INITIAL ENCOUNTER: ICD-10-CM

## 2019-04-02 DIAGNOSIS — E55.9 VITAMIN D DEFICIENCY: ICD-10-CM

## 2019-04-02 LAB
25(OH)D3 SERPL-MCNC: 43.8 NG/ML (ref 30–100)
ALBUMIN SERPL BCP-MCNC: 3.8 G/DL (ref 3.5–5)
ALP SERPL-CCNC: 64 U/L (ref 46–116)
ALT SERPL W P-5'-P-CCNC: 47 U/L (ref 12–78)
ANION GAP SERPL CALCULATED.3IONS-SCNC: 5 MMOL/L (ref 4–13)
AST SERPL W P-5'-P-CCNC: 32 U/L (ref 5–45)
BASOPHILS # BLD AUTO: 0.05 THOUSANDS/ΜL (ref 0–0.1)
BASOPHILS NFR BLD AUTO: 1 % (ref 0–1)
BILIRUB SERPL-MCNC: 0.59 MG/DL (ref 0.2–1)
BILIRUB UR QL STRIP: NEGATIVE
BUN SERPL-MCNC: 14 MG/DL (ref 5–25)
CALCIUM SERPL-MCNC: 9.2 MG/DL (ref 8.3–10.1)
CHLORIDE SERPL-SCNC: 109 MMOL/L (ref 100–108)
CHOLEST SERPL-MCNC: 165 MG/DL (ref 50–200)
CLARITY UR: CLEAR
CO2 SERPL-SCNC: 28 MMOL/L (ref 21–32)
COLOR UR: YELLOW
CREAT SERPL-MCNC: 1.34 MG/DL (ref 0.6–1.3)
CREAT UR-MCNC: 291 MG/DL
EOSINOPHIL # BLD AUTO: 0.11 THOUSAND/ΜL (ref 0–0.61)
EOSINOPHIL NFR BLD AUTO: 2 % (ref 0–6)
ERYTHROCYTE [DISTWIDTH] IN BLOOD BY AUTOMATED COUNT: 12.2 % (ref 11.6–15.1)
GFR SERPL CREATININE-BSD FRML MDRD: 60 ML/MIN/1.73SQ M
GLUCOSE P FAST SERPL-MCNC: 110 MG/DL (ref 65–99)
GLUCOSE UR STRIP-MCNC: NEGATIVE MG/DL
HCT VFR BLD AUTO: 48.4 % (ref 36.5–49.3)
HDLC SERPL-MCNC: 29 MG/DL (ref 40–60)
HGB BLD-MCNC: 15.2 G/DL (ref 12–17)
HGB UR QL STRIP.AUTO: NEGATIVE
IMM GRANULOCYTES # BLD AUTO: 0.02 THOUSAND/UL (ref 0–0.2)
IMM GRANULOCYTES NFR BLD AUTO: 0 % (ref 0–2)
KETONES UR STRIP-MCNC: NEGATIVE MG/DL
LEUKOCYTE ESTERASE UR QL STRIP: NEGATIVE
LYMPHOCYTES # BLD AUTO: 1.63 THOUSANDS/ΜL (ref 0.6–4.47)
LYMPHOCYTES NFR BLD AUTO: 31 % (ref 14–44)
MCH RBC QN AUTO: 31.1 PG (ref 26.8–34.3)
MCHC RBC AUTO-ENTMCNC: 31.4 G/DL (ref 31.4–37.4)
MCV RBC AUTO: 99 FL (ref 82–98)
MONOCYTES # BLD AUTO: 0.53 THOUSAND/ΜL (ref 0.17–1.22)
MONOCYTES NFR BLD AUTO: 10 % (ref 4–12)
NEUTROPHILS # BLD AUTO: 2.92 THOUSANDS/ΜL (ref 1.85–7.62)
NEUTS SEG NFR BLD AUTO: 56 % (ref 43–75)
NITRITE UR QL STRIP: NEGATIVE
NONHDLC SERPL-MCNC: 136 MG/DL
NRBC BLD AUTO-RTO: 0 /100 WBCS
PH UR STRIP.AUTO: 6 [PH]
PHOSPHATE SERPL-MCNC: 2.3 MG/DL (ref 2.7–4.5)
PLATELET # BLD AUTO: 198 THOUSANDS/UL (ref 149–390)
PMV BLD AUTO: 10.8 FL (ref 8.9–12.7)
POTASSIUM SERPL-SCNC: 3.8 MMOL/L (ref 3.5–5.3)
PROT SERPL-MCNC: 7 G/DL (ref 6.4–8.2)
PROT UR STRIP-MCNC: NEGATIVE MG/DL
PROT UR-MCNC: 8 MG/DL
PROT/CREAT UR: 0.03 MG/G{CREAT} (ref 0–0.1)
PTH-INTACT SERPL-MCNC: 49.1 PG/ML (ref 18.4–80.1)
RBC # BLD AUTO: 4.89 MILLION/UL (ref 3.88–5.62)
SODIUM SERPL-SCNC: 142 MMOL/L (ref 136–145)
SP GR UR STRIP.AUTO: 1.03 (ref 1–1.03)
TRIGL SERPL-MCNC: 426 MG/DL
UROBILINOGEN UR QL STRIP.AUTO: 0.2 E.U./DL
WBC # BLD AUTO: 5.26 THOUSAND/UL (ref 4.31–10.16)

## 2019-04-02 PROCEDURE — 82570 ASSAY OF URINE CREATININE: CPT

## 2019-04-02 PROCEDURE — 81003 URINALYSIS AUTO W/O SCOPE: CPT

## 2019-04-02 PROCEDURE — 84100 ASSAY OF PHOSPHORUS: CPT

## 2019-04-02 PROCEDURE — 82306 VITAMIN D 25 HYDROXY: CPT

## 2019-04-02 PROCEDURE — 84156 ASSAY OF PROTEIN URINE: CPT

## 2019-04-02 PROCEDURE — 85025 COMPLETE CBC W/AUTO DIFF WBC: CPT

## 2019-04-02 PROCEDURE — 82785 ASSAY OF IGE: CPT

## 2019-04-02 PROCEDURE — 36415 COLL VENOUS BLD VENIPUNCTURE: CPT

## 2019-04-02 PROCEDURE — 80061 LIPID PANEL: CPT

## 2019-04-02 PROCEDURE — 80053 COMPREHEN METABOLIC PANEL: CPT

## 2019-04-02 PROCEDURE — 83970 ASSAY OF PARATHORMONE: CPT

## 2019-04-02 PROCEDURE — 86003 ALLG SPEC IGE CRUDE XTRC EA: CPT

## 2019-04-02 RX ORDER — ROSUVASTATIN CALCIUM 20 MG/1
20 TABLET, COATED ORAL DAILY
Qty: 30 TABLET | Refills: 5 | Status: SHIPPED | OUTPATIENT
Start: 2019-04-02 | End: 2019-09-09 | Stop reason: SDUPTHER

## 2019-04-03 ENCOUNTER — TELEPHONE (OUTPATIENT)
Dept: UROLOGY | Facility: CLINIC | Age: 54
End: 2019-04-03

## 2019-04-04 LAB
A ALTERNATA IGE QN: <0.1 KUA/I
A FUMIGATUS IGE QN: <0.1 KUA/I
ALLERGEN COMMENT: ABNORMAL
BERMUDA GRASS IGE QN: <0.1 KUA/I
BOXELDER IGE QN: <0.1 KUA/I
C HERBARUM IGE QN: <0.1 KUA/I
CAT DANDER IGE QN: <0.1 KUA/I
CMN PIGWEED IGE QN: <0.1 KUA/I
COMMON RAGWEED IGE QN: <0.1 KUA/I
COTTONWOOD IGE QN: <0.1 KUA/I
D FARINAE IGE QN: 3.14 KUA/I
D PTERONYSS IGE QN: 3.29 KUA/I
DOG DANDER IGE QN: <0.1 KUA/I
LONDON PLANE IGE QN: <0.1 KUA/I
MOUSE URINE PROT IGE QN: <0.1 KUA/I
MT JUNIPER IGE QN: <0.1 KUA/I
MUGWORT IGE QN: <0.1 KUA/I
P NOTATUM IGE QN: <0.1 KUA/I
ROACH IGE QN: <0.1 KUA/I
SHEEP SORREL IGE QN: <0.1 KUA/I
SILVER BIRCH IGE QN: <0.1 KUA/I
TIMOTHY IGE QN: <0.1 KUA/I
TOTAL IGE SMQN RAST: 16.8 KU/L (ref 0–113)
WALNUT IGE QN: <0.1 KUA/I
WHITE ASH IGE QN: <0.1 KUA/I
WHITE ELM IGE QN: <0.1 KUA/I
WHITE MULBERRY IGE QN: <0.1 KUA/I
WHITE OAK IGE QN: <0.1 KUA/I

## 2019-04-08 ENCOUNTER — OFFICE VISIT (OUTPATIENT)
Dept: FAMILY MEDICINE CLINIC | Facility: CLINIC | Age: 54
End: 2019-04-08
Payer: COMMERCIAL

## 2019-04-08 ENCOUNTER — APPOINTMENT (OUTPATIENT)
Dept: RADIOLOGY | Facility: CLINIC | Age: 54
End: 2019-04-08
Payer: COMMERCIAL

## 2019-04-08 VITALS
HEART RATE: 63 BPM | DIASTOLIC BLOOD PRESSURE: 82 MMHG | TEMPERATURE: 100.8 F | OXYGEN SATURATION: 98 % | HEIGHT: 73 IN | WEIGHT: 233.2 LBS | BODY MASS INDEX: 30.91 KG/M2 | SYSTOLIC BLOOD PRESSURE: 122 MMHG

## 2019-04-08 DIAGNOSIS — J45.41 MODERATE PERSISTENT ASTHMA WITH EXACERBATION: ICD-10-CM

## 2019-04-08 DIAGNOSIS — R05.9 COUGH: ICD-10-CM

## 2019-04-08 DIAGNOSIS — J45.41 MODERATE PERSISTENT ASTHMA WITH EXACERBATION: Primary | ICD-10-CM

## 2019-04-08 PROCEDURE — 99214 OFFICE O/P EST MOD 30 MIN: CPT | Performed by: FAMILY MEDICINE

## 2019-04-08 PROCEDURE — 71046 X-RAY EXAM CHEST 2 VIEWS: CPT

## 2019-04-08 PROCEDURE — 3008F BODY MASS INDEX DOCD: CPT | Performed by: FAMILY MEDICINE

## 2019-04-08 RX ORDER — AZITHROMYCIN 250 MG/1
250 TABLET, FILM COATED ORAL EVERY 24 HOURS
Qty: 5 TABLET | Refills: 0 | Status: SHIPPED | OUTPATIENT
Start: 2019-04-08 | End: 2019-04-13

## 2019-04-08 RX ORDER — DEXTROMETHORPHAN HYDROBROMIDE AND PROMETHAZINE HYDROCHLORIDE 15; 6.25 MG/5ML; MG/5ML
5 SYRUP ORAL 4 TIMES DAILY PRN
Qty: 118 ML | Refills: 1 | Status: SHIPPED | OUTPATIENT
Start: 2019-04-08 | End: 2019-05-14

## 2019-04-08 RX ORDER — PREDNISONE 50 MG/1
50 TABLET ORAL DAILY
Qty: 5 TABLET | Refills: 0 | Status: SHIPPED | OUTPATIENT
Start: 2019-04-08 | End: 2019-04-13

## 2019-04-09 ENCOUNTER — TELEPHONE (OUTPATIENT)
Dept: FAMILY MEDICINE CLINIC | Facility: CLINIC | Age: 54
End: 2019-04-09

## 2019-04-09 DIAGNOSIS — R05.9 COUGH: Primary | ICD-10-CM

## 2019-04-09 RX ORDER — PROMETHAZINE HYDROCHLORIDE AND CODEINE PHOSPHATE 6.25; 1 MG/5ML; MG/5ML
5 SYRUP ORAL EVERY 4 HOURS PRN
Qty: 120 ML | Refills: 0 | Status: SHIPPED | OUTPATIENT
Start: 2019-04-09 | End: 2019-05-14

## 2019-04-10 DIAGNOSIS — J45.909 UNCOMPLICATED ASTHMA, UNSPECIFIED ASTHMA SEVERITY, UNSPECIFIED WHETHER PERSISTENT: Primary | ICD-10-CM

## 2019-04-10 RX ORDER — MONTELUKAST SODIUM 10 MG/1
10 TABLET ORAL
Qty: 30 TABLET | Refills: 3 | Status: SHIPPED | OUTPATIENT
Start: 2019-04-10 | End: 2019-08-11 | Stop reason: SDUPTHER

## 2019-04-12 ENCOUNTER — OFFICE VISIT (OUTPATIENT)
Dept: PAIN MEDICINE | Facility: CLINIC | Age: 54
End: 2019-04-12
Payer: COMMERCIAL

## 2019-04-12 VITALS
BODY MASS INDEX: 30.88 KG/M2 | DIASTOLIC BLOOD PRESSURE: 88 MMHG | HEIGHT: 73 IN | RESPIRATION RATE: 18 BRPM | SYSTOLIC BLOOD PRESSURE: 108 MMHG | HEART RATE: 66 BPM | WEIGHT: 233 LBS

## 2019-04-12 DIAGNOSIS — M16.11 PRIMARY OSTEOARTHRITIS OF RIGHT HIP: ICD-10-CM

## 2019-04-12 PROCEDURE — 99214 OFFICE O/P EST MOD 30 MIN: CPT | Performed by: ANESTHESIOLOGY

## 2019-04-12 RX ORDER — TRAMADOL HYDROCHLORIDE 50 MG/1
50 TABLET ORAL 2 TIMES DAILY PRN
Qty: 30 TABLET | Refills: 2 | Status: SHIPPED | OUTPATIENT
Start: 2019-04-12 | End: 2019-07-05 | Stop reason: SDUPTHER

## 2019-04-15 ENCOUNTER — TELEPHONE (OUTPATIENT)
Dept: UROLOGY | Facility: AMBULATORY SURGERY CENTER | Age: 54
End: 2019-04-15

## 2019-04-15 ENCOUNTER — HOSPITAL ENCOUNTER (OUTPATIENT)
Dept: NON INVASIVE DIAGNOSTICS | Facility: CLINIC | Age: 54
Discharge: HOME | End: 2019-04-15
Payer: COMMERCIAL

## 2019-04-15 DIAGNOSIS — R06.00 DOE (DYSPNEA ON EXERTION): ICD-10-CM

## 2019-04-15 DIAGNOSIS — R53.83 FATIGUE, UNSPECIFIED TYPE: ICD-10-CM

## 2019-04-15 PROCEDURE — 93018 CV STRESS TEST I&R ONLY: CPT | Performed by: INTERNAL MEDICINE

## 2019-04-15 PROCEDURE — 78452 HT MUSCLE IMAGE SPECT MULT: CPT

## 2019-04-15 PROCEDURE — 78452 HT MUSCLE IMAGE SPECT MULT: CPT | Performed by: INTERNAL MEDICINE

## 2019-04-15 PROCEDURE — 93017 CV STRESS TEST TRACING ONLY: CPT

## 2019-04-15 PROCEDURE — A9502 TC99M TETROFOSMIN: HCPCS

## 2019-04-15 PROCEDURE — 93016 CV STRESS TEST SUPVJ ONLY: CPT | Performed by: INTERNAL MEDICINE

## 2019-04-15 RX ADMIN — REGADENOSON 0.4 MG: 0.08 INJECTION, SOLUTION INTRAVENOUS at 13:08

## 2019-04-16 ENCOUNTER — OFFICE VISIT (OUTPATIENT)
Dept: NEPHROLOGY | Facility: CLINIC | Age: 54
End: 2019-04-16
Payer: COMMERCIAL

## 2019-04-16 VITALS
SYSTOLIC BLOOD PRESSURE: 130 MMHG | DIASTOLIC BLOOD PRESSURE: 80 MMHG | HEIGHT: 74 IN | TEMPERATURE: 97.8 F | RESPIRATION RATE: 16 BRPM | BODY MASS INDEX: 29.62 KG/M2 | HEART RATE: 78 BPM | WEIGHT: 230.8 LBS

## 2019-04-16 DIAGNOSIS — J44.9 CHRONIC OBSTRUCTIVE PULMONARY DISEASE, UNSPECIFIED COPD TYPE (HCC): ICD-10-CM

## 2019-04-16 DIAGNOSIS — N28.1 RENAL CYST, LEFT: ICD-10-CM

## 2019-04-16 DIAGNOSIS — N18.30 CKD (CHRONIC KIDNEY DISEASE) STAGE 3, GFR 30-59 ML/MIN (HCC): Primary | ICD-10-CM

## 2019-04-16 PROCEDURE — 99213 OFFICE O/P EST LOW 20 MIN: CPT | Performed by: INTERNAL MEDICINE

## 2019-04-16 PROCEDURE — 3066F NEPHROPATHY DOC TX: CPT | Performed by: INTERNAL MEDICINE

## 2019-04-18 ENCOUNTER — OFFICE VISIT (OUTPATIENT)
Dept: CARDIOLOGY CLINIC | Facility: MEDICAL CENTER | Age: 54
End: 2019-04-18
Payer: COMMERCIAL

## 2019-04-18 VITALS
DIASTOLIC BLOOD PRESSURE: 66 MMHG | HEIGHT: 74 IN | OXYGEN SATURATION: 99 % | BODY MASS INDEX: 29.5 KG/M2 | WEIGHT: 229.9 LBS | SYSTOLIC BLOOD PRESSURE: 122 MMHG | HEART RATE: 73 BPM

## 2019-04-18 DIAGNOSIS — Q23.1 BICUSPID AORTIC VALVE: Primary | ICD-10-CM

## 2019-04-18 DIAGNOSIS — I71.2 ASCENDING AORTIC ANEURYSM (HCC): ICD-10-CM

## 2019-04-18 DIAGNOSIS — E78.2 HYPERLIPIDEMIA, MIXED: ICD-10-CM

## 2019-04-18 PROCEDURE — 99214 OFFICE O/P EST MOD 30 MIN: CPT | Performed by: INTERNAL MEDICINE

## 2019-04-19 LAB
CHEST PAIN STATEMENT: NORMAL
MAX DIASTOLIC BP: 82 MMHG
MAX HEART RATE: 102 BPM
MAX PREDICTED HEART RATE: 166 BPM
MAX. SYSTOLIC BP: 140 MMHG
PROTOCOL NAME: NORMAL
REASON FOR TERMINATION: NORMAL
TARGET HR FORMULA: NORMAL
TEST INDICATION: NORMAL
TIME IN EXERCISE PHASE: NORMAL

## 2019-04-22 ENCOUNTER — TELEPHONE (OUTPATIENT)
Dept: FAMILY MEDICINE CLINIC | Facility: CLINIC | Age: 54
End: 2019-04-22

## 2019-04-22 DIAGNOSIS — K21.9 GASTROESOPHAGEAL REFLUX DISEASE WITHOUT ESOPHAGITIS: ICD-10-CM

## 2019-04-22 RX ORDER — PANTOPRAZOLE SODIUM 40 MG/1
TABLET, DELAYED RELEASE ORAL
Qty: 30 TABLET | Refills: 5 | Status: SHIPPED | OUTPATIENT
Start: 2019-04-22 | End: 2019-10-10 | Stop reason: SDUPTHER

## 2019-04-26 ENCOUNTER — HOSPITAL ENCOUNTER (OUTPATIENT)
Dept: RADIOLOGY | Facility: MEDICAL CENTER | Age: 54
Discharge: HOME | End: 2019-04-26
Payer: COMMERCIAL

## 2019-04-26 DIAGNOSIS — I71.2 ASCENDING AORTIC ANEURYSM (HCC): ICD-10-CM

## 2019-04-26 PROCEDURE — 71275 CT ANGIOGRAPHY CHEST: CPT

## 2019-04-26 RX ADMIN — IODIXANOL 100 ML: 320 INJECTION, SOLUTION INTRAVASCULAR at 10:30

## 2019-04-27 ENCOUNTER — APPOINTMENT (OUTPATIENT)
Dept: LAB | Facility: MEDICAL CENTER | Age: 54
End: 2019-04-27
Payer: COMMERCIAL

## 2019-04-27 DIAGNOSIS — N18.30 CKD (CHRONIC KIDNEY DISEASE) STAGE 3, GFR 30-59 ML/MIN (HCC): ICD-10-CM

## 2019-04-27 LAB
ANION GAP SERPL CALCULATED.3IONS-SCNC: 2 MMOL/L (ref 4–13)
BUN SERPL-MCNC: 16 MG/DL (ref 5–25)
CALCIUM SERPL-MCNC: 8.7 MG/DL (ref 8.3–10.1)
CHLORIDE SERPL-SCNC: 108 MMOL/L (ref 100–108)
CO2 SERPL-SCNC: 29 MMOL/L (ref 21–32)
CREAT SERPL-MCNC: 1.42 MG/DL (ref 0.6–1.3)
GFR SERPL CREATININE-BSD FRML MDRD: 56 ML/MIN/1.73SQ M
GLUCOSE P FAST SERPL-MCNC: 113 MG/DL (ref 65–99)
POTASSIUM SERPL-SCNC: 4.6 MMOL/L (ref 3.5–5.3)
SODIUM SERPL-SCNC: 139 MMOL/L (ref 136–145)

## 2019-04-27 PROCEDURE — 36415 COLL VENOUS BLD VENIPUNCTURE: CPT | Performed by: INTERNAL MEDICINE

## 2019-04-27 PROCEDURE — 80048 BASIC METABOLIC PNL TOTAL CA: CPT | Performed by: INTERNAL MEDICINE

## 2019-04-29 ENCOUNTER — TELEPHONE (OUTPATIENT)
Dept: CARDIOLOGY CLINIC | Facility: CLINIC | Age: 54
End: 2019-04-29

## 2019-05-09 ENCOUNTER — TRANSCRIBE ORDERS (OUTPATIENT)
Dept: ADMINISTRATIVE | Facility: HOSPITAL | Age: 54
End: 2019-05-09

## 2019-05-13 ENCOUNTER — TELEPHONE (OUTPATIENT)
Dept: PAIN MEDICINE | Facility: CLINIC | Age: 54
End: 2019-05-13

## 2019-05-14 ENCOUNTER — OFFICE VISIT (OUTPATIENT)
Dept: FAMILY MEDICINE CLINIC | Facility: CLINIC | Age: 54
End: 2019-05-14
Payer: COMMERCIAL

## 2019-05-14 VITALS
HEART RATE: 70 BPM | OXYGEN SATURATION: 97 % | HEIGHT: 74 IN | SYSTOLIC BLOOD PRESSURE: 118 MMHG | DIASTOLIC BLOOD PRESSURE: 72 MMHG | WEIGHT: 225 LBS | RESPIRATION RATE: 18 BRPM | BODY MASS INDEX: 28.88 KG/M2

## 2019-05-14 DIAGNOSIS — F43.23 ADJUSTMENT REACTION WITH ANXIETY AND DEPRESSION: ICD-10-CM

## 2019-05-14 DIAGNOSIS — H69.92 EUSTACHIAN TUBE DISORDER, LEFT: ICD-10-CM

## 2019-05-14 DIAGNOSIS — J45.20 MILD INTERMITTENT ASTHMA WITHOUT COMPLICATION: ICD-10-CM

## 2019-05-14 DIAGNOSIS — E78.2 ELEVATED TRIGLYCERIDES WITH HIGH CHOLESTEROL: ICD-10-CM

## 2019-05-14 DIAGNOSIS — N18.30 CKD (CHRONIC KIDNEY DISEASE) STAGE 3, GFR 30-59 ML/MIN (HCC): ICD-10-CM

## 2019-05-14 DIAGNOSIS — J44.9 CHRONIC OBSTRUCTIVE PULMONARY DISEASE, UNSPECIFIED COPD TYPE (HCC): ICD-10-CM

## 2019-05-14 DIAGNOSIS — M16.11 PRIMARY OSTEOARTHRITIS OF RIGHT HIP: ICD-10-CM

## 2019-05-14 DIAGNOSIS — E78.2 HYPERLIPIDEMIA, MIXED: ICD-10-CM

## 2019-05-14 DIAGNOSIS — K21.9 GASTROESOPHAGEAL REFLUX DISEASE WITHOUT ESOPHAGITIS: Primary | ICD-10-CM

## 2019-05-14 DIAGNOSIS — K76.0 FATTY LIVER DISEASE, NONALCOHOLIC: ICD-10-CM

## 2019-05-14 DIAGNOSIS — J30.1 NON-SEASONAL ALLERGIC RHINITIS DUE TO POLLEN: ICD-10-CM

## 2019-05-14 DIAGNOSIS — R73.03 PREDIABETES: ICD-10-CM

## 2019-05-14 DIAGNOSIS — E55.9 VITAMIN D DEFICIENCY: ICD-10-CM

## 2019-05-14 LAB — SL AMB POCT HEMOGLOBIN AIC: 6.4 (ref ?–6.5)

## 2019-05-14 PROCEDURE — 3044F HG A1C LEVEL LT 7.0%: CPT | Performed by: NURSE PRACTITIONER

## 2019-05-14 PROCEDURE — 99214 OFFICE O/P EST MOD 30 MIN: CPT | Performed by: NURSE PRACTITIONER

## 2019-05-14 PROCEDURE — 83036 HEMOGLOBIN GLYCOSYLATED A1C: CPT | Performed by: NURSE PRACTITIONER

## 2019-05-14 RX ORDER — SILDENAFIL CITRATE 20 MG/1
TABLET ORAL
COMMUNITY
Start: 2017-10-07 | End: 2019-10-29 | Stop reason: ALTCHOICE

## 2019-05-14 RX ORDER — FLUTICASONE PROPIONATE 50 MCG
1 SPRAY, SUSPENSION (ML) NASAL DAILY
Qty: 11 BOTTLE | Refills: 3 | Status: SHIPPED | OUTPATIENT
Start: 2019-05-14 | End: 2019-09-17 | Stop reason: SDUPTHER

## 2019-05-14 RX ORDER — BUPROPION HYDROCHLORIDE 75 MG/1
TABLET ORAL
Qty: 60 TABLET | Refills: 3 | Status: SHIPPED | OUTPATIENT
Start: 2019-05-14 | End: 2019-09-17

## 2019-05-19 DIAGNOSIS — E78.2 ELEVATED TRIGLYCERIDES WITH HIGH CHOLESTEROL: ICD-10-CM

## 2019-05-20 RX ORDER — FENOFIBRATE 145 MG/1
TABLET, COATED ORAL
Qty: 30 TABLET | Refills: 6 | Status: SHIPPED | OUTPATIENT
Start: 2019-05-20 | End: 2019-12-09 | Stop reason: SDUPTHER

## 2019-05-23 ENCOUNTER — TELEPHONE (OUTPATIENT)
Dept: PAIN MEDICINE | Facility: CLINIC | Age: 54
End: 2019-05-23

## 2019-06-13 ENCOUNTER — TELEPHONE (OUTPATIENT)
Dept: PAIN MEDICINE | Facility: MEDICAL CENTER | Age: 54
End: 2019-06-13

## 2019-07-05 ENCOUNTER — OFFICE VISIT (OUTPATIENT)
Dept: PAIN MEDICINE | Facility: CLINIC | Age: 54
End: 2019-07-05
Payer: COMMERCIAL

## 2019-07-05 VITALS
DIASTOLIC BLOOD PRESSURE: 68 MMHG | HEIGHT: 74 IN | HEART RATE: 54 BPM | BODY MASS INDEX: 28.88 KG/M2 | SYSTOLIC BLOOD PRESSURE: 118 MMHG | WEIGHT: 225 LBS

## 2019-07-05 DIAGNOSIS — G89.4 CHRONIC PAIN SYNDROME: Primary | ICD-10-CM

## 2019-07-05 DIAGNOSIS — F11.20 UNCOMPLICATED OPIOID DEPENDENCE (HCC): ICD-10-CM

## 2019-07-05 DIAGNOSIS — M25.561 CHRONIC PAIN OF RIGHT KNEE: ICD-10-CM

## 2019-07-05 DIAGNOSIS — M25.851 HIP IMPINGEMENT SYNDROME, RIGHT: ICD-10-CM

## 2019-07-05 DIAGNOSIS — R10.31 GROIN PAIN, CHRONIC, RIGHT: ICD-10-CM

## 2019-07-05 DIAGNOSIS — G89.29 GROIN PAIN, CHRONIC, RIGHT: ICD-10-CM

## 2019-07-05 DIAGNOSIS — Z79.891 ENCOUNTER FOR LONG-TERM USE OF OPIATE ANALGESIC: ICD-10-CM

## 2019-07-05 DIAGNOSIS — M16.11 PRIMARY OSTEOARTHRITIS OF RIGHT HIP: ICD-10-CM

## 2019-07-05 DIAGNOSIS — G89.29 CHRONIC PAIN OF RIGHT KNEE: ICD-10-CM

## 2019-07-05 DIAGNOSIS — M25.551 PAIN IN RIGHT HIP: ICD-10-CM

## 2019-07-05 PROCEDURE — 80305 DRUG TEST PRSMV DIR OPT OBS: CPT | Performed by: NURSE PRACTITIONER

## 2019-07-05 PROCEDURE — 99214 OFFICE O/P EST MOD 30 MIN: CPT | Performed by: NURSE PRACTITIONER

## 2019-07-05 RX ORDER — TRAMADOL HYDROCHLORIDE 50 MG/1
TABLET ORAL
Qty: 45 TABLET | Refills: 2 | Status: SHIPPED | OUTPATIENT
Start: 2019-07-05 | End: 2019-09-27 | Stop reason: SDUPTHER

## 2019-07-05 NOTE — PATIENT INSTRUCTIONS
Opioid Pain Management   AMBULATORY CARE:   An opioid  is a type of medicine used to treat pain  Examples of opioids are oxycodone, morphine, fentanyl, or codeine  Take opioid medicines as directed, for the condition it is prescribed:  Common problems that may occur when you do not take opioid medicines as directed include the following:  · Health problems  may occur  You may have trouble breathing  You may also develop liver or kidney damage, or stomach bleeding  Any of these health problems can become life-threatening  · Opioid dependence  means your body needs the opioid medicine to keep it from going through withdrawal      · Opioid tolerance  means the opioid does not control pain as well as it used to  You need higher doses of the opioid to get pain relief  · Opioid addiction  means you are not able to control the use of the opioid medicine  You use it when you do not have pain  You crave the opioid medicine  Call 911 or have someone call 911 for any of the following:   · You are breathing slower than normal, or you have trouble breathing  · You cannot be awakened  · You have a seizure  Seek care immediately if:   · Your heart is beating slower than usual     · Your heart feels like it is jumping or fluttering  · You are so sleepy that you cannot stay awake  · You have severe muscle pain or weakness  · You see or hear things that are not real   Contact your healthcare provider if:   · You are too dizzy to stand up  · Your pain gets worse or you have new pain  · You cannot do your usual activities because of side effects from the opioid  · You are constipated or have abdominal pain  · You have questions or concerns about your condition or care  Opioid safety measures:   · Take your medicine as directed  Ask if you need more information on how to take your medicine correctly  Follow up with your healthcare provider regularly  You may need to have your dose adjusted   Do not use opioid medicine if you are pregnant or breastfeeding  · Give your healthcare provider a list of all your medicines  Include any over-the-counter medicines, vitamins, and herbs  It can be dangerous to take opioids with certain other medicines, such as antihistamines  · Keep opioid medicine in a safe place  Store your opioid medicine in a locked cabinet to keep it away from children and others  · Do not drink alcohol while you use opioids  Alcohol use with an opioid medicine can make you sleepy and slow your breathing rate  You may stop breathing completely  · Do not drive or operate heavy machinery after you take opioid medicine  Opioid medicine can make you drowsy and make it hard to concentrate  You may injure yourself or others if you drive or operate heavy machinery while taking your medicine  · Drink fluids and eat high-fiber foods  Fluids and fiber will help prevent constipation  Ask your healthcare provider what fluids are right for you and how much you should drink  Also ask for a list of foods that contain fiber  Follow up with your healthcare provider as directed: You may need to have your dose adjusted  You may be referred to a pain specialist  Write down your questions so you remember to ask them during your visits  © 2017 [address] [employer] Information is for End User's use only and may not be sold, redistributed or otherwise used for commercial purposes  All illustrations and images included in CareNotes® are the copyrighted property of [employer]  or [employer]  The above information is an  only  It is not intended as medical advice for individual conditions or treatments  Talk to your doctor, nurse or pharmacist before following any medical regimen to see if it is safe and effective for you

## 2019-07-05 NOTE — PROGRESS NOTES
Assessment:  1  Chronic pain syndrome    2  Groin pain, chronic, right    3  Chronic pain of right knee    4  Pain in right hip    5  Primary osteoarthritis of right hip    6  Hip impingement syndrome, right        Plan:  While the patient was in the office today, I did have a thorough conversation with the patient regarding his chronic pain syndrome, symptoms, and treatment plan  I did explain to the patient that if between now and his next office visit he would be scheduled for his hip replacement surgery, he should call our office so we can come up with a postoperative pain management plan as per the opioid contract  The patient was agreeable and verbalized an understanding  In the meantime, we will continue the tramadol as prescribed, however, I did increase the total amount to 45 pills for 30 days to try to help better manage the pain  The patient was agreeable and verbalized an understanding  [place] Prescription Drug Monitoring Program report was reviewed and was appropriate     A urine drug screen was collected at today's office visit as part of our medication management protocol  The point of care testing results were appropriate for what was being prescribed  The specimen will be sent for confirmatory testing  The drug screen is medically necessary because the patient is either dependent on opioid medication or is being considered for opioid medication therapy and the results could impact ongoing or future treatment  The drug screen is to evaluate for the presences or absence of prescribed, non-prescribed, and/or illicit drugs/substances  There are risks associated with opioid medications, including dependence, addiction and tolerance  The patient understands and agrees to use these medications only as prescribed   Potential side effects of the medications include, but are not limited to, constipation, drowsiness, addiction, impaired judgment and risk of fatal overdose if not taken as prescribed  The patient was warned against driving while taking sedation medications  Sharing medications is a felony  At this point in time, the patient is showing no signs of addiction, abuse, diversion or suicidal ideation  The patient will follow-up in 12 weeks for medication prescription refill and reevaluation  The patient was advised to contact the office should their symptoms worsen in the interim  The patient was agreeable and verbalized an understanding  History of Present Illness: The patient is a 53 y o  male last seen on 4/12/19 who presents for a follow up office visit in regards to chronic pain syndrome secondary to primary osteoarthritis of the right hip and hip impingement syndrome  The patient currently reports that since his last office visit his pain symptoms have continued to worsen and he is still considering proceeding with a right total hip replacement surgery, however, he has to wait until of his insurance gets figured out as he just got approved for disability and now he has to figure out his insurance process once that is done he is going to plan on proceeding with surgery  He does report he tries to only use the tramadol as needed but recently there are some days he feels he could use it twice a day consistently, but can't because he only gets 30 pills a month  The patient presents today to discuss his medication regimen and treatment plan  Current pain medications includes:  Tramadol 50 mg b i d  p r n  for pain dispense number 30 pills  The patient reports that this regimen is providing minimal pain relief  The patient is reporting no side effects from this pain medication regimen  Pain Contract Signed: 9/5/18  Last Urine Drug Screen: 7/5/19    I have personally reviewed and/or updated the patient's past medical history, past surgical history, family history, social history, current medications, allergies, and vital signs today         Review of Systems:    Review of Systems   Respiratory: Negative for shortness of breath  Cardiovascular: Negative for chest pain  Gastrointestinal: Negative for constipation, diarrhea, nausea and vomiting  Musculoskeletal: Positive for gait problem  Negative for arthralgias, joint swelling (joint stiffness) and myalgias  Skin: Negative for rash  Neurological: Positive for dizziness  Negative for seizures and weakness  All other systems reviewed and are negative  Past Medical History:   Diagnosis Date    Aorta aneurysm (HCC)     4 3    Arm DVT (deep venous thromboembolism), acute ([facility] [address] ) 9498    complications of cardiac cath    Asthma     CKD (chronic kidney disease), stage III (HCC)     COPD (chronic obstructive pulmonary disease) (HCC)     Depression     Essential hypertriglyceridemia     GERD (gastroesophageal reflux disease)     Headache     Hiatal hernia     Hyperlipidemia, mixed 5/7/2018    Liver disease     FATTY LIVER    PAC (premature atrial contraction)     Prediabetes     Renal calculi     Sleep apnea     Vestibular migraine        Past Surgical History:   Procedure Laterality Date    CARPAL TUNNEL RELEASE Left     COLONOSCOPY      FL INJECTION RIGHT HIP (ARTHROGRAM)  8/20/2018    FOOT SURGERY Left     FOREIGN BODY REMOVAL    HERNIA REPAIR      OK COLONOSCOPY FLX DX W/COLLJ SPEC WHEN PFRMD N/A 8/7/2017    Procedure: COLONOSCOPY;  Surgeon: Meryl Cueva MD;  Location: [facility]; Service: Gastroenterology    OK ESOPHAGOGASTRODUODENOSCOPY TRANSORAL DIAGNOSTIC N/A 10/31/2017    Procedure: ESOPHAGOGASTRODUODENOSCOPY (EGD); Surgeon: Meryl Cueva MD;  Location: [facility];   Service: Gastroenterology       Family History   Problem Relation Age of Onset    Cancer Brother     Prostate cancer Brother     Leukemia Brother         his only brother dies from lymphoma or leukemia pt unsure he was only 47    Arthritis Mother     Heart attack Father     Clotting disorder Father     Schizoaffective Disorder  Sister     Aortic aneurysm Other         abdominal       Social History     Occupational History    Occupation: unemployed   Tobacco Use    Smoking status: Former Smoker     Types: Cigars, Cigarettes     Last attempt to quit: 2014     Years since quittin 9    Smokeless tobacco: Never Used   Substance and Sexual Activity    Alcohol use: Yes     Comment: occasional    Drug use: No    Sexual activity: Not Currently         Current Outpatient Medications:     acetaminophen (TYLENOL) 500 mg tablet, Take 3 tablets by mouth daily as needed , Disp: , Rfl:     albuterol (PROVENTIL HFA,VENTOLIN HFA) 90 mcg/act inhaler, Inhale 2 puffs every 6 (six) hours as needed for wheezing , Disp: , Rfl:     buPROPion (WELLBUTRIN) 75 mg tablet, take 1 tablet by mouth twice a day, Disp: 60 tablet, Rfl: 3    fenofibrate (TRICOR) 145 mg tablet, take 1 tablet by mouth once daily, Disp: 30 tablet, Rfl: 6    fluticasone (FLONASE) 50 mcg/act nasal spray, 1 spray into each nostril daily for 30 days, Disp: 11 Bottle, Rfl: 3    mometasone-formoterol (DULERA) 200-5 MCG/ACT inhaler, Inhale 2 puffs 2 (two) times a day , Disp: , Rfl:     montelukast (SINGULAIR) 10 mg tablet, Take 1 tablet (10 mg total) by mouth daily at bedtime, Disp: 30 tablet, Rfl: 3    Omega-3 Fatty Acids (FISH OIL) 1000 MG CPDR, Take by mouth daily, Disp: , Rfl:     pantoprazole (PROTONIX) 40 mg tablet, take 1 tablet by mouth once daily, Disp: 30 tablet, Rfl: 5    rosuvastatin (CRESTOR) 20 MG tablet, Take 1 tablet (20 mg total) by mouth daily for 30 days, Disp: 30 tablet, Rfl: 5    sildenafil (REVATIO) 20 mg tablet, TAKE 2 TO 3 TABLETS BY MOUTH AS NEEDED **NOT MORE THAN ONCE A DAY**, Disp: , Rfl:     traMADol (ULTRAM) 50 mg tablet, Take 1 PO BID PRN for pain , Disp: 45 tablet, Rfl: 2    Influenza Virus Vacc Split PF 0 5 ML CARMENZA, inject 0 5 milliliter intramuscularly, Disp: , Rfl:     No Known Allergies    Physical Exam:    /68   Pulse (!) 54   Ht 6' 1 5" (1 867 m)   Wt 102 kg (225 lb)   BMI 29 28 kg/m²     Constitutional:normal, well developed, well nourished, alert, in no distress and non-toxic and no overt pain behavior  Eyes:anicteric  HEENT:grossly intact  Neck:supple, symmetric, trachea midline and no masses   Pulmonary:even and unlabored  Cardiovascular:No edema or pitting edema present  Skin:Normal without rashes or lesions and well hydrated  Psychiatric:Mood and affect appropriate  Neurologic:Cranial Nerves II-XII grossly intact  Musculoskeletal:The patient's gait is slightly antalgic, limping, but steady without the use of any assistive devices  Imaging  No orders to display         No orders of the defined types were placed in this encounter

## 2019-08-11 DIAGNOSIS — J45.909 UNCOMPLICATED ASTHMA, UNSPECIFIED ASTHMA SEVERITY, UNSPECIFIED WHETHER PERSISTENT: ICD-10-CM

## 2019-08-12 ENCOUNTER — TELEPHONE (OUTPATIENT)
Dept: PAIN MEDICINE | Facility: CLINIC | Age: 54
End: 2019-08-12

## 2019-08-12 RX ORDER — MONTELUKAST SODIUM 10 MG/1
TABLET ORAL
Qty: 30 TABLET | Refills: 3 | Status: SHIPPED | OUTPATIENT
Start: 2019-08-12 | End: 2019-12-09 | Stop reason: SDUPTHER

## 2019-08-12 NOTE — TELEPHONE ENCOUNTER
Can you please call the patient and advise him that his OV on 9/27/19 is now going to be with Louise Campbell the PA who is taking my place with Dr Farhana Jones  Nothing has changed regarding the time or date, just the provider  Thank you

## 2019-09-04 ENCOUNTER — OFFICE VISIT (OUTPATIENT)
Dept: PULMONOLOGY | Facility: CLINIC | Age: 54
End: 2019-09-04
Payer: COMMERCIAL

## 2019-09-04 VITALS
BODY MASS INDEX: 30.09 KG/M2 | SYSTOLIC BLOOD PRESSURE: 110 MMHG | DIASTOLIC BLOOD PRESSURE: 68 MMHG | HEIGHT: 73 IN | HEART RATE: 62 BPM | OXYGEN SATURATION: 98 % | WEIGHT: 227 LBS

## 2019-09-04 DIAGNOSIS — E66.3 OVERWEIGHT: ICD-10-CM

## 2019-09-04 DIAGNOSIS — Z87.891 FORMER SMOKER: ICD-10-CM

## 2019-09-04 DIAGNOSIS — G47.33 OSA (OBSTRUCTIVE SLEEP APNEA): ICD-10-CM

## 2019-09-04 DIAGNOSIS — J45.20 MILD INTERMITTENT ASTHMA WITHOUT COMPLICATION: Primary | ICD-10-CM

## 2019-09-04 PROCEDURE — 99214 OFFICE O/P EST MOD 30 MIN: CPT | Performed by: PHYSICIAN ASSISTANT

## 2019-09-04 NOTE — PROGRESS NOTES
Assessment:    1  Mild intermittent asthma without complication     2  PEE (obstructive sleep apnea)     3  Overweight     4  Former smoker         Plan:   Patient presents today for follow-up, reports that his breathing has been stable  Rarely needs to use the rescue inhaler  Continues with Dulera and Singulair  Reviewed allergy testing results with significant allergies to dust mites  Advised frequent vacuuming, washing sheets, allergy pillow and mattress covers, getting rid of unnecessary carpeting     At his last visit, he was asked to go for sleep study which he is currently refusing  Patient states that he had a sleep study about 5 years ago where he was previously diagnosed with sleep apnea however he never did obtain a CPAP machine  Patient has signs and symptoms consistent with sleep apnea including snoring, witnessed apneic periods, and excessive daytime sleepiness  Will try to obtain the old sleep study records  Patient states if necessary, he would consider going for sleep study in the future once he gets his hip fixed  Obstructive Sleep Apnea Etiology pathogenesis discussed with the patient in detail  Consequences of untreated sleep apnea including excessive daytime sleepiness and increased risk for myocardial infarction, stroke, atrial fibrillation, etc  discussed with the patient  Recommended weight loss    All of the patient's questions were answered to their satisfaction and understanding, which was verbalized  Patient was advised to call the office prior to next appointment should they have any questions or concerns prior  Patient made aware of worrisome symptoms that should prompt a visit to the ER or call to 911 such as chest pain, severe shortness of breath, cyanosis, throat closing, syncope, etc     Subjective:     Patient ID: Dustin Jauregui is a 53 y o  male      Chief Complaint:  Armando Viveros is a 53year old male former smoker with less than 15 pack year smoking history and past medical history including mild intermittent asthma, PEE, aneurysm, CKD, DVT of the arm, liver disease, hyperlipidemia, GERD who presents today for follow-up  Patient reports that his breathing remains stable  Does not need to use the rescue inhaler frequently  He continues with Dulera and Singulair  Was having some worsening shortness of breath with the humidity which has now improved  He does continue with symptoms of sleep apnea including snoring, witnessed apneic periods, and excessive daytime sleepiness  He continues to refuse sleep study  Reports that he had a sleep study about 5 years ago with diagnosis of sleep apnea  We will try to obtain old results  He also states that he will try working on weight loss  He has a cat at home  The following portions of the patient's history were reviewed in this encounter and updated as appropriate: Past medical, social, surgical, family, allergies    Review of Systems   Constitutional: Positive for fatigue  Negative for activity change, chills and fever  HENT: Positive for congestion  Negative for sore throat and trouble swallowing  Respiratory: Positive for shortness of breath  Negative for cough and wheezing  Cardiovascular: Negative for chest pain  Gastrointestinal: Negative  Musculoskeletal:        +hip pain   Neurological: Negative for dizziness, syncope, weakness and light-headedness  Psychiatric/Behavioral: Positive for sleep disturbance  All other systems reviewed and are negative  Objective:    Physical Exam   Constitutional: He is oriented to person, place, and time  He appears well-developed and well-nourished  No distress  overweight   HENT:   Head: Normocephalic and atraumatic  Right Ear: External ear normal    Left Ear: External ear normal    Nose: Nose normal    Mouth/Throat: Oropharynx is clear and moist  No oropharyngeal exudate  Crowded airway   Eyes: Conjunctivae and EOM are normal  Right eye exhibits no discharge  Left eye exhibits no discharge  No scleral icterus  Neck: Normal range of motion  Neck supple  No tracheal deviation present  Short and wide neck   Cardiovascular: Normal rate, regular rhythm and normal heart sounds  Exam reveals no gallop and no friction rub  No murmur heard  Pulmonary/Chest: Effort normal and breath sounds normal  No stridor  No respiratory distress  He has no wheezes  Abdominal: Soft  He exhibits no distension  There is no tenderness  There is no guarding  Musculoskeletal: He exhibits no edema, tenderness or deformity  Gait affected by his R hip pain   Neurological: He is alert and oriented to person, place, and time  No cranial nerve deficit  He exhibits normal muscle tone  Skin: Skin is warm and dry  No rash noted  He is not diaphoretic  No erythema  No pallor  Psychiatric: He has a normal mood and affect  His behavior is normal  Judgment and thought content normal    Nursing note and vitals reviewed  Lab Review:   No visits with results within 2 Month(s) from this visit     Latest known visit with results is:   Office Visit on 05/14/2019   Component Date Value    Hemoglobin A1C 05/14/2019 6 4

## 2019-09-06 ENCOUNTER — TELEPHONE (OUTPATIENT)
Dept: PULMONOLOGY | Facility: CLINIC | Age: 54
End: 2019-09-06

## 2019-09-06 ENCOUNTER — APPOINTMENT (OUTPATIENT)
Dept: LAB | Facility: MEDICAL CENTER | Age: 54
End: 2019-09-06
Payer: COMMERCIAL

## 2019-09-06 DIAGNOSIS — E55.9 VITAMIN D DEFICIENCY: ICD-10-CM

## 2019-09-06 DIAGNOSIS — R73.03 PREDIABETES: ICD-10-CM

## 2019-09-06 DIAGNOSIS — G47.33 OSA (OBSTRUCTIVE SLEEP APNEA): Primary | ICD-10-CM

## 2019-09-06 DIAGNOSIS — M16.11 PRIMARY OSTEOARTHRITIS OF RIGHT HIP: ICD-10-CM

## 2019-09-06 DIAGNOSIS — E78.2 ELEVATED TRIGLYCERIDES WITH HIGH CHOLESTEROL: ICD-10-CM

## 2019-09-06 DIAGNOSIS — K21.9 GASTROESOPHAGEAL REFLUX DISEASE WITHOUT ESOPHAGITIS: ICD-10-CM

## 2019-09-06 DIAGNOSIS — J30.1 NON-SEASONAL ALLERGIC RHINITIS DUE TO POLLEN: ICD-10-CM

## 2019-09-06 DIAGNOSIS — J45.20 MILD INTERMITTENT ASTHMA WITHOUT COMPLICATION: ICD-10-CM

## 2019-09-06 DIAGNOSIS — N18.30 CKD (CHRONIC KIDNEY DISEASE) STAGE 3, GFR 30-59 ML/MIN (HCC): ICD-10-CM

## 2019-09-06 DIAGNOSIS — E78.2 HYPERLIPIDEMIA, MIXED: ICD-10-CM

## 2019-09-06 DIAGNOSIS — K76.0 FATTY LIVER DISEASE, NONALCOHOLIC: ICD-10-CM

## 2019-09-06 DIAGNOSIS — J44.9 CHRONIC OBSTRUCTIVE PULMONARY DISEASE, UNSPECIFIED COPD TYPE (HCC): ICD-10-CM

## 2019-09-06 LAB
25(OH)D3 SERPL-MCNC: 28.1 NG/ML (ref 30–100)
ALBUMIN SERPL BCP-MCNC: 4.4 G/DL (ref 3.5–5)
ALP SERPL-CCNC: 49 U/L (ref 46–116)
ALT SERPL W P-5'-P-CCNC: 44 U/L (ref 12–78)
ANION GAP SERPL CALCULATED.3IONS-SCNC: 7 MMOL/L (ref 4–13)
AST SERPL W P-5'-P-CCNC: 30 U/L (ref 5–45)
BASOPHILS # BLD AUTO: 0.03 THOUSANDS/ΜL (ref 0–0.1)
BASOPHILS NFR BLD AUTO: 1 % (ref 0–1)
BILIRUB SERPL-MCNC: 0.68 MG/DL (ref 0.2–1)
BUN SERPL-MCNC: 23 MG/DL (ref 5–25)
CALCIUM SERPL-MCNC: 10 MG/DL (ref 8.3–10.1)
CHLORIDE SERPL-SCNC: 109 MMOL/L (ref 100–108)
CHOLEST SERPL-MCNC: 99 MG/DL (ref 50–200)
CO2 SERPL-SCNC: 28 MMOL/L (ref 21–32)
CREAT SERPL-MCNC: 1.55 MG/DL (ref 0.6–1.3)
EOSINOPHIL # BLD AUTO: 0.1 THOUSAND/ΜL (ref 0–0.61)
EOSINOPHIL NFR BLD AUTO: 2 % (ref 0–6)
ERYTHROCYTE [DISTWIDTH] IN BLOOD BY AUTOMATED COUNT: 12.5 % (ref 11.6–15.1)
EST. AVERAGE GLUCOSE BLD GHB EST-MCNC: 128 MG/DL
GFR SERPL CREATININE-BSD FRML MDRD: 50 ML/MIN/1.73SQ M
GLUCOSE P FAST SERPL-MCNC: 108 MG/DL (ref 65–99)
HBA1C MFR BLD: 6.1 % (ref 4.2–6.3)
HCT VFR BLD AUTO: 47.1 % (ref 36.5–49.3)
HDLC SERPL-MCNC: 27 MG/DL (ref 40–60)
HGB BLD-MCNC: 15.5 G/DL (ref 12–17)
IMM GRANULOCYTES # BLD AUTO: 0.02 THOUSAND/UL (ref 0–0.2)
IMM GRANULOCYTES NFR BLD AUTO: 0 % (ref 0–2)
LDLC SERPL CALC-MCNC: 25 MG/DL (ref 0–100)
LYMPHOCYTES # BLD AUTO: 1.53 THOUSANDS/ΜL (ref 0.6–4.47)
LYMPHOCYTES NFR BLD AUTO: 26 % (ref 14–44)
MCH RBC QN AUTO: 31.1 PG (ref 26.8–34.3)
MCHC RBC AUTO-ENTMCNC: 32.9 G/DL (ref 31.4–37.4)
MCV RBC AUTO: 94 FL (ref 82–98)
MONOCYTES # BLD AUTO: 0.61 THOUSAND/ΜL (ref 0.17–1.22)
MONOCYTES NFR BLD AUTO: 10 % (ref 4–12)
NEUTROPHILS # BLD AUTO: 3.65 THOUSANDS/ΜL (ref 1.85–7.62)
NEUTS SEG NFR BLD AUTO: 61 % (ref 43–75)
NONHDLC SERPL-MCNC: 72 MG/DL
NRBC BLD AUTO-RTO: 0 /100 WBCS
PLATELET # BLD AUTO: 214 THOUSANDS/UL (ref 149–390)
PMV BLD AUTO: 10.7 FL (ref 8.9–12.7)
POTASSIUM SERPL-SCNC: 4.3 MMOL/L (ref 3.5–5.3)
PROT SERPL-MCNC: 7.3 G/DL (ref 6.4–8.2)
RBC # BLD AUTO: 4.99 MILLION/UL (ref 3.88–5.62)
SODIUM SERPL-SCNC: 144 MMOL/L (ref 136–145)
TRIGL SERPL-MCNC: 234 MG/DL
WBC # BLD AUTO: 5.94 THOUSAND/UL (ref 4.31–10.16)

## 2019-09-06 PROCEDURE — 85025 COMPLETE CBC W/AUTO DIFF WBC: CPT

## 2019-09-06 PROCEDURE — 82306 VITAMIN D 25 HYDROXY: CPT

## 2019-09-06 PROCEDURE — 80053 COMPREHEN METABOLIC PANEL: CPT

## 2019-09-06 PROCEDURE — 80061 LIPID PANEL: CPT

## 2019-09-06 PROCEDURE — 83036 HEMOGLOBIN GLYCOSYLATED A1C: CPT

## 2019-09-06 PROCEDURE — 36415 COLL VENOUS BLD VENIPUNCTURE: CPT

## 2019-09-09 DIAGNOSIS — E78.2 MIXED HYPERLIPIDEMIA: ICD-10-CM

## 2019-09-09 RX ORDER — ROSUVASTATIN CALCIUM 20 MG/1
TABLET, COATED ORAL
Qty: 30 TABLET | Refills: 5 | Status: SHIPPED | OUTPATIENT
Start: 2019-09-09 | End: 2020-03-09 | Stop reason: SDUPTHER

## 2019-09-16 NOTE — PATIENT INSTRUCTIONS
htn-stable  Vitamin d def- stopped his supplement and level has dropped from 42 to 28  AAA-Sees Dr Ana Laura Phoenix annually  Prediabetes- A1C improved to 6 1 from 6 4  Asthma-followed by pulmonary  OA of right knee-sees pain mngmnt  OA of hip - followed by Pain mngmnt  Cannot have surgery at this time  Obesity-advised to decrease calories and portion sizes  Increase fruits and veggies  GERD- stable  Rhinitis-Fall symptoms occurring refill Fluticaosne  Fatigue- multifactorial  Fatigue- advised to start B Complex          Results for Abi Barber (MRN [id number]) as of 9/16/2019 19:39   Ref   Range 9/6/2019 10:17   Sodium Latest Ref Range: 136 - 145 mmol/L 144   Potassium Latest Ref Range: 3 5 - 5 3 mmol/L 4 3   Chloride Latest Ref Range: 100 - 108 mmol/L 109 (H)   CO2 Latest Ref Range: 21 - 32 mmol/L 28   Anion Gap Latest Ref Range: 4 - 13 mmol/L 7   BUN Latest Ref Range: 5 - 25 mg/dL 23   Creatinine Latest Ref Range: 0 60 - 1 30 mg/dL 1 55 (H)   GLUCOSE FASTING Latest Ref Range: 65 - 99 mg/dL 108 (H)   Calcium Latest Ref Range: 8 3 - 10 1 mg/dL 10 0   AST Latest Ref Range: 5 - 45 U/L 30   ALT Latest Ref Range: 12 - 78 U/L 44   Alkaline Phosphatase Latest Ref Range: 46 - 116 U/L 49   Total Protein Latest Ref Range: 6 4 - 8 2 g/dL 7 3   Albumin Latest Ref Range: 3 5 - 5 0 g/dL 4 4   TOTAL BILIRUBIN Latest Ref Range: 0 20 - 1 00 mg/dL 0 68   eGFR Latest Units: ml/min/1 73sq m 50   Cholesterol Latest Ref Range: 50 - 200 mg/dL 99   Triglycerides Latest Ref Range: <=150 mg/dL 234 (H)   HDL Latest Ref Range: 40 - 60 mg/dL 27 (L)   Non-HDL Cholesterol Latest Units: mg/dl 72   LDL Direct Latest Ref Range: 0 - 100 mg/dL 25   Vit D, 25-Hydroxy Latest Ref Range: 30 0 - 100 0 ng/mL 28 1 (L)   WBC Latest Ref Range: 4 31 - 10 16 Thousand/uL 5 94   Red Blood Cell Count Latest Ref Range: 3 88 - 5 62 Million/uL 4 99   Hemoglobin Latest Ref Range: 12 0 - 17 0 g/dL 15 5   HCT Latest Ref Range: 36 5 - 49 3 % 47 1   MCV Latest Ref Range: 82 - 98 fL 94   MCH Latest Ref Range: 26 8 - 34 3 pg 31 1   MCHC Latest Ref Range: 31 4 - 37 4 g/dL 32 9   RDW Latest Ref Range: 11 6 - 15 1 % 12 5   Platelet Count Latest Ref Range: 149 - 390 Thousands/uL 214   MPV Latest Ref Range: 8 9 - 12 7 fL 10 7   nRBC Latest Units: /100 WBCs 0   Neutrophils % Latest Ref Range: 43 - 75 % 61   Immat GRANS % Latest Ref Range: 0 - 2 % 0   Lymphocytes Relative Latest Ref Range: 14 - 44 % 26   Monocytes Relative Latest Ref Range: 4 - 12 % 10   Eosinophils Latest Ref Range: 0 - 6 % 2   Basophils Relative Latest Ref Range: 0 - 1 % 1   Immature Grans Absolute Latest Ref Range: 0 00 - 0 20 Thousand/uL 0 02   Absolute Neutrophils Latest Ref Range: 1 85 - 7 62 Thousands/µL 3 65   Lymphocytes Absolute Latest Ref Range: 0 60 - 4 47 Thousands/µL 1 53   Absolute Monocytes Latest Ref Range: 0 17 - 1 22 Thousand/µL 0 61   Absolute Eosinophils Latest Ref Range: 0 00 - 0 61 Thousand/µL 0 10   Basophils Absolute Latest Ref Range: 0 00 - 0 10 Thousands/µL 0 03   Hemoglobin A1C Latest Ref Range: 4 2 - 6 3 % 6 1   EAG Latest Units: mg/dl 128     Obesity   AMBULATORY CARE:   Obesity  is when your body mass index (BMI) is greater than 30  Your healthcare provider will use your height and weight to measure your BMI  The risks of obesity include  many health problems, such as injuries or physical disability  You may need tests to check for the following:  · Diabetes     · High blood pressure or high cholesterol     · Heart disease     · Gallbladder or liver disease     · Cancer of the colon, breast, prostate, liver, or kidney     · Sleep apnea     · Arthritis or gout  Seek care immediately if:   · You have a severe headache, confusion, or difficulty speaking  · You have weakness on one side of your body  · You have chest pain, sweating, or shortness of breath  Contact your healthcare provider if:   · You have symptoms of gallbladder or liver disease, such as pain in your upper abdomen      · You have knee or hip pain and discomfort while walking  · You have symptoms of diabetes, such as intense hunger and thirst, and frequent urination  · You have symptoms of sleep apnea, such as snoring or daytime sleepiness  · You have questions or concerns about your condition or care  Treatment for obesity  focuses on helping you lose weight to improve your health  Even a small decrease in BMI can reduce the risk for many health problems  Your healthcare provider will help you set a weight-loss goal   · Lifestyle changes  are the first step in treating obesity  These include making healthy food choices and getting regular physical activity  Your healthcare provider may suggest a weight-loss program that involves coaching, education, and therapy  · Medicine  may help you lose weight when it is used with a healthy diet and physical activity  · Surgery  can help you lose weight if you are very obese and have other health problems  There are several types of weight-loss surgery  Ask your healthcare provider for more information  Be successful losing weight:   · Set small, realistic goals  An example of a small goal is to walk for 20 minutes 5 days a week  Anther goal is to lose 5% of your body weight  · Tell friends, family members, and coworkers about your goals  and ask for their support  Ask a friend to lose weight with you, or join a weight-loss support group  · Identify foods or triggers that may cause you to overeat , and find ways to avoid them  Remove tempting high-calorie foods from your home and workplace  Place a bowl of fresh fruit on your kitchen counter  If stress causes you to eat, then find other ways to cope with stress  · Keep a diary to track what you eat and drink  Also write down how many minutes of physical activity you do each day  Weigh yourself once a week and record it in your diary  Eating changes:   You will need to eat 500 to 1,000 fewer calories each day than you currently eat to lose 1 to 2 pounds a week  The following changes will help you cut calories:  · Eat smaller portions  Use small plates, no larger than 9 inches in diameter  Fill your plate half full of fruits and vegetables  Measure your food using measuring cups until you know what a serving size looks like  · Eat 3 meals and 1 or 2 snacks each day  Plan your meals in advance  [place] and eat at home most of the time  Eat slowly  · Eat fruits and vegetables at every meal   They are low in calories and high in fiber, which makes you feel full  Do not add butter, margarine, or cream sauce to vegetables  Use herbs to season steamed vegetables  · Eat less fat and fewer fried foods  Eat more baked or grilled chicken and fish  These protein sources are lower in calories and fat than red meat  Limit fast food  Dress your salads with olive oil and vinegar instead of bottled dressing  · Limit the amount of sugar you eat  Do not drink sugary beverages  Limit alcohol  Activity changes:  Physical activity is good for your body in many ways  It helps you burn calories and build strong muscles  It decreases stress and depression, and improves your mood  It can also help you sleep better  Talk to your healthcare provider before you begin an exercise program   · Exercise for at least 30 minutes 5 days a week  Start slowly  Set aside time each day for physical activity that you enjoy and that is convenient for you  It is best to do both weight training and an activity that increases your heart rate, such as walking, bicycling, or swimming  · Find ways to be more active  Do yard work and housecleaning  Walk up the stairs instead of using elevators  Spend your leisure time going to events that require walking, such as outdoor festivals or fairs  This extra physical activity can help you lose weight and keep it off  Follow up with your healthcare provider as directed:   You may need to meet with a dietitian  Write down your questions so you remember to ask them during your visits  © 2017 [address] [employer] Information is for End User's use only and may not be sold, redistributed or otherwise used for commercial purposes  All illustrations and images included in CareNotes® are the copyrighted property of [employer]  or [employer]  The above information is an  only  It is not intended as medical advice for individual conditions or treatments  Talk to your doctor, nurse or pharmacist before following any medical regimen to see if it is safe and effective for you  Low Fat Diet   AMBULATORY CARE:   A low-fat diet  is an eating plan that is low in total fat, unhealthy fat, and cholesterol  You may need to follow a low-fat diet if you have trouble digesting or absorbing fat  You may also need to follow this diet if you have high cholesterol  You can also lower your cholesterol by increasing the amount of fiber in your diet  Soluble fiber is a type of fiber that helps to decrease cholesterol levels  Different types of fat in food:   · Limit unhealthy fats  A diet that is high in cholesterol, saturated fat, and trans fat may cause unhealthy cholesterol levels  Unhealthy cholesterol levels increase your risk of heart disease  ¨ Cholesterol:  Limit intake of cholesterol to less than 200 mg per day  Cholesterol is found in meat, eggs, and dairy  ¨ Saturated fat:  Limit saturated fat to less than 7% of your total daily calories  Ask your dietitian how many calories you need each day  Saturated fat is found in butter, cheese, ice cream, whole milk, and palm oil  Saturated fat is also found in meat, such as beef, pork, chicken skin, and processed meats  Processed meats include sausage, hot dogs, and bologna  ¨ Trans fat:  Avoid trans fat as much as possible  Trans fat is used in fried and baked foods   Foods that say trans fat free on the label may still have up to 0 5 grams of trans fat per serving  · Include healthy fats  Replace foods that are high in saturated and trans fat with foods high in healthy fats  This may help to decrease high cholesterol levels  ¨ Monounsaturated fats: These are found in avocados, nuts, and vegetable oils, such as olive, canola, and sunflower oil  ¨ Polyunsaturated fats: These can be found in vegetable oils, such as soybean or corn oil  Omega-3 fats can help to decrease the risk of heart disease  Omega-3 fats are found in fish, such as salmon, herring, trout, and tuna  Omega-3 fats can also be found in plant foods, such as walnuts, flaxseed, soybeans, and canola oil    Foods to limit or avoid:   · Grains:      ¨ Snacks that are made with partially hydrogenated oils, such as chips, regular crackers, and butter-flavored popcorn    ¨ High-fat baked goods, such as biscuits, croissants, doughnuts, pies, cookies, and pastries    · Dairy:      ¨ Whole milk, 2% milk, and yogurt and ice cream made with whole milk    ¨ Half and half creamer, heavy cream, and whipping cream    ¨ Cheese, cream cheese, and sour cream    · Meats and proteins:      ¨ High-fat cuts of meat (T-bone steak, regular hamburger, and ribs)    ¨ Fried meat, poultry (turkey and chicken), and fish    ¨ Poultry (chicken and turkey) with skin    ¨ Cold cuts (salami or bologna), hot dogs, jeong, and sausage    ¨ Whole eggs and egg yolks    · Vegetables and fruits with added fat:      ¨ Fried vegetables or vegetables in butter or high-fat sauces, such as cream or cheese sauces    ¨ Fried fruit or fruit served with butter or cream    · Fats:      ¨ Butter, stick margarine, and shortening    ¨ Coconut, palm oil, and palm kernel oil  Foods to include:   · Grains:      ¨ Whole-grain breads, cereals, pasta, and brown rice    ¨ Low-fat crackers and pretzels    · Vegetables and fruits:      ¨ Fresh, frozen, or canned vegetables (no salt or low-sodium)    ¨ Fresh, frozen, dried, or canned fruit (canned in light syrup or fruit juice)    ¨ Avocado    · Low-fat dairy products:      ¨ Nonfat (skim) or 1% milk    ¨ Nonfat or low-fat cheese, yogurt, and cottage cheese    · Meats and proteins:      ¨ Chicken or turkey with no skin    ¨ Baked or broiled fish    ¨ Lean beef and pork (loin, round, extra lean hamburger)    ¨ Beans and peas, unsalted nuts, soy products    ¨ Egg whites and substitutes    ¨ Seeds and nuts    · Fats:      ¨ Unsaturated oil, such as canola, olive, peanut, soybean, or sunflower oil    ¨ Soft or liquid margarine and vegetable oil spread    ¨ Low-fat salad dressing  Other ways to decrease fat:   · Read food labels before you buy foods  Choose foods that have less than 30% of calories from fat  Choose low-fat or fat-free dairy products  Remember that fat free does not mean calorie free  These foods still contain calories, and too many calories can lead to weight gain  · Trim fat from meat and avoid fried food  Trim all visible fat from meat before you cook it  Remove the skin from poultry  Do not albert meat, fish, or poultry  Bake, roast, boil, or broil these foods instead  Avoid fried foods  Eat a baked potato instead of [place] fries  Steam vegetables instead of sautéing them in butter  · Add less fat to foods  Use imitation jeong bits on salads and baked potatoes instead of regular jeong bits  Use fat-free or low-fat salad dressings instead of regular dressings  Use low-fat or nonfat butter-flavored topping instead of regular butter or margarine on popcorn and other foods  Ways to decrease fat in recipes:  Replace high-fat ingredients with low-fat or nonfat ones  This may cause baked goods to be drier than usual  You may need to use nonfat cooking spray on pans to prevent food from sticking  You also may need to change the amount of other ingredients, such as water, in the recipe   Try the following:  · Use low-fat or light margarine instead of regular margarine or shortening  · Use lean ground turkey breast or chicken, or lean ground beef (less than 5% fat) instead of hamburger  · Add 1 teaspoon of canola oil to 8 ounces of skim milk instead of using cream or half and half  · Use grated zucchini, carrots, or apples in breads instead of coconut  · Use blenderized, low-fat cottage cheese, plain tofu, or low-fat ricotta cheese instead of cream cheese  · Use 1 egg white and 1 teaspoon of canola oil, or use ¼ cup (2 ounces) of fat-free egg substitute instead of a whole egg  · Replace half of the oil that is called for in a recipe with applesauce when you bake  Use 3 tablespoons of cocoa powder and 1 tablespoon of canola oil instead of a square of baking chocolate  How to increase fiber:  Eat enough high-fiber foods to get 20 to 30 grams of fiber every day  Slowly increase your fiber intake to avoid stomach cramps, gas, and other problems  · Eat 3 ounces of whole-grain foods each day  An ounce is about 1 slice of bread  Eat whole-grain breads, such as whole-wheat bread  Whole wheat, whole-wheat flour, or other whole grains should be listed as the first ingredient on the food label  Replace white flour with whole-grain flour or use half of each in recipes  Whole-grain flour is heavier than white flour, so you may have to add more yeast or baking powder  · Eat a high-fiber cereal for breakfast   Oatmeal is a good source of soluble fiber  Look for cereals that have bran or fiber in the name  Choose whole-grain products, such as brown rice, barley, and whole-wheat pasta  · Eat more beans, peas, and lentils  For example, add beans to soups or salads  Eat at least 5 cups of fruits and vegetables each day  Eat fruits and vegetables with the peel because the peel is high in fiber  © 2017 [employer] Information is for End User's use only and may not be sold, redistributed or otherwise used for commercial purposes   All illustrations and images included in CareNotes® are the copyrighted property of [employer]  or [employer]  The above information is an  only  It is not intended as medical advice for individual conditions or treatments  Talk to your doctor, nurse or pharmacist before following any medical regimen to see if it is safe and effective for you  Heart Healthy Diet   AMBULATORY CARE:   A heart healthy diet  is an eating plan low in total fat, unhealthy fats, and sodium (salt)  A heart healthy diet helps decrease your risk for heart disease and stroke  Limit the amount of fat you eat to 25% to 35% of your total daily calories  Limit sodium to less than 2,300 mg each day  Healthy fats:  Healthy fats can help improve cholesterol levels  The risk for heart disease is decreased when cholesterol levels are normal  Choose healthy fats, such as the following:  · Unsaturated fat  is found in foods such as soybean, canola, olive, corn, and safflower oils  It is also found in soft tub margarine that is made with liquid vegetable oil  · Omega-3 fat  is found in certain fish, such as salmon, tuna, and trout, and in walnuts and flaxseed  Unhealthy fats:  Unhealthy fats can cause unhealthy cholesterol levels in your blood and increase your risk of heart disease  Limit unhealthy fats, such as the following:  · Cholesterol  is found in animal foods, such as eggs and lobster, and in dairy products made from whole milk  Limit cholesterol to less than 300 milligrams (mg) each day  You may need to limit cholesterol to 200 mg each day if you have heart disease  · Saturated fat  is found in meats, such as jeong and hamburger  It is also found in chicken or turkey skin, whole milk, and butter  Limit saturated fat to less than 7% of your total daily calories  Limit saturated fat to less than 6% if you have heart disease or are at increased risk for it       · Trans fat  is found in packaged foods, such as potato chips and cookies  It is also in hard margarine, some fried foods, and shortening  Avoid trans fats as much as possible    Heart healthy foods and drinks to include:  Ask your dietitian or healthcare provider how many servings to have from each of the following food groups:  · Grains:      ¨ Whole-wheat breads, cereals, and pastas, and brown rice    ¨ Low-fat, low-sodium crackers and chips    · Vegetables:      ¨ Broccoli, green beans, green peas, and spinach    ¨ Collards, kale, and lima beans    ¨ Carrots, sweet potatoes, tomatoes, and peppers    ¨ Canned vegetables with no salt added    · Fruits:      ¨ Bananas, peaches, pears, and pineapple    ¨ Grapes, raisins, and dates    ¨ Oranges, tangerines, grapefruit, orange juice, and grapefruit juice    ¨ Apricots, mangoes, melons, and papaya    ¨ Raspberries and strawberries    ¨ Canned fruit with no added sugar    · Low-fat dairy products:      ¨ Nonfat (skim) milk, 1% milk, and low-fat almond, cashew, or soy milks fortified with calcium    ¨ Low-fat cheese, regular or frozen yogurt, and cottage cheese    · Meats and proteins , such as lean cuts of beef and pork (loin, leg, round), skinless chicken and turkey, legumes, soy products, egg whites, and nuts  Foods and drinks to limit or avoid:  Ask your dietitian or healthcare provider about these and other foods that are high in unhealthy fat, sodium, and sugar:  · Snack or packaged foods , such as frozen dinners, cookies, macaroni and cheese, and cereals with more than 300 mg of sodium per serving    · Canned or dry mixes  for cakes, soups, sauces, or gravies    · Vegetables with added sodium , such as instant potatoes, vegetables with added sauces, or regular canned vegetables    · Other foods high in sodium , such as ketchup, barbecue sauce, salad dressing, pickles, olives, soy sauce, and miso    · High-fat dairy foods  such as whole or 2% milk, cream cheese, or sour cream, and cheeses     · High-fat protein foods  such as high-fat cuts of beef (T-bone steaks, ribs), chicken or turkey with skin, and organ meats, such as liver    · Cured or smoked meats , such as hot dogs, jeong, and sausage    · Unhealthy fats and oils , such as butter, stick margarine, shortening, and cooking oils such as coconut or palm oil    · Food and drinks high in sugar , such as soft drinks (soda), sports drinks, sweetened tea, candy, cake, cookies, pies, and doughnuts  Other diet guidelines to follow:   · Eat more foods containing omega-3 fats  Eat fish high in omega-3 fats at least 2 times a week  · Limit alcohol  Too much alcohol can damage your heart and raise your blood pressure  Women should limit alcohol to 1 drink a day  Men should limit alcohol to 2 drinks a day  A drink of alcohol is 12 ounces of beer, 5 ounces of wine, or 1½ ounces of liquor  · Choose low-sodium foods  High-sodium foods can lead to high blood pressure  Add little or no salt to food you prepare  Use herbs and spices in place of salt  · Eat more fiber  to help lower cholesterol levels  Eat at least 5 servings of fruits and vegetables each day  Eat 3 ounces of whole-grain foods each day  Legumes (beans) are also a good source of fiber  Lifestyle guidelines:   · Do not smoke  Nicotine and other chemicals in cigarettes and cigars can cause lung and heart damage  Ask your healthcare provider for information if you currently smoke and need help to quit  E-cigarettes or smokeless tobacco still contain nicotine  Talk to your healthcare provider before you use these products  · Exercise regularly  to help you maintain a healthy weight and improve your blood pressure and cholesterol levels  Ask your healthcare provider about the best exercise plan for you  Do not start an exercise program without asking your healthcare provider  Follow up with your healthcare provider as directed:  Write down your questions so you remember to ask them during your visits     © 2017 [facility] [address] is for End User's use only and may not be sold, redistributed or otherwise used for commercial purposes  All illustrations and images included in CareNotes® are the copyrighted property of [employer]  or [employer]elly [employer]  The above information is an  only  It is not intended as medical advice for individual conditions or treatments  Talk to your doctor, nurse or pharmacist before following any medical regimen to see if it is safe and effective for you

## 2019-09-16 NOTE — PROGRESS NOTES
Assessment/Plan:       Diagnoses and all orders for this visit:    Gastroesophageal reflux disease without esophagitis  -     CBC and differential; Future  -     Comprehensive metabolic panel; Future  -     Hemoglobin A1C; Future  -     Lipid panel; Future  -     Vitamin B12; Future  -     Vitamin D 25 hydroxy; Future    Fatty liver disease, nonalcoholic  -     CBC and differential; Future  -     Comprehensive metabolic panel; Future  -     Hemoglobin A1C; Future  -     Lipid panel; Future  -     Vitamin B12; Future  -     Vitamin D 25 hydroxy; Future    Chronic obstructive pulmonary disease, unspecified COPD type ([facility] )  -     CBC and differential; Future  -     Comprehensive metabolic panel; Future  -     Hemoglobin A1C; Future  -     Lipid panel; Future  -     Vitamin B12; Future  -     Vitamin D 25 hydroxy; Future    Non-seasonal allergic rhinitis due to pollen  -     CBC and differential; Future  -     Comprehensive metabolic panel; Future  -     Hemoglobin A1C; Future  -     Lipid panel; Future  -     Vitamin B12; Future  -     Vitamin D 25 hydroxy; Future    Mild intermittent asthma without complication  -     CBC and differential; Future  -     Comprehensive metabolic panel; Future  -     Hemoglobin A1C; Future  -     Lipid panel; Future  -     Vitamin B12; Future  -     Vitamin D 25 hydroxy; Future    Ascending aortic aneurysm (HCC)  -     CBC and differential; Future  -     Comprehensive metabolic panel; Future  -     Hemoglobin A1C; Future  -     Lipid panel; Future  -     Vitamin B12; Future  -     Vitamin D 25 hydroxy; Future    Primary osteoarthritis of right hip  -     CBC and differential; Future  -     Comprehensive metabolic panel; Future  -     Hemoglobin A1C; Future  -     Lipid panel; Future  -     Vitamin B12; Future  -     Vitamin D 25 hydroxy;  Future    CKD (chronic kidney disease) stage 3, GFR 30-59 ml/min (HCC)  -     CBC and differential; Future  -     Comprehensive metabolic panel; Future  -     Hemoglobin A1C; Future  -     Lipid panel; Future  -     Vitamin B12; Future  -     Vitamin D 25 hydroxy; Future    Prediabetes  -     CBC and differential; Future  -     Comprehensive metabolic panel; Future  -     Hemoglobin A1C; Future  -     Lipid panel; Future  -     Vitamin B12; Future  -     Vitamin D 25 hydroxy; Future    Hyperlipidemia, mixed  -     CBC and differential; Future  -     Comprehensive metabolic panel; Future  -     Hemoglobin A1C; Future  -     Lipid panel; Future  -     Vitamin B12; Future  -     Vitamin D 25 hydroxy; Future    Depression with anxiety  -     CBC and differential; Future  -     Comprehensive metabolic panel; Future  -     Hemoglobin A1C; Future  -     Lipid panel; Future  -     Vitamin B12; Future  -     Vitamin D 25 hydroxy; Future    Vitamin D deficiency  -     ergocalciferol (VITAMIN D2) 50,000 units; Take 1 capsule (50,000 Units total) by mouth once a week  -     CBC and differential; Future  -     Comprehensive metabolic panel; Future  -     Hemoglobin A1C; Future  -     Lipid panel; Future  -     Vitamin B12; Future  -     Vitamin D 25 hydroxy; Future    Need for influenza vaccination  -     influenza vaccine, [id number], quadrivalent, recombinant, PF, 0 5 mL, for patients 18 yr+ (FLUBLOK)  -     CBC and differential; Future  -     Comprehensive metabolic panel; Future  -     Hemoglobin A1C; Future  -     Lipid panel; Future  -     Vitamin B12; Future  -     Vitamin D 25 hydroxy; Future    Eustachian tube disorder, left  -     fluticasone (FLONASE) 50 mcg/act nasal spray; 1 spray into each nostril daily  -     CBC and differential; Future  -     Comprehensive metabolic panel; Future  -     Hemoglobin A1C; Future  -     Lipid panel; Future  -     Vitamin B12; Future  -     Vitamin D 25 hydroxy; Future    Chronic fatigue  -     CBC and differential; Future  -     Comprehensive metabolic panel; Future  -     Hemoglobin A1C; Future  -     Lipid panel;  Future  - Vitamin B12; Future  -     Vitamin D 25 hydroxy; Future    Other orders  -     Discontinue: amitriptyline (ELAVIL) 25 mg tablet        No problem-specific Assessment & Plan notes found for this encounter  Subjective:      Patient ID: Rj Degroot is a [address] [place] y o  male  Patient is here for follow up and review of labs  New complaints is that he feels very tired  Last week, he started with symptoms of achiness lower legs and when he crossed his feet and moved his legs " I could feel the same pulsing in my head"  This has since resolved  He is still tired      htn-stable  Vitamin d def- stopped his supplement and level has dropped from 42 to 28  AAA-Sees Dr Aleena Lora annually  Prediabetes- A1C improved to 6 1 from 6 4  Asthma-followed by pulmonary  OA of right knee-sees pain mngmnt  OA of hip - followed by Pain mngmnt  Cannot have surgery at this time  Obesity-advised to decrease calories and portion sizes   Increase fruits and veggies  GERD- stable  Rhinitis-stable      Results for Aleexy Krishna (MRN [id number]) as of 2019 19:39    2019 10:17  Sodium: 144  Potassium: 4 3  Chloride: 109 (H)  CO2: 28  Anion Gap: 7  BUN: 23  Creatinine: 1 55 (H)  GLUCOSE FASTIN (H)  Calcium: 10 0  AST: 30  ALT: 44  Alkaline Phosphatase: 49  Total Protein: 7 3  Albumin: 4 4  TOTAL BILIRUBIN: 0 68  eGFR: 50  Cholesterol: 99  Triglycerides: 234 (H)  HDL: 27 (L)  Non-HDL Cholesterol: 72  LDL Direct: 25  Vit D, 25-Hydroxy: 28 1 (L)  WBC: 5 94  Red Blood Cell Count: 4 99  Hemoglobin: 15 5  HCT: 47 1  MCV: 94  MCH: 31 1  MCHC: 32 9  RDW: 12 5  Platelet Count: 557  MPV: 10 7  nRBC: 0  Neutrophils %: 61  Immat GRANS %: 0  Lymphocytes Relative: 26  Monocytes Relative: 10  Eosinophils: 2  Basophils Relative: 1  Immature Grans Absolute: 0 02  Absolute Neutrophils: 3 65  Lymphocytes Absolute: 1 53  Absolute Monocytes: 0 61  Absolute Eosinophils: 0 10  Basophils Absolute: 0 03  Hemoglobin A1C: 6 1  EA        The following portions of the patient's history were reviewed and updated as appropriate:   He has a past medical history of [facility] ), Arm DVT (deep venous thromboembolism), acute ([facility] ) (2015), Asthma, CKD (chronic kidney disease), stage III ([place] [address] ), COPD (chronic obstructive pulmonary disease) ([place] [address] ), Depression, Essential hypertriglyceridemia, GERD (gastroesophageal reflux disease), Headache, Hiatal hernia, Hyperlipidemia, mixed (5/7/2018), Liver disease, PAC (premature atrial contraction), Prediabetes, Renal calculi, Sleep apnea, and Vestibular migraine  ,  does not have any pertinent problems on file  ,   has a past surgical history that includes Carpal tunnel release (Left); Hernia repair; pr colonoscopy flx dx w/collj spec when pfrmd (N/A, 8/7/2017); Colonoscopy; Foot surgery (Left); pr esophagogastroduodenoscopy transoral diagnostic (N/A, 10/31/2017); and FL injection right hip (arthrogram) (8/20/2018)  ,  family history includes Aortic aneurysm in his other; Arthritis in his mother; Cancer in his brother; Clotting disorder in his father; Heart attack in his father; Leukemia in his brother; Prostate cancer in his brother; Schizoaffective Disorder  in his sister  ,   reports that he quit smoking about 5 years ago  His smoking use included cigars and cigarettes  He has never used smokeless tobacco  He reports that he drinks alcohol  He reports that he does not use drugs  ,  has No Known Allergies     Current Outpatient Medications   Medication Sig Dispense Refill    acetaminophen (TYLENOL) 500 mg tablet Take 3 tablets by mouth daily as needed       albuterol (PROVENTIL HFA,VENTOLIN HFA) 90 mcg/act inhaler Inhale 2 puffs every 6 (six) hours as needed for wheezing        ergocalciferol (VITAMIN D2) 50,000 units Take 1 capsule (50,000 Units total) by mouth once a week 4 capsule 3    fenofibrate (TRICOR) 145 mg tablet take 1 tablet by mouth once daily 30 tablet 6    fluticasone (FLONASE) 50 mcg/act nasal spray 1 spray into each nostril daily 11 Bottle 3    Influenza Virus Vacc Split PF 0 5 ML CARMENZA inject 0 5 milliliter intramuscularly      mometasone-formoterol (DULERA) 200-5 MCG/ACT inhaler Inhale 2 puffs 2 (two) times a day   montelukast (SINGULAIR) 10 mg tablet take 1 tablet by mouth daily at bedtime 30 tablet 3    Omega-3 Fatty Acids (FISH OIL) 1000 MG CPDR Take by mouth daily      pantoprazole (PROTONIX) 40 mg tablet take 1 tablet by mouth once daily 30 tablet 5    rosuvastatin (CRESTOR) 20 MG tablet take 1 tablet by mouth once daily 30 tablet 5    sildenafil (REVATIO) 20 mg tablet TAKE 2 TO 3 TABLETS BY MOUTH AS NEEDED **NOT MORE THAN ONCE A DAY**      traMADol (ULTRAM) 50 mg tablet Take 1 PO BID PRN for pain  45 tablet 2     No current facility-administered medications for this visit  Review of Systems   Constitutional: Negative for fatigue and fever  HENT: Positive for postnasal drip and rhinorrhea  Negative for congestion  Eyes: Negative for visual disturbance  Respiratory: Negative for cough and shortness of breath  Cardiovascular: Negative for chest pain, palpitations and leg swelling  Gastrointestinal: Negative for abdominal distention and abdominal pain  Endocrine: Negative for cold intolerance, polydipsia and polyuria  Genitourinary: Negative for difficulty urinating  Musculoskeletal: Positive for arthralgias and myalgias  Negative for back pain and joint swelling  Right hip and right knee pain due to arthritis   Skin: Negative for color change and rash  Allergic/Immunologic: Positive for environmental allergies  Negative for immunocompromised state  Neurological: Negative for dizziness and headaches  Hematological: Negative for adenopathy  Psychiatric/Behavioral: Negative for behavioral problems and sleep disturbance  All other systems reviewed and are negative          Objective:  Vitals:    09/17/19 0937   BP: 126/66   BP Location: Left arm   Patient Position: Sitting   Pulse: 68   Resp: 18   SpO2: 97%   Weight: 104 kg (230 lb)   Height: 6' 1" (1 854 m)     Body mass index is 30 34 kg/m²  Physical Exam   Constitutional: He is oriented to person, place, and time  He appears well-developed and well-nourished  No distress  HENT:   Head: Normocephalic and atraumatic  Right Ear: External ear normal    Left Ear: External ear normal    Nose: Nose normal    Mouth/Throat: Oropharynx is clear and moist  No oropharyngeal exudate  Eyes: Pupils are equal, round, and reactive to light  Conjunctivae and EOM are normal  Right eye exhibits no discharge  Left eye exhibits no discharge  No scleral icterus  Neck: Normal range of motion  Neck supple  No JVD present  No thyromegaly present  Cardiovascular: Normal rate, regular rhythm, normal heart sounds and intact distal pulses  Exam reveals no gallop and no friction rub  No murmur heard  Pulmonary/Chest: Effort normal and breath sounds normal  No respiratory distress  He exhibits no tenderness  Abdominal: Soft  Bowel sounds are normal  He exhibits no distension  There is no tenderness  Musculoskeletal: Normal range of motion  He exhibits no edema or tenderness  Lymphadenopathy:     He has no cervical adenopathy  Neurological: He is alert and oriented to person, place, and time  He has normal reflexes  No cranial nerve deficit  Coordination normal    Skin: Skin is warm and dry  He is not diaphoretic  Psychiatric: He has a normal mood and affect  His behavior is normal  Judgment and thought content normal    Nursing note and vitals reviewed  BMI Counseling: Body mass index is 30 34 kg/m²   The BMI is above normal  Nutrition recommendations include reducing portion sizes, decreasing overall calorie intake, 3-5 servings of fruits/vegetables daily, reducing fast food intake, consuming healthier snacks, decreasing soda and/or juice intake, moderation in carbohydrate intake, increasing intake of lean protein, reducing intake of saturated fat and trans fat and reducing intake of cholesterol  Exercise recommendations include exercising 3-5 times per week and strength training exercises

## 2019-09-17 ENCOUNTER — OFFICE VISIT (OUTPATIENT)
Dept: FAMILY MEDICINE CLINIC | Facility: CLINIC | Age: 54
End: 2019-09-17
Payer: COMMERCIAL

## 2019-09-17 VITALS
RESPIRATION RATE: 18 BRPM | HEART RATE: 68 BPM | HEIGHT: 73 IN | OXYGEN SATURATION: 97 % | BODY MASS INDEX: 30.48 KG/M2 | DIASTOLIC BLOOD PRESSURE: 66 MMHG | SYSTOLIC BLOOD PRESSURE: 126 MMHG | WEIGHT: 230 LBS

## 2019-09-17 DIAGNOSIS — E78.2 HYPERLIPIDEMIA, MIXED: ICD-10-CM

## 2019-09-17 DIAGNOSIS — J45.20 MILD INTERMITTENT ASTHMA WITHOUT COMPLICATION: ICD-10-CM

## 2019-09-17 DIAGNOSIS — I71.2 ASCENDING AORTIC ANEURYSM (HCC): ICD-10-CM

## 2019-09-17 DIAGNOSIS — N18.30 CKD (CHRONIC KIDNEY DISEASE) STAGE 3, GFR 30-59 ML/MIN (HCC): ICD-10-CM

## 2019-09-17 DIAGNOSIS — F41.8 DEPRESSION WITH ANXIETY: ICD-10-CM

## 2019-09-17 DIAGNOSIS — R53.82 CHRONIC FATIGUE: ICD-10-CM

## 2019-09-17 DIAGNOSIS — J44.9 CHRONIC OBSTRUCTIVE PULMONARY DISEASE, UNSPECIFIED COPD TYPE (HCC): ICD-10-CM

## 2019-09-17 DIAGNOSIS — J30.1 NON-SEASONAL ALLERGIC RHINITIS DUE TO POLLEN: ICD-10-CM

## 2019-09-17 DIAGNOSIS — E55.9 VITAMIN D DEFICIENCY: ICD-10-CM

## 2019-09-17 DIAGNOSIS — M16.11 PRIMARY OSTEOARTHRITIS OF RIGHT HIP: ICD-10-CM

## 2019-09-17 DIAGNOSIS — R73.03 PREDIABETES: ICD-10-CM

## 2019-09-17 DIAGNOSIS — H69.92 EUSTACHIAN TUBE DISORDER, LEFT: ICD-10-CM

## 2019-09-17 DIAGNOSIS — K76.0 FATTY LIVER DISEASE, NONALCOHOLIC: ICD-10-CM

## 2019-09-17 DIAGNOSIS — K21.9 GASTROESOPHAGEAL REFLUX DISEASE WITHOUT ESOPHAGITIS: Primary | ICD-10-CM

## 2019-09-17 DIAGNOSIS — Z23 NEED FOR INFLUENZA VACCINATION: ICD-10-CM

## 2019-09-17 PROCEDURE — 90471 IMMUNIZATION ADMIN: CPT | Performed by: NURSE PRACTITIONER

## 2019-09-17 PROCEDURE — 90682 RIV4 VACC RECOMBINANT DNA IM: CPT | Performed by: NURSE PRACTITIONER

## 2019-09-17 PROCEDURE — 99214 OFFICE O/P EST MOD 30 MIN: CPT | Performed by: NURSE PRACTITIONER

## 2019-09-17 RX ORDER — ERGOCALCIFEROL 1.25 MG/1
50000 CAPSULE ORAL WEEKLY
Qty: 4 CAPSULE | Refills: 3 | Status: SHIPPED | OUTPATIENT
Start: 2019-09-17 | End: 2020-01-16 | Stop reason: SDUPTHER

## 2019-09-17 RX ORDER — AMITRIPTYLINE HYDROCHLORIDE 25 MG/1
TABLET, FILM COATED ORAL
Refills: 0 | COMMUNITY
Start: 2019-07-07 | End: 2019-09-17

## 2019-09-17 RX ORDER — FLUTICASONE PROPIONATE 50 MCG
1 SPRAY, SUSPENSION (ML) NASAL DAILY
Qty: 11 BOTTLE | Refills: 3 | Status: SHIPPED | OUTPATIENT
Start: 2019-09-17 | End: 2020-01-28 | Stop reason: SDUPTHER

## 2019-09-27 ENCOUNTER — OFFICE VISIT (OUTPATIENT)
Dept: PAIN MEDICINE | Facility: CLINIC | Age: 54
End: 2019-09-27
Payer: COMMERCIAL

## 2019-09-27 ENCOUNTER — TELEPHONE (OUTPATIENT)
Dept: PAIN MEDICINE | Facility: CLINIC | Age: 54
End: 2019-09-27

## 2019-09-27 VITALS
WEIGHT: 231 LBS | DIASTOLIC BLOOD PRESSURE: 68 MMHG | HEART RATE: 57 BPM | HEIGHT: 73 IN | SYSTOLIC BLOOD PRESSURE: 118 MMHG | BODY MASS INDEX: 30.62 KG/M2

## 2019-09-27 DIAGNOSIS — G89.4 CHRONIC PAIN SYNDROME: Primary | ICD-10-CM

## 2019-09-27 DIAGNOSIS — M16.11 PRIMARY OSTEOARTHRITIS OF RIGHT HIP: ICD-10-CM

## 2019-09-27 PROCEDURE — 99214 OFFICE O/P EST MOD 30 MIN: CPT | Performed by: PHYSICIAN ASSISTANT

## 2019-09-27 RX ORDER — TRAMADOL HYDROCHLORIDE 50 MG/1
TABLET ORAL
Qty: 45 TABLET | Refills: 2 | Status: SHIPPED | OUTPATIENT
Start: 2019-09-27 | End: 2019-10-03

## 2019-09-27 NOTE — PROGRESS NOTES
Assessment:  1  Chronic pain syndrome    2  Primary osteoarthritis of right hip        Plan:  While the patient was in the office today, I did have a thorough conversation with the patient regarding his chronic pain syndrome, symptoms, and treatment plan  I did explain to the patient that if between now and his next office visit he would be scheduled for his hip replacement surgery, he should call our office so we can come up with a postoperative pain management plan as per the opioid contract  The patient was agreeable and verbalized an understanding  In the meantime, we will continue the tramadol as prescribed to be written 45 pills per 30 days for better pain control  Patient was advised to call office should pharmacy need prior authorization for increase in monthly tablets  The patient agrees to above plan  [place] Prescription Drug Monitoring Program report was reviewed and was appropriate   There are risks associated with opioid medications, including dependence, addiction and tolerance  The patient understands and agrees to use these medications only as prescribed  Potential side effects of the medications include, but are not limited to, constipation, drowsiness, addiction, impaired judgment and risk of fatal overdose if not taken as prescribed  The patient was warned against driving while taking sedation medications  Sharing medications is a felony  At this point in time, the patient is showing no signs of addiction, abuse, diversion or suicidal ideation  The patient will follow-up in 12 weeks for medication prescription refill and reevaluation  The patient was advised to contact the office should their symptoms worsen in the interim  The patient was agreeable and verbalized an understanding  History of Present Illness:     The patient is a 53 y o  male last seen on 7/5/19 who presents for a follow up office visit in regards to chronic pain syndrome secondary to right hip osteoarthritis and hip impingement syndrome  The patient currently reports unchanged pain since last visit  Patient states he was considering right hip replacement in the winter however will be unable to complete due to work schedule  He is hoping to proceed with right hip replacement surgery in the spring  At last visit tramadol 50 mg b i d  P r n  Was increased to a total of 45 tablets per month as he was finding he needed more consistent twice daily tramadol use  He states that pharmacy continue to provide only 30 tablets per 30 days which was confirmed through [place]   Current pain medications includes:  Tramadol 50 mg b i d  P r n  Dispensed number 30 pills   The patient reports that this regimen is providing mild pain relief  The patient is reporting no side effects from this pain medication regimen  Pain Contract Signed: 12/19/18  Last Urine Drug Screen: 7/5/19    I have personally reviewed and/or updated the patient's past medical history, past surgical history, family history, social history, current medications, allergies, and vital signs today  Review of Systems:    Review of Systems   Respiratory: Negative for shortness of breath  Cardiovascular: Negative for chest pain  Gastrointestinal: Negative for constipation, diarrhea, nausea and vomiting  Musculoskeletal: Positive for gait problem  Negative for arthralgias, joint swelling (joint stiffness) and myalgias  Skin: Negative for rash  Neurological: Positive for dizziness  Negative for seizures and weakness  All other systems reviewed and are negative          Past Medical History:   Diagnosis Date    [facility]     4 3    Arm DVT (deep venous thromboembolism), acute ([facility] ) 9234    complications of cardiac cath    Asthma     CKD (chronic kidney disease), stage III (HCC)     COPD (chronic obstructive pulmonary disease) ([facility] )     Depression     Essential hypertriglyceridemia     GERD (gastroesophageal reflux disease)     Headache     Hiatal hernia     Hyperlipidemia, mixed 2018    Liver disease     FATTY LIVER    PAC (premature atrial contraction)     Prediabetes     Renal calculi     Sleep apnea     Vestibular migraine        Past Surgical History:   Procedure Laterality Date    CARPAL TUNNEL RELEASE Left     COLONOSCOPY      FL INJECTION RIGHT HIP (ARTHROGRAM)  2018    FOOT SURGERY Left     FOREIGN BODY REMOVAL    HERNIA REPAIR      MO COLONOSCOPY FLX DX W/COLLJ SPEC WHEN PFRMD N/A 2017    Procedure: COLONOSCOPY;  Surgeon: Alisha Appiah MD;  Location: [facility]; Service: Gastroenterology    MO ESOPHAGOGASTRODUODENOSCOPY TRANSORAL DIAGNOSTIC N/A 10/31/2017    Procedure: ESOPHAGOGASTRODUODENOSCOPY (EGD); Surgeon: Alisha Appiah MD;  Location: [facility];   Service: Gastroenterology       Family History   Problem Relation Age of Onset    Cancer Brother     Prostate cancer Brother     Leukemia Brother         his only brother dies from [address] or leukemia pt unsure he was only 46    Arthritis Mother     Heart attack Father     Clotting disorder Father     Schizoaffective Disorder  Sister     Aortic aneurysm Other         abdominal       Social History     Occupational History    Occupation: unemployed   Tobacco Use    Smoking status: Former Smoker     Types: Cigars, Cigarettes     Last attempt to quit: 2014     Years since quittin 1    Smokeless tobacco: Never Used   Substance and Sexual Activity    Alcohol use: Yes     Comment: occasional    Drug use: No    Sexual activity: Not Currently         Current Outpatient Medications:     acetaminophen (TYLENOL) 500 mg tablet, Take 3 tablets by mouth daily as needed , Disp: , Rfl:     albuterol (PROVENTIL HFA,VENTOLIN HFA) 90 mcg/act inhaler, Inhale 2 puffs every 6 (six) hours as needed for wheezing , Disp: , Rfl:     ergocalciferol (VITAMIN D2) 50,000 units, Take 1 capsule (50,000 Units total) by mouth once a week, Disp: 4 capsule, Rfl: 3    fenofibrate (TRICOR) 145 mg tablet, take 1 tablet by mouth once daily, Disp: 30 tablet, Rfl: 6    fluticasone (FLONASE) 50 mcg/act nasal spray, 1 spray into each nostril daily, Disp: 11 Bottle, Rfl: 3    mometasone-formoterol (DULERA) 200-5 MCG/ACT inhaler, Inhale 2 puffs 2 (two) times a day , Disp: , Rfl:     montelukast (SINGULAIR) 10 mg tablet, take 1 tablet by mouth daily at bedtime, Disp: 30 tablet, Rfl: 3    Omega-3 Fatty Acids (FISH OIL) 1000 MG CPDR, Take by mouth daily, Disp: , Rfl:     rosuvastatin (CRESTOR) 20 MG tablet, take 1 tablet by mouth once daily, Disp: 30 tablet, Rfl: 5    sildenafil (REVATIO) 20 mg tablet, TAKE 2 TO 3 TABLETS BY MOUTH AS NEEDED **NOT MORE THAN ONCE A DAY**, Disp: , Rfl:     traMADol (ULTRAM) 50 mg tablet, Take 1 PO BID PRN for pain , Disp: 45 tablet, Rfl: 2    Influenza Virus Vacc Split PF 0 5 ML CARMENZA, inject 0 5 milliliter intramuscularly, Disp: , Rfl:     pantoprazole (PROTONIX) 40 mg tablet, take 1 tablet by mouth once daily (Patient not taking: Reported on 9/27/2019), Disp: 30 tablet, Rfl: 5    No Known Allergies    Physical Exam:    /68 (BP Location: Left arm, Patient Position: Sitting, Cuff Size: Large)   Pulse 57   Ht 6' 1" (1 854 m)   Wt 105 kg (231 lb)   BMI 30 48 kg/m²     Constitutional:normal, well developed, well nourished, alert, in no distress and non-toxic and no overt pain behavior  Eyes:anicteric  HEENT:grossly intact  Neck:supple, symmetric, trachea midline and no masses   Pulmonary:even and unlabored  Cardiovascular:No edema or pitting edema present  Skin:Normal without rashes or lesions and well hydrated  Psychiatric:Mood and affect appropriate  Neurologic:Cranial Nerves II-XII grossly intact  Musculoskeletal:antalgic right       Imaging  No orders to display         No orders of the defined types were placed in this encounter

## 2019-09-27 NOTE — TELEPHONE ENCOUNTER
Patient   [phone]  Naomi Alex    Patient is calling in stating that he needs a prior auth for traMADol (ULTRAM) 50 mg tablet     [id number] : [address]

## 2019-09-30 NOTE — TELEPHONE ENCOUNTER
S/w pt and confirmed with pharmacy that he was only given 30 tabs and med is still requiring prior auth  See previous notes in task stating auth completed and approved  Please advise if Shakila Colunga was obtained for 45 tabs?

## 2019-09-30 NOTE — TELEPHONE ENCOUNTER
Pt is calling stating that the pharmacy only gave him 30 pills of the Tramadol and not 45 pills  He was told to call office if that happened

## 2019-10-01 ENCOUNTER — TELEPHONE (OUTPATIENT)
Dept: NEUROLOGY | Facility: CLINIC | Age: 54
End: 2019-10-01

## 2019-10-01 NOTE — TELEPHONE ENCOUNTER
Patient has an appointment with Dr Jovani Modi on 10/17/19 that needs to be rescheduled due to Dr Hahn Levels will be out of office  Left message for patient to call back to schedule appt for tomorrow   Please schedule appointment when patient calls back    Wed 10/02/19  Thrus 10/03/19 or   Fridday 10/04/19    Thank you

## 2019-10-01 NOTE — TELEPHONE ENCOUNTER
Since the authorization I did was a renewal   They only renewed it for 15 days since that's what the patient has been receiving   I have to submit an authorization as an initial auth and it will be approved for a full 30 day supply

## 2019-10-01 NOTE — TELEPHONE ENCOUNTER
Patient called stated he wants to cancel appointment for now and will call back to reschedule appointment when he is ready  appointment for 10/17/19 canceled

## 2019-10-01 NOTE — TELEPHONE ENCOUNTER
Authorization was done 45 pills for 30 days  Patient has been getting a 15 days supply of Tramadol since 01/14/2019  I am currently waiting on hold with the insurance company to speak with a clinical pharmacist to see why the pharmacy has only been allowed to fill just a 15 day supply

## 2019-10-02 DIAGNOSIS — M48.061 SPINAL STENOSIS OF LUMBAR REGION WITHOUT NEUROGENIC CLAUDICATION: Primary | ICD-10-CM

## 2019-10-02 RX ORDER — TRAMADOL HYDROCHLORIDE 50 MG/1
50 TABLET ORAL EVERY 6 HOURS PRN
Qty: 60 TABLET | Refills: 0 | Status: SHIPPED | OUTPATIENT
Start: 2019-10-02 | End: 2019-10-03

## 2019-10-02 NOTE — TELEPHONE ENCOUNTER
I spoke with clinical pharmacist with [facility]   They are unable to approve 45 per 30 because the script is written 1 bid   So they would only approve the medication for 30 per 15 days unless you would like to write the script 60 per 30 days

## 2019-10-03 ENCOUNTER — TELEPHONE (OUTPATIENT)
Dept: NEPHROLOGY | Facility: CLINIC | Age: 54
End: 2019-10-03

## 2019-10-03 DIAGNOSIS — M16.11 PRIMARY OSTEOARTHRITIS OF RIGHT HIP: ICD-10-CM

## 2019-10-03 RX ORDER — TRAMADOL HYDROCHLORIDE 50 MG/1
TABLET ORAL
Qty: 60 TABLET | Refills: 2 | Status: SHIPPED | OUTPATIENT
Start: 2019-10-03 | End: 2019-12-20 | Stop reason: SDUPTHER

## 2019-10-03 NOTE — TELEPHONE ENCOUNTER
LMOM bebo Jurado his appointment dated 10/22/19 @11:00am with Dr Debby Kramer was canceled Dr Baldev Dwyer is not in the office on that day it was reschedule for 10/29/19 @3:15pm  A letter was sent with new appointment date and time

## 2019-10-10 ENCOUNTER — TELEPHONE (OUTPATIENT)
Dept: PAIN MEDICINE | Facility: CLINIC | Age: 54
End: 2019-10-10

## 2019-10-10 DIAGNOSIS — K21.9 GASTROESOPHAGEAL REFLUX DISEASE WITHOUT ESOPHAGITIS: ICD-10-CM

## 2019-10-10 RX ORDER — PANTOPRAZOLE SODIUM 40 MG/1
40 TABLET, DELAYED RELEASE ORAL DAILY
Qty: 90 TABLET | Refills: 3 | Status: SHIPPED | OUTPATIENT
Start: 2019-10-10 | End: 2020-10-05

## 2019-10-10 NOTE — TELEPHONE ENCOUNTER
Call from [facility]  Ph# [phone]    Caller reports they've received two different scripts for Tramadol  One is dated 10/2 for 1 tablet every 6 hours  The second script is dated 10/3 1 tablet twice a day  Please clarify

## 2019-10-24 ENCOUNTER — TRANSCRIBE ORDERS (OUTPATIENT)
Dept: ADMINISTRATIVE | Facility: HOSPITAL | Age: 54
End: 2019-10-24

## 2019-10-24 ENCOUNTER — APPOINTMENT (OUTPATIENT)
Dept: LAB | Facility: MEDICAL CENTER | Age: 54
End: 2019-10-24
Payer: COMMERCIAL

## 2019-10-24 DIAGNOSIS — N18.30 CHRONIC KIDNEY DISEASE, STAGE III (MODERATE) (HCC): Primary | ICD-10-CM

## 2019-10-24 DIAGNOSIS — N18.30 CHRONIC KIDNEY DISEASE, STAGE III (MODERATE) (HCC): ICD-10-CM

## 2019-10-24 LAB
BILIRUB UR QL STRIP: NEGATIVE
CLARITY UR: CLEAR
COLOR UR: YELLOW
CREAT UR-MCNC: 230 MG/DL
GLUCOSE UR STRIP-MCNC: NEGATIVE MG/DL
HGB UR QL STRIP.AUTO: NEGATIVE
KETONES UR STRIP-MCNC: NEGATIVE MG/DL
LEUKOCYTE ESTERASE UR QL STRIP: NEGATIVE
NITRITE UR QL STRIP: NEGATIVE
PH UR STRIP.AUTO: 5.5 [PH]
PHOSPHATE SERPL-MCNC: 3.1 MG/DL (ref 2.7–4.5)
PROT UR STRIP-MCNC: NEGATIVE MG/DL
PROT UR-MCNC: 9 MG/DL
PROT/CREAT UR: 0.04 MG/G{CREAT} (ref 0–0.1)
PTH-INTACT SERPL-MCNC: 45.5 PG/ML (ref 18.4–80.1)
SP GR UR STRIP.AUTO: 1.03 (ref 1–1.03)
UROBILINOGEN UR QL STRIP.AUTO: 0.2 E.U./DL

## 2019-10-24 PROCEDURE — 84156 ASSAY OF PROTEIN URINE: CPT | Performed by: INTERNAL MEDICINE

## 2019-10-24 PROCEDURE — 84100 ASSAY OF PHOSPHORUS: CPT

## 2019-10-24 PROCEDURE — 81003 URINALYSIS AUTO W/O SCOPE: CPT

## 2019-10-24 PROCEDURE — 82570 ASSAY OF URINE CREATININE: CPT | Performed by: INTERNAL MEDICINE

## 2019-10-24 PROCEDURE — 83970 ASSAY OF PARATHORMONE: CPT

## 2019-10-24 PROCEDURE — 36415 COLL VENOUS BLD VENIPUNCTURE: CPT

## 2019-10-29 ENCOUNTER — OFFICE VISIT (OUTPATIENT)
Dept: NEPHROLOGY | Facility: CLINIC | Age: 54
End: 2019-10-29
Payer: COMMERCIAL

## 2019-10-29 VITALS
WEIGHT: 227.2 LBS | TEMPERATURE: 98.4 F | HEART RATE: 78 BPM | SYSTOLIC BLOOD PRESSURE: 110 MMHG | BODY MASS INDEX: 29.16 KG/M2 | RESPIRATION RATE: 16 BRPM | DIASTOLIC BLOOD PRESSURE: 80 MMHG | HEIGHT: 74 IN

## 2019-10-29 DIAGNOSIS — K21.9 GASTROESOPHAGEAL REFLUX DISEASE WITHOUT ESOPHAGITIS: ICD-10-CM

## 2019-10-29 DIAGNOSIS — N18.30 CKD (CHRONIC KIDNEY DISEASE) STAGE 3, GFR 30-59 ML/MIN (HCC): Primary | ICD-10-CM

## 2019-10-29 DIAGNOSIS — N18.9 CHRONIC KIDNEY DISEASE-MINERAL AND BONE DISORDER: ICD-10-CM

## 2019-10-29 DIAGNOSIS — E83.9 CHRONIC KIDNEY DISEASE-MINERAL AND BONE DISORDER: ICD-10-CM

## 2019-10-29 DIAGNOSIS — M89.9 CHRONIC KIDNEY DISEASE-MINERAL AND BONE DISORDER: ICD-10-CM

## 2019-10-29 DIAGNOSIS — S39.012A LUMBOSACRAL STRAIN, INITIAL ENCOUNTER: ICD-10-CM

## 2019-10-29 PROCEDURE — 99213 OFFICE O/P EST LOW 20 MIN: CPT | Performed by: INTERNAL MEDICINE

## 2019-10-29 NOTE — ASSESSMENT & PLAN NOTE
Kidney function is stable with creatinine 1 55 and GFR of 50  He is stable overall  I discussed asymptomatic and progressive nature of kidney disease    Advised continues hydration and avoidance of any nephrotoxic medicine

## 2019-10-29 NOTE — ASSESSMENT & PLAN NOTE
PTH and phosphorus are within normal range    We will continue to monitor as part of the CKD management

## 2019-10-29 NOTE — PROGRESS NOTES
NEPHROLOGY OFFICE FOLLOW UP  Chiki Newell 53 y o  male MRN: [id number]    Encounter: [id number] 10/29/2019    REASON FOR VISIT: Blake Santo is a 53 y o  male who is here on 10/29/2019 for Follow-up and Chronic Kidney Disease    HPI:    Anna Sewell came in today for follow-up of stage III CKD  He is doing quite well  Denies any acute complaint other than chronic joint pain  No chest pain no palpitation or shortness of Breath      REVIEW OF SYSTEMS:    Review of Systems   Constitutional: Negative for activity change and fatigue  HENT: Negative for congestion and ear discharge  Eyes: Negative for photophobia and pain  Respiratory: Negative for apnea and choking  Cardiovascular: Negative for chest pain and palpitations  Gastrointestinal: Negative for abdominal distention and blood in stool  Endocrine: Negative for heat intolerance and polyphagia  Genitourinary: Negative for flank pain and urgency  Musculoskeletal: Negative for neck pain and neck stiffness  Skin: Negative for color change and wound  Allergic/Immunologic: Negative for food allergies and immunocompromised state  Neurological: Negative for seizures and facial asymmetry  Hematological: Negative for adenopathy  Does not bruise/bleed easily  Psychiatric/Behavioral: Negative for self-injury and suicidal ideas           PAST MEDICAL HISTORY:  Past Medical History:   Diagnosis Date    Aorta aneurysm ([facility] [address] )     4 3    Arm DVT (deep venous thromboembolism), acute ([facility] [address] ) 1768    complications of cardiac cath    Asthma     CKD (chronic kidney disease), stage III (HCC)     COPD (chronic obstructive pulmonary disease) (HCC)     Depression     Essential hypertriglyceridemia     GERD (gastroesophageal reflux disease)     Headache     Hiatal hernia     Hyperlipidemia, mixed 5/7/2018    Liver disease     FATTY LIVER    PAC (premature atrial contraction)     Prediabetes     Renal calculi     Sleep apnea     Vestibular migraine PAST SURGICAL HISTORY:  Past Surgical History:   Procedure Laterality Date    CARPAL TUNNEL RELEASE Left     COLONOSCOPY      FL INJECTION RIGHT HIP (ARTHROGRAM)  2018    FOOT SURGERY Left     FOREIGN BODY REMOVAL    HERNIA REPAIR      ME COLONOSCOPY FLX DX W/COLLJ SPEC WHEN PFRMD N/A 2017    Procedure: COLONOSCOPY;  Surgeon: Lala Nava MD;  Location: [facility]; Service: Gastroenterology    ME ESOPHAGOGASTRODUODENOSCOPY TRANSORAL DIAGNOSTIC N/A 10/31/2017    Procedure: ESOPHAGOGASTRODUODENOSCOPY (EGD); Surgeon: Lala Nava MD;  Location: [facility];   Service: Gastroenterology       SOCIAL HISTORY:  Social History     Substance and Sexual Activity   Alcohol Use Yes    Comment: occasional     Social History     Substance and Sexual Activity   Drug Use No     Social History     Tobacco Use   Smoking Status Former Smoker    Types: Cigars, Cigarettes    Last attempt to quit: 2014    Years since quittin 2   Smokeless Tobacco Never Used       FAMILY HISTORY:  Family History   Problem Relation Age of Onset    Cancer Brother     Prostate cancer Brother     Leukemia Brother         his only brother dies from [address] or leukemia pt unsure he was only 47    Arthritis Mother     Heart attack Father     Clotting disorder Father     Schizoaffective Disorder  Sister     Aortic aneurysm Other         abdominal       MEDICATIONS:    Current Outpatient Medications:     acetaminophen (TYLENOL) 500 mg tablet, Take 3 tablets by mouth daily as needed , Disp: , Rfl:     albuterol (PROVENTIL HFA,VENTOLIN HFA) 90 mcg/act inhaler, Inhale 2 puffs every 6 (six) hours as needed for wheezing , Disp: , Rfl:     ergocalciferol (VITAMIN D2) 50,000 units, Take 1 capsule (50,000 Units total) by mouth once a week, Disp: 4 capsule, Rfl: 3    fenofibrate (TRICOR) 145 mg tablet, take 1 tablet by mouth once daily, Disp: 30 tablet, Rfl: 6    fluticasone (FLONASE) 50 mcg/act nasal spray, 1 spray into each nostril daily, Disp: 11 Bottle, Rfl: 3    mometasone-formoterol (DULERA) 200-5 MCG/ACT inhaler, Inhale 2 puffs 2 (two) times a day , Disp: , Rfl:     montelukast (SINGULAIR) 10 mg tablet, take 1 tablet by mouth daily at bedtime, Disp: 30 tablet, Rfl: 3    Omega-3 Fatty Acids (FISH OIL) 1000 MG CPDR, Take by mouth daily, Disp: , Rfl:     pantoprazole (PROTONIX) 40 mg tablet, Take 1 tablet (40 mg total) by mouth daily, Disp: 90 tablet, Rfl: 3    rosuvastatin (CRESTOR) 20 MG tablet, take 1 tablet by mouth once daily, Disp: 30 tablet, Rfl: 5    traMADol (ULTRAM) 50 mg tablet, Take 1 PO BID PRN for pain , Disp: 60 tablet, Rfl: 2    PHYSICAL EXAM:  Vitals:    10/29/19 1502   BP: 110/80   BP Location: Right arm   Patient Position: Sitting   Pulse: 78   Resp: 16   Temp: 98 4 °F (36 9 °C)   TempSrc: Tympanic   Weight: 103 kg (227 lb 3 2 oz)   Height: 6' 1 5" (1 867 m)     Body mass index is 29 57 kg/m²  Physical Exam   Constitutional: He is oriented to person, place, and time  He appears well-developed  No distress  HENT:   Head: Normocephalic  Mouth/Throat: Oropharynx is clear and moist    Eyes: Conjunctivae are normal  No scleral icterus  Neck: Neck supple  No JVD present  Cardiovascular: Normal rate and normal heart sounds  Pulmonary/Chest: Effort normal  He has no wheezes  Abdominal: Soft  There is no tenderness  Musculoskeletal: Normal range of motion  He exhibits no edema  Neurological: He is alert and oriented to person, place, and time  Skin: Skin is warm  No rash noted  Psychiatric: He has a normal mood and affect   His behavior is normal        LAB RESULTS:  Results for orders placed or performed in visit on 10/24/19   PTH, intact   Result Value Ref Range    PTH 45 5 18 4 - 80 1 pg/mL   Phosphorus   Result Value Ref Range    Phosphorus 3 1 2 7 - 4 5 mg/dL       ASSESSMENT and PLAN:      CKD (chronic kidney disease) stage 3, GFR 30-59 ml/min  Kidney function is stable with creatinine 1 55 and GFR of 50  He is stable overall  I discussed asymptomatic and progressive nature of kidney disease  Advised continues hydration and avoidance of any nephrotoxic medicine    Chronic kidney disease-mineral and bone disorder  PTH and phosphorus are within normal range  We will continue to monitor as part of the CKD management      I will see him back in 6 months  Will get blood and urine test before that visit        Portions of the record may have been created with voice recognition software  Occasional wrong word or "sound a like" substitutions may have occurred due to the inherent limitations of voice recognition software  Read the chart carefully and recognize, using context, where substitutions have occurred  If you have any questions, please contact the dictating provider

## 2019-10-29 NOTE — PATIENT INSTRUCTIONS
Chronic Kidney Disease   AMBULATORY CARE:   Chronic kidney disease (CKD)  is the gradual and permanent loss of kidney function  Normally, the kidneys remove fluid, chemicals, and waste from your blood  These wastes are turned into urine by your kidneys  CKD may worsen over time and lead to kidney failure  Common symptoms include the following:   · Changes in how often you need to urinate    · Swelling in your arms, legs, or feet    · Shortness of breath    · Fatigue or weakness    · Bad or bitter taste in your mouth    · Nausea, vomiting, or loss of appetite  Seek care immediately if:   · You are confused and very drowsy  · You have a seizure  · You have shortness of breath  Contact your healthcare provider if:   · You suddenly gain or lose more weight than your healthcare provider has told you is okay  · You have itchy skin or a rash  · You urinate more or less than you normally do  · You have blood in your urine  · You have nausea and repeated vomiting  · You have fatigue or muscle weakness  · You have hiccups that will not stop  · You have questions or concerns about your condition or care  Treatment for CKD:  Medicines may be given to decrease blood pressure and get rid of extra fluid  You may also receive medicine to manage health conditions that may occur with CKD  Dialysis is a treatment to remove chemicals and waste from your blood when your kidneys can no longer do this  Surgery may be needed to create an arteriovenous fistula (AVF) in your arm or insert a catheter into your abdomen so that you can receive dialysis  A kidney transplant may be done if your CKD becomes severe  Manage CKD:   · Maintain a healthy weight  Ask your healthcare provider how much you should weigh  Ask him to help you create a weight loss plan if you are overweight  · Exercise 30 to 60 minutes a day, 4 to 7 times a week, or as directed  Ask about the best exercise plan for you   Regular exercise can help you manage CKD, high blood pressure, and diabetes  · Follow your healthcare provider's advice about what to eat and drink  He may tell you to eat food low in sodium (salt), potassium, phosphorus, or protein  You may need to see a dietitian if you need help planning meals  Ask how much liquid to drink each day and which liquids are best for you  · Limit alcohol  Ask how much alcohol is safe for you to drink  A drink of alcohol is 12 ounces of beer, 5 ounces of wine, or 1½ ounces of liquor  · Do not smoke  Nicotine and other chemicals in cigarettes and cigars can cause lung and kidney damage  Ask your healthcare provider for information if you currently smoke and need help to quit  E-cigarettes or smokeless tobacco still contain nicotine  Talk to your healthcare provider before you use these products  · Ask your healthcare provider if you need vaccines  Infections such as pneumonia, influenza, and hepatitis can be more harmful or more likely to occur in a person who has CKD  Vaccines reduce your risk of infection with these viruses  Follow up with your healthcare provider as directed:  Write down your questions so you remember to ask them during your visits  © 2017 [employer] Information is for End User's use only and may not be sold, redistributed or otherwise used for commercial purposes  All illustrations and images included in CareNotes® are the copyrighted property of [employer]  or [employer]  The above information is an  only  It is not intended as medical advice for individual conditions or treatments  Talk to your doctor, nurse or pharmacist before following any medical regimen to see if it is safe and effective for you

## 2019-12-09 DIAGNOSIS — J45.909 UNCOMPLICATED ASTHMA, UNSPECIFIED ASTHMA SEVERITY, UNSPECIFIED WHETHER PERSISTENT: ICD-10-CM

## 2019-12-09 DIAGNOSIS — E78.2 ELEVATED TRIGLYCERIDES WITH HIGH CHOLESTEROL: ICD-10-CM

## 2019-12-09 RX ORDER — MONTELUKAST SODIUM 10 MG/1
TABLET ORAL
Qty: 30 TABLET | Refills: 0 | Status: SHIPPED | OUTPATIENT
Start: 2019-12-09 | End: 2020-01-09

## 2019-12-09 RX ORDER — FENOFIBRATE 145 MG/1
TABLET, COATED ORAL
Qty: 30 TABLET | Refills: 0 | Status: SHIPPED | OUTPATIENT
Start: 2019-12-09 | End: 2020-01-09

## 2019-12-20 ENCOUNTER — OFFICE VISIT (OUTPATIENT)
Dept: PAIN MEDICINE | Facility: CLINIC | Age: 54
End: 2019-12-20
Payer: COMMERCIAL

## 2019-12-20 VITALS
BODY MASS INDEX: 29.57 KG/M2 | HEIGHT: 74 IN | HEART RATE: 63 BPM | SYSTOLIC BLOOD PRESSURE: 116 MMHG | DIASTOLIC BLOOD PRESSURE: 76 MMHG

## 2019-12-20 DIAGNOSIS — M25.561 CHRONIC PAIN OF RIGHT KNEE: ICD-10-CM

## 2019-12-20 DIAGNOSIS — M76.31 ILIOTIBIAL BAND TENDONITIS OF RIGHT SIDE: ICD-10-CM

## 2019-12-20 DIAGNOSIS — G89.29 CHRONIC PAIN OF RIGHT KNEE: ICD-10-CM

## 2019-12-20 DIAGNOSIS — G89.4 CHRONIC PAIN SYNDROME: Primary | ICD-10-CM

## 2019-12-20 DIAGNOSIS — M25.851 HIP IMPINGEMENT SYNDROME, RIGHT: ICD-10-CM

## 2019-12-20 DIAGNOSIS — M16.11 PRIMARY OSTEOARTHRITIS OF RIGHT HIP: ICD-10-CM

## 2019-12-20 PROCEDURE — 99214 OFFICE O/P EST MOD 30 MIN: CPT | Performed by: PHYSICIAN ASSISTANT

## 2019-12-20 RX ORDER — TRAMADOL HYDROCHLORIDE 50 MG/1
TABLET ORAL
Qty: 60 TABLET | Refills: 2 | Status: SHIPPED | OUTPATIENT
Start: 2019-12-20 | End: 2020-03-13 | Stop reason: SDUPTHER

## 2019-12-20 NOTE — PATIENT INSTRUCTIONS
Iliotibial Band Syndrome Exercises   WHAT YOU NEED TO KNOW:   What do I need to know about iliotibial band syndrome (ITBS)? ITBS, also known as runner's knee, happens when your iliotibial band (ITB) becomes injured and causes pain  The ITB is a long band of tissue  It extends from the outside of your pelvis (hip bone) to the outside of your tibia (shin bone)  It helps keeps the knee in the correct position when you stand or move  ITBS occurs most often in long distance runners and cyclists  How can I decrease pain and swelling? · Apply ice  on your hip, knee, or thigh for 15 to 20 minutes every hour or as directed  Use an ice pack, or put crushed ice in a plastic bag  Cover it with a towel  Ice helps prevent tissue damage and decreases swelling and pain  · Apply heat  on your hip, knee, or thigh for 20 to 30 minutes before you stretch or exercise  Use a heat pack or wet a washcloth and heat it for 15 seconds in the microwave  Heat helps decrease pain and makes it easier to stretch your muscles  · Massage painful areas as directed  Use a foam roller to gently massage your painful areas  Place the foam roller on a flat surface  Lie on your side with the foam roller against your painful leg  Move your body so that it rolls up and down from your hip to above your knee  Do not lie with it against the outside of your knee cap  What do I need to know about ITB exercises? ITB exercises help strengthen the muscles around your knee and hip  Strong muscles can help reduce pain and decrease your risk of future injury  What do I need to know about exercise safety? Do not start an exercise program before you talk to your healthcare provider  You may need to wait until your swelling and pain have gone down before you start to exercise  · Move slowly and smoothly  Avoid fast or jerky motions  This will help prevent another injury  · Breathe normally  Do not hold your breath   It is important to breathe in and out so you do not tense up during exercise  Tension could prevent you from moving your joint in a full range of motion  · Do the exercises and stretches on both legs  Do this so both ITBs remain strong and flexible  · Stop if you feel sharp pain or an increase in pain  Stop the exercise and contact your healthcare provider if you have these symptoms  It is normal to feel some discomfort, such as a dull ache, during exercise  Regular exercise will help decrease your discomfort over time  · Warm up before you stretch and exercise  This will help prevent an injury  Walk or ride a stationary bike for 5 to 10 minutes  How do I perform ITB stretches? Always stretch before and after you do strengthening exercises  Hold each stretch for 30 seconds to 1 minute  Repeat each stretch 2 to 3 times or as directed  · Standing ITB stretch:  Stand with your injured leg behind your other leg  Cross your front leg over your injured leg  Bend sideways toward the hip that is not injured  Stop when you feel a stretch in the hip of your injured leg  Repeat on the other side  · Lying ITB stretch:  Lie on your back  Bend the knee of your injured leg toward your chest  Place your hand on the outside of your thigh  Slowly pull your knee across your body  Stop when you feel a stretch in your hip and outside of your thigh  Repeat on the other side  · Hip stretch:  Lie on the ground  Place both hands on the shin of 1 leg  Pull your knee toward your chest  Repeat on the other side  · Standing quadriceps stretch:  Stand and place one hand against a wall or hold the back of a chair for balance  With your weight on one leg, bend your other leg and grab your ankle  Pull your heel toward your buttocks  · Sitting hamstring stretch:  Sit with both legs straight in front of you  Place your palms on the floor and slide your hands forward until you feel the stretch   If possible, grab your toes  Do not round your back  How do I perform ITB strengthening exercises? Your healthcare provider will tell you how often to do the following exercises:  · Standing half squats:  Stand with your feet shoulder-width apart  Lean your back against a wall or hold the back of a chair for balance, if needed  Slowly sit down about 10 inches, as if you are going to sit in a chair  Put most of your weight in your heels  Hold the squat for 5 seconds, then slowly rise to a standing position  Do 3 sets of 10 squats  · Sitting leg lifts:  Sit in a chair with both feet flat on the floor  Slowly straighten and raise one leg  Squeeze your thigh muscles and hold for 5 seconds  Relax and return your foot to the floor  Do 3 sets of 10 lifts on each leg  · Single leg dips:  Stand on your injured leg, between 2 chairs  The backs of the chairs should be toward you  Put 1 hand on each chair  Straighten your leg that is not injured and lift it off the floor  Use the chairs to hold some of your weight  Bend the knee of your injured leg  Slowly lower your body toward the floor a few inches  Your weight should be in your heel  Hold for 5 seconds  Slowly return to a standing position  Do 3 sets of 10 on each leg  · Standing hamstring curls: Face a wall and place both palms flat on the wall  Instead you can hold the back of a chair for balance  With your weight on 1 leg, lift your other foot as close to your buttocks as you can  Hold for 5 seconds and then lower your leg  Do 3 sets of 10 curls on each leg  · Hip adduction:  Lie on your injured side  Cross your top leg over your injured leg  Put your foot on the floor in front of you  Raise your injured leg until it touches the other leg  Slowly lower the leg to the floor  Do 3 sets of 10 on each leg  · Hip abduction:  Lie on your side that is not injured  Straighten your legs  Slowly raise your injured leg as high as you can   Keep your foot pointing straight  Hold for 5 seconds then slowly lower your leg  Do 3 sets of 10 on each leg  When should I contact my healthcare provider? · You have sharp pain during exercise or at rest     · You have questions or concerns about stretches or exercises  CARE AGREEMENT:   You have the right to help plan your care  Learn about your health condition and how it may be treated  Discuss treatment options with your caregivers to decide what care you want to receive  You always have the right to refuse treatment  The above information is an  only  It is not intended as medical advice for individual conditions or treatments  Talk to your doctor, nurse or pharmacist before following any medical regimen to see if it is safe and effective for you  © 2017 [employer] Information is for End User's use only and may not be sold, redistributed or otherwise used for commercial purposes  All illustrations and images included in CareNotes® are the copyrighted property of [employer]  or [employer]        Tramadol with fill date of 1/6/20 and 2 additional refills to pharmacy

## 2019-12-20 NOTE — PROGRESS NOTES
Assessment:  1  Chronic pain syndrome    2  Primary osteoarthritis of right hip    3  Hip impingement syndrome, right    4  Chronic pain of right knee    5  Iliotibial band tendonitis of right side        Plan:  I had a lengthy discussion in office today regarding his chronic pain syndrome symptoms and treatment plan options  As patient is noting that least 50% pain relief on current regimen of tramadol 50 mg twice daily as needed I will send refill to pharmacy today with do not fill date of 01/06/2020 with 2 additional refills  Patient denies aberrant behavior and is allowing him to complete his ADLs without difficulty  With regards to his slightly worsening right knee pain I will provide home exercise she for IT band stretching as he does exhibit tenderness along the IT band as well as insertion point in knee on exam today  To consider further physical therapy and possible updated imaging should pain not improve in the next few weeks  Patient expresses understanding  [place] Prescription Drug Monitoring Program report was reviewed and was appropriate     There are risks associated with opioid medications, including dependence, addiction and tolerance  The patient understands and agrees to use these medications only as prescribed  Potential side effects of the medications include, but are not limited to, constipation, drowsiness, addiction, impaired judgment and risk of fatal overdose if not taken as prescribed  The patient was warned against driving while taking sedation medications  Sharing medications is a felony  At this point in time, the patient is showing no signs of addiction, abuse, diversion or suicidal ideation  The patient will follow-up in 12 weeks for medication prescription refill and reevaluation  The patient was advised to contact the office should their symptoms worsen in the interim  The patient was agreeable and verbalized an understanding          History of Present Illness: The patient is a 53 y o  male last seen on 9/27/19 who presents for a follow up office visit in regards to secondary to chronic pain syndrome osteoarthritis of right hip  The patient currently reports slightly worsening pain since last visit  He does attribute this to the increase cold weather  He additionally admits to increasing right knee pain specifically at night this pain is transient but sharp and shooting primarily in the right lateral thigh and knee  He denies any other lower extremity changes,, weakness, bowel or bladder changes or saddle anesthesia  Current pain medications includes:  Tramadol 50 mg b i d  p r n     The patient reports that this regimen is providing 50% pain relief  The patient is reporting no side effects from this pain medication regimen  Pain Contract Signed: 12/19/18  Last Urine Drug Screen: 7/5/19    I have personally reviewed and/or updated the patient's past medical history, past surgical history, family history, social history, current medications, allergies, and vital signs today  Review of Systems:    Review of Systems   Respiratory: Negative for shortness of breath  Cardiovascular: Negative for chest pain  Gastrointestinal: Negative for constipation, diarrhea, nausea and vomiting  Musculoskeletal: Positive for gait problem  Negative for arthralgias, joint swelling (joint stiffness) and myalgias  Skin: Negative for rash  Neurological: Negative for dizziness, seizures and weakness  All other systems reviewed and are negative          Past Medical History:   Diagnosis Date    Aorta aneurysm ([facility] [address] )     4 3    Arm DVT (deep venous thromboembolism), acute ([facility] [address] ) 8797    complications of cardiac cath    Asthma     CKD (chronic kidney disease), stage III (HCC)     COPD (chronic obstructive pulmonary disease) (HCC)     Depression     Essential hypertriglyceridemia     GERD (gastroesophageal reflux disease)     Headache     Hiatal hernia     Hyperlipidemia, mixed 2018    Liver disease     FATTY LIVER    PAC (premature atrial contraction)     Prediabetes     Renal calculi     Sleep apnea     Vestibular migraine        Past Surgical History:   Procedure Laterality Date    CARPAL TUNNEL RELEASE Left     COLONOSCOPY      FL INJECTION RIGHT HIP (ARTHROGRAM)  2018    FOOT SURGERY Left     FOREIGN BODY REMOVAL    HERNIA REPAIR      AZ COLONOSCOPY FLX DX W/COLLJ SPEC WHEN PFRMD N/A 2017    Procedure: COLONOSCOPY;  Surgeon: Mario Reyes MD;  Location: [facility]; Service: Gastroenterology    [place] ESOPHAGOGASTRODUODENOSCOPY TRANSORAL DIAGNOSTIC N/A 10/31/2017    Procedure: ESOPHAGOGASTRODUODENOSCOPY (EGD); Surgeon: Mario Reyes MD;  Location: [facility];   Service: Gastroenterology       Family History   Problem Relation Age of Onset    Cancer Brother     Prostate cancer Brother     Leukemia Brother         his only brother dies from [address] or leukemia pt unsure he was only 46    Arthritis Mother     Heart attack Father     Clotting disorder Father     Schizoaffective Disorder  Sister     Aortic aneurysm Other         abdominal       Social History     Occupational History    Occupation: unemployed   Tobacco Use    Smoking status: Former Smoker     Types: Cigars, Cigarettes     Last attempt to quit: 2014     Years since quittin 3    Smokeless tobacco: Never Used   Substance and Sexual Activity    Alcohol use: Yes     Comment: occasional    Drug use: No    Sexual activity: Not Currently         Current Outpatient Medications:     acetaminophen (TYLENOL) 500 mg tablet, Take 3 tablets by mouth daily as needed , Disp: , Rfl:     albuterol (PROVENTIL HFA,VENTOLIN HFA) 90 mcg/act inhaler, Inhale 2 puffs every 6 (six) hours as needed for wheezing , Disp: , Rfl:     ergocalciferol (VITAMIN D2) 50,000 units, Take 1 capsule (50,000 Units total) by mouth once a week, Disp: 4 capsule, Rfl: 3    fenofibrate (TRICOR) 145 mg tablet, take 1 tablet by mouth once daily, Disp: 30 tablet, Rfl: 0    fluticasone (FLONASE) 50 mcg/act nasal spray, 1 spray into each nostril daily, Disp: 11 Bottle, Rfl: 3    mometasone-formoterol (DULERA) 200-5 MCG/ACT inhaler, Inhale 2 puffs 2 (two) times a day , Disp: , Rfl:     montelukast (SINGULAIR) 10 mg tablet, take 1 tablet by mouth at bedtime, Disp: 30 tablet, Rfl: 0    Omega-3 Fatty Acids (FISH OIL) 1000 MG CPDR, Take by mouth daily, Disp: , Rfl:     pantoprazole (PROTONIX) 40 mg tablet, Take 1 tablet (40 mg total) by mouth daily, Disp: 90 tablet, Rfl: 3    rosuvastatin (CRESTOR) 20 MG tablet, take 1 tablet by mouth once daily, Disp: 30 tablet, Rfl: 5    traMADol (ULTRAM) 50 mg tablet, Take 1 PO BID PRN for pain  Ongoing therapy  Do not fill until 01/6/20 , Disp: 60 tablet, Rfl: 2    No Known Allergies    Physical Exam:    /76 (BP Location: Left arm, Patient Position: Sitting, Cuff Size: Standard)   Pulse 63   Ht 6' 1 5" (1 867 m)   BMI 29 57 kg/m²     Constitutional:normal, well developed, well nourished, alert, in no distress and non-toxic and no overt pain behavior  Eyes:anicteric  HEENT:grossly intact  Neck:supple, symmetric, trachea midline and no masses   Pulmonary:even and unlabored  Cardiovascular:No edema or pitting edema present  Skin:Normal without rashes or lesions and well hydrated  Psychiatric:Mood and affect appropriate  Neurologic:Cranial Nerves II-XII grossly intact  Musculoskeletal:antalgic      Imaging  No orders to display         No orders of the defined types were placed in this encounter

## 2020-01-09 ENCOUNTER — TELEPHONE (OUTPATIENT)
Dept: PAIN MEDICINE | Facility: CLINIC | Age: 55
End: 2020-01-09

## 2020-01-09 DIAGNOSIS — J45.909 UNCOMPLICATED ASTHMA, UNSPECIFIED ASTHMA SEVERITY, UNSPECIFIED WHETHER PERSISTENT: ICD-10-CM

## 2020-01-09 DIAGNOSIS — E78.2 ELEVATED TRIGLYCERIDES WITH HIGH CHOLESTEROL: ICD-10-CM

## 2020-01-09 RX ORDER — FENOFIBRATE 145 MG/1
145 TABLET, COATED ORAL DAILY
Qty: 90 TABLET | Refills: 3 | Status: SHIPPED | OUTPATIENT
Start: 2020-01-09 | End: 2020-10-29

## 2020-01-09 RX ORDER — MONTELUKAST SODIUM 10 MG/1
TABLET ORAL
Qty: 30 TABLET | Refills: 3 | Status: SHIPPED | OUTPATIENT
Start: 2020-01-09 | End: 2020-05-07

## 2020-01-09 RX ORDER — FENOFIBRATE 145 MG/1
TABLET, COATED ORAL
Qty: 30 TABLET | Refills: 0 | Status: SHIPPED | OUTPATIENT
Start: 2020-01-09 | End: 2020-01-21 | Stop reason: SDUPTHER

## 2020-01-09 NOTE — TELEPHONE ENCOUNTER
[facility]  [phone]  Riedinger, Bharathi Krause is calling in stating that traMADol (ULTRAM) 50 mg tablet, needs prior auth

## 2020-01-15 NOTE — TELEPHONE ENCOUNTER
Chris-pharmacist at [employer] called checking the status on prior authorization for medication Tramadol 50 mg  I called office to check if any nurses/MA had any updates, but I got their voice mails   Please advisechristina    Call back# [phone]

## 2020-01-16 DIAGNOSIS — E55.9 VITAMIN D DEFICIENCY: ICD-10-CM

## 2020-01-16 RX ORDER — ERGOCALCIFEROL 1.25 MG/1
50000 CAPSULE ORAL WEEKLY
Qty: 4 CAPSULE | Refills: 3 | Status: SHIPPED | OUTPATIENT
Start: 2020-01-16 | End: 2020-10-09

## 2020-01-16 NOTE — TELEPHONE ENCOUNTER
Informed pt on vm   Insurance is backed up on authorizations  I called to check the status and it is still pending

## 2020-01-21 ENCOUNTER — OFFICE VISIT (OUTPATIENT)
Dept: PULMONOLOGY | Facility: CLINIC | Age: 55
End: 2020-01-21
Payer: COMMERCIAL

## 2020-01-21 VITALS
DIASTOLIC BLOOD PRESSURE: 76 MMHG | HEIGHT: 73 IN | HEART RATE: 66 BPM | BODY MASS INDEX: 30.22 KG/M2 | WEIGHT: 228 LBS | OXYGEN SATURATION: 96 % | SYSTOLIC BLOOD PRESSURE: 118 MMHG

## 2020-01-21 DIAGNOSIS — E66.9 OBESITY (BMI 30-39.9): ICD-10-CM

## 2020-01-21 DIAGNOSIS — J45.30 MILD PERSISTENT ASTHMA WITHOUT COMPLICATION: Primary | ICD-10-CM

## 2020-01-21 DIAGNOSIS — Z87.891 FORMER SMOKER: ICD-10-CM

## 2020-01-21 DIAGNOSIS — G47.33 OSA (OBSTRUCTIVE SLEEP APNEA): ICD-10-CM

## 2020-01-21 PROCEDURE — 99214 OFFICE O/P EST MOD 30 MIN: CPT | Performed by: PHYSICIAN ASSISTANT

## 2020-01-21 RX ORDER — ALBUTEROL SULFATE 2.5 MG/3ML
2.5 SOLUTION RESPIRATORY (INHALATION) 4 TIMES DAILY
Qty: 120 VIAL | Refills: 3 | Status: SHIPPED | OUTPATIENT
Start: 2020-01-21 | End: 2020-10-09

## 2020-01-21 NOTE — PROGRESS NOTES
Assessment:    1  Mild persistent asthma without complication  albuterol (2 5 mg/3 mL) 0 083 % nebulizer solution   2  PEE (obstructive sleep apnea)     3  Obesity (BMI 30-39 9)     4  Former smoker           Plan:   Patient presents today for follow-up, breathing has remained stable  Last PFTs done at [facility] 2018, FEV1 79% predicted, normal DLCO  There was some restriction, likely secondary to obesity  Continue Dulera, Singulair, Ventolin as needed  He has a nebulizer but does not have any medication for it  Albuterol nebulizer solution sent to the pharmacy  Following his last visit, old sleep study records were obtained showing evidence of PEE  He did start with the CPAP, could not tolerate it & sent the machine back  I again provided education regarding the importance of treating the sleep apnea and consequences of untreated sleep apnea including excessive daytime sleepiness and increased risk for myocardial infarction stroke atrial fibrillation discussed with the patient  He understands but refuses, he is not willing to give it another try  Would benefit from weight loss  There is a minimal smoking history, less than 15 pack years  Does not qualify for annual CT lung screening  Reviewed last CT of the chest done April 2019 without any suspicious nodules  All of the patient's questions were answered to their satisfaction and understanding, which was verbalized  Patient was advised to call the office prior to next appointment should they have any questions or concerns prior  Patient made aware of worrisome symptoms that should prompt a visit to the ER or call to 911 such as chest pain, severe shortness of breath, cyanosis, throat closing, syncope, etc     Subjective:     Patient ID: Jassi Ramirez is a 53 y o  male      Chief Complaint:  Jesus Jones is a 76-year-old male former smoker with less than 15 pack year history and past medical history including mild intermittent asthma, untreated PEE, aneurysm, CKD, DVT of the or room, liver disease, hyperlipidemia, GERD  He presents today for follow-up  He again states that his breathing is stable  He has chronic dyspnea on exertion, mainly with strenuous activity  He does not use the Ventolin during these times, it resolves quickly with rest   Other triggers for him include change in season and URIs  Since his last visit, he attempted treatment for the PEE with CPAP but quickly sent the machine back and is currently refusing any further evaluation or treatment for the PEE  The following portions of the patient's history were reviewed in this encounter and updated as appropriate: Past medical, social, surgical, family, allergies    Review of Systems   Constitutional: Positive for fatigue  Respiratory: Positive for cough and shortness of breath  Negative for wheezing  Cardiovascular: Negative for chest pain and leg swelling  Neurological: Negative for weakness  All other systems reviewed and are negative  Objective:    Physical Exam   Constitutional: He is oriented to person, place, and time  He appears well-developed and well-nourished  No distress  Obese   HENT:   Head: Normocephalic and atraumatic  Right Ear: External ear normal    Left Ear: External ear normal    Nose: Nose normal    Mouth/Throat: Oropharynx is clear and moist  No oropharyngeal exudate  Crowded airway   Eyes: Conjunctivae and EOM are normal  Right eye exhibits no discharge  Left eye exhibits no discharge  No scleral icterus  Neck: Normal range of motion  Neck supple  No tracheal deviation present  Short and wide neck   Cardiovascular: Normal rate, regular rhythm and normal heart sounds  Exam reveals no gallop and no friction rub  No murmur heard  Pulmonary/Chest: Effort normal and breath sounds normal  No stridor  No respiratory distress  He has no wheezes  Abdominal: He exhibits no distension  There is no guarding  Musculoskeletal: Normal range of motion   He exhibits no edema, tenderness or deformity  Neurological: He is alert and oriented to person, place, and time  No cranial nerve deficit  He exhibits normal muscle tone  Skin: Skin is warm and dry  No rash noted  He is not diaphoretic  No erythema  No pallor  Psychiatric: He has a normal mood and affect  His behavior is normal  Judgment and thought content normal    Nursing note and vitals reviewed  Lab Review:   No visits with results within 2 Month(s) from this visit     Latest known visit with results is:   Appointment on 10/24/2019   Component Date Value    PTH 10/24/2019 45 5     Phosphorus 10/24/2019 3 1

## 2020-01-23 ENCOUNTER — APPOINTMENT (OUTPATIENT)
Dept: LAB | Facility: MEDICAL CENTER | Age: 55
End: 2020-01-23
Payer: COMMERCIAL

## 2020-01-23 DIAGNOSIS — N18.30 CKD (CHRONIC KIDNEY DISEASE) STAGE 3, GFR 30-59 ML/MIN (HCC): ICD-10-CM

## 2020-01-23 DIAGNOSIS — I71.2 ASCENDING AORTIC ANEURYSM (HCC): ICD-10-CM

## 2020-01-23 DIAGNOSIS — Z23 NEED FOR INFLUENZA VACCINATION: ICD-10-CM

## 2020-01-23 DIAGNOSIS — M16.11 PRIMARY OSTEOARTHRITIS OF RIGHT HIP: ICD-10-CM

## 2020-01-23 DIAGNOSIS — E78.2 HYPERLIPIDEMIA, MIXED: ICD-10-CM

## 2020-01-23 DIAGNOSIS — J45.20 MILD INTERMITTENT ASTHMA WITHOUT COMPLICATION: ICD-10-CM

## 2020-01-23 DIAGNOSIS — K76.0 FATTY LIVER DISEASE, NONALCOHOLIC: ICD-10-CM

## 2020-01-23 DIAGNOSIS — R73.03 PREDIABETES: ICD-10-CM

## 2020-01-23 DIAGNOSIS — J44.9 CHRONIC OBSTRUCTIVE PULMONARY DISEASE, UNSPECIFIED COPD TYPE (HCC): ICD-10-CM

## 2020-01-23 DIAGNOSIS — K21.9 GASTROESOPHAGEAL REFLUX DISEASE WITHOUT ESOPHAGITIS: ICD-10-CM

## 2020-01-23 DIAGNOSIS — J30.1 NON-SEASONAL ALLERGIC RHINITIS DUE TO POLLEN: ICD-10-CM

## 2020-01-23 DIAGNOSIS — F41.8 DEPRESSION WITH ANXIETY: ICD-10-CM

## 2020-01-23 DIAGNOSIS — R53.82 CHRONIC FATIGUE: ICD-10-CM

## 2020-01-23 DIAGNOSIS — H69.92 EUSTACHIAN TUBE DISORDER, LEFT: ICD-10-CM

## 2020-01-23 DIAGNOSIS — E55.9 VITAMIN D DEFICIENCY: ICD-10-CM

## 2020-01-23 LAB
25(OH)D3 SERPL-MCNC: 58.5 NG/ML (ref 30–100)
ALBUMIN SERPL BCP-MCNC: 3.7 G/DL (ref 3.5–5)
ALP SERPL-CCNC: 54 U/L (ref 46–116)
ALT SERPL W P-5'-P-CCNC: 41 U/L (ref 12–78)
ANION GAP SERPL CALCULATED.3IONS-SCNC: 4 MMOL/L (ref 4–13)
AST SERPL W P-5'-P-CCNC: 27 U/L (ref 5–45)
BASOPHILS # BLD AUTO: 0.02 THOUSANDS/ΜL (ref 0–0.1)
BASOPHILS NFR BLD AUTO: 0 % (ref 0–1)
BILIRUB SERPL-MCNC: 0.59 MG/DL (ref 0.2–1)
BUN SERPL-MCNC: 18 MG/DL (ref 5–25)
CALCIUM SERPL-MCNC: 9.1 MG/DL (ref 8.3–10.1)
CHLORIDE SERPL-SCNC: 110 MMOL/L (ref 100–108)
CHOLEST SERPL-MCNC: 90 MG/DL (ref 50–200)
CO2 SERPL-SCNC: 28 MMOL/L (ref 21–32)
CREAT SERPL-MCNC: 1.42 MG/DL (ref 0.6–1.3)
EOSINOPHIL # BLD AUTO: 0.14 THOUSAND/ΜL (ref 0–0.61)
EOSINOPHIL NFR BLD AUTO: 3 % (ref 0–6)
ERYTHROCYTE [DISTWIDTH] IN BLOOD BY AUTOMATED COUNT: 12.3 % (ref 11.6–15.1)
EST. AVERAGE GLUCOSE BLD GHB EST-MCNC: 128 MG/DL
GFR SERPL CREATININE-BSD FRML MDRD: 55 ML/MIN/1.73SQ M
GLUCOSE SERPL-MCNC: 115 MG/DL (ref 65–140)
HBA1C MFR BLD: 6.1 % (ref 4.2–6.3)
HCT VFR BLD AUTO: 48.1 % (ref 36.5–49.3)
HDLC SERPL-MCNC: 27 MG/DL
HGB BLD-MCNC: 15.7 G/DL (ref 12–17)
IMM GRANULOCYTES # BLD AUTO: 0.02 THOUSAND/UL (ref 0–0.2)
IMM GRANULOCYTES NFR BLD AUTO: 0 % (ref 0–2)
LDLC SERPL CALC-MCNC: 24 MG/DL (ref 0–100)
LYMPHOCYTES # BLD AUTO: 1.87 THOUSANDS/ΜL (ref 0.6–4.47)
LYMPHOCYTES NFR BLD AUTO: 34 % (ref 14–44)
MCH RBC QN AUTO: 31.1 PG (ref 26.8–34.3)
MCHC RBC AUTO-ENTMCNC: 32.6 G/DL (ref 31.4–37.4)
MCV RBC AUTO: 95 FL (ref 82–98)
MONOCYTES # BLD AUTO: 0.56 THOUSAND/ΜL (ref 0.17–1.22)
MONOCYTES NFR BLD AUTO: 10 % (ref 4–12)
NEUTROPHILS # BLD AUTO: 2.82 THOUSANDS/ΜL (ref 1.85–7.62)
NEUTS SEG NFR BLD AUTO: 53 % (ref 43–75)
NONHDLC SERPL-MCNC: 63 MG/DL
NRBC BLD AUTO-RTO: 0 /100 WBCS
PLATELET # BLD AUTO: 203 THOUSANDS/UL (ref 149–390)
PMV BLD AUTO: 11.4 FL (ref 8.9–12.7)
POTASSIUM SERPL-SCNC: 4 MMOL/L (ref 3.5–5.3)
PROT SERPL-MCNC: 7 G/DL (ref 6.4–8.2)
RBC # BLD AUTO: 5.05 MILLION/UL (ref 3.88–5.62)
SODIUM SERPL-SCNC: 142 MMOL/L (ref 136–145)
TRIGL SERPL-MCNC: 195 MG/DL
VIT B12 SERPL-MCNC: 400 PG/ML (ref 100–900)
WBC # BLD AUTO: 5.43 THOUSAND/UL (ref 4.31–10.16)

## 2020-01-23 PROCEDURE — 3066F NEPHROPATHY DOC TX: CPT | Performed by: INTERNAL MEDICINE

## 2020-01-23 PROCEDURE — 80053 COMPREHEN METABOLIC PANEL: CPT

## 2020-01-23 PROCEDURE — 82607 VITAMIN B-12: CPT

## 2020-01-23 PROCEDURE — 83036 HEMOGLOBIN GLYCOSYLATED A1C: CPT

## 2020-01-23 PROCEDURE — 85025 COMPLETE CBC W/AUTO DIFF WBC: CPT

## 2020-01-23 PROCEDURE — 80061 LIPID PANEL: CPT

## 2020-01-23 PROCEDURE — 36415 COLL VENOUS BLD VENIPUNCTURE: CPT

## 2020-01-23 PROCEDURE — 82306 VITAMIN D 25 HYDROXY: CPT

## 2020-01-26 NOTE — PATIENT INSTRUCTIONS
Follow up appmnt  GERD-stable on daily medication  COPD-exertional shortness of breath  Asthma-stable symptoms  AAA-followed by Cardiology  HLD-labs continue to improve over the past year  Prediabetes-A1C is 6 1  CKD- creat at baseline  Followed by Nephrology  Depression-labile  Pain right hand- obtain xray of the hand  Osteoarthritis of the hip- can take ES Tylenol  Avoid NSAIDS due to kidney disease  Follow up in 4 month    Results for Stoney Lucas (MRN [id number]) as of 1/26/2020 16:21   Ref   Range 1/23/2020 08:38   Sodium Latest Ref Range: 136 - 145 mmol/L 142   Potassium Latest Ref Range: 3 5 - 5 3 mmol/L 4 0   Chloride Latest Ref Range: 100 - 108 mmol/L 110 (H)   CO2 Latest Ref Range: 21 - 32 mmol/L 28   Anion Gap Latest Ref Range: 4 - 13 mmol/L 4   BUN Latest Ref Range: 5 - 25 mg/dL 18   Creatinine Latest Ref Range: 0 60 - 1 30 mg/dL 1 42 (H)   Glucose, Random Latest Ref Range: 65 - 140 mg/dL 115   Calcium Latest Ref Range: 8 3 - 10 1 mg/dL 9 1   AST Latest Ref Range: 5 - 45 U/L 27   ALT Latest Ref Range: 12 - 78 U/L 41   Alkaline Phosphatase Latest Ref Range: 46 - 116 U/L 54   Total Protein Latest Ref Range: 6 4 - 8 2 g/dL 7 0   Albumin Latest Ref Range: 3 5 - 5 0 g/dL 3 7   TOTAL BILIRUBIN Latest Ref Range: 0 20 - 1 00 mg/dL 0 59   eGFR Latest Units: ml/min/1 73sq m 55   Cholesterol Latest Ref Range: 50 - 200 mg/dL 90   Triglycerides Latest Ref Range: <=150 mg/dL 195 (H)   HDL Latest Ref Range: >=40 mg/dL 27 (L)   Non-HDL Cholesterol Latest Units: mg/dl 63   LDL Direct Latest Ref Range: 0 - 100 mg/dL 24   Vit D, 25-Hydroxy Latest Ref Range: 30 0 - 100 0 ng/mL 58 5   Vitamin B-12 Latest Ref Range: 100 - 900 pg/mL 400   WBC Latest Ref Range: 4 31 - 10 16 Thousand/uL 5 43   Red Blood Cell Count Latest Ref Range: 3 88 - 5 62 Million/uL 5 05   Hemoglobin Latest Ref Range: 12 0 - 17 0 g/dL 15 7   HCT Latest Ref Range: 36 5 - 49 3 % 48 1   MCV Latest Ref Range: 82 - 98 fL 95   MCH Latest Ref Range: 26 8 - 34 3 pg 31 1   MCHC Latest Ref Range: 31 4 - 37 4 g/dL 32 6   RDW Latest Ref Range: 11 6 - 15 1 % 12 3   Platelet Count Latest Ref Range: 149 - 390 Thousands/uL 203   MPV Latest Ref Range: 8 9 - 12 7 fL 11 4   nRBC Latest Units: /100 WBCs 0   Neutrophils % Latest Ref Range: 43 - 75 % 53   Immat GRANS % Latest Ref Range: 0 - 2 % 0   Lymphocytes Relative Latest Ref Range: 14 - 44 % 34   Monocytes Relative Latest Ref Range: 4 - 12 % 10   Eosinophils Latest Ref Range: 0 - 6 % 3   Basophils Relative Latest Ref Range: 0 - 1 % 0   Immature Grans Absolute Latest Ref Range: 0 00 - 0 20 Thousand/uL 0 02   Absolute Neutrophils Latest Ref Range: 1 85 - 7 62 Thousands/µL 2 82   Lymphocytes Absolute Latest Ref Range: 0 60 - 4 47 Thousands/µL 1 87   Absolute Monocytes Latest Ref Range: 0 17 - 1 22 Thousand/µL 0 56   Absolute Eosinophils Latest Ref Range: 0 00 - 0 61 Thousand/µL 0 14   Basophils Absolute Latest Ref Range: 0 00 - 0 10 Thousands/µL 0 02   Hemoglobin A1C Latest Ref Range: 4 2 - 6 3 % 6 1   EAG Latest Units: mg/dl 128

## 2020-01-26 NOTE — PROGRESS NOTES
Assessment/Plan:       Diagnoses and all orders for this visit:    Gastroesophageal reflux disease without esophagitis  -     Comprehensive metabolic panel; Future  -     Lipid panel; Future  -     Hemoglobin A1C; Future    Fatty liver disease, nonalcoholic  -     Comprehensive metabolic panel; Future  -     Lipid panel; Future  -     Hemoglobin A1C; Future    Chronic obstructive pulmonary disease, unspecified COPD type ([facility] )  -     Comprehensive metabolic panel; Future  -     Lipid panel; Future  -     Hemoglobin A1C; Future    Mild intermittent asthma without complication  -     Comprehensive metabolic panel; Future  -     Lipid panel; Future  -     Hemoglobin A1C; Future    Ascending aortic aneurysm (HCC)  -     Comprehensive metabolic panel; Future  -     Lipid panel; Future  -     Hemoglobin A1C; Future    CKD (chronic kidney disease) stage 3, GFR 30-59 ml/min (HCC)  -     Comprehensive metabolic panel; Future  -     Lipid panel; Future  -     Hemoglobin A1C; Future    Prediabetes  -     Comprehensive metabolic panel; Future  -     Lipid panel; Future  -     Hemoglobin A1C; Future    Hyperlipidemia, mixed  -     Comprehensive metabolic panel; Future  -     Lipid panel; Future  -     Hemoglobin A1C; Future    Vitamin D deficiency  -     Comprehensive metabolic panel; Future  -     Lipid panel; Future  -     Hemoglobin A1C; Future    Elevated triglycerides with high cholesterol  -     Comprehensive metabolic panel; Future  -     Lipid panel; Future  -     Hemoglobin A1C; Future    Eustachian tube disorder, left  -     fluticasone (FLONASE) 50 mcg/act nasal spray; 1 spray into each nostril daily  -     Comprehensive metabolic panel; Future  -     Lipid panel; Future  -     Hemoglobin A1C; Future    Right hand pain  -     XR hand 3+ vw right; Future  -     Comprehensive metabolic panel; Future  -     Lipid panel; Future  -     Hemoglobin A1C; Future  -     RF Screen w/ Reflex to Titer;  Future    Screening for prostate cancer  -     Comprehensive metabolic panel; Future  -     Lipid panel; Future  -     Hemoglobin A1C; Future  -     PSA, Total Screen; Future        No problem-specific Assessment & Plan notes found for this encounter  Subjective:      Patient ID: Shy Robbins is a 53 y o  male  Patient is here for follow up of chronic medical conditions  GERD-stable on daily medication  COPD-exertional shortness of breath  Asthma-stable symptoms  AAA-followed by Cardiology  HLD-labs continue to improve over the past year  Prediabetes-A1C is 6 1  CKD- creat at baseline  Followed by Nephrology  Depression-labile  He has some pain in his right hand   He thinks he may have arthritis  He does have stiffness in the morning       Results for Danita Gonzalez (MRN [id number]) as of 2020 16:21    2020 08:38  Sodium: 142  Potassium: 4 0  Chloride: 110 (H)  CO2: 28  Anion Gap: 4  BUN: 18  Creatinine: 1 42 (H)  Glucose, Random: 115  Calcium: 9 1  AST: 27  ALT: 41  Alkaline Phosphatase: 54  Total Protein: 7 0  Albumin: 3 7  TOTAL BILIRUBIN: 0 59  eGFR: 55  Cholesterol: 90  Triglycerides: 195 (H)  HDL: 27 (L)  Non-HDL Cholesterol: 63  LDL Direct: 24  Vit D, 25-Hydroxy: 58 5  Vitamin B-12: 400  WBC: 5 43  Red Blood Cell Count: 5 05  Hemoglobin: 15 7  HCT: 48 1  MCV: 95  MCH: 31 1  MCHC: 32 6  RDW: 12 3  Platelet Count: 639  MPV: 11 4  nRBC: 0  Neutrophils %: 53  Immat GRANS %: 0  Lymphocytes Relative: 34  Monocytes Relative: 10  Eosinophils: 3  Basophils Relative: 0  Immature Grans Absolute: 0 02  Absolute Neutrophils: 2 82  Lymphocytes Absolute: 1 87  Absolute Monocytes: 0 56  Absolute Eosinophils: 0 14  Basophils Absolute: 0 02  Hemoglobin A1C: 6 1  EA        The following portions of the patient's history were reviewed and updated as appropriate:   He has a past medical history of Aorta aneurysm (HCC), Arm DVT (deep venous thromboembolism), acute ([address] ) (), Asthma, CKD (chronic kidney disease), stage III ([facility] [address] ), COPD (chronic obstructive pulmonary disease) ([facility] [place] [address] ), Depression, Essential hypertriglyceridemia, GERD (gastroesophageal reflux disease), Headache, Hiatal hernia, Hyperlipidemia, mixed (5/7/2018), Liver disease, PAC (premature atrial contraction), Prediabetes, Renal calculi, Sleep apnea, and Vestibular migraine  ,  does not have any pertinent problems on file  ,   has a past surgical history that includes Carpal tunnel release (Left); Hernia repair; pr colonoscopy flx dx w/collj spec when pfrmd (N/A, 8/7/2017); Colonoscopy; Foot surgery (Left); pr esophagogastroduodenoscopy transoral diagnostic (N/A, 10/31/2017); and FL injection right hip (arthrogram) (8/20/2018)  ,  family history includes Aortic aneurysm in his other; Arthritis in his mother; Cancer in his brother; Clotting disorder in his father; Heart attack in his father; Leukemia in his brother; Prostate cancer in his brother; Schizoaffective Disorder  in his sister  ,   reports that he quit smoking about 5 years ago  His smoking use included cigars and cigarettes  He has never used smokeless tobacco  He reports that he drinks alcohol  He reports that he does not use drugs  ,  has No Known Allergies     Current Outpatient Medications   Medication Sig Dispense Refill    acetaminophen (TYLENOL) 500 mg tablet Take 3 tablets by mouth daily as needed       albuterol (2 5 mg/3 mL) 0 083 % nebulizer solution Take 1 vial (2 5 mg total) by nebulization 4 (four) times a day 120 vial 3    albuterol (PROVENTIL HFA,VENTOLIN HFA) 90 mcg/act inhaler Inhale 2 puffs every 6 (six) hours as needed for wheezing        ergocalciferol (VITAMIN D2) 50,000 units Take 1 capsule (50,000 Units total) by mouth once a week 4 capsule 3    fenofibrate (TRICOR) 145 mg tablet Take 1 tablet (145 mg total) by mouth daily 90 tablet 3    fluticasone (FLONASE) 50 mcg/act nasal spray 1 spray into each nostril daily 11 Bottle 3    mometasone-formoterol (DULERA) 200-5 MCG/ACT inhaler Inhale 2 puffs 2 (two) times a day   montelukast (SINGULAIR) 10 mg tablet take 1 tablet by mouth at bedtime 30 tablet 3    Omega-3 Fatty Acids (FISH OIL) 1000 MG CPDR Take by mouth daily      pantoprazole (PROTONIX) 40 mg tablet Take 1 tablet (40 mg total) by mouth daily 90 tablet 3    rosuvastatin (CRESTOR) 20 MG tablet take 1 tablet by mouth once daily 30 tablet 5    traMADol (ULTRAM) 50 mg tablet Take 1 PO BID PRN for pain  Ongoing therapy  Do not fill until 01/6/20  60 tablet 2     No current facility-administered medications for this visit  Review of Systems   Constitutional: Negative for fatigue and fever  HENT: Negative for congestion  Eyes: Negative for visual disturbance  Respiratory: Positive for shortness of breath  Negative for cough and wheezing  Cardiovascular: Negative for chest pain, palpitations and leg swelling  Gastrointestinal: Negative for abdominal distention and abdominal pain  Endocrine: Negative for cold intolerance, polydipsia and polyuria  Genitourinary: Negative for difficulty urinating and frequency  Musculoskeletal: Positive for back pain  Negative for joint swelling  Right hand pain  Pain in right hip   Skin: Negative for color change and rash  Allergic/Immunologic: Negative for immunocompromised state  Neurological: Negative for dizziness and headaches  Hematological: Negative for adenopathy  Psychiatric/Behavioral: Negative for behavioral problems and sleep disturbance  All other systems reviewed and are negative  Objective:  Vitals:    01/28/20 0946   BP: 122/76   BP Location: Left arm   Patient Position: Sitting   Pulse: 70   Resp: 18   Temp: 99 3 °F (37 4 °C)   SpO2: 96%   Weight: 103 kg (228 lb)   Height: 6' 1" (1 854 m)     Body mass index is 30 08 kg/m²  Physical Exam   Constitutional: He is oriented to person, place, and time  He appears well-developed and well-nourished  No distress     HENT:   Head: Normocephalic and atraumatic  Nose: Nose normal    Mouth/Throat: Oropharynx is clear and moist  No oropharyngeal exudate  Eyes: Pupils are equal, round, and reactive to light  Conjunctivae and EOM are normal  No scleral icterus  Neck: Normal range of motion  Neck supple  No thyromegaly present  Cardiovascular: Normal rate, regular rhythm and normal heart sounds  Exam reveals no gallop and no friction rub  No murmur heard  Pulmonary/Chest: Effort normal and breath sounds normal  No respiratory distress  He exhibits no tenderness  Abdominal: Soft  Bowel sounds are normal  He exhibits no distension  There is no tenderness  Musculoskeletal: Normal range of motion  He exhibits no edema or tenderness  Lymphadenopathy:     He has no cervical adenopathy  Neurological: He is alert and oriented to person, place, and time  No sensory deficit  Skin: Skin is warm and dry  Capillary refill takes less than 2 seconds  He is not diaphoretic  No erythema  Psychiatric: He has a normal mood and affect  His behavior is normal  Judgment and thought content normal    Nursing note and vitals reviewed

## 2020-01-28 ENCOUNTER — OFFICE VISIT (OUTPATIENT)
Dept: FAMILY MEDICINE CLINIC | Facility: CLINIC | Age: 55
End: 2020-01-28
Payer: COMMERCIAL

## 2020-01-28 VITALS
SYSTOLIC BLOOD PRESSURE: 122 MMHG | OXYGEN SATURATION: 96 % | DIASTOLIC BLOOD PRESSURE: 76 MMHG | WEIGHT: 228 LBS | HEART RATE: 70 BPM | BODY MASS INDEX: 30.22 KG/M2 | HEIGHT: 73 IN | TEMPERATURE: 99.3 F | RESPIRATION RATE: 18 BRPM

## 2020-01-28 DIAGNOSIS — E55.9 VITAMIN D DEFICIENCY: ICD-10-CM

## 2020-01-28 DIAGNOSIS — H69.92 EUSTACHIAN TUBE DISORDER, LEFT: ICD-10-CM

## 2020-01-28 DIAGNOSIS — J44.9 CHRONIC OBSTRUCTIVE PULMONARY DISEASE, UNSPECIFIED COPD TYPE (HCC): ICD-10-CM

## 2020-01-28 DIAGNOSIS — I71.2 ASCENDING AORTIC ANEURYSM (HCC): ICD-10-CM

## 2020-01-28 DIAGNOSIS — M79.641 RIGHT HAND PAIN: ICD-10-CM

## 2020-01-28 DIAGNOSIS — K76.0 FATTY LIVER DISEASE, NONALCOHOLIC: ICD-10-CM

## 2020-01-28 DIAGNOSIS — Z12.5 SCREENING FOR PROSTATE CANCER: ICD-10-CM

## 2020-01-28 DIAGNOSIS — J45.20 MILD INTERMITTENT ASTHMA WITHOUT COMPLICATION: ICD-10-CM

## 2020-01-28 DIAGNOSIS — R73.03 PREDIABETES: ICD-10-CM

## 2020-01-28 DIAGNOSIS — N18.30 CKD (CHRONIC KIDNEY DISEASE) STAGE 3, GFR 30-59 ML/MIN (HCC): ICD-10-CM

## 2020-01-28 DIAGNOSIS — E78.2 HYPERLIPIDEMIA, MIXED: ICD-10-CM

## 2020-01-28 DIAGNOSIS — E78.2 ELEVATED TRIGLYCERIDES WITH HIGH CHOLESTEROL: ICD-10-CM

## 2020-01-28 DIAGNOSIS — K21.9 GASTROESOPHAGEAL REFLUX DISEASE WITHOUT ESOPHAGITIS: Primary | ICD-10-CM

## 2020-01-28 PROCEDURE — 1036F TOBACCO NON-USER: CPT | Performed by: NURSE PRACTITIONER

## 2020-01-28 PROCEDURE — 3008F BODY MASS INDEX DOCD: CPT | Performed by: NURSE PRACTITIONER

## 2020-01-28 PROCEDURE — 99214 OFFICE O/P EST MOD 30 MIN: CPT | Performed by: NURSE PRACTITIONER

## 2020-01-28 RX ORDER — FLUTICASONE PROPIONATE 50 MCG
1 SPRAY, SUSPENSION (ML) NASAL DAILY
Qty: 11 BOTTLE | Refills: 3 | Status: SHIPPED | OUTPATIENT
Start: 2020-01-28 | End: 2020-12-18

## 2020-01-29 ENCOUNTER — APPOINTMENT (OUTPATIENT)
Dept: RADIOLOGY | Facility: MEDICAL CENTER | Age: 55
End: 2020-01-29
Payer: COMMERCIAL

## 2020-01-29 DIAGNOSIS — M79.641 RIGHT HAND PAIN: ICD-10-CM

## 2020-01-29 PROCEDURE — 73130 X-RAY EXAM OF HAND: CPT

## 2020-02-12 NOTE — TELEPHONE ENCOUNTER
Pt appt cancelled due to weather  Stated PCP wanted a f/u d/t fatigue, diaphoresis with exertion, and recommended a stress, but did not order one  Next avail appt isn't until April  Pt asking if you would order stress, and pt can f/u afterwards     Please advise Bilobed Transposition Flap Text: The defect edges were debeveled with a #15 scalpel blade.  Given the location of the defect and the proximity to free margins a bilobed transposition flap was deemed most appropriate.  Using a sterile surgical marker, an appropriate bilobe flap drawn around the defect.    The area thus outlined was incised deep to adipose tissue with a #15 scalpel blade.  The skin margins were undermined to an appropriate distance in all directions utilizing iris scissors.

## 2020-03-09 DIAGNOSIS — E78.2 MIXED HYPERLIPIDEMIA: ICD-10-CM

## 2020-03-09 RX ORDER — ROSUVASTATIN CALCIUM 20 MG/1
20 TABLET, COATED ORAL DAILY
Qty: 30 TABLET | Refills: 5 | Status: SHIPPED | OUTPATIENT
Start: 2020-03-09 | End: 2020-03-09

## 2020-03-09 RX ORDER — ROSUVASTATIN CALCIUM 20 MG/1
TABLET, COATED ORAL
Qty: 30 TABLET | Refills: 5 | Status: SHIPPED | OUTPATIENT
Start: 2020-03-09 | End: 2021-03-24

## 2020-03-13 ENCOUNTER — OFFICE VISIT (OUTPATIENT)
Dept: PAIN MEDICINE | Facility: CLINIC | Age: 55
End: 2020-03-13
Payer: COMMERCIAL

## 2020-03-13 VITALS
WEIGHT: 225.6 LBS | HEIGHT: 73 IN | BODY MASS INDEX: 29.9 KG/M2 | HEART RATE: 62 BPM | RESPIRATION RATE: 18 BRPM | DIASTOLIC BLOOD PRESSURE: 70 MMHG | SYSTOLIC BLOOD PRESSURE: 111 MMHG

## 2020-03-13 DIAGNOSIS — G89.4 CHRONIC PAIN SYNDROME: Primary | ICD-10-CM

## 2020-03-13 DIAGNOSIS — F11.20 UNCOMPLICATED OPIOID DEPENDENCE (HCC): ICD-10-CM

## 2020-03-13 DIAGNOSIS — Z79.891 LONG-TERM CURRENT USE OF OPIATE ANALGESIC: ICD-10-CM

## 2020-03-13 DIAGNOSIS — M16.11 PRIMARY OSTEOARTHRITIS OF RIGHT HIP: ICD-10-CM

## 2020-03-13 PROCEDURE — 3008F BODY MASS INDEX DOCD: CPT | Performed by: ANESTHESIOLOGY

## 2020-03-13 PROCEDURE — 1036F TOBACCO NON-USER: CPT | Performed by: ANESTHESIOLOGY

## 2020-03-13 PROCEDURE — 3066F NEPHROPATHY DOC TX: CPT | Performed by: ANESTHESIOLOGY

## 2020-03-13 PROCEDURE — 80305 DRUG TEST PRSMV DIR OPT OBS: CPT | Performed by: ANESTHESIOLOGY

## 2020-03-13 PROCEDURE — 99214 OFFICE O/P EST MOD 30 MIN: CPT | Performed by: ANESTHESIOLOGY

## 2020-03-13 PROCEDURE — 3044F HG A1C LEVEL LT 7.0%: CPT | Performed by: ANESTHESIOLOGY

## 2020-03-13 RX ORDER — TRAMADOL HYDROCHLORIDE 50 MG/1
TABLET ORAL
Qty: 60 TABLET | Refills: 2 | Status: SHIPPED | OUTPATIENT
Start: 2020-03-13 | End: 2020-07-16 | Stop reason: SDUPTHER

## 2020-03-13 NOTE — PROGRESS NOTES
Assessment:  1  Chronic pain syndrome     2  Long-term current use of opiate analgesic     3  Uncomplicated opioid dependence ([address] )     4  Primary osteoarthritis of right hip  traMADol (ULTRAM) 50 mg tablet       Plan: This is a 29-year-old male who presents today for follow-up office visit regarding chronic pain syndrome, chronic low back pain and right leg pain  Of note, patient is currently on opiate therapy with tramadol 50 mg p o  B i d  He is here today for routine medication refill  Patient denies aberrant behavior  Patient reports adequate pain relief  Patient continues to perform ADLs without difficulty  Patient denies adverse side effects  There are risks associated with opioid medications, including dependence, addiction and tolerance  The patient understands and agrees to use these medications only as prescribed  Potential side effects of the medications include, but are not limited to, constipation, drowsiness, addiction, impaired judgment and risk of fatal overdose if not taken as prescribed  The patient was warned against driving while taking sedation medications  Sharing medications is a felony  At this point in time, the patient is showing no signs of addiction, abuse, diversion or suicidal ideation  A urine drug screen was collected at today's office visit as part of our medication management protocol  The point of care testing results were appropriate for what was being prescribed  The specimen will be sent for confirmatory testing  The drug screen is medically necessary because the patient is either dependent on opioid medication or is being considered for opioid medication therapy and the results could impact ongoing or future treatment  The drug screen is to evaluate for the presences or absence of prescribed, non-prescribed, and/or illicit drugs/substances      [place] Prescription Drug Monitoring Program report was reviewed and was appropriate     My impressions and treatment recommendations were discussed in detail with the patient who verbalized understanding and had no further questions  Discharge instructions were provided  I personally saw and examined the patient and I agree with the above discussed plan of care  History of Present Illness:  Stacey Altamirano is a 53 y o  male who presents for a follow up office visit in regards to Hip Pain (right) and Knee Pain (right)The patients current symptoms include constant sharp throbbing pain  Pain is rated 6/10  He is on tramadol 50mg po BID prn  Patient denies aberrant behavior  Patient reports adequate pain relief  Patient continues to perform ADLs without difficulty  Patient denies adverse side effects  I have personally reviewed and/or updated the patient's past medical history, past surgical history, family history, social history, current medications, allergies, and vital signs today  Review of Systems   Musculoskeletal: Positive for gait problem  Decreased range of motion, joint stiffness and pain in extremity         Patient Active Problem List   Diagnosis    Dizziness    COPD (chronic obstructive pulmonary disease) ([facility] )    Ascending aortic aneurysm (HCC)    Bicuspid aortic valve    Adjustment reaction with anxiety and depression    Allergic rhinitis    GERD (gastroesophageal reflux disease)    Liver disease    Hiatal hernia    Encounter to establish care    Prediabetes    Diabetic eye exam ([facility] [address] )    CKD (chronic kidney disease) stage 3, GFR 30-59 ml/min (HCC)    Hyperlipidemia, mixed    Weight gain    Fatigue    Generalized abdominal pain    Mild intermittent asthma without complication    Seasonal allergic rhinitis due to pollen    Numbness of fingers of both hands    Depression with anxiety    Abdominal bloating    Vitamin D deficiency    Renal cyst, left    Hepatic cyst    Fatty liver disease, nonalcoholic    Erectile dysfunction    Carpal tunnel syndrome of right wrist  Chronic pain of right knee    Vestibular migraine    Patellar tendinitis of right knee    Hamstring tightness of right lower extremity    Benign paroxysmal positional vertigo due to bilateral vestibular disorder    Pain in right hip    Hip impingement syndrome, right    Primary osteoarthritis of right hip    Ulnar neuropathy of both upper extremities    Lumbosacral strain    Tarsal tunnel syndrome of right side    Cubital tunnel syndrome, bilateral    Need for influenza vaccination    Need for pneumococcal vaccination    Need for hepatitis C screening test    Groin pain, chronic, right    Elevated triglycerides with high cholesterol    Diaphoresis    Moderate persistent asthma with exacerbation    Cough    Eustachian tube disorder, left    Chronic pain syndrome    Chronic kidney disease-mineral and bone disorder    Right hand pain       Past Medical History:   Diagnosis Date    Aorta aneurysm (HCC)     4 3    Arm DVT (deep venous thromboembolism), acute ([facility] [address] ) 4879    complications of cardiac cath    Asthma     CKD (chronic kidney disease), stage III (HCC)     COPD (chronic obstructive pulmonary disease) (HCC)     Depression     Essential hypertriglyceridemia     GERD (gastroesophageal reflux disease)     Headache     Hiatal hernia     Hyperlipidemia, mixed 5/7/2018    Liver disease     FATTY LIVER    PAC (premature atrial contraction)     Prediabetes     Renal calculi     Sleep apnea     Vestibular migraine        Past Surgical History:   Procedure Laterality Date    CARPAL TUNNEL RELEASE Left     COLONOSCOPY      FL INJECTION RIGHT HIP (ARTHROGRAM)  8/20/2018    FOOT SURGERY Left     FOREIGN BODY REMOVAL    HERNIA REPAIR      LA COLONOSCOPY FLX DX W/COLLJ SPEC WHEN PFRMD N/A 8/7/2017    Procedure: COLONOSCOPY;  Surgeon: Prudence Leslie MD;  Location: [facility];   Service: Gastroenterology    LA ESOPHAGOGASTRODUODENOSCOPY TRANSORAL DIAGNOSTIC N/A 10/31/2017 Procedure: ESOPHAGOGASTRODUODENOSCOPY (EGD); Surgeon: Jamie Kebede MD;  Location: [facility]; Service: Gastroenterology       Family History   Problem Relation Age of Onset    Cancer Brother     Prostate cancer Brother     Leukemia Brother         his only brother dies from [address] or leukemia pt unsure he was only 46    Arthritis Mother     Heart attack Father     Clotting disorder Father     Schizoaffective Disorder  Sister     Aortic aneurysm Other         abdominal       Social History     Occupational History    Occupation: unemployed   Tobacco Use    Smoking status: Former Smoker     Types: Cigars, Cigarettes     Last attempt to quit: 2014     Years since quittin 6    Smokeless tobacco: Never Used   Substance and Sexual Activity    Alcohol use: Yes     Comment: occasional    Drug use: No    Sexual activity: Not Currently       Current Outpatient Medications on File Prior to Visit   Medication Sig    acetaminophen (TYLENOL) 500 mg tablet Take 3 tablets by mouth daily as needed     albuterol (2 5 mg/3 mL) 0 083 % nebulizer solution Take 1 vial (2 5 mg total) by nebulization 4 (four) times a day    albuterol (PROVENTIL HFA,VENTOLIN HFA) 90 mcg/act inhaler Inhale 2 puffs every 6 (six) hours as needed for wheezing   ergocalciferol (VITAMIN D2) 50,000 units Take 1 capsule (50,000 Units total) by mouth once a week    fenofibrate (TRICOR) 145 mg tablet Take 1 tablet (145 mg total) by mouth daily    fluticasone (FLONASE) 50 mcg/act nasal spray 1 spray into each nostril daily    mometasone-formoterol (DULERA) 200-5 MCG/ACT inhaler Inhale 2 puffs 2 (two) times a day      montelukast (SINGULAIR) 10 mg tablet take 1 tablet by mouth at bedtime    Omega-3 Fatty Acids (FISH OIL) 1000 MG CPDR Take by mouth daily    pantoprazole (PROTONIX) 40 mg tablet Take 1 tablet (40 mg total) by mouth daily    rosuvastatin (CRESTOR) 20 MG tablet take 1 tablet by mouth once daily    traMADol (ULTRAM) 50 mg tablet Take 1 PO BID PRN for pain  Ongoing therapy  Do not fill until 01/6/20  No current facility-administered medications on file prior to visit  No Known Allergies    Physical Exam:    Resp 18   Ht 6' 1" (1 854 m)   Wt 102 kg (225 lb 9 6 oz)   BMI 29 76 kg/m²     Constitutional:normal, well developed, well nourished, alert, in no distress and non-toxic and no overt pain behavior    Eyes:anicteric  HEENT:grossly intact  Neck:supple, symmetric, trachea midline and no masses   Pulmonary:even and unlabored  Cardiovascular:No edema or pitting edema present  Skin:Normal without rashes or lesions and well hydrated  Psychiatric:Mood and affect appropriate  Neurologic:Cranial Nerves II-XII grossly intact  Musculoskeletal:normal    Imaging

## 2020-03-17 LAB
6MAM UR QL CFM: NEGATIVE NG/ML
7AMINOCLONAZEPAM UR QL CFM: NEGATIVE NG/ML
A-OH ALPRAZ UR QL CFM: NEGATIVE NG/ML
ACCEPTABLE CREAT UR QL: NORMAL MG/DL
ACCEPTIBLE SP GR UR QL: NORMAL
AMPHET UR QL CFM: NEGATIVE NG/ML
AMPHET UR QL CFM: NEGATIVE NG/ML
BUPRENORPHINE UR QL CFM: NEGATIVE NG/ML
BUTALBITAL UR QL CFM: NEGATIVE NG/ML
BZE UR QL CFM: NEGATIVE NG/ML
CODEINE UR QL CFM: NEGATIVE NG/ML
DESIPRAMINE UR QL CFM: NEGATIVE NG/ML
DESIPRAMINE UR QL CFM: NEGATIVE NG/ML
EDDP UR QL CFM: NEGATIVE NG/ML
ETHYL GLUCURONIDE UR QL CFM: NEGATIVE NG/ML
ETHYL SULFATE UR QL SCN: NEGATIVE NG/ML
FENTANYL UR QL CFM: NEGATIVE NG/ML
GLIADIN IGG SER IA-ACNC: NEGATIVE NG/ML
GLUCOSE 30M P 50 G LAC PO SERPL-MCNC: NEGATIVE NG/ML
HYDROCODONE UR QL CFM: NEGATIVE NG/ML
HYDROCODONE UR QL CFM: NEGATIVE NG/ML
HYDROMORPHONE UR QL CFM: NEGATIVE NG/ML
HYDROMORPHONE UR QL CFM: NORMAL
IMIPRAMINE UR QL CFM: NEGATIVE NG/ML
MDMA UR QL CFM: NEGATIVE NG/ML
ME-PHENIDATE UR QL CFM: NEGATIVE NG/ML
MEPERIDINE UR QL CFM: NEGATIVE NG/ML
METHADONE UR QL CFM: NEGATIVE NG/ML
METHAMPHET UR QL CFM: NEGATIVE NG/ML
MORPHINE UR QL CFM: NEGATIVE NG/ML
MORPHINE UR QL CFM: NEGATIVE NG/ML
NITRITE UR QL: NORMAL UG/ML
NORBUPRENORPHINE UR QL CFM: NEGATIVE NG/ML
NORDIAZEPAM UR QL CFM: NEGATIVE NG/ML
NORFENTANYL UR QL CFM: NEGATIVE NG/ML
NORHYDROCODONE UR QL CFM: NEGATIVE NG/ML
NORHYDROCODONE UR QL CFM: NEGATIVE NG/ML
NORMEPERIDINE UR QL CFM: NEGATIVE NG/ML
NOROXYCODONE UR QL CFM: NEGATIVE NG/ML
OLANZAPINE QUANTIFICATION: NEGATIVE NG/ML
OPC-3373 QUANTIFICATION: NEGATIVE
OXAZEPAM UR QL CFM: NEGATIVE NG/ML
OXYCODONE UR QL CFM: NEGATIVE NG/ML
OXYMORPHONE UR QL CFM: NEGATIVE NG/ML
OXYMORPHONE UR QL CFM: NEGATIVE NG/ML
PCP UR QL CFM: NEGATIVE NG/ML
PHENOBARB UR QL CFM: NEGATIVE NG/ML
PROLACTIN SERPL-MCNC: NEGATIVE NG/ML
RESULT ALL_PRESCRIBED MEDS AND SPECIAL INSTRUCTIONS: NORMAL
SL AMB 3-METHYL-FENTANYL QUANTIFICATION: NORMAL NG/ML
SL AMB 4-ANPP QUANTIFICATION: NORMAL NG/ML
SL AMB 4-FIBF QUANTIFICATION: NORMAL NG/ML
SL AMB 7-OH-MITRAGYNINE (KRATOM ALKALOID) QUANTIFICATION: NEGATIVE NG/ML
SL AMB ACETYL FENTANYL QUANTIFICATION: NORMAL NG/ML
SL AMB ACETYL NORFENTANYL QUANTIFICATION: NORMAL NG/ML
SL AMB ACRYL FENTANYL QUANTIFICATION: NORMAL NG/ML
SL AMB BUTRYL FENTANYL QUANTIFICATION: NORMAL NG/ML
SL AMB CARFENTANIL QUANTIFICATION: NORMAL NG/ML
SL AMB CLOZAPINE QUANTIFICATION: NEGATIVE NG/ML
SL AMB CTHC (MARIJUANA METABOLITE) QUANTIFICATION: NEGATIVE NG/ML
SL AMB CYCLOPROPYL FENTANYL QUANTIFICATION: NORMAL NG/ML
SL AMB DEXTROMETHORPHAN QUANTIFICATION: NEGATIVE NG/ML
SL AMB DEXTRORPHAN (DEXTROMETHORPHAN METABOLITE) QUANT: NEGATIVE NG/ML
SL AMB DEXTRORPHAN (DEXTROMETHORPHAN METABOLITE) QUANT: NEGATIVE NG/ML
SL AMB FURANYL FENTANYL QUANTIFICATION: NORMAL NG/ML
SL AMB HYDROXYRISPERIDONE QUANTIFICATION: NEGATIVE NG/ML
SL AMB METHOXYACETYL FENTANYL QUANTIFICATION: NORMAL NG/ML
SL AMB N-DESMETHYL U-47700 QUANTIFICATION: NORMAL NG/ML
SL AMB N-DESMETHYL-TRAMADOL QUANT-MEASURED RESULT: 139.86 NG/ML
SL AMB N-DESMETHYLCLOZAPINE QUANTIFICATION: NEGATIVE NG/ML
SL AMB O-DESMETHYL-TRAMADOL QUANT-MEASURED RESULT_SALIV: 7204.41 NG/ML
SL AMB PHENTERMINE QUANTIFICATION: NEGATIVE NG/ML
SL AMB RISPERIDONE QUANTIFICATION: NEGATIVE NG/ML
SL AMB RITALINIC ACID QUANTIFICATION: NEGATIVE NG/ML
SL AMB U-47700 QUANTIFICATION: NORMAL NG/ML
SPECIMEN PH ACCEPTABLE UR: NORMAL
TAPENTADOL UR QL CFM: NEGATIVE NG/ML
TEMAZEPAM UR QL CFM: NEGATIVE NG/ML
TEMAZEPAM UR QL CFM: NEGATIVE NG/ML
TRAMADOL UR CFM-MCNC: 3231.45 NG/ML

## 2020-04-14 ENCOUNTER — TELEMEDICINE (OUTPATIENT)
Dept: FAMILY MEDICINE CLINIC | Facility: CLINIC | Age: 55
End: 2020-04-14
Payer: COMMERCIAL

## 2020-04-14 DIAGNOSIS — K21.9 GASTROESOPHAGEAL REFLUX DISEASE WITHOUT ESOPHAGITIS: Primary | ICD-10-CM

## 2020-04-14 PROCEDURE — G2012 BRIEF CHECK IN BY MD/QHP: HCPCS | Performed by: PHYSICIAN ASSISTANT

## 2020-04-27 DIAGNOSIS — N18.30 CKD (CHRONIC KIDNEY DISEASE) STAGE 3, GFR 30-59 ML/MIN (HCC): Primary | ICD-10-CM

## 2020-05-07 DIAGNOSIS — J45.909 UNCOMPLICATED ASTHMA, UNSPECIFIED ASTHMA SEVERITY, UNSPECIFIED WHETHER PERSISTENT: ICD-10-CM

## 2020-05-07 RX ORDER — MONTELUKAST SODIUM 10 MG/1
TABLET ORAL
Qty: 30 TABLET | Refills: 3 | Status: SHIPPED | OUTPATIENT
Start: 2020-05-07 | End: 2020-09-04

## 2020-05-11 ENCOUNTER — TELEPHONE (OUTPATIENT)
Dept: NEPHROLOGY | Facility: CLINIC | Age: 55
End: 2020-05-11

## 2020-06-02 ENCOUNTER — APPOINTMENT (OUTPATIENT)
Dept: LAB | Facility: MEDICAL CENTER | Age: 55
End: 2020-06-02
Payer: COMMERCIAL

## 2020-06-02 DIAGNOSIS — J44.9 CHRONIC OBSTRUCTIVE PULMONARY DISEASE, UNSPECIFIED COPD TYPE (HCC): ICD-10-CM

## 2020-06-02 DIAGNOSIS — R73.03 PREDIABETES: ICD-10-CM

## 2020-06-02 DIAGNOSIS — E78.2 ELEVATED TRIGLYCERIDES WITH HIGH CHOLESTEROL: ICD-10-CM

## 2020-06-02 DIAGNOSIS — Z12.5 SCREENING FOR PROSTATE CANCER: ICD-10-CM

## 2020-06-02 DIAGNOSIS — K76.0 FATTY LIVER DISEASE, NONALCOHOLIC: ICD-10-CM

## 2020-06-02 DIAGNOSIS — K21.9 GASTROESOPHAGEAL REFLUX DISEASE WITHOUT ESOPHAGITIS: ICD-10-CM

## 2020-06-02 DIAGNOSIS — N18.30 CKD (CHRONIC KIDNEY DISEASE) STAGE 3, GFR 30-59 ML/MIN (HCC): ICD-10-CM

## 2020-06-02 DIAGNOSIS — I71.2 ASCENDING AORTIC ANEURYSM (HCC): ICD-10-CM

## 2020-06-02 DIAGNOSIS — M79.641 RIGHT HAND PAIN: ICD-10-CM

## 2020-06-02 DIAGNOSIS — E55.9 VITAMIN D DEFICIENCY: ICD-10-CM

## 2020-06-02 DIAGNOSIS — J45.20 MILD INTERMITTENT ASTHMA WITHOUT COMPLICATION: ICD-10-CM

## 2020-06-02 DIAGNOSIS — H69.92 EUSTACHIAN TUBE DISORDER, LEFT: ICD-10-CM

## 2020-06-02 DIAGNOSIS — E78.2 HYPERLIPIDEMIA, MIXED: ICD-10-CM

## 2020-06-02 LAB
25(OH)D3 SERPL-MCNC: 62.9 NG/ML (ref 30–100)
ALBUMIN SERPL BCP-MCNC: 4.1 G/DL (ref 3.5–5)
ALP SERPL-CCNC: 63 U/L (ref 46–116)
ALT SERPL W P-5'-P-CCNC: 46 U/L (ref 12–78)
ANION GAP SERPL CALCULATED.3IONS-SCNC: 4 MMOL/L (ref 4–13)
AST SERPL W P-5'-P-CCNC: 31 U/L (ref 5–45)
BACTERIA UR QL AUTO: NORMAL /HPF
BASOPHILS # BLD AUTO: 0.04 THOUSANDS/ΜL (ref 0–0.1)
BASOPHILS NFR BLD AUTO: 1 % (ref 0–1)
BILIRUB SERPL-MCNC: 0.55 MG/DL (ref 0.2–1)
BILIRUB UR QL STRIP: NEGATIVE
BUN SERPL-MCNC: 19 MG/DL (ref 5–25)
CALCIUM SERPL-MCNC: 9.1 MG/DL (ref 8.3–10.1)
CHLORIDE SERPL-SCNC: 107 MMOL/L (ref 100–108)
CHOLEST SERPL-MCNC: 94 MG/DL (ref 50–200)
CLARITY UR: CLEAR
CO2 SERPL-SCNC: 27 MMOL/L (ref 21–32)
COLOR UR: YELLOW
CREAT SERPL-MCNC: 1.45 MG/DL (ref 0.6–1.3)
CREAT UR-MCNC: 181 MG/DL
EOSINOPHIL # BLD AUTO: 0.14 THOUSAND/ΜL (ref 0–0.61)
EOSINOPHIL NFR BLD AUTO: 3 % (ref 0–6)
ERYTHROCYTE [DISTWIDTH] IN BLOOD BY AUTOMATED COUNT: 12.3 % (ref 11.6–15.1)
EST. AVERAGE GLUCOSE BLD GHB EST-MCNC: 131 MG/DL
GFR SERPL CREATININE-BSD FRML MDRD: 53 ML/MIN/1.73SQ M
GLUCOSE P FAST SERPL-MCNC: 130 MG/DL (ref 65–99)
GLUCOSE UR STRIP-MCNC: NEGATIVE MG/DL
HBA1C MFR BLD: 6.2 %
HCT VFR BLD AUTO: 47.2 % (ref 36.5–49.3)
HDLC SERPL-MCNC: 22 MG/DL
HGB BLD-MCNC: 15.4 G/DL (ref 12–17)
HGB UR QL STRIP.AUTO: NEGATIVE
HYALINE CASTS #/AREA URNS LPF: NORMAL /LPF
IMM GRANULOCYTES # BLD AUTO: 0.02 THOUSAND/UL (ref 0–0.2)
IMM GRANULOCYTES NFR BLD AUTO: 0 % (ref 0–2)
KETONES UR STRIP-MCNC: NEGATIVE MG/DL
LDLC SERPL CALC-MCNC: 14 MG/DL (ref 0–100)
LEUKOCYTE ESTERASE UR QL STRIP: NEGATIVE
LYMPHOCYTES # BLD AUTO: 1.74 THOUSANDS/ΜL (ref 0.6–4.47)
LYMPHOCYTES NFR BLD AUTO: 33 % (ref 14–44)
MCH RBC QN AUTO: 31.6 PG (ref 26.8–34.3)
MCHC RBC AUTO-ENTMCNC: 32.6 G/DL (ref 31.4–37.4)
MCV RBC AUTO: 97 FL (ref 82–98)
MONOCYTES # BLD AUTO: 0.52 THOUSAND/ΜL (ref 0.17–1.22)
MONOCYTES NFR BLD AUTO: 10 % (ref 4–12)
NEUTROPHILS # BLD AUTO: 2.89 THOUSANDS/ΜL (ref 1.85–7.62)
NEUTS SEG NFR BLD AUTO: 53 % (ref 43–75)
NITRITE UR QL STRIP: NEGATIVE
NON-SQ EPI CELLS URNS QL MICRO: NORMAL /HPF
NONHDLC SERPL-MCNC: 72 MG/DL
NRBC BLD AUTO-RTO: 0 /100 WBCS
PH UR STRIP.AUTO: 5.5 [PH]
PHOSPHATE SERPL-MCNC: 2.6 MG/DL (ref 2.7–4.5)
PLATELET # BLD AUTO: 205 THOUSANDS/UL (ref 149–390)
PMV BLD AUTO: 10.9 FL (ref 8.9–12.7)
POTASSIUM SERPL-SCNC: 4.2 MMOL/L (ref 3.5–5.3)
PROT SERPL-MCNC: 7 G/DL (ref 6.4–8.2)
PROT UR STRIP-MCNC: NEGATIVE MG/DL
PROT UR-MCNC: 8 MG/DL
PROT/CREAT UR: 0.04 MG/G{CREAT} (ref 0–0.1)
PSA SERPL-MCNC: 0.3 NG/ML (ref 0–4)
PTH-INTACT SERPL-MCNC: 44.3 PG/ML (ref 18.4–80.1)
RBC # BLD AUTO: 4.88 MILLION/UL (ref 3.88–5.62)
RBC #/AREA URNS AUTO: NORMAL /HPF
RHEUMATOID FACT SER QL LA: NEGATIVE
SODIUM SERPL-SCNC: 138 MMOL/L (ref 136–145)
SP GR UR STRIP.AUTO: 1.03 (ref 1–1.03)
TRIGL SERPL-MCNC: 290 MG/DL
UROBILINOGEN UR QL STRIP.AUTO: 0.2 E.U./DL
WBC # BLD AUTO: 5.35 THOUSAND/UL (ref 4.31–10.16)
WBC #/AREA URNS AUTO: NORMAL /HPF

## 2020-06-02 PROCEDURE — 36415 COLL VENOUS BLD VENIPUNCTURE: CPT

## 2020-06-02 PROCEDURE — 86430 RHEUMATOID FACTOR TEST QUAL: CPT

## 2020-06-02 PROCEDURE — 84100 ASSAY OF PHOSPHORUS: CPT

## 2020-06-02 PROCEDURE — 80053 COMPREHEN METABOLIC PANEL: CPT

## 2020-06-02 PROCEDURE — 85025 COMPLETE CBC W/AUTO DIFF WBC: CPT

## 2020-06-02 PROCEDURE — 80061 LIPID PANEL: CPT

## 2020-06-02 PROCEDURE — 81001 URINALYSIS AUTO W/SCOPE: CPT

## 2020-06-02 PROCEDURE — 83970 ASSAY OF PARATHORMONE: CPT

## 2020-06-02 PROCEDURE — 82570 ASSAY OF URINE CREATININE: CPT

## 2020-06-02 PROCEDURE — 83036 HEMOGLOBIN GLYCOSYLATED A1C: CPT

## 2020-06-02 PROCEDURE — G0103 PSA SCREENING: HCPCS

## 2020-06-02 PROCEDURE — 82306 VITAMIN D 25 HYDROXY: CPT

## 2020-06-02 PROCEDURE — 84156 ASSAY OF PROTEIN URINE: CPT

## 2020-06-03 ENCOUNTER — TELEPHONE (OUTPATIENT)
Dept: NEPHROLOGY | Facility: CLINIC | Age: 55
End: 2020-06-03

## 2020-06-04 ENCOUNTER — OFFICE VISIT (OUTPATIENT)
Dept: FAMILY MEDICINE CLINIC | Facility: CLINIC | Age: 55
End: 2020-06-04
Payer: COMMERCIAL

## 2020-06-04 VITALS
BODY MASS INDEX: 29.82 KG/M2 | TEMPERATURE: 97.5 F | DIASTOLIC BLOOD PRESSURE: 74 MMHG | SYSTOLIC BLOOD PRESSURE: 126 MMHG | WEIGHT: 225 LBS | HEIGHT: 73 IN | OXYGEN SATURATION: 96 % | HEART RATE: 68 BPM | RESPIRATION RATE: 18 BRPM

## 2020-06-04 DIAGNOSIS — G89.4 CHRONIC PAIN SYNDROME: ICD-10-CM

## 2020-06-04 DIAGNOSIS — K21.9 GASTROESOPHAGEAL REFLUX DISEASE WITHOUT ESOPHAGITIS: ICD-10-CM

## 2020-06-04 DIAGNOSIS — J30.1 SEASONAL ALLERGIC RHINITIS DUE TO POLLEN: ICD-10-CM

## 2020-06-04 DIAGNOSIS — J45.20 MILD INTERMITTENT ASTHMA WITHOUT COMPLICATION: Primary | ICD-10-CM

## 2020-06-04 DIAGNOSIS — R73.03 PREDIABETES: ICD-10-CM

## 2020-06-04 DIAGNOSIS — I71.2 ASCENDING AORTIC ANEURYSM (HCC): ICD-10-CM

## 2020-06-04 DIAGNOSIS — G56.01 CARPAL TUNNEL SYNDROME OF RIGHT WRIST: ICD-10-CM

## 2020-06-04 DIAGNOSIS — K76.0 FATTY LIVER DISEASE, NONALCOHOLIC: ICD-10-CM

## 2020-06-04 DIAGNOSIS — Z79.891 LONG-TERM CURRENT USE OF OPIATE ANALGESIC: ICD-10-CM

## 2020-06-04 DIAGNOSIS — M16.11 PRIMARY OSTEOARTHRITIS OF RIGHT HIP: ICD-10-CM

## 2020-06-04 DIAGNOSIS — J44.9 CHRONIC OBSTRUCTIVE PULMONARY DISEASE, UNSPECIFIED COPD TYPE (HCC): ICD-10-CM

## 2020-06-04 DIAGNOSIS — F41.8 DEPRESSION WITH ANXIETY: ICD-10-CM

## 2020-06-04 DIAGNOSIS — E78.2 HYPERLIPIDEMIA, MIXED: ICD-10-CM

## 2020-06-04 DIAGNOSIS — N18.30 CKD (CHRONIC KIDNEY DISEASE) STAGE 3, GFR 30-59 ML/MIN (HCC): ICD-10-CM

## 2020-06-04 PROCEDURE — 3044F HG A1C LEVEL LT 7.0%: CPT | Performed by: NURSE PRACTITIONER

## 2020-06-04 PROCEDURE — 3066F NEPHROPATHY DOC TX: CPT | Performed by: NURSE PRACTITIONER

## 2020-06-04 PROCEDURE — 3008F BODY MASS INDEX DOCD: CPT | Performed by: NURSE PRACTITIONER

## 2020-06-04 PROCEDURE — 1036F TOBACCO NON-USER: CPT | Performed by: NURSE PRACTITIONER

## 2020-06-04 PROCEDURE — 99214 OFFICE O/P EST MOD 30 MIN: CPT | Performed by: NURSE PRACTITIONER

## 2020-06-05 ENCOUNTER — TELEPHONE (OUTPATIENT)
Dept: NEPHROLOGY | Facility: CLINIC | Age: 55
End: 2020-06-05

## 2020-06-16 ENCOUNTER — TELEPHONE (OUTPATIENT)
Dept: NEPHROLOGY | Facility: CLINIC | Age: 55
End: 2020-06-16

## 2020-06-17 ENCOUNTER — TELEPHONE (OUTPATIENT)
Dept: NEPHROLOGY | Facility: CLINIC | Age: 55
End: 2020-06-17

## 2020-06-17 ENCOUNTER — TELEMEDICINE (OUTPATIENT)
Dept: NEPHROLOGY | Facility: CLINIC | Age: 55
End: 2020-06-17
Payer: COMMERCIAL

## 2020-06-17 VITALS — RESPIRATION RATE: 16 BRPM | WEIGHT: 225 LBS | HEIGHT: 73 IN | BODY MASS INDEX: 29.82 KG/M2

## 2020-06-17 DIAGNOSIS — E83.9 CHRONIC KIDNEY DISEASE-MINERAL AND BONE DISORDER: ICD-10-CM

## 2020-06-17 DIAGNOSIS — J44.9 CHRONIC OBSTRUCTIVE PULMONARY DISEASE, UNSPECIFIED COPD TYPE (HCC): ICD-10-CM

## 2020-06-17 DIAGNOSIS — N18.9 CHRONIC KIDNEY DISEASE-MINERAL AND BONE DISORDER: ICD-10-CM

## 2020-06-17 DIAGNOSIS — M89.9 CHRONIC KIDNEY DISEASE-MINERAL AND BONE DISORDER: ICD-10-CM

## 2020-06-17 DIAGNOSIS — N18.30 CKD (CHRONIC KIDNEY DISEASE) STAGE 3, GFR 30-59 ML/MIN (HCC): Primary | ICD-10-CM

## 2020-06-17 PROCEDURE — G2012 BRIEF CHECK IN BY MD/QHP: HCPCS | Performed by: INTERNAL MEDICINE

## 2020-06-30 ENCOUNTER — OFFICE VISIT (OUTPATIENT)
Dept: OBGYN CLINIC | Facility: CLINIC | Age: 55
End: 2020-06-30
Payer: COMMERCIAL

## 2020-06-30 ENCOUNTER — APPOINTMENT (OUTPATIENT)
Dept: RADIOLOGY | Facility: CLINIC | Age: 55
End: 2020-06-30
Payer: COMMERCIAL

## 2020-06-30 VITALS
WEIGHT: 225.4 LBS | RESPIRATION RATE: 18 BRPM | DIASTOLIC BLOOD PRESSURE: 77 MMHG | HEIGHT: 73 IN | BODY MASS INDEX: 29.87 KG/M2 | SYSTOLIC BLOOD PRESSURE: 123 MMHG | HEART RATE: 62 BPM

## 2020-06-30 DIAGNOSIS — M25.561 RIGHT KNEE PAIN, UNSPECIFIED CHRONICITY: ICD-10-CM

## 2020-06-30 DIAGNOSIS — M16.11 PRIMARY OSTEOARTHRITIS OF RIGHT HIP: Primary | ICD-10-CM

## 2020-06-30 DIAGNOSIS — M16.11 PRIMARY OSTEOARTHRITIS OF RIGHT HIP: ICD-10-CM

## 2020-06-30 PROCEDURE — 1036F TOBACCO NON-USER: CPT | Performed by: ORTHOPAEDIC SURGERY

## 2020-06-30 PROCEDURE — 3066F NEPHROPATHY DOC TX: CPT | Performed by: ORTHOPAEDIC SURGERY

## 2020-06-30 PROCEDURE — 99214 OFFICE O/P EST MOD 30 MIN: CPT | Performed by: ORTHOPAEDIC SURGERY

## 2020-06-30 PROCEDURE — 73562 X-RAY EXAM OF KNEE 3: CPT

## 2020-06-30 PROCEDURE — 3044F HG A1C LEVEL LT 7.0%: CPT | Performed by: ORTHOPAEDIC SURGERY

## 2020-06-30 PROCEDURE — 73502 X-RAY EXAM HIP UNI 2-3 VIEWS: CPT

## 2020-06-30 NOTE — PROGRESS NOTES
SUBJECTIVE: Patient is a 63year old male with Right hip pain  Patient was last seen in office on 02/19/2019 regarding his hip pain  He has tried cortisone injections, OTC medications and therapy without pain relief  Today, patient is here to discuss total hip arthroplasty  Today, patient reports he has pain daily which is affecting his ADLs  Patient has occasional difficulty with prolonged walking and ambulating stairs  Reports pain at night and difficulty getting comfortable  Locates his pain to groin, proximal anterior thigh and low back  Also admits to mild Right knee pain  He reports he has pain agreement and has been taking Tramadol for his hip pain  Patient has kidney disease and cannot take NSAIDs  Patient also has PMH aortic aneurysm and has cardiology whom he has an appointment with next month  Patient offers no additional complaints or concerns at this time             ROS:   General: No fever, no chills, no weight loss, no weight gain  HEENT:  No loss of hearing, no nose bleeds, no sore throat  Eyes:  No eye pain, no red eyes, no visual disturbance  Respiratory:  No cough, no shortness of breath, no wheezing  Cardiovascular:  No chest pain, no palpitations, no edema  GI: No abdominal pain, no nausea, no vomiting  Endocrine: No frequent urination, no excessive thirst  Urinary:  No dysuria, no hematuria, no incontinence  Musculoskeletal: see HPI and PE  Skin:  No rash, no wounds  Neurological:  No dizziness, no headache, no numbness  Psychiatric:  No difficulty concentrating, no depression, no suicide thoughts, no anxiety  Review of all other systems is negative    PMH:  Past Medical History:   Diagnosis Date    Aorta aneurysm ([facility])     4 3    Arm DVT (deep venous thromboembolism), acute ([facility] [address] ) 3718    complications of cardiac cath    Asthma     Chronic kidney disease     CKD (chronic kidney disease), stage III ([facility])     COPD (chronic obstructive pulmonary disease) ([facility])     Depression  Essential hypertriglyceridemia     GERD (gastroesophageal reflux disease)     Headache     Hiatal hernia     Hyperlipidemia, mixed 5/7/2018    Liver disease     FATTY LIVER    PAC (premature atrial contraction)     Prediabetes     Renal calculi     Sleep apnea     Vestibular migraine        PSH:  Past Surgical History:   Procedure Laterality Date    CARPAL TUNNEL RELEASE Left     COLONOSCOPY      FL INJECTION RIGHT HIP (ARTHROGRAM)  8/20/2018    FOOT SURGERY Left     FOREIGN BODY REMOVAL    HERNIA REPAIR      RI COLONOSCOPY FLX DX W/COLLJ SPEC WHEN PFRMD N/A 8/7/2017    Procedure: COLONOSCOPY;  Surgeon: Haydee Mendoza MD;  Location: [facility]; Service: Gastroenterology    RI ESOPHAGOGASTRODUODENOSCOPY TRANSORAL DIAGNOSTIC N/A 10/31/2017    Procedure: ESOPHAGOGASTRODUODENOSCOPY (EGD); Surgeon: Haydee Mendoza MD;  Location: [facility]; Service: Gastroenterology       Medications:  Current Outpatient Medications   Medication Sig Dispense Refill    acetaminophen (TYLENOL) 500 mg tablet Take 3 tablets by mouth as needed       albuterol (2 5 mg/3 mL) 0 083 % nebulizer solution Take 1 vial (2 5 mg total) by nebulization 4 (four) times a day 120 vial 3    albuterol (PROVENTIL HFA,VENTOLIN HFA) 90 mcg/act inhaler Inhale 2 puffs every 6 (six) hours as needed for wheezing   fenofibrate (TRICOR) 145 mg tablet Take 1 tablet (145 mg total) by mouth daily 90 tablet 3    fluticasone (FLONASE) 50 mcg/act nasal spray 1 spray into each nostril daily (Patient taking differently: 1 spray into each nostril daily as needed ) 11 Bottle 3    mometasone-formoterol (DULERA) 200-5 MCG/ACT inhaler Inhale 2 puffs 2 (two) times a day        montelukast (SINGULAIR) 10 mg tablet take 1 tablet by mouth at bedtime 30 tablet 3    Omega-3 Fatty Acids (FISH OIL) 1000 MG CPDR Take by mouth daily      pantoprazole (PROTONIX) 40 mg tablet Take 1 tablet (40 mg total) by mouth daily 90 tablet 3    rosuvastatin (CRESTOR) 20 MG tablet take 1 tablet by mouth once daily 30 tablet 5    traMADol (ULTRAM) 50 mg tablet Take 1 PO BID PRN for pain  Ongoing therapy  Do not fill until 20  60 tablet 2    ergocalciferol (VITAMIN D2) 50,000 units Take 1 capsule (50,000 Units total) by mouth once a week (Patient not taking: Reported on 2020) 4 capsule 3     No current facility-administered medications for this visit  Allergies:  No Known Allergies    Family History:  Family History   Problem Relation Age of Onset    Cancer Brother     Prostate cancer Brother     Leukemia Brother         his only brother dies from lymphoma or leukemia pt unsure he was only 47    Arthritis Mother     Heart attack Father     Clotting disorder Father     Schizoaffective Disorder  Sister     Aortic aneurysm Other         abdominal       Social History:  Social History     Occupational History    Occupation: unemployed   Tobacco Use    Smoking status: Former Smoker     Types: Cigars, Cigarettes     Last attempt to quit: 2014     Years since quittin 9    Smokeless tobacco: Never Used   Substance and Sexual Activity    Alcohol use: Yes     Comment: occasional    Drug use: No    Sexual activity: Not Currently       Physical Exam:  General :  Alert, cooperative, no distress, appears stated age  Blood pressure 123/77, pulse 62, resp  rate 18, height 6' 1" (1 854 m), weight 102 kg (225 lb 6 4 oz)  Head:  Normocephalic, without obvious abnormality, atraumatic   Eyes:  Conjunctiva/corneas clear, EOM's intact,   Ears: Both ears normal appearance, no hearing deficits      Nose: Nares normal, septum midline, no drainage    Neck: Supple,  trachea midline, no adenopathy, no tenderness, no mass   Back:   Symmetric, no curvature, ROM normal, no tenderness   Lungs:   Respirations unlabored, CTA   Cardiac:  RRR   Extremities: Extremities normal, atraumatic, no cyanosis or edema      Pulses: 2+ and symmetric   Skin: Skin color, texture, turgor normal, no rashes or lesions      Neurologic: Normal             Ortho Exam  Right Hip   Skin intact without swelling, erythema   No Tenderness to palpation, calf soft, nontender   AROM 110 flexion  - PROM 15 ER, 5 IR, 120 flexion, 35 ABD; ABD with pain   Strength testing +4/5 flexion/extension   Negative Femoral stretch, straight leg   Sensation intact to light touch          Imaging Studies: The following imaging studies were reviewed in office today  My findings are noted  Xrays Right hip 6/30/20 demonstrate severe los of joint space superolateral femoracetabular joint with sclerosis  There is deformity and flattening of femoral head and cam lesion  Xrays taken today of Right knee demonstrate no acute fracture or dislocation or significant degenerative changes  Assessment  Encounter Diagnoses   Name Primary?  Primary osteoarthritis of right hip Yes    Right knee pain, unspecified chronicity            Plan: 62 yo male Right hip osteoarthritis  - Discussed surgical intervention with total hip arthroplasty with patient  Used model and answered all questions to patient's satisfaction  Patient would like to proceed with surgery  Consent obtained today  - Discussed with patient will need to work with Pain Management with pain agreement    - Follow up after surgery for post-op appointment and xrays upon arrival

## 2020-07-14 DIAGNOSIS — M16.11 PRIMARY OSTEOARTHRITIS OF RIGHT HIP: ICD-10-CM

## 2020-07-14 RX ORDER — TRAMADOL HYDROCHLORIDE 50 MG/1
TABLET ORAL
Qty: 60 TABLET | OUTPATIENT
Start: 2020-07-14

## 2020-07-16 ENCOUNTER — TELEPHONE (OUTPATIENT)
Dept: PAIN MEDICINE | Facility: CLINIC | Age: 55
End: 2020-07-16

## 2020-07-16 ENCOUNTER — OFFICE VISIT (OUTPATIENT)
Dept: PAIN MEDICINE | Facility: CLINIC | Age: 55
End: 2020-07-16
Payer: COMMERCIAL

## 2020-07-16 VITALS
SYSTOLIC BLOOD PRESSURE: 107 MMHG | DIASTOLIC BLOOD PRESSURE: 72 MMHG | WEIGHT: 224 LBS | BODY MASS INDEX: 29.69 KG/M2 | HEIGHT: 73 IN | HEART RATE: 53 BPM

## 2020-07-16 DIAGNOSIS — G89.4 CHRONIC PAIN SYNDROME: Primary | ICD-10-CM

## 2020-07-16 DIAGNOSIS — F11.20 UNCOMPLICATED OPIOID DEPENDENCE (HCC): ICD-10-CM

## 2020-07-16 DIAGNOSIS — Z79.891 LONG-TERM CURRENT USE OF OPIATE ANALGESIC: ICD-10-CM

## 2020-07-16 DIAGNOSIS — M16.11 PRIMARY OSTEOARTHRITIS OF RIGHT HIP: ICD-10-CM

## 2020-07-16 PROCEDURE — 3044F HG A1C LEVEL LT 7.0%: CPT | Performed by: NURSE PRACTITIONER

## 2020-07-16 PROCEDURE — 3008F BODY MASS INDEX DOCD: CPT | Performed by: NURSE PRACTITIONER

## 2020-07-16 PROCEDURE — 3066F NEPHROPATHY DOC TX: CPT | Performed by: NURSE PRACTITIONER

## 2020-07-16 PROCEDURE — 1036F TOBACCO NON-USER: CPT | Performed by: NURSE PRACTITIONER

## 2020-07-16 PROCEDURE — 99214 OFFICE O/P EST MOD 30 MIN: CPT | Performed by: NURSE PRACTITIONER

## 2020-07-16 RX ORDER — TRAMADOL HYDROCHLORIDE 50 MG/1
TABLET ORAL
Qty: 90 TABLET | Refills: 2 | Status: SHIPPED | OUTPATIENT
Start: 2020-07-16 | End: 2020-10-15

## 2020-07-16 NOTE — PROGRESS NOTES
Assessment:  1  Chronic pain syndrome    2  Primary osteoarthritis of right hip    3  Uncomplicated opioid dependence ([address] )    4  Long-term current use of opiate analgesic        Plan:  The patient is a 63 y o  male last seen on 3/13/2020 who presents for a follow up office visit in regards to chronic pain syndrome secondary to osteoarthritis of the right hip  The patient currently reports ongoing right hip pain that is unchanged since his last office visit  The patient is scheduled for right total hip arthroplasty on 09/28/2020  The patient has noted some increased hip pain during the evening that is not adequately covered with the tramadol twice daily  This time will increase the tramadol to 50 mg by mouth 3 times daily until the patient is seen postoperatively  A prescription for tramadol 50 mg by mouth 3 times daily was electronically sent to the patient's pharmacy on file with 2 refills  [facility] Prescription Drug Monitoring Program report was reviewed and was appropriate     There are risks associated with opioid medications, including dependence, addiction and tolerance  The patient understands and agrees to use these medications only as prescribed  Potential side effects of the medications include, but are not limited to, constipation, drowsiness, addiction, impaired judgment and risk of fatal overdose if not taken as prescribed  The patient was warned against driving while taking sedation medications  Sharing medications is a felony  At this point in time, the patient is showing no signs of addiction, abuse, diversion or suicidal ideation  The patient will follow-up after right total hip arthroplasty and in 12 weeks for medication prescription refill and reevaluation  The patient was advised to contact the office should their symptoms worsen in the interim  The patient was agreeable and verbalized an understanding  History of Present Illness:     The patient is a 63 y o  male last seen on 3/13/2020 who presents for a follow up office visit in regards to chronic pain syndrome secondary to osteoarthritis of the right hip  The patient currently reports ongoing right hip pain that is unchanged since his last office visit  The patient describes the pain as intermittent but worse in the evening and night, and as sharp, pressure-like, and with shooting and numbness  The patient is currently scheduled to have right total hip arthroplasty on 09/28/2020  The patient denies muscle weakness or bowel or bladder dysfunction  Patient currently rates his pain as a 6/10 on numeric rating scale  Current pain medications includes:  Tramadol 50 mg by mouth twice daily, and Tylenol as needed  The patient reports that this regimen is providing mild to moderate pain relief  The patient is reporting no side effects from this pain medication regimen  Pain Contract Signed: 3/13/2020  Last Urine Drug Screen: 3/13/2020    I have personally reviewed and/or updated the patient's past medical history, past surgical history, family history, social history, current medications, allergies, and vital signs today  Review of Systems:    Review of Systems   Constitutional: Negative for fever and unexpected weight change  HENT: Negative for trouble swallowing  Eyes: Negative for visual disturbance  Respiratory: Negative for shortness of breath and wheezing  Cardiovascular: Negative for chest pain and palpitations  Gastrointestinal: Positive for nausea and vomiting  Negative for constipation and diarrhea  Endocrine: Negative for cold intolerance, heat intolerance and polydipsia  Genitourinary: Negative for difficulty urinating and frequency  Musculoskeletal: Positive for gait problem  Negative for arthralgias, joint swelling and myalgias  Right Hip Pain   Skin: Negative for rash  Neurological: Negative for dizziness, seizures, syncope, weakness and headaches     Hematological: Does not bruise/bleed easily  Psychiatric/Behavioral: Negative for dysphoric mood  All other systems reviewed and are negative  Past Medical History:   Diagnosis Date    Aorta aneurysm (HCC)     4 3    Arm DVT (deep venous thromboembolism), acute ([facility] [address] ) 5975    complications of cardiac cath    Asthma     Chronic kidney disease     CKD (chronic kidney disease), stage III (HCC)     COPD (chronic obstructive pulmonary disease) (HCC)     Depression     Essential hypertriglyceridemia     GERD (gastroesophageal reflux disease)     Headache     Hiatal hernia     Hyperlipidemia, mixed 2018    Liver disease     FATTY LIVER    PAC (premature atrial contraction)     Prediabetes     Renal calculi     Sleep apnea     Vestibular migraine        Past Surgical History:   Procedure Laterality Date    CARPAL TUNNEL RELEASE Left     COLONOSCOPY      FL INJECTION RIGHT HIP (ARTHROGRAM)  2018    FOOT SURGERY Left     FOREIGN BODY REMOVAL    HERNIA REPAIR      GA COLONOSCOPY FLX DX W/COLLJ SPEC WHEN PFRMD N/A 2017    Procedure: COLONOSCOPY;  Surgeon: Tano Tafoya MD;  Location: [facility]; Service: Gastroenterology    GA ESOPHAGOGASTRODUODENOSCOPY TRANSORAL DIAGNOSTIC N/A 10/31/2017    Procedure: ESOPHAGOGASTRODUODENOSCOPY (EGD); Surgeon: Tano Tafoya MD;  Location: [facility];   Service: Gastroenterology       Family History   Problem Relation Age of Onset    Cancer Brother     Prostate cancer Brother     Leukemia Brother         his only brother dies from [address] [facility] [facility] [address] [address] or leukemia pt unsure he was only 47    Arthritis Mother     Heart attack Father     Clotting disorder Father     Schizoaffective Disorder  Sister     Aortic aneurysm Other         abdominal       Social History     Occupational History    Occupation: unemployed   Tobacco Use    Smoking status: Former Smoker     Types: Cigars, Cigarettes     Last attempt to quit: 2014     Years since quittin 9    Smokeless tobacco: Never Used   Substance and Sexual Activity    Alcohol use: Yes     Comment: occasional    Drug use: No    Sexual activity: Not Currently         Current Outpatient Medications:     acetaminophen (TYLENOL) 500 mg tablet, Take 3 tablets by mouth as needed , Disp: , Rfl:     albuterol (2 5 mg/3 mL) 0 083 % nebulizer solution, Take 1 vial (2 5 mg total) by nebulization 4 (four) times a day, Disp: 120 vial, Rfl: 3    albuterol (PROVENTIL HFA,VENTOLIN HFA) 90 mcg/act inhaler, Inhale 2 puffs every 6 (six) hours as needed for wheezing , Disp: , Rfl:     ascorbic acid (VITAMIN C) 500 MG tablet, Take 1 tablet (500 mg total) by mouth daily, Disp: 30 tablet, Rfl: 0    fenofibrate (TRICOR) 145 mg tablet, Take 1 tablet (145 mg total) by mouth daily, Disp: 90 tablet, Rfl: 3    ferrous sulfate 324 (65 Fe) mg, Take 1 tablet (324 mg total) by mouth daily before breakfast, Disp: 30 tablet, Rfl: 0    fluticasone (FLONASE) 50 mcg/act nasal spray, 1 spray into each nostril daily (Patient taking differently: 1 spray into each nostril daily as needed ), Disp: 11 Bottle, Rfl: 3    folic acid (FOLVITE) 1 mg tablet, Take 1 tablet (1 mg total) by mouth daily, Disp: 30 tablet, Rfl: 0    mometasone-formoterol (DULERA) 200-5 MCG/ACT inhaler, Inhale 2 puffs 2 (two) times a day , Disp: , Rfl:     montelukast (SINGULAIR) 10 mg tablet, take 1 tablet by mouth at bedtime, Disp: 30 tablet, Rfl: 3    Omega-3 Fatty Acids (FISH OIL) 1000 MG CPDR, Take by mouth daily, Disp: , Rfl:     pantoprazole (PROTONIX) 40 mg tablet, Take 1 tablet (40 mg total) by mouth daily, Disp: 90 tablet, Rfl: 3    rosuvastatin (CRESTOR) 20 MG tablet, take 1 tablet by mouth once daily, Disp: 30 tablet, Rfl: 5    traMADol (ULTRAM) 50 mg tablet, Take 1 PO three times daily PRN for pain   Ongoing therapy, Disp: 90 tablet, Rfl: 2    ergocalciferol (VITAMIN D2) 50,000 units, Take 1 capsule (50,000 Units total) by mouth once a week (Patient not taking: Reported on 6/17/2020), Disp: 4 capsule, Rfl: 3    No Known Allergies    Physical Exam:    /72   Pulse (!) 53   Ht 6' 1" (1 854 m)   Wt 102 kg (224 lb)   BMI 29 55 kg/m²     Constitutional:overweight  Eyes:anicteric  HEENT:grossly intact  Neck:supple, symmetric, trachea midline and no masses   Pulmonary:even and unlabored  Cardiovascular:No edema or pitting edema present  Skin:Normal without rashes or lesions and well hydrated  Psychiatric:Mood and affect appropriate  Neurologic:Cranial Nerves II-XII grossly intact  Musculoskeletal:normal      Imaging  No orders to display         No orders of the defined types were placed in this encounter

## 2020-07-16 NOTE — PATIENT INSTRUCTIONS
Opioid Dependence   WHAT YOU NEED TO KNOW:   What is opioid dependence? Opioids are medicines, such as morphine and codeine, used to treat pain  Dependence happens after you have used opioids regularly for a long period of time  Dependence means that your body gets used to how much medicine you take  Dependence is not the same as addiction  Addiction means that a person uses opioids to get high instead of using them to control pain  What are the signs and symptoms of opioid dependence? · You need more of the opioid to get the same amount of pain relief as you did when you first started taking it  · You have tried to use less opioid medicine but are not able to  · You have withdrawal symptoms when you take less of the opioid  What are the signs and symptoms of opioid withdrawal?  You may have the following signs and symptoms if you suddenly stop taking opioids or if you decrease the amount you normally take:  · Yawning     · Runny nose     · Nausea or vomiting     · Diarrhea     · Chills or goosebumps    · Muscle aches or cramps     · Anxiety  How is opioid dependence diagnosed? Your healthcare provider will do a physical exam  He will ask you questions about your symptoms and your use of opioids  He will also ask about your current and past use of other drugs and any family history of drug abuse  How is opioid dependence treated? You may be treated in a hospital or you may be treated as an outpatient  During detoxification (detox), healthcare providers will slowly decrease your dose of the opioid medicine you are dependent on  They may use another opioid medicine such as methadone to decrease symptoms of withdrawal  You may need to take this or another medicine for some time  Your healthcare provider will also replace the opioid with another pain medicine that is less likely to cause dependence  He may also suggest that you receive counseling and social support during treatment     What are the risks of opioid dependence? There is a risk of overdose during early treatment with methadone  You may become dependent on the medicines used to treat opioid dependence  Without treatment, you may develop other health problems or become addicted to opioids  Your risk of abusing alcohol and other drugs increases  You may also develop risky behaviors that can lead to an overdose, violence, and suicidal thoughts  When should I contact my healthcare provider? · Your speech is slurred  · You have difficulty staying awake  · You have nausea and vomiting  · You are easily upset or cry easily  · You have poor balance  · You have questions or concerns about your condition or care  When should I seek immediate care or call 911? · You feel lightheaded or faint  · You have a fast, slow, or irregular heartbeat  · You have a seizure  CARE AGREEMENT:   You have the right to help plan your care  Learn about your health condition and how it may be treated  Discuss treatment options with your caregivers to decide what care you want to receive  You always have the right to refuse treatment  The above information is an  only  It is not intended as medical advice for individual conditions or treatments  Talk to your doctor, nurse or pharmacist before following any medical regimen to see if it is safe and effective for you  © 2017 [employer]  Information is for End User's use only and may not be sold, redistributed or otherwise used for commercial purposes  All illustrations and images included in CareNotes® are the copyrighted property of [employer]  or [employer]

## 2020-07-16 NOTE — TELEPHONE ENCOUNTER
Belkis Izquierdo from [address] [place] [address] called in to say that the medication Tramadol 50 mg requires an prior auth  Please be advised thank you        Belkis Izquierdo @ [address] [place] [address] call back #  [phone]    Insurance company: [address] [place] [address] HC:[id number]  Insurance company phone:

## 2020-07-20 ENCOUNTER — TELEPHONE (OUTPATIENT)
Dept: PULMONOLOGY | Facility: CLINIC | Age: 55
End: 2020-07-20

## 2020-08-24 ENCOUNTER — TELEPHONE (OUTPATIENT)
Dept: NEPHROLOGY | Facility: CLINIC | Age: 55
End: 2020-08-24

## 2020-08-24 NOTE — TELEPHONE ENCOUNTER
COVID Pre-Visit Screening     1  Is this a family member screening? NO  2  Have you traveled outside of your state in the past 2 weeks? NO  3  Do you presently have a fever or flu-like symptoms? NO  4  Do you have symptoms of an upper respiratory infection like runny nose, sore throat, or cough? NO  5  Are you suffering from new headache that you have not had in the past? NO  6  Do you have/have you experienced any new shortness of breath recently? NO  7  Do you have any new diarrhea, nausea or vomiting? NO  8  Have you been in contact with anyone who has been sick or diagnosed with COVID-19? NO  9  Do you have any new loss of taste or smell? NO  10  Are you able to wear a mask without a valve for the entire visit? YES    Appointment was confirmed and went over registration details with patient   Claudio Augustin,

## 2020-08-25 ENCOUNTER — APPOINTMENT (OUTPATIENT)
Dept: LAB | Facility: MEDICAL CENTER | Age: 55
End: 2020-08-25
Payer: COMMERCIAL

## 2020-08-25 DIAGNOSIS — N18.30 CKD (CHRONIC KIDNEY DISEASE) STAGE 3, GFR 30-59 ML/MIN (HCC): ICD-10-CM

## 2020-08-25 DIAGNOSIS — M89.9 CHRONIC KIDNEY DISEASE-MINERAL AND BONE DISORDER: ICD-10-CM

## 2020-08-25 DIAGNOSIS — G56.01 CARPAL TUNNEL SYNDROME OF RIGHT WRIST: ICD-10-CM

## 2020-08-25 DIAGNOSIS — I71.2 ASCENDING AORTIC ANEURYSM (HCC): ICD-10-CM

## 2020-08-25 DIAGNOSIS — J44.9 CHRONIC OBSTRUCTIVE PULMONARY DISEASE, UNSPECIFIED COPD TYPE (HCC): ICD-10-CM

## 2020-08-25 DIAGNOSIS — E83.9 CHRONIC KIDNEY DISEASE-MINERAL AND BONE DISORDER: ICD-10-CM

## 2020-08-25 DIAGNOSIS — F41.8 DEPRESSION WITH ANXIETY: ICD-10-CM

## 2020-08-25 DIAGNOSIS — R73.03 PREDIABETES: ICD-10-CM

## 2020-08-25 DIAGNOSIS — J45.20 MILD INTERMITTENT ASTHMA WITHOUT COMPLICATION: ICD-10-CM

## 2020-08-25 DIAGNOSIS — M16.11 PRIMARY OSTEOARTHRITIS OF RIGHT HIP: ICD-10-CM

## 2020-08-25 DIAGNOSIS — N18.9 CHRONIC KIDNEY DISEASE-MINERAL AND BONE DISORDER: ICD-10-CM

## 2020-08-25 DIAGNOSIS — E78.2 HYPERLIPIDEMIA, MIXED: ICD-10-CM

## 2020-08-25 DIAGNOSIS — K76.0 FATTY LIVER DISEASE, NONALCOHOLIC: ICD-10-CM

## 2020-08-25 DIAGNOSIS — K21.9 GASTROESOPHAGEAL REFLUX DISEASE WITHOUT ESOPHAGITIS: ICD-10-CM

## 2020-08-25 DIAGNOSIS — Z79.891 LONG-TERM CURRENT USE OF OPIATE ANALGESIC: ICD-10-CM

## 2020-08-25 DIAGNOSIS — G89.4 CHRONIC PAIN SYNDROME: ICD-10-CM

## 2020-08-25 DIAGNOSIS — J30.1 SEASONAL ALLERGIC RHINITIS DUE TO POLLEN: ICD-10-CM

## 2020-08-25 LAB
25(OH)D3 SERPL-MCNC: 45.7 NG/ML (ref 30–100)
ALBUMIN SERPL BCP-MCNC: 4 G/DL (ref 3.5–5)
ALP SERPL-CCNC: 58 U/L (ref 46–116)
ALT SERPL W P-5'-P-CCNC: 40 U/L (ref 12–78)
ANION GAP SERPL CALCULATED.3IONS-SCNC: 2 MMOL/L (ref 4–13)
AST SERPL W P-5'-P-CCNC: 30 U/L (ref 5–45)
BASOPHILS # BLD AUTO: 0.03 THOUSANDS/ΜL (ref 0–0.1)
BASOPHILS NFR BLD AUTO: 1 % (ref 0–1)
BILIRUB SERPL-MCNC: 0.67 MG/DL (ref 0.2–1)
BILIRUB UR QL STRIP: NEGATIVE
BUN SERPL-MCNC: 18 MG/DL (ref 5–25)
CALCIUM SERPL-MCNC: 9.4 MG/DL (ref 8.3–10.1)
CHLORIDE SERPL-SCNC: 107 MMOL/L (ref 100–108)
CHOLEST SERPL-MCNC: 93 MG/DL (ref 50–200)
CLARITY UR: CLEAR
CO2 SERPL-SCNC: 30 MMOL/L (ref 21–32)
COLOR UR: YELLOW
CREAT SERPL-MCNC: 1.48 MG/DL (ref 0.6–1.3)
CREAT UR-MCNC: 211 MG/DL
EOSINOPHIL # BLD AUTO: 0.15 THOUSAND/ΜL (ref 0–0.61)
EOSINOPHIL NFR BLD AUTO: 2 % (ref 0–6)
ERYTHROCYTE [DISTWIDTH] IN BLOOD BY AUTOMATED COUNT: 12.8 % (ref 11.6–15.1)
EST. AVERAGE GLUCOSE BLD GHB EST-MCNC: 131 MG/DL
GFR SERPL CREATININE-BSD FRML MDRD: 52 ML/MIN/1.73SQ M
GLUCOSE P FAST SERPL-MCNC: 137 MG/DL (ref 65–99)
GLUCOSE UR STRIP-MCNC: NEGATIVE MG/DL
HBA1C MFR BLD: 6.2 %
HCT VFR BLD AUTO: 46.2 % (ref 36.5–49.3)
HDLC SERPL-MCNC: 25 MG/DL
HGB BLD-MCNC: 15 G/DL (ref 12–17)
HGB UR QL STRIP.AUTO: NEGATIVE
IMM GRANULOCYTES # BLD AUTO: 0.02 THOUSAND/UL (ref 0–0.2)
IMM GRANULOCYTES NFR BLD AUTO: 0 % (ref 0–2)
KETONES UR STRIP-MCNC: NEGATIVE MG/DL
LDLC SERPL CALC-MCNC: <0 MG/DL (ref 0–100)
LEUKOCYTE ESTERASE UR QL STRIP: NEGATIVE
LYMPHOCYTES # BLD AUTO: 1.64 THOUSANDS/ΜL (ref 0.6–4.47)
LYMPHOCYTES NFR BLD AUTO: 27 % (ref 14–44)
MCH RBC QN AUTO: 31.8 PG (ref 26.8–34.3)
MCHC RBC AUTO-ENTMCNC: 32.5 G/DL (ref 31.4–37.4)
MCV RBC AUTO: 98 FL (ref 82–98)
MONOCYTES # BLD AUTO: 0.55 THOUSAND/ΜL (ref 0.17–1.22)
MONOCYTES NFR BLD AUTO: 9 % (ref 4–12)
NEUTROPHILS # BLD AUTO: 3.75 THOUSANDS/ΜL (ref 1.85–7.62)
NEUTS SEG NFR BLD AUTO: 61 % (ref 43–75)
NITRITE UR QL STRIP: NEGATIVE
NONHDLC SERPL-MCNC: 68 MG/DL
NRBC BLD AUTO-RTO: 0 /100 WBCS
PH UR STRIP.AUTO: 6 [PH]
PHOSPHATE SERPL-MCNC: 2.3 MG/DL (ref 2.7–4.5)
PLATELET # BLD AUTO: 220 THOUSANDS/UL (ref 149–390)
PMV BLD AUTO: 10.9 FL (ref 8.9–12.7)
POTASSIUM SERPL-SCNC: 4.3 MMOL/L (ref 3.5–5.3)
PROT SERPL-MCNC: 7.2 G/DL (ref 6.4–8.2)
PROT UR STRIP-MCNC: NEGATIVE MG/DL
PROT UR-MCNC: 12 MG/DL
PROT/CREAT UR: 0.06 MG/G{CREAT} (ref 0–0.1)
PTH-INTACT SERPL-MCNC: 44.8 PG/ML (ref 18.4–80.1)
RBC # BLD AUTO: 4.71 MILLION/UL (ref 3.88–5.62)
SODIUM SERPL-SCNC: 139 MMOL/L (ref 136–145)
SP GR UR STRIP.AUTO: 1.03 (ref 1–1.03)
TRIGL SERPL-MCNC: 347 MG/DL
UROBILINOGEN UR QL STRIP.AUTO: 1 E.U./DL
WBC # BLD AUTO: 6.14 THOUSAND/UL (ref 4.31–10.16)

## 2020-08-25 PROCEDURE — 84100 ASSAY OF PHOSPHORUS: CPT

## 2020-08-25 PROCEDURE — 80053 COMPREHEN METABOLIC PANEL: CPT

## 2020-08-25 PROCEDURE — 82306 VITAMIN D 25 HYDROXY: CPT

## 2020-08-25 PROCEDURE — 83970 ASSAY OF PARATHORMONE: CPT

## 2020-08-25 PROCEDURE — 84156 ASSAY OF PROTEIN URINE: CPT

## 2020-08-25 PROCEDURE — 81003 URINALYSIS AUTO W/O SCOPE: CPT

## 2020-08-25 PROCEDURE — 83036 HEMOGLOBIN GLYCOSYLATED A1C: CPT

## 2020-08-25 PROCEDURE — 3044F HG A1C LEVEL LT 7.0%: CPT | Performed by: INTERNAL MEDICINE

## 2020-08-25 PROCEDURE — 82570 ASSAY OF URINE CREATININE: CPT

## 2020-08-25 PROCEDURE — 3061F NEG MICROALBUMINURIA REV: CPT | Performed by: INTERNAL MEDICINE

## 2020-08-25 PROCEDURE — 85025 COMPLETE CBC W/AUTO DIFF WBC: CPT

## 2020-08-25 PROCEDURE — 80061 LIPID PANEL: CPT

## 2020-08-25 PROCEDURE — 36415 COLL VENOUS BLD VENIPUNCTURE: CPT

## 2020-08-26 ENCOUNTER — OFFICE VISIT (OUTPATIENT)
Dept: NEPHROLOGY | Facility: CLINIC | Age: 55
End: 2020-08-26
Payer: COMMERCIAL

## 2020-08-26 VITALS
DIASTOLIC BLOOD PRESSURE: 80 MMHG | WEIGHT: 224 LBS | SYSTOLIC BLOOD PRESSURE: 120 MMHG | HEART RATE: 68 BPM | BODY MASS INDEX: 28.75 KG/M2 | TEMPERATURE: 97.4 F | RESPIRATION RATE: 16 BRPM | HEIGHT: 74 IN

## 2020-08-26 DIAGNOSIS — N18.30 CKD (CHRONIC KIDNEY DISEASE) STAGE 3, GFR 30-59 ML/MIN (HCC): Primary | ICD-10-CM

## 2020-08-26 DIAGNOSIS — E83.9 CHRONIC KIDNEY DISEASE-MINERAL AND BONE DISORDER: ICD-10-CM

## 2020-08-26 DIAGNOSIS — N52.9 ED (ERECTILE DYSFUNCTION) OF ORGANIC ORIGIN: ICD-10-CM

## 2020-08-26 DIAGNOSIS — N18.9 CHRONIC KIDNEY DISEASE-MINERAL AND BONE DISORDER: ICD-10-CM

## 2020-08-26 DIAGNOSIS — M25.851 HIP IMPINGEMENT SYNDROME, RIGHT: ICD-10-CM

## 2020-08-26 DIAGNOSIS — M89.9 CHRONIC KIDNEY DISEASE-MINERAL AND BONE DISORDER: ICD-10-CM

## 2020-08-26 DIAGNOSIS — Z01.818 PRE-OPERATIVE CLEARANCE: ICD-10-CM

## 2020-08-26 DIAGNOSIS — M16.11 PRIMARY OSTEOARTHRITIS OF ONE HIP, RIGHT: ICD-10-CM

## 2020-08-26 PROCEDURE — 3008F BODY MASS INDEX DOCD: CPT | Performed by: INTERNAL MEDICINE

## 2020-08-26 PROCEDURE — 3044F HG A1C LEVEL LT 7.0%: CPT | Performed by: INTERNAL MEDICINE

## 2020-08-26 PROCEDURE — 3066F NEPHROPATHY DOC TX: CPT | Performed by: INTERNAL MEDICINE

## 2020-08-26 PROCEDURE — 1036F TOBACCO NON-USER: CPT | Performed by: INTERNAL MEDICINE

## 2020-08-26 PROCEDURE — 99214 OFFICE O/P EST MOD 30 MIN: CPT | Performed by: INTERNAL MEDICINE

## 2020-08-26 RX ORDER — SILDENAFIL CITRATE 20 MG/1
20 TABLET ORAL DAILY
Qty: 90 TABLET | Refills: 3 | Status: SHIPPED | OUTPATIENT
Start: 2020-08-26 | End: 2020-10-15

## 2020-08-26 NOTE — ASSESSMENT & PLAN NOTE
Patient will low to medium risk of as for for the renal failure because of CKD  Will advised hydration around surgery  Will advised avoiding any nephrotoxic medicine     Will follow the patient hospital at your request if necessary

## 2020-08-26 NOTE — PROGRESS NOTES
NEPHROLOGY OFFICE FOLLOW UP  Sandra Peoples 63 y o  male MRN: [id number]    Encounter: [id number] 8/26/2020    REASON FOR VISIT: Sandra Peoples is a 63 y o  male who is here on 8/26/2020 for Chronic Kidney Disease and Follow-up    HPI:    Eder Morris came in today for preoperative renal clearance for the hip surgery    He does have right hip pain for long time  Being monitored by orthopedic surgeon who decided to replacement    He denies any other complaint    No chest pain no palpitation or shortness of breath no nausea no vomiting no urinary complaint      REVIEW OF SYSTEMS:    Review of Systems   Constitutional: Negative for activity change and fatigue  HENT: Negative for congestion and ear discharge  Eyes: Negative for photophobia and pain  Respiratory: Negative for apnea and choking  Cardiovascular: Negative for chest pain and palpitations  Gastrointestinal: Negative for abdominal distention and blood in stool  Endocrine: Negative for heat intolerance and polyphagia  Genitourinary: Negative for flank pain and urgency  Musculoskeletal: Positive for arthralgias  Negative for neck pain and neck stiffness  Skin: Negative for color change and wound  Allergic/Immunologic: Negative for food allergies and immunocompromised state  Neurological: Negative for seizures and facial asymmetry  Hematological: Negative for adenopathy  Does not bruise/bleed easily  Psychiatric/Behavioral: Negative for self-injury and suicidal ideas           PAST MEDICAL HISTORY:  Past Medical History:   Diagnosis Date    Aorta aneurysm ([address] )     4 3    Arm DVT (deep venous thromboembolism), [facility] ([facility] ) 9376    complications of cardiac cath    Asthma     Chronic kidney disease     CKD (chronic kidney disease), stage III (HCC)     COPD (chronic obstructive pulmonary disease) (HCC)     Depression     Essential hypertriglyceridemia     GERD (gastroesophageal reflux disease)     Headache     Hiatal hernia     Hyperlipidemia, mixed 2018    Liver disease     FATTY LIVER    PAC (premature atrial contraction)     Prediabetes     Renal calculi     Sleep apnea     Vestibular migraine        PAST SURGICAL HISTORY:  Past Surgical History:   Procedure Laterality Date    CARPAL TUNNEL RELEASE Left     COLONOSCOPY      FL INJECTION RIGHT HIP (ARTHROGRAM)  2018    FOOT SURGERY Left     FOREIGN BODY REMOVAL    HERNIA REPAIR      ND COLONOSCOPY FLX DX W/COLLJ SPEC WHEN PFRMD N/A 2017    Procedure: COLONOSCOPY;  Surgeon: Fab Lim MD;  Location: [facility]; Service: Gastroenterology    ND ESOPHAGOGASTRODUODENOSCOPY TRANSORAL DIAGNOSTIC N/A 10/31/2017    Procedure: ESOPHAGOGASTRODUODENOSCOPY (EGD); Surgeon: Fab Lim MD;  Location: [facility];   Service: Gastroenterology       SOCIAL HISTORY:  Social History     Substance and Sexual Activity   Alcohol Use Yes    Comment: occasional     Social History     Substance and Sexual Activity   Drug Use No     Social History     Tobacco Use   Smoking Status Former Smoker    Types: Cigars, Cigarettes    Last attempt to quit: 2014    Years since quittin 0   Smokeless Tobacco Never Used       FAMILY HISTORY:  Family History   Problem Relation Age of Onset    Cancer Brother     Prostate cancer Brother     Leukemia Brother         his only brother dies from [address] or leukemia pt unsure he was only 46    Arthritis Mother     Heart attack Father     Clotting disorder Father     Schizoaffective Disorder  Sister     Aortic aneurysm Other         abdominal       MEDICATIONS:    Current Outpatient Medications:     acetaminophen (TYLENOL) 500 mg tablet, Take 3 tablets by mouth as needed , Disp: , Rfl:     albuterol (2 5 mg/3 mL) 0 083 % nebulizer solution, Take 1 vial (2 5 mg total) by nebulization 4 (four) times a day, Disp: 120 vial, Rfl: 3    albuterol (PROVENTIL HFA,VENTOLIN HFA) 90 mcg/act inhaler, Inhale 2 puffs every 6 (six) hours as needed for wheezing , Disp: , Rfl:     fenofibrate (TRICOR) 145 mg tablet, Take 1 tablet (145 mg total) by mouth daily, Disp: 90 tablet, Rfl: 3    fluticasone (FLONASE) 50 mcg/act nasal spray, 1 spray into each nostril daily (Patient taking differently: 1 spray into each nostril daily as needed ), Disp: 11 Bottle, Rfl: 3    folic acid (FOLVITE) 1 mg tablet, Take 1 tablet (1 mg total) by mouth daily, Disp: 30 tablet, Rfl: 0    mometasone-formoterol (DULERA) 200-5 MCG/ACT inhaler, Inhale 2 puffs 2 (two) times a day , Disp: , Rfl:     montelukast (SINGULAIR) 10 mg tablet, take 1 tablet by mouth at bedtime, Disp: 30 tablet, Rfl: 3    pantoprazole (PROTONIX) 40 mg tablet, Take 1 tablet (40 mg total) by mouth daily, Disp: 90 tablet, Rfl: 3    rosuvastatin (CRESTOR) 20 MG tablet, take 1 tablet by mouth once daily, Disp: 30 tablet, Rfl: 5    sildenafil (REVATIO) 20 mg tablet, TAKE 1 TABLET (20 MG TOTAL) BY MOUTH DAILY AS DIRECTED, Disp: 90 tablet, Rfl: 3    traMADol (ULTRAM) 50 mg tablet, Take 1 PO three times daily PRN for pain  Ongoing therapy, Disp: 90 tablet, Rfl: 2    ergocalciferol (VITAMIN D2) 50,000 units, Take 1 capsule (50,000 Units total) by mouth once a week (Patient not taking: Reported on 6/17/2020), Disp: 4 capsule, Rfl: 3    PHYSICAL EXAM:  Vitals:    08/26/20 1439   BP: 120/80   BP Location: Right arm   Patient Position: Sitting   Pulse: 68   Resp: 16   Temp: (!) 97 4 °F (36 3 °C)   TempSrc: Temporal   Weight: 102 kg (224 lb)   Height: 6' 1 5" (1 867 m)     Body mass index is 29 15 kg/m²  Physical Exam  Constitutional:       General: He is not in acute distress  Appearance: He is well-developed  HENT:      Head: Normocephalic  Eyes:      General: No scleral icterus  Conjunctiva/sclera: Conjunctivae normal    Neck:      Musculoskeletal: Neck supple  Vascular: No JVD  Cardiovascular:      Rate and Rhythm: Normal rate  Heart sounds: Normal heart sounds     Pulmonary: Effort: Pulmonary effort is normal       Breath sounds: No wheezing  Abdominal:      Palpations: Abdomen is soft  Tenderness: There is no abdominal tenderness  Musculoskeletal: Normal range of motion  Skin:     General: Skin is warm  Findings: No rash  Neurological:      Mental Status: He is alert and oriented to person, place, and time  Psychiatric:         Behavior: Behavior normal          LAB RESULTS:  Results for orders placed or performed in visit on 08/25/20   Comprehensive metabolic panel   Result Value Ref Range    Sodium 139 136 - 145 mmol/L    Potassium 4 3 3 5 - 5 3 mmol/L    Chloride 107 100 - 108 mmol/L    CO2 30 21 - 32 mmol/L    ANION GAP 2 (L) 4 - 13 mmol/L    BUN 18 5 - 25 mg/dL    Creatinine 1 48 (H) 0 60 - 1 30 mg/dL    Glucose, Fasting 137 (H) 65 - 99 mg/dL    Calcium 9 4 8 3 - 10 1 mg/dL    AST 30 5 - 45 U/L    ALT 40 12 - 78 U/L    Alkaline Phosphatase 58 46 - 116 U/L    Total Protein 7 2 6 4 - 8 2 g/dL    Albumin 4 0 3 5 - 5 0 g/dL    Total Bilirubin 0 67 0 20 - 1 00 mg/dL    eGFR 52 ml/min/1 73sq m   Hemoglobin A1C   Result Value Ref Range    Hemoglobin A1C 6 2 (H) Normal 3 8-5 6%; PreDiabetic 5 7-6 4%;  Diabetic >=6 5%; Glycemic control for adults with diabetes <7 0% %     mg/dl   CBC and differential   Result Value Ref Range    WBC 6 14 4 31 - 10 16 Thousand/uL    RBC 4 71 3 88 - 5 62 Million/uL    Hemoglobin 15 0 12 0 - 17 0 g/dL    Hematocrit 46 2 36 5 - 49 3 %    MCV 98 82 - 98 fL    MCH 31 8 26 8 - 34 3 pg    MCHC 32 5 31 4 - 37 4 g/dL    RDW 12 8 11 6 - 15 1 %    MPV 10 9 8 9 - 12 7 fL    Platelets 774 446 - 369 Thousands/uL    nRBC 0 /100 WBCs    Neutrophils Relative 61 43 - 75 %    Immat GRANS % 0 0 - 2 %    Lymphocytes Relative 27 14 - 44 %    Monocytes Relative 9 4 - 12 %    Eosinophils Relative 2 0 - 6 %    Basophils Relative 1 0 - 1 %    Neutrophils Absolute 3 75 1 85 - 7 62 Thousands/µL    Immature Grans Absolute 0 02 0 00 - 0 20 Thousand/uL Lymphocytes Absolute 1 64 0 60 - 4 47 Thousands/µL    Monocytes Absolute 0 55 0 17 - 1 22 Thousand/µL    Eosinophils Absolute 0 15 0 00 - 0 61 Thousand/µL    Basophils Absolute 0 03 0 00 - 0 10 Thousands/µL   Lipid panel   Result Value Ref Range    Cholesterol 93 50 - 200 mg/dL    Triglycerides 347 (H) <=150 mg/dL    HDL, Direct 25 (L) >=40 mg/dL    LDL Calculated <0 (L) 0 - 100 mg/dL    Non-HDL-Chol (CHOL-HDL) 68 mg/dl   Phosphorus   Result Value Ref Range    Phosphorus 2 3 (L) 2 7 - 4 5 mg/dL   Protein / creatinine ratio, urine   Result Value Ref Range    Creatinine, Ur 211 0 mg/dL    Protein Urine Random 12 mg/dL    Prot/Creat Ratio, Ur 0 06 0 00 - 0 10   PTH, intact   Result Value Ref Range    PTH 44 8 18 4 - 80 1 pg/mL   UA (URINE) with reflex to Scope   Result Value Ref Range    Color, UA Yellow     Clarity, UA Clear     Specific Gravity, UA 1 026 1 003 - 1 030    pH, UA 6 0 4 5, 5 0, 5 5, 6 0, 6 5, 7 0, 7 5, 8 0    Leukocytes, UA Negative Negative    Nitrite, UA Negative Negative    Protein, UA Negative Negative mg/dl    Glucose, UA Negative Negative mg/dl    Ketones, UA Negative Negative mg/dl    Urobilinogen, UA 1 0 0 2, 1 0 E U /dl E U /dl    Bilirubin, UA Negative Negative    Blood, UA Negative Negative   Vitamin D 25 hydroxy   Result Value Ref Range    Vit D, 25-Hydroxy 45 7 30 0 - 100 0 ng/mL       ASSESSMENT and PLAN:      CKD (chronic kidney disease) stage 3, GFR 30-59 ml/min  Patient renal function is quite stable at this point creatinine is 1 48 with GFR of 52  Does have hypertensive nephrosclerosis        Hip impingement syndrome, right  Will require surgery    Pre-operative clearance  Patient will low to medium risk of as for for the renal failure because of CKD  Will advised hydration around surgery  Will advised avoiding any nephrotoxic medicine     Will follow the patient hospital at your request if necessary              Portions of the record may have been created with voice recognition software  Occasional wrong word or "sound a like" substitutions may have occurred due to the inherent limitations of voice recognition software  Read the chart carefully and recognize, using context, where substitutions have occurred  If you have any questions, please contact the dictating provider

## 2020-08-26 NOTE — PATIENT INSTRUCTIONS
Chronic Kidney Disease   AMBULATORY CARE:   Chronic kidney disease (CKD)  is the gradual and permanent loss of kidney function  Normally, the kidneys remove fluid, chemicals, and waste from your blood  These wastes are turned into urine by your kidneys  CKD may worsen over time and lead to kidney failure  Common symptoms include the following:   · Changes in how often you need to urinate    · Swelling in your arms, legs, or feet    · Shortness of breath    · Fatigue or weakness    · Bad or bitter taste in your mouth    · Nausea, vomiting, or loss of appetite  Seek care immediately if:   · You are confused and very drowsy  · You have a seizure  · You have shortness of breath  Contact your healthcare provider if:   · You suddenly gain or lose more weight than your healthcare provider has told you is okay  · You have itchy skin or a rash  · You urinate more or less than you normally do  · You have blood in your urine  · You have nausea and repeated vomiting  · You have fatigue or muscle weakness  · You have hiccups that will not stop  · You have questions or concerns about your condition or care  Treatment for CKD:  Medicines may be given to decrease blood pressure and get rid of extra fluid  You may also receive medicine to manage health conditions that may occur with CKD  Dialysis is a treatment to remove chemicals and waste from your blood when your kidneys can no longer do this  Surgery may be needed to create an arteriovenous fistula (AVF) in your arm or insert a catheter into your abdomen so that you can receive dialysis  A kidney transplant may be done if your CKD becomes severe  Manage CKD:   · Maintain a healthy weight  Ask your healthcare provider how much you should weigh  Ask him to help you create a weight loss plan if you are overweight  · Exercise 30 to 60 minutes a day, 4 to 7 times a week, or as directed  Ask about the best exercise plan for you   Regular exercise can help you manage CKD, high blood pressure, and diabetes  · Follow your healthcare provider's advice about what to eat and drink  He may tell you to eat food low in sodium (salt), potassium, phosphorus, or protein  You may need to see a dietitian if you need help planning meals  Ask how much liquid to drink each day and which liquids are best for you  · Limit alcohol  Ask how much alcohol is safe for you to drink  A drink of alcohol is 12 ounces of beer, 5 ounces of wine, or 1½ ounces of liquor  · Do not smoke  Nicotine and other chemicals in cigarettes and cigars can cause lung and kidney damage  Ask your healthcare provider for information if you currently smoke and need help to quit  E-cigarettes or smokeless tobacco still contain nicotine  Talk to your healthcare provider before you use these products  · Ask your healthcare provider if you need vaccines  Infections such as pneumonia, influenza, and hepatitis can be more harmful or more likely to occur in a person who has CKD  Vaccines reduce your risk of infection with these viruses  Follow up with your healthcare provider as directed:  Write down your questions so you remember to ask them during your visits  © 2017 [address] [employer] Information is for End User's use only and may not be sold, redistributed or otherwise used for commercial purposes  All illustrations and images included in CareNotes® are the copyrighted property of [employer]  or [employer]  The above information is an  only  It is not intended as medical advice for individual conditions or treatments  Talk to your doctor, nurse or pharmacist before following any medical regimen to see if it is safe and effective for you

## 2020-08-26 NOTE — ASSESSMENT & PLAN NOTE
Patient renal function is quite stable at this point creatinine is 1 48 with GFR of 52    Does have hypertensive nephrosclerosis

## 2020-08-29 ENCOUNTER — TELEPHONE (OUTPATIENT)
Dept: OBGYN CLINIC | Facility: HOSPITAL | Age: 55
End: 2020-08-29

## 2020-08-29 NOTE — TELEPHONE ENCOUNTER
Patient sees Dr Sofie Randolph  Patient is calling in stating that he is scheduled for surgery on 9/28 and is experiencing severe back pain that he could not even get out of bed this morning  He is asking as to what further he should do for this, he is not sure if it is coming from his hip or what  Patient is asking for a call back relating this         Call back# [phone]

## 2020-08-31 NOTE — TELEPHONE ENCOUNTER
Called patient to discuss symptoms  Despite pain today, he reports he is mildly improving since 3 days ago  Pain is worst when trying to stand or sit  Patient reports symptoms decrease as he is ambulating  Tylenol and Tramadol did not help with the pain; he currently sees Pain management  He has also tried ice and heat for pain relief  Admits to some/occasional groin pain, but that is probably more of hip etiology  Reports some occasional shakiness and numbness of bilateral lower extremities  Denies bladder or bowel incontinence and saddle anesthesia, fevers, chills  Discussed alarm symptoms with patient and if severe pain continues, can report to the ED for further evaluation  Per last note, patient has PMH of low back pain  Continue Tylenol and APAP for pain relief  Patient cannot take NSAIDs  Presented understanding  Patient will call again this week for updates about symptoms  Thank you !

## 2020-08-31 NOTE — TELEPHONE ENCOUNTER
Pain is in low back or groin? Pain with ambulating ? Any radiating symptoms/numbness or tingling lower extremities ? Fevers, chills?

## 2020-08-31 NOTE — TELEPHONE ENCOUNTER
Spoke to pt  Who stated 5 days ago when he awoke and tried to get out of bed he experienced 10 out of 10 stabbing  pain to his middle to lower back  Pt  Stated he had a very hard time getting off the bed and since then he has been having a very hard time with getting off his bed or when he is in the sitting position  Today pt  Is in 8 out of 10 pin and stated he has been taking Tylenol and Tramadol that was prescribed previously for his hip  Pain is mostly when pt  Is getting off the bed or from a sitting position , pain makes his knees shake when getting up  Pt  Deny any fevers, any groin pain and stated yesterday his foot felt numb, with no tingling at this time  Pt  Expressed great concern and would like Dr Meche Russell to be aware   Please advise thanks

## 2020-09-01 ENCOUNTER — APPOINTMENT (OUTPATIENT)
Dept: LAB | Facility: HOSPITAL | Age: 55
End: 2020-09-01
Payer: MEDICARE

## 2020-09-01 ENCOUNTER — HOSPITAL ENCOUNTER (OUTPATIENT)
Dept: RADIOLOGY | Facility: HOSPITAL | Age: 55
Discharge: HOME | End: 2020-09-01
Payer: MEDICARE

## 2020-09-01 ENCOUNTER — OFFICE VISIT (OUTPATIENT)
Dept: LAB | Facility: HOSPITAL | Age: 55
End: 2020-09-01
Payer: MEDICARE

## 2020-09-01 DIAGNOSIS — M16.11 PRIMARY OSTEOARTHRITIS OF ONE HIP, RIGHT: ICD-10-CM

## 2020-09-01 DIAGNOSIS — E83.9 CHRONIC KIDNEY DISEASE-MINERAL AND BONE DISORDER: ICD-10-CM

## 2020-09-01 DIAGNOSIS — N18.9 CHRONIC KIDNEY DISEASE-MINERAL AND BONE DISORDER: ICD-10-CM

## 2020-09-01 DIAGNOSIS — N18.30 CKD (CHRONIC KIDNEY DISEASE) STAGE 3, GFR 30-59 ML/MIN (HCC): ICD-10-CM

## 2020-09-01 DIAGNOSIS — M89.9 CHRONIC KIDNEY DISEASE-MINERAL AND BONE DISORDER: ICD-10-CM

## 2020-09-01 LAB
25(OH)D3 SERPL-MCNC: 34.2 NG/ML (ref 30–100)
APTT PPP: 26 SECONDS (ref 23–37)
ATRIAL RATE: 56 BPM
CRP SERPL QL: <3 MG/L
INR PPP: 0.97 (ref 0.84–1.19)
P AXIS: 43 DEGREES
PR INTERVAL: 150 MS
PROTHROMBIN TIME: 13.1 SECONDS (ref 11.6–14.5)
QRS AXIS: -16 DEGREES
QRSD INTERVAL: 94 MS
QT INTERVAL: 408 MS
QTC INTERVAL: 393 MS
T WAVE AXIS: -17 DEGREES
VENTRICULAR RATE: 56 BPM

## 2020-09-01 PROCEDURE — 82306 VITAMIN D 25 HYDROXY: CPT

## 2020-09-01 PROCEDURE — 86140 C-REACTIVE PROTEIN: CPT

## 2020-09-01 PROCEDURE — 93005 ELECTROCARDIOGRAM TRACING: CPT

## 2020-09-01 PROCEDURE — 86850 RBC ANTIBODY SCREEN: CPT

## 2020-09-01 PROCEDURE — 36415 COLL VENOUS BLD VENIPUNCTURE: CPT

## 2020-09-01 PROCEDURE — 86900 BLOOD TYPING SEROLOGIC ABO: CPT

## 2020-09-01 PROCEDURE — 85610 PROTHROMBIN TIME: CPT

## 2020-09-01 PROCEDURE — 93010 ELECTROCARDIOGRAM REPORT: CPT | Performed by: INTERNAL MEDICINE

## 2020-09-01 PROCEDURE — 85730 THROMBOPLASTIN TIME PARTIAL: CPT

## 2020-09-01 PROCEDURE — 86901 BLOOD TYPING SEROLOGIC RH(D): CPT

## 2020-09-01 PROCEDURE — 71046 X-RAY EXAM CHEST 2 VIEWS: CPT

## 2020-09-01 NOTE — TELEPHONE ENCOUNTER
Please be advise I spoke to pt  Who stated he was told to call back with an update on his progress   Pt  Stated the heat seems to be working as he is starting to feel a little better every day  Pt  Will continue plan of care and will call back if symptoms worsen

## 2020-09-04 DIAGNOSIS — J45.909 UNCOMPLICATED ASTHMA, UNSPECIFIED ASTHMA SEVERITY, UNSPECIFIED WHETHER PERSISTENT: ICD-10-CM

## 2020-09-04 RX ORDER — MONTELUKAST SODIUM 10 MG/1
TABLET ORAL
Qty: 30 TABLET | Refills: 3 | Status: SHIPPED | OUTPATIENT
Start: 2020-09-04 | End: 2020-11-25

## 2020-09-05 PROBLEM — Z01.818 PREOP EXAMINATION: Status: ACTIVE | Noted: 2020-09-05

## 2020-09-05 NOTE — PROGRESS NOTES
Subjective:     64 yo  male who presents to the office today for a preoperative consultation at the request of surgeon Homa silva who plans on performing Right THR on September 28  This consultation is requested for the specific conditions prompting preoperative evaluation (i e  because of potential affect on operative risk): gerd, obesity, asthma  Planned anesthesia: general  The patient has the following known anesthesia issues: none  Patients bleeding risk: no recent abnormal bleeding, no remote history of abnormal bleeding and no use of Ca-channel blockers  Patient does not have objections to receiving blood products if needed  Patient has been dealing with back pain for the past 3 weeks  He woke from sleep with pain in the lower back  Pain shifts from the right side to the middle  The following portions of the patient's history were reviewed and updated as appropriate:   He  has a past medical history of Aorta aneurysm ([address] ), Arm DVT (deep venous thromboembolism), acute ([address] ) (2015), Asthma, Chronic kidney disease, CKD (chronic kidney disease), stage III ([address] ), COPD (chronic obstructive pulmonary disease) ([address] ), Depression, Essential hypertriglyceridemia, GERD (gastroesophageal reflux disease), Headache, Hiatal hernia, Hyperlipidemia, mixed (5/7/2018), Liver disease, PAC (premature atrial contraction), Prediabetes, Renal calculi, Sleep apnea, and Vestibular migraine    He   Patient Active Problem List    Diagnosis Date Noted    Pre-operative clearance 08/26/2020    Long-term current use of opiate analgesic 96/34/6476    Uncomplicated opioid dependence ([facility] [address] ) 03/13/2020    Right hand pain 01/28/2020    Chronic kidney disease-mineral and bone disorder 10/29/2019    Chronic pain syndrome 07/05/2019    Eustachian tube disorder, left 05/14/2019    Moderate persistent asthma with exacerbation 04/08/2019    Cough 04/08/2019    Elevated triglycerides with high cholesterol 02/05/2019    Diaphoresis 02/05/2019    Need for pneumococcal vaccination 11/20/2018    Need for hepatitis C screening test 11/20/2018    Groin pain, chronic, right 11/20/2018    Cubital tunnel syndrome, bilateral 10/02/2018    Need for influenza vaccination 10/02/2018    Lumbosacral strain 10/01/2018    Tarsal tunnel syndrome of right side 10/01/2018    Ulnar neuropathy of both upper extremities     Primary osteoarthritis of right hip 09/17/2018    Pain in right hip 08/06/2018    Hip impingement syndrome, right 08/06/2018    Benign paroxysmal positional vertigo due to bilateral vestibular disorder 07/10/2018    Patellar tendinitis of right knee 06/18/2018    Hamstring tightness of right lower extremity 06/18/2018    Vestibular migraine 05/29/2018    Vitamin D deficiency 05/15/2018    Renal cyst, left 05/15/2018    Hepatic cyst 05/15/2018    Fatty liver disease, nonalcoholic 14/42/4401    Erectile dysfunction 05/15/2018    Carpal tunnel syndrome of right wrist 05/15/2018    Chronic pain of right knee 05/15/2018    GERD (gastroesophageal reflux disease) 05/07/2018    Liver disease 05/07/2018    Hiatal hernia 05/07/2018    Encounter to establish care 05/07/2018    Prediabetes 05/07/2018    Diabetic eye exam ([facility] [address] ) 05/07/2018    CKD (chronic kidney disease) stage 3, GFR 30-59 ml/min ([address] ) 05/07/2018    Hyperlipidemia, mixed 05/07/2018    Weight gain 05/07/2018    Fatigue 05/07/2018    Generalized abdominal pain 05/07/2018    Mild intermittent asthma without complication 94/28/6174    Seasonal allergic rhinitis due to pollen 05/07/2018    Numbness of fingers of both hands 05/07/2018    Depression with anxiety 05/07/2018    Abdominal bloating 05/07/2018    Dizziness 05/06/2016    COPD (chronic obstructive pulmonary disease) ([address] ) 05/06/2016    Ascending aortic aneurysm ([address] ) 05/06/2016    Bicuspid aortic valve 05/06/2016    Adjustment reaction with anxiety and depression 07/10/2015    Allergic rhinitis 09/23/2013     He  has a past surgical history that includes Carpal tunnel release (Left); Hernia repair; pr colonoscopy flx dx w/collj spec when pfrmd (N/A, 8/7/2017); Colonoscopy; Foot surgery (Left); pr esophagogastroduodenoscopy transoral diagnostic (N/A, 10/31/2017); and FL injection right hip (arthrogram) (8/20/2018)  His family history includes Aortic aneurysm in his other; Arthritis in his mother; Cancer in his brother; Clotting disorder in his father; Heart attack in his father; Leukemia in his brother; Prostate cancer in his brother; Schizoaffective Disorder  in his sister  He  reports that he quit smoking about 6 years ago  His smoking use included cigars and cigarettes  He has never used smokeless tobacco  He reports current alcohol use  He reports that he does not use drugs  Current Outpatient Medications   Medication Sig Dispense Refill    acetaminophen (TYLENOL) 500 mg tablet Take 3 tablets by mouth as needed       albuterol (2 5 mg/3 mL) 0 083 % nebulizer solution Take 1 vial (2 5 mg total) by nebulization 4 (four) times a day 120 vial 3    albuterol (PROVENTIL HFA,VENTOLIN HFA) 90 mcg/act inhaler Inhale 2 puffs every 6 (six) hours as needed for wheezing   ergocalciferol (VITAMIN D2) 50,000 units Take 1 capsule (50,000 Units total) by mouth once a week (Patient not taking: Reported on 6/17/2020) 4 capsule 3    fenofibrate (TRICOR) 145 mg tablet Take 1 tablet (145 mg total) by mouth daily 90 tablet 3    fluticasone (FLONASE) 50 mcg/act nasal spray 1 spray into each nostril daily (Patient taking differently: 1 spray into each nostril daily as needed ) 11 Bottle 3    folic acid (FOLVITE) 1 mg tablet Take 1 tablet (1 mg total) by mouth daily 30 tablet 0    mometasone-formoterol (DULERA) 200-5 MCG/ACT inhaler Inhale 2 puffs 2 (two) times a day        montelukast (SINGULAIR) 10 mg tablet take 1 tablet by mouth at bedtime 30 tablet 3    pantoprazole (PROTONIX) 40 mg tablet Take 1 tablet (40 mg total) by mouth daily 90 tablet 3    rosuvastatin (CRESTOR) 20 MG tablet take 1 tablet by mouth once daily 30 tablet 5    sildenafil (REVATIO) 20 mg tablet TAKE 1 TABLET (20 MG TOTAL) BY MOUTH DAILY AS DIRECTED 90 tablet 3    traMADol (ULTRAM) 50 mg tablet Take 1 PO three times daily PRN for pain  Ongoing therapy 90 tablet 2     No current facility-administered medications for this visit  Current Outpatient Medications on File Prior to Visit   Medication Sig    acetaminophen (TYLENOL) 500 mg tablet Take 3 tablets by mouth as needed     albuterol (2 5 mg/3 mL) 0 083 % nebulizer solution Take 1 vial (2 5 mg total) by nebulization 4 (four) times a day    albuterol (PROVENTIL HFA,VENTOLIN HFA) 90 mcg/act inhaler Inhale 2 puffs every 6 (six) hours as needed for wheezing   ergocalciferol (VITAMIN D2) 50,000 units Take 1 capsule (50,000 Units total) by mouth once a week (Patient not taking: Reported on 6/17/2020)    fenofibrate (TRICOR) 145 mg tablet Take 1 tablet (145 mg total) by mouth daily    fluticasone (FLONASE) 50 mcg/act nasal spray 1 spray into each nostril daily (Patient taking differently: 1 spray into each nostril daily as needed )    folic acid (FOLVITE) 1 mg tablet Take 1 tablet (1 mg total) by mouth daily    mometasone-formoterol (DULERA) 200-5 MCG/ACT inhaler Inhale 2 puffs 2 (two) times a day   montelukast (SINGULAIR) 10 mg tablet take 1 tablet by mouth at bedtime    pantoprazole (PROTONIX) 40 mg tablet Take 1 tablet (40 mg total) by mouth daily    rosuvastatin (CRESTOR) 20 MG tablet take 1 tablet by mouth once daily    sildenafil (REVATIO) 20 mg tablet TAKE 1 TABLET (20 MG TOTAL) BY MOUTH DAILY AS DIRECTED    traMADol (ULTRAM) 50 mg tablet Take 1 PO three times daily PRN for pain  Ongoing therapy     No current facility-administered medications on file prior to visit  He has No Known Allergies       Review of Systems  Pertinent items are noted in HPI  Results for [place] Oneill (MRN [id number]) as of 9/5/2020 05:53   Ref   Range 8/25/2020 08:04 9/1/2020 09:05   Sodium Latest Ref Range: 136 - 145 mmol/L 139    Potassium Latest Ref Range: 3 5 - 5 3 mmol/L 4 3    Chloride Latest Ref Range: 100 - 108 mmol/L 107    CO2 Latest Ref Range: 21 - 32 mmol/L 30    Anion Gap Latest Ref Range: 4 - 13 mmol/L 2 (L)    BUN Latest Ref Range: 5 - 25 mg/dL 18    Creatinine Latest Ref Range: 0 60 - 1 30 mg/dL 1 48 (H)    GLUCOSE FASTING Latest Ref Range: 65 - 99 mg/dL 137 (H)    Calcium Latest Ref Range: 8 3 - 10 1 mg/dL 9 4    AST Latest Ref Range: 5 - 45 U/L 30    ALT Latest Ref Range: 12 - 78 U/L 40    Alkaline Phosphatase Latest Ref Range: 46 - 116 U/L 58    Total Protein Latest Ref Range: 6 4 - 8 2 g/dL 7 2    Albumin Latest Ref Range: 3 5 - 5 0 g/dL 4 0    TOTAL BILIRUBIN Latest Ref Range: 0 20 - 1 00 mg/dL 0 67    eGFR Latest Units: ml/min/1 73sq m 52    Phosphorus Latest Ref Range: 2 7 - 4 5 mg/dL 2 3 (L)    Cholesterol Latest Ref Range: 50 - 200 mg/dL 93    Triglycerides Latest Ref Range: <=150 mg/dL 347 (H)    HDL Latest Ref Range: >=40 mg/dL 25 (L)    Non-HDL Cholesterol Latest Units: mg/dl 68    LDL Calculated Latest Ref Range: 0 - 100 mg/dL <0 (L)    Vit D, 25-Hydroxy Latest Ref Range: 30 0 - 100 0 ng/mL 45 7 34 2   WBC Latest Ref Range: 4 31 - 10 16 Thousand/uL 6 14    Red Blood Cell Count Latest Ref Range: 3 88 - 5 62 Million/uL 4 71    Hemoglobin Latest Ref Range: 12 0 - 17 0 g/dL 15 0    HCT Latest Ref Range: 36 5 - 49 3 % 46 2    MCV Latest Ref Range: 82 - 98 fL 98    MCH Latest Ref Range: 26 8 - 34 3 pg 31 8    MCHC Latest Ref Range: 31 4 - 37 4 g/dL 32 5    RDW Latest Ref Range: 11 6 - 15 1 % 12 8    Platelet Count Latest Ref Range: 149 - 390 Thousands/uL 220    MPV Latest Ref Range: 8 9 - 12 7 fL 10 9    nRBC Latest Units: /100 WBCs 0    Neutrophils % Latest Ref Range: 43 - 75 % 61    Immat GRANS % Latest Ref Range: 0 - 2 % 0    Lymphocytes Relative Latest Ref Range: 14 - 44 % 27    Monocytes Relative Latest Ref Range: 4 - 12 % 9    Eosinophils Latest Ref Range: 0 - 6 % 2    Basophils Relative Latest Ref Range: 0 - 1 % 1    Immature Grans Absolute Latest Ref Range: 0 00 - 0 20 Thousand/uL 0 02    Absolute Neutrophils Latest Ref Range: 1 85 - 7 62 Thousands/µL 3 75    Lymphocytes Absolute Latest Ref Range: 0 60 - 4 47 Thousands/µL 1 64    Absolute Monocytes Latest Ref Range: 0 17 - 1 22 Thousand/µL 0 55    Absolute Eosinophils Latest Ref Range: 0 00 - 0 61 Thousand/µL 0 15    Basophils Absolute Latest Ref Range: 0 00 - 0 10 Thousands/µL 0 03    Protime Latest Ref Range: 11 6 - 14 5 seconds  13 1   INR Latest Ref Range: 0 84 - 1 19   0 97       Objective: There were no vitals taken for this visit      General Appearance:    Alert, cooperative, no distress, appears stated age   Head:    Normocephalic, without obvious abnormality, atraumatic   Eyes:    PERRL, conjunctiva/corneas clear, EOM's intact, fundi     benign, both eyes        Ears:    Normal TM's and external ear canals, both ears   Nose:   Nares normal, septum midline, mucosa normal, no drainage    or sinus tenderness   Throat:   Lips, mucosa, and tongue normal; teeth and gums normal   Neck:   Supple, symmetrical, trachea midline, no adenopathy;        thyroid:  No enlargement/tenderness/nodules; no carotid    bruit or JVD   Back:     Symmetric, no curvature, ROM normal, no CVA tenderness   Lungs:     Clear to auscultation bilaterally, respirations unlabored   Chest wall:    No tenderness or deformity   Heart:    Regular rate and rhythm, S1 and S2 normal, no murmur, rub   or gallop   Abdomen:     Soft, non-tender, bowel sounds active all four quadrants,     no masses, no organomegaly   Genitalia:     Rectal:     Extremities:   Extremities normal, atraumatic, no cyanosis or edema   Pulses:   2+ and symmetric all extremities   Skin:   Skin color, texture, turgor normal, no rashes or lesions   Lymph nodes: Cervical, supraclavicular, and axillary nodes normal   Neurologic:   CNII-XII intact   Normal strength, sensation and reflexes       throughout         Cardiographics  ECG: normal sinus rhythm, no blocks or conduction defects, no ischemic changes    Imaging  Chest x-ray: normal     Lab Review   Admission on 09/07/2020, Discharged on 09/07/2020   Component Date Value    WBC 09/07/2020 5 00     RBC 09/07/2020 4 63     Hemoglobin 09/07/2020 15 1     Hematocrit 09/07/2020 44 0     MCV 09/07/2020 95     MCH 09/07/2020 32 6     MCHC 09/07/2020 34 3     RDW 09/07/2020 13 2     MPV 09/07/2020 8 7     Platelets 16/88/8632 195     Neutrophils Relative 09/07/2020 52     Lymphocytes Relative 09/07/2020 34     Monocytes Relative 09/07/2020 10     Eosinophils Relative 09/07/2020 3     Basophils Relative 09/07/2020 1     Neutrophils Absolute 09/07/2020 2 60     Lymphocytes Absolute 09/07/2020 1 70     Monocytes Absolute 09/07/2020 0 50     Eosinophils Absolute 09/07/2020 0 10     Basophils Absolute 09/07/2020 0 00     Color, UA 09/07/2020 Yellow     Clarity, UA 09/07/2020 Clear     Specific Gravity, UA 09/07/2020 >=1 030*    pH, UA 09/07/2020 5 0     Leukocytes, UA 09/07/2020 Negative     Nitrite, UA 09/07/2020 Negative     Protein, UA 09/07/2020 Negative     Glucose, UA 09/07/2020 Trace*    Ketones, UA 09/07/2020 Negative     Urobilinogen, UA 09/07/2020 0 2     Bilirubin, UA 09/07/2020 Negative     Blood, UA 09/07/2020 Negative     Sodium 09/07/2020 139     Potassium 09/07/2020 4 3     Chloride 09/07/2020 105     CO2 09/07/2020 29     ANION GAP 09/07/2020 5     BUN 09/07/2020 25     Creatinine 09/07/2020 1 47*    Glucose 09/07/2020 163*    Calcium 09/07/2020 9 5     eGFR 09/07/2020 53    Appointment on 09/01/2020   Component Date Value    CRP 09/01/2020 <3 0     Protime 09/01/2020 13 1     INR 09/01/2020 0 97     PTT 09/01/2020 26     ABO Grouping 09/01/2020 O     Rh Factor 09/01/2020 Positive     Antibody Screen 09/01/2020 Negative     Specimen Expiration Date 09/01/2020 [id number]     Vit D, 25-Hydroxy 09/01/2020 34 2    Office Visit on 09/01/2020   Component Date Value    Ventricular Rate 09/01/2020 56     Atrial Rate 09/01/2020 56     OH Interval 09/01/2020 150     QRSD Interval 09/01/2020 94     QT Interval 09/01/2020 408     QTC Interval 09/01/2020 393     P Axis 09/01/2020 43     QRS Axis 09/01/2020 -16     T Wave Axis 09/01/2020 -17    Appointment on 08/25/2020   Component Date Value    Sodium 08/25/2020 139     Potassium 08/25/2020 4 3     Chloride 08/25/2020 107     CO2 08/25/2020 30     ANION GAP 08/25/2020 2*    BUN 08/25/2020 18     Creatinine 08/25/2020 1 48*    Glucose, Fasting 08/25/2020 137*    Calcium 08/25/2020 9 4     AST 08/25/2020 30     ALT 08/25/2020 40     Alkaline Phosphatase 08/25/2020 58     Total Protein 08/25/2020 7 2     Albumin 08/25/2020 4 0     Total Bilirubin 08/25/2020 0 67     eGFR 08/25/2020 52     Hemoglobin A1C 08/25/2020 6 2*    EAG 08/25/2020 131     WBC 08/25/2020 6 14     RBC 08/25/2020 4 71     Hemoglobin 08/25/2020 15 0     Hematocrit 08/25/2020 46 2     MCV 08/25/2020 98     MCH 08/25/2020 31 8     MCHC 08/25/2020 32 5     RDW 08/25/2020 12 8     MPV 08/25/2020 10 9     Platelets 75/12/0564 220     nRBC 08/25/2020 0     Neutrophils Relative 08/25/2020 61     Immat GRANS % 08/25/2020 0     Lymphocytes Relative 08/25/2020 27     Monocytes Relative 08/25/2020 9     Eosinophils Relative 08/25/2020 2     Basophils Relative 08/25/2020 1     Neutrophils Absolute 08/25/2020 3 75     Immature Grans Absolute 08/25/2020 0 02     Lymphocytes Absolute 08/25/2020 1 64     Monocytes Absolute 08/25/2020 0 55     Eosinophils Absolute 08/25/2020 0 15     Basophils Absolute 08/25/2020 0 03     Cholesterol 08/25/2020 93     Triglycerides 08/25/2020 347*    HDL, Direct 08/25/2020 25*    LDL Calculated 08/25/2020 <0*    Non-HDL-Chol (CHOL-HDL) 08/25/2020 68     Phosphorus 08/25/2020 2 3*    Creatinine, Ur 08/25/2020 211 0     Protein Urine Random 08/25/2020 12     Prot/Creat Ratio, Ur 08/25/2020 0 06     PTH 08/25/2020 44 8     Color, UA 08/25/2020 Yellow     Clarity, UA 08/25/2020 Clear     Specific Gravity, UA 08/25/2020 1 026     pH, UA 08/25/2020 6 0     Leukocytes, UA 08/25/2020 Negative     Nitrite, UA 08/25/2020 Negative     Protein, UA 08/25/2020 Negative     Glucose, UA 08/25/2020 Negative     Ketones, UA 08/25/2020 Negative     Urobilinogen, UA 08/25/2020 1 0     Bilirubin, UA 08/25/2020 Negative     Blood, UA 08/25/2020 Negative     Vit D, 25-Hydroxy 08/25/2020 45 7         Assessment:     64 yo male with planned surgery as above  Known risk factors for perioperative complications: None  gerd, asthma, hld, obesity    Difficulty with intubation is not anticipated  Cardiac Risk Estimation: LOW    Current medications which may produce withdrawal symptoms if withheld perioperatively: none     Plan:     1  Preoperative workup as follows chest x-ray, ECG, hemoglobin, hematocrit, electrolytes, creatinine  2  Change in medication regimen before surgery: none, continue medication regimen including morning of surgery, with sip of water

## 2020-09-07 ENCOUNTER — APPOINTMENT (EMERGENCY)
Dept: CT IMAGING | Facility: HOSPITAL | Age: 55
End: 2020-09-07
Payer: MEDICARE

## 2020-09-07 ENCOUNTER — HOSPITAL ENCOUNTER (EMERGENCY)
Facility: HOSPITAL | Age: 55
Discharge: HOME | End: 2020-09-07
Attending: FAMILY MEDICINE
Payer: MEDICARE

## 2020-09-07 VITALS
RESPIRATION RATE: 18 BRPM | TEMPERATURE: 98 F | OXYGEN SATURATION: 98 % | HEIGHT: 73 IN | HEART RATE: 62 BPM | WEIGHT: 225 LBS | SYSTOLIC BLOOD PRESSURE: 123 MMHG | DIASTOLIC BLOOD PRESSURE: 57 MMHG | BODY MASS INDEX: 29.82 KG/M2

## 2020-09-07 DIAGNOSIS — R10.9 RIGHT FLANK PAIN: Primary | ICD-10-CM

## 2020-09-07 LAB
ANION GAP SERPL CALCULATED.3IONS-SCNC: 5 MMOL/L (ref 4–13)
BASOPHILS # BLD AUTO: 0 THOUSANDS/ΜL (ref 0–0.1)
BASOPHILS NFR BLD AUTO: 1 % (ref 0–2)
BILIRUB UR QL STRIP: NEGATIVE
BUN SERPL-MCNC: 25 MG/DL (ref 7–25)
CALCIUM SERPL-MCNC: 9.5 MG/DL (ref 8.6–10.5)
CHLORIDE SERPL-SCNC: 105 MMOL/L (ref 98–107)
CLARITY UR: CLEAR
CO2 SERPL-SCNC: 29 MMOL/L (ref 21–31)
COLOR UR: YELLOW
CREAT SERPL-MCNC: 1.47 MG/DL (ref 0.7–1.3)
EOSINOPHIL # BLD AUTO: 0.1 THOUSAND/ΜL (ref 0–0.61)
EOSINOPHIL NFR BLD AUTO: 3 % (ref 0–5)
ERYTHROCYTE [DISTWIDTH] IN BLOOD BY AUTOMATED COUNT: 13.2 % (ref 11.5–14.5)
GFR SERPL CREATININE-BSD FRML MDRD: 53 ML/MIN/1.73SQ M
GLUCOSE SERPL-MCNC: 163 MG/DL (ref 65–99)
GLUCOSE UR STRIP-MCNC: ABNORMAL MG/DL
HCT VFR BLD AUTO: 44 % (ref 42–47)
HGB BLD-MCNC: 15.1 G/DL (ref 14–18)
HGB UR QL STRIP.AUTO: NEGATIVE
KETONES UR STRIP-MCNC: NEGATIVE MG/DL
LEUKOCYTE ESTERASE UR QL STRIP: NEGATIVE
LYMPHOCYTES # BLD AUTO: 1.7 THOUSANDS/ΜL (ref 0.6–4.47)
LYMPHOCYTES NFR BLD AUTO: 34 % (ref 21–51)
MCH RBC QN AUTO: 32.6 PG (ref 26–34)
MCHC RBC AUTO-ENTMCNC: 34.3 G/DL (ref 31–37)
MCV RBC AUTO: 95 FL (ref 81–99)
MONOCYTES # BLD AUTO: 0.5 THOUSAND/ΜL (ref 0.17–1.22)
MONOCYTES NFR BLD AUTO: 10 % (ref 2–12)
NEUTROPHILS # BLD AUTO: 2.6 THOUSANDS/ΜL (ref 1.4–6.5)
NEUTS SEG NFR BLD AUTO: 52 % (ref 42–75)
NITRITE UR QL STRIP: NEGATIVE
PH UR STRIP.AUTO: 5 [PH]
PLATELET # BLD AUTO: 195 THOUSANDS/UL (ref 149–390)
PMV BLD AUTO: 8.7 FL (ref 8.6–11.7)
POTASSIUM SERPL-SCNC: 4.3 MMOL/L (ref 3.5–5.5)
PROT UR STRIP-MCNC: NEGATIVE MG/DL
RBC # BLD AUTO: 4.63 MILLION/UL (ref 4.3–5.9)
SODIUM SERPL-SCNC: 139 MMOL/L (ref 134–143)
SP GR UR STRIP.AUTO: >=1.03 (ref 1–1.03)
UROBILINOGEN UR QL STRIP.AUTO: 0.2 E.U./DL
WBC # BLD AUTO: 5 THOUSAND/UL (ref 4.8–10.8)

## 2020-09-07 PROCEDURE — G1004 CDSM NDSC: HCPCS

## 2020-09-07 PROCEDURE — 80048 BASIC METABOLIC PNL TOTAL CA: CPT | Performed by: FAMILY MEDICINE

## 2020-09-07 PROCEDURE — 81003 URINALYSIS AUTO W/O SCOPE: CPT | Performed by: FAMILY MEDICINE

## 2020-09-07 PROCEDURE — 96374 THER/PROPH/DIAG INJ IV PUSH: CPT

## 2020-09-07 PROCEDURE — 74176 CT ABD & PELVIS W/O CONTRAST: CPT

## 2020-09-07 PROCEDURE — 99284 EMERGENCY DEPT VISIT MOD MDM: CPT

## 2020-09-07 PROCEDURE — 36415 COLL VENOUS BLD VENIPUNCTURE: CPT | Performed by: FAMILY MEDICINE

## 2020-09-07 PROCEDURE — 96375 TX/PRO/DX INJ NEW DRUG ADDON: CPT

## 2020-09-07 PROCEDURE — 85025 COMPLETE CBC W/AUTO DIFF WBC: CPT | Performed by: FAMILY MEDICINE

## 2020-09-07 PROCEDURE — 99284 EMERGENCY DEPT VISIT MOD MDM: CPT | Performed by: FAMILY MEDICINE

## 2020-09-07 PROCEDURE — 96361 HYDRATE IV INFUSION ADD-ON: CPT

## 2020-09-07 RX ORDER — FENTANYL CITRATE 50 UG/ML
50 INJECTION, SOLUTION INTRAMUSCULAR; INTRAVENOUS ONCE
Status: COMPLETED | OUTPATIENT
Start: 2020-09-07 | End: 2020-09-07

## 2020-09-07 RX ORDER — ONDANSETRON 2 MG/ML
4 INJECTION INTRAMUSCULAR; INTRAVENOUS ONCE
Status: COMPLETED | OUTPATIENT
Start: 2020-09-07 | End: 2020-09-07

## 2020-09-07 RX ORDER — HYDROMORPHONE HCL/PF 1 MG/ML
0.5 SYRINGE (ML) INJECTION ONCE
Status: COMPLETED | OUTPATIENT
Start: 2020-09-07 | End: 2020-09-07

## 2020-09-07 RX ADMIN — FENTANYL CITRATE 50 MCG: 50 INJECTION INTRAMUSCULAR; INTRAVENOUS at 08:52

## 2020-09-07 RX ADMIN — ONDANSETRON 4 MG: 2 INJECTION INTRAMUSCULAR; INTRAVENOUS at 08:53

## 2020-09-07 RX ADMIN — SODIUM CHLORIDE 1000 ML: 0.9 INJECTION, SOLUTION INTRAVENOUS at 08:51

## 2020-09-07 RX ADMIN — HYDROMORPHONE HYDROCHLORIDE 0.5 MG: 1 INJECTION, SOLUTION INTRAMUSCULAR; INTRAVENOUS; SUBCUTANEOUS at 10:35

## 2020-09-07 NOTE — ED PROVIDER NOTES
History  Chief Complaint   Patient presents with    Flank Pain     Right sided flank pain     HPI  This is a 58-year-old male presented ED with complain of right-sided flank pain x2 weeks  Patient states he has pain initially started about 2 weeks ago called his orthopedic who recommended to take medication at home  Patient states that he scheduled for right hip replacement end of September  He denies any recent trauma or injury  He is currently rating his pain a 5/10  He states nothing makes the pain better or worse  Patient also states that he has history of kidney stone and is unsure if this is 1 of his this kidney stone as well  He is denying any numbness or tingling he denies any saddle paresthesia  He denies any urinary bowel incontinence  Patient is denying any dysuria urgency or frequency  He states he took Tylenol without any improvement  Patient is ambulatory in the ED  Prior to Admission Medications   Prescriptions Last Dose Informant Patient Reported?  Taking?   acetaminophen (TYLENOL) 500 mg tablet  Self Yes No   Sig: Take 3 tablets by mouth as needed    albuterol (2 5 mg/3 mL) 0 083 % nebulizer solution  Self No No   Sig: Take 1 vial (2 5 mg total) by nebulization 4 (four) times a day   albuterol (PROVENTIL HFA,VENTOLIN HFA) 90 mcg/act inhaler  Self Yes No   Sig: Inhale 2 puffs every 6 (six) hours as needed for wheezing    ergocalciferol (VITAMIN D2) 50,000 units  Self No No   Sig: Take 1 capsule (50,000 Units total) by mouth once a week   Patient not taking: Reported on 2020   fenofibrate (TRICOR) 145 mg tablet  Self No No   Sig: Take 1 tablet (145 mg total) by mouth daily   fluticasone (FLONASE) 50 mcg/act nasal spray  Self No No   Si spray into each nostril daily   Patient taking differently: 1 spray into each nostril daily as needed    folic acid (FOLVITE) 1 mg tablet  Self No No   Sig: Take 1 tablet (1 mg total) by mouth daily   mometasone-formoterol (DULERA) 200-5 MCG/ACT inhaler  Self Yes No   Sig: Inhale 2 puffs 2 (two) times a day  montelukast (SINGULAIR) 10 mg tablet   No No   Sig: take 1 tablet by mouth at bedtime   pantoprazole (PROTONIX) 40 mg tablet  Self No No   Sig: Take 1 tablet (40 mg total) by mouth daily   rosuvastatin (CRESTOR) 20 MG tablet  Self No No   Sig: take 1 tablet by mouth once daily   sildenafil (REVATIO) 20 mg tablet  Self No No   Sig: TAKE 1 TABLET (20 MG TOTAL) BY MOUTH DAILY AS DIRECTED   traMADol (ULTRAM) 50 mg tablet  Self No No   Sig: Take 1 PO three times daily PRN for pain  Ongoing therapy      Facility-Administered Medications: None       Past Medical History:   Diagnosis Date    Aorta aneurysm ([address] )     4 3    Arm D[facility]embol[facility] 7032    complications of cardiac cath    Asthma     Chronic kidney disease     CKD (chronic kidney disease), stage III (HCC)     COPD (chronic obstructive pulmonary disease) (HCC)     Depression     Essential hypertriglyceridemia     GERD (gastroesophageal reflux disease)     Headache     Hiatal hernia     Hyperlipidemia, mixed 5/7/2018    Liver disease     FATTY LIVER    PAC (premature atrial contraction)     Prediabetes     Renal calculi     Sleep apnea     Vestibular migraine        Past Surgical History:   Procedure Laterality Date    CARPAL TUNNEL RELEASE Left     COLONOSCOPY      FL INJECTION RIGHT HIP (ARTHROGRAM)  8/20/2018    FOOT SURGERY Left     FOREIGN BODY REMOVAL    HERNIA REPAIR      MS COLONOSCOPY FLX DX W/COLLJ SPEC WHEN PFRMD N/A 8/7/2017    Procedure: COLONOSCOPY;  Surgeon: Pilar Del Cid MD;  [facility]; Service: Gastroenterology    MS ESOPHAGOGASTRODUODENOSCOPY TRANSORAL DIAGNOSTIC N/A 10/31/2017    Procedure: ESOPHAGOGASTRODUODENOSCOPY (EGD); Surgeon: Pilar Del Cid MD;  Location[facility];   Service: Gastroenterology       Family History   Problem Relation Age of Onset    Cancer Brother     Prostate cancer Brother     Leukemia Brother         his only brother dies from lymphoma or leukemia pt unsure he was only 47    Arthritis Mother     Heart attack Father     Clotting disorder Father     Schizoaffective Disorder  Sister     Aortic aneurysm Other         abdominal     I have reviewed and agree with the history as documented  E-Cigarette/Vaping    E-Cigarette Use Never User      E-Cigarette/Vaping Substances    Nicotine No     THC No     CBD No     Flavoring No     Other No     Unknown No      Social History     Tobacco Use    Smoking status: Former Smoker     Types: Cigars, Cigarettes     Last attempt to quit: 2014     Years since quittin 0    Smokeless tobacco: Never Used   Substance Use Topics    Alcohol use: Yes     Comment: occasional    Drug use: No       Review of Systems   Constitutional: Negative  HENT: Negative  Eyes: Negative  Respiratory: Negative  Negative for cough and shortness of breath  Gastrointestinal: Negative  Endocrine: Negative  Genitourinary: Positive for flank pain (right flank pain )  Allergic/Immunologic: Negative  Negative for environmental allergies  Neurological: Negative  Hematological: Negative  Psychiatric/Behavioral: Negative  Physical Exam  Physical Exam  Vitals signs and nursing note reviewed  Constitutional:       Appearance: Normal appearance  HENT:      Head: Normocephalic and atraumatic  Nose: Nose normal       Mouth/Throat:      Mouth: Mucous membranes are moist    Eyes:      Extraocular Movements: Extraocular movements intact  Conjunctiva/sclera: Conjunctivae normal       Pupils: Pupils are equal, round, and reactive to light  Neck:      Musculoskeletal: Normal range of motion and neck supple  Cardiovascular:      Rate and Rhythm: Normal rate and regular rhythm  Pulmonary:      Effort: Pulmonary effort is normal       Breath sounds: Normal breath sounds     Abdominal:      General: Bowel sounds are normal  Palpations: Abdomen is soft  There is no mass  Musculoskeletal:        Arms:    Skin:     General: Skin is warm  Neurological:      General: No focal deficit present  Mental Status: He is alert and oriented to person, place, and time           Vital Signs  ED Triage Vitals [09/07/20 0815]   Temperature Pulse Respirations Blood Pressure SpO2   98 °F (36 7 °C) 73 17 153/84 97 %      Temp Source Heart Rate Source Patient Position - Orthostatic VS BP Location FiO2 (%)   Temporal Monitor Sitting Left arm --      Pain Score       --           Vitals:    09/07/20 0815   BP: 153/84   Pulse: 73   Patient Position - Orthostatic VS: Sitting         Visual Acuity      ED Medications  Medications   sodium chloride 0 9 % bolus 1,000 mL (1,000 mL Intravenous New Bag 9/7/20 0851)   fentanyl citrate (PF) 100 MCG/2ML 50 mcg (50 mcg Intravenous Given 9/7/20 0852)   ondansetron (ZOFRAN) injection 4 mg (4 mg Intravenous Given 9/7/20 0853)   HYDROmorphone (DILAUDID) injection 0 5 mg (0 5 mg Intravenous Given 9/7/20 1035)       Diagnostic Studies  Results Reviewed     Procedure Component Value Units Date/Time    Basic metabolic panel [[id number]]  (Abnormal) Collected:  09/07/20 0838    Lab Status:  Final result Specimen:  Blood from Arm, Right Updated:  09/07/20 0914     Sodium 139 mmol/L      Potassium 4 3 mmol/L      Chloride 105 mmol/L      CO2 29 mmol/L      ANION GAP 5 mmol/L      BUN 25 mg/dL      Creatinine 1 47 mg/dL      Glucose 163 mg/dL      Calcium 9 5 mg/dL      eGFR 53 ml/min/1 73sq m     Narrative:       [place] guidelines for Chronic Kidney Disease (CKD):     Stage 1 with normal or high GFR (GFR > 90 mL/min/1 73 square meters)    Stage 2 Mild CKD (GFR = 60-89 mL/min/1 73 square meters)    Stage 3A Moderate CKD (GFR = 45-59 mL/min/1 73 square meters)    Stage 3B Moderate CKD (GFR = 30-44 mL/min/1 73 square meters)    Stage 4 Severe CKD (GFR = 15-29 mL/min/1 73 square meters)   Stage 5 End Stage CKD (GFR <15 mL/min/1 73 square meters)  Note: GFR calculation is accurate only with a steady state creatinine    UA w Reflex to Microscopic w Reflex to Culture [[id number]]  (Abnormal) Collected:  09/07/20 0850    Lab Status:  Final result Specimen:  Urine, Clean Catch Updated:  09/07/20 0856     Color, UA Yellow     Clarity, UA Clear     Specific Gravity, UA >=1 030     pH, UA 5 0     Leukocytes, UA Negative     Nitrite, UA Negative     Protein, UA Negative mg/dl      Glucose, UA Trace mg/dl      Ketones, UA Negative mg/dl      Urobilinogen, UA 0 2 E U /dl      Bilirubin, UA Negative     Blood, UA Negative    CBC and differential [[id number]]  (Normal) Collected:  09/07/20 0838    Lab Status:  Final result Specimen:  Blood from Arm, Right Updated:  09/07/20 0848     WBC 5 00 Thousand/uL      RBC 4 63 Million/uL      Hemoglobin 15 1 g/dL      Hematocrit 44 0 %      MCV 95 fL      MCH 32 6 pg      MCHC 34 3 g/dL      RDW 13 2 %      MPV 8 7 fL      Platelets 533 Thousands/uL      Neutrophils Relative 52 %      Lymphocytes Relative 34 %      Monocytes Relative 10 %      Eosinophils Relative 3 %      Basophils Relative 1 %      Neutrophils Absolute 2 60 Thousands/µL      Lymphocytes Absolute 1 70 Thousands/µL      Monocytes Absolute 0 50 Thousand/µL      Eosinophils Absolute 0 10 Thousand/µL      Basophils Absolute 0 00 Thousands/µL                  CT renal stone study abdomen pelvis wo contrast   Final Result by Chuckie Guevara MD (09/07 0945)      No urinary tract calculi identified  Workstation performed: [id number]                    Procedures  Procedures         ED Course  ED Course as of Sep 07 1206   [facility] Sep 07, 2020   1157 Patient states he is feeling much better recommended to follow-up with orthopedic  CT negative  US AUDIT      Most Recent Value   Initial Alcohol Screen: US AUDIT-C    1   How often do you have a drink containing alcohol?  0 Filed at: 09/07/2020 4884 2  How many drinks containing alcohol do you have on a typical day you are drinking? 0 Filed at: 09/07/2020 0818   3a  Male UNDER 65: How often do you have five or more drinks on one occasion? 0 Filed at: 09/07/2020 0818   Audit-C Score  0 Filed at: 09/07/2020 0818                  ABBEY/DAST-10      Most Recent Value   How many times in the past year have you    Used an illegal drug or used a prescription medication for non-medical reasons? Never Filed at: 09/07/2020 3005                                MDM      Disposition  Final diagnoses:   Right flank pain     Time reflects when diagnosis was documented in both MDM as applicable and the Disposition within this note     Time User Action Codes Description Comment    9/7/2020 12:01 PM Cyndie España [R10 9] Right flank pain       ED Disposition     ED Disposition Condition Date/Time Comment    Discharge Stable Mon Sep 7, 2020 12:01 PM Nabeel discharge to home/self care  Follow-up Information     Follow up With Specialties Details Why [address] [place], [place] Nurse Practitioner Schedule an appointment as soon as possible for a visit in 2 days If symptoms worsen [address] [facility]  [phone]            Patient's Medications   Discharge Prescriptions    No medications on file     No discharge procedures on file      PDMP Review       Value Time User    PDMP Reviewed  Yes 7/16/2020  2:15 PM Iveth Tovar          ED Provider  Electronically Signed by           Cristy Chowdhury MD  09/07/20 7615

## 2020-09-08 ENCOUNTER — TELEPHONE (OUTPATIENT)
Dept: OBGYN CLINIC | Facility: HOSPITAL | Age: 55
End: 2020-09-08

## 2020-09-08 NOTE — TELEPHONE ENCOUNTER
Hubert Arrow  [phone]    Dr Maribeth Clemons,    Patient went to ED for back pain and was told to notify you because it may effect upcomming surgery  He would like call back on how he should proceed  Report in Epic  Pain level 7/10

## 2020-09-09 ENCOUNTER — OFFICE VISIT (OUTPATIENT)
Dept: OBGYN CLINIC | Facility: CLINIC | Age: 55
End: 2020-09-09
Payer: MEDICARE

## 2020-09-09 ENCOUNTER — TELEPHONE (OUTPATIENT)
Dept: OBGYN CLINIC | Facility: HOSPITAL | Age: 55
End: 2020-09-09

## 2020-09-09 ENCOUNTER — OFFICE VISIT (OUTPATIENT)
Dept: FAMILY MEDICINE CLINIC | Facility: CLINIC | Age: 55
End: 2020-09-09
Payer: MEDICARE

## 2020-09-09 ENCOUNTER — APPOINTMENT (OUTPATIENT)
Dept: RADIOLOGY | Facility: CLINIC | Age: 55
End: 2020-09-09
Payer: MEDICARE

## 2020-09-09 VITALS
DIASTOLIC BLOOD PRESSURE: 78 MMHG | HEART RATE: 77 BPM | HEIGHT: 73 IN | OXYGEN SATURATION: 95 % | SYSTOLIC BLOOD PRESSURE: 126 MMHG | WEIGHT: 224 LBS | TEMPERATURE: 98.6 F | BODY MASS INDEX: 29.69 KG/M2 | RESPIRATION RATE: 18 BRPM

## 2020-09-09 VITALS
WEIGHT: 224 LBS | HEART RATE: 72 BPM | DIASTOLIC BLOOD PRESSURE: 79 MMHG | TEMPERATURE: 98.4 F | HEIGHT: 73 IN | SYSTOLIC BLOOD PRESSURE: 113 MMHG | BODY MASS INDEX: 29.69 KG/M2

## 2020-09-09 DIAGNOSIS — F41.8 DEPRESSION WITH ANXIETY: ICD-10-CM

## 2020-09-09 DIAGNOSIS — Z01.818 PREOP EXAMINATION: Primary | ICD-10-CM

## 2020-09-09 DIAGNOSIS — E78.2 ELEVATED TRIGLYCERIDES WITH HIGH CHOLESTEROL: ICD-10-CM

## 2020-09-09 DIAGNOSIS — M48.00 CENTRAL STENOSIS OF SPINAL CANAL: ICD-10-CM

## 2020-09-09 DIAGNOSIS — K21.9 GASTROESOPHAGEAL REFLUX DISEASE WITHOUT ESOPHAGITIS: ICD-10-CM

## 2020-09-09 DIAGNOSIS — Z00.00 MEDICARE ANNUAL WELLNESS VISIT, SUBSEQUENT: ICD-10-CM

## 2020-09-09 DIAGNOSIS — M62.830 LUMBAR PARASPINAL MUSCLE SPASM: ICD-10-CM

## 2020-09-09 DIAGNOSIS — M54.50 LUMBAR PAIN: ICD-10-CM

## 2020-09-09 DIAGNOSIS — M16.11 ARTHRITIS OF RIGHT HIP: ICD-10-CM

## 2020-09-09 DIAGNOSIS — Z23 NEED FOR INFLUENZA VACCINATION: ICD-10-CM

## 2020-09-09 DIAGNOSIS — E55.9 VITAMIN D DEFICIENCY: ICD-10-CM

## 2020-09-09 DIAGNOSIS — E78.2 HYPERLIPIDEMIA, MIXED: ICD-10-CM

## 2020-09-09 DIAGNOSIS — M47.816 FACET ARTHROPATHY, LUMBAR: Primary | ICD-10-CM

## 2020-09-09 DIAGNOSIS — M51.26 BULGING OF LUMBAR INTERVERTEBRAL DISC: ICD-10-CM

## 2020-09-09 DIAGNOSIS — J45.20 MILD INTERMITTENT ASTHMA WITHOUT COMPLICATION: ICD-10-CM

## 2020-09-09 DIAGNOSIS — M62.9 HAMSTRING TIGHTNESS OF BOTH LOWER EXTREMITIES: ICD-10-CM

## 2020-09-09 DIAGNOSIS — R73.03 PREDIABETES: ICD-10-CM

## 2020-09-09 PROCEDURE — G0402 INITIAL PREVENTIVE EXAM: HCPCS | Performed by: NURSE PRACTITIONER

## 2020-09-09 PROCEDURE — 99214 OFFICE O/P EST MOD 30 MIN: CPT | Performed by: NURSE PRACTITIONER

## 2020-09-09 PROCEDURE — G0008 ADMIN INFLUENZA VIRUS VAC: HCPCS | Performed by: NURSE PRACTITIONER

## 2020-09-09 PROCEDURE — 72110 X-RAY EXAM L-2 SPINE 4/>VWS: CPT

## 2020-09-09 PROCEDURE — 99213 OFFICE O/P EST LOW 20 MIN: CPT | Performed by: FAMILY MEDICINE

## 2020-09-09 PROCEDURE — 90682 RIV4 VACC RECOMBINANT DNA IM: CPT | Performed by: NURSE PRACTITIONER

## 2020-09-09 RX ORDER — CYCLOBENZAPRINE HCL 10 MG
10 TABLET ORAL 3 TIMES DAILY PRN
Qty: 45 TABLET | Refills: 0 | Status: SHIPPED | OUTPATIENT
Start: 2020-09-09 | End: 2020-10-15 | Stop reason: SDUPTHER

## 2020-09-09 RX ORDER — FERROUS SULFATE 325(65) MG
325 TABLET ORAL
COMMUNITY
End: 2020-10-09

## 2020-09-09 RX ORDER — PREDNISONE 20 MG/1
20 TABLET ORAL 2 TIMES DAILY WITH MEALS
Qty: 10 TABLET | Refills: 0 | Status: SHIPPED | OUTPATIENT
Start: 2020-09-09 | End: 2020-09-14

## 2020-09-09 NOTE — PROGRESS NOTES
Assessment and Plan:     Problem List Items Addressed This Visit        Digestive    GERD (gastroesophageal reflux disease)       Respiratory    Mild intermittent asthma without complication       Musculoskeletal and Integument    Arthritis of right hip       Other    Prediabetes    Hyperlipidemia, mixed    Depression with anxiety    Vitamin D deficiency    Need for influenza vaccination    Relevant Orders    influenza vaccine, quadrivalent, recombinant, PF, 0 5 mL, for patients 18 yr+ (FLUBLOK) (Completed)    Elevated triglycerides with high cholesterol    Preop examination - Primary    Lumbar pain    Relevant Medications    cyclobenzaprine (FLEXERIL) 10 mg tablet    Other Relevant Orders    XR spine lumbar minimum 4 views non injury    Ambulatory referral to Physical Therapy    Ambulatory referral to Sports Medicine           Preventive health issues were discussed with patient, and age appropriate screening tests were ordered as noted in patient's After Visit Summary  Personalized health advice and appropriate referrals for health education or preventive services given if needed, as noted in patient's After Visit Summary       History of Present Illness:     Patient presents for Medicare Annual Wellness visit    Patient Care Team:  Halina Paget as PCP - General (Nurse Practitioner)  Analisa Altamirano MD as PCP - [address] [place] [address] (RTE)  Felecia Trammell MD as Consulting Physician (Cardiology)  Eren Méndez MD as Consulting Physician (Cardiothoracic Surgery)  Danelle Guzman MD as Consulting Physician (Orthopedic Surgery)  Jerone Sandhoff, MD as Consulting Physician (Neurology)  Hayden Strong MD as Consulting Physician (Nephrology)  Jenny Sweeney MD (Pain Medicine)  [facility]     Problem List:     Patient Active Problem List   Diagnosis    Dizziness    COPD [facility]chronic obstructive [facility] disease) [facility] )    Ascending aortic aneurysm ([facility] )    Bicuspid aortic valve    Adjustment reaction with anxiety and depression    Allergic rhinitis    GERD (gastroesophageal reflux disease)    Liver disease    Hiatal hernia    Encounter to establish care    Prediabetes    Diabetic [facility] exam [facility] )    CKD (chronic kidney disease) stage 3, GFR 30-59 ml/min ([facility])    Hyperlipidemia, mixed    Weight gain    Fatigue    Generalized abdominal pain    Mild intermittent asthma without complication    Seasonal allergic rhinitis due to pollen    Numbness of fingers of both hands    Depression with anxiety    Abdominal bloating    Vitamin D deficiency    Renal cyst, left    Hepatic cyst    Fatty liver disease, nonalcoholic    Erectile dysfunction    Carpal tunnel syndrome of right wrist    Chronic pain of right knee    Vestibular migraine    Patellar tendinitis of right knee    Hamstring tightness of right lower extremity    Benign paroxysmal positional vertigo due to bilateral vestibular disorder    Pain in right hip    Hip impingement syndrome, right    Primary osteoarthritis of right hip    Ulnar neuropathy of both upper extremities    Lumbosacral strain    Tarsal tunnel syndrome of right side    Cubital tunnel syndrome, bilateral    Need for influenza vaccination    Need for pneumococcal vaccination    Need for hepatitis C screening test    Groin pain, chronic, right    Elevated triglycerides with high cholesterol    Diaphoresis    Moderate persistent asthma with exacerbation    Cough    Eustachian tube disorder, left    Chronic pain syndrome    Chronic kidney disease-mineral and bone disorder    Right hand pain    Long-term current use of opiate analgesic    Uncomplicated opioid dependence (HCC)    Pre-operative clearance    Preop examination    Arthritis of right hip    Lumbar pain      Past Medical and Surgical History:     Past Medical History:   Diagnosis Date    Aorta aneurysm (HCC)     4 3    Arm DVT (deep venous thromboembolism), acute [facility]) 6336    complications of cardiac cath    Asthma     Chronic kidney disease     CKD (chronic kidney disease), stage III (HCC)     COPD (chronic obstructive pulmonary disease) (HCC)     Depression     Essential hypertriglyceridemia     GERD (gastroesophageal reflux disease)     Headache     Hiatal hernia     Hyperlipidemia, mixed 5/7/2018    Liver disease     FATTY LIVER    PAC (premature atrial contraction)     Prediabetes     Renal calculi     Sleep apnea     Vestibular migraine      Past Surgical History:   Procedure Laterality Date    CARPAL TUNNEL RELEASE Left     COLONOSCOPY      FL INJECTION RIGHT HIP (ARTHROGRAM)  8/20/2018    FOOT SURGERY Left     FOREIGN BODY REMOVAL    HERNIA REPAIR      CT COLONOSCOPY FLX DX W/COLLJ SPEC WHEN PFRMD N/A 8/7/2017    Procedure: COLONOSCOPY;  Surgeon: Yo Ramirez MD;  [facility]; Service: Gastroenterology    CT ESOPHAGOGASTRODUODENOSCOPY TRANSORAL DIAGNOSTIC N/A 10/31/2017    Procedure: ESOPHAGOGASTRODUODENOSCOPY (EGD); Surgeon: Yo Ramirez MD;  [facility];   Service: Gastroenterology      Family History:     Family History   Problem Relation Age of Onset    Cancer Brother     Prostate cancer Brother     Leukemia Brother         his only brother dies from [address] or leukemia pt unsure he was only 47    Arthritis Mother     Heart attack Father     Clotting disorder Father     Schizoaffective Disorder  Sister     Aortic aneurysm Other         abdominal      Social History:     E-Cigarette/Vaping    E-Cigarette Use Never User      E-Cigarette/Vaping Substances    Nicotine No     THC No     CBD No     Flavoring No     Other No     Unknown No      Social History     Socioeconomic History    Marital status:      Spouse name: None    Number of children: 2    Years of education: None    Highest education level: None   Occupational History    Occupation: unemployed   Social Needs    Financial resource strain: None    Food insecurity     Worry: None     Inability: None    Transportation needs     Medical: None     Non-medical: None   Tobacco Use    Smoking status: Former Smoker     Types: Cigars, Cigarettes     Last attempt to quit: 2014     Years since quittin 0    Smokeless tobacco: Never Used   Substance and Sexual Activity    Alcohol use: Yes     Comment: occasional    Drug use: No    Sexual activity: Not Currently   Lifestyle    Physical activity     Days per week: None     Minutes per session: None    Stress: None   Relationships    Social connections     Talks on phone: None     Gets together: None     Attends [employer] service: None     Active member of club or organization: None     Attends meetings of clubs or organizations: None     Relationship status: None    Intimate partner violence     Fear of current or ex partner: None     Emotionally abused: None     Physically abused: None     Forced sexual activity: None   Other Topics Concern    None   Social History Narrative    Caffeine use    Lives alone      Medications and Allergies:     Current Outpatient Medications   Medication Sig Dispense Refill    acetaminophen (TYLENOL) 500 mg tablet Take 3 tablets by mouth as needed       albuterol (2 5 mg/3 mL) 0 083 % nebulizer solution Take 1 vial (2 5 mg total) by nebulization 4 (four) times a day 120 vial 3    albuterol (PROVENTIL HFA,VENTOLIN HFA) 90 mcg/act inhaler Inhale 2 puffs every 6 (six) hours as needed for wheezing   fenofibrate (TRICOR) 145 mg tablet Take 1 tablet (145 mg total) by mouth daily 90 tablet 3    ferrous sulfate 325 (65 Fe) mg tablet Take 325 mg by mouth daily with breakfast      fluticasone (FLONASE) 50 mcg/act nasal spray 1 spray into each nostril daily (Patient taking differently: 1 spray into each nostril daily as needed ) 11 Bottle 3    mometasone-formoterol (DULERA) 200-5 MCG/ACT inhaler Inhale 2 puffs 2 (two) times a day        montelukast (SINGULAIR) 10 mg tablet take 1 tablet by mouth at bedtime 30 tablet 3    pantoprazole (PROTONIX) 40 mg tablet Take 1 tablet (40 mg total) by mouth daily 90 tablet 3    rosuvastatin (CRESTOR) 20 MG tablet take 1 tablet by mouth once daily 30 tablet 5    sildenafil (REVATIO) 20 mg tablet TAKE 1 TABLET (20 MG TOTAL) BY MOUTH DAILY AS DIRECTED 90 tablet 3    traMADol (ULTRAM) 50 mg tablet Take 1 PO three times daily PRN for pain  Ongoing therapy 90 tablet 2    cyclobenzaprine (FLEXERIL) 10 mg tablet Take 1 tablet (10 mg total) by mouth 3 (three) times a day as needed for muscle spasms for up to 15 days 45 tablet 0    ergocalciferol (VITAMIN D2) 50,000 units Take 1 capsule (50,000 Units total) by mouth once a week (Patient not taking: Reported on 6/17/2020) 4 capsule 3    folic acid (FOLVITE) 1 mg tablet Take 1 tablet (1 mg total) by mouth daily (Patient not taking: Reported on 9/9/2020) 30 tablet 0     No current facility-administered medications for this visit  No Known Allergies   Immunizations:     Immunization History   Administered Date(s) Administered    INFLUENZA 12/07/2016, 10/26/2017    Influenza TIV (IM) 04/15/2016    Influenza, recombinant, quadrivalent,injectable, preservative free 10/02/2018, 09/17/2019, 09/09/2020    Pneumococcal Conjugate 13-Valent 12/07/2016    Pneumococcal Polysaccharide PPV23 11/20/2018    Tdap 06/09/2017    Zoster 06/09/2017      Health Maintenance:         Topic Date Due    Hepatitis C Screening  Completed         Topic Date Due    Influenza Vaccine  07/01/2020      Medicare Health Risk Assessment:     /78 (BP Location: Left arm, Patient Position: Sitting)   Pulse 77   Temp 98 6 °F (37 °C)   Resp 18   Ht 6' 1" (1 854 m)   Wt 102 kg (224 lb)   SpO2 95%   BMI 29 55 kg/m²      Socrates Smith is here for his Subsequent Wellness visit  Health Risk Assessment:   Patient rates overall health as fair   Patient feels that their physical health rating is same  Eyesight was rated as same  Hearing was rated as same  Patient feels that their emotional and mental health rating is same  Pain experienced in the last 7 days has been a lot  Patient's pain rating has been 9/10  Patient states that he has experienced no weight loss or gain in last 6 months  Fall Risk Screening: In the past year, patient has experienced: no history of falling in past year      Home Safety:  Patient does not have trouble with stairs inside or outside of their home  Patient has working smoke alarms and has working carbon monoxide detector  Home safety hazards include: none  Nutrition:   Current diet is Regular  Medications:   Patient is not currently taking any over-the-counter supplements  Patient is able to manage medications  Activities of Daily Living (ADLs)/Instrumental Activities of Daily Living (IADLs):   Walk and transfer into and out of bed and chair?: Yes  Dress and groom yourself?: Yes    Bathe or shower yourself?: Yes    Feed yourself?  Yes  Do your laundry/housekeeping?: Yes  Manage your money, pay your bills and track your expenses?: Yes  Make your own meals?: Yes    Do your own shopping?: Yes    Previous Hospitalizations:   Any hospitalizations or ED visits within the last 12 months?: Yes    How many hospitalizations have you had in the last year?: 3-4    Advance Care Planning:   Living will: No    Durable POA for healthcare: No    Advanced directive: No    Advanced directive counseling given: Yes    Five wishes given: Yes    Patient declined ACP directive: No    End of Life Decisions reviewed with patient: Yes    Provider agrees with end of life decisions: Yes      Cognitive Screening:   Provider or family/friend/caregiver concerned regarding cognition?: No    PREVENTIVE SCREENINGS      Cardiovascular Screening:    General: Screening Not Indicated, History Lipid Disorder and Screening Current      Diabetes Screening:     General: Screening Current Colorectal Cancer Screening:     General: Screening Current      Prostate Cancer Screening:    General: Screening Current      Osteoporosis Screening:    General: Screening Not Indicated      Abdominal Aortic Aneurysm (AAA) Screening:    Risk factors include: tobacco use        General: Screening Not Indicated      Lung Cancer Screening:     General: Screening Not Indicated      Hepatitis C Screening:    General: Screening Current    Other Counseling Topics:   Car/seat belt/driving safety, skin self-exam, sunscreen and regular weightbearing exercise         NICK Palomino

## 2020-09-09 NOTE — LETTER
September 9, 2020     Elli Vega, [address]  [facility]  [address]    Patient: Prerna Gomez   YOB: 1965   Date of Visit: 9/9/2020       Dear Dr Sofie Roach:    Thank you for referring Aristides Cervantes to me for evaluation  Below are my notes for this consultation  If you have questions, please do not hesitate to call me  I look forward to following your patient along with you  Sincerely,        [employer], DO        CC: No Recipients  [employer], DO  9/9/2020  6:31 PM  Sign when Signing Visit  Assessment/Plan:  Assessment/Plan   Diagnoses and all orders for this visit:    Facet arthropathy, lumbar  -     Ambulatory referral to Sports Medicine  -     MRI lumbar spine wo contrast; Future  -     Ambulatory referral to Physical Therapy; Future    Central stenosis of spinal canal  -     MRI lumbar spine wo contrast; Future  -     Ambulatory referral to Physical Therapy; Future    Bulging of lumbar intervertebral disc  -     MRI lumbar spine wo contrast; Future  -     Ambulatory referral to Physical Therapy; Future  -     predniSONE 20 mg tablet; Take 1 tablet (20 mg total) by mouth 2 (two) times a day with meals for 5 days    Lumbar paraspinal muscle spasm  -     Ambulatory referral to Physical Therapy; Future  -     predniSONE 20 mg tablet; Take 1 tablet (20 mg total) by mouth 2 (two) times a day with meals for 5 days    Hamstring tightness of both lower extremities  -     Ambulatory referral to Physical Therapy; Future         63 year male with low back pain more than 2 weeks duration  Discussed with patient physical exam, imaging studies, impression and plan  X-rays  Of the lumbar spine noted for straightening of lumbar lordosis, and degenerative disc disease  CT scan abdomen and pelvis reveals multiple levels of bulging discs in the lumbar spine , as well as facet hypertrophy  Physical exam noted for right lumbar paraspinal hypertonicity and tenderness    He has limited range of motion lumbar spine in all planes  He has intact strength, sensation, and patellar reflex both lower extremities  Clinical impression that he may be symptomatic from underlying lumbar spine pathology  I recommend he proceed with starting physical therapy as soon as possible and do home exercises as directed to help with acute muscle spasms  He is also taking muscle relaxers as prescribed but not before driving as   They can cause drowsiness  He is also to take prednisone 20 mg twice daily food for 5 days  I will also refer him for MRI of lumbar spine to evaluate for degree of osseous pathology and soft tissue abnormality, as more invasive management may be warranted  He will follow up with me after getting MRI done  Subjective:   Patient ID: Flor Mann is a 63 y o  male  Chief Complaint   Patient presents with    Lower Back - Pain        48-year-old male presents for evaluation of low back pain of more than 2 weeks duration  He denies any particular trauma or inciting event  He states he woke up and was experiencing pain  He had pain described as sudden onset, localized to the lumbar aspect middle of the spine, throbbing and sharp, nonradiating, severe intensity, and worse with movement  He had difficulty getting out of bed  He contacted Orthopedics and was advised on taking Tylenol and heat  His symptoms slowly improved  He reports having had another episode of worsening pain in which pain of the middle of the spine was radiating to the right flank  He was concerned for for kidney stone and he presented to the emergency room  He had CT evaluation done which was unremarkable  He follow-up with primary care provider, was prescribed cyclobenzaprine, referred for x-rays, referred to physical therapy, and referred to Sports Medicine  Back Pain   This is a new problem  The current episode started 1 to 4 weeks ago  The problem occurs constantly  The problem has been unchanged  Pertinent negatives include no change in bowel habit, numbness or weakness  The symptoms are aggravated by standing, walking, twisting and bending  He has tried rest, oral narcotics, acetaminophen and heat for the symptoms  The treatment provided mild relief  The following portions of the patient's history were reviewed and updated as appropriate: He  has a past medical history of Aorta aneurysm ([facility] ), Arm DVT (deep venous thromboembolism), acute ([place] [address] ) (2015), Asthma, Chronic kidney disease, CKD (chronic kidney disease), stage III ([place] [address] ), COPD (chronic obstructive pulmonary disease) ([place] [address] ), Depression, Essential hypertriglyceridemia, GERD (gastroesophageal reflux disease), Headache, Hiatal hernia, Hyperlipidemia, mixed (5/7/2018), Liver disease, PAC (premature atrial contraction), Prediabetes, Renal calculi, Sleep apnea, and Vestibular migraine  He  has a past surgical history that includes Carpal tunnel release (Left); Hernia repair; pr colonoscopy flx dx w/collj spec when pfrmd (N/A, 8/7/2017); Colonoscopy; Foot surgery (Left); pr esophagogastroduodenoscopy transoral diagnostic (N/A, 10/31/2017); and FL injection right hip (arthrogram) (8/20/2018)  His family history includes Aortic aneurysm in his other; Arthritis in his mother; Cancer in his brother; Clotting disorder in his father; Heart attack in his father; Leukemia in his brother; Prostate cancer in his brother; Schizoaffective Disorder  in his sister  He  reports that he quit smoking about 6 years ago  His smoking use included cigars and cigarettes  He has never used smokeless tobacco  He reports current alcohol use  He reports that he does not use drugs  He has No Known Allergies       Review of Systems   Gastrointestinal: Negative for change in bowel habit  Musculoskeletal: Positive for back pain  Neurological: Negative for weakness and numbness         Objective:  Vitals:    09/09/20 1424   BP: 113/79   Pulse: 72   Temp: 98 4 °F (36 9 °C) Weight: 102 kg (224 lb)   Height: 6' 1" (1 854 m)     Right Ankle Exam     Muscle Strength   Dorsiflexion:  5/5  Plantar flexion:  5/5      Left Ankle Exam     Muscle Strength   Dorsiflexion:  5/5   Plantar flexion:  5/5       Right Hip Exam     Muscle Strength   Flexion: 5/5       Left Hip Exam     Muscle Strength   Flexion: 5/5       Back Exam     Tenderness   The patient is experiencing tenderness in the lumbar  Range of Motion   Extension: abnormal   Flexion: abnormal   Lateral bend right: abnormal   Lateral bend left: abnormal   Rotation right: abnormal   Rotation left: abnormal     Muscle Strength   Right Quadriceps:  5/5   Left Quadriceps:  5/5     Tests   Straight leg raise right: negative  Straight leg raise left: negative    Reflexes   Patellar: normal    Other   Sensation: normal  Gait: normal           Strength/Myotome Testing     Left Ankle/Foot   Dorsiflexion: 5  Plantar flexion: 5    Right Ankle/Foot   Dorsiflexion: 5  Plantar flexion: 5      Physical Exam  Vitals signs and nursing note reviewed  Constitutional:       General: He is not in acute distress  Appearance: He is well-developed  HENT:      Head: Normocephalic and atraumatic  Eyes:      Conjunctiva/sclera: Conjunctivae normal    Neck:      Trachea: No tracheal deviation  Cardiovascular:      Rate and Rhythm: Normal rate  Pulmonary:      Effort: Pulmonary effort is normal  No respiratory distress  Abdominal:      General: There is no distension  Musculoskeletal:         General: Tenderness present  Skin:     General: Skin is warm and dry  Neurological:      Mental Status: He is alert and oriented to person, place, and time  Psychiatric:         Behavior: Behavior normal          I have personally reviewed pertinent films in PACS and my interpretation is  lumbar spine degenerative disc disease, bulging disc, facet arthropathy, central stenosis

## 2020-09-09 NOTE — PATIENT INSTRUCTIONS
Preop clearance eval    Chronic conditions are stable:  Prediabetes- stable  Depression-  Stable  HLD- stable  Patient does have 3 weeks of acute lumbar back pain  Kidney stones were ruled out  Obtain xray of lumbar spine  Refer to Ortho  Refer to Physical therapy  Start muscle relaxants  Medicare Preventive Visit Patient Instructions  Thank you for completing your Welcome to Medicare Visit or Medicare Annual Wellness Visit today  Your next wellness visit will be due in one year (9/9/2021)  The screening/preventive services that you may require over the next 5-10 years are detailed below  Some tests may not apply to you based off risk factors and/or age  Screening tests ordered at today's visit but not completed yet may show as past due  Also, please note that scanned in results may not display below  Preventive Screenings:  Service Recommendations Previous Testing/Comments   Colorectal Cancer Screening  · Colonoscopy    · Fecal Occult Blood Test (FOBT)/Fecal Immunochemical Test (FIT)  · Fecal DNA/Cologuard Test  · Flexible Sigmoidoscopy Age: 54-65 years old   Colonoscopy: every 10 years (May be performed more frequently if at higher risk)  OR  FOBT/FIT: every 1 year  OR  Cologuard: every 3 years  OR  Sigmoidoscopy: every 5 years  Screening may be recommended earlier than age 48 if at higher risk for colorectal cancer  Also, an individualized decision between you and your healthcare provider will decide whether screening between the ages of 74-80 would be appropriate   Colonoscopy: 08/07/2017  FOBT/FIT: Not on file  Cologuard: Not on file  Sigmoidoscopy: Not on file    Screening Current     Prostate Cancer Screening Individualized decision between patient and health care provider in men between ages of 53-78   Medicare will cover every 12 months beginning on the day after your 50th birthday PSA: 0 3 ng/mL     Screening Current     Hepatitis C Screening Once for adults born between 1945 and 1965  More frequently in patients at high risk for Hepatitis C Hep C Antibody: 01/31/2019    Screening Current   Diabetes Screening 1-2 times per year if you're at risk for diabetes or have pre-diabetes Fasting glucose: 137 mg/dL   A1C: 6 2 %    Screening Current   Cholesterol Screening Once every 5 years if you don't have a lipid disorder  May order more often based on risk factors  Lipid panel: 08/25/2020    Screening Not Indicated  History Lipid Disorder      Other Preventive Screenings Covered by Medicare:  1  Abdominal Aortic Aneurysm (AAA) Screening: covered once if your at risk  You're considered to be at risk if you have a family history of AAA or a male between the age of 73-68 who smoking at least 100 cigarettes in your lifetime  2  Lung Cancer Screening: covers low dose CT scan once per year if you meet all of the following conditions: (1) Age 50-69; (2) No signs or symptoms of lung cancer; (3) Current smoker or have quit smoking within the last 15 years; (4) You have a tobacco smoking history of at least 30 pack years (packs per day x number of years you smoked); (5) You get a written order from a healthcare provider  3  Glaucoma Screening: covered annually if you're considered high risk: (1) You have diabetes OR (2) Family history of glaucoma OR (3)  aged 48 and older OR (3)  American aged 72 and older  3  Osteoporosis Screening: covered every 2 years if you meet one of the following conditions: (1) Have a vertebral abnormality; (2) On glucocorticoid therapy for more than 3 months; (3) Have primary hyperparathyroidism; (4) On osteoporosis medications and need to assess response to drug therapy  5  HIV Screening: covered annually if you're between the age of 12-76  Also covered annually if you are younger than 13 and older than 72 with risk factors for HIV infection  For pregnant patients, it is covered up to 3 times per pregnancy      Immunizations:  Immunization Recommendations Influenza Vaccine Annual influenza vaccination during flu season is recommended for all persons aged >= 6 months who do not have contraindications   Pneumococcal Vaccine (Prevnar and Pneumovax)  * Prevnar = PCV13  * Pneumovax = PPSV23 Adults 25-60 years old: 1-3 doses may be recommended based on certain risk factors  Adults 72 years old: Prevnar (PCV13) vaccine recommended followed by Pneumovax (PPSV23) vaccine  If already received PPSV23 since turning 65, then PCV13 recommended at least one year after PPSV23 dose  Hepatitis B Vaccine 3 dose series if at intermediate or high risk (ex: diabetes, end stage renal disease, liver disease)   Tetanus (Td) Vaccine - COST NOT COVERED BY MEDICARE PART B Following completion of primary series, a booster dose should be given every 10 years to maintain immunity against tetanus  Td may also be given as tetanus wound prophylaxis  Tdap Vaccine - COST NOT COVERED BY MEDICARE PART B Recommended at least once for all adults  For pregnant patients, recommended with each pregnancy  Shingles Vaccine (Shingrix) - COST NOT COVERED BY MEDICARE PART B  2 shot series recommended in those aged 48 and above     Health Maintenance Due:      Topic Date Due    Hepatitis C Screening  Completed     Immunizations Due:      Topic Date Due    Influenza Vaccine  07/01/2020     Advance Directives   What are advance directives? Advance directives are legal documents that state your wishes and plans for medical care  These plans are made ahead of time in case you lose your ability to make decisions for yourself  Advance directives can apply to any medical decision, such as the treatments you want, and if you want to donate organs  What are the types of advance directives? There are many types of advance directives, and each state has rules about how to use them  You may choose a combination of any of the following:  · Living will: This is a written record of the treatment you want   You can also choose which treatments you do not want, which to limit, and which to stop at a certain time  This includes surgery, medicine, IV fluid, and tube feedings  · Durable power of  for healthcare [facility]): This is a written record that states who you want to make healthcare choices for you when you are unable to make them for yourself  This person, called a proxy, is usually a family member or a friend  You may choose more than 1 proxy  · Do not resuscitate (DNR) order:  A DNR order is used in case your heart stops beating or you stop breathing  It is a request not to have certain forms of treatment, such as CPR  A DNR order may be included in other types of advance directives  · Medical directive: This covers the care that you want if you are in a coma, near death, or unable to make decisions for yourself  You can list the treatments you want for each condition  Treatment may include pain medicine, surgery, blood transfusions, dialysis, IV or tube feedings, and a ventilator (breathing machine)  · Values history: This document has questions about your views, beliefs, and how you feel and think about life  This information can help others choose the care that you would choose  Why are advance directives important? An advance directive helps you control your care  Although spoken wishes may be used, it is better to have your wishes written down  Spoken wishes can be misunderstood, or not followed  Treatments may be given even if you do not want them  An advance directive may make it easier for your family to make difficult choices about your care  Weight Management   Why it is important to manage your weight:  Being overweight increases your risk of health conditions such as heart disease, high blood pressure, type 2 diabetes, and certain types of cancer  It can also increase your risk for osteoarthritis, sleep apnea, and other respiratory problems  Aim for a slow, steady weight loss   Even a small amount of weight loss can lower your risk of health problems  How to lose weight safely:  A safe and healthy way to lose weight is to eat fewer calories and get regular exercise  You can lose up about 1 pound a week by decreasing the number of calories you eat by 500 calories each day  Healthy meal plan for weight management:  A healthy meal plan includes a variety of foods, contains fewer calories, and helps you stay healthy  A healthy meal plan includes the following:  · Eat whole-grain foods more often  A healthy meal plan should contain fiber  Fiber is the part of grains, fruits, and vegetables that is not broken down by your body  Whole-grain foods are healthy and provide extra fiber in your diet  Some examples of whole-grain foods are whole-wheat breads and pastas, oatmeal, brown rice, and bulgur  · Eat a variety of vegetables every day  Include dark, leafy greens such as spinach, kale, ernesto greens, and mustard greens  Eat yellow and orange vegetables such as carrots, sweet potatoes, and winter squash  · Eat a variety of fruits every day  Choose fresh or canned fruit (canned in its own juice or light syrup) instead of juice  Fruit juice has very little or no fiber  · Eat low-fat dairy foods  Drink fat-free (skim) milk or 1% milk  Eat fat-free yogurt and low-fat cottage cheese  Try low-fat cheeses such as mozzarella and other reduced-fat cheeses  · Choose meat and other protein foods that are low in fat  Choose beans or other legumes such as split peas or lentils  Choose fish, skinless poultry (chicken or turkey), or lean cuts of red meat (beef or pork)  Before you cook meat or poultry, cut off any visible fat  · Use less fat and oil  Try baking foods instead of frying them  Add less fat, such as margarine, sour cream, regular salad dressing and mayonnaise to foods  Eat fewer high-fat foods  Some examples of high-fat foods include french fries, doughnuts, ice cream, and cakes    · Eat fewer sweets  Limit foods and drinks that are high in sugar  This includes candy, cookies, regular soda, and sweetened drinks  Exercise:  Exercise at least 30 minutes per day on most days of the week  Some examples of exercise include walking, biking, dancing, and swimming  You can also fit in more physical activity by taking the stairs instead of the elevator or parking farther away from stores  Ask your healthcare provider about the best exercise plan for you  © Copyright [employer] 2018 Information is for End User's use only and may not be sold, redistributed or otherwise used for commercial purposes   All illustrations and images included in CareNotes® are the copyrighted property of A D A M , Inc  or [address] [place] [address]

## 2020-09-09 NOTE — TELEPHONE ENCOUNTER
Call to this patient tonight  Currently taking medication for back pain and seeing Dr Yaritza Sorto for back pain  Will discuss with Dr Ross Miguel  Advised patient to call us after steroid course for back symptoms

## 2020-09-09 NOTE — PROGRESS NOTES
Assessment/Plan:  Assessment/Plan   Diagnoses and all orders for this visit:    Facet arthropathy, lumbar  -     Ambulatory referral to Sports Medicine  -     MRI lumbar spine wo contrast; Future  -     Ambulatory referral to Physical Therapy; Future    Central stenosis of spinal canal  -     MRI lumbar spine wo contrast; Future  -     Ambulatory referral to Physical Therapy; Future    Bulging of lumbar intervertebral disc  -     MRI lumbar spine wo contrast; Future  -     Ambulatory referral to Physical Therapy; Future  -     predniSONE 20 mg tablet; Take 1 tablet (20 mg total) by mouth 2 (two) times a day with meals for 5 days    Lumbar paraspinal muscle spasm  -     Ambulatory referral to Physical Therapy; Future  -     predniSONE 20 mg tablet; Take 1 tablet (20 mg total) by mouth 2 (two) times a day with meals for 5 days    Hamstring tightness of both lower extremities  -     Ambulatory referral to Physical Therapy; Future         63 year male with low back pain more than 2 weeks duration  Discussed with patient physical exam, imaging studies, impression and plan  X-rays  Of the lumbar spine noted for straightening of lumbar lordosis, and degenerative disc disease  CT scan abdomen and pelvis reveals multiple levels of bulging discs in the lumbar spine , as well as facet hypertrophy  Physical exam noted for right lumbar paraspinal hypertonicity and tenderness  He has limited range of motion lumbar spine in all planes  He has intact strength, sensation, and patellar reflex both lower extremities  Clinical impression that he may be symptomatic from underlying lumbar spine pathology  I recommend he proceed with starting physical therapy as soon as possible and do home exercises as directed to help with acute muscle spasms  He is also taking muscle relaxers as prescribed but not before driving as   They can cause drowsiness  He is also to take prednisone 20 mg twice daily food for 5 days    I will also refer him for MRI of lumbar spine to evaluate for degree of osseous pathology and soft tissue abnormality, as more invasive management may be warranted  He will follow up with me after getting MRI done  Subjective:   Patient ID: Niraj Lane is a 63 y o  male  Chief Complaint   Patient presents with    Lower Back - Pain        27-year-old male presents for evaluation of low back pain of more than 2 weeks duration  He denies any particular trauma or inciting event  He states he woke up and was experiencing pain  He had pain described as sudden onset, localized to the lumbar aspect middle of the spine, throbbing and sharp, nonradiating, severe intensity, and worse with movement  He had difficulty getting out of bed  He contacted Orthopedics and was advised on taking Tylenol and heat  His symptoms slowly improved  He reports having had another episode of worsening pain in which pain of the middle of the spine was radiating to the right flank  He was concerned for for kidney stone and he presented to the emergency room  He had CT evaluation done which was unremarkable  He follow-up with primary care provider, was prescribed cyclobenzaprine, referred for x-rays, referred to physical therapy, and referred to Sports Medicine  Back Pain   This is a new problem  The current episode started 1 to 4 weeks ago  The problem occurs constantly  The problem has been unchanged  Pertinent negatives include no change in bowel habit, numbness or weakness  The symptoms are aggravated by standing, walking, twisting and bending  He has tried rest, oral narcotics, acetaminophen and heat for the symptoms  The treatment provided mild relief             The following portions of the patient's history were reviewed and updated as appropriate: He  has a past medical history of Aorta aneurysm ([facility] [address] ), Arm DVT (deep venous thromboembolism), acute ([facility] [address] ) (2015), Asthma, Chronic kidney disease, CKD (chronic kidney disease), stage III [facility]), COPD (chronic obstructive pulmonary disease) ([facility] [address] ), Depression, Essential hypertriglyceridemia, GERD (gastroesophageal reflux disease), Headache, Hiatal hernia, Hyperlipidemia, mixed (5/7/2018), Liver disease, PAC (premature atrial contraction), Prediabetes, Renal calculi, Sleep apnea, and Vestibular migraine  He  has a past surgical history that includes Carpal tunnel release (Left); Hernia repair; pr colonoscopy flx dx w/collj spec when pfrmd (N/A, 8/7/2017); Colonoscopy; Foot surgery (Left); pr esophagogastroduodenoscopy transoral diagnostic (N/A, 10/31/2017); and FL injection right hip (arthrogram) (8/20/2018)  His family history includes Aortic aneurysm in his other; Arthritis in his mother; Cancer in his brother; Clotting disorder in his father; Heart attack in his father; Leukemia in his brother; Prostate cancer in his brother; Schizoaffective Disorder  in his sister  He  reports that he quit smoking about 6 years ago  His smoking use included cigars and cigarettes  He has never used smokeless tobacco  He reports current alcohol use  He reports that he does not use drugs  He has No Known Allergies       Review of Systems   Gastrointestinal: Negative for change in bowel habit  Musculoskeletal: Positive for back pain  Neurological: Negative for weakness and numbness  Objective:  Vitals:    09/09/20 1424   BP: 113/79   Pulse: 72   Temp: 98 4 °F (36 9 °C)   Weight: 102 kg (224 lb)   Height: 6' 1" (1 854 m)     Right Ankle Exam     Muscle Strength   Dorsiflexion:  5/5  Plantar flexion:  5/5      Left Ankle Exam     Muscle Strength   Dorsiflexion:  5/5   Plantar flexion:  5/5       Right Hip Exam     Muscle Strength   Flexion: 5/5       Left Hip Exam     Muscle Strength   Flexion: 5/5       Back Exam     Tenderness   The patient is experiencing tenderness in the lumbar      Range of Motion   Extension: abnormal   Flexion: abnormal   Lateral bend right: abnormal   Lateral bend left: abnormal Rotation right: abnormal   Rotation left: abnormal     Muscle Strength   Right Quadriceps:  5/5   Left Quadriceps:  5/5     Tests   Straight leg raise right: negative  Straight leg raise left: negative    Reflexes   Patellar: normal    Other   Sensation: normal  Gait: normal           Strength/Myotome Testing     Left Ankle/Foot   Dorsiflexion: 5  Plantar flexion: 5    Right Ankle/Foot   Dorsiflexion: 5  Plantar flexion: 5      Physical Exam  Vitals signs and nursing note reviewed  Constitutional:       General: He is not in acute distress  Appearance: He is well-developed  HENT:      Head: Normocephalic and atraumatic  Eyes:      Conjunctiva/sclera: Conjunctivae normal    Neck:      Trachea: No tracheal deviation  Cardiovascular:      Rate and Rhythm: Normal rate  Pulmonary:      Effort: Pulmonary effort is normal  No respiratory distress  Abdominal:      General: There is no distension  Musculoskeletal:         General: Tenderness present  Skin:     General: Skin is warm and dry  Neurological:      Mental Status: He is alert and oriented to person, place, and time  Psychiatric:         Behavior: Behavior normal          I have personally reviewed pertinent films in PACS and my interpretation is  lumbar spine degenerative disc disease, bulging disc, facet arthropathy, central stenosis

## 2020-09-11 ENCOUNTER — HOSPITAL ENCOUNTER (OUTPATIENT)
Dept: MRI IMAGING | Facility: HOSPITAL | Age: 55
Discharge: HOME | End: 2020-09-11
Attending: FAMILY MEDICINE
Payer: MEDICARE

## 2020-09-11 DIAGNOSIS — M48.00 CENTRAL STENOSIS OF SPINAL CANAL: ICD-10-CM

## 2020-09-11 DIAGNOSIS — M51.26 BULGING OF LUMBAR INTERVERTEBRAL DISC: ICD-10-CM

## 2020-09-11 DIAGNOSIS — M47.816 FACET ARTHROPATHY, LUMBAR: ICD-10-CM

## 2020-09-11 PROCEDURE — G1004 CDSM NDSC: HCPCS

## 2020-09-11 PROCEDURE — 72148 MRI LUMBAR SPINE W/O DYE: CPT

## 2020-09-14 ENCOUNTER — TELEPHONE (OUTPATIENT)
Dept: OBGYN CLINIC | Facility: HOSPITAL | Age: 55
End: 2020-09-14

## 2020-09-14 ENCOUNTER — EVALUATION (OUTPATIENT)
Dept: PHYSICAL THERAPY | Age: 55
End: 2020-09-14
Payer: MEDICARE

## 2020-09-14 DIAGNOSIS — M48.00 CENTRAL STENOSIS OF SPINAL CANAL: ICD-10-CM

## 2020-09-14 DIAGNOSIS — M16.11 PRIMARY OSTEOARTHRITIS OF ONE HIP, RIGHT: ICD-10-CM

## 2020-09-14 DIAGNOSIS — M47.816 FACET ARTHROPATHY, LUMBAR: Primary | ICD-10-CM

## 2020-09-14 PROCEDURE — 97162 PT EVAL MOD COMPLEX 30 MIN: CPT | Performed by: PHYSICAL THERAPIST

## 2020-09-14 PROCEDURE — 97140 MANUAL THERAPY 1/> REGIONS: CPT | Performed by: PHYSICAL THERAPIST

## 2020-09-14 PROCEDURE — 97110 THERAPEUTIC EXERCISES: CPT | Performed by: PHYSICAL THERAPIST

## 2020-09-14 NOTE — PROGRESS NOTES
PT Evaluation     Today's date: 2020  Patient name: Ivana aCstaneda  : 1965  MRN: [id number]  Referring provider: Jolly Yun PA-C  Dx:   Encounter Diagnosis     ICD-10-CM    1  Facet arthropathy, lumbar  M47 816    2  Central stenosis of spinal canal  M48 00    3  Primary osteoarthritis of one hip, right  M16 11 Ambulatory referral to Physical Therapy       Start Time: 0900  Stop Time: 1000  Total time in clinic (min): 60 minutes    Assessment  Assessment details: Opal Lopez is a 63year old male who is seen today for both pre-op education and instruction in exercises for up coming THR scheduled for 20  He developed sudden onset of LBP approximately three weeks ago and is referred for out patient PT concerning this pain  He presents with impairments as outlined that inhibit participation in functional and recreational activities  He could benefit from a trial of PT addressing impairments affecting LB to achieve goals as outlined  Impairments: abnormal gait, abnormal muscle firing, abnormal or restricted ROM, impaired physical strength, lacks appropriate home exercise program, pain with function, weight-bearing intolerance and poor posture   Understanding of Dx/Px/POC: good   Prognosis: good    Goals  STG 1-2 weeks  1  Independent in ongoing HEP prior to surgery  2  Good understanding of THR precautions  3  Decrease LBP 50%    LTG 3-4 weeks  1  Start post op PT with reevalution  2  LBP less than 2/10  3  Decreased muscle spasm and tone paraspinal musculature  4  Discharge planning LB       Plan  Patient would benefit from: skilled physical therapy  Planned modality interventions: cryotherapy, thermotherapy: hydrocollator packs and unattended electrical stimulation  Planned therapy interventions: aquatic therapy, manual therapy, joint mobilization, neuromuscular re-education, strengthening, patient education, therapeutic exercise, therapeutic activities, gait training and home exercise program  Frequency: 2x week  Duration in weeks: 6  Plan of Care beginning date: 2020  Plan of Care expiration date: 2020        Subjective Evaluation    History of Present Illness  Mechanism of injury: Right hip pain for 2 5 years that became progressively worse  Recommended THR due to significant arthritis  Will have surgery 20  Here for pre-op education and exercise instruction  Awoke three weeks ago with central LBP with difficulty moving and standing  Then two weeks later moved to right/central low back pain  To ER and family MD and orthopedics  Medication helping pain management per patient  Quality of life: good    Pain  Current pain ratin  At best pain ratin  At worst pain rating: 10  Location: ;groin and lateral thigh to knee; right LBP  Quality: tight, throbbing, sharp, dull ache and pressure  Relieving factors: change in position, rest and medications  Aggravating factors: standing, walking and nothing  Progression: improved    Social Support  Stairs in house: yes (4)   Lives in: [facility]  Lives with: adult children    Employment status: not working (disability due to aortic anyereysm)  Hand dominance: right    Treatments  Current treatment: medication  Patient Goals  Patient goals for therapy: decreased pain, increased strength, independence with ADLs/IADLs and increased motion  Patient goal: pre-op education regardng expectatioins post surgery        Objective     Static Posture     Head  Forward  Lumbar Spine   Decreased lordosis  Pelvis   Pelvis (Right): Elevated  Palpation     Right   Hypertonic in the erector spinae and lumbar paraspinals  Muscle spasm in the quadratus lumborum  Tenderness of the adductor lafredo, erector spinae, lumbar paraspinals and quadratus lumborum  Trigger point to adductor alfredo       Neurological Testing     Sensation     Lumbar     Right   Intact: light touch    Active Range of Motion     Lumbar   Flexion:  Restriction level: minimal  Extension:  with pain Restriction level: moderate  Left lateral flexion: 25 degrees       Right lateral flexion: 20 degrees     Right Hip   Flexion: 95 degrees   Abduction: 25 degrees with pain  External rotation (90/90): 25 degrees     Strength/Myotome Testing     Lumbar   Left   Normal strength    Right Hip   Planes of Motion   Flexion: 4-  Extension: 4-  Abduction: 4-  Adduction: 4-    Right Knee   Flexion: 4+  Extension: 4    Right Ankle/Foot   Dorsiflexion: 5  Plantar flexion: 5    Ambulation     Ambulation: Level Surfaces   Ambulation without assistive device: independent    Observational Gait   Gait: antalgic   Decreased stride length and right stance time         Flowsheet Rows      Most Recent Value   PT/OT G-Codes   Current Score  51   Projected Score  61             Precautions: cardiac, THR scheduled for 9/24/20      Manuals 9/14            B LE/LB 10                                                   Neuro Re-Ed                                                                                                        Ther Ex             Quad and glut set 10x each            Add set ball 10x            Heel slides 5x            Standing hip abd, ext, march 10x each            Standing hamstring 10x            Toe raises 10x                                      Ther Activity             HEP As above                         Gait Training                                       Modalities

## 2020-09-15 ENCOUNTER — TELEPHONE (OUTPATIENT)
Dept: OBGYN CLINIC | Facility: HOSPITAL | Age: 55
End: 2020-09-15

## 2020-09-15 DIAGNOSIS — Z01.818 PRE-OP TESTING: Primary | ICD-10-CM

## 2020-09-15 NOTE — TELEPHONE ENCOUNTER
Patient sees Dr GRAVES [facility]     Patient called in stating that he has completed his medication ,predniSONE 20 mg tablet  He stated that hes feeling much better        [phone]

## 2020-09-16 ENCOUNTER — TELEPHONE (OUTPATIENT)
Dept: OBGYN CLINIC | Facility: HOSPITAL | Age: 55
End: 2020-09-16

## 2020-09-16 NOTE — TELEPHONE ENCOUNTER
Preoperative Elective Admission Assessment-Spoke to pt for 10m       Living Situation: pt lives with son, Aby, and girlfriend, Vonda Vences in a ranch home                    Steps: front, #4 steps; back, small ramp     First Floor Setup: Yes with full bathroom, step-in tub    Post-op Caregiver:  son, Aby, and girlfriend, Rhea Dubon Transport: Pt lives in [facility]; I plan to callback Friday afternoon(2 or 3pm) to discuss  Pt reports he is calling local transport services to explore them  ADDENDUM 9/18:  contacted pt again for transport update; called and pt to filled out transport application through his county  If doesn't work out, pt reports he could have son drive him, I recommended pt have a back-up transport plan to [facility] transport, he plans to finalize this with son/friends  Outpatient [facility] Site: [facility]    DME: Pt has no DME; would need a RW and BSC prior to leaving hospital    Patient's Current Level of Function: Pt ambulates independent and is independent with his ADLs    Medication Management: pt self manages his medications taking them daily out of the bottle                    Preferred Pharmacy: [facility], [facility]                    Blood Management Vitamins: Pt confirms he is taking his oral vitamin regimen as prescribed by his surgeon                    Post-op anticoagulant: TBD by surgical team    DC Plan:  Pt wants DC to home with Santy Arauz; educated pt he would need to qualify and be homebound for this level of care  educated that our goal, if at all possible, is to appropriately discharge patient based off their post-op function while striving to maintain maximal independence  If possible, the goal is to discharge patient to home and for them to attend outpatient physical therapy    Will call pt back after he explores transport services for finalized DC plan                       Barriers to DC identified preoperatively:    *Limited post-op transport    BMI: 29 55    Caresense:    Patient Education: educated that our goal, if at all possible, is to appropriately discharge patient based off their post-op function while striving to maintain maximal independence  If possible, the goal is to discharge patient to home and for them to attend outpatient physical therapy  Pt educated on post-op pain, early mobilization (POD0), indication for/use of incentive spirometer (10x/hour while awake) and indication for/use of foot/leg pumps (18 hours/day)  I educated patient on the many benefits of outpt PT(Including maintaining independence, additional resources at outpt site, better outcomes etc  )  Also educated on how home PT vs  outpt PT is determined (while inpt)  Pt denies having additional questions at this time  Pt encouraged to call me with any questions, concerns or issues

## 2020-09-17 NOTE — PROGRESS NOTES
Daily Note     Today's date: 2020  Patient name: Prerna Gomez  : 1965  MRN: [id number]  Referring provider: Neema Magana PA-C  Dx:   Encounter Diagnosis     ICD-10-CM    1  Facet arthropathy, lumbar  M47 816    2  Central stenosis of spinal canal  M48 00    3  Primary osteoarthritis of one hip, right  M16 11        Start Time: 0700  Stop Time: 0750  Total time in clinic (min): 50 minutes    Subjective: Patient reports increased pain in the lower back and right hip today  Objective: See treatment diary below      Assessment: Tolerated treatment fairly well, increased guarded during manual therapy to bl le's tightness bl LE/LB sophia hamstrings and piriformis  Added lb and core ex today with fair tolerance  Patient demonstrated fatigue post treatment and would benefit from continued PT      Plan: Continue per plan of care        Precautions: cardiac, THR scheduled for 20      Manuals            B LE/LB 10 15'                                                  Neuro Re-Ed                                                                                                        Ther Ex             Quad and glut set 10x each            Add set ball 10x 30x           ADMIN  2"x10           Heel slides 5x 30x R  10x B           Standing hip abd, ext, march 10x each 20x           Standing hamstring 10x 20x           Toe raises 10x 20x           theraband abd  B 30x                        Ther Activity             HEP As above                         Gait Training                                       Modalities               10' post

## 2020-09-18 ENCOUNTER — OFFICE VISIT (OUTPATIENT)
Dept: PHYSICAL THERAPY | Age: 55
End: 2020-09-18
Payer: MEDICARE

## 2020-09-18 DIAGNOSIS — M16.11 PRIMARY OSTEOARTHRITIS OF ONE HIP, RIGHT: ICD-10-CM

## 2020-09-18 DIAGNOSIS — M47.816 FACET ARTHROPATHY, LUMBAR: Primary | ICD-10-CM

## 2020-09-18 DIAGNOSIS — M48.00 CENTRAL STENOSIS OF SPINAL CANAL: ICD-10-CM

## 2020-09-18 DIAGNOSIS — Z01.818 PRE-OP TESTING: ICD-10-CM

## 2020-09-18 PROCEDURE — U0003 INFECTIOUS AGENT DETECTION BY NUCLEIC ACID (DNA OR RNA); SEVERE ACUTE RESPIRATORY SYNDROME CORONAVIRUS 2 (SARS-COV-2) (CORONAVIRUS DISEASE [COVID-19]), AMPLIFIED PROBE TECHNIQUE, MAKING USE OF HIGH THROUGHPUT TECHNOLOGIES AS DESCRIBED BY CMS-2020-01-R: HCPCS | Performed by: ORTHOPAEDIC SURGERY

## 2020-09-18 PROCEDURE — 97110 THERAPEUTIC EXERCISES: CPT

## 2020-09-18 PROCEDURE — 97140 MANUAL THERAPY 1/> REGIONS: CPT

## 2020-09-19 LAB — SARS-COV-2 RNA SPEC QL NAA+PROBE: NOT DETECTED

## 2020-09-21 ENCOUNTER — OFFICE VISIT (OUTPATIENT)
Dept: PULMONOLOGY | Facility: CLINIC | Age: 55
End: 2020-09-21
Payer: MEDICARE

## 2020-09-21 ENCOUNTER — OFFICE VISIT (OUTPATIENT)
Dept: CARDIOLOGY CLINIC | Facility: MEDICAL CENTER | Age: 55
End: 2020-09-21
Payer: MEDICARE

## 2020-09-21 VITALS
SYSTOLIC BLOOD PRESSURE: 120 MMHG | BODY MASS INDEX: 30.05 KG/M2 | WEIGHT: 226.7 LBS | DIASTOLIC BLOOD PRESSURE: 80 MMHG | TEMPERATURE: 97.5 F | OXYGEN SATURATION: 98 % | HEIGHT: 73 IN | HEART RATE: 64 BPM

## 2020-09-21 VITALS
TEMPERATURE: 97.5 F | WEIGHT: 226 LBS | HEIGHT: 73 IN | BODY MASS INDEX: 29.95 KG/M2 | SYSTOLIC BLOOD PRESSURE: 120 MMHG | HEART RATE: 83 BPM | DIASTOLIC BLOOD PRESSURE: 84 MMHG | OXYGEN SATURATION: 95 %

## 2020-09-21 DIAGNOSIS — J45.20 MILD INTERMITTENT ASTHMA WITHOUT COMPLICATION: Primary | ICD-10-CM

## 2020-09-21 DIAGNOSIS — E78.2 HYPERLIPIDEMIA, MIXED: Primary | ICD-10-CM

## 2020-09-21 DIAGNOSIS — Z01.810 PREOP CARDIOVASCULAR EXAM: ICD-10-CM

## 2020-09-21 DIAGNOSIS — J30.1 SEASONAL ALLERGIC RHINITIS DUE TO POLLEN: ICD-10-CM

## 2020-09-21 DIAGNOSIS — I71.2 ASCENDING AORTIC ANEURYSM (HCC): ICD-10-CM

## 2020-09-21 DIAGNOSIS — Q23.1 BICUSPID AORTIC VALVE: ICD-10-CM

## 2020-09-21 DIAGNOSIS — Z01.818 PREOP EXAMINATION: ICD-10-CM

## 2020-09-21 PROBLEM — J45.21 MILD INTERMITTENT ASTHMA WITH EXACERBATION: Status: ACTIVE | Noted: 2019-04-08

## 2020-09-21 PROCEDURE — 99205 OFFICE O/P NEW HI 60 MIN: CPT | Performed by: INTERNAL MEDICINE

## 2020-09-21 PROCEDURE — 99214 OFFICE O/P EST MOD 30 MIN: CPT | Performed by: PHYSICIAN ASSISTANT

## 2020-09-21 NOTE — PROGRESS NOTES
Cardiology Follow Up    Alonso Schwartz  1965  [id number]  [address] [facility] [address] [id number] [phone]    1  Hyperlipidemia, mixed     2  Ascending aortic aneurysm ([facility] )     3  Bicuspid aortic valve     4  Preop cardiovascular exam       Chief Complaint   Patient presents with    Follow-up     needs cardiac clearance     Dizziness     at times        Interval History: Patient will be having hip surgery next week and will need cardiac evaluation prior to this  Patient feels well, without complaints  No reported chest pain, shortness of breath, palpitations, lightheadedness, syncope, LE edema, orthopnea, PND, or significant weight changes  Patient remains active without any increased fatigue out of the ordinary          Patient Active Problem List   Diagnosis    Dizziness    COPD (chronic obstructive pulmonary disease) ([facility] )    Ascending aortic aneurysm (HCC)    Bicuspid aortic valve    Adjustment reaction with anxiety and depression    Allergic rhinitis    GERD (gastroesophageal reflux disease)    Liver disease    Hiatal hernia    Encounter to establish care    Prediabetes    Medicare annual wellness visit, subsequent    CKD (chronic kidney disease) stage 3, GFR 30-59 ml/min ([facility])    Hyperlipidemia, mixed    Weight gain    Fatigue    Generalized abdominal pain    Mild intermittent asthma without complication    Seasonal allergic rhinitis due to pollen    Numbness of fingers of both hands    Depression with anxiety    Abdominal bloating    Vitamin D deficiency    Renal cyst, left    Hepatic cyst    Fatty liver disease, nonalcoholic    Erectile dysfunction    Carpal tunnel syndrome of right wrist    Chronic pain of right knee    Vestibular migraine    Patellar tendinitis of right knee    Hamstring tightness of right lower extremity    Benign paroxysmal positional vertigo due to bilateral vestibular disorder    Pain in right hip    Hip impingement syndrome, right    Primary osteoarthritis of right hip    Ulnar neuropathy of both upper extremities    Lumbosacral strain    Tarsal tunnel syndrome of right side    Cubital tunnel syndrome, bilateral    Need for influenza vaccination    Need for pneumococcal vaccination    Need for hepatitis C screening test    Groin pain, chronic, right    Elevated triglycerides with high cholesterol    Diaphoresis    Moderate persistent asthma with exacerbation    Cough    Eustachian tube disorder, left    Chronic pain syndrome    Chronic kidney disease-mineral and bone disorder    Right hand pain    Long-term current use of opiate analgesic    Uncomplicated opioid dependence (HCC)    Pre-operative clearance    Preop examination    Arthritis of right hip    Lumbar pain    Preop cardiovascular exam     Past Medical History:   Diagnosis Date    Aorta aneurysm (HCC)     4 3    Arm DVT (deep venous thromboembolism), acute ([facility] ) 1833    complications of cardiac cath    Asthma     Chronic kidney disease     CKD (chronic kidney disease), stage III (HCC)     COPD (chronic obstructive pulmonary disease) (HCC)     Depression     Essential hypertriglyceridemia     GERD (gastroesophageal reflux disease)     Headache     Hiatal hernia     Hyperlipidemia, mixed 5/7/2018    Kidney stone     Liver disease     FATTY LIVER    PAC (premature atrial contraction)     Prediabetes     Renal calculi     Sleep apnea     Vestibular migraine      Social History     Socioeconomic History    Marital status:      Spouse name: Not on file    Number of children: 2    Years of education: Not on file    Highest education level: Not on file   Occupational History    Occupation: unemployed   Social Needs    Financial resource strain: Not on file    Food insecurity     Worry: Not on file     Inability: Not on file   [employer] needs     Medical: Not on file     Non-medical: Not on file   Tobacco Use    Smoking status: Former Smoker     Types: Cigars, Cigarettes     Last attempt to quit: 2014     Years since quittin 1    Smokeless tobacco: Never Used   Substance and Sexual Activity    Alcohol use: Yes     Comment: occasional    Drug use: No    Sexual activity: Not Currently   Lifestyle    Physical activity     Days per week: Not on file     Minutes per session: Not on file    Stress: Not on file   Relationships    Social connections     Talks on phone: Not on file     Gets together: Not on file     Attends Anabaptism service: Not on file     Active member of club or organization: Not on file     Attends meetings of clubs or organizations: Not on file     Relationship status: Not on file    Intimate partner violence     Fear of current or ex partner: Not on file     Emotionally abused: Not on file     Physically abused: Not on file     Forced sexual activity: Not on file   Other Topics Concern    Not on file   Social History Narrative    Caffeine use    Lives alone      Family History   Problem Relation Age of Onset    Cancer Brother     Prostate cancer Brother     Leukemia Brother         his only brother dies from [address] or leukemia pt unsure he was only 47    Arthritis Mother     Heart attack Father     Clotting disorder Father     Schizoaffective Disorder  Sister     Aortic aneurysm Other         abdominal     Past Surgical History:   Procedure Laterality Date    CARPAL TUNNEL RELEASE Left     COLONOSCOPY      FL INJECTION RIGHT HIP (ARTHROGRAM)  2018    FOOT SURGERY Left     FOREIGN BODY REMOVAL    HERNIA REPAIR      PA COLONOSCOPY FLX DX W/COLLJ SPEC WHEN PFRMD N/A 2017    Procedure: COLONOSCOPY;  Surgeon: Manish Graf MD;  Location: [facility];   Service: Gastroenterology    PA ESOPHAGOGASTRODUODENOSCOPY TRANSORAL DIAGNOSTIC N/A 10/31/2017    Procedure: ESOPHAGOGASTRODUODENOSCOPY (EGD); Surgeon: Arlette Osborn MD;  Location: [facility]; Service: Gastroenterology       Current Outpatient Medications:     acetaminophen (TYLENOL) 500 mg tablet, Take 3 tablets by mouth as needed , Disp: , Rfl:     albuterol (2 5 mg/3 mL) 0 083 % nebulizer solution, Take 1 vial (2 5 mg total) by nebulization 4 (four) times a day, Disp: 120 vial, Rfl: 3    albuterol (PROVENTIL HFA,VENTOLIN HFA) 90 mcg/act inhaler, Inhale 2 puffs every 6 (six) hours as needed for wheezing , Disp: , Rfl:     cyclobenzaprine (FLEXERIL) 10 mg tablet, Take 1 tablet (10 mg total) by mouth 3 (three) times a day as needed for muscle spasms for up to 15 days, Disp: 45 tablet, Rfl: 0    fenofibrate (TRICOR) 145 mg tablet, Take 1 tablet (145 mg total) by mouth daily, Disp: 90 tablet, Rfl: 3    ferrous sulfate 325 (65 Fe) mg tablet, Take 325 mg by mouth daily with breakfast, Disp: , Rfl:     fluticasone (FLONASE) 50 mcg/act nasal spray, 1 spray into each nostril daily (Patient taking differently: 1 spray into each nostril daily as needed ), Disp: 11 Bottle, Rfl: 3    mometasone-formoterol (DULERA) 200-5 MCG/ACT inhaler, Inhale 2 puffs 2 (two) times a day , Disp: , Rfl:     montelukast (SINGULAIR) 10 mg tablet, take 1 tablet by mouth at bedtime, Disp: 30 tablet, Rfl: 3    pantoprazole (PROTONIX) 40 mg tablet, Take 1 tablet (40 mg total) by mouth daily, Disp: 90 tablet, Rfl: 3    rosuvastatin (CRESTOR) 20 MG tablet, take 1 tablet by mouth once daily, Disp: 30 tablet, Rfl: 5    sildenafil (REVATIO) 20 mg tablet, TAKE 1 TABLET (20 MG TOTAL) BY MOUTH DAILY AS DIRECTED, Disp: 90 tablet, Rfl: 3    traMADol (ULTRAM) 50 mg tablet, Take 1 PO three times daily PRN for pain   Ongoing therapy, Disp: 90 tablet, Rfl: 2    ergocalciferol (VITAMIN D2) 50,000 units, Take 1 capsule (50,000 Units total) by mouth once a week (Patient not taking: Reported on 6/17/2020), Disp: 4 capsule, Rfl: 3    folic acid (FOLVITE) 1 mg tablet, Take 1 tablet (1 mg total) by mouth daily (Patient not taking: Reported on 9/9/2020), Disp: 30 tablet, Rfl: 0  No Known Allergies    Labs:  Orders Only on 09/18/2020   Component Date Value    SARS-CoV-2  09/18/2020 Not Detected    Admission on 09/07/2020, Discharged on 09/07/2020   Component Date Value    WBC 09/07/2020 5 00     RBC 09/07/2020 4 63     Hemoglobin 09/07/2020 15 1     Hematocrit 09/07/2020 44 0     MCV 09/07/2020 95     MCH 09/07/2020 32 6     MCHC 09/07/2020 34 3     RDW 09/07/2020 13 2     MPV 09/07/2020 8 7     Platelets 92/88/8881 195     Neutrophils Relative 09/07/2020 52     Lymphocytes Relative 09/07/2020 34     Monocytes Relative 09/07/2020 10     Eosinophils Relative 09/07/2020 3     Basophils Relative 09/07/2020 1     Neutrophils Absolute 09/07/2020 2 60     Lymphocytes Absolute 09/07/2020 1 70     Monocytes Absolute 09/07/2020 0 50     Eosinophils Absolute 09/07/2020 0 10     Basophils Absolute 09/07/2020 0 00     Color, UA 09/07/2020 Yellow     Clarity, UA 09/07/2020 Clear     Specific Gravity, UA 09/07/2020 >=1 030*    pH, UA 09/07/2020 5 0     Leukocytes, UA 09/07/2020 Negative     Nitrite, UA 09/07/2020 Negative     Protein, UA 09/07/2020 Negative     Glucose, UA 09/07/2020 Trace*    Ketones, UA 09/07/2020 Negative     Urobilinogen, UA 09/07/2020 0 2     Bilirubin, UA 09/07/2020 Negative     Blood, UA 09/07/2020 Negative     Sodium 09/07/2020 139     Potassium 09/07/2020 4 3     Chloride 09/07/2020 105     CO2 09/07/2020 29     ANION GAP 09/07/2020 5     BUN 09/07/2020 25     Creatinine 09/07/2020 1 47*    Glucose 09/07/2020 163*    Calcium 09/07/2020 9 5     eGFR 09/07/2020 53    Appointment on 09/01/2020   Component Date Value    CRP 09/01/2020 <3 0     Protime 09/01/2020 13 1     INR 09/01/2020 0 97     PTT 09/01/2020 26     ABO Grouping 09/01/2020 O     Rh Factor 09/01/2020 Positive     Antibody Screen 09/01/2020 Negative  Specimen Expiration Date 09/01/2020 [id number]     Vit D, 25-Hydroxy 09/01/2020 34 2    Office Visit on 09/01/2020   Component Date Value    Ventricular Rate 09/01/2020 56     Atrial Rate 09/01/2020 56     UT Interval 09/01/2020 150     QRSD Interval 09/01/2020 94     QT Interval 09/01/2020 408     QTC Interval 09/01/2020 393     P Axis 09/01/2020 43     QRS Axis 09/01/2020 -16     T Wave Axis 09/01/2020 -17    Appointment on 08/25/2020   Component Date Value    Sodium 08/25/2020 139     Potassium 08/25/2020 4 3     Chloride 08/25/2020 107     CO2 08/25/2020 30     ANION GAP 08/25/2020 2*    BUN 08/25/2020 18     Creatinine 08/25/2020 1 48*    Glucose, Fasting 08/25/2020 137*    Calcium 08/25/2020 9 4     AST 08/25/2020 30     ALT 08/25/2020 40     Alkaline Phosphatase 08/25/2020 58     Total Protein 08/25/2020 7 2     Albumin 08/25/2020 4 0     Total Bilirubin 08/25/2020 0 67     eGFR 08/25/2020 52     Hemoglobin A1C 08/25/2020 6 2*    EAG 08/25/2020 131     WBC 08/25/2020 6 14     RBC 08/25/2020 4 71     Hemoglobin 08/25/2020 15 0     Hematocrit 08/25/2020 46 2     MCV 08/25/2020 98     MCH 08/25/2020 31 8     MCHC 08/25/2020 32 5     RDW 08/25/2020 12 8     MPV 08/25/2020 10 9     Platelets 88/31/2474 220     nRBC 08/25/2020 0     Neutrophils Relative 08/25/2020 61     Immat GRANS % 08/25/2020 0     Lymphocytes Relative 08/25/2020 27     Monocytes Relative 08/25/2020 9     Eosinophils Relative 08/25/2020 2     Basophils Relative 08/25/2020 1     Neutrophils Absolute 08/25/2020 3 75     Immature Grans Absolute 08/25/2020 0 02     Lymphocytes Absolute 08/25/2020 1 64     Monocytes Absolute 08/25/2020 0 55     Eosinophils Absolute 08/25/2020 0 15     Basophils Absolute 08/25/2020 0 03     Cholesterol 08/25/2020 93     Triglycerides 08/25/2020 347*    HDL, Direct 08/25/2020 25*    LDL Calculated 08/25/2020 <0*    Non-HDL-Chol (CHOL-HDL) 08/25/2020 68     Phosphorus 08/25/2020 2 3*    Creatinine, Ur 08/25/2020 211 0     Protein Urine Random 08/25/2020 12     Prot/Creat Ratio, Ur 08/25/2020 0 06     PTH 08/25/2020 44 8     Color, UA 08/25/2020 Yellow     Clarity, UA 08/25/2020 Clear     Specific Gravity, UA 08/25/2020 1 026     pH, UA 08/25/2020 6 0     Leukocytes, UA 08/25/2020 Negative     Nitrite, UA 08/25/2020 Negative     Protein, UA 08/25/2020 Negative     Glucose, UA 08/25/2020 Negative     Ketones, UA 08/25/2020 Negative     Urobilinogen, UA 08/25/2020 1 0     Bilirubin, UA 08/25/2020 Negative     Blood, UA 08/25/2020 Negative     Vit D, 25-Hydroxy 08/25/2020 45 7    Appointment on 06/02/2020   Component Date Value    Sodium 06/02/2020 138     Potassium 06/02/2020 4 2     Chloride 06/02/2020 107     CO2 06/02/2020 27     ANION GAP 06/02/2020 4     BUN 06/02/2020 19     Creatinine 06/02/2020 1 45*    Glucose, Fasting 06/02/2020 130*    Calcium 06/02/2020 9 1     AST 06/02/2020 31     ALT 06/02/2020 46     Alkaline Phosphatase 06/02/2020 63     Total Protein 06/02/2020 7 0     Albumin 06/02/2020 4 1     Total Bilirubin 06/02/2020 0 55     eGFR 06/02/2020 53     Cholesterol 06/02/2020 94     Triglycerides 06/02/2020 290*    HDL, Direct 06/02/2020 22*    LDL Calculated 06/02/2020 14     Non-HDL-Chol (CHOL-HDL) 06/02/2020 72     Hemoglobin A1C 06/02/2020 6 2*    EAG 06/02/2020 131     PSA 06/02/2020 0 3     Rheumatoid Factor 06/02/2020 Negative     WBC 06/02/2020 5 35     RBC 06/02/2020 4 88     Hemoglobin 06/02/2020 15 4     Hematocrit 06/02/2020 47 2     MCV 06/02/2020 97     MCH 06/02/2020 31 6     MCHC 06/02/2020 32 6     RDW 06/02/2020 12 3     MPV 06/02/2020 10 9     Platelets 15/14/4198 205     nRBC 06/02/2020 0     Neutrophils Relative 06/02/2020 53     Immat GRANS % 06/02/2020 0     Lymphocytes Relative 06/02/2020 33     Monocytes Relative 06/02/2020 10     Eosinophils Relative 06/02/2020 3     Basophils Relative 06/02/2020 1     Neutrophils Absolute 06/02/2020 2 89     Immature Grans Absolute 06/02/2020 0 02     Lymphocytes Absolute 06/02/2020 1 74     Monocytes Absolute 06/02/2020 0 52     Eosinophils Absolute 06/02/2020 0 14     Basophils Absolute 06/02/2020 0 04     Phosphorus 06/02/2020 2 6*    PTH 06/02/2020 44 3     Vit D, 25-Hydroxy 06/02/2020 62 9    Orders Only on 04/27/2020   Component Date Value    Creatinine, Ur 06/02/2020 181 0     Protein Urine Random 06/02/2020 8     Prot/Creat Ratio, Ur 06/02/2020 0 04     Clarity, UA 06/02/2020 Clear     Color, UA 06/02/2020 Yellow     Specific Gravity, UA 06/02/2020 1 026     pH, UA 06/02/2020 5 5     Glucose, UA 06/02/2020 Negative     Ketones, UA 06/02/2020 Negative     Blood, UA 06/02/2020 Negative     Protein, UA 06/02/2020 Negative     Nitrite, UA 06/02/2020 Negative     Bilirubin, UA 06/02/2020 Negative     Urobilinogen, UA 06/02/2020 0 2     Leukocytes, UA 06/02/2020 Negative     WBC, UA 06/02/2020 None Seen     RBC, UA 06/02/2020 None Seen     Hyaline Casts, UA 06/02/2020 None Seen     Bacteria, UA 06/02/2020 None Seen     Epithelial Cells 06/02/2020 None Seen      Lab Results   Component Value Date    CHOL 114 03/17/2015    TRIG 347 (H) 08/25/2020    TRIG 222 03/17/2015    HDL 25 (L) 08/25/2020    HDL 32 03/17/2015     Imaging: Xr Chest Pa & Lateral    Result Date: 4/10/2019  Narrative: CHEST INDICATION:   J45 41: Moderate persistent asthma with (acute) exacerbation   COMPARISON:  Previous study from May 6, 2016, previous CT from January 10, 2017 EXAM PERFORMED/VIEWS:  XR CHEST PA & LATERAL FINDINGS: Heart size normal   There is 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 No acute consolidation or congestion No acute compression collapse of the vertebra seen Sclerotic density seen projecting over the thoracic spine on the lateral view due to prominent osteophytic spurring seen in this area Impression: No acute consolidation or congestion Workstation performed: [id number]       Review of Systems:  Review of Systems   Constitutional: Negative for activity change, appetite change, fatigue and fever  HENT: Negative for nosebleeds and sore throat  Eyes: Negative for photophobia and visual disturbance  Respiratory: Negative for cough, chest tightness, shortness of breath and wheezing  Cardiovascular: Negative for chest pain, palpitations and leg swelling  Gastrointestinal: Negative for abdominal pain, diarrhea, nausea and vomiting  Endocrine: Negative for polyuria  Genitourinary: Negative for dysuria, frequency and hematuria  Musculoskeletal: Negative for arthralgias, back pain and gait problem  Skin: Negative for pallor and rash  Neurological: Negative for dizziness, syncope, speech difficulty and light-headedness  Hematological: Does not bruise/bleed easily  Psychiatric/Behavioral: Negative for agitation, behavioral problems and confusion  Physical Exam:  Physical Exam  Vitals signs reviewed  Constitutional:       Appearance: He is well-developed  He is not diaphoretic  HENT:      Head: Normocephalic and atraumatic  Nose: Nose normal    Eyes:      General: No scleral icterus  Pupils: Pupils are equal, round, and reactive to light  Neck:      Musculoskeletal: Normal range of motion and neck supple  Vascular: No JVD  Cardiovascular:      Rate and Rhythm: Normal rate and regular rhythm  Heart sounds: Normal heart sounds  No murmur  No friction rub  No gallop  Pulmonary:      Effort: Pulmonary effort is normal  No respiratory distress  Breath sounds: Normal breath sounds  No wheezing or rales  Abdominal:      General: Bowel sounds are normal  There is no distension  Palpations: Abdomen is soft  Tenderness: There is no abdominal tenderness  Musculoskeletal: Normal range of motion  General: No deformity     Skin: General: Skin is warm and dry  Findings: No rash  Neurological:      Mental Status: He is alert and oriented to person, place, and time  Cranial Nerves: No cranial nerve deficit  Psychiatric:         Behavior: Behavior normal        Blood pressure 120/80, pulse 64, temperature 97 5 °F (36 4 °C), temperature source Tympanic, height 6' 1" (1 854 m), weight 103 kg (226 lb 11 2 oz), SpO2 98 %  Discussion/Summary:  Bicuspid AV: with no evidence of stenosis or regurgitation of any significance  Continue surveillance with echocardiograms  Recheck in May 2018 revealed normal LVEF, bicuspid AV with trace AR, no stenosis  Will recheck echo next year      Chest Pain: evaluated with a stress test that ruled out CAD in April 2019       Ascending Aortic Aneurysm: measuring 4 3cm on echo, had repeat CTA in 6 months as directed by Dr Celestina Saldana, with stable size  Stable at 4 3cm in Jan 2017  He is now due again to have this evaluated  April 2019 revealed stable size at 43mm        Palpitations: with COPD, we evaluated further with a Holter that ruled out significant arrhythmias - he did have PACs and 1 brief 7 beat run of PAT, which is not unexpected with his lung disease      Lipids: , LDL unable to be calculated, HDL 29, and   Would continue dietary efforts to reduce carbohydrates and sugars in diet to help with TG levels along with continuing on fenofibrate  His fasting sugar ws 110 - suggesting a diagnosis of DM - which could be causing his increase in TG and his symptoms of diaphoresis and increased fatigue  Pre-op cardiac eval: prior to intermediate risk hip surgery  No active symptoms or recent EKG changes (reviewed from 9/1/20) that suggest acute ischemia, CHF, or arrhythmias  Proceed with planned surgery without further cardiac work-up

## 2020-09-21 NOTE — LETTER
Cardiology Pre Operative Clearance      PRE OPERATIVE CARDIAC RISK ASSESSMENT    09/21/20    Oddis Hunger  1965  [id number]    Date of Surgery: 09/28/2020     Type of Surgery: R Hip replacement     Surgeon: Dr Nano Escamilla      No Cardiac Contraindication for Planned Surgical Procedures    Anticoagulation: none     Physician Comment: Proceed with planned intermediate cardiac risk surgery without further cardiac work-up  Electronically Signed: Farooq Mata MD, [facility]

## 2020-09-21 NOTE — PROGRESS NOTES
Assessment/Plan:   Diagnoses and all orders for this visit:    Mild intermittent asthma without complication    Seasonal allergic rhinitis due to pollen    Preop examination     Patient is here for asthma follow-up as well as pulmonary clearance for upcoming hip surgery  He is doing well with his asthma, [place] [facility] [place] rare need for his rescue inhaler  No limitation of his daily activities, no exacerbations of his asthma over the last year  PFT was done in 2018 which shows some mild restriction likely related to obesity, FEV1 was 79% with normal DLCO   He had a recent chest x-ray done this month that was clear  He does have a history of PEE but was unable to tolerate the CPAP and has been unwilling to attempt again  He is low risk for pulmonary complications from upcoming surgery, asthma has been well controlled  Discussed with him early ambulation postoperatively to prevent pneumonia  Discussed that given his untreated sleep apnea could potentially have difficulty post anesthesia  He will follow up with us in 1 year or sooner if necessary  No follow-ups on file  All questions are answered to the patient's satisfaction and understanding  He verbalizes understanding  He is encouraged to call with any further questions or concerns  Portions of the record may have been created with voice recognition software  Occasional wrong word or "sound a like" substitutions may have occurred due to the inherent limitations of voice recognition software  Read the chart carefully and recognize, using context, where substitutions have occurred      Electronically Signed by David Covarrubias PA-C    ______________________________________________________________________    Chief Complaint:   Chief Complaint   Patient presents with    Pre-op Exam       Patient ID: Akiko Torres is a 63 y o  y o  male has a past medical history of Aorta aneurysm ([facility] ), Arm DVT (deep venous thromboembolism), acute ([facility] ) (2015), Asthma, Chronic kidney disease, CKD (chronic kidney disease), stage III ([facility] ), COPD (chronic obstructive pulmonary disease) ([facility] ), Depression, Essential hypertriglyceridemia, GERD (gastroesophageal reflux disease), Headache, Hiatal hernia, Hyperlipidemia, mixed (5/7/2018), Kidney stone, Liver disease, PAC (premature atrial contraction), Prediabetes, Renal calculi, Sleep apnea, and Vestibular migraine  9/21/2020  Patient presents today for follow-up visit  Patient is a 54-year-old male former smoker with less than 15 pack year smoking history with past medical history of mild intermittent asthma, untreated PEE, aneurysm, CKD, DVT of the upper extremity, liver disease  He is here today for follow-up and pulmonary clearance for upcoming hip surgery  He continues to do well with his breathing, uses his Dulera twice per day  He very rarely has the need to use his rescue inhaler  Denies any shortness of breath or cough, no problems with his daily activities due to his breathing  He had been diagnosed with sleep apnea in the past but was unable to tolerate the CPAP and has been unwilling to attempt again  Review of Systems   Constitutional: Negative  HENT: Negative  Respiratory: Negative  Cardiovascular: Negative  Gastrointestinal: Negative  Genitourinary: Negative  Musculoskeletal: Positive for arthralgias  Skin: Negative  Allergic/Immunologic: Negative  Neurological: Negative  Psychiatric/Behavioral: Negative  Smoking history: He reports that he quit smoking about 6 years ago  His smoking use included cigars and cigarettes   He has never used smokeless tobacco     The following portions of the patient's history were reviewed and updated as appropriate: allergies, current medications, past family history, past medical history, past social history, past surgical history and problem list     Immunization History   Administered Date(s) Administered    INFLUENZA 12/07/2016, 10/26/2017    Influenza TIV (IM) 04/15/2016    Influenza, recombinant, quadrivalent,injectable, preservative free 10/02/2018, 09/17/2019, 09/09/2020    Pneumococcal Conjugate 13-Valent 12/07/2016    Pneumococcal Polysaccharide PPV23 11/20/2018    Tdap 06/09/2017    Zoster 06/09/2017     Current Outpatient Medications   Medication Sig Dispense Refill    acetaminophen (TYLENOL) 500 mg tablet Take 3 tablets by mouth as needed       albuterol (2 5 mg/3 mL) 0 083 % nebulizer solution Take 1 vial (2 5 mg total) by nebulization 4 (four) times a day 120 vial 3    albuterol (PROVENTIL HFA,VENTOLIN HFA) 90 mcg/act inhaler Inhale 2 puffs every 6 (six) hours as needed for wheezing   cyclobenzaprine (FLEXERIL) 10 mg tablet Take 1 tablet (10 mg total) by mouth 3 (three) times a day as needed for muscle spasms for up to 15 days 45 tablet 0    fenofibrate (TRICOR) 145 mg tablet Take 1 tablet (145 mg total) by mouth daily 90 tablet 3    ferrous sulfate 325 (65 Fe) mg tablet Take 325 mg by mouth daily with breakfast      fluticasone (FLONASE) 50 mcg/act nasal spray 1 spray into each nostril daily (Patient taking differently: 1 spray into each nostril daily as needed ) 11 Bottle 3    mometasone-formoterol (DULERA) 200-5 MCG/ACT inhaler Inhale 2 puffs 2 (two) times a day   montelukast (SINGULAIR) 10 mg tablet take 1 tablet by mouth at bedtime 30 tablet 3    pantoprazole (PROTONIX) 40 mg tablet Take 1 tablet (40 mg total) by mouth daily 90 tablet 3    rosuvastatin (CRESTOR) 20 MG tablet take 1 tablet by mouth once daily 30 tablet 5    sildenafil (REVATIO) 20 mg tablet TAKE 1 TABLET (20 MG TOTAL) BY MOUTH DAILY AS DIRECTED 90 tablet 3    traMADol (ULTRAM) 50 mg tablet Take 1 PO three times daily PRN for pain   Ongoing therapy 90 tablet 2    ergocalciferol (VITAMIN D2) 50,000 units Take 1 capsule (50,000 Units total) by mouth once a week (Patient not taking: Reported on 6/17/2020) 4 capsule 3    folic acid (FOLVITE) 1 mg tablet Take 1 tablet (1 mg total) by mouth daily (Patient not taking: Reported on 9/21/2020) 30 tablet 0     No current facility-administered medications for this visit  Allergies: Patient has no known allergies  Objective:  Vitals:    09/21/20 1448   BP: 120/84   Pulse: 83   Temp: 97 5 °F (36 4 °C)   SpO2: 95%   Weight: 103 kg (226 lb)   Height: 6' 1" (1 854 m)   Oxygen Therapy  SpO2: 95 %    Wt Readings from Last 3 Encounters:   09/21/20 103 kg (226 lb)   09/21/20 103 kg (226 lb 11 2 oz)   09/11/20 102 kg (225 lb)     Body mass index is 29 82 kg/m²  Physical Exam  Constitutional:       General: He is not in acute distress  Appearance: He is well-developed  HENT:      Head: Normocephalic and atraumatic  Eyes:      Pupils: Pupils are equal, round, and reactive to light  Neck:      Musculoskeletal: Normal range of motion  Cardiovascular:      Rate and Rhythm: Normal rate and regular rhythm  Heart sounds: Normal heart sounds  No murmur  Pulmonary:      Effort: Pulmonary effort is normal  No accessory muscle usage or respiratory distress  Breath sounds: Normal breath sounds  No decreased breath sounds, wheezing, rhonchi or rales  Abdominal:      Palpations: Abdomen is soft  Tenderness: There is no abdominal tenderness  Musculoskeletal: Normal range of motion  Skin:     General: Skin is warm and dry  Findings: No rash  Neurological:      Mental Status: He is alert and oriented to person, place, and time           Lab Review:   Lab Results   Component Value Date     03/17/2015    K 4 3 09/07/2020    K 3 7 03/17/2015     09/07/2020     03/17/2015    CO2 29 09/07/2020    CO2 28 03/17/2015    BUN 25 09/07/2020    BUN 13 09/03/2015    CREATININE 1 47 (H) 09/07/2020    CREATININE 1 41 (H) 09/03/2015    GLUCOSE 92 03/17/2015    CALCIUM 9 5 09/07/2020    CALCIUM 8 7 03/17/2015     Lab Results   Component Value Date    WBC 5 00 09/07/2020    WBC 6 54 03/17/2015    HGB 15 1 09/07/2020    HGB 14 7 03/17/2015    HCT 44 0 09/07/2020    HCT 44 8 03/17/2015    MCV 95 09/07/2020    MCV 94 03/17/2015     09/07/2020     03/17/2015       Diagnostics:  I have personally reviewed pertinent reports  and I have personally reviewed pertinent films in PACS  Reviewed recent chest x-ray and prior PFTs  Office Spirometry Results:     ESS:    Xr Chest Pa & Lateral    Result Date: 9/3/2020  Narrative: CHEST INDICATION:   M16 11: Unilateral primary osteoarthritis, right hip  COMPARISON:  Chest x-ray dated April 8, 2019  CTA chest dated April 26, 2019  EXAM PERFORMED/VIEWS:  XR CHEST PA & LATERAL FINDINGS: Cardiomediastinal silhouette appears unremarkable  There is stable widening of the mediastinum secondary to mediastinal lipomatosis  The lungs are clear  No pneumothorax or pleural effusion  Osseous structures appear within normal limits for patient age  Impression: No acute cardiopulmonary disease  Workstation performed: [id number]     Xr Spine Lumbar Minimum 4 Views Non Injury    Result Date: 9/9/2020  Narrative: LUMBAR SPINE INDICATION:   M54 5: Low back pain  COMPARISON:  None VIEWS:  XR SPINE LUMBAR MINIMUM 4 VIEWS NON INJURY FINDINGS: There are 5 non rib bearing lumbar vertebral bodies  There is no evidence of acute fracture or destructive osseous lesion  Alignment is anatomic  Lumbar lordosis straightening  Slight levoscoliosis  L1-L2 mild degenerative disc change  L3-L4, L4-L5 small osteophytes  The pedicles appear intact  Soft tissues are unremarkable  Impression: Lumbar lordosis straightening Degenerative changes   No acute osseous abnormality Workstation performed: [id number]     Mri Lumbar Spine Wo Contrast    Result Date: 9/14/2020  Narrative: MRI LUMBAR SPINE WITHOUT CONTRAST INDICATION: M47 816: Spondylosis without myelopathy or radiculopathy, lumbar region M48 00: Spinal stenosis, site unspecified M51 26: Other intervertebral disc displacement, lumbar region  COMPARISON:  X-ray 9/9/2020, CT of the abdomen 9/17/2020 TECHNIQUE:  Sagittal T1, sagittal T2, sagittal inversion recovery, axial T1 and axial T2, coronal T2   IMAGE QUALITY:  Diagnostic FINDINGS: VERTEBRAL BODIES:  There are 5 nonrib-bearing lumbar type vertebral bodies  Shrinkage of normal lumbar lordosis with left convex L2-3 apex scoliosis  Minor rightward translation of L1 leftward translation of L3  Normal marrow signal is identified within the visualized bony structures  No discrete marrow lesion  SACRUM:  Normal signal within the sacrum  No evidence of insufficiency or stress fracture  DISTAL CORD AND CONUS:  Normal size and signal within the distal cord and conus  Conus terminates at the L1 level  PARASPINAL SOFT TISSUES:  Paraspinal soft tissues are unremarkable  32 mm mid pole left renal mass, subcentimeter cyst in the right lobe of the liver  LOWER THORACIC DISC SPACES:  Normal disc height and signal   No disc herniation, canal stenosis or foraminal narrowing  LUMBAR DISC SPACES: L1-L2:  Decreased disc height, circumferential bulge with thin right paramedian noncompressive protrusion  Minimal facet arthrosis  L2-L3:  Minor circumferential bulge, slight right facet arthrosis  L3-L4:  Decreased disc height is slightly asymmetric to the right where a asymmetric bulging of the disc and right lateral osteophytes extend into the base of the foramen without L3 root compromise  L4-L5:  Minor circumferential bulging disc, slight leftward translation of L4, minor bilateral facet arthrosis, no critical stenosis  L5-S1:  Minor reduction disc height, circumferential bulge with mild facet arthrosis  Impression: Minor, noncompressive degenerative changes of the lumbar spine, minor left convex mid lumbar scoliosis   Workstation performed: [id number]     Ct Renal Stone Study Abdomen Pelvis Wo Contrast    Result Date: 9/7/2020  Narrative: CT ABDOMEN AND PELVIS WITHOUT IV CONTRAST - LOW DOSE RENAL STONE INDICATION:   right flank pain  COMPARISON:  8/21/2018 TECHNIQUE:  Low dose thin section CT examination of the abdomen and pelvis was performed without intravenous or oral contrast according to a protocol specifically designed to evaluate for urinary tract calculus  Axial, sagittal, and coronal 2D reformatted images were created from the source data and submitted for interpretation  Evaluation for pathology in the abdomen and pelvis that is unrelated to urinary tract calculi is limited  Radiation dose length product (DLP) for this visit:  476 8 mGy-cm   This examination, like all CT scans performed in the [facility], was performed utilizing techniques to minimize radiation dose exposure, including the use of iterative reconstruction and automated exposure control  FINDINGS: RIGHT KIDNEY AND URETER: No urinary tract calculi  No hydronephrosis or hydroureter  LEFT KIDNEY AND URETER: No urinary tract calculi  No hydronephrosis or hydroureter  Simple cyst is present  URINARY BLADDER: Unremarkable  No significant abnormality in the visualized lung bases  Visualized portion of the liver is diffusely decreased in density, consistent with hepatic steatosis  Simple cyst is again noted in the region of the falciform ligament  Limited low radiation dose noncontrast CT evaluation demonstrates no other clinically significant abnormality of liver, spleen, pancreas, or adrenal glands  No calcified gallstones or gallbladder wall thickening noted  No ascites or bulky lymphadenopathy on this limited noncontrast study  Colonic diverticula are noted, without evidence to suggest acute diverticulitis  Visualized bowel appears otherwise unremarkable  The appendix is well seen and there is no evidence of acute appendicitis  No acute fracture or destructive osseous lesion is identified       Impression: No urinary tract calculi identified   Workstation performed: [id number]

## 2020-09-21 NOTE — PATIENT INSTRUCTIONS

## 2020-09-22 ENCOUNTER — OFFICE VISIT (OUTPATIENT)
Dept: PHYSICAL THERAPY | Age: 55
End: 2020-09-22
Payer: MEDICARE

## 2020-09-22 DIAGNOSIS — M16.11 PRIMARY OSTEOARTHRITIS OF ONE HIP, RIGHT: ICD-10-CM

## 2020-09-22 DIAGNOSIS — M47.816 FACET ARTHROPATHY, LUMBAR: Primary | ICD-10-CM

## 2020-09-22 DIAGNOSIS — M48.00 CENTRAL STENOSIS OF SPINAL CANAL: ICD-10-CM

## 2020-09-22 PROCEDURE — 97110 THERAPEUTIC EXERCISES: CPT

## 2020-09-22 NOTE — PROGRESS NOTES
Daily Note     Today's date: 2020  Patient name: Melinda Antonio  : 1965  MRN: [id number]  Referring provider: Colleen Campo PA-C  Dx:   Encounter Diagnosis     ICD-10-CM    1  Facet arthropathy, lumbar  M47 816    2  Central stenosis of spinal canal  M48 00    3  Primary osteoarthritis of one hip, right  M16 11        Start Time: 1300  Stop Time: 1355  Total time in clinic (min): 55 minutes    Subjective: Reports 5/10 pain in his LB and R hip  States he had some muscle soreness in his thighs post last session, but it did not last too long  Objective: See treatment diary below      Assessment: Tolerated treatment well  Mild soreness noted post session  Patient was able to perform 30 heelslides today bilaterally without significant increase in pain levels  Tight in B hips, especially R hip abduction  Patient would benefit from continued PT      Plan: Continue per plan of care        Precautions: cardiac, THR scheduled for 20      Manuals           B LE/LB 10 15' 15'                                                 Neuro Re-Ed                                                                                                        Ther Ex             Quad and glut set 10x each  30x ea          Add set ball 10x 30x 30x          ADMIN  2"x10 3''x20          Heel slides 5x 30x R  10x B x30          Standing hip abd, ext, march 10x each 20x 20x          Standing hamstring curls 10x 20x 20x          Toe raises 10x 20x 20x          theraband abd  B 30x 30x                       Ther Activity             HEP As above                         Gait Training                                       Modalities               10' post 10' prior today

## 2020-09-23 ENCOUNTER — APPOINTMENT (OUTPATIENT)
Dept: LAB | Facility: MEDICAL CENTER | Age: 55
End: 2020-09-23
Payer: MEDICARE

## 2020-09-23 ENCOUNTER — OFFICE VISIT (OUTPATIENT)
Dept: OBGYN CLINIC | Facility: CLINIC | Age: 55
End: 2020-09-23
Payer: MEDICARE

## 2020-09-23 VITALS
HEART RATE: 69 BPM | BODY MASS INDEX: 29.95 KG/M2 | WEIGHT: 226 LBS | SYSTOLIC BLOOD PRESSURE: 124 MMHG | DIASTOLIC BLOOD PRESSURE: 81 MMHG | TEMPERATURE: 98.7 F | HEIGHT: 73 IN

## 2020-09-23 DIAGNOSIS — M16.11 PRIMARY OSTEOARTHRITIS OF ONE HIP, RIGHT: ICD-10-CM

## 2020-09-23 DIAGNOSIS — M51.36 DEGENERATIVE DISC DISEASE, LUMBAR: ICD-10-CM

## 2020-09-23 DIAGNOSIS — M47.816 LUMBAR FACET ARTHROPATHY: Primary | ICD-10-CM

## 2020-09-23 DIAGNOSIS — M51.26 BULGING OF LUMBAR INTERVERTEBRAL DISC: ICD-10-CM

## 2020-09-23 LAB
ALBUMIN SERPL BCP-MCNC: 3.9 G/DL (ref 3.5–5)
ALP SERPL-CCNC: 54 U/L (ref 46–116)
ALT SERPL W P-5'-P-CCNC: 83 U/L (ref 12–78)
ANION GAP SERPL CALCULATED.3IONS-SCNC: 6 MMOL/L (ref 4–13)
AST SERPL W P-5'-P-CCNC: 53 U/L (ref 5–45)
BASOPHILS # BLD AUTO: 0.04 THOUSANDS/ΜL (ref 0–0.1)
BASOPHILS NFR BLD AUTO: 1 % (ref 0–1)
BILIRUB SERPL-MCNC: 0.59 MG/DL (ref 0.2–1)
BUN SERPL-MCNC: 19 MG/DL (ref 5–25)
CALCIUM SERPL-MCNC: 10 MG/DL (ref 8.3–10.1)
CHLORIDE SERPL-SCNC: 103 MMOL/L (ref 100–108)
CO2 SERPL-SCNC: 30 MMOL/L (ref 21–32)
CREAT SERPL-MCNC: 1.63 MG/DL (ref 0.6–1.3)
EOSINOPHIL # BLD AUTO: 0.19 THOUSAND/ΜL (ref 0–0.61)
EOSINOPHIL NFR BLD AUTO: 3 % (ref 0–6)
ERYTHROCYTE [DISTWIDTH] IN BLOOD BY AUTOMATED COUNT: 12.3 % (ref 11.6–15.1)
EST. AVERAGE GLUCOSE BLD GHB EST-MCNC: 140 MG/DL
FERRITIN SERPL-MCNC: 949 NG/ML (ref 8–388)
GFR SERPL CREATININE-BSD FRML MDRD: 46 ML/MIN/1.73SQ M
GLUCOSE SERPL-MCNC: 139 MG/DL (ref 65–140)
HBA1C MFR BLD: 6.5 %
HCT VFR BLD AUTO: 48.7 % (ref 36.5–49.3)
HGB BLD-MCNC: 15.5 G/DL (ref 12–17)
IMM GRANULOCYTES # BLD AUTO: 0.03 THOUSAND/UL (ref 0–0.2)
IMM GRANULOCYTES NFR BLD AUTO: 1 % (ref 0–2)
IRON SATN MFR SERPL: 38 %
IRON SERPL-MCNC: 135 UG/DL (ref 65–175)
LYMPHOCYTES # BLD AUTO: 1.61 THOUSANDS/ΜL (ref 0.6–4.47)
LYMPHOCYTES NFR BLD AUTO: 26 % (ref 14–44)
MCH RBC QN AUTO: 31.3 PG (ref 26.8–34.3)
MCHC RBC AUTO-ENTMCNC: 31.8 G/DL (ref 31.4–37.4)
MCV RBC AUTO: 98 FL (ref 82–98)
MONOCYTES # BLD AUTO: 0.69 THOUSAND/ΜL (ref 0.17–1.22)
MONOCYTES NFR BLD AUTO: 11 % (ref 4–12)
NEUTROPHILS # BLD AUTO: 3.57 THOUSANDS/ΜL (ref 1.85–7.62)
NEUTS SEG NFR BLD AUTO: 58 % (ref 43–75)
NRBC BLD AUTO-RTO: 0 /100 WBCS
PLATELET # BLD AUTO: 200 THOUSANDS/UL (ref 149–390)
PMV BLD AUTO: 11.4 FL (ref 8.9–12.7)
POTASSIUM SERPL-SCNC: 4.1 MMOL/L (ref 3.5–5.3)
PROT SERPL-MCNC: 7.1 G/DL (ref 6.4–8.2)
RBC # BLD AUTO: 4.95 MILLION/UL (ref 3.88–5.62)
SODIUM SERPL-SCNC: 139 MMOL/L (ref 136–145)
TIBC SERPL-MCNC: 360 UG/DL (ref 250–450)
WBC # BLD AUTO: 6.13 THOUSAND/UL (ref 4.31–10.16)

## 2020-09-23 PROCEDURE — 82728 ASSAY OF FERRITIN: CPT

## 2020-09-23 PROCEDURE — 83540 ASSAY OF IRON: CPT

## 2020-09-23 PROCEDURE — 85025 COMPLETE CBC W/AUTO DIFF WBC: CPT

## 2020-09-23 PROCEDURE — 83036 HEMOGLOBIN GLYCOSYLATED A1C: CPT

## 2020-09-23 PROCEDURE — 83550 IRON BINDING TEST: CPT

## 2020-09-23 PROCEDURE — 99213 OFFICE O/P EST LOW 20 MIN: CPT | Performed by: FAMILY MEDICINE

## 2020-09-23 PROCEDURE — 36415 COLL VENOUS BLD VENIPUNCTURE: CPT

## 2020-09-23 PROCEDURE — 80053 COMPREHEN METABOLIC PANEL: CPT

## 2020-09-23 NOTE — PROGRESS NOTES
Assessment/Plan:  Assessment/Plan   Diagnoses and all orders for this visit:    Lumbar facet arthropathy  -     Ambulatory referral to Pain Management; Future    Degenerative disc disease, lumbar  -     Ambulatory referral to Pain Management; Future    Bulging of lumbar intervertebral disc  -     Ambulatory referral to Pain Management; Future         13-year-old male with low back pain of more than 4 weeks duration  Discussed with patient MRI results, impression and plan  MRI of lumbar spine noted 32 for left renal mass, and cyst in the right lobe of liver, multilevel degenerative disc disease and circumferential disc bulging, bilateral facet arthrosis  Clinical impression is that he may have had flare of facet arthropathy and lumbar spine  I recommend he start taking tumeric 500 mg twice daily, tart cherry 1000 mg daily, and Osteo Bi-Flex triple strength twice daily  He is scheduled for right hip total arthroplasty  He is also under the care pain management and I recommend he continue to follow-up with pain management  He will follow up with me as needed  Subjective:   Patient ID: Harry Hamm is a 63 y o  male  Chief Complaint   Patient presents with    Lower Back - Follow-up         13-year-old male following up for low back pain of more than 4 weeks duration  He was last seen by me 2 weeks ago at which point he was referred for MRI of the lumbar spine  He was prescribed course oral steroid and referred to physical therapy  He started physical therapy about 9 days ago and is scheduled for right hip total arthroplasty on 09/28/2020  He reports dramatic improvement since his last visit  He reports no longer experiencing sharp pain and lumbar spine    He has pain described as localized to the midline lumbar aspect of spine, achy and sore, constant, worse with bending and twisting, and improved with rest   He denies any pain radiating to the legs or any numbness or tingling in lower extremities  Back Pain   This is a new problem  The current episode started more than 1 month ago  The problem occurs constantly  The problem has been gradually improving  Associated symptoms include arthralgias  Pertinent negatives include no joint swelling, numbness or weakness  The symptoms are aggravated by standing, twisting, walking and bending  He has tried rest, oral narcotics and position changes (  Oral steroid, muscle relaxers) for the symptoms  The treatment provided moderate relief  Review of Systems   Musculoskeletal: Positive for arthralgias and back pain  Negative for joint swelling  Neurological: Negative for weakness and numbness  Objective:  Vitals:    09/23/20 0849   BP: 124/81   Pulse: 69   Temp: 98 7 °F (37 1 °C)   Weight: 103 kg (226 lb)   Height: 6' 1" (1 854 m)     Right Hip Exam     Muscle Strength   Flexion: 5/5       Left Hip Exam     Muscle Strength   Flexion: 5/5             Physical Exam  Vitals signs and nursing note reviewed  Constitutional:       General: He is not in acute distress  Appearance: He is well-developed  HENT:      Head: Normocephalic and atraumatic  Eyes:      Conjunctiva/sclera: Conjunctivae normal    Neck:      Trachea: No tracheal deviation  Cardiovascular:      Rate and Rhythm: Normal rate  Pulmonary:      Effort: Pulmonary effort is normal  No respiratory distress  Abdominal:      General: There is no distension  Skin:     General: Skin is warm and dry  Neurological:      Mental Status: He is alert and oriented to person, place, and time  Psychiatric:         Behavior: Behavior normal          I have personally reviewed pertinent films in PACS and my interpretation is   Lumbar spine disc bulging

## 2020-09-24 ENCOUNTER — OFFICE VISIT (OUTPATIENT)
Dept: FAMILY MEDICINE CLINIC | Facility: CLINIC | Age: 55
End: 2020-09-24
Payer: MEDICARE

## 2020-09-24 VITALS
TEMPERATURE: 97.2 F | HEART RATE: 83 BPM | SYSTOLIC BLOOD PRESSURE: 126 MMHG | DIASTOLIC BLOOD PRESSURE: 78 MMHG | RESPIRATION RATE: 18 BRPM | WEIGHT: 226 LBS | BODY MASS INDEX: 29.95 KG/M2 | HEIGHT: 73 IN | OXYGEN SATURATION: 97 %

## 2020-09-24 DIAGNOSIS — H00.012 HORDEOLUM EXTERNUM OF RIGHT LOWER EYELID: Primary | ICD-10-CM

## 2020-09-24 PROCEDURE — 99213 OFFICE O/P EST LOW 20 MIN: CPT | Performed by: NURSE PRACTITIONER

## 2020-09-24 RX ORDER — NEOMYCIN SULFATE, POLYMYXIN B SULFATE, BACITRACIN ZINC, HYDROCORTISONE 3.5; 10000; 400; 1 MG/G; [USP'U]/G; [USP'U]/G; MG/G
OINTMENT OPHTHALMIC 2 TIMES DAILY
Qty: 3.5 G | Refills: 0 | Status: SHIPPED | OUTPATIENT
Start: 2020-09-24 | End: 2020-10-15

## 2020-09-24 NOTE — PROGRESS NOTES
Assessment/Plan:       Diagnoses and all orders for this visit:    Hordeolum externum of right lower eyelid  -     Discontinue: tobramycin (TOBREX) 0 3 % OINT; Administer 0 5 inches to the right eye 2 (two) times a day for 5 days  -     neomycin-bacitracin-polymyxin-hydrocortisone (CORTISPORIN) 1 % ophthalmic ointment; Administer to the right eye 2 (two) times a day        No problem-specific Assessment & Plan notes found for this encounter  Subjective:      Patient ID: Harry Hamm is a 63 y o  male  Patient is here for evaluation of reddened lump on lower right eyelid  Patient reports that he developed redness and a lump a few days ago  No pain  NO drainage  The following portions of the patient's history were reviewed and updated as appropriate:   He has a past medical history of Aorta aneurysm ([facility] [address] ), Arm DVT (deep venous thromboembolism), acute ([address] ) (2015), Asthma, Chronic kidney disease, CKD (chronic kidney disease), stage III ([facility] [address] ), COPD (chronic obstructive pulmonary disease) ([facility] [address] ), Depression, Essential hypertriglyceridemia, GERD (gastroesophageal reflux disease), Headache, Hiatal hernia, Hyperlipidemia, mixed (5/7/2018), Kidney stone, Liver disease, PAC (premature atrial contraction), Prediabetes, Renal calculi, Sleep apnea, and Vestibular migraine  ,  does not have any pertinent problems on file  ,   has a past surgical history that includes Carpal tunnel release (Left); Hernia repair; pr colonoscopy flx dx w/collj spec when pfrmd (N/A, 8/7/2017); Colonoscopy; Foot surgery (Left); pr esophagogastroduodenoscopy transoral diagnostic (N/A, 10/31/2017); and FL injection right hip (arthrogram) (8/20/2018)  ,  family history includes Aortic aneurysm in his other; Arthritis in his mother; Cancer in his brother; Clotting disorder in his father; Heart attack in his father; Leukemia in his brother; Prostate cancer in his brother; Schizoaffective Disorder  in his sister  ,   reports that he quit smoking about 6 years ago  His smoking use included cigars and cigarettes  He has never used smokeless tobacco  He reports current alcohol use  He reports that he does not use drugs  ,  has No Known Allergies     Current Outpatient Medications   Medication Sig Dispense Refill    acetaminophen (TYLENOL) 500 mg tablet Take 3 tablets by mouth as needed       albuterol (2 5 mg/3 mL) 0 083 % nebulizer solution Take 1 vial (2 5 mg total) by nebulization 4 (four) times a day 120 vial 3    albuterol (PROVENTIL HFA,VENTOLIN HFA) 90 mcg/act inhaler Inhale 2 puffs every 6 (six) hours as needed for wheezing   cyclobenzaprine (FLEXERIL) 10 mg tablet Take 1 tablet (10 mg total) by mouth 3 (three) times a day as needed for muscle spasms for up to 15 days 45 tablet 0    ergocalciferol (VITAMIN D2) 50,000 units Take 1 capsule (50,000 Units total) by mouth once a week (Patient not taking: Reported on 6/17/2020) 4 capsule 3    fenofibrate (TRICOR) 145 mg tablet Take 1 tablet (145 mg total) by mouth daily 90 tablet 3    ferrous sulfate 325 (65 Fe) mg tablet Take 325 mg by mouth daily with breakfast      fluticasone (FLONASE) 50 mcg/act nasal spray 1 spray into each nostril daily (Patient taking differently: 1 spray into each nostril daily as needed ) 11 Bottle 3    folic acid (FOLVITE) 1 mg tablet Take 1 tablet (1 mg total) by mouth daily 30 tablet 0    mometasone-formoterol (DULERA) 200-5 MCG/ACT inhaler Inhale 2 puffs 2 (two) times a day        montelukast (SINGULAIR) 10 mg tablet take 1 tablet by mouth at bedtime 30 tablet 3    neomycin-bacitracin-polymyxin-hydrocortisone (CORTISPORIN) 1 % ophthalmic ointment Administer to the right eye 2 (two) times a day 3 5 g 0    pantoprazole (PROTONIX) 40 mg tablet Take 1 tablet (40 mg total) by mouth daily 90 tablet 3    rosuvastatin (CRESTOR) 20 MG tablet take 1 tablet by mouth once daily 30 tablet 5    sildenafil (REVATIO) 20 mg tablet TAKE 1 TABLET (20 MG TOTAL) BY MOUTH DAILY AS DIRECTED 90 tablet 3    traMADol (ULTRAM) 50 mg tablet Take 1 PO three times daily PRN for pain  Ongoing therapy 90 tablet 2     No current facility-administered medications for this visit  Review of Systems   Constitutional: Negative for fatigue and fever  HENT: Negative for congestion  Lump lower eyelid right eye   Eyes: Negative for visual disturbance  Respiratory: Negative for cough and shortness of breath  Cardiovascular: Negative for chest pain, palpitations and leg swelling  Gastrointestinal: Negative for abdominal distention and abdominal pain  Endocrine: Negative for cold intolerance, polydipsia and polyuria  Genitourinary: Negative for difficulty urinating  Musculoskeletal: Negative for back pain and joint swelling  Skin: Negative for color change and rash  Allergic/Immunologic: Negative for immunocompromised state  Neurological: Negative for dizziness and headaches  Hematological: Negative for adenopathy  Psychiatric/Behavioral: Negative for behavioral problems and sleep disturbance  All other systems reviewed and are negative  Objective:  Vitals:    09/24/20 1452   BP: 126/78   BP Location: Left arm   Patient Position: Sitting   Pulse: 83   Resp: 18   Temp: (!) 97 2 °F (36 2 °C)   SpO2: 97%   Weight: 103 kg (226 lb)   Height: 6' 1" (1 854 m)     Body mass index is 29 82 kg/m²  Physical Exam  Vitals signs and nursing note reviewed  Constitutional:       General: He is not in acute distress  Appearance: Normal appearance  He is obese  He is not ill-appearing, toxic-appearing or diaphoretic  HENT:      Head: Normocephalic and atraumatic  Comments: Right lower eyelid hordoleum     Right Ear: Tympanic membrane, ear canal and external ear normal       Left Ear: Tympanic membrane, ear canal and external ear normal       Nose: Nose normal  No congestion        Mouth/Throat:      Mouth: Mucous membranes are moist       Pharynx: Oropharynx is clear  No oropharyngeal exudate or posterior oropharyngeal erythema  Eyes:      Conjunctiva/sclera: Conjunctivae normal       Pupils: Pupils are equal, round, and reactive to light  Neck:      Musculoskeletal: Normal range of motion and neck supple  Vascular: No carotid bruit  Cardiovascular:      Rate and Rhythm: Normal rate and regular rhythm  Pulses: Normal pulses  Heart sounds: Normal heart sounds  No murmur  No friction rub  No gallop  Pulmonary:      Effort: Pulmonary effort is normal  No respiratory distress  Breath sounds: Normal breath sounds  No stridor  No wheezing, rhonchi or rales  Chest:      Chest wall: No tenderness  Abdominal:      General: Bowel sounds are normal  There is no distension  Palpations: Abdomen is soft  There is no mass  Tenderness: There is no abdominal tenderness  Musculoskeletal: Normal range of motion  General: No swelling, tenderness, deformity or signs of injury  Lymphadenopathy:      Cervical: No cervical adenopathy  Skin:     General: Skin is warm and dry  Capillary Refill: Capillary refill takes less than 2 seconds  Coloration: Skin is not jaundiced or pale  Findings: No erythema or rash  Neurological:      Mental Status: He is alert  Cranial Nerves: No cranial nerve deficit  Sensory: No sensory deficit  Coordination: Coordination normal    Psychiatric:         Mood and Affect: Mood normal          Behavior: Behavior normal          Thought Content:  Thought content normal          Judgment: Judgment normal

## 2020-09-24 NOTE — PROGRESS NOTES
Daily Note     Today's date: 2020  Patient name: Prerna Gomez  : 1965  MRN: [id number]  Referring provider: Neema Magana PA-C  Dx:   Encounter Diagnosis     ICD-10-CM    1  Facet arthropathy, lumbar  M47 816    2  Central stenosis of spinal canal  M48 00    3  Primary osteoarthritis of one hip, right  M16 11        Start Time: 07  Stop Time: 08  Total time in clinic (min): 55 minutes    Subjective: 4/10 hip pain; lbp at 1/10      Objective: See treatment diary below      Assessment: Tolerated treatment well  Patient demonstrated fatigue post treatment Patient to have hip replacement surgery on Monday  Independent in HEP      Plan: Resume PT post THR next week       Precautions: cardiac, THR scheduled for 20  '    Manuals          B LE/LB 10 15' 15' 8                                                Neuro Re-Ed                                                                                                        Ther Ex             Quad and glut set 10x each  30x ea 20x         Add set ball 10x 30x 30x 30x         ADMIN  2"x10 3''x20 20x         Heel slides 5x 30x R  10x B x30 30x         Standing hip abd, ext, march 10x each 20x 20x 20x         Standing hamstring curls 10x 20x 20x 20x((/         Toe raises 10x 20x 20x 20x         theraband abd  B 30x 30x 30x                      Ther Activity             HEP As above                         Gait Training                                       Modalities             MH  10' post 10' prior today 10'         Ice post

## 2020-09-24 NOTE — PATIENT INSTRUCTIONS
Stye   WHAT YOU NEED TO KNOW:   A stye is a lump on the edge or inside of your eyelid caused by an infection  A stye can form on your upper or lower eyelid  It usually goes away in 2 to 4 days  DISCHARGE INSTRUCTIONS:   Medicines:   · Antibiotic medicine: This is given as an ointment to put into your eye  It is used to fight an infection caused by bacteria  Use as directed  · Take your medicine as directed  Contact your healthcare provider if you think your medicine is not helping or if you have side effects  Tell him of her if you are allergic to any medicine  Keep a list of the medicines, vitamins, and herbs you take  Include the amounts, and when and why you take them  Bring the list or the pill bottles to follow-up visits  Carry your medicine list with you in case of an emergency  Follow up with your healthcare provider as directed:  Write down your questions so you remember to ask them during your visits  Self-care:   · Use warm compresses: This will help decrease swelling and pain  Wet a clean washcloth with warm water and place it on your eye for 10 to 15 minutes, 3 to 4 times each day or as directed  · Keep your hands away from your eye: This helps to prevent the spread of the infection to other parts of the eye  Wash your hands often with soap and dry with a clean towel  Do not squeeze the stye  · Do not use eye makeup:  Do not wear eye makeup while you have a stye  Eye makeup may carry bacteria and cause another stye  Throw away eye makeup and brushes used to apply the makeup  Use new eye makeup after the stye has gone away  Do not share eye makeup with others  · Prevent another stye:  Wash your face and clean your eyelashes every day  Remove eye makeup with makeup remover  This helps to completely remove eye makeup without heavy rubbing  Contact your healthcare provider if:   · You have redness and discharge around your eye, and your eye pain is getting worse      · Your vision changes  · The stye has not gone away within 7 days  · The stye comes back within a short period of time after treatment  · You have questions or concerns about your condition or care  © 2017 [address] [employer] Information is for End User's use only and may not be sold, redistributed or otherwise used for commercial purposes  All illustrations and images included in CareNotes® are the copyrighted property of [employer]  or [employer]  The above information is an  only  It is not intended as medical advice for individual conditions or treatments  Talk to your doctor, nurse or pharmacist before following any medical regimen to see if it is safe and effective for you

## 2020-09-25 ENCOUNTER — OFFICE VISIT (OUTPATIENT)
Dept: PHYSICAL THERAPY | Age: 55
End: 2020-09-25
Payer: MEDICARE

## 2020-09-25 DIAGNOSIS — M16.11 PRIMARY OSTEOARTHRITIS OF ONE HIP, RIGHT: ICD-10-CM

## 2020-09-25 DIAGNOSIS — M47.816 FACET ARTHROPATHY, LUMBAR: Primary | ICD-10-CM

## 2020-09-25 DIAGNOSIS — M48.00 CENTRAL STENOSIS OF SPINAL CANAL: ICD-10-CM

## 2020-09-25 PROCEDURE — 97140 MANUAL THERAPY 1/> REGIONS: CPT | Performed by: PHYSICAL THERAPIST

## 2020-09-25 PROCEDURE — 97110 THERAPEUTIC EXERCISES: CPT | Performed by: PHYSICAL THERAPIST

## 2020-09-27 LAB
ABO GROUP BLD: NORMAL
BLD GP AB SCN SERPL QL: NEGATIVE
RH BLD: POSITIVE
SPECIMEN EXPIRATION DATE: NORMAL

## 2020-09-28 ENCOUNTER — APPOINTMENT (OUTPATIENT)
Dept: RADIOLOGY | Facility: HOSPITAL | Age: 55
End: 2020-09-28
Payer: MEDICARE

## 2020-09-28 ENCOUNTER — HOSPITAL ENCOUNTER (OUTPATIENT)
Facility: HOSPITAL | Age: 55
Setting detail: OUTPATIENT SURGERY
Discharge: HOME | End: 2020-09-29
Attending: ORTHOPAEDIC SURGERY | Admitting: ORTHOPAEDIC SURGERY
Payer: MEDICARE

## 2020-09-28 ENCOUNTER — ANESTHESIA (OUTPATIENT)
Dept: PERIOP | Facility: HOSPITAL | Age: 55
End: 2020-09-28
Payer: MEDICARE

## 2020-09-28 ENCOUNTER — ANESTHESIA EVENT (OUTPATIENT)
Dept: PERIOP | Facility: HOSPITAL | Age: 55
End: 2020-09-28
Payer: MEDICARE

## 2020-09-28 VITALS — HEART RATE: 75 BPM

## 2020-09-28 DIAGNOSIS — E83.9 CHRONIC KIDNEY DISEASE-MINERAL AND BONE DISORDER: Primary | ICD-10-CM

## 2020-09-28 DIAGNOSIS — N18.9 CHRONIC KIDNEY DISEASE-MINERAL AND BONE DISORDER: Primary | ICD-10-CM

## 2020-09-28 DIAGNOSIS — M89.9 CHRONIC KIDNEY DISEASE-MINERAL AND BONE DISORDER: Primary | ICD-10-CM

## 2020-09-28 DIAGNOSIS — M16.11 PRIMARY OSTEOARTHRITIS OF RIGHT HIP: ICD-10-CM

## 2020-09-28 DIAGNOSIS — M16.11 PRIMARY OSTEOARTHRITIS OF ONE HIP, RIGHT: ICD-10-CM

## 2020-09-28 LAB
ABO GROUP BLD: NORMAL
RH BLD: POSITIVE
SARS-COV-2 RNA RESP QL NAA+PROBE: NEGATIVE

## 2020-09-28 PROCEDURE — C1776 JOINT DEVICE (IMPLANTABLE): HCPCS | Performed by: ORTHOPAEDIC SURGERY

## 2020-09-28 PROCEDURE — 88311 DECALCIFY TISSUE: CPT | Performed by: PATHOLOGY

## 2020-09-28 PROCEDURE — 88304 TISSUE EXAM BY PATHOLOGIST: CPT | Performed by: PATHOLOGY

## 2020-09-28 PROCEDURE — 94760 N-INVAS EAR/PLS OXIMETRY 1: CPT

## 2020-09-28 PROCEDURE — 27130 TOTAL HIP ARTHROPLASTY: CPT | Performed by: ORTHOPAEDIC SURGERY

## 2020-09-28 PROCEDURE — 87635 SARS-COV-2 COVID-19 AMP PRB: CPT | Performed by: PHYSICIAN ASSISTANT

## 2020-09-28 PROCEDURE — NC001 PR NO CHARGE: Performed by: PHYSICIAN ASSISTANT

## 2020-09-28 PROCEDURE — G9197 ORDER FOR CEPH: HCPCS | Performed by: ORTHOPAEDIC SURGERY

## 2020-09-28 PROCEDURE — 73501 X-RAY EXAM HIP UNI 1 VIEW: CPT

## 2020-09-28 PROCEDURE — C1713 ANCHOR/SCREW BN/BN,TIS/BN: HCPCS | Performed by: ORTHOPAEDIC SURGERY

## 2020-09-28 PROCEDURE — 73502 X-RAY EXAM HIP UNI 2-3 VIEWS: CPT

## 2020-09-28 PROCEDURE — 27130 TOTAL HIP ARTHROPLASTY: CPT | Performed by: PHYSICIAN ASSISTANT

## 2020-09-28 PROCEDURE — 94664 DEMO&/EVAL PT USE INHALER: CPT

## 2020-09-28 DEVICE — BIOLOX DELTA CERAMIC FEMORAL HEAD +5.0 36MM DIA 12/14 TAPER
Type: IMPLANTABLE DEVICE | Site: HIP | Status: FUNCTIONAL
Brand: BIOLOX DELTA

## 2020-09-28 DEVICE — PINNACLE HIP SOLUTIONS ALTRX POLYETHYLENE ACETABULAR LINER NEUTRAL 36MM ID 56MM OD
Type: IMPLANTABLE DEVICE | Site: HIP | Status: FUNCTIONAL
Brand: PINNACLE ALTRX

## 2020-09-28 DEVICE — PINNACLE CANCELLOUS BONE SCREW 6.5MM X 25MM
Type: IMPLANTABLE DEVICE | Site: HIP | Status: FUNCTIONAL
Brand: PINNACLE

## 2020-09-28 DEVICE — TRI-LOCK BPS FEMORAL STEM 12/14 TAPER TRI-LOCK BPS W/GRIPTION SIZE 9 STD 113MM
Type: IMPLANTABLE DEVICE | Site: HIP | Status: FUNCTIONAL
Brand: TRI-LOCK GRIPTION

## 2020-09-28 DEVICE — PINNACLE GRIPTION ACETABULAR SHELL SECTOR 56MM OD
Type: IMPLANTABLE DEVICE | Site: HIP | Status: FUNCTIONAL
Brand: PINNACLE GRIPTION

## 2020-09-28 RX ORDER — OXYCODONE HYDROCHLORIDE 5 MG/1
5 TABLET ORAL EVERY 4 HOURS PRN
Status: DISCONTINUED | OUTPATIENT
Start: 2020-09-28 | End: 2020-09-29 | Stop reason: HOSPADM

## 2020-09-28 RX ORDER — SODIUM CHLORIDE, SODIUM GLUCONATE, SODIUM ACETATE, POTASSIUM CHLORIDE, MAGNESIUM CHLORIDE, SODIUM PHOSPHATE, DIBASIC, AND POTASSIUM PHOSPHATE .53; .5; .37; .037; .03; .012; .00082 G/100ML; G/100ML; G/100ML; G/100ML; G/100ML; G/100ML; G/100ML
125 INJECTION, SOLUTION INTRAVENOUS CONTINUOUS
Status: DISCONTINUED | OUTPATIENT
Start: 2020-09-28 | End: 2020-09-29

## 2020-09-28 RX ORDER — EPHEDRINE SULFATE 50 MG/ML
INJECTION INTRAVENOUS AS NEEDED
Status: DISCONTINUED | OUTPATIENT
Start: 2020-09-28 | End: 2020-09-28

## 2020-09-28 RX ORDER — HYDROMORPHONE HCL/PF 1 MG/ML
0.5 SYRINGE (ML) INJECTION
Status: DISCONTINUED | OUTPATIENT
Start: 2020-09-28 | End: 2020-09-28 | Stop reason: HOSPADM

## 2020-09-28 RX ORDER — HYDROMORPHONE HYDROCHLORIDE 2 MG/ML
INJECTION, SOLUTION INTRAMUSCULAR; INTRAVENOUS; SUBCUTANEOUS AS NEEDED
Status: DISCONTINUED | OUTPATIENT
Start: 2020-09-28 | End: 2020-09-28

## 2020-09-28 RX ORDER — FLUTICASONE FUROATE AND VILANTEROL 200; 25 UG/1; UG/1
1 POWDER RESPIRATORY (INHALATION) DAILY
Status: DISCONTINUED | OUTPATIENT
Start: 2020-09-29 | End: 2020-09-29 | Stop reason: HOSPADM

## 2020-09-28 RX ORDER — DOCUSATE SODIUM 100 MG/1
100 CAPSULE, LIQUID FILLED ORAL 2 TIMES DAILY
Status: DISCONTINUED | OUTPATIENT
Start: 2020-09-28 | End: 2020-09-29 | Stop reason: HOSPADM

## 2020-09-28 RX ORDER — FLUTICASONE PROPIONATE 50 MCG
1 SPRAY, SUSPENSION (ML) NASAL DAILY PRN
Status: DISCONTINUED | OUTPATIENT
Start: 2020-09-28 | End: 2020-09-29 | Stop reason: HOSPADM

## 2020-09-28 RX ORDER — FOLIC ACID 1 MG/1
1 TABLET ORAL DAILY
Status: DISCONTINUED | OUTPATIENT
Start: 2020-09-28 | End: 2020-09-29 | Stop reason: HOSPADM

## 2020-09-28 RX ORDER — LIDOCAINE HYDROCHLORIDE 10 MG/ML
0.5 INJECTION, SOLUTION EPIDURAL; INFILTRATION; INTRACAUDAL; PERINEURAL ONCE AS NEEDED
Status: DISCONTINUED | OUTPATIENT
Start: 2020-09-28 | End: 2020-09-28 | Stop reason: HOSPADM

## 2020-09-28 RX ORDER — ONDANSETRON 2 MG/ML
4 INJECTION INTRAMUSCULAR; INTRAVENOUS ONCE AS NEEDED
Status: DISCONTINUED | OUTPATIENT
Start: 2020-09-28 | End: 2020-09-28 | Stop reason: HOSPADM

## 2020-09-28 RX ORDER — GABAPENTIN 100 MG/1
100 CAPSULE ORAL EVERY 8 HOURS
Status: DISCONTINUED | OUTPATIENT
Start: 2020-09-28 | End: 2020-09-29 | Stop reason: HOSPADM

## 2020-09-28 RX ORDER — ALBUTEROL SULFATE 2.5 MG/3ML
2.5 SOLUTION RESPIRATORY (INHALATION) 4 TIMES DAILY
Status: DISCONTINUED | OUTPATIENT
Start: 2020-09-28 | End: 2020-09-28

## 2020-09-28 RX ORDER — PROMETHAZINE HYDROCHLORIDE 25 MG/ML
25 INJECTION, SOLUTION INTRAMUSCULAR; INTRAVENOUS ONCE AS NEEDED
Status: DISCONTINUED | OUTPATIENT
Start: 2020-09-28 | End: 2020-09-28 | Stop reason: HOSPADM

## 2020-09-28 RX ORDER — PROPOFOL 10 MG/ML
INJECTION, EMULSION INTRAVENOUS AS NEEDED
Status: DISCONTINUED | OUTPATIENT
Start: 2020-09-28 | End: 2020-09-28

## 2020-09-28 RX ORDER — ASCORBIC ACID 500 MG
500 TABLET ORAL 2 TIMES DAILY
Status: DISCONTINUED | OUTPATIENT
Start: 2020-09-28 | End: 2020-09-29 | Stop reason: HOSPADM

## 2020-09-28 RX ORDER — CHLORHEXIDINE GLUCONATE 4 G/100ML
SOLUTION TOPICAL DAILY PRN
Status: DISCONTINUED | OUTPATIENT
Start: 2020-09-28 | End: 2020-09-28 | Stop reason: HOSPADM

## 2020-09-28 RX ORDER — ACETAMINOPHEN 325 MG/1
650 TABLET ORAL EVERY 6 HOURS PRN
Status: DISCONTINUED | OUTPATIENT
Start: 2020-09-28 | End: 2020-09-29 | Stop reason: HOSPADM

## 2020-09-28 RX ORDER — OXYCODONE HYDROCHLORIDE 5 MG/1
10 TABLET ORAL EVERY 4 HOURS PRN
Status: DISCONTINUED | OUTPATIENT
Start: 2020-09-28 | End: 2020-09-29 | Stop reason: HOSPADM

## 2020-09-28 RX ORDER — MONTELUKAST SODIUM 10 MG/1
10 TABLET ORAL
Status: DISCONTINUED | OUTPATIENT
Start: 2020-09-28 | End: 2020-09-29 | Stop reason: HOSPADM

## 2020-09-28 RX ORDER — SILDENAFIL CITRATE 20 MG/1
20 TABLET ORAL DAILY
Status: DISCONTINUED | OUTPATIENT
Start: 2020-09-28 | End: 2020-09-28

## 2020-09-28 RX ORDER — TRAMADOL HYDROCHLORIDE 50 MG/1
50 TABLET ORAL EVERY 8 HOURS
Status: DISCONTINUED | OUTPATIENT
Start: 2020-09-28 | End: 2020-09-29 | Stop reason: HOSPADM

## 2020-09-28 RX ORDER — MAGNESIUM HYDROXIDE 1200 MG/15ML
LIQUID ORAL AS NEEDED
Status: DISCONTINUED | OUTPATIENT
Start: 2020-09-28 | End: 2020-09-28 | Stop reason: HOSPADM

## 2020-09-28 RX ORDER — FENOFIBRATE 145 MG/1
145 TABLET, COATED ORAL DAILY
Status: DISCONTINUED | OUTPATIENT
Start: 2020-09-28 | End: 2020-09-29 | Stop reason: HOSPADM

## 2020-09-28 RX ORDER — BUPIVACAINE HYDROCHLORIDE 5 MG/ML
INJECTION, SOLUTION PERINEURAL AS NEEDED
Status: DISCONTINUED | OUTPATIENT
Start: 2020-09-28 | End: 2020-09-28

## 2020-09-28 RX ORDER — CEFAZOLIN SODIUM 2 G/50ML
2000 SOLUTION INTRAVENOUS EVERY 8 HOURS
Status: COMPLETED | OUTPATIENT
Start: 2020-09-28 | End: 2020-09-28

## 2020-09-28 RX ORDER — FENTANYL CITRATE 50 UG/ML
INJECTION, SOLUTION INTRAMUSCULAR; INTRAVENOUS AS NEEDED
Status: DISCONTINUED | OUTPATIENT
Start: 2020-09-28 | End: 2020-09-28

## 2020-09-28 RX ORDER — SODIUM CHLORIDE, SODIUM LACTATE, POTASSIUM CHLORIDE, CALCIUM CHLORIDE 600; 310; 30; 20 MG/100ML; MG/100ML; MG/100ML; MG/100ML
125 INJECTION, SOLUTION INTRAVENOUS CONTINUOUS
Status: DISCONTINUED | OUTPATIENT
Start: 2020-09-28 | End: 2020-09-28

## 2020-09-28 RX ORDER — NEOMYCIN SULFATE, POLYMYXIN B SULFATE, AND DEXAMETHASONE 3.5; 10000; 1 MG/G; [USP'U]/G; MG/G
OINTMENT OPHTHALMIC 3 TIMES DAILY
Status: DISCONTINUED | OUTPATIENT
Start: 2020-09-28 | End: 2020-09-29 | Stop reason: HOSPADM

## 2020-09-28 RX ORDER — FENTANYL CITRATE 50 UG/ML
25 INJECTION, SOLUTION INTRAMUSCULAR; INTRAVENOUS EVERY 2 HOUR PRN
Status: DISCONTINUED | OUTPATIENT
Start: 2020-09-28 | End: 2020-09-29 | Stop reason: HOSPADM

## 2020-09-28 RX ORDER — FERROUS SULFATE 325(65) MG
325 TABLET ORAL
Status: DISCONTINUED | OUTPATIENT
Start: 2020-09-29 | End: 2020-09-29 | Stop reason: HOSPADM

## 2020-09-28 RX ORDER — MAGNESIUM HYDROXIDE/ALUMINUM HYDROXICE/SIMETHICONE 120; 1200; 1200 MG/30ML; MG/30ML; MG/30ML
30 SUSPENSION ORAL EVERY 6 HOURS PRN
Status: DISCONTINUED | OUTPATIENT
Start: 2020-09-28 | End: 2020-09-29 | Stop reason: HOSPADM

## 2020-09-28 RX ORDER — ALBUTEROL SULFATE 90 UG/1
2 AEROSOL, METERED RESPIRATORY (INHALATION) EVERY 6 HOURS PRN
Status: DISCONTINUED | OUTPATIENT
Start: 2020-09-28 | End: 2020-09-29 | Stop reason: HOSPADM

## 2020-09-28 RX ORDER — DEXMEDETOMIDINE HYDROCHLORIDE 100 UG/ML
INJECTION, SOLUTION INTRAVENOUS AS NEEDED
Status: DISCONTINUED | OUTPATIENT
Start: 2020-09-28 | End: 2020-09-28

## 2020-09-28 RX ORDER — PROPOFOL 10 MG/ML
INJECTION, EMULSION INTRAVENOUS CONTINUOUS PRN
Status: DISCONTINUED | OUTPATIENT
Start: 2020-09-28 | End: 2020-09-28

## 2020-09-28 RX ORDER — KETAMINE HYDROCHLORIDE 50 MG/ML
INJECTION, SOLUTION, CONCENTRATE INTRAMUSCULAR; INTRAVENOUS AS NEEDED
Status: DISCONTINUED | OUTPATIENT
Start: 2020-09-28 | End: 2020-09-28

## 2020-09-28 RX ORDER — PANTOPRAZOLE SODIUM 40 MG/1
40 TABLET, DELAYED RELEASE ORAL DAILY
Status: DISCONTINUED | OUTPATIENT
Start: 2020-09-28 | End: 2020-09-29 | Stop reason: HOSPADM

## 2020-09-28 RX ORDER — ATORVASTATIN CALCIUM 40 MG/1
40 TABLET, FILM COATED ORAL
Status: DISCONTINUED | OUTPATIENT
Start: 2020-09-28 | End: 2020-09-29 | Stop reason: HOSPADM

## 2020-09-28 RX ORDER — MIDAZOLAM HYDROCHLORIDE 2 MG/2ML
INJECTION, SOLUTION INTRAMUSCULAR; INTRAVENOUS AS NEEDED
Status: DISCONTINUED | OUTPATIENT
Start: 2020-09-28 | End: 2020-09-28

## 2020-09-28 RX ORDER — SODIUM CHLORIDE, SODIUM LACTATE, POTASSIUM CHLORIDE, CALCIUM CHLORIDE 600; 310; 30; 20 MG/100ML; MG/100ML; MG/100ML; MG/100ML
100 INJECTION, SOLUTION INTRAVENOUS CONTINUOUS
Status: DISPENSED | OUTPATIENT
Start: 2020-09-28 | End: 2020-09-28

## 2020-09-28 RX ORDER — ONDANSETRON 2 MG/ML
INJECTION INTRAMUSCULAR; INTRAVENOUS AS NEEDED
Status: DISCONTINUED | OUTPATIENT
Start: 2020-09-28 | End: 2020-09-28

## 2020-09-28 RX ORDER — DEXAMETHASONE SODIUM PHOSPHATE 4 MG/ML
INJECTION, SOLUTION INTRA-ARTICULAR; INTRALESIONAL; INTRAMUSCULAR; INTRAVENOUS; SOFT TISSUE AS NEEDED
Status: DISCONTINUED | OUTPATIENT
Start: 2020-09-28 | End: 2020-09-28

## 2020-09-28 RX ORDER — CEFAZOLIN SODIUM 2 G/50ML
2000 SOLUTION INTRAVENOUS ONCE
Status: COMPLETED | OUTPATIENT
Start: 2020-09-28 | End: 2020-09-28

## 2020-09-28 RX ORDER — FLUTICASONE FUROATE AND VILANTEROL 200; 25 UG/1; UG/1
1 POWDER RESPIRATORY (INHALATION) 2 TIMES DAILY
Status: DISCONTINUED | OUTPATIENT
Start: 2020-09-28 | End: 2020-09-28

## 2020-09-28 RX ORDER — BISACODYL 10 MG
10 SUPPOSITORY, RECTAL RECTAL DAILY PRN
Status: DISCONTINUED | OUTPATIENT
Start: 2020-09-28 | End: 2020-09-29 | Stop reason: HOSPADM

## 2020-09-28 RX ORDER — ERGOCALCIFEROL 1.25 MG/1
50000 CAPSULE ORAL WEEKLY
Status: DISCONTINUED | OUTPATIENT
Start: 2020-09-28 | End: 2020-09-29 | Stop reason: HOSPADM

## 2020-09-28 RX ORDER — GLYCOPYRROLATE 0.2 MG/ML
INJECTION INTRAMUSCULAR; INTRAVENOUS AS NEEDED
Status: DISCONTINUED | OUTPATIENT
Start: 2020-09-28 | End: 2020-09-28

## 2020-09-28 RX ORDER — CHLORHEXIDINE GLUCONATE 0.12 MG/ML
15 RINSE ORAL ONCE
Status: COMPLETED | OUTPATIENT
Start: 2020-09-28 | End: 2020-09-28

## 2020-09-28 RX ORDER — ONDANSETRON 2 MG/ML
4 INJECTION INTRAMUSCULAR; INTRAVENOUS EVERY 6 HOURS PRN
Status: DISCONTINUED | OUTPATIENT
Start: 2020-09-28 | End: 2020-09-29 | Stop reason: HOSPADM

## 2020-09-28 RX ADMIN — Medication 1 TABLET: at 18:04

## 2020-09-28 RX ADMIN — FENTANYL CITRATE 25 MCG: 50 INJECTION, SOLUTION INTRAMUSCULAR; INTRAVENOUS at 10:02

## 2020-09-28 RX ADMIN — CEFAZOLIN SODIUM 2000 MG: 2 SOLUTION INTRAVENOUS at 18:10

## 2020-09-28 RX ADMIN — DOCUSATE SODIUM 100 MG: 100 CAPSULE, LIQUID FILLED ORAL at 18:05

## 2020-09-28 RX ADMIN — SODIUM CHLORIDE, SODIUM LACTATE, POTASSIUM CHLORIDE, AND CALCIUM CHLORIDE 125 ML/HR: .6; .31; .03; .02 INJECTION, SOLUTION INTRAVENOUS at 07:13

## 2020-09-28 RX ADMIN — CEFAZOLIN SODIUM 2000 MG: 2 SOLUTION INTRAVENOUS at 23:10

## 2020-09-28 RX ADMIN — BUPIVACAINE HYDROCHLORIDE 3 ML: 5 INJECTION, SOLUTION PERINEURAL at 08:12

## 2020-09-28 RX ADMIN — FENTANYL CITRATE 25 MCG: 50 INJECTION, SOLUTION INTRAMUSCULAR; INTRAVENOUS at 10:00

## 2020-09-28 RX ADMIN — PANTOPRAZOLE SODIUM 40 MG: 40 TABLET, DELAYED RELEASE ORAL at 18:06

## 2020-09-28 RX ADMIN — KETAMINE HYDROCHLORIDE 20 MG: 50 INJECTION INTRAMUSCULAR; INTRAVENOUS at 09:49

## 2020-09-28 RX ADMIN — NEOMYCIN AND POLYMYXIN B SULFATES AND DEXAMETHASONE: 3.5; 10000; 1 OINTMENT OPHTHALMIC at 22:53

## 2020-09-28 RX ADMIN — OXYCODONE HYDROCHLORIDE 10 MG: 5 TABLET ORAL at 17:30

## 2020-09-28 RX ADMIN — HYDROMORPHONE HYDROCHLORIDE 0.5 MG: 1 INJECTION, SOLUTION INTRAMUSCULAR; INTRAVENOUS; SUBCUTANEOUS at 12:30

## 2020-09-28 RX ADMIN — DEXMEDETOMIDINE HCL 4 MCG: 100 INJECTION INTRAVENOUS at 10:04

## 2020-09-28 RX ADMIN — TRANEXAMIC ACID 1000 MG: 1 INJECTION, SOLUTION INTRAVENOUS at 08:26

## 2020-09-28 RX ADMIN — PROPOFOL 60 MG: 10 INJECTION, EMULSION INTRAVENOUS at 08:16

## 2020-09-28 RX ADMIN — ENOXAPARIN SODIUM 30 MG: 40 INJECTION SUBCUTANEOUS at 22:52

## 2020-09-28 RX ADMIN — SODIUM CHLORIDE, SODIUM GLUCONATE, SODIUM ACETATE, POTASSIUM CHLORIDE, MAGNESIUM CHLORIDE, SODIUM PHOSPHATE, DIBASIC, AND POTASSIUM PHOSPHATE 125 ML/HR: .53; .5; .37; .037; .03; .012; .00082 INJECTION, SOLUTION INTRAVENOUS at 18:09

## 2020-09-28 RX ADMIN — GABAPENTIN 100 MG: 100 CAPSULE ORAL at 22:53

## 2020-09-28 RX ADMIN — TRAMADOL HYDROCHLORIDE 50 MG: 50 TABLET, FILM COATED ORAL at 22:52

## 2020-09-28 RX ADMIN — MONTELUKAST SODIUM 10 MG: 10 TABLET, FILM COATED ORAL at 22:52

## 2020-09-28 RX ADMIN — GABAPENTIN 100 MG: 100 CAPSULE ORAL at 16:36

## 2020-09-28 RX ADMIN — EPHEDRINE SULFATE 5 MG: 50 INJECTION INTRAVENOUS at 08:12

## 2020-09-28 RX ADMIN — DEXMEDETOMIDINE HCL 4 MCG: 100 INJECTION INTRAVENOUS at 09:52

## 2020-09-28 RX ADMIN — KETAMINE HYDROCHLORIDE 10 MG: 50 INJECTION INTRAMUSCULAR; INTRAVENOUS at 08:53

## 2020-09-28 RX ADMIN — EPHEDRINE SULFATE 10 MG: 50 INJECTION INTRAVENOUS at 08:30

## 2020-09-28 RX ADMIN — DEXAMETHASONE SODIUM PHOSPHATE 4 MG: 4 INJECTION, SOLUTION INTRAMUSCULAR; INTRAVENOUS at 08:57

## 2020-09-28 RX ADMIN — HYDROMORPHONE HYDROCHLORIDE 0.5 MG: 1 INJECTION, SOLUTION INTRAMUSCULAR; INTRAVENOUS; SUBCUTANEOUS at 11:15

## 2020-09-28 RX ADMIN — HYDROMORPHONE HYDROCHLORIDE 0.4 MG: 2 INJECTION, SOLUTION INTRAMUSCULAR; INTRAVENOUS; SUBCUTANEOUS at 10:33

## 2020-09-28 RX ADMIN — HYDROMORPHONE HYDROCHLORIDE 0.2 MG: 2 INJECTION, SOLUTION INTRAMUSCULAR; INTRAVENOUS; SUBCUTANEOUS at 10:23

## 2020-09-28 RX ADMIN — ATORVASTATIN CALCIUM 40 MG: 40 TABLET, FILM COATED ORAL at 16:36

## 2020-09-28 RX ADMIN — HYDROMORPHONE HYDROCHLORIDE 0.4 MG: 2 INJECTION, SOLUTION INTRAMUSCULAR; INTRAVENOUS; SUBCUTANEOUS at 10:36

## 2020-09-28 RX ADMIN — FOLIC ACID 1 MG: 1 TABLET ORAL at 18:04

## 2020-09-28 RX ADMIN — TRAMADOL HYDROCHLORIDE 50 MG: 50 TABLET, FILM COATED ORAL at 16:36

## 2020-09-28 RX ADMIN — NEOMYCIN AND POLYMYXIN B SULFATES AND DEXAMETHASONE: 3.5; 10000; 1 OINTMENT OPHTHALMIC at 18:10

## 2020-09-28 RX ADMIN — HYDROMORPHONE HYDROCHLORIDE 0.2 MG: 2 INJECTION, SOLUTION INTRAMUSCULAR; INTRAVENOUS; SUBCUTANEOUS at 10:25

## 2020-09-28 RX ADMIN — OXYCODONE HYDROCHLORIDE AND ACETAMINOPHEN 500 MG: 500 TABLET ORAL at 18:06

## 2020-09-28 RX ADMIN — DEXMEDETOMIDINE HCL 4 MCG: 100 INJECTION INTRAVENOUS at 10:10

## 2020-09-28 RX ADMIN — KETAMINE HYDROCHLORIDE 40 MG: 50 INJECTION INTRAMUSCULAR; INTRAVENOUS at 08:16

## 2020-09-28 RX ADMIN — FENOFIBRATE 145 MG: 145 TABLET, FILM COATED ORAL at 18:05

## 2020-09-28 RX ADMIN — HYDROMORPHONE HYDROCHLORIDE 0.4 MG: 2 INJECTION, SOLUTION INTRAMUSCULAR; INTRAVENOUS; SUBCUTANEOUS at 10:42

## 2020-09-28 RX ADMIN — MIDAZOLAM HYDROCHLORIDE 2 MG: 1 INJECTION, SOLUTION INTRAMUSCULAR; INTRAVENOUS at 08:02

## 2020-09-28 RX ADMIN — ERGOCALCIFEROL 50000 UNITS: 1.25 CAPSULE, LIQUID FILLED ORAL at 18:10

## 2020-09-28 RX ADMIN — FENTANYL CITRATE 25 MCG: 50 INJECTION, SOLUTION INTRAMUSCULAR; INTRAVENOUS at 10:06

## 2020-09-28 RX ADMIN — EPHEDRINE SULFATE 5 MG: 50 INJECTION INTRAVENOUS at 10:26

## 2020-09-28 RX ADMIN — PHENYLEPHRINE HYDROCHLORIDE 20 MCG/MIN: 10 INJECTION INTRAVENOUS at 08:56

## 2020-09-28 RX ADMIN — CEFAZOLIN SODIUM 2000 MG: 2 SOLUTION INTRAVENOUS at 08:14

## 2020-09-28 RX ADMIN — PROPOFOL 100 MCG/KG/MIN: 10 INJECTION, EMULSION INTRAVENOUS at 08:17

## 2020-09-28 RX ADMIN — ONDANSETRON 4 MG: 2 INJECTION INTRAMUSCULAR; INTRAVENOUS at 08:57

## 2020-09-28 RX ADMIN — GLYCOPYRROLATE 0.2 MG: 0.2 INJECTION, SOLUTION INTRAMUSCULAR; INTRAVENOUS at 09:51

## 2020-09-28 RX ADMIN — HYDROMORPHONE HYDROCHLORIDE 0.4 MG: 2 INJECTION, SOLUTION INTRAMUSCULAR; INTRAVENOUS; SUBCUTANEOUS at 10:39

## 2020-09-28 RX ADMIN — CHLORHEXIDINE GLUCONATE 0.12% ORAL RINSE 15 ML: 1.2 LIQUID ORAL at 07:02

## 2020-09-28 RX ADMIN — FENTANYL CITRATE 25 MCG: 50 INJECTION, SOLUTION INTRAMUSCULAR; INTRAVENOUS at 09:59

## 2020-09-28 NOTE — ANESTHESIA POSTPROCEDURE EVALUATION
Post-Op Assessment Note    CV Status:  Stable  Pain Score: 3    Pain management: adequate     Mental Status:  Awake   Hydration Status:  Stable   PONV Controlled:  None   Airway Patency:  Patent and adequate      Post Op Vitals Reviewed: Yes      Staff: CRNA   Comments: 4LPm/Mask    Post-op block assessment: no complications and sterile dressing applied      No complications documented      /66 (09/28/20 1105)    Temp 98 2 °F (36 8 °C) (09/28/20 1105)    Pulse 71 (09/28/20 1105)   Resp 18 (09/28/20 1105)    SpO2 98 % (09/28/20 1105)

## 2020-09-28 NOTE — ANESTHESIA PROCEDURE NOTES
CSE Block    Patient location during procedure: OR  Start time: 9/28/2020 8:13 AM  Reason for block: procedure for pain and at surgeon's request  Staffing  Anesthesiologist: Teresa Foster MD  Performed: anesthesiologist   Preanesthetic Checklist  Completed: patient identified, surgical consent, pre-op evaluation, timeout performed, IV checked, risks and benefits discussed and monitors and equipment checked  CSE  Patient position: sitting  Prep: ChloraPrep  Patient monitoring: cardiac monitor and continuous pulse ox  Approach: midline  Spinal Needle  Needle type: pencil-tip   Needle gauge: 27 G  Needle length: 10 cm  Epidural Needle  Injection technique: SHAN saline  Needle type: Tuohy   Needle gauge: 18 G  Needle insertion depth: 7 5 cm  Location: lumbar (1-5)  Catheter  Catheter type: side hole  Catheter size: 20 G  Catheter at skin depth: 13 cm  Assessment  Injection Assessment:  negative aspiration for heme, no paresthesia on injection, positive aspiration for clear CSF and no pain on injection  Additional Notes  Easy SHAN to saline at 7 cm  Spinal needle contacting bone  Tuohy withdrawn and adjusted slightly laterally with easy SHAN ~7 5 cm   Clear dural puncture with free flow of CSF

## 2020-09-28 NOTE — ANESTHESIA PREPROCEDURE EVALUATION
Procedure:  ARTHROPLASTY HIP TOTAL (Right Hip)    Relevant Problems   ANESTHESIA (within normal limits)      CARDIO   (+) Ascending aortic aneurysm (HCC)   (+) Elevated triglycerides with high cholesterol   (+) Hyperlipidemia, mixed      GI/HEPATIC   (+) Fatty liver disease, nonalcoholic   (+) GERD (gastroesophageal reflux disease)   (+) Hepatic cyst   (+) Hiatal hernia   (+) Liver disease      /RENAL   (+) CKD (chronic kidney disease) stage 3, GFR 30-59 ml/min   (+) Chronic kidney disease-mineral and bone disorder   (+) Renal cyst, left      MUSCULOSKELETAL   (+) Arthritis of right hip   (+) Lumbar pain   (+) Lumbosacral strain   (+) Patellar tendinitis of right knee   (+) Primary osteoarthritis of right hip      NEURO/PSYCH   (+) Chronic pain syndrome   (+) Depression with anxiety      PULMONARY   (+) COPD (chronic obstructive pulmonary disease) (HCC)   (+) Mild intermittent asthma with exacerbation   (+) Mild intermittent asthma without complication      Lab Results   Component Value Date    WBC 6 13 09/23/2020    HGB 15 5 09/23/2020    HCT 48 7 09/23/2020    MCV 98 09/23/2020     09/23/2020     Lab Results   Component Value Date     03/17/2015    K 4 1 09/23/2020    CO2 30 09/23/2020     09/23/2020    BUN 19 09/23/2020    CREATININE 1 63 (H) 09/23/2020     Lab Results   Component Value Date    INR 0 97 09/01/2020    INR 0 98 01/06/2018    INR 1 05 05/06/2016    PROTIME 13 1 09/01/2020    PROTIME 13 3 01/06/2018    PROTIME 13 1 05/06/2016     Lab Results   Component Value Date    PTT 26 09/01/2020       Lab Results   Component Value Date    HGBA1C 6 5 (H) 09/23/2020       Type and Screen:  O    Physical Exam    Airway    Mallampati score: II  TM Distance: >3 FB  Neck ROM: full     Dental   upper dentures and lower dentures,     Cardiovascular      Pulmonary      Other Findings        Anesthesia Plan  ASA Score- 2     Anesthesia Type- spinal, epidural and IV sedation with anesthesia with ASA Monitors  Additional Monitors:   Airway Plan:           Plan Factors-Exercise tolerance (METS): >4 METS  Chart reviewed  Existing labs reviewed  Patient summary reviewed  Induction- intravenous  Postoperative Plan- Plan for postoperative opioid use  Informed Consent- Anesthetic plan and risks discussed with patient  I personally reviewed this patient with the CRNA  Discussed and agreed on the Anesthesia Plan with the CRNA  Megan Angel

## 2020-09-29 ENCOUNTER — TELEPHONE (OUTPATIENT)
Dept: OBGYN CLINIC | Facility: CLINIC | Age: 55
End: 2020-09-29

## 2020-09-29 VITALS
RESPIRATION RATE: 18 BRPM | TEMPERATURE: 98.2 F | WEIGHT: 235.89 LBS | BODY MASS INDEX: 30.27 KG/M2 | SYSTOLIC BLOOD PRESSURE: 117 MMHG | HEART RATE: 77 BPM | DIASTOLIC BLOOD PRESSURE: 69 MMHG | HEIGHT: 74 IN | OXYGEN SATURATION: 94 %

## 2020-09-29 PROBLEM — N18.30 STAGE 3 CHRONIC KIDNEY DISEASE (HCC): Status: ACTIVE | Noted: 2020-09-29

## 2020-09-29 LAB
ANION GAP SERPL CALCULATED.3IONS-SCNC: 10 MMOL/L (ref 4–13)
BUN SERPL-MCNC: 15 MG/DL (ref 5–25)
CALCIUM SERPL-MCNC: 8.8 MG/DL (ref 8.3–10.1)
CHLORIDE SERPL-SCNC: 101 MMOL/L (ref 100–108)
CO2 SERPL-SCNC: 25 MMOL/L (ref 21–32)
CREAT SERPL-MCNC: 1.6 MG/DL (ref 0.6–1.3)
ERYTHROCYTE [DISTWIDTH] IN BLOOD BY AUTOMATED COUNT: 12.1 % (ref 11.6–15.1)
GFR SERPL CREATININE-BSD FRML MDRD: 47 ML/MIN/1.73SQ M
GLUCOSE P FAST SERPL-MCNC: 229 MG/DL (ref 65–99)
GLUCOSE SERPL-MCNC: 229 MG/DL (ref 65–140)
HCT VFR BLD AUTO: 40.1 % (ref 36.5–49.3)
HGB BLD-MCNC: 13.1 G/DL (ref 12–17)
MCH RBC QN AUTO: 31.3 PG (ref 26.8–34.3)
MCHC RBC AUTO-ENTMCNC: 32.7 G/DL (ref 31.4–37.4)
MCV RBC AUTO: 96 FL (ref 82–98)
PLATELET # BLD AUTO: 179 THOUSANDS/UL (ref 149–390)
PMV BLD AUTO: 10.6 FL (ref 8.9–12.7)
POTASSIUM SERPL-SCNC: 4.3 MMOL/L (ref 3.5–5.3)
RBC # BLD AUTO: 4.18 MILLION/UL (ref 3.88–5.62)
SODIUM SERPL-SCNC: 136 MMOL/L (ref 136–145)
WBC # BLD AUTO: 10.43 THOUSAND/UL (ref 4.31–10.16)

## 2020-09-29 PROCEDURE — 97163 PT EVAL HIGH COMPLEX 45 MIN: CPT

## 2020-09-29 PROCEDURE — 99215 OFFICE O/P EST HI 40 MIN: CPT | Performed by: INTERNAL MEDICINE

## 2020-09-29 PROCEDURE — 97166 OT EVAL MOD COMPLEX 45 MIN: CPT

## 2020-09-29 PROCEDURE — 97116 GAIT TRAINING THERAPY: CPT

## 2020-09-29 PROCEDURE — 85027 COMPLETE CBC AUTOMATED: CPT | Performed by: ORTHOPAEDIC SURGERY

## 2020-09-29 PROCEDURE — 97110 THERAPEUTIC EXERCISES: CPT

## 2020-09-29 PROCEDURE — 97535 SELF CARE MNGMENT TRAINING: CPT

## 2020-09-29 PROCEDURE — NC001 PR NO CHARGE: Performed by: ORTHOPAEDIC SURGERY

## 2020-09-29 PROCEDURE — 99024 POSTOP FOLLOW-UP VISIT: CPT | Performed by: ORTHOPAEDIC SURGERY

## 2020-09-29 PROCEDURE — 80048 BASIC METABOLIC PNL TOTAL CA: CPT | Performed by: ORTHOPAEDIC SURGERY

## 2020-09-29 RX ORDER — OXYCODONE HYDROCHLORIDE 5 MG/1
5 TABLET ORAL EVERY 6 HOURS PRN
Qty: 28 TABLET | Refills: 0 | Status: SHIPPED | OUTPATIENT
Start: 2020-09-29 | End: 2020-10-06

## 2020-09-29 RX ORDER — DOCUSATE SODIUM 100 MG/1
100 CAPSULE, LIQUID FILLED ORAL 2 TIMES DAILY
Qty: 10 CAPSULE | Refills: 0 | Status: SHIPPED | OUTPATIENT
Start: 2020-09-29 | End: 2020-10-09

## 2020-09-29 RX ORDER — ASPIRIN 325 MG
325 TABLET, DELAYED RELEASE (ENTERIC COATED) ORAL DAILY
Qty: 30 TABLET | Refills: 0 | Status: SHIPPED | OUTPATIENT
Start: 2020-09-29 | End: 2020-09-29 | Stop reason: HOSPADM

## 2020-09-29 RX ADMIN — GABAPENTIN 100 MG: 100 CAPSULE ORAL at 15:54

## 2020-09-29 RX ADMIN — PANTOPRAZOLE SODIUM 40 MG: 40 TABLET, DELAYED RELEASE ORAL at 09:49

## 2020-09-29 RX ADMIN — DOCUSATE SODIUM 100 MG: 100 CAPSULE, LIQUID FILLED ORAL at 15:55

## 2020-09-29 RX ADMIN — SODIUM CHLORIDE, SODIUM GLUCONATE, SODIUM ACETATE, POTASSIUM CHLORIDE, MAGNESIUM CHLORIDE, SODIUM PHOSPHATE, DIBASIC, AND POTASSIUM PHOSPHATE 125 ML/HR: .53; .5; .37; .037; .03; .012; .00082 INJECTION, SOLUTION INTRAVENOUS at 06:13

## 2020-09-29 RX ADMIN — OXYCODONE HYDROCHLORIDE AND ACETAMINOPHEN 500 MG: 500 TABLET ORAL at 15:55

## 2020-09-29 RX ADMIN — OXYCODONE HYDROCHLORIDE 10 MG: 5 TABLET ORAL at 00:44

## 2020-09-29 RX ADMIN — GABAPENTIN 100 MG: 100 CAPSULE ORAL at 06:16

## 2020-09-29 RX ADMIN — TRAMADOL HYDROCHLORIDE 50 MG: 50 TABLET, FILM COATED ORAL at 15:53

## 2020-09-29 RX ADMIN — Medication 1 TABLET: at 09:49

## 2020-09-29 RX ADMIN — FERROUS SULFATE TAB 325 MG (65 MG ELEMENTAL FE) 325 MG: 325 (65 FE) TAB at 09:00

## 2020-09-29 RX ADMIN — FOLIC ACID 1 MG: 1 TABLET ORAL at 09:48

## 2020-09-29 RX ADMIN — ATORVASTATIN CALCIUM 40 MG: 40 TABLET, FILM COATED ORAL at 15:53

## 2020-09-29 RX ADMIN — TRAMADOL HYDROCHLORIDE 50 MG: 50 TABLET, FILM COATED ORAL at 06:15

## 2020-09-29 RX ADMIN — ENOXAPARIN SODIUM 30 MG: 40 INJECTION SUBCUTANEOUS at 09:53

## 2020-09-29 RX ADMIN — OXYCODONE HYDROCHLORIDE 10 MG: 5 TABLET ORAL at 15:54

## 2020-09-29 RX ADMIN — FLUTICASONE FUROATE AND VILANTEROL TRIFENATATE 1 PUFF: 200; 25 POWDER RESPIRATORY (INHALATION) at 09:54

## 2020-09-29 RX ADMIN — DOCUSATE SODIUM 100 MG: 100 CAPSULE, LIQUID FILLED ORAL at 09:50

## 2020-09-29 RX ADMIN — NEOMYCIN AND POLYMYXIN B SULFATES AND DEXAMETHASONE: 3.5; 10000; 1 OINTMENT OPHTHALMIC at 09:54

## 2020-09-29 RX ADMIN — FENOFIBRATE 145 MG: 145 TABLET, FILM COATED ORAL at 09:49

## 2020-09-29 RX ADMIN — OXYCODONE HYDROCHLORIDE AND ACETAMINOPHEN 500 MG: 500 TABLET ORAL at 09:49

## 2020-09-29 NOTE — TELEPHONE ENCOUNTER
Pharamcy calling for clarification for Lovenox rx if it is 30 mg or 40mg   Please advise Pharmacists [phone]

## 2020-09-29 NOTE — TELEPHONE ENCOUNTER
Will need surgeon clarification  Order was placed in Epic  for 40mg, but instructions state inject 30mg daily  Please further advice, Dr Carletta Boeck

## 2020-10-01 ENCOUNTER — TELEPHONE (OUTPATIENT)
Dept: OBGYN CLINIC | Facility: HOSPITAL | Age: 55
End: 2020-10-01

## 2020-10-01 ENCOUNTER — APPOINTMENT (OUTPATIENT)
Dept: PHYSICAL THERAPY | Age: 55
End: 2020-10-01
Payer: MEDICARE

## 2020-10-05 ENCOUNTER — APPOINTMENT (OUTPATIENT)
Dept: PHYSICAL THERAPY | Age: 55
End: 2020-10-05
Payer: MEDICARE

## 2020-10-05 DIAGNOSIS — K21.9 GASTROESOPHAGEAL REFLUX DISEASE WITHOUT ESOPHAGITIS: ICD-10-CM

## 2020-10-05 RX ORDER — PANTOPRAZOLE SODIUM 40 MG/1
TABLET, DELAYED RELEASE ORAL
Qty: 90 TABLET | Refills: 3 | Status: SHIPPED | OUTPATIENT
Start: 2020-10-05 | End: 2021-10-28

## 2020-10-06 ENCOUNTER — TELEPHONE (OUTPATIENT)
Dept: OBGYN CLINIC | Facility: HOSPITAL | Age: 55
End: 2020-10-06

## 2020-10-07 DIAGNOSIS — Z96.649 STATUS POST TOTAL REPLACEMENT OF HIP, UNSPECIFIED LATERALITY: Primary | ICD-10-CM

## 2020-10-07 DIAGNOSIS — M54.50 LUMBAR PAIN: ICD-10-CM

## 2020-10-07 RX ORDER — OXYCODONE HYDROCHLORIDE 5 MG/1
5 TABLET ORAL EVERY 6 HOURS PRN
Qty: 20 TABLET | Refills: 0 | Status: SHIPPED | OUTPATIENT
Start: 2020-10-07 | End: 2020-10-12

## 2020-10-08 ENCOUNTER — APPOINTMENT (OUTPATIENT)
Dept: PHYSICAL THERAPY | Age: 55
End: 2020-10-08
Payer: MEDICARE

## 2020-10-09 ENCOUNTER — OFFICE VISIT (OUTPATIENT)
Dept: OBGYN CLINIC | Facility: CLINIC | Age: 55
End: 2020-10-09
Payer: MEDICARE

## 2020-10-09 ENCOUNTER — APPOINTMENT (OUTPATIENT)
Dept: RADIOLOGY | Facility: CLINIC | Age: 55
End: 2020-10-09
Payer: MEDICARE

## 2020-10-09 VITALS
RESPIRATION RATE: 20 BRPM | BODY MASS INDEX: 29.08 KG/M2 | SYSTOLIC BLOOD PRESSURE: 120 MMHG | DIASTOLIC BLOOD PRESSURE: 77 MMHG | HEIGHT: 74 IN | HEART RATE: 62 BPM | TEMPERATURE: 97.2 F | WEIGHT: 226.6 LBS

## 2020-10-09 DIAGNOSIS — Z96.649 STATUS POST TOTAL REPLACEMENT OF HIP, UNSPECIFIED LATERALITY: Primary | ICD-10-CM

## 2020-10-09 DIAGNOSIS — M54.16 RIGHT LUMBAR RADICULITIS: ICD-10-CM

## 2020-10-09 DIAGNOSIS — Z96.649 STATUS POST TOTAL REPLACEMENT OF HIP, UNSPECIFIED LATERALITY: ICD-10-CM

## 2020-10-09 PROCEDURE — 99024 POSTOP FOLLOW-UP VISIT: CPT | Performed by: ORTHOPAEDIC SURGERY

## 2020-10-09 PROCEDURE — 73502 X-RAY EXAM HIP UNI 2-3 VIEWS: CPT

## 2020-10-09 PROCEDURE — 99212 OFFICE O/P EST SF 10 MIN: CPT | Performed by: ORTHOPAEDIC SURGERY

## 2020-10-09 RX ORDER — PREDNISONE 10 MG/1
10 TABLET ORAL 2 TIMES DAILY WITH MEALS
Qty: 12 TABLET | Refills: 0 | Status: SHIPPED | OUTPATIENT
Start: 2020-10-09 | End: 2020-10-15

## 2020-10-12 ENCOUNTER — APPOINTMENT (OUTPATIENT)
Dept: PHYSICAL THERAPY | Age: 55
End: 2020-10-12
Payer: MEDICARE

## 2020-10-13 ENCOUNTER — TELEPHONE (OUTPATIENT)
Dept: OBGYN CLINIC | Facility: HOSPITAL | Age: 55
End: 2020-10-13

## 2020-10-15 ENCOUNTER — APPOINTMENT (OUTPATIENT)
Dept: PHYSICAL THERAPY | Age: 55
End: 2020-10-15
Payer: MEDICARE

## 2020-10-15 ENCOUNTER — OFFICE VISIT (OUTPATIENT)
Dept: PAIN MEDICINE | Facility: CLINIC | Age: 55
End: 2020-10-15
Payer: MEDICARE

## 2020-10-15 VITALS
DIASTOLIC BLOOD PRESSURE: 80 MMHG | BODY MASS INDEX: 29 KG/M2 | HEART RATE: 57 BPM | HEIGHT: 74 IN | RESPIRATION RATE: 18 BRPM | WEIGHT: 226 LBS | TEMPERATURE: 97.1 F | SYSTOLIC BLOOD PRESSURE: 114 MMHG

## 2020-10-15 DIAGNOSIS — G89.4 CHRONIC PAIN SYNDROME: Primary | ICD-10-CM

## 2020-10-15 DIAGNOSIS — S39.012D LUMBOSACRAL STRAIN, SUBSEQUENT ENCOUNTER: ICD-10-CM

## 2020-10-15 DIAGNOSIS — M54.50 LUMBAR PAIN: ICD-10-CM

## 2020-10-15 DIAGNOSIS — M16.11 PRIMARY OSTEOARTHRITIS OF RIGHT HIP: ICD-10-CM

## 2020-10-15 DIAGNOSIS — M76.51 PATELLAR TENDINITIS OF RIGHT KNEE: ICD-10-CM

## 2020-10-15 DIAGNOSIS — Z79.891 LONG-TERM CURRENT USE OF OPIATE ANALGESIC: ICD-10-CM

## 2020-10-15 DIAGNOSIS — F11.20 UNCOMPLICATED OPIOID DEPENDENCE (HCC): ICD-10-CM

## 2020-10-15 PROCEDURE — 99214 OFFICE O/P EST MOD 30 MIN: CPT | Performed by: NURSE PRACTITIONER

## 2020-10-15 PROCEDURE — 80305 DRUG TEST PRSMV DIR OPT OBS: CPT | Performed by: NURSE PRACTITIONER

## 2020-10-15 RX ORDER — CYCLOBENZAPRINE HCL 10 MG
10 TABLET ORAL 3 TIMES DAILY PRN
Qty: 90 TABLET | Refills: 0 | Status: SHIPPED | OUTPATIENT
Start: 2020-10-15 | End: 2020-10-29 | Stop reason: ALTCHOICE

## 2020-10-15 RX ORDER — OXYCODONE HYDROCHLORIDE 5 MG/1
5 TABLET ORAL EVERY 4 HOURS PRN
COMMUNITY
End: 2020-10-29 | Stop reason: ALTCHOICE

## 2020-10-15 RX ORDER — TRAMADOL HYDROCHLORIDE 50 MG/1
TABLET ORAL
Qty: 90 TABLET | Refills: 1 | Status: SHIPPED | OUTPATIENT
Start: 2020-10-15 | End: 2020-12-11 | Stop reason: SDUPTHER

## 2020-10-17 LAB
6MAM UR QL CFM: NEGATIVE NG/ML
7AMINOCLONAZEPAM UR QL CFM: NEGATIVE NG/ML
A-OH ALPRAZ UR QL CFM: NEGATIVE NG/ML
AMPHET UR QL CFM: NEGATIVE NG/ML
AMPHET UR QL CFM: NEGATIVE NG/ML
BUPRENORPHINE UR QL CFM: NEGATIVE NG/ML
BUTALBITAL UR QL CFM: NEGATIVE NG/ML
BZE UR QL CFM: NEGATIVE NG/ML
CODEINE UR QL CFM: NEGATIVE NG/ML
DESIPRAMINE UR QL CFM: NEGATIVE NG/ML
DESIPRAMINE UR QL CFM: NEGATIVE NG/ML
EDDP UR QL CFM: NEGATIVE NG/ML
ETHYL GLUCURONIDE UR QL CFM: NEGATIVE NG/ML
ETHYL SULFATE UR QL SCN: NEGATIVE NG/ML
FENTANYL UR QL CFM: NEGATIVE NG/ML
GLIADIN IGG SER IA-ACNC: NEGATIVE NG/ML
GLUCOSE 30M P 50 G LAC PO SERPL-MCNC: NEGATIVE NG/ML
HYDROCODONE UR QL CFM: NEGATIVE NG/ML
HYDROCODONE UR QL CFM: NEGATIVE NG/ML
HYDROMORPHONE UR QL CFM: NEGATIVE NG/ML
HYDROMORPHONE UR QL CFM: NORMAL
IMIPRAMINE UR QL CFM: NEGATIVE NG/ML
MDMA UR QL CFM: NEGATIVE NG/ML
ME-PHENIDATE UR QL CFM: NEGATIVE NG/ML
MEPERIDINE UR QL CFM: NEGATIVE NG/ML
METHADONE UR QL CFM: NEGATIVE NG/ML
METHAMPHET UR QL CFM: NEGATIVE NG/ML
MORPHINE UR QL CFM: NEGATIVE NG/ML
MORPHINE UR QL CFM: NEGATIVE NG/ML
NORBUPRENORPHINE UR QL CFM: NEGATIVE NG/ML
NORDIAZEPAM UR QL CFM: NEGATIVE NG/ML
NORFENTANYL UR QL CFM: NEGATIVE NG/ML
NORHYDROCODONE UR QL CFM: NEGATIVE NG/ML
NORHYDROCODONE UR QL CFM: NEGATIVE NG/ML
NORMEPERIDINE UR QL CFM: NEGATIVE NG/ML
NOROXYCODONE UR QL CFM: NORMAL NG/ML
OLANZAPINE QUANTIFICATION: NEGATIVE NG/ML
OPC-3373 QUANTIFICATION: NEGATIVE
OXAZEPAM UR QL CFM: NEGATIVE NG/ML
OXYCODONE UR QL CFM: NORMAL NG/ML
OXYMORPHONE UR QL CFM: NORMAL NG/ML
OXYMORPHONE UR QL CFM: NORMAL NG/ML
PCP UR QL CFM: NEGATIVE NG/ML
PHENOBARB UR QL CFM: NEGATIVE NG/ML
PROLACTIN SERPL-MCNC: NEGATIVE NG/ML
RESULT ALL_PRESCRIBED MEDS AND SPECIAL INSTRUCTIONS: NORMAL
SL AMB 3-METHYL-FENTANYL QUANTIFICATION: NORMAL NG/ML
SL AMB 4-ANPP QUANTIFICATION: NORMAL NG/ML
SL AMB 4-FIBF QUANTIFICATION: NORMAL NG/ML
SL AMB 7-OH-MITRAGYNINE (KRATOM ALKALOID) QUANTIFICATION: NEGATIVE NG/ML
SL AMB ACETYL FENTANYL QUANTIFICATION: NORMAL NG/ML
SL AMB ACETYL NORFENTANYL QUANTIFICATION: NORMAL NG/ML
SL AMB ACRYL FENTANYL QUANTIFICATION: NORMAL NG/ML
SL AMB BUTRYL FENTANYL QUANTIFICATION: NORMAL NG/ML
SL AMB CARFENTANIL QUANTIFICATION: NORMAL NG/ML
SL AMB CLOZAPINE QUANTIFICATION: NEGATIVE NG/ML
SL AMB CTHC (MARIJUANA METABOLITE) QUANTIFICATION: NEGATIVE NG/ML
SL AMB CYCLOPROPYL FENTANYL QUANTIFICATION: NORMAL NG/ML
SL AMB DEXTROMETHORPHAN QUANTIFICATION: NEGATIVE NG/ML
SL AMB DEXTRORPHAN (DEXTROMETHORPHAN METABOLITE) QUANT: NEGATIVE NG/ML
SL AMB DEXTRORPHAN (DEXTROMETHORPHAN METABOLITE) QUANT: NEGATIVE NG/ML
SL AMB FURANYL FENTANYL QUANTIFICATION: NORMAL NG/ML
SL AMB HYDROXYRISPERIDONE QUANTIFICATION: NEGATIVE NG/ML
SL AMB METHOXYACETYL FENTANYL QUANTIFICATION: NORMAL NG/ML
SL AMB N-DESMETHYL U-47700 QUANTIFICATION: NORMAL NG/ML
SL AMB N-DESMETHYL-TRAMADOL QUANTIFICATION: ABNORMAL NG/ML
SL AMB N-DESMETHYLCLOZAPINE QUANTIFICATION: NEGATIVE NG/ML
SL AMB PHENTERMINE QUANTIFICATION: NEGATIVE NG/ML
SL AMB RISPERIDONE QUANTIFICATION: NEGATIVE NG/ML
SL AMB RITALINIC ACID QUANTIFICATION: NEGATIVE NG/ML
SL AMB U-47700 QUANTIFICATION: NORMAL NG/ML
TAPENTADOL UR QL CFM: NEGATIVE NG/ML
TEMAZEPAM UR QL CFM: NEGATIVE NG/ML
TEMAZEPAM UR QL CFM: NEGATIVE NG/ML
TRAMADOL UR QL CFM: ABNORMAL NG/ML
URATE/CREAT 24H UR: ABNORMAL NG/ML

## 2020-10-19 ENCOUNTER — OFFICE VISIT (OUTPATIENT)
Dept: PHYSICAL THERAPY | Age: 55
End: 2020-10-19
Payer: MEDICARE

## 2020-10-19 DIAGNOSIS — Z96.641 STATUS POST TOTAL HIP REPLACEMENT, RIGHT: ICD-10-CM

## 2020-10-19 DIAGNOSIS — M16.11 PRIMARY OSTEOARTHRITIS OF ONE HIP, RIGHT: Primary | ICD-10-CM

## 2020-10-19 DIAGNOSIS — M76.31 IT BAND SYNDROME, RIGHT: ICD-10-CM

## 2020-10-19 PROCEDURE — 97110 THERAPEUTIC EXERCISES: CPT | Performed by: PHYSICAL THERAPIST

## 2020-10-19 PROCEDURE — 97162 PT EVAL MOD COMPLEX 30 MIN: CPT | Performed by: PHYSICAL THERAPIST

## 2020-10-22 ENCOUNTER — OFFICE VISIT (OUTPATIENT)
Dept: PHYSICAL THERAPY | Age: 55
End: 2020-10-22
Payer: MEDICARE

## 2020-10-22 DIAGNOSIS — M76.31 IT BAND SYNDROME, RIGHT: ICD-10-CM

## 2020-10-22 DIAGNOSIS — M16.11 PRIMARY OSTEOARTHRITIS OF ONE HIP, RIGHT: Primary | ICD-10-CM

## 2020-10-22 DIAGNOSIS — Z96.641 STATUS POST TOTAL HIP REPLACEMENT, RIGHT: ICD-10-CM

## 2020-10-22 PROCEDURE — 97110 THERAPEUTIC EXERCISES: CPT | Performed by: PHYSICAL THERAPIST

## 2020-10-22 PROCEDURE — 97140 MANUAL THERAPY 1/> REGIONS: CPT | Performed by: PHYSICAL THERAPIST

## 2020-10-26 ENCOUNTER — OFFICE VISIT (OUTPATIENT)
Dept: PHYSICAL THERAPY | Age: 55
End: 2020-10-26
Payer: MEDICARE

## 2020-10-26 DIAGNOSIS — Z96.641 STATUS POST TOTAL HIP REPLACEMENT, RIGHT: ICD-10-CM

## 2020-10-26 DIAGNOSIS — M76.31 IT BAND SYNDROME, RIGHT: ICD-10-CM

## 2020-10-26 DIAGNOSIS — M16.11 PRIMARY OSTEOARTHRITIS OF ONE HIP, RIGHT: Primary | ICD-10-CM

## 2020-10-26 PROCEDURE — 97110 THERAPEUTIC EXERCISES: CPT | Performed by: PHYSICAL THERAPIST

## 2020-10-26 PROCEDURE — 97140 MANUAL THERAPY 1/> REGIONS: CPT | Performed by: PHYSICAL THERAPIST

## 2020-10-29 ENCOUNTER — OFFICE VISIT (OUTPATIENT)
Dept: PHYSICAL THERAPY | Age: 55
End: 2020-10-29
Payer: MEDICARE

## 2020-10-29 ENCOUNTER — OFFICE VISIT (OUTPATIENT)
Dept: FAMILY MEDICINE CLINIC | Facility: CLINIC | Age: 55
End: 2020-10-29
Payer: MEDICARE

## 2020-10-29 VITALS
BODY MASS INDEX: 28.88 KG/M2 | WEIGHT: 225 LBS | RESPIRATION RATE: 18 BRPM | HEIGHT: 74 IN | SYSTOLIC BLOOD PRESSURE: 116 MMHG | HEART RATE: 82 BPM | OXYGEN SATURATION: 97 % | TEMPERATURE: 98.2 F | DIASTOLIC BLOOD PRESSURE: 82 MMHG

## 2020-10-29 DIAGNOSIS — F41.8 DEPRESSION WITH ANXIETY: ICD-10-CM

## 2020-10-29 DIAGNOSIS — J45.20 MILD INTERMITTENT ASTHMA WITHOUT COMPLICATION: ICD-10-CM

## 2020-10-29 DIAGNOSIS — M16.11 PRIMARY OSTEOARTHRITIS OF ONE HIP, RIGHT: Primary | ICD-10-CM

## 2020-10-29 DIAGNOSIS — I71.2 ASCENDING AORTIC ANEURYSM (HCC): ICD-10-CM

## 2020-10-29 DIAGNOSIS — M16.11 PRIMARY OSTEOARTHRITIS OF RIGHT HIP: ICD-10-CM

## 2020-10-29 DIAGNOSIS — J44.9 CHRONIC OBSTRUCTIVE PULMONARY DISEASE, UNSPECIFIED COPD TYPE (HCC): ICD-10-CM

## 2020-10-29 DIAGNOSIS — E78.2 HYPERLIPIDEMIA, MIXED: ICD-10-CM

## 2020-10-29 DIAGNOSIS — J30.1 SEASONAL ALLERGIC RHINITIS DUE TO POLLEN: ICD-10-CM

## 2020-10-29 DIAGNOSIS — R73.03 PREDIABETES: ICD-10-CM

## 2020-10-29 DIAGNOSIS — E55.9 VITAMIN D DEFICIENCY: ICD-10-CM

## 2020-10-29 DIAGNOSIS — M76.31 IT BAND SYNDROME, RIGHT: ICD-10-CM

## 2020-10-29 DIAGNOSIS — G47.20 SLEEP PATTERN DISTURBANCE: ICD-10-CM

## 2020-10-29 DIAGNOSIS — K76.0 FATTY LIVER DISEASE, NONALCOHOLIC: ICD-10-CM

## 2020-10-29 DIAGNOSIS — E78.2 ELEVATED TRIGLYCERIDES WITH HIGH CHOLESTEROL: ICD-10-CM

## 2020-10-29 DIAGNOSIS — N18.31 STAGE 3A CHRONIC KIDNEY DISEASE (HCC): ICD-10-CM

## 2020-10-29 DIAGNOSIS — K21.9 GASTROESOPHAGEAL REFLUX DISEASE WITHOUT ESOPHAGITIS: Primary | ICD-10-CM

## 2020-10-29 DIAGNOSIS — Z96.641 STATUS POST TOTAL HIP REPLACEMENT, RIGHT: ICD-10-CM

## 2020-10-29 PROCEDURE — 97140 MANUAL THERAPY 1/> REGIONS: CPT | Performed by: PHYSICAL THERAPIST

## 2020-10-29 PROCEDURE — 97110 THERAPEUTIC EXERCISES: CPT | Performed by: PHYSICAL THERAPIST

## 2020-10-29 PROCEDURE — 99214 OFFICE O/P EST MOD 30 MIN: CPT | Performed by: NURSE PRACTITIONER

## 2020-10-29 RX ORDER — FENOFIBRATE 160 MG/1
160 TABLET ORAL DAILY
Qty: 90 TABLET | Refills: 3 | Status: SHIPPED | OUTPATIENT
Start: 2020-10-29 | End: 2021-10-19

## 2020-11-02 ENCOUNTER — APPOINTMENT (OUTPATIENT)
Dept: PHYSICAL THERAPY | Age: 55
End: 2020-11-02
Payer: MEDICARE

## 2020-11-02 ENCOUNTER — TELEPHONE (OUTPATIENT)
Dept: FAMILY MEDICINE CLINIC | Facility: CLINIC | Age: 55
End: 2020-11-02

## 2020-11-03 DIAGNOSIS — F51.01 PRIMARY INSOMNIA: Primary | ICD-10-CM

## 2020-11-05 ENCOUNTER — OFFICE VISIT (OUTPATIENT)
Dept: PHYSICAL THERAPY | Age: 55
End: 2020-11-05
Payer: MEDICARE

## 2020-11-05 DIAGNOSIS — Z96.641 STATUS POST TOTAL HIP REPLACEMENT, RIGHT: ICD-10-CM

## 2020-11-05 DIAGNOSIS — M76.31 IT BAND SYNDROME, RIGHT: ICD-10-CM

## 2020-11-05 DIAGNOSIS — M16.11 PRIMARY OSTEOARTHRITIS OF ONE HIP, RIGHT: Primary | ICD-10-CM

## 2020-11-05 PROCEDURE — 97140 MANUAL THERAPY 1/> REGIONS: CPT | Performed by: PHYSICAL THERAPIST

## 2020-11-05 PROCEDURE — 97110 THERAPEUTIC EXERCISES: CPT | Performed by: PHYSICAL THERAPIST

## 2020-11-09 ENCOUNTER — OFFICE VISIT (OUTPATIENT)
Dept: FAMILY MEDICINE CLINIC | Facility: CLINIC | Age: 55
End: 2020-11-09
Payer: MEDICARE

## 2020-11-09 ENCOUNTER — APPOINTMENT (OUTPATIENT)
Dept: RADIOLOGY | Facility: CLINIC | Age: 55
End: 2020-11-09
Payer: MEDICARE

## 2020-11-09 ENCOUNTER — APPOINTMENT (OUTPATIENT)
Dept: PHYSICAL THERAPY | Age: 55
End: 2020-11-09
Payer: MEDICARE

## 2020-11-09 VITALS
DIASTOLIC BLOOD PRESSURE: 80 MMHG | TEMPERATURE: 98 F | BODY MASS INDEX: 28.88 KG/M2 | WEIGHT: 225 LBS | OXYGEN SATURATION: 97 % | HEIGHT: 74 IN | RESPIRATION RATE: 18 BRPM | SYSTOLIC BLOOD PRESSURE: 118 MMHG | HEART RATE: 82 BPM

## 2020-11-09 DIAGNOSIS — M79.601 RIGHT ARM PAIN: Primary | ICD-10-CM

## 2020-11-09 DIAGNOSIS — M79.601 RIGHT ARM PAIN: ICD-10-CM

## 2020-11-09 DIAGNOSIS — M54.9 ACUTE UPPER BACK PAIN: ICD-10-CM

## 2020-11-09 PROCEDURE — 96372 THER/PROPH/DIAG INJ SC/IM: CPT | Performed by: NURSE PRACTITIONER

## 2020-11-09 PROCEDURE — 72050 X-RAY EXAM NECK SPINE 4/5VWS: CPT

## 2020-11-09 PROCEDURE — 72072 X-RAY EXAM THORAC SPINE 3VWS: CPT

## 2020-11-09 PROCEDURE — 99213 OFFICE O/P EST LOW 20 MIN: CPT | Performed by: NURSE PRACTITIONER

## 2020-11-09 RX ORDER — KETOROLAC TROMETHAMINE 30 MG/ML
30 INJECTION, SOLUTION INTRAMUSCULAR; INTRAVENOUS ONCE
Status: COMPLETED | OUTPATIENT
Start: 2020-11-09 | End: 2020-11-09

## 2020-11-09 RX ORDER — METHYLPREDNISOLONE 4 MG/1
TABLET ORAL
Qty: 21 EACH | Refills: 0 | Status: SHIPPED | OUTPATIENT
Start: 2020-11-09 | End: 2020-11-25

## 2020-11-09 RX ADMIN — KETOROLAC TROMETHAMINE 30 MG: 30 INJECTION, SOLUTION INTRAMUSCULAR; INTRAVENOUS at 10:59

## 2020-11-12 ENCOUNTER — OFFICE VISIT (OUTPATIENT)
Dept: PHYSICAL THERAPY | Age: 55
End: 2020-11-12
Payer: MEDICARE

## 2020-11-12 DIAGNOSIS — Z96.641 STATUS POST TOTAL HIP REPLACEMENT, RIGHT: ICD-10-CM

## 2020-11-12 DIAGNOSIS — M16.11 PRIMARY OSTEOARTHRITIS OF ONE HIP, RIGHT: Primary | ICD-10-CM

## 2020-11-12 DIAGNOSIS — M76.31 IT BAND SYNDROME, RIGHT: ICD-10-CM

## 2020-11-12 PROCEDURE — 97140 MANUAL THERAPY 1/> REGIONS: CPT | Performed by: PHYSICAL THERAPIST

## 2020-11-12 PROCEDURE — 97110 THERAPEUTIC EXERCISES: CPT | Performed by: PHYSICAL THERAPIST

## 2020-11-16 ENCOUNTER — OFFICE VISIT (OUTPATIENT)
Dept: PHYSICAL THERAPY | Age: 55
End: 2020-11-16
Payer: MEDICARE

## 2020-11-16 DIAGNOSIS — M16.11 PRIMARY OSTEOARTHRITIS OF ONE HIP, RIGHT: Primary | ICD-10-CM

## 2020-11-16 DIAGNOSIS — M76.31 IT BAND SYNDROME, RIGHT: ICD-10-CM

## 2020-11-16 DIAGNOSIS — Z96.641 STATUS POST TOTAL HIP REPLACEMENT, RIGHT: ICD-10-CM

## 2020-11-16 PROCEDURE — 97140 MANUAL THERAPY 1/> REGIONS: CPT | Performed by: PHYSICAL THERAPIST

## 2020-11-16 PROCEDURE — 97110 THERAPEUTIC EXERCISES: CPT | Performed by: PHYSICAL THERAPIST

## 2020-11-18 ENCOUNTER — OFFICE VISIT (OUTPATIENT)
Dept: OBGYN CLINIC | Facility: CLINIC | Age: 55
End: 2020-11-18
Payer: MEDICARE

## 2020-11-18 VITALS
HEART RATE: 88 BPM | SYSTOLIC BLOOD PRESSURE: 122 MMHG | DIASTOLIC BLOOD PRESSURE: 93 MMHG | BODY MASS INDEX: 28.49 KG/M2 | WEIGHT: 222 LBS | HEIGHT: 74 IN | TEMPERATURE: 98.8 F

## 2020-11-18 DIAGNOSIS — M51.34 DEGENERATIVE DISC DISEASE, THORACIC: ICD-10-CM

## 2020-11-18 DIAGNOSIS — M54.12 RADICULOPATHY, CERVICAL REGION: Primary | ICD-10-CM

## 2020-11-18 DIAGNOSIS — M47.812 FACET ARTHROPATHY, CERVICAL: ICD-10-CM

## 2020-11-18 DIAGNOSIS — M48.02 FORAMINAL STENOSIS OF CERVICAL REGION: ICD-10-CM

## 2020-11-18 DIAGNOSIS — M62.838 CERVICAL PARASPINAL MUSCLE SPASM: ICD-10-CM

## 2020-11-18 DIAGNOSIS — M50.30 DEGENERATIVE DISC DISEASE, CERVICAL: ICD-10-CM

## 2020-11-18 PROCEDURE — 99213 OFFICE O/P EST LOW 20 MIN: CPT | Performed by: FAMILY MEDICINE

## 2020-11-18 RX ORDER — METHOCARBAMOL 750 MG/1
750 TABLET, FILM COATED ORAL 3 TIMES DAILY PRN
Qty: 90 TABLET | Refills: 1 | Status: SHIPPED | OUTPATIENT
Start: 2020-11-18 | End: 2020-12-18

## 2020-11-19 ENCOUNTER — OFFICE VISIT (OUTPATIENT)
Dept: PHYSICAL THERAPY | Age: 55
End: 2020-11-19
Payer: MEDICARE

## 2020-11-19 DIAGNOSIS — M16.11 PRIMARY OSTEOARTHRITIS OF ONE HIP, RIGHT: Primary | ICD-10-CM

## 2020-11-19 DIAGNOSIS — M76.31 IT BAND SYNDROME, RIGHT: ICD-10-CM

## 2020-11-19 DIAGNOSIS — Z96.641 STATUS POST TOTAL HIP REPLACEMENT, RIGHT: ICD-10-CM

## 2020-11-19 PROCEDURE — 97110 THERAPEUTIC EXERCISES: CPT | Performed by: PHYSICAL THERAPIST

## 2020-11-19 PROCEDURE — 97140 MANUAL THERAPY 1/> REGIONS: CPT | Performed by: PHYSICAL THERAPIST

## 2020-11-20 ENCOUNTER — OFFICE VISIT (OUTPATIENT)
Dept: OBGYN CLINIC | Facility: CLINIC | Age: 55
End: 2020-11-20

## 2020-11-20 ENCOUNTER — APPOINTMENT (OUTPATIENT)
Dept: RADIOLOGY | Facility: CLINIC | Age: 55
End: 2020-11-20
Payer: MEDICARE

## 2020-11-20 ENCOUNTER — HOSPITAL ENCOUNTER (OUTPATIENT)
Dept: MRI IMAGING | Facility: HOSPITAL | Age: 55
Discharge: HOME | End: 2020-11-20
Attending: FAMILY MEDICINE
Payer: MEDICARE

## 2020-11-20 VITALS
BODY MASS INDEX: 28.49 KG/M2 | HEART RATE: 80 BPM | SYSTOLIC BLOOD PRESSURE: 130 MMHG | WEIGHT: 222 LBS | DIASTOLIC BLOOD PRESSURE: 92 MMHG | HEIGHT: 74 IN

## 2020-11-20 DIAGNOSIS — M25.561 RIGHT KNEE PAIN, UNSPECIFIED CHRONICITY: Primary | ICD-10-CM

## 2020-11-20 DIAGNOSIS — Z96.649 STATUS POST TOTAL REPLACEMENT OF HIP, UNSPECIFIED LATERALITY: ICD-10-CM

## 2020-11-20 DIAGNOSIS — M25.561 RIGHT KNEE PAIN, UNSPECIFIED CHRONICITY: ICD-10-CM

## 2020-11-20 DIAGNOSIS — M50.30 DEGENERATIVE DISC DISEASE, CERVICAL: ICD-10-CM

## 2020-11-20 DIAGNOSIS — M48.02 FORAMINAL STENOSIS OF CERVICAL REGION: ICD-10-CM

## 2020-11-20 DIAGNOSIS — M54.12 RADICULOPATHY, CERVICAL REGION: ICD-10-CM

## 2020-11-20 DIAGNOSIS — M47.812 FACET ARTHROPATHY, CERVICAL: ICD-10-CM

## 2020-11-20 PROCEDURE — 99024 POSTOP FOLLOW-UP VISIT: CPT | Performed by: ORTHOPAEDIC SURGERY

## 2020-11-20 PROCEDURE — 73502 X-RAY EXAM HIP UNI 2-3 VIEWS: CPT

## 2020-11-20 PROCEDURE — 73562 X-RAY EXAM OF KNEE 3: CPT

## 2020-11-20 PROCEDURE — 72141 MRI NECK SPINE W/O DYE: CPT

## 2020-11-20 PROCEDURE — G1004 CDSM NDSC: HCPCS

## 2020-11-23 ENCOUNTER — OFFICE VISIT (OUTPATIENT)
Dept: PHYSICAL THERAPY | Age: 55
End: 2020-11-23
Payer: MEDICARE

## 2020-11-23 DIAGNOSIS — M16.11 PRIMARY OSTEOARTHRITIS OF ONE HIP, RIGHT: Primary | ICD-10-CM

## 2020-11-23 DIAGNOSIS — M76.31 IT BAND SYNDROME, RIGHT: ICD-10-CM

## 2020-11-23 DIAGNOSIS — Z96.641 STATUS POST TOTAL HIP REPLACEMENT, RIGHT: ICD-10-CM

## 2020-11-23 PROCEDURE — 97140 MANUAL THERAPY 1/> REGIONS: CPT

## 2020-11-23 PROCEDURE — 97110 THERAPEUTIC EXERCISES: CPT

## 2020-11-25 ENCOUNTER — OFFICE VISIT (OUTPATIENT)
Dept: OBGYN CLINIC | Facility: CLINIC | Age: 55
End: 2020-11-25
Payer: MEDICARE

## 2020-11-25 VITALS
HEIGHT: 74 IN | DIASTOLIC BLOOD PRESSURE: 88 MMHG | WEIGHT: 223.2 LBS | SYSTOLIC BLOOD PRESSURE: 124 MMHG | HEART RATE: 87 BPM | RESPIRATION RATE: 20 BRPM | BODY MASS INDEX: 28.64 KG/M2 | TEMPERATURE: 97.4 F

## 2020-11-25 DIAGNOSIS — R52 REFERRED PAIN: ICD-10-CM

## 2020-11-25 DIAGNOSIS — M62.838 CERVICAL PARASPINAL MUSCLE SPASM: ICD-10-CM

## 2020-11-25 DIAGNOSIS — M47.812 FACET ARTHROPATHY, CERVICAL: Primary | ICD-10-CM

## 2020-11-25 PROCEDURE — 99213 OFFICE O/P EST LOW 20 MIN: CPT | Performed by: FAMILY MEDICINE

## 2020-11-25 RX ORDER — TIZANIDINE 4 MG/1
4 TABLET ORAL 3 TIMES DAILY PRN
Qty: 90 TABLET | Refills: 0 | Status: SHIPPED | OUTPATIENT
Start: 2020-11-25 | End: 2020-12-02

## 2020-11-27 ENCOUNTER — OFFICE VISIT (OUTPATIENT)
Dept: PHYSICAL THERAPY | Age: 55
End: 2020-11-27
Payer: MEDICARE

## 2020-11-27 DIAGNOSIS — M76.31 IT BAND SYNDROME, RIGHT: ICD-10-CM

## 2020-11-27 DIAGNOSIS — Z96.641 STATUS POST TOTAL HIP REPLACEMENT, RIGHT: ICD-10-CM

## 2020-11-27 DIAGNOSIS — M16.11 PRIMARY OSTEOARTHRITIS OF ONE HIP, RIGHT: Primary | ICD-10-CM

## 2020-11-27 PROCEDURE — 97140 MANUAL THERAPY 1/> REGIONS: CPT | Performed by: PHYSICAL THERAPIST

## 2020-11-27 PROCEDURE — 97110 THERAPEUTIC EXERCISES: CPT | Performed by: PHYSICAL THERAPIST

## 2020-11-30 ENCOUNTER — OFFICE VISIT (OUTPATIENT)
Dept: PHYSICAL THERAPY | Age: 55
End: 2020-11-30
Payer: MEDICARE

## 2020-11-30 DIAGNOSIS — M16.11 PRIMARY OSTEOARTHRITIS OF ONE HIP, RIGHT: Primary | ICD-10-CM

## 2020-11-30 DIAGNOSIS — Z96.641 STATUS POST TOTAL HIP REPLACEMENT, RIGHT: ICD-10-CM

## 2020-11-30 DIAGNOSIS — M76.31 IT BAND SYNDROME, RIGHT: ICD-10-CM

## 2020-11-30 DIAGNOSIS — J45.20 MILD INTERMITTENT ASTHMA WITHOUT COMPLICATION: Primary | ICD-10-CM

## 2020-11-30 PROCEDURE — 97110 THERAPEUTIC EXERCISES: CPT | Performed by: PHYSICAL THERAPIST

## 2020-12-01 DIAGNOSIS — J45.20 MILD INTERMITTENT ASTHMA WITHOUT COMPLICATION: Primary | ICD-10-CM

## 2020-12-01 RX ORDER — MOMETASONE FUROATE AND FORMOTEROL FUMARATE DIHYDRATE 200; 5 UG/1; UG/1
AEROSOL RESPIRATORY (INHALATION)
Qty: 39 G | Refills: 3 | Status: SHIPPED | OUTPATIENT
Start: 2020-12-01 | End: 2020-12-01

## 2020-12-01 RX ORDER — BUDESONIDE AND FORMOTEROL FUMARATE DIHYDRATE 160; 4.5 UG/1; UG/1
2 AEROSOL RESPIRATORY (INHALATION) 2 TIMES DAILY
Qty: 1 INHALER | Refills: 3 | Status: SHIPPED | OUTPATIENT
Start: 2020-12-01 | End: 2020-12-18

## 2020-12-01 RX ORDER — MOMETASONE FUROATE AND FORMOTEROL FUMARATE DIHYDRATE 200; 5 UG/1; UG/1
2 AEROSOL RESPIRATORY (INHALATION) 2 TIMES DAILY
OUTPATIENT
Start: 2020-12-01

## 2020-12-02 ENCOUNTER — OFFICE VISIT (OUTPATIENT)
Dept: PAIN MEDICINE | Facility: CLINIC | Age: 55
End: 2020-12-02
Payer: MEDICARE

## 2020-12-02 VITALS
WEIGHT: 217.4 LBS | HEIGHT: 74 IN | HEART RATE: 92 BPM | BODY MASS INDEX: 27.9 KG/M2 | SYSTOLIC BLOOD PRESSURE: 119 MMHG | DIASTOLIC BLOOD PRESSURE: 88 MMHG | RESPIRATION RATE: 20 BRPM

## 2020-12-02 DIAGNOSIS — M54.12 CERVICAL RADICULOPATHY: ICD-10-CM

## 2020-12-02 DIAGNOSIS — M79.601 RIGHT ARM PAIN: ICD-10-CM

## 2020-12-02 DIAGNOSIS — M16.11 PRIMARY OSTEOARTHRITIS OF RIGHT HIP: ICD-10-CM

## 2020-12-02 DIAGNOSIS — M47.812 FACET ARTHROPATHY, CERVICAL: ICD-10-CM

## 2020-12-02 DIAGNOSIS — M47.812 CERVICAL SPONDYLOSIS: ICD-10-CM

## 2020-12-02 DIAGNOSIS — M54.2 CHRONIC NECK PAIN: Primary | ICD-10-CM

## 2020-12-02 DIAGNOSIS — G89.29 CHRONIC NECK PAIN: Primary | ICD-10-CM

## 2020-12-02 PROCEDURE — 99214 OFFICE O/P EST MOD 30 MIN: CPT | Performed by: STUDENT IN AN ORGANIZED HEALTH CARE EDUCATION/TRAINING PROGRAM

## 2020-12-02 RX ORDER — TRAMADOL HYDROCHLORIDE 50 MG/1
50 TABLET ORAL 3 TIMES DAILY PRN
Qty: 90 TABLET | Refills: 0 | Status: CANCELLED | OUTPATIENT
Start: 2020-12-21 | End: 2021-01-19

## 2020-12-02 RX ORDER — TRAMADOL HYDROCHLORIDE 50 MG/1
50 TABLET ORAL 3 TIMES DAILY PRN
Qty: 90 TABLET | Refills: 0 | Status: CANCELLED | OUTPATIENT
Start: 2021-01-20 | End: 2021-02-18

## 2020-12-02 RX ORDER — GABAPENTIN 300 MG/1
300 CAPSULE ORAL 2 TIMES DAILY
Qty: 90 CAPSULE | Refills: 0 | Status: SHIPPED | OUTPATIENT
Start: 2020-12-02 | End: 2021-02-22

## 2020-12-04 ENCOUNTER — APPOINTMENT (OUTPATIENT)
Dept: PHYSICAL THERAPY | Age: 55
End: 2020-12-04
Payer: MEDICARE

## 2020-12-04 ENCOUNTER — TELEPHONE (OUTPATIENT)
Dept: PAIN MEDICINE | Facility: CLINIC | Age: 55
End: 2020-12-04

## 2020-12-04 ENCOUNTER — OFFICE VISIT (OUTPATIENT)
Dept: FAMILY MEDICINE CLINIC | Facility: CLINIC | Age: 55
End: 2020-12-04
Payer: MEDICARE

## 2020-12-04 VITALS
DIASTOLIC BLOOD PRESSURE: 74 MMHG | BODY MASS INDEX: 27.85 KG/M2 | SYSTOLIC BLOOD PRESSURE: 106 MMHG | TEMPERATURE: 98.4 F | HEIGHT: 74 IN | OXYGEN SATURATION: 99 % | WEIGHT: 217 LBS | HEART RATE: 68 BPM

## 2020-12-04 DIAGNOSIS — R63.1 EXCESSIVE THIRST: ICD-10-CM

## 2020-12-04 DIAGNOSIS — E11.9 TYPE 2 DIABETES MELLITUS WITHOUT COMPLICATION, WITHOUT LONG-TERM CURRENT USE OF INSULIN (HCC): Primary | ICD-10-CM

## 2020-12-04 PROBLEM — N18.30 STAGE 3 CHRONIC KIDNEY DISEASE (HCC): Status: RESOLVED | Noted: 2020-09-29 | Resolved: 2020-12-04

## 2020-12-04 PROBLEM — Z01.810 PREOP CARDIOVASCULAR EXAM: Status: RESOLVED | Noted: 2020-09-21 | Resolved: 2020-12-04

## 2020-12-04 PROBLEM — H69.92 EUSTACHIAN TUBE DISORDER, LEFT: Status: RESOLVED | Noted: 2019-05-14 | Resolved: 2020-12-04

## 2020-12-04 PROBLEM — H00.012 HORDEOLUM EXTERNUM OF RIGHT LOWER EYELID: Status: RESOLVED | Noted: 2020-09-24 | Resolved: 2020-12-04

## 2020-12-04 PROBLEM — Z23 NEED FOR INFLUENZA VACCINATION: Status: RESOLVED | Noted: 2018-10-02 | Resolved: 2020-12-04

## 2020-12-04 PROBLEM — R53.83 FATIGUE: Status: RESOLVED | Noted: 2018-05-07 | Resolved: 2020-12-04

## 2020-12-04 PROBLEM — E78.2 ELEVATED TRIGLYCERIDES WITH HIGH CHOLESTEROL: Status: RESOLVED | Noted: 2019-02-05 | Resolved: 2020-12-04

## 2020-12-04 PROBLEM — Z76.89 ENCOUNTER TO ESTABLISH CARE: Status: RESOLVED | Noted: 2018-05-07 | Resolved: 2020-12-04

## 2020-12-04 PROBLEM — Z23 NEED FOR PNEUMOCOCCAL VACCINATION: Status: RESOLVED | Noted: 2018-11-20 | Resolved: 2020-12-04

## 2020-12-04 PROBLEM — J45.21 MILD INTERMITTENT ASTHMA WITH EXACERBATION: Status: RESOLVED | Noted: 2019-04-08 | Resolved: 2020-12-04

## 2020-12-04 PROBLEM — Z11.59 NEED FOR HEPATITIS C SCREENING TEST: Status: RESOLVED | Noted: 2018-11-20 | Resolved: 2020-12-04

## 2020-12-04 PROBLEM — M79.601 RIGHT ARM PAIN: Status: RESOLVED | Noted: 2020-01-28 | Resolved: 2020-12-04

## 2020-12-04 PROBLEM — M54.9 ACUTE UPPER BACK PAIN: Status: RESOLVED | Noted: 2020-11-09 | Resolved: 2020-12-04

## 2020-12-04 PROBLEM — R14.0 ABDOMINAL BLOATING: Status: RESOLVED | Noted: 2018-05-07 | Resolved: 2020-12-04

## 2020-12-04 PROBLEM — J45.20 MILD INTERMITTENT ASTHMA WITHOUT COMPLICATION: Status: RESOLVED | Noted: 2018-05-07 | Resolved: 2020-12-04

## 2020-12-04 PROBLEM — R20.0 NUMBNESS OF FINGERS OF BOTH HANDS: Status: RESOLVED | Noted: 2018-05-07 | Resolved: 2020-12-04

## 2020-12-04 PROBLEM — R05.9 COUGH: Status: RESOLVED | Noted: 2019-04-08 | Resolved: 2020-12-04

## 2020-12-04 PROBLEM — Z01.818 PRE-OPERATIVE CLEARANCE: Status: RESOLVED | Noted: 2020-08-26 | Resolved: 2020-12-04

## 2020-12-04 PROBLEM — M54.50 LUMBAR PAIN: Status: RESOLVED | Noted: 2020-09-09 | Resolved: 2020-12-04

## 2020-12-04 PROBLEM — R10.84 GENERALIZED ABDOMINAL PAIN: Status: RESOLVED | Noted: 2018-05-07 | Resolved: 2020-12-04

## 2020-12-04 PROBLEM — Z00.00 MEDICARE ANNUAL WELLNESS VISIT, SUBSEQUENT: Status: RESOLVED | Noted: 2018-05-07 | Resolved: 2020-12-04

## 2020-12-04 PROBLEM — K76.9 LIVER DISEASE: Status: RESOLVED | Noted: 2018-05-07 | Resolved: 2020-12-04

## 2020-12-04 PROBLEM — R61 DIAPHORESIS: Status: RESOLVED | Noted: 2019-02-05 | Resolved: 2020-12-04

## 2020-12-04 PROBLEM — Z01.818 PREOP EXAMINATION: Status: RESOLVED | Noted: 2020-09-05 | Resolved: 2020-12-04

## 2020-12-04 PROBLEM — M62.89 HAMSTRING TIGHTNESS OF RIGHT LOWER EXTREMITY: Status: RESOLVED | Noted: 2018-06-18 | Resolved: 2020-12-04

## 2020-12-04 PROBLEM — M25.551 PAIN IN RIGHT HIP: Status: RESOLVED | Noted: 2018-08-06 | Resolved: 2020-12-04

## 2020-12-04 LAB
CREATINE UR-MCNC: 50 MG/DL
SL AMB POCT HEMOGLOBIN AIC: 9.9 (ref ?–6.5)
SL AMB POCT UR MICROALBUMIN: 30

## 2020-12-04 PROCEDURE — 82570 ASSAY OF URINE CREATININE: CPT | Performed by: FAMILY MEDICINE

## 2020-12-04 PROCEDURE — 99214 OFFICE O/P EST MOD 30 MIN: CPT | Performed by: FAMILY MEDICINE

## 2020-12-04 PROCEDURE — 92250 FUNDUS PHOTOGRAPHY W/I&R: CPT | Performed by: FAMILY MEDICINE

## 2020-12-04 PROCEDURE — 83036 HEMOGLOBIN GLYCOSYLATED A1C: CPT | Performed by: FAMILY MEDICINE

## 2020-12-04 PROCEDURE — 82043 UR ALBUMIN QUANTITATIVE: CPT | Performed by: FAMILY MEDICINE

## 2020-12-04 RX ORDER — DULAGLUTIDE 0.75 MG/.5ML
0.75 INJECTION, SOLUTION SUBCUTANEOUS WEEKLY
Qty: 2 ML | Refills: 1 | Status: SHIPPED | OUTPATIENT
Start: 2020-12-04 | End: 2020-12-18 | Stop reason: SDUPTHER

## 2020-12-04 RX ORDER — BLOOD-GLUCOSE METER
1 KIT MISCELLANEOUS
Qty: 1 EACH | Refills: 0 | Status: SHIPPED | OUTPATIENT
Start: 2020-12-04

## 2020-12-04 RX ORDER — LANCETS 30 GAUGE
EACH MISCELLANEOUS
Qty: 100 EACH | Refills: 5 | Status: SHIPPED | OUTPATIENT
Start: 2020-12-04

## 2020-12-04 NOTE — PROGRESS NOTES
PT Evaluation     Today's date: 2020  Patient name: Blake Santo  : 1965  MRN: [id number]  Referring provider: Rasta Cohen PT  Dx:   Encounter Diagnosis     ICD-10-CM    1  Neck pain  M54 2                   Assessment  Assessment details: Blake Santo is a 63 y o  male who presents with pain, decreased strength and decreased mobility Due to these impairments, Patient has difficulty performing a/iadls and recreational activities  Patient's clinical presentation is consistent with neck pain  Patient would benefit from skilled physical therapy to address their aforementioned impairments, improve their level of function and to improve their overall quality of life  Impairments: abnormal or restricted ROM, activity intolerance, impaired physical strength, lacks appropriate home exercise program, pain with function, poor posture  and poor body mechanics  Understanding of Dx/Px/POC: good   Prognosis: good    Goals  ST-3 WEEKS  1  Decrease pain by 4 points on VAS at its worst   2   Increase ROM by > 5 deg in all deficients planes  neck and upper extremity  3  Increase UE by 1/2 MMT grade in all deficient planes  4  Improve sleep tolerance to greater than 5 hours uninterrupted by neck/UE pain  5  Increase postural endurance and postural awareness    LT-6 WEEKS  1  Patient to be independent with a/iadls  2  Increase functional activities for leisure and home activities to previous LOF    3  Independent with HEP and/or fitness program     Plan  Patient would benefit from: skilled physical therapy  Planned modality interventions: cryotherapy, electrical stimulation/Russian stimulation, thermotherapy: hydrocollator packs and unattended electrical stimulation  Planned therapy interventions: activity modification, behavior modification, body mechanics training, aquatic therapy, flexibility, functional ROM exercises, home exercise program, IADL retraining, joint mobilization, manual therapy, neuromuscular re-education, patient education, postural training, strengthening, stretching, therapeutic activities and therapeutic exercise  Frequency: 2-3x week  Duration in weeks: 6  Plan of Care beginning date: 12/4/2020  Plan of Care expiration date: 3/4/2021  Treatment plan discussed with: patient        Subjective Evaluation    History of Present Illness  Date of onset: 11/10/2020  Mechanism of injury: Patient notes sudden insidious onset of neck pain and right UE weakness with scapular pain  MRI ordered and positive for stenosis  Will have epidural injection  Presents for PT evaluation this date     Quality of life: good    Pain  Progression: no change    Hand dominance: right    Treatments  Current treatment: medication  Patient Goals  Patient goals for therapy: decreased pain, increased strength, increased motion and independence with ADLs/IADLs  Patient goal: improve sleep        Objective           Precautions: ***      Manuals                                                                 Neuro Re-Ed                                                                                                        Ther Ex                                                                                                                     Ther Activity                                       Gait Training                                       Modalities

## 2020-12-08 ENCOUNTER — HOSPITAL ENCOUNTER (OUTPATIENT)
Dept: RADIOLOGY | Facility: CLINIC | Age: 55
Discharge: HOME | End: 2020-12-08
Attending: STUDENT IN AN ORGANIZED HEALTH CARE EDUCATION/TRAINING PROGRAM

## 2020-12-08 VITALS
HEART RATE: 86 BPM | OXYGEN SATURATION: 95 % | TEMPERATURE: 97.7 F | SYSTOLIC BLOOD PRESSURE: 126 MMHG | DIASTOLIC BLOOD PRESSURE: 77 MMHG | RESPIRATION RATE: 20 BRPM

## 2020-12-08 DIAGNOSIS — M47.812 FACET ARTHROPATHY, CERVICAL: ICD-10-CM

## 2020-12-08 DIAGNOSIS — M47.812 CERVICAL SPONDYLOSIS: ICD-10-CM

## 2020-12-08 RX ORDER — METHYLPREDNISOLONE ACETATE 80 MG/ML
80 INJECTION, SUSPENSION INTRA-ARTICULAR; INTRALESIONAL; INTRAMUSCULAR; PARENTERAL; SOFT TISSUE ONCE
Status: DISCONTINUED | OUTPATIENT
Start: 2020-12-08 | End: 2020-12-12 | Stop reason: HOSPADM

## 2020-12-08 RX ORDER — 0.9 % SODIUM CHLORIDE 0.9 %
4 VIAL (ML) INJECTION ONCE
Status: DISCONTINUED | OUTPATIENT
Start: 2020-12-08 | End: 2020-12-12 | Stop reason: HOSPADM

## 2020-12-08 RX ORDER — LIDOCAINE HYDROCHLORIDE 10 MG/ML
5 INJECTION, SOLUTION EPIDURAL; INFILTRATION; INTRACAUDAL; PERINEURAL ONCE
Status: DISCONTINUED | OUTPATIENT
Start: 2020-12-08 | End: 2020-12-12 | Stop reason: HOSPADM

## 2020-12-11 ENCOUNTER — TELEPHONE (OUTPATIENT)
Dept: NEPHROLOGY | Facility: CLINIC | Age: 55
End: 2020-12-11

## 2020-12-11 ENCOUNTER — OFFICE VISIT (OUTPATIENT)
Dept: PAIN MEDICINE | Facility: CLINIC | Age: 55
End: 2020-12-11
Payer: MEDICARE

## 2020-12-11 VITALS
RESPIRATION RATE: 18 BRPM | WEIGHT: 218 LBS | SYSTOLIC BLOOD PRESSURE: 120 MMHG | BODY MASS INDEX: 27.98 KG/M2 | HEART RATE: 68 BPM | HEIGHT: 74 IN | DIASTOLIC BLOOD PRESSURE: 79 MMHG

## 2020-12-11 DIAGNOSIS — M54.12 CERVICAL RADICULOPATHY: ICD-10-CM

## 2020-12-11 DIAGNOSIS — G89.4 CHRONIC PAIN SYNDROME: Primary | ICD-10-CM

## 2020-12-11 DIAGNOSIS — M16.11 PRIMARY OSTEOARTHRITIS OF RIGHT HIP: ICD-10-CM

## 2020-12-11 DIAGNOSIS — F11.20 UNCOMPLICATED OPIOID DEPENDENCE (HCC): ICD-10-CM

## 2020-12-11 DIAGNOSIS — M47.812 CERVICAL SPONDYLOSIS: ICD-10-CM

## 2020-12-11 DIAGNOSIS — Z79.891 LONG-TERM CURRENT USE OF OPIATE ANALGESIC: ICD-10-CM

## 2020-12-11 LAB
LEFT EYE DIABETIC RETINOPATHY: NORMAL
LEFT EYE IMAGE QUALITY: NORMAL
LEFT EYE MACULAR EDEMA: NORMAL
LEFT EYE OTHER RETINOPATHY: NORMAL
RIGHT EYE DIABETIC RETINOPATHY: NORMAL
RIGHT EYE IMAGE QUALITY: NORMAL
RIGHT EYE MACULAR EDEMA: NORMAL
RIGHT EYE OTHER RETINOPATHY: NORMAL
SEVERITY (EYE EXAM): NORMAL

## 2020-12-11 PROCEDURE — 99214 OFFICE O/P EST MOD 30 MIN: CPT | Performed by: NURSE PRACTITIONER

## 2020-12-11 RX ORDER — TRAMADOL HYDROCHLORIDE 50 MG/1
TABLET ORAL
Qty: 90 TABLET | Refills: 1 | Status: SHIPPED | OUTPATIENT
Start: 2020-12-11 | End: 2021-02-22 | Stop reason: SDUPTHER

## 2020-12-14 ENCOUNTER — TRANSCRIBE ORDERS (OUTPATIENT)
Dept: ADMINISTRATIVE | Facility: HOSPITAL | Age: 55
End: 2020-12-14

## 2020-12-14 ENCOUNTER — LAB (OUTPATIENT)
Dept: LAB | Facility: MEDICAL CENTER | Age: 55
End: 2020-12-14
Payer: MEDICARE

## 2020-12-14 DIAGNOSIS — E83.9 CHRONIC KIDNEY DISEASE-MINERAL AND BONE DISORDER: ICD-10-CM

## 2020-12-14 DIAGNOSIS — M89.9 CHRONIC KIDNEY DISEASE-MINERAL AND BONE DISORDER: ICD-10-CM

## 2020-12-14 DIAGNOSIS — M89.9 BONE DISORDER: ICD-10-CM

## 2020-12-14 DIAGNOSIS — N18.9 CHRONIC KIDNEY DISEASE, UNSPECIFIED CKD STAGE: ICD-10-CM

## 2020-12-14 DIAGNOSIS — N18.30 CKD (CHRONIC KIDNEY DISEASE) STAGE 3, GFR 30-59 ML/MIN (HCC): ICD-10-CM

## 2020-12-14 DIAGNOSIS — N18.30 STAGE 3 CHRONIC KIDNEY DISEASE, UNSPECIFIED WHETHER STAGE 3A OR 3B CKD (HCC): Primary | ICD-10-CM

## 2020-12-14 DIAGNOSIS — N18.9 CHRONIC KIDNEY DISEASE-MINERAL AND BONE DISORDER: ICD-10-CM

## 2020-12-14 DIAGNOSIS — N18.30 STAGE 3 CHRONIC KIDNEY DISEASE, UNSPECIFIED WHETHER STAGE 3A OR 3B CKD (HCC): ICD-10-CM

## 2020-12-14 LAB
25(OH)D3 SERPL-MCNC: 26.9 NG/ML (ref 30–100)
ANION GAP SERPL CALCULATED.3IONS-SCNC: 7 MMOL/L (ref 4–13)
BASOPHILS # BLD AUTO: 0.05 THOUSANDS/ΜL (ref 0–0.1)
BASOPHILS NFR BLD AUTO: 1 % (ref 0–1)
BILIRUB UR QL STRIP: ABNORMAL
BUN SERPL-MCNC: 13 MG/DL (ref 5–25)
CALCIUM SERPL-MCNC: 10.1 MG/DL (ref 8.3–10.1)
CHLORIDE SERPL-SCNC: 107 MMOL/L (ref 100–108)
CLARITY UR: CLEAR
CO2 SERPL-SCNC: 26 MMOL/L (ref 21–32)
COLOR UR: YELLOW
CREAT SERPL-MCNC: 1.25 MG/DL (ref 0.6–1.3)
CREAT UR-MCNC: 265 MG/DL
EOSINOPHIL # BLD AUTO: 0.09 THOUSAND/ΜL (ref 0–0.61)
EOSINOPHIL NFR BLD AUTO: 2 % (ref 0–6)
ERYTHROCYTE [DISTWIDTH] IN BLOOD BY AUTOMATED COUNT: 12.5 % (ref 11.6–15.1)
GFR SERPL CREATININE-BSD FRML MDRD: 64 ML/MIN/1.73SQ M
GLUCOSE P FAST SERPL-MCNC: 151 MG/DL (ref 65–99)
GLUCOSE UR STRIP-MCNC: ABNORMAL MG/DL
HCT VFR BLD AUTO: 45.3 % (ref 36.5–49.3)
HGB BLD-MCNC: 14.6 G/DL (ref 12–17)
HGB UR QL STRIP.AUTO: NEGATIVE
IMM GRANULOCYTES # BLD AUTO: 0.01 THOUSAND/UL (ref 0–0.2)
IMM GRANULOCYTES NFR BLD AUTO: 0 % (ref 0–2)
KETONES UR STRIP-MCNC: ABNORMAL MG/DL
LEUKOCYTE ESTERASE UR QL STRIP: NEGATIVE
LYMPHOCYTES # BLD AUTO: 1.41 THOUSANDS/ΜL (ref 0.6–4.47)
LYMPHOCYTES NFR BLD AUTO: 32 % (ref 14–44)
MCH RBC QN AUTO: 30.6 PG (ref 26.8–34.3)
MCHC RBC AUTO-ENTMCNC: 32.2 G/DL (ref 31.4–37.4)
MCV RBC AUTO: 95 FL (ref 82–98)
MONOCYTES # BLD AUTO: 0.49 THOUSAND/ΜL (ref 0.17–1.22)
MONOCYTES NFR BLD AUTO: 11 % (ref 4–12)
NEUTROPHILS # BLD AUTO: 2.35 THOUSANDS/ΜL (ref 1.85–7.62)
NEUTS SEG NFR BLD AUTO: 54 % (ref 43–75)
NITRITE UR QL STRIP: NEGATIVE
NRBC BLD AUTO-RTO: 0 /100 WBCS
PH UR STRIP.AUTO: 5.5 [PH]
PHOSPHATE SERPL-MCNC: 3 MG/DL (ref 2.7–4.5)
PLATELET # BLD AUTO: 234 THOUSANDS/UL (ref 149–390)
PMV BLD AUTO: 11.8 FL (ref 8.9–12.7)
POTASSIUM SERPL-SCNC: 4.3 MMOL/L (ref 3.5–5.3)
PROT UR STRIP-MCNC: NEGATIVE MG/DL
PROT UR-MCNC: 7 MG/DL
PROT/CREAT UR: 0.03 MG/G{CREAT} (ref 0–0.1)
PTH-INTACT SERPL-MCNC: 28.8 PG/ML (ref 18.4–80.1)
RBC # BLD AUTO: 4.77 MILLION/UL (ref 3.88–5.62)
SODIUM SERPL-SCNC: 140 MMOL/L (ref 136–145)
SP GR UR STRIP.AUTO: 1.03 (ref 1–1.03)
UROBILINOGEN UR QL STRIP.AUTO: 0.2 E.U./DL
WBC # BLD AUTO: 4.4 THOUSAND/UL (ref 4.31–10.16)

## 2020-12-14 PROCEDURE — 80048 BASIC METABOLIC PNL TOTAL CA: CPT

## 2020-12-14 PROCEDURE — 85025 COMPLETE CBC W/AUTO DIFF WBC: CPT

## 2020-12-14 PROCEDURE — 82306 VITAMIN D 25 HYDROXY: CPT

## 2020-12-14 PROCEDURE — 84100 ASSAY OF PHOSPHORUS: CPT

## 2020-12-14 PROCEDURE — 81003 URINALYSIS AUTO W/O SCOPE: CPT

## 2020-12-14 PROCEDURE — 36415 COLL VENOUS BLD VENIPUNCTURE: CPT

## 2020-12-14 PROCEDURE — 82570 ASSAY OF URINE CREATININE: CPT

## 2020-12-14 PROCEDURE — 84156 ASSAY OF PROTEIN URINE: CPT

## 2020-12-14 PROCEDURE — 83970 ASSAY OF PARATHORMONE: CPT

## 2020-12-15 ENCOUNTER — TELEPHONE (OUTPATIENT)
Dept: NEPHROLOGY | Facility: CLINIC | Age: 55
End: 2020-12-15

## 2020-12-15 ENCOUNTER — OFFICE VISIT (OUTPATIENT)
Dept: DIABETES SERVICES | Facility: CLINIC | Age: 55
End: 2020-12-15
Payer: MEDICARE

## 2020-12-15 DIAGNOSIS — E11.9 TYPE 2 DIABETES MELLITUS WITHOUT COMPLICATION, WITHOUT LONG-TERM CURRENT USE OF INSULIN (HCC): Primary | ICD-10-CM

## 2020-12-15 PROCEDURE — G0109 DIAB MANAGE TRN IND/GROUP: HCPCS | Performed by: DIETITIAN, REGISTERED

## 2020-12-16 ENCOUNTER — OFFICE VISIT (OUTPATIENT)
Dept: NEPHROLOGY | Facility: CLINIC | Age: 55
End: 2020-12-16
Payer: MEDICARE

## 2020-12-16 VITALS
WEIGHT: 217.8 LBS | TEMPERATURE: 95.7 F | HEIGHT: 74 IN | SYSTOLIC BLOOD PRESSURE: 110 MMHG | RESPIRATION RATE: 16 BRPM | HEART RATE: 68 BPM | BODY MASS INDEX: 27.95 KG/M2 | DIASTOLIC BLOOD PRESSURE: 70 MMHG

## 2020-12-16 DIAGNOSIS — E83.9 CHRONIC KIDNEY DISEASE-MINERAL AND BONE DISORDER: ICD-10-CM

## 2020-12-16 DIAGNOSIS — N18.9 CHRONIC KIDNEY DISEASE-MINERAL AND BONE DISORDER: ICD-10-CM

## 2020-12-16 DIAGNOSIS — N18.31 STAGE 3A CHRONIC KIDNEY DISEASE (HCC): Primary | ICD-10-CM

## 2020-12-16 DIAGNOSIS — M16.11 PRIMARY OSTEOARTHRITIS OF RIGHT HIP: ICD-10-CM

## 2020-12-16 DIAGNOSIS — M89.9 CHRONIC KIDNEY DISEASE-MINERAL AND BONE DISORDER: ICD-10-CM

## 2020-12-16 DIAGNOSIS — E11.9 TYPE 2 DIABETES MELLITUS WITHOUT COMPLICATION, WITHOUT LONG-TERM CURRENT USE OF INSULIN (HCC): ICD-10-CM

## 2020-12-16 PROCEDURE — 99214 OFFICE O/P EST MOD 30 MIN: CPT | Performed by: INTERNAL MEDICINE

## 2020-12-16 RX ORDER — MONTELUKAST SODIUM 10 MG/1
10 TABLET ORAL
COMMUNITY
End: 2021-09-09

## 2020-12-16 RX ORDER — PROPRANOLOL/HYDROCHLOROTHIAZID 40 MG-25MG
TABLET ORAL
COMMUNITY
End: 2022-05-12

## 2020-12-18 ENCOUNTER — OFFICE VISIT (OUTPATIENT)
Dept: FAMILY MEDICINE CLINIC | Facility: CLINIC | Age: 55
End: 2020-12-18
Payer: MEDICARE

## 2020-12-18 ENCOUNTER — EVALUATION (OUTPATIENT)
Dept: PHYSICAL THERAPY | Age: 55
End: 2020-12-18
Payer: MEDICARE

## 2020-12-18 VITALS
BODY MASS INDEX: 27.85 KG/M2 | OXYGEN SATURATION: 99 % | TEMPERATURE: 97.9 F | HEART RATE: 67 BPM | RESPIRATION RATE: 18 BRPM | HEIGHT: 74 IN | DIASTOLIC BLOOD PRESSURE: 78 MMHG | WEIGHT: 217 LBS | SYSTOLIC BLOOD PRESSURE: 112 MMHG

## 2020-12-18 DIAGNOSIS — G89.29 CHRONIC NECK PAIN: Primary | ICD-10-CM

## 2020-12-18 DIAGNOSIS — M54.2 CHRONIC NECK PAIN: Primary | ICD-10-CM

## 2020-12-18 DIAGNOSIS — E11.9 TYPE 2 DIABETES MELLITUS WITHOUT COMPLICATION, WITHOUT LONG-TERM CURRENT USE OF INSULIN (HCC): Primary | ICD-10-CM

## 2020-12-18 PROCEDURE — 97162 PT EVAL MOD COMPLEX 30 MIN: CPT | Performed by: PHYSICAL THERAPIST

## 2020-12-18 PROCEDURE — 97140 MANUAL THERAPY 1/> REGIONS: CPT | Performed by: PHYSICAL THERAPIST

## 2020-12-18 PROCEDURE — 97110 THERAPEUTIC EXERCISES: CPT | Performed by: PHYSICAL THERAPIST

## 2020-12-18 PROCEDURE — 99213 OFFICE O/P EST LOW 20 MIN: CPT | Performed by: FAMILY MEDICINE

## 2020-12-18 RX ORDER — DULAGLUTIDE 0.75 MG/.5ML
0.75 INJECTION, SOLUTION SUBCUTANEOUS WEEKLY
Qty: 12 PEN | Refills: 1 | Status: SHIPPED | OUTPATIENT
Start: 2020-12-18 | End: 2021-03-08 | Stop reason: SDUPTHER

## 2020-12-21 ENCOUNTER — OFFICE VISIT (OUTPATIENT)
Dept: PHYSICAL THERAPY | Age: 55
End: 2020-12-21
Payer: MEDICARE

## 2020-12-21 DIAGNOSIS — M54.2 CHRONIC NECK PAIN: Primary | ICD-10-CM

## 2020-12-21 DIAGNOSIS — G89.29 CHRONIC NECK PAIN: Primary | ICD-10-CM

## 2020-12-21 PROCEDURE — 97140 MANUAL THERAPY 1/> REGIONS: CPT

## 2020-12-21 PROCEDURE — 97014 ELECTRIC STIMULATION THERAPY: CPT

## 2020-12-21 PROCEDURE — 97110 THERAPEUTIC EXERCISES: CPT

## 2020-12-23 ENCOUNTER — OFFICE VISIT (OUTPATIENT)
Dept: PHYSICAL THERAPY | Age: 55
End: 2020-12-23
Payer: MEDICARE

## 2020-12-23 DIAGNOSIS — M54.2 CHRONIC NECK PAIN: Primary | ICD-10-CM

## 2020-12-23 DIAGNOSIS — G89.29 CHRONIC NECK PAIN: Primary | ICD-10-CM

## 2020-12-23 PROCEDURE — 97110 THERAPEUTIC EXERCISES: CPT

## 2020-12-23 PROCEDURE — 97140 MANUAL THERAPY 1/> REGIONS: CPT

## 2020-12-23 PROCEDURE — 97014 ELECTRIC STIMULATION THERAPY: CPT

## 2020-12-28 ENCOUNTER — OFFICE VISIT (OUTPATIENT)
Dept: PHYSICAL THERAPY | Age: 55
End: 2020-12-28
Payer: MEDICARE

## 2020-12-28 DIAGNOSIS — M54.2 CHRONIC NECK PAIN: Primary | ICD-10-CM

## 2020-12-28 DIAGNOSIS — M16.11 PRIMARY OSTEOARTHRITIS OF ONE HIP, RIGHT: ICD-10-CM

## 2020-12-28 DIAGNOSIS — M76.31 IT BAND SYNDROME, RIGHT: ICD-10-CM

## 2020-12-28 DIAGNOSIS — G89.29 CHRONIC NECK PAIN: Primary | ICD-10-CM

## 2020-12-28 DIAGNOSIS — Z96.641 STATUS POST TOTAL HIP REPLACEMENT, RIGHT: ICD-10-CM

## 2020-12-28 PROCEDURE — 97110 THERAPEUTIC EXERCISES: CPT

## 2020-12-28 PROCEDURE — 97014 ELECTRIC STIMULATION THERAPY: CPT

## 2020-12-28 PROCEDURE — 97140 MANUAL THERAPY 1/> REGIONS: CPT

## 2020-12-30 ENCOUNTER — OFFICE VISIT (OUTPATIENT)
Dept: PHYSICAL THERAPY | Age: 55
End: 2020-12-30
Payer: MEDICARE

## 2020-12-30 DIAGNOSIS — M54.2 CHRONIC NECK PAIN: Primary | ICD-10-CM

## 2020-12-30 DIAGNOSIS — G89.29 CHRONIC NECK PAIN: Primary | ICD-10-CM

## 2020-12-30 PROCEDURE — 97014 ELECTRIC STIMULATION THERAPY: CPT

## 2020-12-30 PROCEDURE — 97140 MANUAL THERAPY 1/> REGIONS: CPT

## 2020-12-30 PROCEDURE — 97110 THERAPEUTIC EXERCISES: CPT

## 2021-01-06 ENCOUNTER — TELEPHONE (OUTPATIENT)
Dept: PAIN MEDICINE | Facility: CLINIC | Age: 56
End: 2021-01-06

## 2021-01-06 ENCOUNTER — OFFICE VISIT (OUTPATIENT)
Dept: PHYSICAL THERAPY | Age: 56
End: 2021-01-06
Payer: MEDICARE

## 2021-01-06 DIAGNOSIS — G89.29 CHRONIC NECK PAIN: Primary | ICD-10-CM

## 2021-01-06 DIAGNOSIS — M54.2 CHRONIC NECK PAIN: Primary | ICD-10-CM

## 2021-01-06 PROCEDURE — 97012 MECHANICAL TRACTION THERAPY: CPT | Performed by: PHYSICAL THERAPIST

## 2021-01-06 PROCEDURE — 97014 ELECTRIC STIMULATION THERAPY: CPT | Performed by: PHYSICAL THERAPIST

## 2021-01-06 PROCEDURE — 97110 THERAPEUTIC EXERCISES: CPT | Performed by: PHYSICAL THERAPIST

## 2021-01-06 PROCEDURE — 97140 MANUAL THERAPY 1/> REGIONS: CPT | Performed by: PHYSICAL THERAPIST

## 2021-01-06 NOTE — PROGRESS NOTES
Daily Note     Today's date: 2021  Patient name: Denis Uribe  : 1965  MRN: [id number]  Referring provider: Christopher Serrano MD  Dx:   Encounter Diagnosis     ICD-10-CM    1  Chronic neck pain  M54 2     G89 29        Start Time: 0708  Stop Time: 0800  Total time in clinic (min): 52 minutes    Subjective: Patient notes that pain persists with numbness and weakness in 5th digit and weakness in right UE  Unable to lift a hammer the other day  Objective: See treatment diary below      Assessment: Tolerated treatment well  Patient with persistent symptoms  Continues with UE radicular pain and numbness with weakness  Advised patient to contact MD regarding weakness and numbness increase  Added mechanical traction today as some relief with cervical distraction and manual therapy noted  Poor posture with guarding  Plan: Continue per plan of care        Precautions: R THR, arm DVT, DM      Manuals 21       cervical 10 10' 10 10' 10 10       Manual traction 5 5' 5' 5' 5 5                                 Neuro Re-Ed                                                                                                        Ther Ex             AROM cerv 10'  HEP x10 ea 10x ea Home          Supine cervical retraction  2x10 2x10 seated 2x10 20x seated 20x       Shoulder rolls   20x 20x 20x 20x 20x       Shoulder retraction  20x 20x 20x 20x 20x       Retro UBE   4' 4'' 4'        Median nerve glide    x10 10x        Ulnar nerve glide    x10 10x        digiflex     yellow 10x ea 10x       gripper     30x white 30x                                 Mechanical Traction      10'  Static  15#                                 Modalities             MH/ES right scap/trap and right shoulder 10' 10' 10' 10' 10' 10'

## 2021-01-06 NOTE — TELEPHONE ENCOUNTER
Pt is calling stating that he was at PT today and they advised him to call in to tell [facility] and Amarjit Mccormick that he is getting worse    Pain level 5/10 and numbness is getting worse in his right arm and he is losing a lot more strength    Please advise  Pt can be reached at [phone]

## 2021-01-06 NOTE — TELEPHONE ENCOUNTER
S/W pt who states that his right arm weakness/numbness is worsening sophia from elbow to hand, and this feeling "comes on hard "  States pain has increased from pinky to FA to elbow and also back of shoulder  Discussed with SP, pt to come in tomorrow at 0815  Clerical notified to put on schedule

## 2021-01-06 NOTE — TELEPHONE ENCOUNTER
See below  Pt was due to have ARON on 12/8/20 but this was cancelled due to new DM diagnosis and A1C of 9 9  Pt saw [employer] on 12/18/20 and EMG was ordered which is scheduled 2/8/21  Pt is rescheduled for ARON with SP on 1/19/21  Has next OV with [employer] on 2/5/21    Should pt have OV earlier, possibly with SP? Should labs be completed before ARON on 1/19/21?

## 2021-01-06 NOTE — TELEPHONE ENCOUNTER
He can keep ARON schedule, would be ok with trying to get him in earlier if possible  Will just need him to check blood glucose day of procedure  Regarding weakness, if this is truly worsening weakness (not weakness secondary to pain), he should make an appointment  If he has worsening neurological exam, will likely need new MRI and possibly referral to neurosurgery

## 2021-01-07 ENCOUNTER — OFFICE VISIT (OUTPATIENT)
Dept: PAIN MEDICINE | Facility: CLINIC | Age: 56
End: 2021-01-07
Payer: MEDICARE

## 2021-01-07 VITALS
BODY MASS INDEX: 27.7 KG/M2 | HEART RATE: 60 BPM | DIASTOLIC BLOOD PRESSURE: 77 MMHG | SYSTOLIC BLOOD PRESSURE: 119 MMHG | RESPIRATION RATE: 18 BRPM | WEIGHT: 215.8 LBS | HEIGHT: 74 IN

## 2021-01-07 DIAGNOSIS — M47.812 CERVICAL SPONDYLOSIS: ICD-10-CM

## 2021-01-07 DIAGNOSIS — G89.4 CHRONIC PAIN SYNDROME: ICD-10-CM

## 2021-01-07 DIAGNOSIS — M54.12 CERVICAL RADICULOPATHY: Primary | ICD-10-CM

## 2021-01-07 DIAGNOSIS — F11.20 UNCOMPLICATED OPIOID DEPENDENCE (HCC): ICD-10-CM

## 2021-01-07 DIAGNOSIS — Z79.891 LONG-TERM CURRENT USE OF OPIATE ANALGESIC: ICD-10-CM

## 2021-01-07 PROCEDURE — 99214 OFFICE O/P EST MOD 30 MIN: CPT | Performed by: STUDENT IN AN ORGANIZED HEALTH CARE EDUCATION/TRAINING PROGRAM

## 2021-01-07 PROCEDURE — 80305 DRUG TEST PRSMV DIR OPT OBS: CPT | Performed by: STUDENT IN AN ORGANIZED HEALTH CARE EDUCATION/TRAINING PROGRAM

## 2021-01-07 NOTE — TELEPHONE ENCOUNTER
Patient called in stating that his procedure on 1/19 was to be moved up to a sooner date  Thank you        [phone]

## 2021-01-07 NOTE — PROGRESS NOTES
Pain Medicine Follow-Up Note    Assessment:  1  Cervical radiculopathy    2  Chronic pain syndrome    3  Uncomplicated opioid dependence ([address] )    4  Long-term current use of opiate analgesic    5  Cervical spondylosis        Plan:  Orders Placed This Encounter   Procedures    MM ALL_Prescribed Meds and Special Instructions    MM DT_Alprazolam Definitive Test    MM DT_Amphetamine Definitive Test    MM DT_Aripiprazole Definitive Test    MM DT_Buprenorphine Definitive Test    MM DT_Butalbital Definitive Test    MM DT_Clonazepam Definitive Test    MM DT_Clozapine Definitive Test    MM DT_Cocaine Definitive Test    MM DT_Codeine Definitive Test    MM DT_Desipramine Definitive Test    MM DT_Dextromethorphan Definitive Test    MM Diazepam Definitive Test    MM DT_Ethyl Glucuronide/Ethyl Sulfate Definitive Test    MM DT_Fentanyl Definitive Test    MM DT_Heroin Definitive Test    MM DT_Hydrocodone Definitive Test    MM DT_Imipramine Definitive Test    MM DT_Kratom Definitive Test    MM DT_Levorphanol Definitive Test    MM DT_MDMA Definitive Test    MM DT_Meperidine Definitive Test    MM DT_Methadone Definitive Test    MM DT_Methamphetamine Definitive Test    MM DT_Methylphenidate Definitive Test    MM DT_Morphine Definitive Test    MM DT_Olanzapine Definitive Test    MM DT_Oxazepam Definitive Test    MM DT_Oxycodone Definitive Test    MM DT_Oxymorphone Definitive Test    MM DT_Paroxetine Definitive Test    MM DT_Phencyclidine Definitive Test    MM DT_Phenobarbital Definitive Test    MM DT_Phentermine Definitive Test    MM DT_Risperidone Definitive Test    MM DT_Tapentadol Definitive Test    MM DT_Temazapam Definitive Test    MM DT_THC Definitive Test    MM DT_Tramadol Definitive Test    MM DT_Hydromorphone Confirm Positive       No orders of the defined types were placed in this encounter        My impressions and treatment recommendations were discussed in detail with the patient who verbalized understanding and had no further questions  This is a 50-year-old male who returns to our office with chief complaint of right upper extremity arm pain and weakness  His exam is more less consistent with when I saw him last   Strength is intact except for most notably diminished right  and right pincer grasp  Does not have any red flag symptoms on exam today that I feel would warrant urgent imaging  Spurling test on the right is positive  He has also been reporting significant pain at the elbow radiating down the arm, with negative Tinel's test at the elbow  We did previously recommend he obtain EMG of the right upper extremity to delineate cervical radiculopathy versus possible peripheral nerve entrapment  He has this scheduled in early February  In the meantime, to treat his radicular symptoms, we may move forward with cervical epidural steroid injection  This is tentatively scheduled for 01/19/2021, will see if we can move the patient up sooner  Otherwise, he will continue gabapentin 300 mg b i d  And tramadol  [place] Prescription Drug Monitoring Program report was reviewed and was appropriate     A urine drug screen was collected at today's office visit as part of our medication management protocol  The point of care testing results were appropriate for what was being prescribed  The specimen will be sent for confirmatory testing  The drug screen is medically necessary because the patient is either dependent on opioid medication or is being considered for opioid medication therapy and the results could impact ongoing or future treatment  The drug screen is to evaluate for the presences or absence of prescribed, non-prescribed, and/or illicit drugs/substances  There are risks associated with opioid medications, including dependence, addiction and tolerance  The patient understands and agrees to use these medications only as prescribed   Potential side effects of the medications include, but are not limited to, constipation, drowsiness, addiction, impaired judgment and risk of fatal overdose if not taken as prescribed  The patient was warned against driving while taking sedation medications  Sharing medications is a felony  At this point in time, the patient is showing no signs of addiction, abuse, diversion or suicidal ideation  Discharge instructions were provided  I personally saw and examined the patient and I agree with the above discussed plan of care  History of Present Illness:    Kendell Maria is a 63 y o  male who presents to [facility] for interval re-evaluation of the above stated pain complaints  The patient has a past medical and chronic pain history as outlined in the assessment section  He was last seen on 12/11/2020 which time he was continued on tramadol 50 mg t i d  And gabapentin 300 mg b i d  He was supposed to undergo cervical epidural steroid injection with me prior to that visit, but this was canceled due to hyperglycemia  Since that time, patient has continued to go to physical therapy  Today he is seen on a more urgent basis due to subjective complaints of increased right upper extremity pain and weakness  Patient underwent physical therapy earlier this week and was noted to have worsening symptoms and therefore was recommended to touch base with us again and discontinue physical therapy  During his time of physical therapy, he does note improvement with traction therapy  Overall, however he reports therapy made him feel worse  He continues to endorse right upper extremity radiculopathy, citing mostly pain in the elbow radiating down to the right ring finger and pinky  He reports that he is unable to  objects and even lift objects at times due to weakness  Of note, he did report diminished  strength at our previous visits  He denies any left upper extremity radiculopathy    He denies bowel bladder incontinence or saddle anesthesia  Current pain score is 5/10  Pain is again worse in the morning, evening, and night  The pain is intermittent  The pain is described as pressure-like, numbness, pins and needles, and shooting in nature  Other than as stated above, the patient denies any interval changes in medications, medical condition, mental condition, symptoms, or allergies since the last office visit  Review of Systems:    Review of Systems   Respiratory: Negative for shortness of breath  Cardiovascular: Negative for chest pain  Gastrointestinal: Negative for constipation, diarrhea, nausea and vomiting  Musculoskeletal: Negative for arthralgias, gait problem, joint swelling and myalgias  Decreased range of motion, muscle weakness and pain in extremity  Skin: Negative for rash  Neurological: Negative for dizziness, seizures and weakness  All other systems reviewed and are negative          Patient Active Problem List   Diagnosis    COPD (chronic obstructive pulmonary disease) ([facility] [address] )    Ascending aortic aneurysm (HCC)    Bicuspid aortic valve    Adjustment reaction with anxiety and depression    Allergic rhinitis    GERD (gastroesophageal reflux disease)    Hiatal hernia    Type 2 diabetes mellitus without complication, without long-term current use of insulin (HCC)    CKD (chronic kidney disease) stage 3, GFR 30-59 ml/min    Hyperlipidemia, mixed    Weight gain    Seasonal allergic rhinitis due to pollen    Depression with anxiety    Vitamin D deficiency    Renal cyst, left    Hepatic cyst    Fatty liver disease, nonalcoholic    Erectile dysfunction    Carpal tunnel syndrome of right wrist    Chronic pain of right knee    Vestibular migraine    Patellar tendinitis of right knee    Benign paroxysmal positional vertigo due to bilateral vestibular disorder    Hip impingement syndrome, right    Primary osteoarthritis of right hip    Ulnar neuropathy of both upper extremities    Lumbosacral strain    Tarsal tunnel syndrome of right side    Cubital tunnel syndrome, bilateral    Groin pain, chronic, right    Chronic pain syndrome    Chronic kidney disease-mineral and bone disorder    Long-term current use of opiate analgesic    Uncomplicated opioid dependence (HCC)    Arthritis of right hip    Sleep pattern disturbance       Past Medical History:   Diagnosis Date    Aorta aneurysm (HCC)     4 3    Arm DVT (deep venous thromboembolism), acute ([facility] [address] ) 9607    complications of cardiac cath    Asthma     Chronic kidney disease     CKD (chronic kidney disease), stage III     COPD (chronic obstructive pulmonary disease) (HCC)     Depression     Diabetes mellitus (HCC)     Essential hypertriglyceridemia     GERD (gastroesophageal reflux disease)     Headache     Hiatal hernia     Hyperlipidemia, mixed 5/7/2018    Kidney stone     Liver disease     FATTY LIVER    PAC (premature atrial contraction)     Prediabetes     Renal calculi     Sleep apnea     Vestibular migraine        Past Surgical History:   Procedure Laterality Date    CARPAL TUNNEL RELEASE Left     COLONOSCOPY      FL INJECTION RIGHT HIP (ARTHROGRAM)  8/20/2018    FOOT SURGERY Left     FOREIGN BODY REMOVAL    HERNIA REPAIR      AK COLONOSCOPY FLX DX W/COLLJ SPEC WHEN PFRMD N/A 8/7/2017    Procedure: COLONOSCOPY;  Surgeon: Prudence Leslie MD;  Location: [facility]; Service: Gastroenterology    AK ESOPHAGOGASTRODUODENOSCOPY TRANSORAL DIAGNOSTIC N/A 10/31/2017    Procedure: ESOPHAGOGASTRODUODENOSCOPY (EGD); Surgeon: Prudence Leslie MD;  Location: [facility];   Service: Gastroenterology    AK TOTAL HIP ARTHROPLASTY Right 9/28/2020    Procedure: ARTHROPLASTY HIP TOTAL;  Surgeon: Antonia Jones MD;  Location: [facility];  Service: Orthopedics       Family History   Problem Relation Age of Onset    Cancer Brother     Prostate cancer Brother     Leukemia Brother         his only brother dies from [address] or leukemia pt unsure he was only 46    Arthritis Mother     Heart attack Father     Clotting disorder Father     Schizoaffective Disorder  Sister     Aortic aneurysm Other         abdominal       Social History     Occupational History    Occupation: unemployed   Tobacco Use    Smoking status: Former Smoker     Types: Cigars, Cigarettes     Quit date: 2014     Years since quittin 4    Smokeless tobacco: Never Used    Tobacco comment: never inhaled   Substance and Sexual Activity    Alcohol use: Yes     Frequency: Monthly or less     Drinks per session: 1 or 2     Binge frequency: Never     Comment: occasional beer    Drug use: No    Sexual activity: Not Currently         Current Outpatient Medications:     acetaminophen (TYLENOL) 500 mg tablet, Take 3 tablets by mouth as needed , Disp: , Rfl:     albuterol (PROVENTIL HFA,VENTOLIN HFA) 90 mcg/act inhaler, Inhale 2 puffs every 6 (six) hours as needed for wheezing , Disp: , Rfl:     Dulaglutide (Trulicity) 4 42 DC/0 6WQ SOPN, Inject 0 5 mL (0 75 mg total) under the skin once a week, Disp: 12 pen, Rfl: 1    fenofibrate (TRIGLIDE) 160 MG tablet, Take 1 tablet (160 mg total) by mouth daily, Disp: 90 tablet, Rfl: 3    gabapentin (NEURONTIN) 300 mg capsule, Take 1 capsule (300 mg total) by mouth 2 (two) times a day, Disp: 90 capsule, Rfl: 0    glucose blood test strip, Use as instructed, Disp: 100 each, Rfl: 5    glucose monitoring kit (FREESTYLE) monitoring kit, Use 1 each daily before breakfast, Disp: 1 each, Rfl: 0    Lancets MISC, Use daily before breakfast, Disp: 100 each, Rfl: 5    mometasone-formoterol (Dulera) 100-5 MCG/ACT inhaler, Inhale 2 puffs 2 (two) times a day Rinse mouth after use , Disp: , Rfl:     montelukast (SINGULAIR) 10 mg tablet, Take 10 mg by mouth daily at bedtime, Disp: , Rfl:     pantoprazole (PROTONIX) 40 mg tablet, take 1 tablet by mouth once daily, Disp: 90 tablet, Rfl: 3   rosuvastatin (CRESTOR) 20 MG tablet, take 1 tablet by mouth once daily, Disp: 30 tablet, Rfl: 5    traMADol (ULTRAM) 50 mg tablet, Take 1 PO three times daily PRN for pain  Ongoing therapy Do not fill until 12/19/2020, Disp: 90 tablet, Rfl: 1    Turmeric 500 MG CAPS, Take by mouth, Disp: , Rfl:     No Known Allergies    Physical Exam:    /77   Pulse 60   Resp 18   Ht 6' 1 5" (1 867 m)   Wt 97 9 kg (215 lb 12 8 oz)   BMI 28 09 kg/m²     Constitutional:normal, well developed, well nourished, alert, in no distress and non-toxic and no overt pain behavior    Eyes:anicteric  HEENT:grossly intact  Neck:supple, symmetric, trachea midline and no masses   Pulmonary:even and unlabored  Cardiovascular:No edema or pitting edema present  Skin:Normal without rashes or lesions and well hydrated  Psychiatric:Mood and affect appropriate  Neurologic:Cranial Nerves II-XII grossly intact  Musculoskeletal:normal     Cervical Spine Exam    Appearance:  Normal lordosis  Palpation/Tenderness:  no tenderness or spasm  Sensory:  no sensory deficits noted  Range of Motion:  Full range of motion with no pain or limitations in flexion, extension, lateral flexion and rotation  Motor Strength:  Left Arm Flexion  5/5  Left Arm Extension  5/5  Right Arm Flexion  5-/5  Right Arm Extension  5-/5  Left Wrist Flexion  5/5  Left Wrist Extension  5/5  Left Finger Abduction  5/5  Right Finger Abduction  5/5  Left Pincer Grasp  5/5  Right Pincer Grasp  4/5  Left    5/5  Right   4/5   Right wrist flexion: 5/5'  Right wrist extension: 5/5    Pain limited weakness noted with right arm flexion and extension    Reflexes:  Left Biceps:  2+   Right Biceps:  2+   Left Brachioradialis:  2+   Right Brachioradialis:  2+   Left Triceps:  2+   Right Triceps:  2+   Special Tests:  Left Spurlings:  negative  Right Spurlings  positive  Left Kaur's sign  negative  Right Kaur's sign  negative  Right Elbow Tinel's sign  negative          Imaging  No orders to display     MRI CERVICAL SPINE WITHOUT CONTRAST     INDICATION: Radiculopathy      COMPARISON:  Radiographs of the cervical spine from November 9, 2020      TECHNIQUE:  Sagittal T1, sagittal T2, sagittal inversion recovery, axial T2, axial  2D merge  Imaging performed on 1 5T MRI    IMAGE QUALITY:  Diagnostic     FINDINGS:     ALIGNMENT:  Straightening of the cervical lordosis  No evidence of spondylolisthesis  Maintained vertebral body stature      MARROW SIGNAL:  Normal marrow signal is identified within the visualized bony structures  No discrete marrow lesion      CERVICAL AND VISUALIZED THORACIC CORD:  Normal signal within the visualized cord      PREVERTEBRAL AND PARASPINAL SOFT TISSUES:  Normal      VISUALIZED POSTERIOR FOSSA:  The visualized posterior fossa demonstrates no abnormal signal      CERVICAL DISC SPACES:  Disc space narrowing at C6-C7      C2-C3:  Normal      C3-C4:  Endplate osteophytic ridging and minimal bulging of the disc annulus with mild right greater than left foraminal stenosis  No spinal stenosis      C4-C5:  Posterior disc osteophyte complex with bilateral uncovertebral hypertrophy and mild facet arthropathy resulting in mild bilateral foraminal stenosis  No spinal stenosis      C5-C6:  Posterior disc osteophyte complex with bilateral uncovertebral hypertrophy and mild facet arthropathy  Patent spinal canal   Mild right greater than left foraminal stenosis      C6-C7:  Disc osteophyte complex with bilateral uncovertebral hypertrophy  No significant spinal canal stenosis  No greater than mild bilateral foraminal stenosis      C7-T1:  Normal      UPPER THORACIC DISC SPACES:  Normal      IMPRESSION:     Mild straightening of the cervical spine with multilevel cervical spondylosis with patent spinal canal and no greater than mild foraminal stenosis      No cord signal abnormality      Orders Placed This Encounter   Procedures    MM ALL_Prescribed Meds and Special Instructions    MM DT_Alprazolam Definitive Test    MM DT_Amphetamine Definitive Test    MM DT_Aripiprazole Definitive Test    MM DT_Buprenorphine Definitive Test    MM DT_Butalbital Definitive Test    MM DT_Clonazepam Definitive Test    MM DT_Clozapine Definitive Test    MM DT_Cocaine Definitive Test    MM DT_Codeine Definitive Test    MM DT_Desipramine Definitive Test    MM DT_Dextromethorphan Definitive Test    MM Diazepam Definitive Test    MM DT_Ethyl Glucuronide/Ethyl Sulfate Definitive Test    MM DT_Fentanyl Definitive Test    MM DT_Heroin Definitive Test    MM DT_Hydrocodone Definitive Test    MM DT_Imipramine Definitive Test    MM DT_Kratom Definitive Test    MM DT_Levorphanol Definitive Test    MM DT_MDMA Definitive Test    MM DT_Meperidine Definitive Test    MM DT_Methadone Definitive Test    MM DT_Methamphetamine Definitive Test    MM DT_Methylphenidate Definitive Test    MM DT_Morphine Definitive Test    MM DT_Olanzapine Definitive Test    MM DT_Oxazepam Definitive Test    MM DT_Oxycodone Definitive Test    MM DT_Oxymorphone Definitive Test    MM DT_Paroxetine Definitive Test    MM DT_Phencyclidine Definitive Test    MM DT_Phenobarbital Definitive Test    MM DT_Phentermine Definitive Test    MM DT_Risperidone Definitive Test    MM DT_Tapentadol Definitive Test    MM DT_Temazapam Definitive Test    MM DT_THC Definitive Test    MM DT_Tramadol Definitive Test    MM DT_Hydromorphone Confirm Positive

## 2021-01-07 NOTE — H&P (VIEW-ONLY)
Pain Medicine Follow-Up Note    Assessment:  1  Cervical radiculopathy    2  Chronic pain syndrome    3  Uncomplicated opioid dependence ([address] )    4  Long-term current use of opiate analgesic    5  Cervical spondylosis        Plan:  Orders Placed This Encounter   Procedures    MM ALL_Prescribed Meds and Special Instructions    MM DT_Alprazolam Definitive Test    MM DT_Amphetamine Definitive Test    MM DT_Aripiprazole Definitive Test    MM DT_Buprenorphine Definitive Test    MM DT_Butalbital Definitive Test    MM DT_Clonazepam Definitive Test    MM DT_Clozapine Definitive Test    MM DT_Cocaine Definitive Test    MM DT_Codeine Definitive Test    MM DT_Desipramine Definitive Test    MM DT_Dextromethorphan Definitive Test    MM Diazepam Definitive Test    MM DT_Ethyl Glucuronide/Ethyl Sulfate Definitive Test    MM DT_Fentanyl Definitive Test    MM DT_Heroin Definitive Test    MM DT_Hydrocodone Definitive Test    MM DT_Imipramine Definitive Test    MM DT_Kratom Definitive Test    MM DT_Levorphanol Definitive Test    MM DT_MDMA Definitive Test    MM DT_Meperidine Definitive Test    MM DT_Methadone Definitive Test    MM DT_Methamphetamine Definitive Test    MM DT_Methylphenidate Definitive Test    MM DT_Morphine Definitive Test    MM DT_Olanzapine Definitive Test    MM DT_Oxazepam Definitive Test    MM DT_Oxycodone Definitive Test    MM DT_Oxymorphone Definitive Test    MM DT_Paroxetine Definitive Test    MM DT_Phencyclidine Definitive Test    MM DT_Phenobarbital Definitive Test    MM DT_Phentermine Definitive Test    MM DT_Risperidone Definitive Test    MM DT_Tapentadol Definitive Test    MM DT_Temazapam Definitive Test    MM DT_THC Definitive Test    MM DT_Tramadol Definitive Test    MM DT_Hydromorphone Confirm Positive       No orders of the defined types were placed in this encounter        My impressions and treatment recommendations were discussed in detail with the patient who verbalized understanding and had no further questions  This is a 58-year-old male who returns to our office with chief complaint of right upper extremity arm pain and weakness  His exam is more less consistent with when I saw him last   Strength is intact except for most notably diminished right  and right pincer grasp  Does not have any red flag symptoms on exam today that I feel would warrant urgent imaging  Spurling test on the right is positive  He has also been reporting significant pain at the elbow radiating down the arm, with negative Tinel's test at the elbow  We did previously recommend he obtain EMG of the right upper extremity to delineate cervical radiculopathy versus possible peripheral nerve entrapment  He has this scheduled in early February  In the meantime, to treat his radicular symptoms, we may move forward with cervical epidural steroid injection  This is tentatively scheduled for 01/19/2021, will see if we can move the patient up sooner  Otherwise, he will continue gabapentin 300 mg b i d  And tramadol  [place] Prescription Drug Monitoring Program report was reviewed and was appropriate     A urine drug screen was collected at today's office visit as part of our medication management protocol  The point of care testing results were appropriate for what was being prescribed  The specimen will be sent for confirmatory testing  The drug screen is medically necessary because the patient is either dependent on opioid medication or is being considered for opioid medication therapy and the results could impact ongoing or future treatment  The drug screen is to evaluate for the presences or absence of prescribed, non-prescribed, and/or illicit drugs/substances  There are risks associated with opioid medications, including dependence, addiction and tolerance  The patient understands and agrees to use these medications only as prescribed   Potential side effects of the medications include, but are not limited to, constipation, drowsiness, addiction, impaired judgment and risk of fatal overdose if not taken as prescribed  The patient was warned against driving while taking sedation medications  Sharing medications is a felony  At this point in time, the patient is showing no signs of addiction, abuse, diversion or suicidal ideation  Discharge instructions were provided  I personally saw and examined the patient and I agree with the above discussed plan of care  History of Present Illness:    Stacey Altamirano is a 63 y o  male who presents to [facility] for interval re-evaluation of the above stated pain complaints  The patient has a past medical and chronic pain history as outlined in the assessment section  He was last seen on 12/11/2020 which time he was continued on tramadol 50 mg t i d  And gabapentin 300 mg b i d  He was supposed to undergo cervical epidural steroid injection with me prior to that visit, but this was canceled due to hyperglycemia  Since that time, patient has continued to go to physical therapy  Today he is seen on a more urgent basis due to subjective complaints of increased right upper extremity pain and weakness  Patient underwent physical therapy earlier this week and was noted to have worsening symptoms and therefore was recommended to touch base with us again and discontinue physical therapy  During his time of physical therapy, he does note improvement with traction therapy  Overall, however he reports therapy made him feel worse  He continues to endorse right upper extremity radiculopathy, citing mostly pain in the elbow radiating down to the right ring finger and pinky  He reports that he is unable to  objects and even lift objects at times due to weakness  Of note, he did report diminished  strength at our previous visits  He denies any left upper extremity radiculopathy    He denies bowel bladder incontinence or saddle anesthesia  Current pain score is 5/10  Pain is again worse in the morning, evening, and night  The pain is intermittent  The pain is described as pressure-like, numbness, pins and needles, and shooting in nature  Other than as stated above, the patient denies any interval changes in medications, medical condition, mental condition, symptoms, or allergies since the last office visit  Review of Systems:    Review of Systems   Respiratory: Negative for shortness of breath  Cardiovascular: Negative for chest pain  Gastrointestinal: Negative for constipation, diarrhea, nausea and vomiting  Musculoskeletal: Negative for arthralgias, gait problem, joint swelling and myalgias  Decreased range of motion, muscle weakness and pain in extremity  Skin: Negative for rash  Neurological: Negative for dizziness, seizures and weakness  All other systems reviewed and are negative          Patient Active Problem List   Diagnosis    COPD (chronic obstructive pulmonary disease) [facility] )    Ascending aortic aneurysm (HCC)    Bicuspid aortic valve    Adjustment reaction with anxiety and depression    Allergic rhinitis    GERD (gastroesophageal reflux disease)    Hiatal hernia    Type 2 diabetes mellitus without complication, without long-term current use of insulin (HCC)    CKD (chronic kidney disease) stage 3, GFR 30-59 ml/min    Hyperlipidemia, mixed    Weight gain    Seasonal allergic rhinitis due to pollen    Depression with anxiety    Vitamin D deficiency    Renal cyst, left    Hepatic cyst    Fatty liver disease, nonalcoholic    Erectile dysfunction    Carpal tunnel syndrome of right wrist    Chronic pain of right knee    Vestibular migraine    Patellar tendinitis of right knee    Benign paroxysmal positional vertigo due to bilateral vestibular disorder    Hip impingement syndrome, right    Primary osteoarthritis of right hip    Ulnar neuropathy of both upper extremities    Lumbosacral strain    Tarsal tunnel syndrome of right side    Cubital tunnel syndrome, bilateral    Groin pain, chronic, right    Chronic pain syndrome    Chronic kidney disease-mineral and bone disorder    Long-term current use of opiate analgesic    Uncomplicated opioid dependence (HCC)    Arthritis of right hip    Sleep pattern disturbance       Past Medical History:   Diagnosis Date    Aorta aneurysm (HCC)     4 3    Arm DVT (deep venous thromboembolism), acute ([facility] [address] ) 9452    complications of cardiac cath    Asthma     Chronic kidney disease     CKD (chronic kidney disease), stage III     COPD (chronic obstructive pulmonary disease) (HCC)     Depression     Diabetes mellitus (HCC)     Essential hypertriglyceridemia     GERD (gastroesophageal reflux disease)     Headache     Hiatal hernia     Hyperlipidemia, mixed 5/7/2018    Kidney stone     Liver disease     FATTY LIVER    PAC (premature atrial contraction)     Prediabetes     Renal calculi     Sleep apnea     Vestibular migraine        Past Surgical History:   Procedure Laterality Date    CARPAL TUNNEL RELEASE Left     COLONOSCOPY      FL INJECTION RIGHT HIP (ARTHROGRAM)  8/20/2018    FOOT SURGERY Left     FOREIGN BODY REMOVAL    HERNIA REPAIR      [place] COLONOSCOPY FLX DX W/COLLJ SPEC WHEN PFRMD N/A 8/7/2017    Procedure: COLONOSCOPY;  Surgeon: Joshua Mcneill MD;  Location: [facility]; Service: Gastroenterology    NJ ESOPHAGOGASTRODUODENOSCOPY TRANSORAL DIAGNOSTIC N/A 10/31/2017    Procedure: ESOPHAGOGASTRODUODENOSCOPY (EGD); Surgeon: Joshua Mcneill MD;  Location: [facility];   Service: Gastroenterology    NJ TOTAL HIP ARTHROPLASTY Right 9/28/2020    Procedure: ARTHROPLASTY HIP TOTAL;  Surgeon: Karlie Chapman MD;  Location: [facility];  Service: Orthopedics       Family History   Problem Relation Age of Onset    Cancer Brother     Prostate cancer Brother     Leukemia Brother         his only brother dies from [address] or leukemia pt unsure he was only 46    Arthritis Mother     Heart attack Father     Clotting disorder Father     Schizoaffective Disorder  Sister     Aortic aneurysm Other         abdominal       Social History     Occupational History    Occupation: unemployed   Tobacco Use    Smoking status: Former Smoker     Types: Cigars, Cigarettes     Quit date: 2014     Years since quittin 4    Smokeless tobacco: Never Used    Tobacco comment: never inhaled   Substance and Sexual Activity    Alcohol use: Yes     Frequency: Monthly or less     Drinks per session: 1 or 2     Binge frequency: Never     Comment: occasional beer    Drug use: No    Sexual activity: Not Currently         Current Outpatient Medications:     acetaminophen (TYLENOL) 500 mg tablet, Take 3 tablets by mouth as needed , Disp: , Rfl:     albuterol (PROVENTIL HFA,VENTOLIN HFA) 90 mcg/act inhaler, Inhale 2 puffs every 6 (six) hours as needed for wheezing , Disp: , Rfl:     Dulaglutide (Trulicity) 8 28 KU/0 9BZ SOPN, Inject 0 5 mL (0 75 mg total) under the skin once a week, Disp: 12 pen, Rfl: 1    fenofibrate (TRIGLIDE) 160 MG tablet, Take 1 tablet (160 mg total) by mouth daily, Disp: 90 tablet, Rfl: 3    gabapentin (NEURONTIN) 300 mg capsule, Take 1 capsule (300 mg total) by mouth 2 (two) times a day, Disp: 90 capsule, Rfl: 0    glucose blood test strip, Use as instructed, Disp: 100 each, Rfl: 5    glucose monitoring kit (FREESTYLE) monitoring kit, Use 1 each daily before breakfast, Disp: 1 each, Rfl: 0    Lancets MISC, Use daily before breakfast, Disp: 100 each, Rfl: 5    mometasone-formoterol (Dulera) 100-5 MCG/ACT inhaler, Inhale 2 puffs 2 (two) times a day Rinse mouth after use , Disp: , Rfl:     montelukast (SINGULAIR) 10 mg tablet, Take 10 mg by mouth daily at bedtime, Disp: , Rfl:     pantoprazole (PROTONIX) 40 mg tablet, take 1 tablet by mouth once daily, Disp: 90 tablet, Rfl: 3   rosuvastatin (CRESTOR) 20 MG tablet, take 1 tablet by mouth once daily, Disp: 30 tablet, Rfl: 5    traMADol (ULTRAM) 50 mg tablet, Take 1 PO three times daily PRN for pain  Ongoing therapy Do not fill until 12/19/2020, Disp: 90 tablet, Rfl: 1    Turmeric 500 MG CAPS, Take by mouth, Disp: , Rfl:     No Known Allergies    Physical Exam:    /77   Pulse 60   Resp 18   Ht 6' 1 5" (1 867 m)   Wt 97 9 kg (215 lb 12 8 oz)   BMI 28 09 kg/m²     Constitutional:normal, well developed, well nourished, alert, in no distress and non-toxic and no overt pain behavior    Eyes:anicteric  HEENT:grossly intact  Neck:supple, symmetric, trachea midline and no masses   Pulmonary:even and unlabored  Cardiovascular:No edema or pitting edema present  Skin:Normal without rashes or lesions and well hydrated  Psychiatric:Mood and affect appropriate  Neurologic:Cranial Nerves II-XII grossly intact  Musculoskeletal:normal     Cervical Spine Exam    Appearance:  Normal lordosis  Palpation/Tenderness:  no tenderness or spasm  Sensory:  no sensory deficits noted  Range of Motion:  Full range of motion with no pain or limitations in flexion, extension, lateral flexion and rotation  Motor Strength:  Left Arm Flexion  5/5  Left Arm Extension  5/5  Right Arm Flexion  5-/5  Right Arm Extension  5-/5  Left Wrist Flexion  5/5  Left Wrist Extension  5/5  Left Finger Abduction  5/5  Right Finger Abduction  5/5  Left Pincer Grasp  5/5  Right Pincer Grasp  4/5  Left    5/5  Right   4/5   Right wrist flexion: 5/5'  Right wrist extension: 5/5    Pain limited weakness noted with right arm flexion and extension    Reflexes:  Left Biceps:  2+   Right Biceps:  2+   Left Brachioradialis:  2+   Right Brachioradialis:  2+   Left Triceps:  2+   Right Triceps:  2+   Special Tests:  Left Spurlings:  negative  Right Spurlings  positive  Left Kaur's sign  negative  Right Kaur's sign  negative  Right Elbow Tinel's sign  negative          Imaging  No orders to display     MRI CERVICAL SPINE WITHOUT CONTRAST     INDICATION: Radiculopathy      COMPARISON:  Radiographs of the cervical spine from November 9, 2020      TECHNIQUE:  Sagittal T1, sagittal T2, sagittal inversion recovery, axial T2, axial  2D merge  Imaging performed on 1 5T MRI    IMAGE QUALITY:  Diagnostic     FINDINGS:     ALIGNMENT:  Straightening of the cervical lordosis  No evidence of spondylolisthesis  Maintained vertebral body stature      MARROW SIGNAL:  Normal marrow signal is identified within the visualized bony structures  No discrete marrow lesion      CERVICAL AND VISUALIZED THORACIC CORD:  Normal signal within the visualized cord      PREVERTEBRAL AND PARASPINAL SOFT TISSUES:  Normal      VISUALIZED POSTERIOR FOSSA:  The visualized posterior fossa demonstrates no abnormal signal      CERVICAL DISC SPACES:  Disc space narrowing at C6-C7      C2-C3:  Normal      C3-C4:  Endplate osteophytic ridging and minimal bulging of the disc annulus with mild right greater than left foraminal stenosis  No spinal stenosis      C4-C5:  Posterior disc osteophyte complex with bilateral uncovertebral hypertrophy and mild facet arthropathy resulting in mild bilateral foraminal stenosis  No spinal stenosis      C5-C6:  Posterior disc osteophyte complex with bilateral uncovertebral hypertrophy and mild facet arthropathy  Patent spinal canal   Mild right greater than left foraminal stenosis      C6-C7:  Disc osteophyte complex with bilateral uncovertebral hypertrophy  No significant spinal canal stenosis  No greater than mild bilateral foraminal stenosis      C7-T1:  Normal      UPPER THORACIC DISC SPACES:  Normal      IMPRESSION:     Mild straightening of the cervical spine with multilevel cervical spondylosis with patent spinal canal and no greater than mild foraminal stenosis      No cord signal abnormality      Orders Placed This Encounter   Procedures    MM ALL_Prescribed Meds and Special Instructions    MM DT_Alprazolam Definitive Test    MM DT_Amphetamine Definitive Test    MM DT_Aripiprazole Definitive Test    MM DT_Buprenorphine Definitive Test    MM DT_Butalbital Definitive Test    MM DT_Clonazepam Definitive Test    MM DT_Clozapine Definitive Test    MM DT_Cocaine Definitive Test    MM DT_Codeine Definitive Test    MM DT_Desipramine Definitive Test    MM DT_Dextromethorphan Definitive Test    MM Diazepam Definitive Test    MM DT_Ethyl Glucuronide/Ethyl Sulfate Definitive Test    MM DT_Fentanyl Definitive Test    MM DT_Heroin Definitive Test    MM DT_Hydrocodone Definitive Test    MM DT_Imipramine Definitive Test    MM DT_Kratom Definitive Test    MM DT_Levorphanol Definitive Test    MM DT_MDMA Definitive Test    MM DT_Meperidine Definitive Test    MM DT_Methadone Definitive Test    MM DT_Methamphetamine Definitive Test    MM DT_Methylphenidate Definitive Test    MM DT_Morphine Definitive Test    MM DT_Olanzapine Definitive Test    MM DT_Oxazepam Definitive Test    MM DT_Oxycodone Definitive Test    MM DT_Oxymorphone Definitive Test    MM DT_Paroxetine Definitive Test    MM DT_Phencyclidine Definitive Test    MM DT_Phenobarbital Definitive Test    MM DT_Phentermine Definitive Test    MM DT_Risperidone Definitive Test    MM DT_Tapentadol Definitive Test    MM DT_Temazapam Definitive Test    MM DT_THC Definitive Test    MM DT_Tramadol Definitive Test    MM DT_Hydromorphone Confirm Positive

## 2021-01-08 ENCOUNTER — OFFICE VISIT (OUTPATIENT)
Dept: PHYSICAL THERAPY | Age: 56
End: 2021-01-08
Payer: MEDICARE

## 2021-01-08 DIAGNOSIS — G89.29 CHRONIC NECK PAIN: Primary | ICD-10-CM

## 2021-01-08 DIAGNOSIS — E11.9 TYPE 2 DIABETES MELLITUS WITHOUT COMPLICATION, WITHOUT LONG-TERM CURRENT USE OF INSULIN (HCC): Primary | ICD-10-CM

## 2021-01-08 DIAGNOSIS — M54.2 CHRONIC NECK PAIN: Primary | ICD-10-CM

## 2021-01-08 PROCEDURE — 97140 MANUAL THERAPY 1/> REGIONS: CPT | Performed by: PHYSICAL THERAPIST

## 2021-01-08 PROCEDURE — 97110 THERAPEUTIC EXERCISES: CPT | Performed by: PHYSICAL THERAPIST

## 2021-01-08 PROCEDURE — 97014 ELECTRIC STIMULATION THERAPY: CPT | Performed by: PHYSICAL THERAPIST

## 2021-01-08 PROCEDURE — 97012 MECHANICAL TRACTION THERAPY: CPT | Performed by: PHYSICAL THERAPIST

## 2021-01-08 NOTE — PROGRESS NOTES
Daily Note     Today's date: 2021  Patient name: Renée Mcdaniel  : 1965  MRN: [id number]  Referring provider: Jagjit Fernandez MD  Dx:   Encounter Diagnosis     ICD-10-CM    1  Chronic neck pain  M54 2     G89 29                   Subjective: Patient notes he was seen by pain management and will have injections in two weeks if not sooner  Will have EMG next month  Patient notes following last PT visit with traction he had longer duration of relief of hand numbness   Today complains of right trap and scapular pain with continued right UE radicular symptoms and numbness  Noted no pain or numbness while on mechanical traction      Objective: See treatment diary below      Assessment: Tolerated treatment well  Patient would benefit from continued PT with emphasis on traction due to positive response with traction  Moderate right scapular muscle tightness with spasm noted  Plan: Continue per plan of care        Precautions: R THR, arm DVT, DM      Manuals 21      cervical 10 10' 10 10' 10 10 10      Manual traction 5 5' 5' 5' 5 5 5                                Neuro Re-Ed                                                                                                        Ther Ex             AROM cerv 10'  HEP x10 ea 10x ea Home    5'      Supine cervical retraction  2x10 2x10 seated 2x10 20x seated 20x 20x      Shoulder rolls   20x 20x 20x 20x 20x 20      Shoulder retraction  20x 20x 20x 20x 20x       Retro UBE   4' 4'' 4'        Median nerve glide    x10 10x        Ulnar nerve glide    x10 10x        digiflex     yellow 10x ea 10x 15      gripper     30x white 30x 30  green                                Mechanical Traction      10'  Static  15# 10'   Static  16#                                Modalities             MH/ES right scap/trap and right shoulder 10' 10' 10' 10' 10' 10' 10

## 2021-01-08 NOTE — TELEPHONE ENCOUNTER
S/w pt and moved [facility] to 1/12/20 230 pm arrival  Advised pt to check his sugar in the morning and to call us with the results

## 2021-01-11 ENCOUNTER — OFFICE VISIT (OUTPATIENT)
Dept: PHYSICAL THERAPY | Age: 56
End: 2021-01-11
Payer: MEDICARE

## 2021-01-11 DIAGNOSIS — M54.2 CHRONIC NECK PAIN: Primary | ICD-10-CM

## 2021-01-11 DIAGNOSIS — G89.29 CHRONIC NECK PAIN: Primary | ICD-10-CM

## 2021-01-11 LAB
6MAM UR QL CFM: NEGATIVE NG/ML
7AMINOCLONAZEPAM UR QL CFM: NEGATIVE NG/ML
A-OH ALPRAZ UR QL CFM: NEGATIVE NG/ML
AMPHET UR QL CFM: NEGATIVE NG/ML
AMPHET UR QL CFM: NEGATIVE NG/ML
BUPRENORPHINE UR QL CFM: NEGATIVE NG/ML
BUTALBITAL UR QL CFM: NEGATIVE NG/ML
BZE UR QL CFM: NEGATIVE NG/ML
CODEINE UR QL CFM: NEGATIVE NG/ML
DESIPRAMINE UR QL CFM: NEGATIVE NG/ML
DESIPRAMINE UR QL CFM: NEGATIVE NG/ML
EDDP UR QL CFM: NEGATIVE NG/ML
ETHYL GLUCURONIDE UR QL CFM: NEGATIVE NG/ML
ETHYL SULFATE UR QL SCN: NEGATIVE NG/ML
FENTANYL UR QL CFM: NEGATIVE NG/ML
GLIADIN IGG SER IA-ACNC: NEGATIVE NG/ML
GLUCOSE 30M P 50 G LAC PO SERPL-MCNC: NEGATIVE NG/ML
HYDROCODONE UR QL CFM: NEGATIVE NG/ML
HYDROCODONE UR QL CFM: NEGATIVE NG/ML
HYDROMORPHONE UR QL CFM: NEGATIVE NG/ML
HYDROMORPHONE UR QL CFM: NORMAL
IMIPRAMINE UR QL CFM: NEGATIVE NG/ML
MDMA UR QL CFM: NEGATIVE NG/ML
ME-PHENIDATE UR QL CFM: NEGATIVE NG/ML
MEPERIDINE UR QL CFM: NEGATIVE NG/ML
METHADONE UR QL CFM: NEGATIVE NG/ML
METHAMPHET UR QL CFM: NEGATIVE NG/ML
MORPHINE UR QL CFM: NEGATIVE NG/ML
MORPHINE UR QL CFM: NEGATIVE NG/ML
NORBUPRENORPHINE UR QL CFM: NEGATIVE NG/ML
NORDIAZEPAM UR QL CFM: NEGATIVE NG/ML
NORFENTANYL UR QL CFM: NEGATIVE NG/ML
NORHYDROCODONE UR QL CFM: NEGATIVE NG/ML
NORHYDROCODONE UR QL CFM: NEGATIVE NG/ML
NORMEPERIDINE UR QL CFM: NEGATIVE NG/ML
NOROXYCODONE UR QL CFM: NEGATIVE NG/ML
OLANZAPINE QUANTIFICATION: NEGATIVE NG/ML
OPC-3373 QUANTIFICATION: NEGATIVE
OXAZEPAM UR QL CFM: NEGATIVE NG/ML
OXYCODONE UR QL CFM: NEGATIVE NG/ML
OXYMORPHONE UR QL CFM: NEGATIVE NG/ML
OXYMORPHONE UR QL CFM: NEGATIVE NG/ML
PCP UR QL CFM: NEGATIVE NG/ML
PHENOBARB UR QL CFM: NEGATIVE NG/ML
PROLACTIN SERPL-MCNC: NEGATIVE NG/ML
RESULT ALL_PRESCRIBED MEDS AND SPECIAL INSTRUCTIONS: NORMAL
SL AMB 3-METHYL-FENTANYL QUANTIFICATION: NORMAL NG/ML
SL AMB 4-ANPP QUANTIFICATION: NORMAL NG/ML
SL AMB 4-FIBF QUANTIFICATION: NORMAL NG/ML
SL AMB 7-OH-MITRAGYNINE (KRATOM ALKALOID) QUANTIFICATION: NEGATIVE NG/ML
SL AMB ACETYL FENTANYL QUANTIFICATION: NORMAL NG/ML
SL AMB ACETYL NORFENTANYL QUANTIFICATION: NORMAL NG/ML
SL AMB ACRYL FENTANYL QUANTIFICATION: NORMAL NG/ML
SL AMB BUTRYL FENTANYL QUANTIFICATION: NORMAL NG/ML
SL AMB CARFENTANIL QUANTIFICATION: NORMAL NG/ML
SL AMB CLOZAPINE QUANTIFICATION: NEGATIVE NG/ML
SL AMB CTHC (MARIJUANA METABOLITE) QUANTIFICATION: NEGATIVE NG/ML
SL AMB CYCLOPROPYL FENTANYL QUANTIFICATION: NORMAL NG/ML
SL AMB DEXTROMETHORPHAN QUANTIFICATION: NEGATIVE NG/ML
SL AMB DEXTRORPHAN (DEXTROMETHORPHAN METABOLITE) QUANT: NEGATIVE NG/ML
SL AMB DEXTRORPHAN (DEXTROMETHORPHAN METABOLITE) QUANT: NEGATIVE NG/ML
SL AMB FURANYL FENTANYL QUANTIFICATION: NORMAL NG/ML
SL AMB HYDROXYRISPERIDONE QUANTIFICATION: NEGATIVE NG/ML
SL AMB METHOXYACETYL FENTANYL QUANTIFICATION: NORMAL NG/ML
SL AMB N-DESMETHYL U-47700 QUANTIFICATION: NORMAL NG/ML
SL AMB N-DESMETHYL-TRAMADOL QUANTIFICATION: NORMAL NG/ML
SL AMB N-DESMETHYLCLOZAPINE QUANTIFICATION: NEGATIVE NG/ML
SL AMB PHENTERMINE QUANTIFICATION: NEGATIVE NG/ML
SL AMB RISPERIDONE QUANTIFICATION: NEGATIVE NG/ML
SL AMB RITALINIC ACID QUANTIFICATION: NEGATIVE NG/ML
SL AMB U-47700 QUANTIFICATION: NORMAL NG/ML
TAPENTADOL UR QL CFM: NEGATIVE NG/ML
TEMAZEPAM UR QL CFM: NEGATIVE NG/ML
TEMAZEPAM UR QL CFM: NEGATIVE NG/ML
TRAMADOL UR QL CFM: NORMAL NG/ML
URATE/CREAT 24H UR: NORMAL NG/ML

## 2021-01-11 PROCEDURE — 97140 MANUAL THERAPY 1/> REGIONS: CPT | Performed by: PHYSICAL THERAPIST

## 2021-01-11 PROCEDURE — 97012 MECHANICAL TRACTION THERAPY: CPT | Performed by: PHYSICAL THERAPIST

## 2021-01-11 PROCEDURE — 97014 ELECTRIC STIMULATION THERAPY: CPT | Performed by: PHYSICAL THERAPIST

## 2021-01-11 PROCEDURE — 97110 THERAPEUTIC EXERCISES: CPT | Performed by: PHYSICAL THERAPIST

## 2021-01-11 NOTE — PROGRESS NOTES
Daily Note     Today's date: 2021  Patient name: Colonel Vivas  : 1965  MRN: [id number]  Referring provider: Soraida Estrada MD  Dx:   Encounter Diagnosis     ICD-10-CM    1  Chronic neck pain  M54 2     G89 29        Start Time: 0810  Stop Time: 0900  Total time in clinic (min): 50 minutes    Subjective: Patient notes he will have his epidural injection tomorrow  Notes continued scapular pain with hand numbness  Notes trying to work on his car this weekend and felt weak in UE  Objective: See treatment diary below      Assessment: Tolerated treatment well  Patient With decreased radicular pain and numbness in cervical flexion posturing  Decreased numbness with flexion and left lateral flexion  Advised small ball for self release at home  Demonstrated understanding  Plan: Continue per plan of care        Precautions: R THR, arm DVT, DM      Manuals 21     cervical 10 10' 10 10' 10 10 10 10     Manual traction 5 5' 5' 5' 5 5 5 5                               Neuro Re-Ed                                                                                                        Ther Ex             AROM cerv 10'  HEP x10 ea 10x ea Home    5' 5'     Supine cervical retraction  2x10 2x10 seated 2x10 20x seated 20x 20x 20x  supin     Shoulder rolls   20x 20x 20x 20x 20x 20      Shoulder retraction  20x 20x 20x 20x 20x  20     Retro UBE   4' 4'' 4'        Median nerve glide    x10 10x        Ulnar nerve glide    x10 10x        digiflex     yellow 10x ea 10x 15 10     gripper     30x white 30x 30  green 30  white                  HEP        Small ball self  TP     Mechanical Traction      10'  Static  15# 10'   Static  16# 10'  Static  16#                               Modalities             MH/ES right scap/trap and right shoulder 10' 10' 10' 10' 10' 10' 10 10

## 2021-01-12 ENCOUNTER — HOSPITAL ENCOUNTER (OUTPATIENT)
Dept: RADIOLOGY | Facility: CLINIC | Age: 56
Discharge: HOME | End: 2021-01-12
Attending: STUDENT IN AN ORGANIZED HEALTH CARE EDUCATION/TRAINING PROGRAM | Admitting: STUDENT IN AN ORGANIZED HEALTH CARE EDUCATION/TRAINING PROGRAM
Payer: MEDICARE

## 2021-01-12 ENCOUNTER — TELEPHONE (OUTPATIENT)
Dept: PAIN MEDICINE | Facility: CLINIC | Age: 56
End: 2021-01-12

## 2021-01-12 VITALS
TEMPERATURE: 98.7 F | OXYGEN SATURATION: 97 % | DIASTOLIC BLOOD PRESSURE: 71 MMHG | HEART RATE: 68 BPM | RESPIRATION RATE: 18 BRPM | SYSTOLIC BLOOD PRESSURE: 122 MMHG

## 2021-01-12 PROCEDURE — 62321 NJX INTERLAMINAR CRV/THRC: CPT | Performed by: STUDENT IN AN ORGANIZED HEALTH CARE EDUCATION/TRAINING PROGRAM

## 2021-01-12 RX ORDER — METHYLPREDNISOLONE ACETATE 80 MG/ML
80 INJECTION, SUSPENSION INTRA-ARTICULAR; INTRALESIONAL; INTRAMUSCULAR; PARENTERAL; SOFT TISSUE ONCE
Status: COMPLETED | OUTPATIENT
Start: 2021-01-12 | End: 2021-01-12

## 2021-01-12 RX ORDER — LIDOCAINE HYDROCHLORIDE 10 MG/ML
5 INJECTION, SOLUTION EPIDURAL; INFILTRATION; INTRACAUDAL; PERINEURAL ONCE
Status: COMPLETED | OUTPATIENT
Start: 2021-01-12 | End: 2021-01-12

## 2021-01-12 RX ORDER — 0.9 % SODIUM CHLORIDE 0.9 %
4 VIAL (ML) INJECTION ONCE
Status: COMPLETED | OUTPATIENT
Start: 2021-01-12 | End: 2021-01-12

## 2021-01-12 RX ADMIN — METHYLPREDNISOLONE ACETATE 80 MG: 80 INJECTION, SUSPENSION INTRA-ARTICULAR; INTRALESIONAL; INTRAMUSCULAR; PARENTERAL; SOFT TISSUE at 14:41

## 2021-01-12 RX ADMIN — LIDOCAINE HYDROCHLORIDE 5 ML: 10 INJECTION, SOLUTION EPIDURAL; INFILTRATION; INTRACAUDAL; PERINEURAL at 14:33

## 2021-01-12 RX ADMIN — IOHEXOL 1 ML: 300 INJECTION, SOLUTION INTRAVENOUS at 14:38

## 2021-01-12 RX ADMIN — SODIUM CHLORIDE 4 ML: 9 INJECTION INTRAMUSCULAR; INTRAVENOUS; SUBCUTANEOUS at 14:35

## 2021-01-12 NOTE — DISCHARGE INSTR - LAB
Epidural Steroid Injection   WHAT YOU NEED TO KNOW:   An epidural steroid injection (CHANTEL) is a procedure to inject steroid medicine into the epidural space  The epidural space is between your spinal cord and vertebrae  Steroids reduce inflammation and fluid buildup in your spine that may be causing pain  You may be given pain medicine along with the steroids  ACTIVITY  · Do not drive or operate machinery today  · No strenuous activity today - bending, lifting, etc   · You may resume normal activites starting tomorrow - start slowly and as tolerated  · You may shower today, but no tub baths or hot tubs  · You may have numbness for several hours from the local anesthetic  Please use caution and common sense, especially with weight-bearing activities  CARE OF THE INJECTION SITE  · If you have soreness or pain, apply ice to the area today (20 minutes on/20 minutes off)  · Starting tomorrow, you may use warm, moist heat or ice if needed  · You may have an increase or change in your discomfort for 36-48 hours after your treatment  · Apply ice and continue with any pain medication you have been prescribed  · Notify [facility] if you have any of the following: redness, drainage, swelling, headache, stiff neck or fever above 100°F     SPECIAL INSTRUCTIONS  · Our office will contact you in approximately 7 days for a progress report  MEDICATIONS  · Continue to take all routine medications  · Our office may have instructed you to hold some medications  If you have a problem specifically related to your procedure, please call our office at [phone]  Problems not related to your procedure should be directed to your primary care physician

## 2021-01-12 NOTE — INTERVAL H&P NOTE
Update: (This section must be completed if the H&P was completed greater than 24 hrs to procedure or admission)    H&P reviewed  After examining the patient, I find no changed to the H&P since it had been written  Patient re-evaluated   Accept as history and physical     Samara Houser MD/January 12, 2021/2:21 PM

## 2021-01-13 ENCOUNTER — APPOINTMENT (OUTPATIENT)
Dept: PHYSICAL THERAPY | Age: 56
End: 2021-01-13
Payer: MEDICARE

## 2021-01-14 NOTE — PROGRESS NOTES
PT Re-Evaluation     Today's date: 1/15/2021  Patient name: El Jones  : 1965  MRN: [id number]  Referring provider: Kenney Dahl MD  Dx:   Encounter Diagnosis     ICD-10-CM    1  Chronic neck pain  M54 2     G89 29        Start Time: 0800  Stop Time: 6102  Total time in clinic (min): 50 minutes    Assessment  Assessment details: El Jones is a 63 y o  male who presents with pain, decreased strength and decreased ROM  Due to these impairments, Patient has difficulty performing a/iadls and recreational activities  Patient's clinical presentation is consistent with their referring diagnosis of neck pain  Patient recently went for epidural injection with improvement noted with decreased pain, increased cervical AROM and increased UE strength as noted in  strength and wrist strength  Continues with spasm and limited postural endurance  Patient will follow up with MD in 3 weeks  Hold PT until that follow up with potential discharge at that time  Bessy Hernandez will call with any worsening of symptoms and a need to return to PT prior to that follow up  Impairments: abnormal or restricted ROM, activity intolerance, impaired physical strength, lacks appropriate home exercise program, pain with function, poor posture  and poor body mechanics  Understanding of Dx/Px/POC: good   Prognosis: good    Goals  ST-3 WEEKS  1  Decrease pain by 4 points on VAS at its worst  met  2  Increase ROM by > 5-10 deg in all deficients plane - mets  3   Increase UE by 1/2 MMT grade in all deficient planes  - met  4  Increase postural endurance with improved postural awareness - partially met    LT-6 WEEKS  1  Patient to be independent with a/iadls  2  Increase functional activities for leisure and home activities to previous LOF  3  Independent with HEP and/or fitness program     Plan  Plan details: Hold PT until MD follow up in three weeks unless exacerbation of symptoms occurs    Patient would benefit from: skilled physical therapy  Planned modality interventions: cryotherapy, electrical stimulation/Russian stimulation, thermotherapy: hydrocollator packs, unattended electrical stimulation and traction  Planned therapy interventions: behavior modification, aquatic therapy, functional ROM exercises, home exercise program, joint mobilization, manual therapy, neuromuscular re-education, patient education, postural training, therapeutic activities and therapeutic exercise  Frequency: 2-3x week  Duration in weeks: 8  Plan of Care beginning date: 1/15/2021  Plan of Care expiration date: 3/16/2021  Treatment plan discussed with: patient        Subjective Evaluation    History of Present Illness  Date of onset: 2020  Mechanism of injury: Sudden onset of right scapular and UE pain in early November  Was seen by family physician and orthopedics then referred to pain management  MRI positive for multilevel stenosis and spondylosis  Patient notes he had epidural injection on Tuesday and notes relief of symptoms  Notes feeling 75% better post injection  He has stopped his pain medication despite advice to continue  Notes now most pain numbness into hand and into distal forearm  Notes o pain cerv or scapular area today  Pain  Current pain ratin  At best pain ratin  At worst pain ratin  Location: right cervical and scapular area, right posterior and medial arm forearm  Quality: pressure and throbbing (numb)  Relieving factors: medications and rest  Aggravating factors: nothing, sitting, lifting and standing (driving)  Progression: improved    Hand dominance: right      Diagnostic Tests  X-ray: abnormal  MRI studies: abnormal  Treatments  Current treatment: medication  Patient Goals  Patient goals for therapy: decreased pain, increased strength and increased motion          Objective     Static Posture   General Observations  Asymmetrical weight bearing  Head  Forward      Shoulders  Asymmetric shoulders and rounded  Scapulae  Right depressed  Palpation     Right   Hypertonic in the cervical paraspinals, levator scapulae and upper trapezius  Muscle spasm in the rhomboids and scalenes  Tenderness of the cervical paraspinals, levator scapulae, middle trapezius, rhomboids and suboccipitals  Right Shoulder Comments  Right rhomboids: causes UE numbness with palpation  Neurological Testing     Sensation   Cervical/Thoracic     Right   Diminished: light touch  Paresthesia: light touch    Active Range of Motion   Cervical/Thoracic Spine       Cervical    Flexion:  WFL  Extension:  Restriction level: minimal  Left lateral flexion: 30 degrees      Right lateral flexion: 30 degrees      Left rotation: 55 degrees  Right rotation: 60 degrees             Strength/Myotome Testing   Cervical Spine     Left   Normal strength    Right   Interossei strength (t1): 4+    Left Wrist/Hand   Normal wrist strength     (2nd hand position)     Trial 1: 50    Trial 2: 55    Trial 3: 50    Thumb Strength  Key/Lateral Pinch     Trial 1: 10    Trial 2: 10    Trial 3: 10    Average: 10    Right Wrist/Hand   Wrist extension: 4  Ulnar deviation: 4     (2nd hand position)     Trial 1: 45    Trial 2: 40    Trial 3: 40    Thumb Strength   Key/Lateral Pinch     Trial 1: 8    Trial 2: 9    Trial 3: 9    Average: 8 67    Tests   Cervical   Positive vertical compression  Lumbar   Positive vertical compression          Flowsheet Rows      Most Recent Value   PT/OT G-Codes   Current Score  78   Projected Score  61         Precautions: R THR, arm DVT, DM      Manuals 12/18 12/21 12/23 12/28 12/30 1/6/21 1/8 1/11 1/15    cervical 10 10' 10 10' 10 10 10 10 15    Manual traction 5 5' 5' 5' 5 5 5 5 10                              Neuro Re-Ed                                                                                                        Ther Ex             AROM cerv 10'  HEP x10 ea 10x ea Home    5' 5' 5'    Supine cervical retraction  2x10 2x10 seated 2x10 20x seated 20x 20x 20x  supin     Shoulder rolls   20x 20x 20x 20x 20x 20      Shoulder retraction  20x 20x 20x 20x 20x  20     Retro UBE   4' 4'' 4'        Median nerve glide    x10 10x        Ulnar nerve glide    x10 10x        digiflex     yellow 10x ea 10x 15 10 10  red    gripper     30x white 30x 30  green 30  white 30x                 HEP        Small ball self  TP review  10    Mechanical Traction      10'  Static  15# 10'   Static  16# 10'  Static  16#                               Modalities             MH/ES right scap/trap and right shoulder 10' 10' 10' 10' 10' 10' 10 10 10

## 2021-01-15 ENCOUNTER — EVALUATION (OUTPATIENT)
Dept: PHYSICAL THERAPY | Age: 56
End: 2021-01-15
Payer: MEDICARE

## 2021-01-15 DIAGNOSIS — M54.2 CHRONIC NECK PAIN: Primary | ICD-10-CM

## 2021-01-15 DIAGNOSIS — G89.29 CHRONIC NECK PAIN: Primary | ICD-10-CM

## 2021-01-15 PROCEDURE — 97140 MANUAL THERAPY 1/> REGIONS: CPT | Performed by: PHYSICAL THERAPIST

## 2021-01-15 PROCEDURE — 97014 ELECTRIC STIMULATION THERAPY: CPT | Performed by: PHYSICAL THERAPIST

## 2021-01-15 PROCEDURE — 97110 THERAPEUTIC EXERCISES: CPT | Performed by: PHYSICAL THERAPIST

## 2021-01-19 ENCOUNTER — TELEPHONE (OUTPATIENT)
Dept: PAIN MEDICINE | Facility: CLINIC | Age: 56
End: 2021-01-19

## 2021-02-03 ENCOUNTER — TELEPHONE (OUTPATIENT)
Dept: FAMILY MEDICINE CLINIC | Facility: CLINIC | Age: 56
End: 2021-02-03

## 2021-02-03 DIAGNOSIS — M47.812 CERVICAL SPONDYLOSIS WITHOUT MYELOPATHY: ICD-10-CM

## 2021-02-03 DIAGNOSIS — M79.601 RIGHT UPPER LIMB PAIN: ICD-10-CM

## 2021-02-03 DIAGNOSIS — M54.12 BRACHIAL NEURITIS: Primary | ICD-10-CM

## 2021-02-03 NOTE — TELEPHONE ENCOUNTER
Haylie Darden called and benji needs an order put into [facility] for [facility] neurology because he has [facility]    dx [id number], [phone], [phone]

## 2021-02-08 ENCOUNTER — PROCEDURE VISIT (OUTPATIENT)
Dept: NEUROLOGY | Facility: CLINIC | Age: 56
End: 2021-02-08
Payer: MEDICARE

## 2021-02-08 DIAGNOSIS — M79.601 RIGHT ARM PAIN: ICD-10-CM

## 2021-02-08 DIAGNOSIS — M47.812 CERVICAL SPONDYLOSIS WITHOUT MYELOPATHY: ICD-10-CM

## 2021-02-08 DIAGNOSIS — M79.601 RIGHT UPPER LIMB PAIN: ICD-10-CM

## 2021-02-08 DIAGNOSIS — M54.12 BRACHIAL NEURITIS: ICD-10-CM

## 2021-02-08 DIAGNOSIS — M54.12 CERVICAL RADICULOPATHY: ICD-10-CM

## 2021-02-08 DIAGNOSIS — M47.812 CERVICAL SPONDYLOSIS: ICD-10-CM

## 2021-02-08 PROCEDURE — 95886 MUSC TEST DONE W/N TEST COMP: CPT | Performed by: PHYSICAL MEDICINE & REHABILITATION

## 2021-02-08 PROCEDURE — 95909 NRV CNDJ TST 5-6 STUDIES: CPT | Performed by: PHYSICAL MEDICINE & REHABILITATION

## 2021-02-08 NOTE — PROGRESS NOTES
EMG 1 Limb     Date/Time 2/8/2021 10:06 AM     Performed by  Yahir Lovell MD     Authorized by Fifi Godinez MD      [facility] Protocol   Consent: Verbal consent obtained  Risks and benefits: risks, benefits and alternatives were discussed  Consent given by: patient  Patient understanding: patient states understanding of the procedure being performed  Patient consent: the patient's understanding of the procedure matches consent given               EMG RIGHT UPPER EXTREMITY    42-year-old right-handed male presents with complaints of right-sided neck pain radiating down the arm  associated with tingling and numbness in all fingers  for the last several months  Patient is being evaluated for focal neuropathy versus cervical radiculopathy  On physical examination, motor strength is 5/5 throughout except  strength is 4+/5  Sensations are diminished to light touch and pinprick in the median and ulnar nerve distribution  Motor and sensory conduction studies were performed on the right median and ulnar nerves  The   median motor terminal latency was prolonged at 4 5 milliseconds with a low compound motor action potential amplitude of 3 9 mV and a normal conduction velocity across the wrist   The ulnar motor terminal latency was normal with a normal compound motor action potential amplitude and a normal conduction velocity across the wrist but a slow conduction velocity of 30 m/sec across the elbow  Median F-wave was prolonged at 32 7 milliseconds  The  ulnar F wave was  prolonged at 35 1 milliseconds  Median  sensory peak latency was prolonged at 4 2 milliseconds with normal sensory action potential amplitude  The ulnar sensory peak latency was prolonged at 3 6 milliseconds with a low sensory action potential amplitude of 14 microvolts  The median palmar evoked response was prolonged by 0 5 milliseconds as compared to the ulnar palmar evoked response at the same distance     The radial sensory action potential was normal     Concentric needle EMG was performed on various distal and proximal muscles of the right upper extremity including APB, FDI, pronator teres,FCU, deltoid, biceps, triceps and low cervical paraspinal region  There was no evidence of active denervation in any of the muscles tested  Moderate decreased recruitment of giant motor units was noted in the abductor pollicis brevis and FDI  Mild decreased recruitment of giant motor units was also noted in the FCU  The compound motor unit action potentials were of normal configuration with interference patterns being full or full for effort  IMPRESSION:   There is electrophysiologic evidence of a:    1  Moderate median nerve compression neuropathy at the wrist with demyelinative and axonal changes, consistent with a diagnosis of carpal tunnel syndrome  2   Moderate ulnar neuropathy, localized best across the elbow with predominant demyelinative changes  3  There is no evidence of a cervical radiculopathy  Clinical correlation is recommended  ANN Beauchamp

## 2021-02-09 ENCOUNTER — TELEPHONE (OUTPATIENT)
Dept: RADIOLOGY | Facility: CLINIC | Age: 56
End: 2021-02-09

## 2021-02-09 DIAGNOSIS — G56.11 RIGHT MEDIAN NERVE NEUROPATHY: ICD-10-CM

## 2021-02-09 DIAGNOSIS — G56.01 CARPAL TUNNEL SYNDROME OF RIGHT WRIST: ICD-10-CM

## 2021-02-09 DIAGNOSIS — G56.21 ULNAR NEUROPATHY AT ELBOW OF RIGHT UPPER EXTREMITY: Primary | ICD-10-CM

## 2021-02-09 NOTE — TELEPHONE ENCOUNTER
----- Message from Valente Kulkarni MD sent at 2/9/2021 10:08 AM EST -----  Please let patient know his EMG shows carpal tunnel syndrome and ulnar neuropathy so he has nerve compression and irritation in two places- at the wrist and at the elbow  Symptoms are likely from these areas and not the neck  I think he should get evaluated by a hand specialist  Can send him Dr Marciano Dow   Will place consult now    ----- Message -----  From: Wenceslao Ojeda MD  Sent: 2/8/2021  10:17 AM EST  To: Valente Kulkarni MD

## 2021-02-09 NOTE — TELEPHONE ENCOUNTER
S/W pt and advised of below  Advised of Dr Sander Rivas referral   Number given to call to schedule consult  Advised to keep appt for 2/11/21 with [employer] for med management

## 2021-02-15 ENCOUNTER — OFFICE VISIT (OUTPATIENT)
Dept: OBGYN CLINIC | Facility: MEDICAL CENTER | Age: 56
End: 2021-02-15
Payer: MEDICARE

## 2021-02-15 VITALS
HEART RATE: 63 BPM | BODY MASS INDEX: 27.23 KG/M2 | DIASTOLIC BLOOD PRESSURE: 71 MMHG | HEIGHT: 74 IN | SYSTOLIC BLOOD PRESSURE: 105 MMHG | WEIGHT: 212.2 LBS

## 2021-02-15 DIAGNOSIS — G56.21 ULNAR NEUROPATHY AT ELBOW OF RIGHT UPPER EXTREMITY: ICD-10-CM

## 2021-02-15 DIAGNOSIS — G56.21 CUBITAL TUNNEL SYNDROME ON RIGHT: Primary | ICD-10-CM

## 2021-02-15 DIAGNOSIS — G56.11 RIGHT MEDIAN NERVE NEUROPATHY: ICD-10-CM

## 2021-02-15 DIAGNOSIS — G56.01 CARPAL TUNNEL SYNDROME OF RIGHT WRIST: ICD-10-CM

## 2021-02-15 PROCEDURE — 99213 OFFICE O/P EST LOW 20 MIN: CPT | Performed by: ORTHOPAEDIC SURGERY

## 2021-02-15 RX ORDER — CHLORHEXIDINE GLUCONATE 0.12 MG/ML
15 RINSE ORAL ONCE
Status: CANCELLED | OUTPATIENT
Start: 2021-02-15 | End: 2021-02-15

## 2021-02-15 RX ORDER — IBUPROFEN 600 MG/1
TABLET ORAL
Qty: 30 TABLET | Refills: 0 | Status: SHIPPED | OUTPATIENT
Start: 2021-02-15 | End: 2021-03-08

## 2021-02-15 RX ORDER — ACETAMINOPHEN 500 MG
TABLET ORAL
Qty: 30 TABLET | Refills: 0 | Status: SHIPPED | OUTPATIENT
Start: 2021-02-15 | End: 2021-06-08 | Stop reason: ALTCHOICE

## 2021-02-15 NOTE — H&P
CHIEF COMPLAINT:  Chief Complaint   Patient presents with    Right Hand - Pain, Numbness       SUBJECTIVE:  Ace Daniels is a 63y o  year old RHD male who presents for evaluation of his right wrist and elbow  Patient states says that for the past 4-5 months he has had pain and trouble holding small objects with the RUE  He was being treated for cervical radiculopathy by Dr Kaye Patino who performed TPIs  The pain in his RUE had resolved from those injections, however he began noticing numbness and tingling in the ring and small finger specifically after that  He states he is not taking any medications for the symptoms  He has a history of left carpal tunnel release about 10 years ago at [facility] which completely resolved his symptoms  Patient states he has a history of fatty liver, chronic kidney disease, aortic aneurysm, asthma, and diabetes  Denies history of gastric ulcer, MI, or use of blood thinners  Patient is unable to take ibuprofen        PAST MEDICAL HISTORY:  Past Medical History:   Diagnosis Date    Aorta aneurysm ([facility] [address] )     4 3    Arm DVT (deep venous thromboembolism), acute ([facility] [address] ) 5509    complications of cardiac cath    Asthma     Chronic kidney disease     CKD (chronic kidney disease), stage III     COPD (chronic obstructive pulmonary disease) (HCC)     Depression     Diabetes mellitus (HCC)     Essential hypertriglyceridemia     GERD (gastroesophageal reflux disease)     Headache     Hiatal hernia     Hyperlipidemia, mixed 5/7/2018    Kidney stone     Liver disease     FATTY LIVER    PAC (premature atrial contraction)     Prediabetes     Renal calculi     Sleep apnea     Vestibular migraine        PAST SURGICAL HISTORY:  Past Surgical History:   Procedure Laterality Date    CARPAL TUNNEL RELEASE Left     COLONOSCOPY      FL INJECTION RIGHT HIP (ARTHROGRAM)  8/20/2018    FOOT SURGERY Left     FOREIGN BODY REMOVAL    HERNIA REPAIR      NM COLONOSCOPY FLX DX W/COLLJ SPEC WHEN PFRMD N/A 2017    Procedure: COLONOSCOPY;  Surgeon: Julissa Guzman MD;  Location: [facility]; Service: Gastroenterology    MT ESOPHAGOGASTRODUODENOSCOPY TRANSORAL DIAGNOSTIC N/A 10/31/2017    Procedure: ESOPHAGOGASTRODUODENOSCOPY (EGD); Surgeon: Julissa Guzman MD;  Location: [facility];   Service: Gastroenterology    MT TOTAL HIP ARTHROPLASTY Right 2020    Procedure: ARTHROPLASTY HIP TOTAL;  Surgeon: Jaqueline Peoples MD;  Location: [facility];  Service: Orthopedics       FAMILY HISTORY:  Family History   Problem Relation Age of Onset    Cancer Brother     Prostate cancer Brother     Leukemia Brother         his only brother dies from [address] or leukemia pt unsure he was only 47    Arthritis Mother     Heart attack Father     Clotting disorder Father     Schizoaffective Disorder  Sister     Aortic aneurysm Other         abdominal       SOCIAL HISTORY:  Social History     Tobacco Use    Smoking status: Former Smoker     Types: [de-identified], Cigarettes     Quit date: 2014     Years since quittin 5    Smokeless tobacco: Never Used    Tobacco comment: never inhaled   Substance Use Topics    Alcohol use: Yes     Frequency: Monthly or less     Drinks per session: 1 or 2     Binge frequency: Never     Comment: occasional beer    Drug use: No       MEDICATIONS:    Current Outpatient Medications:     acetaminophen (TYLENOL) 500 mg tablet, Take 3 tablets by mouth as needed , Disp: , Rfl:     albuterol (PROVENTIL HFA,VENTOLIN HFA) 90 mcg/act inhaler, Inhale 2 puffs every 6 (six) hours as needed for wheezing , Disp: , Rfl:     Dulaglutide (Trulicity) 3 09 VT/8 2FW SOPN, Inject 0 5 mL (0 75 mg total) under the skin once a week, Disp: 12 pen, Rfl: 1    gabapentin (NEURONTIN) 300 mg capsule, Take 1 capsule (300 mg total) by mouth 2 (two) times a day, Disp: 90 capsule, Rfl: 0    glucose blood test strip, TEST BS TID, Disp: 300 each, Rfl: 5    glucose monitoring kit (FREESTYLE) monitoring kit, Use 1 each daily before breakfast, Disp: 1 each, Rfl: 0    Lancets MISC, Use daily before breakfast, Disp: 100 each, Rfl: 5    mometasone-formoterol (Dulera) 100-5 MCG/ACT inhaler, Inhale 2 puffs 2 (two) times a day Rinse mouth after use , Disp: , Rfl:     montelukast (SINGULAIR) 10 mg tablet, Take 10 mg by mouth daily at bedtime, Disp: , Rfl:     pantoprazole (PROTONIX) 40 mg tablet, take 1 tablet by mouth once daily, Disp: 90 tablet, Rfl: 3    rosuvastatin (CRESTOR) 20 MG tablet, take 1 tablet by mouth once daily, Disp: 30 tablet, Rfl: 5    traMADol (ULTRAM) 50 mg tablet, Take 1 PO three times daily PRN for pain  Ongoing therapy Do not fill until 12/19/2020, Disp: 90 tablet, Rfl: 1    Turmeric 500 MG CAPS, Take by mouth, Disp: , Rfl:     acetaminophen (TYLENOL) 500 mg tablet, Take one tablet the day of surgery, then take one tablet for breakfast lunch and dinner after surgery for 5 days, Disp: 30 tablet, Rfl: 0    fenofibrate (TRIGLIDE) 160 MG tablet, Take 1 tablet (160 mg total) by mouth daily, Disp: 90 tablet, Rfl: 3    ibuprofen (MOTRIN) 600 mg tablet, Take one tablet the day of surgery, then take one tablet for breakfast lunch and dinner after surgery for 5 days, Disp: 30 tablet, Rfl: 0    ALLERGIES:  No Known Allergies    REVIEW OF SYSTEMS:  Review of Systems   Constitutional: Negative for appetite change and unexpected weight change  HENT: Negative for congestion and trouble swallowing  Eyes: Negative for visual disturbance  Respiratory: Negative for cough and shortness of breath  Cardiovascular: Negative for chest pain and palpitations  Gastrointestinal: Negative for nausea and vomiting  Endocrine: Negative for cold intolerance and heat intolerance  Musculoskeletal: Negative for gait problem and myalgias  Skin: Negative for rash         VITALS:  Vitals:    02/15/21 0851   BP: 105/71   Pulse: 63       LABS:  HgA1c:   Lab Results   Component Value Date    HGBA1C 9 9 (A) 12/04/2020     BMP:   Lab Results   Component Value Date    GLUCOSE 92 03/17/2015    CALCIUM 10 1 12/14/2020     03/17/2015    K 4 3 12/14/2020    CO2 26 12/14/2020     12/14/2020    BUN 13 12/14/2020    CREATININE 1 25 12/14/2020       _____________________________________________________  PHYSICAL EXAMINATION:  General: well developed and well nourished, alert, oriented times 3 and appears comfortable  Psychiatric: Normal  HEENT: Trachea Midline, No torticollis  Pulmonary: No audible wheezing or strider  Cardiovascular: No discernable arrhythmia   Skin: No masses, erythema, lacerations, fluctation, ulcerations  Neurovascular: Motor Intact to the Median, Ulnar, Radial Nerve and Pulses Intact    MUSCULOSKELETAL EXAMINATION:  Right Carpal Tunnel Exam:    Negative thenar atrophy  Negative phalen's test  Negative carpal tunnel compression  Negative tinels over median nerve at the wrist   Opposition strength 5/5  Abduction strength 5/5  Right Ulnar Nerve Exam:    Negative intrinsic atrophy  Negative deformity at the elbow  Full range of motion with flexion and extension of the elbow without ulnar nerve subluxation  Positive ulnar nerve compression test at the elbow  Positive tinels over the ulnar nerve at the elbow  Positive cross finger test in the index and long fingers  FDP strength is 4/5 to the ring finger  FDP strength is 4/5 to the small finger  Intrinsic strength 4/5      ___________________________________________________  STUDIES REVIEWED:  EDx of the RUE performed on 02/08/2021 demonstrate moderate ulnar neuropathy, localized best across the elbow with predominant demyelinating changes  Also demonstrates moderate median nerve compression neuropathy at the wrist with demyelinating and axonal changes consistent with diagnosis of carpal tunnel syndrome  No evidence of cervical radiculopathy         PROCEDURES PERFORMED:  No Procedures performed today    _____________________________________________________  ASSESSMENT/PLAN:    Right cubital tunnel syndrome  Cubital tunnel release was discussed at length today including the risks and benefits, Pt would like to proceed with the surgery                 *Surgery- right open cubital tunnel release                 * detailed consent was signed                 * anesthesia- local with sedation                 * PT/OT was ordered for wound care                 * Post op medication was sent to pharmacy on file    Surgery medication instructions: You will stop eating and drinking at midnight the night before your surgery, but you may continue to take your normal medications with a small sip of water  In the morning on the day of your surgery, we would like you to take the following medication:   Tylenol 500mg one tablet by mouth    After surgery, we would like you to take Tylenol 500 mg one tablet by mouth every 6 hours  (at breakfast, lunch and dinner) for 5-7 days after your surgery  Please take this medication EVERYDAY after surgery for 5-7 days, and not just as needed  Taking this medications after surgery will limit your need for prescription pain medication  We will also prescribe a narcotic pain medication for a limited time after surgery that you can take as needed for moderate or severe pain  The narcotic pain medication may also have Tylenol in it  Please limit Tylenol usage to under 3,000mg a day  Diagnoses and all orders for this visit:    Cubital tunnel syndrome on right  -     Ambulatory referral to PT/OT hand therapy; Future  -     Case request operating room: RELEASE CUBITAL TUNNEL right; Standing  -     ibuprofen (MOTRIN) 600 mg tablet; Take one tablet the day of surgery, then take one tablet for breakfast lunch and dinner after surgery for 5 days  -     acetaminophen (TYLENOL) 500 mg tablet;  Take one tablet the day of surgery, then take one tablet for breakfast lunch and dinner after surgery for 5 days  -     Case request operating room: [place] [id number] right    Other orders  -     Diet NPO; Sips with meds; Standing  -     Height and weight upon arrival; Standing  -     Nursing Communication [email or web address] Interventions Implemented; Standing  -     Nursing Communication CHG bath, have staff wash entire body (neck down) per pre-op bathing protocol  Routine, evening prior to, and day of surgery ; Standing  -     Nursing Communication Swab both nares with Povidone-Iodine solution, EXCLUDE if patient has shellfish/Iodine allergy  Routine, day of surgery, on call to OR; Standing  -     chlorhexidine (PERIDEX) 0 12 % oral rinse 15 mL  -     Void on call to OR; Standing  -     Insert peripheral IV; Standing      Follow Up:  Return for Postoperatively  To Do Next Visit:  Sutures out    Operative Discussions:  Cubital Tunnel Release: The anatomy and physiology of cubital tunnel syndrome were discussed with the patient today in the office  Typically, increased elbow flexion activities decrease blood flow within the intraneural spaces, resulting in a feeling of numbness, tingling, weakness, or clumsiness within the hand and fingers  Occasionally, anatomic structures such as medial elbow osteophytes, the medial head of the triceps, were subluxing ulnar nerve may result in increased pressure or aggravation at the cubital tunnel  Typical signs and symptoms usually include numbness and tingling within the ring and small finger, weakness with , and weakness with pinch  Conservative treatment and includes nocturnal bracing to keep the elbow in a semi-extended position, activity modification, therapy, and avoiding excessive elbow flexion activities  A majority of patients typically respond to conservative treatment over a period of approximately 3-6 months  Vitamin B6 one tablet daily over the counter may helpful to reduce symptoms    EMG/NCV testing of the ulnar nerve at the elbow is not as reliable as carpal tunnel syndrome  Surgical intervention in the form of in situ release of the ulnar nerve at the elbow or ulnar nerve transposition may be required in up to 20% of patients  The patient has elected to undergo cubital tunnel release  The possibility of converting to a subcutaneous or submuscular ulnar nerve transposition depending on the nerve stability was discussed with the patient  Typically, in the postoperative period, light activities are allowed immediately, driving is allowed when narcotic medications have stopped, and the incision may get wet after 5 days  Heavy activities will be allowed after follow up appointment in 1-2 weeks  While the pain within the ring and small finger of the hands generally improves rapidly, the numbness and tingling, as well as the strength, will slowly improve over a period of weeks to months  Total recovery can take up to 18 months from the time of surgery  Numbness and tingling near the incision, or near the medial aspect of the forearm was discussed with the patient  The patient has an understanding of the above mentioned discussion  The risks and benefits of the procedure were explained to the patient, which include, but are not limited to: Bleeding, infection, recurrence, pain, scar, damage to tendons, damage to nerves, and damage to blood vessels, failure to give desired results and complications related to anesthesia  These risks, along with alternative conservative treatment options, and postoperative protocols were voiced back and understood by the patient  All questions were answered to the patient's satisfaction  The patient agrees to comply with a standard postoperative protocol, and is willing to proceed  Education was provided via written and auditory forms  There were no barriers to learning  Written handouts regarding wound care, incision and scar care, and general preoperative information was provided to the patient    Prior to surgery, the patient may be requested to stop all anti-inflammatory medications  Prophylactic aspirin, Plavix, and Coumadin may be allowed to be continued  Medications including vitamin E , ginkgo, and fish oil are requested to be stopped approximately one week prior to surgery  Hypertensive medications and beta blockers, if taken, should be continued  Vitamin B6 one tablet daily over the counter may helpful to reduce symptoms        Scribe Attestation    I,:  Margette Lombard am acting as a scribe while in the presence of the attending physician :       I,:  Alyx Morley MD personally performed the services described in this documentation    as scribed in my presence :

## 2021-02-15 NOTE — PROGRESS NOTES
CHIEF COMPLAINT:  Chief Complaint   Patient presents with    Right Hand - Pain, Numbness       SUBJECTIVE:  Daniel Nogueira is a 63y o  year old RHD male who presents for evaluation of his right wrist and elbow  Patient states says that for the past 4-5 months he has had pain and trouble holding small objects with the RUE  He was being treated for cervical radiculopathy by Dr Junior Wallace who performed TPIs  The pain in his RUE had resolved from those injections, however he began noticing numbness and tingling in the ring and small finger specifically after that  He states he is not taking any medications for the symptoms  He has a history of left carpal tunnel release about 10 years ago at [facility] which completely resolved his symptoms  Patient states he has a history of fatty liver, chronic kidney disease, aortic aneurysm, asthma, and diabetes  Denies history of gastric ulcer, MI, or use of blood thinners  Patient is unable to take ibuprofen        PAST MEDICAL HISTORY:  Past Medical History:   Diagnosis Date    Aorta aneurysm ([facility] [address] )     4 3    Arm DVT (deep venous thromboembolism), acute ([facility] [address] ) 0204    complications of cardiac cath    Asthma     Chronic kidney disease     CKD (chronic kidney disease), stage III     COPD (chronic obstructive pulmonary disease) (HCC)     Depression     Diabetes mellitus (HCC)     Essential hypertriglyceridemia     GERD (gastroesophageal reflux disease)     Headache     Hiatal hernia     Hyperlipidemia, mixed 5/7/2018    Kidney stone     Liver disease     FATTY LIVER    PAC (premature atrial contraction)     Prediabetes     Renal calculi     Sleep apnea     Vestibular migraine        PAST SURGICAL HISTORY:  Past Surgical History:   Procedure Laterality Date    CARPAL TUNNEL RELEASE Left     COLONOSCOPY      FL INJECTION RIGHT HIP (ARTHROGRAM)  8/20/2018    FOOT SURGERY Left     FOREIGN BODY REMOVAL    HERNIA REPAIR      MO COLONOSCOPY FLX DX W/COLLJ SPEC WHEN PFRMD N/A 2017    Procedure: COLONOSCOPY;  Surgeon: Kamilah Jessica MD;  Location: [facility]; Service: Gastroenterology    IA ESOPHAGOGASTRODUODENOSCOPY TRANSORAL DIAGNOSTIC N/A 10/31/2017    Procedure: ESOPHAGOGASTRODUODENOSCOPY (EGD); Surgeon: Kamilah Jessica MD;  Location: [facility];   Service: Gastroenterology    IA TOTAL HIP ARTHROPLASTY Right 2020    Procedure: ARTHROPLASTY HIP TOTAL;  Surgeon: Prosper Tafoya MD;  Location: [facility];  Service: Orthopedics       FAMILY HISTORY:  Family History   Problem Relation Age of Onset    Cancer Brother     Prostate cancer Brother     Leukemia Brother         his only brother dies from [address] or leukemia pt unsure he was only 46    Arthritis Mother     Heart attack Father     Clotting disorder Father     Schizoaffective Disorder  Sister     Aortic aneurysm Other         abdominal       SOCIAL HISTORY:  Social History     Tobacco Use    Smoking status: Former Smoker     Types: [de-identified], Cigarettes     Quit date: 2014     Years since quittin 5    Smokeless tobacco: Never Used    Tobacco comment: never inhaled   Substance Use Topics    Alcohol use: Yes     Frequency: Monthly or less     Drinks per session: 1 or 2     Binge frequency: Never     Comment: occasional beer    Drug use: No       MEDICATIONS:    Current Outpatient Medications:     acetaminophen (TYLENOL) 500 mg tablet, Take 3 tablets by mouth as needed , Disp: , Rfl:     albuterol (PROVENTIL HFA,VENTOLIN HFA) 90 mcg/act inhaler, Inhale 2 puffs every 6 (six) hours as needed for wheezing , Disp: , Rfl:     Dulaglutide (Trulicity) 8 00 OM/9 4EE SOPN, Inject 0 5 mL (0 75 mg total) under the skin once a week, Disp: 12 pen, Rfl: 1    gabapentin (NEURONTIN) 300 mg capsule, Take 1 capsule (300 mg total) by mouth 2 (two) times a day, Disp: 90 capsule, Rfl: 0    glucose blood test strip, TEST BS TID, Disp: 300 each, Rfl: 5    glucose monitoring kit (FREESTYLE) monitoring kit, Use 1 each daily before breakfast, Disp: 1 each, Rfl: 0    Lancets MISC, Use daily before breakfast, Disp: 100 each, Rfl: 5    mometasone-formoterol (Dulera) 100-5 MCG/ACT inhaler, Inhale 2 puffs 2 (two) times a day Rinse mouth after use , Disp: , Rfl:     montelukast (SINGULAIR) 10 mg tablet, Take 10 mg by mouth daily at bedtime, Disp: , Rfl:     pantoprazole (PROTONIX) 40 mg tablet, take 1 tablet by mouth once daily, Disp: 90 tablet, Rfl: 3    rosuvastatin (CRESTOR) 20 MG tablet, take 1 tablet by mouth once daily, Disp: 30 tablet, Rfl: 5    traMADol (ULTRAM) 50 mg tablet, Take 1 PO three times daily PRN for pain  Ongoing therapy Do not fill until 12/19/2020, Disp: 90 tablet, Rfl: 1    Turmeric 500 MG CAPS, Take by mouth, Disp: , Rfl:     acetaminophen (TYLENOL) 500 mg tablet, Take one tablet the day of surgery, then take one tablet for breakfast lunch and dinner after surgery for 5 days, Disp: 30 tablet, Rfl: 0    fenofibrate (TRIGLIDE) 160 MG tablet, Take 1 tablet (160 mg total) by mouth daily, Disp: 90 tablet, Rfl: 3    ibuprofen (MOTRIN) 600 mg tablet, Take one tablet the day of surgery, then take one tablet for breakfast lunch and dinner after surgery for 5 days, Disp: 30 tablet, Rfl: 0    ALLERGIES:  No Known Allergies    REVIEW OF SYSTEMS:  Review of Systems   Constitutional: Negative for appetite change and unexpected weight change  HENT: Negative for congestion and trouble swallowing  Eyes: Negative for visual disturbance  Respiratory: Negative for cough and shortness of breath  Cardiovascular: Negative for chest pain and palpitations  Gastrointestinal: Negative for nausea and vomiting  Endocrine: Negative for cold intolerance and heat intolerance  Musculoskeletal: Negative for gait problem and myalgias  Skin: Negative for rash         VITALS:  Vitals:    02/15/21 0851   BP: 105/71   Pulse: 63       LABS:  HgA1c:   Lab Results   Component Value Date    HGBA1C 9 9 (A) 12/04/2020     BMP:   Lab Results   Component Value Date    GLUCOSE 92 03/17/2015    CALCIUM 10 1 12/14/2020     03/17/2015    K 4 3 12/14/2020    CO2 26 12/14/2020     12/14/2020    BUN 13 12/14/2020    CREATININE 1 25 12/14/2020       _____________________________________________________  PHYSICAL EXAMINATION:  General: well developed and well nourished, alert, oriented times 3 and appears comfortable  Psychiatric: Normal  HEENT: Trachea Midline, No torticollis  Pulmonary: No audible wheezing or strider  Cardiovascular: No discernable arrhythmia   Skin: No masses, erythema, lacerations, fluctation, ulcerations  Neurovascular: Motor Intact to the Median, Ulnar, Radial Nerve and Pulses Intact    MUSCULOSKELETAL EXAMINATION:  Right Carpal Tunnel Exam:    Negative thenar atrophy  Negative phalen's test  Negative carpal tunnel compression  Negative tinels over median nerve at the wrist   Opposition strength 5/5  Abduction strength 5/5  Right Ulnar Nerve Exam:    Negative intrinsic atrophy  Negative deformity at the elbow  Full range of motion with flexion and extension of the elbow without ulnar nerve subluxation  Positive ulnar nerve compression test at the elbow  Positive tinels over the ulnar nerve at the elbow  Positive cross finger test in the index and long fingers  FDP strength is 4/5 to the ring finger  FDP strength is 4/5 to the small finger  Intrinsic strength 4/5      ___________________________________________________  STUDIES REVIEWED:  EDx of the RUE performed on 02/08/2021 demonstrate moderate ulnar neuropathy, localized best across the elbow with predominant demyelinating changes  Also demonstrates moderate median nerve compression neuropathy at the wrist with demyelinating and axonal changes consistent with diagnosis of carpal tunnel syndrome  No evidence of cervical radiculopathy         PROCEDURES PERFORMED:  No Procedures performed today    _____________________________________________________  ASSESSMENT/PLAN:    Right cubital tunnel syndrome  Cubital tunnel release was discussed at length today including the risks and benefits, Pt would like to proceed with the surgery                 *Surgery- right open cubital tunnel release                 * detailed consent was signed                 * anesthesia- local with sedation                 * PT/OT was ordered for wound care                 * Post op medication was sent to pharmacy on file    Surgery medication instructions: You will stop eating and drinking at midnight the night before your surgery, but you may continue to take your normal medications with a small sip of water  In the morning on the day of your surgery, we would like you to take the following medication:   Tylenol 500mg one tablet by mouth    After surgery, we would like you to take Tylenol 500 mg one tablet by mouth every 6 hours  (at breakfast, lunch and dinner) for 5-7 days after your surgery  Please take this medication EVERYDAY after surgery for 5-7 days, and not just as needed  Taking this medications after surgery will limit your need for prescription pain medication  We will also prescribe a narcotic pain medication for a limited time after surgery that you can take as needed for moderate or severe pain  The narcotic pain medication may also have Tylenol in it  Please limit Tylenol usage to under 3,000mg a day  Diagnoses and all orders for this visit:    Cubital tunnel syndrome on right  -     Ambulatory referral to PT/OT hand therapy; Future  -     Case request operating room: RELEASE CUBITAL TUNNEL right; Standing  -     ibuprofen (MOTRIN) 600 mg tablet; Take one tablet the day of surgery, then take one tablet for breakfast lunch and dinner after surgery for 5 days  -     acetaminophen (TYLENOL) 500 mg tablet;  Take one tablet the day of surgery, then take one tablet for breakfast lunch and dinner after surgery for 5 days  -     Case request operating room: [place] [id number] right    Other orders  -     Diet NPO; Sips with meds; Standing  -     Height and weight upon arrival; Standing  -     Nursing Communication [facility] Interventions Implemented; Standing  -     Nursing Communication CHG bath, have staff wash entire body (neck down) per pre-op bathing protocol  Routine, evening prior to, and day of surgery ; Standing  -     Nursing Communication Swab both nares with Povidone-Iodine solution, EXCLUDE if patient has shellfish/Iodine allergy  Routine, day of surgery, on call to OR; Standing  -     chlorhexidine (PERIDEX) 0 12 % oral rinse 15 mL  -     Void on call to OR; Standing  -     Insert peripheral IV; Standing      Follow Up:  Return for Postoperatively  To Do Next Visit:  Sutures out    Operative Discussions:  Cubital Tunnel Release: The anatomy and physiology of cubital tunnel syndrome were discussed with the patient today in the office  Typically, increased elbow flexion activities decrease blood flow within the intraneural spaces, resulting in a feeling of numbness, tingling, weakness, or clumsiness within the hand and fingers  Occasionally, anatomic structures such as medial elbow osteophytes, the medial head of the triceps, were subluxing ulnar nerve may result in increased pressure or aggravation at the cubital tunnel  Typical signs and symptoms usually include numbness and tingling within the ring and small finger, weakness with , and weakness with pinch  Conservative treatment and includes nocturnal bracing to keep the elbow in a semi-extended position, activity modification, therapy, and avoiding excessive elbow flexion activities  A majority of patients typically respond to conservative treatment over a period of approximately 3-6 months  Vitamin B6 one tablet daily over the counter may helpful to reduce symptoms    EMG/NCV testing of the ulnar nerve at the elbow is not as reliable as carpal tunnel syndrome  Surgical intervention in the form of in situ release of the ulnar nerve at the elbow or ulnar nerve transposition may be required in up to 20% of patients  The patient has elected to undergo cubital tunnel release  The possibility of converting to a subcutaneous or submuscular ulnar nerve transposition depending on the nerve stability was discussed with the patient  Typically, in the postoperative period, light activities are allowed immediately, driving is allowed when narcotic medications have stopped, and the incision may get wet after 5 days  Heavy activities will be allowed after follow up appointment in 1-2 weeks  While the pain within the ring and small finger of the hands generally improves rapidly, the numbness and tingling, as well as the strength, will slowly improve over a period of weeks to months  Total recovery can take up to 18 months from the time of surgery  Numbness and tingling near the incision, or near the medial aspect of the forearm was discussed with the patient  The patient has an understanding of the above mentioned discussion  The risks and benefits of the procedure were explained to the patient, which include, but are not limited to: Bleeding, infection, recurrence, pain, scar, damage to tendons, damage to nerves, and damage to blood vessels, failure to give desired results and complications related to anesthesia  These risks, along with alternative conservative treatment options, and postoperative protocols were voiced back and understood by the patient  All questions were answered to the patient's satisfaction  The patient agrees to comply with a standard postoperative protocol, and is willing to proceed  Education was provided via written and auditory forms  There were no barriers to learning  Written handouts regarding wound care, incision and scar care, and general preoperative information was provided to the patient    Prior to surgery, the patient may be requested to stop all anti-inflammatory medications  Prophylactic aspirin, Plavix, and Coumadin may be allowed to be continued  Medications including vitamin E , ginkgo, and fish oil are requested to be stopped approximately one week prior to surgery  Hypertensive medications and beta blockers, if taken, should be continued  Vitamin B6 one tablet daily over the counter may helpful to reduce symptoms        Scribe Attestation    I,:  Roslyn Bhagat am acting as a scribe while in the presence of the attending physician :       I,:  Jorge Durbin MD personally performed the services described in this documentation    as scribed in my presence :

## 2021-02-16 ENCOUNTER — PREP FOR PROCEDURE (OUTPATIENT)
Dept: OBGYN CLINIC | Facility: MEDICAL CENTER | Age: 56
End: 2021-02-16

## 2021-02-16 DIAGNOSIS — Z01.812 PRE-OPERATIVE LABORATORY EXAMINATION: Primary | ICD-10-CM

## 2021-02-22 ENCOUNTER — OFFICE VISIT (OUTPATIENT)
Dept: PAIN MEDICINE | Facility: CLINIC | Age: 56
End: 2021-02-22
Payer: MEDICARE

## 2021-02-22 VITALS
WEIGHT: 215.2 LBS | BODY MASS INDEX: 27.62 KG/M2 | HEART RATE: 63 BPM | RESPIRATION RATE: 18 BRPM | DIASTOLIC BLOOD PRESSURE: 64 MMHG | HEIGHT: 74 IN | SYSTOLIC BLOOD PRESSURE: 102 MMHG

## 2021-02-22 DIAGNOSIS — M16.11 PRIMARY OSTEOARTHRITIS OF RIGHT HIP: ICD-10-CM

## 2021-02-22 DIAGNOSIS — G89.4 CHRONIC PAIN SYNDROME: Primary | ICD-10-CM

## 2021-02-22 DIAGNOSIS — F11.20 UNCOMPLICATED OPIOID DEPENDENCE (HCC): ICD-10-CM

## 2021-02-22 DIAGNOSIS — M54.12 CERVICAL RADICULOPATHY: ICD-10-CM

## 2021-02-22 DIAGNOSIS — Z79.891 LONG-TERM CURRENT USE OF OPIATE ANALGESIC: ICD-10-CM

## 2021-02-22 DIAGNOSIS — M47.812 CERVICAL SPONDYLOSIS: ICD-10-CM

## 2021-02-22 PROCEDURE — 99214 OFFICE O/P EST MOD 30 MIN: CPT | Performed by: NURSE PRACTITIONER

## 2021-02-22 RX ORDER — TRAMADOL HYDROCHLORIDE 50 MG/1
TABLET ORAL
Qty: 90 TABLET | Refills: 2 | Status: SHIPPED | OUTPATIENT
Start: 2021-02-22 | End: 2021-05-20 | Stop reason: SDUPTHER

## 2021-02-22 NOTE — PATIENT INSTRUCTIONS
You may  your prescription on 3/4/2021 with 2 refills    Opioid Dependence   WHAT YOU NEED TO KNOW:   What is opioid dependence? Opioids are medicines, such as morphine and codeine, used to treat pain  Dependence happens after you have used opioids regularly for a long period of time  Dependence means that your body gets used to how much medicine you take  Dependence is not the same as addiction  Addiction means that a person uses opioids to get high instead of using them to control pain  What are the signs and symptoms of opioid dependence? · You need more of the opioid to get the same amount of pain relief as you did when you first started taking it  · You have tried to use less opioid medicine but are not able to  · You have withdrawal symptoms when you take less of the opioid  What are the signs and symptoms of opioid withdrawal?  You may have the following signs and symptoms if you suddenly stop taking opioids or if you decrease the amount you normally take:  · Yawning     · Runny nose     · Nausea or vomiting     · Diarrhea     · Chills or goosebumps    · Muscle aches or cramps     · Anxiety  How is opioid dependence diagnosed? Your healthcare provider will do a physical exam  He will ask you questions about your symptoms and your use of opioids  He will also ask about your current and past use of other drugs and any family history of drug abuse  How is opioid dependence treated? You may be treated in a hospital or you may be treated as an outpatient  During detoxification (detox), healthcare providers will slowly decrease your dose of the opioid medicine you are dependent on  They may use another opioid medicine such as methadone to decrease symptoms of withdrawal  You may need to take this or another medicine for some time  Your healthcare provider will also replace the opioid with another pain medicine that is less likely to cause dependence   He may also suggest that you receive counseling and social support during treatment  What are the risks of opioid dependence? There is a risk of overdose during early treatment with methadone  You may become dependent on the medicines used to treat opioid dependence  Without treatment, you may develop other health problems or become addicted to opioids  Your risk of abusing alcohol and other drugs increases  You may also develop risky behaviors that can lead to an overdose, violence, and suicidal thoughts  When should I contact my healthcare provider? · Your speech is slurred  · You have difficulty staying awake  · You have nausea and vomiting  · You are easily upset or cry easily  · You have poor balance  · You have questions or concerns about your condition or care  When should I seek immediate care or call 911? · You feel lightheaded or faint  · You have a fast, slow, or irregular heartbeat  · You have a seizure  CARE AGREEMENT:   You have the right to help plan your care  Learn about your health condition and how it may be treated  Discuss treatment options with your caregivers to decide what care you want to receive  You always have the right to refuse treatment  The above information is an  only  It is not intended as medical advice for individual conditions or treatments  Talk to your doctor, nurse or pharmacist before following any medical regimen to see if it is safe and effective for you  © 2017 [address] [employer] Information is for End User's use only and may not be sold, redistributed or otherwise used for commercial purposes  All illustrations and images included in CareNotes® are the copyrighted property of [employer]  or [employer]

## 2021-02-22 NOTE — PROGRESS NOTES
Assessment:  1  Chronic pain syndrome    2  Cervical radiculopathy    3  Cervical spondylosis    4  Primary osteoarthritis of right hip    5  Long-term current use of opiate analgesic    6  Uncomplicated opioid dependence ([address] )        Plan:  Kymberly Oliver is a 63 y o  male who was last seen 1/12/2021 presents for a follow up office visit in regards to chronic pain syndrome secondary to cervical radiculopathy, cervical spondylosis, and primary osteoarthritis of the right hip    I discussed with the patient that since there has been moderate to significant improvement in his neck pain symptoms that we will hold off on any repeat injections at this point in time  However, I did explain that if over the next 3-4 months the pain symptoms should return, worsen and/or change, we could consider repeat injection  If after that 3-4 month period of time an outpatient visit will be warranted for re-evaluation  The patient continues with moderate to stable relief of his neck,right hip and right knee pain with his current pain medication regimen; therefore, I will continues medications as prescribed  A prescription for tramadol 50 mg by mouth 3 times daily was electronically sent to the patient's pharmacy on file with a do not fill date of 03/04/2021 in with 2 refills  There are risks associated with opioid medications, including dependence, addiction and tolerance  The patient understands and agrees to use these medications only as prescribed  Potential side effects of the medications include, but are not limited to, constipation, drowsiness, addiction, impaired judgment and risk of fatal overdose if not taken as prescribed  The patient was warned against driving while taking sedation medications  Sharing medications is a felony  At this point in time, the patient is showing no signs of addiction, abuse, diversion or suicidal ideation      [facility] Prescription Drug Monitoring Program report was reviewed and was appropriate     The patient will complete right cubital tunnel release surgery on 03/10/2021  The patient will follow up in 12 weeks for medication prescription refill re-evaluation or sooner if symptoms worsen in the interim  The patient was agreeable and verbalized understanding  My impressions and treatment recommendations were discussed in detail with the patient who verbalized understanding and had no further questions  Discharge instructions were provided  I personally saw and examined the patient and I agree with the above discussed plan of care  No orders of the defined types were placed in this encounter  New Medications Ordered This Visit   Medications    traMADol (ULTRAM) 50 mg tablet     Sig: Take 1 PO three times daily PRN for pain  Ongoing therapy Do not fill until 3/4/2021     Dispense:  90 tablet     Refill:  2       History of Present Illness:  Jassi Ramirez is a 63 y o  male who was last seen 1/12/2021 presents for a follow up office visit in regards to chronic pain syndrome secondary to cervical radiculopathy, cervical spondylosis, and primary osteoarthritis of the right hip  The patients current symptoms include  Hip Pain (right) and Leg Pain (right)  The patient currently reports ongoing right hip and right knee pain that are unchanged since last office visit  Patient describes his hip pain as occasional, pressure-like pain that is worse in the evening  At the last visit, the patient underwent cervical epidural steroid injection at C6- C7 which he  reports provided 80% relief of his neck pain and reduce his pain level to 1/10 on the numeric rating scale  The patient is scheduled to have right cubital tunnel release on 03/10/2021  The patient denies muscle weakness or bowel or bladder dysfunction, reports he is able to complete ADLs with minimal difficulty  The patient currently rates his pain as 1/10 on numeric rating scale        Current pain medications include: Tramadol 50 mg by mouth 3 times daily  The patient was started on gabapentin 300 mg by mouth twice daily; however, the patient reports he is no longer taking this medication  The patient reports this pain medication regimen provides moderate relief of his pain symptoms with no noted side effects  Pain Contract Signed: 1/12/2021, Last Urine Drug Screen: 1/7/2021    I have personally reviewed and/or updated the patient's past medical history, past surgical history, family history, social history, current medications, allergies, and vital signs today  Review of Systems   Musculoskeletal: Positive for gait problem          Joint stiffness       Patient Active Problem List   Diagnosis    COPD [facility]chronic obstructive pulmonary disease) [facility] [facility]    Ascending aortic aneurysm (HCC)    Bicuspid aortic valve    Adjustment reaction with anxiety and depression    Allergic rhinitis    GERD (gastroesophageal reflux disease)    Hiatal hernia    Type 2 diabetes mellitus without complication, without long-term current use of insulin (HCC)    CKD (chronic kidney disease) stage 3, GFR 30-59 ml/min    Hyperlipidemia, mixed    Weight gain    Seasonal allergic rhinitis due to pollen    Depression with anxiety    Vitamin D deficiency    Renal cyst, left    Hepatic cyst    Fatty liver disease, nonalcoholic    Erectile dysfunction    Carpal tunnel syndrome of right wrist    Chronic pain of right knee    Vestibular migraine    Patellar tendinitis of right knee    Benign paroxysmal positional vertigo due to bilateral vestibular disorder    Hip impingement syndrome, right    Primary osteoarthritis of right hip    Ulnar neuropathy of both upper extremities    Lumbosacral strain    Tarsal tunnel syndrome of right side    Cubital tunnel syndrome, bilateral    Groin pain, chronic, right    Chronic pain syndrome    Chronic kidney disease-mineral and bone disorder    Long-term current use of opiate analgesic    Uncomplicated opioid dependence ([facility] [address] )    Arthritis of right hip    Sleep pattern disturbance       Past Medical History:   Diagnosis Date    A[facility] aneurysm ([facility] )     4 3    Arm DVT (deep venous thromboembolism), acute ([facility] ) 6343    complications of cardiac cath    Asthma     Chronic kidney disease     CKD (chronic kidney disease), stage III     COPD (chronic obstructive pulmonary disease) (HCC)     Depression     Diabetes mellitus (HCC)     Essential hypertriglyceridemia     GERD (gastroesophageal reflux disease)     Headache     Hiatal hernia     Hyperlipidemia, mixed 5/7/2018    Kidney stone     Liver disease     FATTY LIVER    PAC (premature atrial contraction)     Prediabetes     Renal calculi     Sleep apnea     Vestibular migraine        Past Surgical History:   Procedure Laterality Date    CARPAL TUNNEL RELEASE Left     COLONOSCOPY      FL INJECTION RIGHT HIP (ARTHROGRAM)  8/20/2018    FOOT SURGERY Left     FOREIGN BODY REMOVAL    HERNIA REPAIR      [place] COLONOSCOPY FLX DX W/COLLJ SPEC WHEN PFRMD N/A 8/7/2017    Procedure: COLONOSCOPY;  Surgeon: Keisha Lazo MD;  [facility]; Service: Gastroenterology    [place] ESOPHAGOGASTRODUODENOSCOPY TRANSORAL DIAGNOSTIC N/A 10/31/2017    Procedure: ESOPHAGOGASTRODUODENOSCOPY (EGD); Surgeon: Keisha Lazo MD;  [facility];   Service: Gastroenterology    [place] TOTAL HIP ARTHROPLASTY Right 9/28/2020    Procedure: ARTHROPLASTY HIP TOTAL;  Surgeon: Aleyda Mednia MD;  [facility];  Service: Orthopedics       Family History   Problem Relation Age of Onset    Cancer Brother     Prostate cancer Brother     Leukemia Brother         his only brother dies from [address] or leukemia pt unsure he was only 46    Arthritis Mother     Heart attack Father     Clotting disorder Father     Schizoaffective Disorder  Sister     Aortic aneurysm Other         abdominal       Social History     Occupational History    Occupation: unemployed   Tobacco Use    Smoking status: Former Smoker     Types: Cigars, Cigarettes     Quit date: 2014     Years since quittin 5    Smokeless tobacco: Never Used    Tobacco comment: never inhaled   Substance and Sexual Activity    Alcohol use: Yes     Frequency: Monthly or less     Drinks per session: 1 or 2     Binge frequency: Never     Comment: occasional beer    Drug use: No    Sexual activity: Not Currently       Current Outpatient Medications on File Prior to Visit   Medication Sig    acetaminophen (TYLENOL) 500 mg tablet Take 3 tablets by mouth as needed     acetaminophen (TYLENOL) 500 mg tablet Take one tablet the day of surgery, then take one tablet for breakfast lunch and dinner after surgery for 5 days    albuterol (PROVENTIL HFA,VENTOLIN HFA) 90 mcg/act inhaler Inhale 2 puffs every 6 (six) hours as needed for wheezing   Dulaglutide (Trulicity) 9 48 DI/6 4EK SOPN Inject 0 5 mL (0 75 mg total) under the skin once a week    glucose blood test strip TEST BS TID    glucose monitoring kit (FREESTYLE) monitoring kit Use 1 each daily before breakfast    ibuprofen (MOTRIN) 600 mg tablet Take one tablet the day of surgery, then take one tablet for breakfast lunch and dinner after surgery for 5 days    Lancets MISC Use daily before breakfast    mometasone-formoterol (Dulera) 100-5 MCG/ACT inhaler Inhale 2 puffs 2 (two) times a day Rinse mouth after use   montelukast (SINGULAIR) 10 mg tablet Take 10 mg by mouth daily at bedtime    pantoprazole (PROTONIX) 40 mg tablet take 1 tablet by mouth once daily    rosuvastatin (CRESTOR) 20 MG tablet take 1 tablet by mouth once daily    Turmeric 500 MG CAPS Take by mouth    [DISCONTINUED] gabapentin (NEURONTIN) 300 mg capsule Take 1 capsule (300 mg total) by mouth 2 (two) times a day    [DISCONTINUED] traMADol (ULTRAM) 50 mg tablet Take 1 PO three times daily PRN for pain   Ongoing therapy Do not fill until 2020   Keshawn Chaudhary fenofibrate (TRIGLIDE) 160 MG tablet Take 1 tablet (160 mg total) by mouth daily     No current facility-administered medications on file prior to visit  No Known Allergies    Physical Exam:    /64   Pulse 63   Resp 18   Ht 6' 1 5" (1 867 m)   Wt 97 6 kg (215 lb 3 2 oz)   BMI 28 01 kg/m²     Constitutional:normal, well developed, well nourished, alert, in no distress and non-toxic and no overt pain behavior    Eyes:anicteric  HEENT:grossly intact  Neck:supple, symmetric, trachea midline and no masses   Pulmonary:even and unlabored  Cardiovascular:No edema or pitting edema present  Skin:Normal without rashes or lesions and well hydrated  Psychiatric:Mood and affect appropriate  Neurologic:Cranial Nerves II-XII grossly intact  Musculoskeletal:normal    Imaging

## 2021-03-07 PROBLEM — G47.20 SLEEP PATTERN DISTURBANCE: Status: RESOLVED | Noted: 2020-10-29 | Resolved: 2021-03-07

## 2021-03-07 PROBLEM — J30.1 SEASONAL ALLERGIC RHINITIS DUE TO POLLEN: Status: RESOLVED | Noted: 2018-05-07 | Resolved: 2021-03-07

## 2021-03-07 PROBLEM — R63.5 WEIGHT GAIN: Status: RESOLVED | Noted: 2018-05-07 | Resolved: 2021-03-07

## 2021-03-07 PROBLEM — Z79.891 LONG-TERM CURRENT USE OF OPIATE ANALGESIC: Status: RESOLVED | Noted: 2020-03-13 | Resolved: 2021-03-07

## 2021-03-07 NOTE — PROGRESS NOTES
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 MRN: [id number]      ASSESSMENT/PLAN  Problem List Items Addressed This Visit        Digestive    Fatty liver disease, nonalcoholic    Relevant Orders    Comprehensive metabolic panel    Lipid panel    GERD (gastroesophageal reflux disease)       Endocrine    Type 2 diabetes mellitus without complication, without long-term current use of insulin (HCC) - Primary    Relevant Orders    Comprehensive metabolic panel    Lipid panel    POCT hemoglobin A1c       Respiratory    Allergic rhinitis    COPD (chronic obstructive pulmonary disease) (HCC)       Cardiovascular and Mediastinum    Ascending aortic aneurysm (HCC)       Genitourinary    Chronic kidney disease-mineral and bone disorder    Relevant Orders    Comprehensive metabolic panel    CKD (chronic kidney disease) stage 3, GFR 30-59 ml/min    Relevant Orders    Comprehensive metabolic panel       Other    Chronic pain syndrome    Hyperlipidemia, mixed    Relevant Orders    Comprehensive metabolic panel    Lipid panel    Uncomplicated opioid dependence ([facility] [address] )      Other Visit Diagnoses     Screening for HIV (human immunodeficiency virus)            DM w/ CKD: A1c much improved to 6 6%  Continue current regimen  Update CMP   HLD/NAFLD: Update lipids + CMP   Asthma/Allergies:  Well controlled off medication   GERD: Pt would like to go see GI, but defers until he gets his secondary insurance   Chronic Pain on Tramadol: F/u with Pain Management/Neuro as scheduled   Aortic Aneurysm: F/u with Cardiology as scheduled         Future Appointments   Date Time Provider Kristen Rody   3/19/2021  9:00 AM Leonor Barr MD [facility]   4/16/2021  9:00 AM COLIN Chery OT [address]   4/26/2021  9:00 AM Alyx Morley MD [facility]   5/20/2021  9:45 AM NICK Calvillo SP  [facility]   6/16/2021  9:40 Socrates Magaña MD [facility]          SUBJECTIVE  CC: Diabetes and Hypertension      HPI:  Stephanie Salcedo is a 63 y o  male who presents for chronic follow up as below  DM w/ CKD3a: Due for A1c; home sugars AM highest is 140s   HLD/NAFLD: Tolerating statin + fenofibrate   Asthma/Allergies: Off all inhalers, no flare ups    GERD: On PPI daily, has significant symptoms if he misses a dose   Chronic Pain, on chronic opioids: Follows with Pain Management, Neuro   Aortic Aneurysm: Following with Cardiology      Review of Systems   Constitutional: Positive for fatigue (since he stopped working)  Negative for diaphoresis  HENT: Positive for rhinorrhea  Negative for congestion, ear pain and sore throat  Respiratory: Negative for cough and shortness of breath  Cardiovascular: Negative for chest pain and palpitations  Endocrine: Negative for polydipsia  Neurological: Negative for dizziness and tremors         Historical Information   The patient history was reviewed and updated as follows:    Past Medical History:   Diagnosis Date    Aorta aneurysm ([facility] [address] )     4 3    Arm DVT (deep venous thromboembolism), acute ([facility] [address] ) 8205    complications of cardiac cath    Asthma     Chronic kidney disease     CKD (chronic kidney disease), stage III     COPD (chronic obstructive pulmonary disease) (HCC)     Depression     Diabetes mellitus (HCC)     Essential hypertriglyceridemia     GERD (gastroesophageal reflux disease)     Headache     Hiatal hernia     Hyperlipidemia, mixed 5/7/2018    Kidney stone     Liver disease     FATTY LIVER    PAC (premature atrial contraction)     Prediabetes     Renal calculi     Sleep apnea     Vestibular migraine      Past Surgical History:   Procedure Laterality Date    CARPAL TUNNEL RELEASE Left     COLONOSCOPY      FL INJECTION RIGHT HIP (ARTHROGRAM)  8/20/2018    FOOT SURGERY Left     FOREIGN BODY REMOVAL    HERNIA REPAIR      [place] COLONOSCOPY FLX DX W/COLLJ SPEC WHEN PFRMD N/A 8/7/2017    Procedure: COLONOSCOPY;  Surgeon: Chriss Salinas Darryl Garcia MD;  [facility]; Service: Gastroenterology    [place] ESOPHAGOGASTRODUODENOSCOPY TRANSORAL DIAGNOSTIC N/A 10/31/2017    Procedure: ESOPHAGOGASTRODUODENOSCOPY (EGD); Surgeon: Nanda Sharpe MD;  [facility];   Service: Gastroenterology    [place] TOTAL HIP ARTHROPLASTY Right 2020    Procedure: ARTHROPLASTY HIP TOTAL;  Surgeon: Ean Garcia MD;  [facility];  Service: Orthopedics     Family History   Problem Relation Age of Onset    Cancer Brother     Prostate cancer Brother     Leukemia Brother         his only brother dies from [address] or leukemia pt unsure he was only 46    Arthritis Mother     Heart attack Father     Clotting disorder Father     Schizoaffective Disorder  Sister     Aortic aneurysm Other         abdominal      Social History   Social History     Substance and Sexual Activity   Alcohol Use Yes    Frequency: Monthly or less    Drinks per session: 1 or 2    Binge frequency: Never    Comment: occasional beer     Social History     Substance and Sexual Activity   Drug Use No     Social History     Tobacco Use   Smoking Status Former Smoker    Types: [de-identified], Cigarettes    Quit date: 2014    Years since quittin 5   Smokeless Tobacco Never Used   Tobacco Comment    never inhaled       Medications:     Current Outpatient Medications:     acetaminophen (TYLENOL) 500 mg tablet, Take one tablet the day of surgery, then take one tablet for breakfast lunch and dinner after surgery for 5 days, Disp: 30 tablet, Rfl: 0    Dulaglutide (Trulicity) 5 08 EV/0 0DI SOPN, Inject 0 5 mL (0 75 mg total) under the skin once a week, Disp: 12 pen, Rfl: 1    fenofibrate (TRIGLIDE) 160 MG tablet, Take 1 tablet (160 mg total) by mouth daily, Disp: 90 tablet, Rfl: 3    glucose blood test strip, TEST BS TID, Disp: 300 each, Rfl: 5    glucose monitoring kit (FREESTYLE) monitoring kit, Use 1 each daily before breakfast, Disp: 1 each, Rfl: 0    Lancets MISC, Use daily before breakfast, Disp: 100 each, Rfl: 5    montelukast (SINGULAIR) 10 mg tablet, Take 10 mg by mouth daily at bedtime, Disp: , Rfl:     pantoprazole (PROTONIX) 40 mg tablet, take 1 tablet by mouth once daily, Disp: 90 tablet, Rfl: 3    rosuvastatin (CRESTOR) 20 MG tablet, take 1 tablet by mouth once daily, Disp: 30 tablet, Rfl: 5    traMADol (ULTRAM) 50 mg tablet, Take 1 PO three times daily PRN for pain  Ongoing therapy Do not fill until 3/4/2021, Disp: 90 tablet, Rfl: 2    Turmeric 500 MG CAPS, Take by mouth, Disp: , Rfl:   No Known Allergies    OBJECTIVE    Vitals:   Vitals:    03/08/21 0852   BP: 116/82   Pulse: 72   Temp: 99 8 °F (37 7 °C)   SpO2: 99%   Weight: 97 5 kg (215 lb)   Height: 6' 1 5" (1 867 m)           Physical Exam  Vitals signs and nursing note reviewed  Constitutional:       General: He is not in acute distress  Appearance: Normal appearance  HENT:      Head: Normocephalic and atraumatic  Right Ear: Tympanic membrane and ear canal normal  There is impacted cerumen  Left Ear: Tympanic membrane and ear canal normal    Eyes:      Conjunctiva/sclera: Conjunctivae normal    Cardiovascular:      Rate and Rhythm: Normal rate and regular rhythm  Pulmonary:      Effort: Pulmonary effort is normal  No respiratory distress  Breath sounds: Normal breath sounds  Abdominal:      General: Bowel sounds are normal  There is no distension  Palpations: Abdomen is soft  Tenderness: There is no abdominal tenderness  Musculoskeletal:      Right lower leg: No edema  Left lower leg: No edema  Lymphadenopathy:      Cervical: No cervical adenopathy  Skin:     General: Skin is warm and dry  Neurological:      General: No focal deficit present  Mental Status: He is alert     Psychiatric:         Mood and Affect: Mood normal                     Adenike Garg DO  [facility]   3/8/2021  9:00 AM

## 2021-03-08 ENCOUNTER — OFFICE VISIT (OUTPATIENT)
Dept: FAMILY MEDICINE CLINIC | Facility: CLINIC | Age: 56
End: 2021-03-08
Payer: MEDICARE

## 2021-03-08 VITALS
OXYGEN SATURATION: 99 % | DIASTOLIC BLOOD PRESSURE: 82 MMHG | BODY MASS INDEX: 27.59 KG/M2 | HEIGHT: 74 IN | SYSTOLIC BLOOD PRESSURE: 116 MMHG | WEIGHT: 215 LBS | HEART RATE: 72 BPM | TEMPERATURE: 99.8 F

## 2021-03-08 DIAGNOSIS — K76.0 FATTY LIVER DISEASE, NONALCOHOLIC: ICD-10-CM

## 2021-03-08 DIAGNOSIS — I71.2 ASCENDING AORTIC ANEURYSM (HCC): ICD-10-CM

## 2021-03-08 DIAGNOSIS — F11.20 UNCOMPLICATED OPIOID DEPENDENCE (HCC): ICD-10-CM

## 2021-03-08 DIAGNOSIS — J30.1 NON-SEASONAL ALLERGIC RHINITIS DUE TO POLLEN: ICD-10-CM

## 2021-03-08 DIAGNOSIS — N18.9 CHRONIC KIDNEY DISEASE-MINERAL AND BONE DISORDER: ICD-10-CM

## 2021-03-08 DIAGNOSIS — Z11.4 SCREENING FOR HIV (HUMAN IMMUNODEFICIENCY VIRUS): ICD-10-CM

## 2021-03-08 DIAGNOSIS — E11.9 TYPE 2 DIABETES MELLITUS WITHOUT COMPLICATION, WITHOUT LONG-TERM CURRENT USE OF INSULIN (HCC): Primary | ICD-10-CM

## 2021-03-08 DIAGNOSIS — G89.4 CHRONIC PAIN SYNDROME: ICD-10-CM

## 2021-03-08 DIAGNOSIS — J45.20 MILD INTERMITTENT ASTHMA WITHOUT COMPLICATION: ICD-10-CM

## 2021-03-08 DIAGNOSIS — E78.2 HYPERLIPIDEMIA, MIXED: ICD-10-CM

## 2021-03-08 DIAGNOSIS — M89.9 CHRONIC KIDNEY DISEASE-MINERAL AND BONE DISORDER: ICD-10-CM

## 2021-03-08 DIAGNOSIS — K21.9 GASTROESOPHAGEAL REFLUX DISEASE WITHOUT ESOPHAGITIS: ICD-10-CM

## 2021-03-08 DIAGNOSIS — E83.9 CHRONIC KIDNEY DISEASE-MINERAL AND BONE DISORDER: ICD-10-CM

## 2021-03-08 DIAGNOSIS — N18.31 STAGE 3A CHRONIC KIDNEY DISEASE (HCC): ICD-10-CM

## 2021-03-08 LAB — SL AMB POCT HEMOGLOBIN AIC: 6.6 (ref ?–6.5)

## 2021-03-08 PROCEDURE — 83036 HEMOGLOBIN GLYCOSYLATED A1C: CPT | Performed by: FAMILY MEDICINE

## 2021-03-08 PROCEDURE — 99214 OFFICE O/P EST MOD 30 MIN: CPT | Performed by: FAMILY MEDICINE

## 2021-03-08 RX ORDER — DULAGLUTIDE 0.75 MG/.5ML
0.75 INJECTION, SOLUTION SUBCUTANEOUS WEEKLY
Qty: 12 PEN | Refills: 1 | Status: SHIPPED | OUTPATIENT
Start: 2021-03-08 | End: 2022-02-01

## 2021-03-12 ENCOUNTER — APPOINTMENT (OUTPATIENT)
Dept: LAB | Facility: MEDICAL CENTER | Age: 56
End: 2021-03-12
Payer: MEDICARE

## 2021-03-12 DIAGNOSIS — E78.2 HYPERLIPIDEMIA, MIXED: ICD-10-CM

## 2021-03-12 DIAGNOSIS — E11.9 TYPE 2 DIABETES MELLITUS WITHOUT COMPLICATION, WITHOUT LONG-TERM CURRENT USE OF INSULIN (HCC): ICD-10-CM

## 2021-03-12 DIAGNOSIS — M89.9 CHRONIC KIDNEY DISEASE-MINERAL AND BONE DISORDER: ICD-10-CM

## 2021-03-12 DIAGNOSIS — N18.31 STAGE 3A CHRONIC KIDNEY DISEASE (HCC): ICD-10-CM

## 2021-03-12 DIAGNOSIS — N18.9 CHRONIC KIDNEY DISEASE-MINERAL AND BONE DISORDER: ICD-10-CM

## 2021-03-12 DIAGNOSIS — Z11.4 SCREENING FOR HIV (HUMAN IMMUNODEFICIENCY VIRUS): ICD-10-CM

## 2021-03-12 DIAGNOSIS — K76.0 FATTY LIVER DISEASE, NONALCOHOLIC: ICD-10-CM

## 2021-03-12 DIAGNOSIS — E83.9 CHRONIC KIDNEY DISEASE-MINERAL AND BONE DISORDER: ICD-10-CM

## 2021-03-12 LAB
ALBUMIN SERPL BCP-MCNC: 3.8 G/DL (ref 3.5–5)
ALP SERPL-CCNC: 65 U/L (ref 46–116)
ALT SERPL W P-5'-P-CCNC: 41 U/L (ref 12–78)
ANION GAP SERPL CALCULATED.3IONS-SCNC: 6 MMOL/L (ref 4–13)
AST SERPL W P-5'-P-CCNC: 32 U/L (ref 5–45)
BILIRUB SERPL-MCNC: 0.62 MG/DL (ref 0.2–1)
BUN SERPL-MCNC: 14 MG/DL (ref 5–25)
CALCIUM SERPL-MCNC: 9.3 MG/DL (ref 8.3–10.1)
CHLORIDE SERPL-SCNC: 107 MMOL/L (ref 100–108)
CHOLEST SERPL-MCNC: 81 MG/DL (ref 50–200)
CO2 SERPL-SCNC: 29 MMOL/L (ref 21–32)
CREAT SERPL-MCNC: 1.38 MG/DL (ref 0.6–1.3)
GFR SERPL CREATININE-BSD FRML MDRD: 57 ML/MIN/1.73SQ M
GLUCOSE P FAST SERPL-MCNC: 111 MG/DL (ref 65–99)
HDLC SERPL-MCNC: 27 MG/DL
LDLC SERPL CALC-MCNC: 20 MG/DL (ref 0–100)
NONHDLC SERPL-MCNC: 54 MG/DL
POTASSIUM SERPL-SCNC: 4.1 MMOL/L (ref 3.5–5.3)
PROT SERPL-MCNC: 6.9 G/DL (ref 6.4–8.2)
SODIUM SERPL-SCNC: 142 MMOL/L (ref 136–145)
TRIGL SERPL-MCNC: 171 MG/DL

## 2021-03-12 PROCEDURE — 36415 COLL VENOUS BLD VENIPUNCTURE: CPT

## 2021-03-12 PROCEDURE — 80061 LIPID PANEL: CPT

## 2021-03-12 PROCEDURE — 87389 HIV-1 AG W/HIV-1&-2 AB AG IA: CPT

## 2021-03-12 PROCEDURE — 80053 COMPREHEN METABOLIC PANEL: CPT

## 2021-03-13 LAB — HIV 1+2 AB+HIV1 P24 AG SERPL QL IA: NORMAL

## 2021-03-22 ENCOUNTER — TELEPHONE (OUTPATIENT)
Dept: OBGYN CLINIC | Facility: HOSPITAL | Age: 56
End: 2021-03-22

## 2021-03-22 NOTE — TELEPHONE ENCOUNTER
Dr Angélica Farah  RE: Faith Singer    Patient called to cancel Sx on 04 14 w/ Dr Angélica Farah    Patient does not want to rsch

## 2021-03-24 DIAGNOSIS — E78.2 MIXED HYPERLIPIDEMIA: ICD-10-CM

## 2021-03-24 RX ORDER — ROSUVASTATIN CALCIUM 20 MG/1
TABLET, COATED ORAL
Qty: 30 TABLET | Refills: 5 | Status: SHIPPED | OUTPATIENT
Start: 2021-03-24 | End: 2021-09-20

## 2021-03-26 ENCOUNTER — TELEPHONE (OUTPATIENT)
Dept: FAMILY MEDICINE CLINIC | Facility: CLINIC | Age: 56
End: 2021-03-26

## 2021-04-08 NOTE — TELEPHONE ENCOUNTER
LMOM for pt to call office  This medication is not something typically covered by insurance  Per Dr Nenita Lopez he can take OTC medications for pain relief  I performed the initial face to face bedside interview with this patient regarding history of present illness, review of symptoms and past medical, social and family history.  I completed an independent physical examination.  I was the initial provider who evaluated this patient.  The history, review of symptoms and examination was documented by the scribe in my presence and I attest to the accuracy of the documentation.  I have signed out the follow up of any pending tests (i.e. labs, radiological studies) to the ACP.  I have discussed the patient’s plan of care and disposition with the ACP.

## 2021-05-20 ENCOUNTER — OFFICE VISIT (OUTPATIENT)
Dept: PAIN MEDICINE | Facility: CLINIC | Age: 56
End: 2021-05-20
Payer: MEDICARE

## 2021-05-20 VITALS
BODY MASS INDEX: 28.07 KG/M2 | HEART RATE: 61 BPM | WEIGHT: 218.7 LBS | SYSTOLIC BLOOD PRESSURE: 107 MMHG | HEIGHT: 74 IN | DIASTOLIC BLOOD PRESSURE: 73 MMHG

## 2021-05-20 DIAGNOSIS — M47.812 CERVICAL SPONDYLOSIS: ICD-10-CM

## 2021-05-20 DIAGNOSIS — M54.12 CERVICAL RADICULOPATHY: ICD-10-CM

## 2021-05-20 DIAGNOSIS — G89.4 CHRONIC PAIN SYNDROME: Primary | ICD-10-CM

## 2021-05-20 DIAGNOSIS — F11.20 UNCOMPLICATED OPIOID DEPENDENCE (HCC): ICD-10-CM

## 2021-05-20 DIAGNOSIS — Z79.891 LONG-TERM CURRENT USE OF OPIATE ANALGESIC: ICD-10-CM

## 2021-05-20 DIAGNOSIS — M16.11 PRIMARY OSTEOARTHRITIS OF RIGHT HIP: ICD-10-CM

## 2021-05-20 PROCEDURE — 80305 DRUG TEST PRSMV DIR OPT OBS: CPT | Performed by: NURSE PRACTITIONER

## 2021-05-20 PROCEDURE — 99214 OFFICE O/P EST MOD 30 MIN: CPT | Performed by: NURSE PRACTITIONER

## 2021-05-20 RX ORDER — TRAMADOL HYDROCHLORIDE 50 MG/1
TABLET ORAL
Qty: 90 TABLET | Refills: 2 | Status: SHIPPED | OUTPATIENT
Start: 2021-05-20 | End: 2021-08-19 | Stop reason: SDUPTHER

## 2021-05-20 NOTE — PATIENT INSTRUCTIONS
You may  your prescription on 6/4/2021    Opioid Dependence   WHAT YOU NEED TO KNOW:   What is opioid dependence? Opioids are medicines, such as morphine and codeine, used to treat pain  Dependence happens after you have used opioids regularly for a long period of time  Dependence means that your body gets used to how much medicine you take  Dependence is not the same as addiction  Addiction means that a person uses opioids to get high instead of using them to control pain  What are the signs and symptoms of opioid dependence? · You need more of the opioid to get the same amount of pain relief as you did when you first started taking it  · You have tried to use less opioid medicine but are not able to  · You have withdrawal symptoms when you take less of the opioid  What are the signs and symptoms of opioid withdrawal?  You may have the following signs and symptoms if you suddenly stop taking opioids or if you decrease the amount you normally take:  · Yawning     · Runny nose     · Nausea or vomiting     · Diarrhea     · Chills or goosebumps    · Muscle aches or cramps     · Anxiety  How is opioid dependence diagnosed? Your healthcare provider will do a physical exam  He will ask you questions about your symptoms and your use of opioids  He will also ask about your current and past use of other drugs and any family history of drug abuse  How is opioid dependence treated? You may be treated in a hospital or you may be treated as an outpatient  During detoxification (detox), healthcare providers will slowly decrease your dose of the opioid medicine you are dependent on  They may use another opioid medicine such as methadone to decrease symptoms of withdrawal  You may need to take this or another medicine for some time  Your healthcare provider will also replace the opioid with another pain medicine that is less likely to cause dependence   He may also suggest that you receive counseling and social support during treatment  What are the risks of opioid dependence? There is a risk of overdose during early treatment with methadone  You may become dependent on the medicines used to treat opioid dependence  Without treatment, you may develop other health problems or become addicted to opioids  Your risk of abusing alcohol and other drugs increases  You may also develop risky behaviors that can lead to an overdose, violence, and suicidal thoughts  When should I contact my healthcare provider? · Your speech is slurred  · You have difficulty staying awake  · You have nausea and vomiting  · You are easily upset or cry easily  · You have poor balance  · You have questions or concerns about your condition or care  When should I seek immediate care or call 911? · You feel lightheaded or faint  · You have a fast, slow, or irregular heartbeat  · You have a seizure  CARE AGREEMENT:   You have the right to help plan your care  Learn about your health condition and how it may be treated  Discuss treatment options with your caregivers to decide what care you want to receive  You always have the right to refuse treatment  The above information is an  only  It is not intended as medical advice for individual conditions or treatments  Talk to your doctor, nurse or pharmacist before following any medical regimen to see if it is safe and effective for you  © 2017 [address] Information is for End User's use only and may not be sold, redistributed or otherwise used for commercial purposes  All illustrations and images included in CareNotes® are the copyrighted property of [employer]  or [employer]elly Stokes

## 2021-05-20 NOTE — PROGRESS NOTES
Assessment:  1  Chronic pain syndrome    2  Cervical radiculopathy    3  Cervical spondylosis    4  Primary osteoarthritis of right hip    5  Long-term current use of opiate analgesic    6  Uncomplicated opioid dependence ([address] )        Plan:  The patient is a 61 y o  male last seen on 2/22/2021 who presents for a follow up office visit in regards to chronic pain secondary to cervical radiculopathy, cervical spondylosis, and right hip osteoarthritis    The patient continues with positive relief from the cervical epidural steroid injection completed on 01/12/2021  The patient does present today with bilateral lumbar paraspinal trigger points  I discussed trigger point injections with the patient and the use of muscle relaxers to help with his low back pain  The most common risks of the procedure were reviewed with the patient  The patient reports he has a muscle relaxer at home that he will use  The patient reports he will call to schedule trigger point injections if no relief from the muscle relaxers  Also discussed with the patient to call for refills of the muscle relaxer should he run out prior to his next scheduled office visit  The patient will continue with his pain medication regimen as prescribed  A prescription for tramadol 50 mg by mouth every 8 hours was electronically sent to the patient's pharmacy on file with do not fill dates of 06/04/2021 and 2 refills  [place] Prescription Drug Monitoring Program report was reviewed and was appropriate     A urine drug screen was collected at today's office visit as part of our medication management protocol  The point of care testing results were appropriate for what was being prescribed  The specimen will be sent for confirmatory testing  The drug screen is medically necessary because the patient is either dependent on opioid medication or is being considered for opioid medication therapy and the results could impact ongoing or future treatment  The drug screen is to evaluate for the presences or absence of prescribed, non-prescribed, and/or illicit drugs/substances  There are risks associated with opioid medications, including dependence, addiction and tolerance  The patient understands and agrees to use these medications only as prescribed  Potential side effects of the medications include, but are not limited to, constipation, drowsiness, addiction, impaired judgment and risk of fatal overdose if not taken as prescribed  The patient was warned against driving while taking sedation medications  Sharing medications is a felony  At this point in time, the patient is showing no signs of addiction, abuse, diversion or suicidal ideation  The patient will follow-up in 12 weeks for medication prescription refill and reevaluation  The patient was advised to contact the office should their symptoms worsen in the interim  The patient was agreeable and verbalized an understanding  History of Present Illness: The patient is a 61 y o  male last seen on 2/22/2021 who presents for a follow up office visit in regards to chronic pain secondary to cervical radiculopathy, cervical spondylosis, and right hip osteoarthritis  The patient currently reports  Ongoing neck pain that is unchanged since last office visit  The patient reports continued relief of his neck pain since the cervical epidural steroid injection  At this time, the patient reports low back pain that is occasional, sharp pain that is worse in the morning in the evening  The patient describes the pain as muscle tightness with cramping  The patient was previously scheduled for right cubital tunnel release surgery in March; however, the patient had to cancel procedure as he is waiting to obtain secondary insurance coverage  The patient denies muscle weakness and reports no bowel or bladder dysfunction, and is able to complete ADLs with minimal difficulty    The patient currently rates his pain as 4/10 on the numeric rating scale  Current pain medications include: Tramadol 50 mg by mouth every 8 hours  Pain Contract Signed: 1/12/2021  Last Urine Drug Screen: 5/20/2021    I have personally reviewed and/or updated the patient's past medical history, past surgical history, family history, social history, current medications, allergies, and vital signs today  Review of Systems:    Review of Systems      Past Medical History:   Diagnosis Date    Aorta aneurysm ([facility] )     4 3    Arm DVT (deep venous thromboembolism), acute ([facility] [address] ) 5409    complications of cardiac cath    Asthma     Chronic kidney disease     CKD (chronic kidney disease), stage III (HCC)     COPD (chronic obstructive pulmonary disease) (HCC)     Depression     Diabetes mellitus (HCC)     Essential hypertriglyceridemia     GERD (gastroesophageal reflux disease)     Headache     Hiatal hernia     Hyperlipidemia, mixed 5/7/2018    Kidney stone     Liver disease     FATTY LIVER    PAC (premature atrial contraction)     Prediabetes     Renal calculi     Sleep apnea     Vestibular migraine        Past Surgical History:   Procedure Laterality Date    CARPAL TUNNEL RELEASE Left     COLONOSCOPY      FL INJECTION RIGHT HIP (ARTHROGRAM)  8/20/2018    FOOT SURGERY Left     FOREIGN BODY REMOVAL    HERNIA REPAIR      MS COLONOSCOPY FLX DX W/COLLJ SPEC WHEN PFRMD N/A 8/7/2017    Procedure: COLONOSCOPY;  Surgeon: Jeanna Sorensen MD;  [facility]; Service: Gastroenterology    MS ESOPHAGOGASTRODUODENOSCOPY TRANSORAL DIAGNOSTIC N/A 10/31/2017    Procedure: ESOPHAGOGASTRODUODENOSCOPY (EGD); Surgeon: Jeanna Sorensen MD;  [facility];   Service: Gastroenterology    MS TOTAL HIP ARTHROPLASTY Right 9/28/2020    Procedure: ARTHROPLASTY HIP TOTAL;  Surgeon: Esther Veronica MD;  [facility];  Service: Orthopedics       Family History   Problem Relation Age of Onset    Cancer Brother     Prostate cancer Brother     Leukemia Brother         his only brother dies from [address] or leukemia pt unsure he was only 46    Arthritis Mother     Heart attack Father     Clotting disorder Father     Schizoaffective Disorder  Sister     Aortic aneurysm Other         abdominal       Social History     Occupational History    Occupation: unemployed   Tobacco Use    Smoking status: Former Smoker     Types: Cigars, Cigarettes     Quit date: 2014     Years since quittin 7    Smokeless tobacco: Never Used    Tobacco comment: never inhaled   Substance and Sexual Activity    Alcohol use: Yes     Frequency: Monthly or less     Drinks per session: 1 or 2     Binge frequency: Never     Comment: occasional beer    Drug use: No    Sexual activity: Not Currently         Current Outpatient Medications:     acetaminophen (TYLENOL) 500 mg tablet, Take one tablet the day of surgery, then take one tablet for breakfast lunch and dinner after surgery for 5 days, Disp: 30 tablet, Rfl: 0    Dulaglutide (Trulicity) 6 66 BU/8 8JE SOPN, Inject 0 5 mL (0 75 mg total) under the skin once a week, Disp: 12 pen, Rfl: 1    glucose blood test strip, TEST BS TID, Disp: 300 each, Rfl: 5    glucose monitoring kit (FREESTYLE) monitoring kit, Use 1 each daily before breakfast, Disp: 1 each, Rfl: 0    Lancets MISC, Use daily before breakfast, Disp: 100 each, Rfl: 5    montelukast (SINGULAIR) 10 mg tablet, Take 10 mg by mouth daily at bedtime, Disp: , Rfl:     pantoprazole (PROTONIX) 40 mg tablet, take 1 tablet by mouth once daily, Disp: 90 tablet, Rfl: 3    rosuvastatin (CRESTOR) 20 MG tablet, take 1 tablet by mouth once daily, Disp: 30 tablet, Rfl: 5    traMADol (ULTRAM) 50 mg tablet, Take 1 PO three times daily PRN for pain   Ongoing therapy Do not fill until 2021, Disp: 90 tablet, Rfl: 2    Turmeric 500 MG CAPS, Take by mouth, Disp: , Rfl:     fenofibrate (TRIGLIDE) 160 MG tablet, Take 1 tablet (160 mg total) by mouth daily, Disp: 90 tablet, Rfl: 3    No Known Allergies    Physical Exam:    /73   Pulse 61   Ht 6' 1 5" (1 867 m)   Wt 99 2 kg (218 lb 11 2 oz)   BMI 28 46 kg/m²     Constitutional:normal, well developed, well nourished, alert, in no distress and non-toxic and no overt pain behavior   and overweight  Eyes:anicteric  HEENT:grossly intact  Neck:supple, symmetric, trachea midline and no masses   Pulmonary:even and unlabored  Cardiovascular:No edema or pitting edema present  Skin:Normal without rashes or lesions and well hydrated  Psychiatric:Mood and affect appropriate  Neurologic:Cranial Nerves II-XII grossly intact  Musculoskeletal:normal     Lumbar Spine Exam    Appearance:  Normal lordosis  Palpation/Tenderness:  left lumbar paraspinal tenderness  right lumbar paraspinal tenderness  palpable lumber paraspinal trigger points    Imaging  No orders to display         Orders Placed This Encounter   Procedures    MM ALL_Prescribed Meds and Special Instructions    MM DT_Alprazolam Definitive Test    MM DT_Amphetamine Definitive Test    MM DT_Buprenorphine Definitive Test    MM DT_Carisoprodol Definitive Test    MM DT_Clonazepam Definitive Test    MM DT_Clozapine Definitive Test    MM DT_Cocaine Definitive Test    MM DT_Codeine Definitive Test    MM Diazepam Definitive Test    MM DT_Ethyl Glucuronide/Ethyl Sulfate Definitive Test    MM DT_Fentanyl Definitive Test    MM DT_Heroin Definitive Test    MM DT_Hydrocodone Definitive Test    MM DT_Hydromorphone Definitive Test    MM DT_Kratom Definitive Test    MM Lorazepam Definitive Test    MM DT_MDMA Definitive Test    MM DT_Methadone Definitive Test    MM DT_Morphine Definitive Test    MM DT_Oxazepam Definitive Test    MM DT_Oxycodone Definitive Test    MM DT_Oxymorphone Definitive Test    MM DT_Phentermine Definitive Test    MM DT_Tapentadol Definitive Test    MM DT_Temazapam Definitive Test    MM DT_THC Definitive Test    MM DT_Tramadol Definitive Test    MM DT_Validity Creatinine    MM DT_Validity Oxidant    MM DT_Validity pH    MM DT_Validity Specific

## 2021-05-26 LAB
6MAM UR QL CFM: NEGATIVE NG/ML
7AMINOCLONAZEPAM UR QL CFM: NEGATIVE NG/ML
A-OH ALPRAZ UR QL CFM: NEGATIVE NG/ML
ACCEPTABLE CREAT UR QL: NORMAL MG/DL
ACCEPTIBLE SP GR UR QL: NORMAL
AMPHET UR QL CFM: NEGATIVE NG/ML
BUPRENORPHINE UR QL CFM: NEGATIVE NG/ML
BZE UR QL CFM: NEGATIVE NG/ML
CARISOPRODOL UR QL CFM: NEGATIVE NG/ML
CODEINE UR QL CFM: NEGATIVE NG/ML
EDDP UR QL CFM: NEGATIVE NG/ML
ETHYL GLUCURONIDE UR QL CFM: NEGATIVE NG/ML
ETHYL SULFATE UR QL SCN: NEGATIVE NG/ML
FENTANYL UR QL CFM: NEGATIVE NG/ML
GLIADIN IGG SER IA-ACNC: NEGATIVE NG/ML
HYDROCODONE UR QL CFM: NEGATIVE NG/ML
HYDROCODONE UR QL CFM: NEGATIVE NG/ML
HYDROMORPHONE UR QL CFM: NEGATIVE NG/ML
LORAZEPAM UR QL CFM: NEGATIVE NG/ML
MDMA UR QL CFM: NEGATIVE NG/ML
MEPROBAMATE UR QL CFM: NEGATIVE NG/ML
METHADONE UR QL CFM: NEGATIVE NG/ML
MORPHINE UR QL CFM: NEGATIVE NG/ML
MORPHINE UR QL CFM: NEGATIVE NG/ML
NITRITE UR QL: NORMAL UG/ML
NORBUPRENORPHINE UR QL CFM: NEGATIVE NG/ML
NORDIAZEPAM UR QL CFM: NEGATIVE NG/ML
NORFENTANYL UR QL CFM: NEGATIVE NG/ML
NORHYDROCODONE UR QL CFM: NEGATIVE NG/ML
NORHYDROCODONE UR QL CFM: NEGATIVE NG/ML
NOROXYCODONE UR QL CFM: NEGATIVE NG/ML
OXAZEPAM UR QL CFM: NEGATIVE NG/ML
OXYCODONE UR QL CFM: NEGATIVE NG/ML
OXYMORPHONE UR QL CFM: NEGATIVE NG/ML
OXYMORPHONE UR QL CFM: NEGATIVE NG/ML
RESULT ALL_PRESCRIBED MEDS AND SPECIAL INSTRUCTIONS: NORMAL
SL AMB 3-METHYL-FENTANYL QUANTIFICATION: NORMAL NG/ML
SL AMB 4-ANPP QUANTIFICATION: NORMAL NG/ML
SL AMB 4-FIBF QUANTIFICATION: NORMAL NG/ML
SL AMB 7-OH-MITRAGYNINE (KRATOM ALKALOID) QUANTIFICATION: NEGATIVE NG/ML
SL AMB ACETYL FENTANYL QUANTIFICATION: NORMAL NG/ML
SL AMB ACETYL NORFENTANYL QUANTIFICATION: NORMAL NG/ML
SL AMB ACRYL FENTANYL QUANTIFICATION: NORMAL NG/ML
SL AMB BUTRYL FENTANYL QUANTIFICATION: NORMAL NG/ML
SL AMB CARFENTANIL QUANTIFICATION: NORMAL NG/ML
SL AMB CLOZAPINE QUANTIFICATION: NEGATIVE NG/ML
SL AMB CTHC (MARIJUANA METABOLITE) QUANTIFICATION: NEGATIVE NG/ML
SL AMB CYCLOPROPYL FENTANYL QUANTIFICATION: NORMAL NG/ML
SL AMB FURANYL FENTANYL QUANTIFICATION: NORMAL NG/ML
SL AMB METHOXYACETYL FENTANYL QUANTIFICATION: NORMAL NG/ML
SL AMB N-DESMETHYL U-47700 QUANTIFICATION: NORMAL NG/ML
SL AMB N-DESMETHYL-TRAMADOL QUANTIFICATION: ABNORMAL NG/ML
SL AMB N-DESMETHYLCLOZAPINE QUANTIFICATION: NEGATIVE NG/ML
SL AMB PHENTERMINE QUANTIFICATION: NEGATIVE NG/ML
SL AMB U-47700 QUANTIFICATION: NORMAL NG/ML
SPECIMEN PH ACCEPTABLE UR: NORMAL
TAPENTADOL UR QL CFM: NEGATIVE NG/ML
TEMAZEPAM UR QL CFM: NEGATIVE NG/ML
TEMAZEPAM UR QL CFM: NEGATIVE NG/ML
TRAMADOL UR QL CFM: ABNORMAL NG/ML
URATE/CREAT 24H UR: ABNORMAL NG/ML

## 2021-06-03 PROBLEM — E11.22 TYPE 2 DIABETES MELLITUS WITH STAGE 3A CHRONIC KIDNEY DISEASE, WITHOUT LONG-TERM CURRENT USE OF INSULIN (HCC): Status: ACTIVE | Noted: 2018-05-07

## 2021-06-03 PROBLEM — N18.31 TYPE 2 DIABETES MELLITUS WITH STAGE 3A CHRONIC KIDNEY DISEASE, WITHOUT LONG-TERM CURRENT USE OF INSULIN (HCC): Status: ACTIVE | Noted: 2018-05-07

## 2021-06-08 ENCOUNTER — OFFICE VISIT (OUTPATIENT)
Dept: FAMILY MEDICINE CLINIC | Facility: CLINIC | Age: 56
End: 2021-06-08
Payer: MEDICARE

## 2021-06-08 VITALS
OXYGEN SATURATION: 96 % | TEMPERATURE: 96.8 F | HEART RATE: 62 BPM | HEIGHT: 74 IN | BODY MASS INDEX: 27.67 KG/M2 | DIASTOLIC BLOOD PRESSURE: 76 MMHG | SYSTOLIC BLOOD PRESSURE: 108 MMHG | WEIGHT: 215.6 LBS

## 2021-06-08 DIAGNOSIS — E11.22 TYPE 2 DIABETES MELLITUS WITH STAGE 3A CHRONIC KIDNEY DISEASE, WITHOUT LONG-TERM CURRENT USE OF INSULIN (HCC): Primary | ICD-10-CM

## 2021-06-08 DIAGNOSIS — N18.31 TYPE 2 DIABETES MELLITUS WITH STAGE 3A CHRONIC KIDNEY DISEASE, WITHOUT LONG-TERM CURRENT USE OF INSULIN (HCC): Primary | ICD-10-CM

## 2021-06-08 DIAGNOSIS — H61.21 RIGHT EAR IMPACTED CERUMEN: ICD-10-CM

## 2021-06-08 DIAGNOSIS — E78.2 HYPERLIPIDEMIA, MIXED: ICD-10-CM

## 2021-06-08 DIAGNOSIS — N18.31 STAGE 3A CHRONIC KIDNEY DISEASE (HCC): ICD-10-CM

## 2021-06-08 LAB — SL AMB POCT HEMOGLOBIN AIC: 6 (ref ?–6.5)

## 2021-06-08 PROCEDURE — 69210 REMOVE IMPACTED EAR WAX UNI: CPT | Performed by: FAMILY MEDICINE

## 2021-06-08 PROCEDURE — 83036 HEMOGLOBIN GLYCOSYLATED A1C: CPT | Performed by: FAMILY MEDICINE

## 2021-06-08 PROCEDURE — 99214 OFFICE O/P EST MOD 30 MIN: CPT | Performed by: FAMILY MEDICINE

## 2021-06-08 NOTE — PROGRESS NOTES
Marcelino Reionso 1965 male MRN: [id number]      ASSESSMENT/PLAN  Problem List Items Addressed This Visit        Endocrine    Type 2 diabetes mellitus with stage 3a chronic kidney disease, without long-term current use of insulin ([facility] ) - Primary    Relevant Orders    POCT hemoglobin A1c (Completed)       Genitourinary    CKD (chronic kidney disease) stage 3, GFR 30-59 ml/min ([facility])       Other    Hyperlipidemia, mixed      Other Visit Diagnoses     Right ear impacted cerumen        Relevant Orders    Ear cerumen removal        DM: A1c 6 0% (improved from 6 6%), continue current regimen    HLD: Continue current regimen + lifestyle modifications    CKD3a: F/u with Nephro as scheduled, labs ordered to complete prior to visit     BMI Counseling: Body mass index is 28 06 kg/m²  The BMI is above normal  Nutrition recommendations include 3-5 servings of fruits/vegetables daily  Exercise recommendations include exercising 3-5 times per week  Future Appointments   Date Time Provider Kristen Fine   6/16/2021  9:40 AM Mike Vences MD [facility]   8/19/2021 10:15 AM NICK Hernandez [facility]   9/9/2021  8:00 AM DO ALFONZO Allison  [facility]          SUBJECTIVE  CC: Diabetes (3 mo follow up)      HPI:  Marcelino Reinoso is a 61 y o  male who presents for chronic follow up as detailed below  DM: Home sugars: "not every day"; no hypo/hyperglycemic symptoms   HLD: Tolerating statin/fenofibrate without issue   CKD3a: Following with Nephro      Review of Systems   Constitutional: Negative for diaphoresis  Respiratory: Negative for shortness of breath  Cardiovascular: Negative for chest pain and palpitations  Gastrointestinal: Negative for constipation and diarrhea  Endocrine: Negative for polydipsia and polyuria  Neurological: Negative for dizziness and tremors         Historical Information   The patient history was reviewed and updated as follows:    Past Medical History: Diagnosis Date    Aorta aneurysm ([facility] [address] )     4 3    Arm DVT (deep venous thromboembolism), acute ([facility] [address] ) 1011    complications of cardiac cath    Asthma     Chronic kidney disease     CKD (chronic kidney disease), stage III (HCC)     COPD (chronic obstructive pulmonary disease) (HCC)     Depression     Diabetes mellitus (HCC)     Essential hypertriglyceridemia     GERD (gastroesophageal reflux disease)     Headache     Hiatal hernia     Hyperlipidemia, mixed 5/7/2018    Kidney stone     Liver disease     FATTY LIVER    PAC (premature atrial contraction)     Prediabetes     Renal calculi     Sleep apnea     Vestibular migraine      Past Surgical History:   Procedure Laterality Date    CARPAL TUNNEL RELEASE Left     COLONOSCOPY      FL INJECTION RIGHT HIP (ARTHROGRAM)  8/20/2018    FOOT SURGERY Left     FOREIGN BODY REMOVAL    HERNIA REPAIR      ND COLONOSCOPY FLX DX W/COLLJ SPEC WHEN PFRMD N/A 8/7/2017    Procedure: COLONOSCOPY;  Surgeon: Zuri Bianchi MD;  [facility]; Service: Gastroenterology    ND ESOPHAGOGASTRODUODENOSCOPY TRANSORAL DIAGNOSTIC N/A 10/31/2017    Procedure: ESOPHAGOGASTRODUODENOSCOPY (EGD); Surgeon: Zuri Bianchi MD;  [facility];   Service: Gastroenterology    ND TOTAL HIP ARTHROPLASTY Right 9/28/2020    Procedure: ARTHROPLASTY HIP TOTAL;  Surgeon: Mable Day MD;  [facility];  Service: Orthopedics     Family History   Problem Relation Age of Onset    Cancer Brother     Prostate cancer Brother     Leukemia Brother         his only brother dies from [address] or leukemia pt unsure he was only 46    Arthritis Mother     Heart attack Father     Clotting disorder Father     Schizoaffective Disorder  Sister     Aortic aneurysm Other         abdominal      Social History   Social History     Substance and Sexual Activity   Alcohol Use Yes    Frequency: Monthly or less    Drinks per session: 1 or 2    Binge frequency: Never Comment: occasional beer     Social History     Substance and Sexual Activity   Drug Use No     Social History     Tobacco Use   Smoking Status Former Smoker    Types: [de-identified], Cigarettes    Quit date: 2014    Years since quittin 8   Smokeless Tobacco Never Used   Tobacco Comment    never inhaled       Medications:     Current Outpatient Medications:     Dulaglutide (Trulicity) / 4TB SOPN, Inject 0 5 mL (0 75 mg total) under the skin once a week, Disp: 12 pen, Rfl: 1    glucose blood test strip, TEST BS TID, Disp: 300 each, Rfl: 5    glucose monitoring kit (FREESTYLE) monitoring kit, Use 1 each daily before breakfast, Disp: 1 each, Rfl: 0    Lancets MISC, Use daily before breakfast, Disp: 100 each, Rfl: 5    montelukast (SINGULAIR) 10 mg tablet, Take 10 mg by mouth daily at bedtime, Disp: , Rfl:     pantoprazole (PROTONIX) 40 mg tablet, take 1 tablet by mouth once daily, Disp: 90 tablet, Rfl: 3    rosuvastatin (CRESTOR) 20 MG tablet, take 1 tablet by mouth once daily, Disp: 30 tablet, Rfl: 5    traMADol (ULTRAM) 50 mg tablet, Take 1 PO three times daily PRN for pain  Ongoing therapy Do not fill until 2021, Disp: 90 tablet, Rfl: 2    Turmeric 500 MG CAPS, Take by mouth, Disp: , Rfl:     fenofibrate (TRIGLIDE) 160 MG tablet, Take 1 tablet (160 mg total) by mouth daily, Disp: 90 tablet, Rfl: 3  No Known Allergies    OBJECTIVE    Vitals:   Vitals:    21 0816   BP: 108/76   Pulse: 62   Temp: (!) 96 8 °F (36 °C)   SpO2: 96%   Weight: 97 8 kg (215 lb 9 6 oz)   Height: 6' 1 5" (1 867 m)           Physical Exam  Vitals signs and nursing note reviewed  Constitutional:       General: He is not in acute distress  Appearance: Normal appearance  HENT:      Head: Normocephalic and atraumatic  Right Ear: External ear normal  There is impacted cerumen        Left Ear: Tympanic membrane, ear canal and external ear normal       Nose: Nose normal       Mouth/Throat:      Mouth: Mucous membranes are moist       Pharynx: No oropharyngeal exudate or posterior oropharyngeal erythema  Eyes:      Conjunctiva/sclera: Conjunctivae normal    Cardiovascular:      Rate and Rhythm: Normal rate and regular rhythm  Pulmonary:      Effort: Pulmonary effort is normal  No respiratory distress  Breath sounds: Normal breath sounds  Abdominal:      General: Bowel sounds are normal  There is no distension  Palpations: Abdomen is soft  Tenderness: There is no abdominal tenderness  Musculoskeletal:      Right lower leg: No edema  Left lower leg: No edema  Lymphadenopathy:      Cervical: No cervical adenopathy  Skin:     General: Skin is warm and dry  Neurological:      General: No focal deficit present  Mental Status: He is alert  Psychiatric:         Mood and Affect: Mood normal             Ear cerumen removal    Date/Time: 6/8/2021 8:45 AM  Performed by: Marjorie Munoz DO  Authorized by: Marjorie Munoz DO   Universal Protocol:  Consent: Verbal consent obtained  Patient identity confirmed: verbally with patient      Patient location:  Clinic  Procedure details:     Location:  R ear    Procedure type: curette      Approach:  External  Post-procedure details:     Complication:  None    Hearing quality:  Improved    Patient tolerance of procedure:   Tolerated well, no immediate complications              Marjorie Munoz DO  [facility] [address] [facility]   6/8/2021  8:47 AM

## 2021-06-09 ENCOUNTER — TELEPHONE (OUTPATIENT)
Dept: NEPHROLOGY | Facility: CLINIC | Age: 56
End: 2021-06-09

## 2021-06-14 ENCOUNTER — APPOINTMENT (OUTPATIENT)
Dept: LAB | Facility: MEDICAL CENTER | Age: 56
End: 2021-06-14
Payer: MEDICARE

## 2021-06-14 DIAGNOSIS — N18.9 CHRONIC KIDNEY DISEASE-MINERAL AND BONE DISORDER: ICD-10-CM

## 2021-06-14 DIAGNOSIS — N18.31 STAGE 3A CHRONIC KIDNEY DISEASE (HCC): ICD-10-CM

## 2021-06-14 DIAGNOSIS — J44.9 CHRONIC OBSTRUCTIVE PULMONARY DISEASE, UNSPECIFIED COPD TYPE (HCC): ICD-10-CM

## 2021-06-14 DIAGNOSIS — R73.03 PREDIABETES: ICD-10-CM

## 2021-06-14 DIAGNOSIS — J45.20 MILD INTERMITTENT ASTHMA WITHOUT COMPLICATION: ICD-10-CM

## 2021-06-14 DIAGNOSIS — K21.9 GASTROESOPHAGEAL REFLUX DISEASE WITHOUT ESOPHAGITIS: ICD-10-CM

## 2021-06-14 DIAGNOSIS — M16.11 PRIMARY OSTEOARTHRITIS OF RIGHT HIP: ICD-10-CM

## 2021-06-14 DIAGNOSIS — I71.2 ASCENDING AORTIC ANEURYSM (HCC): ICD-10-CM

## 2021-06-14 DIAGNOSIS — E55.9 VITAMIN D DEFICIENCY: ICD-10-CM

## 2021-06-14 DIAGNOSIS — E78.2 HYPERLIPIDEMIA, MIXED: ICD-10-CM

## 2021-06-14 DIAGNOSIS — M89.9 CHRONIC KIDNEY DISEASE-MINERAL AND BONE DISORDER: ICD-10-CM

## 2021-06-14 DIAGNOSIS — J30.1 SEASONAL ALLERGIC RHINITIS DUE TO POLLEN: ICD-10-CM

## 2021-06-14 DIAGNOSIS — E83.9 CHRONIC KIDNEY DISEASE-MINERAL AND BONE DISORDER: ICD-10-CM

## 2021-06-14 DIAGNOSIS — F41.8 DEPRESSION WITH ANXIETY: ICD-10-CM

## 2021-06-14 DIAGNOSIS — K76.0 FATTY LIVER DISEASE, NONALCOHOLIC: ICD-10-CM

## 2021-06-14 LAB
25(OH)D3 SERPL-MCNC: 29.5 NG/ML (ref 30–100)
ANION GAP SERPL CALCULATED.3IONS-SCNC: 4 MMOL/L (ref 4–13)
BASOPHILS # BLD AUTO: 0.05 THOUSANDS/ΜL (ref 0–0.1)
BASOPHILS NFR BLD AUTO: 1 % (ref 0–1)
BILIRUB UR QL STRIP: NEGATIVE
BUN SERPL-MCNC: 14 MG/DL (ref 5–25)
CALCIUM SERPL-MCNC: 9.9 MG/DL (ref 8.3–10.1)
CHLORIDE SERPL-SCNC: 104 MMOL/L (ref 100–108)
CLARITY UR: CLEAR
CO2 SERPL-SCNC: 30 MMOL/L (ref 21–32)
COLOR UR: YELLOW
CREAT SERPL-MCNC: 1.42 MG/DL (ref 0.6–1.3)
CREAT UR-MCNC: 222 MG/DL
EOSINOPHIL # BLD AUTO: 0.19 THOUSAND/ΜL (ref 0–0.61)
EOSINOPHIL NFR BLD AUTO: 3 % (ref 0–6)
ERYTHROCYTE [DISTWIDTH] IN BLOOD BY AUTOMATED COUNT: 12.6 % (ref 11.6–15.1)
EST. AVERAGE GLUCOSE BLD GHB EST-MCNC: 123 MG/DL
GFR SERPL CREATININE-BSD FRML MDRD: 54 ML/MIN/1.73SQ M
GLUCOSE P FAST SERPL-MCNC: 117 MG/DL (ref 65–99)
GLUCOSE UR STRIP-MCNC: NEGATIVE MG/DL
HBA1C MFR BLD: 5.9 %
HCT VFR BLD AUTO: 46.9 % (ref 36.5–49.3)
HGB BLD-MCNC: 15.1 G/DL (ref 12–17)
HGB UR QL STRIP.AUTO: NEGATIVE
IMM GRANULOCYTES # BLD AUTO: 0.01 THOUSAND/UL (ref 0–0.2)
IMM GRANULOCYTES NFR BLD AUTO: 0 % (ref 0–2)
KETONES UR STRIP-MCNC: NEGATIVE MG/DL
LEUKOCYTE ESTERASE UR QL STRIP: NEGATIVE
LYMPHOCYTES # BLD AUTO: 1.56 THOUSANDS/ΜL (ref 0.6–4.47)
LYMPHOCYTES NFR BLD AUTO: 26 % (ref 14–44)
MCH RBC QN AUTO: 32 PG (ref 26.8–34.3)
MCHC RBC AUTO-ENTMCNC: 32.2 G/DL (ref 31.4–37.4)
MCV RBC AUTO: 99 FL (ref 82–98)
MONOCYTES # BLD AUTO: 0.56 THOUSAND/ΜL (ref 0.17–1.22)
MONOCYTES NFR BLD AUTO: 10 % (ref 4–12)
NEUTROPHILS # BLD AUTO: 3.54 THOUSANDS/ΜL (ref 1.85–7.62)
NEUTS SEG NFR BLD AUTO: 60 % (ref 43–75)
NITRITE UR QL STRIP: NEGATIVE
NRBC BLD AUTO-RTO: 0 /100 WBCS
PH UR STRIP.AUTO: 5.5 [PH]
PHOSPHATE SERPL-MCNC: 2.7 MG/DL (ref 2.7–4.5)
PLATELET # BLD AUTO: 233 THOUSANDS/UL (ref 149–390)
PMV BLD AUTO: 10.7 FL (ref 8.9–12.7)
POTASSIUM SERPL-SCNC: 4.2 MMOL/L (ref 3.5–5.3)
PROT UR STRIP-MCNC: NEGATIVE MG/DL
PROT UR-MCNC: 7 MG/DL
PROT/CREAT UR: 0.03 MG/G{CREAT} (ref 0–0.1)
PTH-INTACT SERPL-MCNC: 31 PG/ML (ref 18.4–80.1)
RBC # BLD AUTO: 4.72 MILLION/UL (ref 3.88–5.62)
SODIUM SERPL-SCNC: 138 MMOL/L (ref 136–145)
SP GR UR STRIP.AUTO: 1.02 (ref 1–1.03)
UROBILINOGEN UR QL STRIP.AUTO: 0.2 E.U./DL
WBC # BLD AUTO: 5.91 THOUSAND/UL (ref 4.31–10.16)

## 2021-06-14 PROCEDURE — 81003 URINALYSIS AUTO W/O SCOPE: CPT

## 2021-06-14 PROCEDURE — 82306 VITAMIN D 25 HYDROXY: CPT

## 2021-06-14 PROCEDURE — 82570 ASSAY OF URINE CREATININE: CPT

## 2021-06-14 PROCEDURE — 84156 ASSAY OF PROTEIN URINE: CPT

## 2021-06-14 PROCEDURE — 84100 ASSAY OF PHOSPHORUS: CPT

## 2021-06-14 PROCEDURE — 36415 COLL VENOUS BLD VENIPUNCTURE: CPT

## 2021-06-14 PROCEDURE — 85025 COMPLETE CBC W/AUTO DIFF WBC: CPT

## 2021-06-14 PROCEDURE — 83970 ASSAY OF PARATHORMONE: CPT

## 2021-06-14 PROCEDURE — 80048 BASIC METABOLIC PNL TOTAL CA: CPT

## 2021-06-14 PROCEDURE — 83036 HEMOGLOBIN GLYCOSYLATED A1C: CPT

## 2021-06-15 ENCOUNTER — TELEPHONE (OUTPATIENT)
Dept: NEPHROLOGY | Facility: CLINIC | Age: 56
End: 2021-06-15

## 2021-06-16 ENCOUNTER — OFFICE VISIT (OUTPATIENT)
Dept: NEPHROLOGY | Facility: CLINIC | Age: 56
End: 2021-06-16
Payer: MEDICARE

## 2021-06-16 VITALS
WEIGHT: 216.4 LBS | RESPIRATION RATE: 16 BRPM | HEIGHT: 74 IN | BODY MASS INDEX: 27.77 KG/M2 | DIASTOLIC BLOOD PRESSURE: 70 MMHG | SYSTOLIC BLOOD PRESSURE: 110 MMHG | HEART RATE: 68 BPM | TEMPERATURE: 97.2 F

## 2021-06-16 DIAGNOSIS — N18.9 CHRONIC KIDNEY DISEASE-MINERAL AND BONE DISORDER: ICD-10-CM

## 2021-06-16 DIAGNOSIS — E11.22 TYPE 2 DIABETES MELLITUS WITH STAGE 3A CHRONIC KIDNEY DISEASE, WITHOUT LONG-TERM CURRENT USE OF INSULIN (HCC): ICD-10-CM

## 2021-06-16 DIAGNOSIS — N18.31 TYPE 2 DIABETES MELLITUS WITH STAGE 3A CHRONIC KIDNEY DISEASE, WITHOUT LONG-TERM CURRENT USE OF INSULIN (HCC): ICD-10-CM

## 2021-06-16 DIAGNOSIS — N18.31 STAGE 3A CHRONIC KIDNEY DISEASE (HCC): Primary | ICD-10-CM

## 2021-06-16 DIAGNOSIS — M89.9 CHRONIC KIDNEY DISEASE-MINERAL AND BONE DISORDER: ICD-10-CM

## 2021-06-16 DIAGNOSIS — E83.9 CHRONIC KIDNEY DISEASE-MINERAL AND BONE DISORDER: ICD-10-CM

## 2021-06-16 PROCEDURE — 99214 OFFICE O/P EST MOD 30 MIN: CPT | Performed by: INTERNAL MEDICINE

## 2021-06-16 NOTE — PROGRESS NOTES
NEPHROLOGY OFFICE FOLLOW UP  Khadra Brandt 61 y o  male MRN: [id number]    Encounter: [id number] 6/16/2021    REASON FOR VISIT: Khadra Brandt is a 61 y o  male who is here on 6/16/2021 for Chronic Kidney Disease and Follow-up    HPI:     Fernanda Archibald came in today for follow-up of CKD  13-year-old gentleman who is overall doing quite well  Has some back pain but no other acute complaint     Denies any chest pain palpitation or shortness of breath     No nausea no vomiting no urinary complaint      REVIEW OF SYSTEMS:    Review of Systems   Constitutional: Negative for activity change and fatigue  HENT: Negative for congestion and ear discharge  Eyes: Negative for photophobia and pain  Respiratory: Negative for apnea and choking  Cardiovascular: Negative for chest pain and palpitations  Gastrointestinal: Negative for abdominal distention and blood in stool  Endocrine: Negative for heat intolerance and polyphagia  Genitourinary: Negative for flank pain and urgency  Musculoskeletal: Negative for neck pain and neck stiffness  Skin: Negative for color change and wound  Allergic/Immunologic: Negative for food allergies and immunocompromised state  Neurological: Negative for seizures and facial asymmetry  Hematological: Negative for adenopathy  Does not bruise/bleed easily  Psychiatric/Behavioral: Negative for self-injury and suicidal ideas           PAST MEDICAL HISTORY:  Past Medical History:   Diagnosis Date    Aorta aneurysm ([facility] [address] )     4 3    Arm DVT (deep venous thromboembolism), acute ([facility] [address] ) 0405    complications of cardiac cath    Asthma     Chronic kidney disease     CKD (chronic kidney disease), stage III (HCC)     COPD (chronic obstructive pulmonary disease) (HCC)     Depression     Diabetes mellitus (HCC)     Essential hypertriglyceridemia     GERD (gastroesophageal reflux disease)     Headache     Hiatal hernia     Hyperlipidemia, mixed 5/7/2018    Kidney stone     Liver disease     FATTY LIVER    PAC (premature atrial contraction)     Prediabetes     Renal calculi     Sleep apnea     Vestibular migraine        PAST SURGICAL HISTORY:  Past Surgical History:   Procedure Laterality Date    CARPAL TUNNEL RELEASE Left     COLONOSCOPY      FL INJECTION RIGHT HIP (ARTHROGRAM)  2018    FOOT SURGERY Left     FOREIGN BODY REMOVAL    HERNIA REPAIR      CA COLONOSCOPY FLX DX W/COLLJ SPEC WHEN PFRMD N/A 2017    Procedure: COLONOSCOPY;  Surgeon: Edilia Dodd MD;  [facility]; Service: Gastroenterology    CA ESOPHAGOGASTRODUODENOSCOPY TRANSORAL DIAGNOSTIC N/A 10/31/2017    Procedure: ESOPHAGOGASTRODUODENOSCOPY (EGD); Surgeon: Edilia Dodd MD;  [facility];   Service: Gastroenterology    CA TOTAL HIP ARTHROPLASTY Right 2020    Procedure: ARTHROPLASTY HIP TOTAL;  Surgeon: Poppy Blackwood MD;  [facility];  Service: Orthopedics       SOCIAL HISTORY:  Social History     Substance and Sexual Activity   Alcohol Use Yes    Comment: occasional beer     Social History     Substance and Sexual Activity   Drug Use No     Social History     Tobacco Use   Smoking Status Former Smoker    Types: Cigars, Cigarettes    Quit date: 2014    Years since quittin 8   Smokeless Tobacco Never Used   Tobacco Comment    never inhaled       FAMILY HISTORY:  Family History   Problem Relation Age of Onset    Cancer Brother     Prostate cancer Brother     Leukemia Brother         his only brother dies from [address] or leukemia pt unsure he was only 46    Arthritis Mother     Heart attack Father     Clotting disorder Father     Schizoaffective Disorder  Sister     Aortic aneurysm Other         abdominal       MEDICATIONS:    Current Outpatient Medications:     Dulaglutide (Trulicity) 6 05 FP/0 9ZR SOPN, Inject 0 5 mL (0 75 mg total) under the skin once a week, Disp: 12 pen, Rfl: 1    fenofibrate (TRIGLIDE) 160 MG tablet, Take 1 tablet (160 mg total) by mouth daily, Disp: 90 tablet, Rfl: 3    glucose blood test strip, TEST BS TID, Disp: 300 each, Rfl: 5    glucose monitoring kit (FREESTYLE) monitoring kit, Use 1 each daily before breakfast, Disp: 1 each, Rfl: 0    Lancets MISC, Use daily before breakfast, Disp: 100 each, Rfl: 5    pantoprazole (PROTONIX) 40 mg tablet, take 1 tablet by mouth once daily, Disp: 90 tablet, Rfl: 3    rosuvastatin (CRESTOR) 20 MG tablet, take 1 tablet by mouth once daily, Disp: 30 tablet, Rfl: 5    traMADol (ULTRAM) 50 mg tablet, Take 1 PO three times daily PRN for pain  Ongoing therapy Do not fill until 6/4/2021, Disp: 90 tablet, Rfl: 2    montelukast (SINGULAIR) 10 mg tablet, Take 10 mg by mouth daily at bedtime, Disp: , Rfl:     Turmeric 500 MG CAPS, Take by mouth, Disp: , Rfl:     PHYSICAL EXAM:  Vitals:    06/16/21 0941   BP: 110/70   BP Location: Right arm   Patient Position: Sitting   Pulse: 68   Resp: 16   Temp: (!) 97 2 °F (36 2 °C)   TempSrc: Temporal   Weight: 98 2 kg (216 lb 6 4 oz)   Height: 6' 1 5" (1 867 m)     Body mass index is 28 16 kg/m²  Physical Exam  Constitutional:       General: He is not in acute distress  Appearance: He is well-developed  HENT:      Head: Normocephalic  Eyes:      General: No scleral icterus  Conjunctiva/sclera: Conjunctivae normal    Neck:      Vascular: No JVD  Cardiovascular:      Rate and Rhythm: Normal rate and regular rhythm  Heart sounds: Normal heart sounds  Pulmonary:      Effort: Pulmonary effort is normal       Breath sounds: Normal breath sounds  No wheezing  Abdominal:      General: Bowel sounds are normal       Palpations: Abdomen is soft  Tenderness: There is no abdominal tenderness  Musculoskeletal:         General: No swelling  Normal range of motion  Cervical back: Neck supple  Skin:     General: Skin is warm  Findings: No rash  Neurological:      General: No focal deficit present        Mental Status: He is alert and oriented to person, place, and time  Psychiatric:         Behavior: Behavior normal          LAB RESULTS:  Results for orders placed or performed in visit on 06/14/21   Hemoglobin A1C   Result Value Ref Range    Hemoglobin A1C 5 9 (H) Normal 3 8-5 6%; PreDiabetic 5 7-6 4%;  Diabetic >=6 5%; Glycemic control for adults with diabetes <7 0% %     mg/dl   Basic metabolic panel   Result Value Ref Range    Sodium 138 136 - 145 mmol/L    Potassium 4 2 3 5 - 5 3 mmol/L    Chloride 104 100 - 108 mmol/L    CO2 30 21 - 32 mmol/L    ANION GAP 4 4 - 13 mmol/L    BUN 14 5 - 25 mg/dL    Creatinine 1 42 (H) 0 60 - 1 30 mg/dL    Glucose, Fasting 117 (H) 65 - 99 mg/dL    Calcium 9 9 8 3 - 10 1 mg/dL    eGFR 54 ml/min/1 73sq m   CBC and differential   Result Value Ref Range    WBC 5 91 4 31 - 10 16 Thousand/uL    RBC 4 72 3 88 - 5 62 Million/uL    Hemoglobin 15 1 12 0 - 17 0 g/dL    Hematocrit 46 9 36 5 - 49 3 %    MCV 99 (H) 82 - 98 fL    MCH 32 0 26 8 - 34 3 pg    MCHC 32 2 31 4 - 37 4 g/dL    RDW 12 6 11 6 - 15 1 %    MPV 10 7 8 9 - 12 7 fL    Platelets 859 226 - 257 Thousands/uL    nRBC 0 /100 WBCs    Neutrophils Relative 60 43 - 75 %    Immat GRANS % 0 0 - 2 %    Lymphocytes Relative 26 14 - 44 %    Monocytes Relative 10 4 - 12 %    Eosinophils Relative 3 0 - 6 %    Basophils Relative 1 0 - 1 %    Neutrophils Absolute 3 54 1 85 - 7 62 Thousands/µL    Immature Grans Absolute 0 01 0 00 - 0 20 Thousand/uL    Lymphocytes Absolute 1 56 0 60 - 4 47 Thousands/µL    Monocytes Absolute 0 56 0 17 - 1 22 Thousand/µL    Eosinophils Absolute 0 19 0 00 - 0 61 Thousand/µL    Basophils Absolute 0 05 0 00 - 0 10 Thousands/µL   Phosphorus   Result Value Ref Range    Phosphorus 2 7 2 7 - 4 5 mg/dL   Protein / creatinine ratio, urine   Result Value Ref Range    Creatinine, Ur 222 0 mg/dL    Protein Urine Random 7 mg/dL    Prot/Creat Ratio, Ur 0 03 0 00 - 0 10   PTH, intact   Result Value Ref Range    PTH 31 0 18 4 - 80 1 pg/mL   UA (URINE) with reflex to Scope   Result Value Ref Range    Color, UA Yellow     Clarity, UA Clear     Specific [place], UA 1 024 1 003 - 1 030    pH, UA 5 5 4 5, 5 0, 5 5, 6 0, 6 5, 7 0, 7 5, 8 0    Leukocytes, UA Negative Negative    Nitrite, UA Negative Negative    Protein, UA Negative Negative mg/dl    Glucose, UA Negative Negative mg/dl    Ketones, UA Negative Negative mg/dl    Urobilinogen, UA 0 2 0 2, 1 0 E U /dl E U /dl    Bilirubin, UA Negative Negative    Blood, UA Negative Negative   Vitamin D 25 hydroxy   Result Value Ref Range    Vit D, 25-Hydroxy 29 5 (L) 30 0 - 100 0 ng/mL       ASSESSMENT and PLAN:      CKD (chronic kidney disease) stage 3, GFR 30-59 ml/min  Lab Results   Component Value Date    EGFR 54 06/14/2021    EGFR 57 03/12/2021    EGFR 64 12/14/2020    CREATININE 1 42 (H) 06/14/2021    CREATININE 1 38 (H) 03/12/2021    CREATININE 1 25 12/14/2020    patient GFR is stable at about 54  Asymptomatic and progressive nature of kidney disease discussed with him  Advised hydration and avoiding any nephrotoxic medicine    Chronic kidney disease-mineral and bone disorder  Lab Results   Component Value Date    EGFR 54 06/14/2021    EGFR 57 03/12/2021    EGFR 64 12/14/2020    CREATININE 1 42 (H) 06/14/2021    CREATININE 1 38 (H) 03/12/2021    CREATININE 1 25 12/14/2020    PTH and phosphorus are within acceptable range  Vitamin-D level is 28  Advised to take over-the-counter vitamin-D 2000 unit once a day    Type 2 diabetes mellitus with stage 3a chronic kidney disease, without long-term current use of insulin ([facility])    Lab Results   Component Value Date    HGBA1C 5 9 (H) 06/14/2021    diabetes seems to be quite well control  Importance of diabetic control and effect on kidney disease discussed with him       everything discussed the patient  I will see him back in 6 months will get blood and urine test before that visit      Portions of the record may have been created with voice recognition software  Occasional wrong word or "sound a like" substitutions may have occurred due to the inherent limitations of voice recognition software  Read the chart carefully and recognize, using context, where substitutions have occurred  If you have any questions, please contact the dictating provider

## 2021-06-16 NOTE — ASSESSMENT & PLAN NOTE
Lab Results   Component Value Date    EGFR 54 06/14/2021    EGFR 57 03/12/2021    EGFR 64 12/14/2020    CREATININE 1 42 (H) 06/14/2021    CREATININE 1 38 (H) 03/12/2021    CREATININE 1 25 12/14/2020    PTH and phosphorus are within acceptable range  Vitamin-D level is 28    Advised to take over-the-counter vitamin-D 2000 unit once a day

## 2021-06-16 NOTE — PATIENT INSTRUCTIONS

## 2021-06-16 NOTE — ASSESSMENT & PLAN NOTE
Lab Results   Component Value Date    HGBA1C 5 9 (H) 06/14/2021    diabetes seems to be quite well control    Importance of diabetic control and effect on kidney disease discussed with him

## 2021-06-16 NOTE — ASSESSMENT & PLAN NOTE
Lab Results   Component Value Date    EGFR 54 06/14/2021    EGFR 57 03/12/2021    EGFR 64 12/14/2020    CREATININE 1 42 (H) 06/14/2021    CREATININE 1 38 (H) 03/12/2021    CREATININE 1 25 12/14/2020    patient GFR is stable at about 54  Asymptomatic and progressive nature of kidney disease discussed with him    Advised hydration and avoiding any nephrotoxic medicine

## 2021-08-19 ENCOUNTER — OFFICE VISIT (OUTPATIENT)
Dept: PAIN MEDICINE | Facility: CLINIC | Age: 56
End: 2021-08-19
Payer: MEDICARE

## 2021-08-19 VITALS
HEART RATE: 80 BPM | BODY MASS INDEX: 28.89 KG/M2 | WEIGHT: 218 LBS | DIASTOLIC BLOOD PRESSURE: 78 MMHG | SYSTOLIC BLOOD PRESSURE: 117 MMHG | HEIGHT: 73 IN

## 2021-08-19 DIAGNOSIS — M54.12 CERVICAL RADICULOPATHY: ICD-10-CM

## 2021-08-19 DIAGNOSIS — M47.812 CERVICAL SPONDYLOSIS: ICD-10-CM

## 2021-08-19 DIAGNOSIS — G89.4 CHRONIC PAIN SYNDROME: Primary | ICD-10-CM

## 2021-08-19 DIAGNOSIS — F11.20 UNCOMPLICATED OPIOID DEPENDENCE (HCC): ICD-10-CM

## 2021-08-19 DIAGNOSIS — M16.11 PRIMARY OSTEOARTHRITIS OF RIGHT HIP: ICD-10-CM

## 2021-08-19 DIAGNOSIS — Z79.891 LONG-TERM CURRENT USE OF OPIATE ANALGESIC: ICD-10-CM

## 2021-08-19 PROCEDURE — 80305 DRUG TEST PRSMV DIR OPT OBS: CPT | Performed by: NURSE PRACTITIONER

## 2021-08-19 PROCEDURE — 99214 OFFICE O/P EST MOD 30 MIN: CPT | Performed by: NURSE PRACTITIONER

## 2021-08-19 RX ORDER — TRAMADOL HYDROCHLORIDE 50 MG/1
TABLET ORAL
Qty: 90 TABLET | Refills: 2 | Status: SHIPPED | OUTPATIENT
Start: 2021-08-19 | End: 2021-11-10 | Stop reason: SDUPTHER

## 2021-08-19 NOTE — PROGRESS NOTES
Pain Medicine Follow-Up Note    Assessment:  1  Chronic pain syndrome    2  Cervical radiculopathy    3  Cervical spondylosis    4  Primary osteoarthritis of right hip    5  Uncomplicated opioid dependence ([address] )    6  Long-term current use of opiate analgesic        Plan:  No orders of the defined types were placed in this encounter  New Medications Ordered This Visit   Medications    traMADol (ULTRAM) 50 mg tablet     Sig: Take 1 PO three times daily PRN for pain  Ongoing therapy Do not fill until 9/17/2021     Dispense:  90 tablet     Refill:  2     The patient continues with pain that is moderately controlled with use of his current pain medication regimen; therefore, I will continue his medications as prescribed  A prescription for tramadol 50 mg by mouth every 8 hours was electronically sent to the patient's pharmacy on file with a do not fill date of 09/17/2021 and 2 refills  [place] Prescription Drug Monitoring Program report was reviewed and was appropriate     A urine drug screen was collected at today's office visit as part of our medication management protocol  The point of care testing results were appropriate for what was being prescribed  The specimen will be sent for confirmatory testing  The drug screen is medically necessary because the patient is either dependent on opioid medication or is being considered for opioid medication therapy and the results could impact ongoing or future treatment  The drug screen is to evaluate for the presences or absence of prescribed, non-prescribed, and/or illicit drugs/substances  There are risks associated with opioid medications, including dependence, addiction and tolerance  The patient understands and agrees to use these medications only as prescribed  Potential side effects of the medications include, but are not limited to, constipation, drowsiness, addiction, impaired judgment and risk of fatal overdose if not taken as prescribed   The patient was warned against driving while taking sedation medications  Sharing medications is a felony  At this point in time, the patient is showing no signs of addiction, abuse, diversion or suicidal ideation  Follow-up is planned in 12 weeks time or sooner as warranted  Discharge instructions were provided  I personally saw and examined the patient and I agree with the above discussed plan of care  My impressions and treatment recommendations were discussed in detail with the patient who verbalized understanding and had no further questions  History of Present Illness:    Patricia Jimenez is a 61 y o  male who presents to [facility] for interval re-evaluation of the above stated pain complaints  The patient has a past medical and chronic pain history as outlined in the assessment section  He was last seen on 5/20/2021 in regards to chronic pain syndrome secondary to cervical radiculopathy, cervical spondylosis, and right hip osteoarthritis  The patient currently reports ongoing neck, low back and hip pain that are unchanged since last office visit  The patient describes his pain as occasional, sharp pain that is worse in the morning and in the evening  The patient continues with pain that is well control with the use of his current pain medication regimen  The patient denies weakness and reports no bowel or bladder dysfunction, and is able to complete ADLs with minimal difficulty  The patient currently rates his pain as 4/10 on the numeric rating scale       Current pain medications include:  Tramadol 50 mg by mouth every 8 hours  Pain Contract Signed:  1/8/2021  Last Urine Drug Screen:  5/20/2021    Other than as stated above, the patient denies any interval changes in medications, medical condition, mental condition, symptoms, or allergies since the last office visit  Review of Systems:    Review of Systems   Respiratory: Negative for shortness of breath  Cardiovascular: Negative for chest pain  Gastrointestinal: Negative for constipation, diarrhea, nausea and vomiting  Musculoskeletal: Negative for arthralgias, gait problem, joint swelling and myalgias  Joint stiffness   Skin: Negative for rash  Neurological: Positive for dizziness and weakness  Negative for seizures  All other systems reviewed and are negative          Patient Active Problem List   Diagnosis    Mild intermittent asthma without complication    Ascending aortic aneurysm (HCC)    Bicuspid aortic valve    Allergic rhinitis    GERD (gastroesophageal reflux disease)    Hiatal hernia    Type 2 diabetes mellitus with stage 3a chronic kidney disease, without long-term current use of insulin ([facility])    CKD (chronic kidney disease) stage 3, GFR 30-59 ml/min ([facility])    Hyperlipidemia, mixed    Depression with anxiety    Vitamin D deficiency    Renal cyst, left    Hepatic cyst    Fatty liver disease, nonalcoholic    Erectile dysfunction    Carpal tunnel syndrome of right wrist    Chronic pain of right knee    Vestibular migraine    Patellar tendinitis of right knee    Benign paroxysmal positional vertigo due to bilateral vestibular disorder    Hip impingement syndrome, right    Primary osteoarthritis of right hip    Ulnar neuropathy of both upper extremities    Lumbosacral strain    Tarsal tunnel syndrome of right side    Cubital tunnel syndrome, bilateral    Groin pain, chronic, right    Chronic pain syndrome    Chronic kidney disease-mineral and bone disorder    Uncomplicated opioid dependence ([address] )    Arthritis of right hip       Past Medical History:   Diagnosis Date    Aorta aneurysm ([facility])     4 3    Arm DVT (deep venous thromboembolism), acute ([address] ) 9914    complications of cardiac cath    Asthma     Chronic kidney disease     CKD (chronic kidney disease), stage III ([address] )     COPD (chronic obstructive pulmonary disease) ([address] )     Depression     Diabetes mellitus ([address] )     Essential hypertriglyceridemia     GERD (gastroesophageal reflux disease)     Headache     Hiatal hernia     Hyperlipidemia, mixed 2018    Kidney stone     Liver disease     FATTY LIVER    PAC (premature atrial contraction)     Prediabetes     Renal calculi     Sleep apnea     Vestibular migraine        Past Surgical History:   Procedure Laterality Date    CARPAL TUNNEL RELEASE Left     COLONOSCOPY      FL INJECTION RIGHT HIP (ARTHROGRAM)  2018    FOOT SURGERY Left     FOREIGN BODY REMOVAL    HERNIA REPAIR      LA COLONOSCOPY FLX DX W/COLLJ SPEC WHEN PFRMD N/A 2017    Procedure: COLONOSCOPY;  Surgeon: Fox Hensley MD;  [facility]; Service: Gastroenterology    LA ESOPHAGOGASTRODUODENOSCOPY TRANSORAL DIAGNOSTIC N/A 10/31/2017    Procedure: ESOPHAGOGASTRODUODENOSCOPY (EGD); Surgeon: Fox Hensley MD;  [facility];   Service: Gastroenterology    LA TOTAL HIP ARTHROPLASTY Right 2020    Procedure: ARTHROPLASTY HIP TOTAL;  Surgeon: Rena Lynn MD;  [facility];  Service: Orthopedics       Family History   Problem Relation Age of Onset    Cancer Brother     Prostate cancer Brother     Leukemia Brother         his only brother dies from [address] or leukemia pt unsure he was only 46    Arthritis Mother     Heart attack Father     Clotting disorder Father     Schizoaffective Disorder  Sister     Aortic aneurysm Other         abdominal       Social History     Occupational History    Occupation: unemployed   Tobacco Use    Smoking status: Former Smoker     Types: Cigars, Cigarettes     Quit date: 2014     Years since quittin 0    Smokeless tobacco: Never Used    Tobacco comment: never inhaled   Vaping Use    Vaping Use: Never used   Substance and Sexual Activity    Alcohol use: Yes     Comment: occasional beer    Drug use: No    Sexual activity: Yes     Partners: Female         Current Outpatient Medications:     Dulaglutide (Trulicity) 2 89 TV/7 3SR SOPN, Inject 0 5 mL (0 75 mg total) under the skin once a week, Disp: 12 pen, Rfl: 1    glucose blood test strip, TEST BS TID, Disp: 300 each, Rfl: 5    glucose monitoring kit (FREESTYLE) monitoring kit, Use 1 each daily before breakfast, Disp: 1 each, Rfl: 0    Lancets MISC, Use daily before breakfast, Disp: 100 each, Rfl: 5    montelukast (SINGULAIR) 10 mg tablet, Take 10 mg by mouth daily at bedtime, Disp: , Rfl:     pantoprazole (PROTONIX) 40 mg tablet, take 1 tablet by mouth once daily, Disp: 90 tablet, Rfl: 3    rosuvastatin (CRESTOR) 20 MG tablet, take 1 tablet by mouth once daily, Disp: 30 tablet, Rfl: 5    traMADol (ULTRAM) 50 mg tablet, Take 1 PO three times daily PRN for pain  Ongoing therapy Do not fill until 9/17/2021, Disp: 90 tablet, Rfl: 2    Turmeric 500 MG CAPS, Take by mouth, Disp: , Rfl:     fenofibrate (TRIGLIDE) 160 MG tablet, Take 1 tablet (160 mg total) by mouth daily, Disp: 90 tablet, Rfl: 3    No Known Allergies    Physical Exam:    /78   Pulse 80   Ht 6' 1" (1 854 m)   Wt 98 9 kg (218 lb)   BMI 28 76 kg/m²     Constitutional:normal, well developed, well nourished, alert, in no distress and non-toxic and no overt pain behavior  and overweight  Eyes:anicteric  HEENT:grossly intact  Neck:supple, symmetric, trachea midline and no masses   Pulmonary:even and unlabored  Cardiovascular:No edema or pitting edema present  Skin:Normal without rashes or lesions and well hydrated  Psychiatric:Mood and affect appropriate  Neurologic:Cranial Nerves II-XII grossly intact  Musculoskeletal:normal      Imaging  No orders to display         No orders of the defined types were placed in this encounter

## 2021-08-19 NOTE — PATIENT INSTRUCTIONS
Your next prescription is due to refill on 9/17/2021 with 2 refills    Opioid Dependence   WHAT YOU NEED TO KNOW:   What is opioid dependence? Opioids are medicines, such as morphine and codeine, used to treat pain  Dependence happens after you have used opioids regularly for a long period of time  Dependence means that your body gets used to how much medicine you take  Dependence is not the same as addiction  Addiction means that a person uses opioids to get high instead of using them to control pain  What are the signs and symptoms of opioid dependence? · You need more of the opioid to get the same amount of pain relief as you did when you first started taking it  · You have tried to use less opioid medicine but are not able to  · You have withdrawal symptoms when you take less of the opioid  What are the signs and symptoms of opioid withdrawal?  You may have the following signs and symptoms if you suddenly stop taking opioids or if you decrease the amount you normally take:  · Yawning     · Runny nose     · Nausea or vomiting     · Diarrhea     · Chills or goosebumps    · Muscle aches or cramps     · Anxiety  How is opioid dependence diagnosed? Your healthcare provider will do a physical exam  He will ask you questions about your symptoms and your use of opioids  He will also ask about your current and past use of other drugs and any family history of drug abuse  How is opioid dependence treated? You may be treated in a hospital or you may be treated as an outpatient  During detoxification (detox), healthcare providers will slowly decrease your dose of the opioid medicine you are dependent on  They may use another opioid medicine such as methadone to decrease symptoms of withdrawal  You may need to take this or another medicine for some time  Your healthcare provider will also replace the opioid with another pain medicine that is less likely to cause dependence   He may also suggest that you receive counseling and social support during treatment  What are the risks of opioid dependence? There is a risk of overdose during early treatment with methadone  You may become dependent on the medicines used to treat opioid dependence  Without treatment, you may develop other health problems or become addicted to opioids  Your risk of abusing alcohol and other drugs increases  You may also develop risky behaviors that can lead to an overdose, violence, and suicidal thoughts  When should I contact my healthcare provider? · Your speech is slurred  · You have difficulty staying awake  · You have nausea and vomiting  · You are easily upset or cry easily  · You have poor balance  · You have questions or concerns about your condition or care  When should I seek immediate care or call 911? · You feel lightheaded or faint  · You have a fast, slow, or irregular heartbeat  · You have a seizure  CARE AGREEMENT:   You have the right to help plan your care  Learn about your health condition and how it may be treated  Discuss treatment options with your caregivers to decide what care you want to receive  You always have the right to refuse treatment  The above information is an  only  It is not intended as medical advice for individual conditions or treatments  Talk to your doctor, nurse or pharmacist before following any medical regimen to see if it is safe and effective for you  © 2017 [address] Information is for End User's use only and may not be sold, redistributed or otherwise used for commercial purposes  All illustrations and images included in CareNotes® are the copyrighted property of A D A M , Inc  or Telly Stokes

## 2021-08-21 LAB

## 2021-09-09 ENCOUNTER — OFFICE VISIT (OUTPATIENT)
Dept: FAMILY MEDICINE CLINIC | Facility: CLINIC | Age: 56
End: 2021-09-09
Payer: MEDICARE

## 2021-09-09 VITALS
TEMPERATURE: 98.9 F | OXYGEN SATURATION: 99 % | HEART RATE: 70 BPM | DIASTOLIC BLOOD PRESSURE: 70 MMHG | WEIGHT: 214 LBS | SYSTOLIC BLOOD PRESSURE: 102 MMHG | BODY MASS INDEX: 28.36 KG/M2 | HEIGHT: 73 IN

## 2021-09-09 DIAGNOSIS — Z12.5 SCREENING FOR MALIGNANT NEOPLASM OF PROSTATE: ICD-10-CM

## 2021-09-09 DIAGNOSIS — Z23 NEED FOR INFLUENZA VACCINATION: ICD-10-CM

## 2021-09-09 DIAGNOSIS — N18.31 TYPE 2 DIABETES MELLITUS WITH STAGE 3A CHRONIC KIDNEY DISEASE, WITHOUT LONG-TERM CURRENT USE OF INSULIN (HCC): Primary | ICD-10-CM

## 2021-09-09 DIAGNOSIS — Z00.00 MEDICARE ANNUAL WELLNESS VISIT, SUBSEQUENT: ICD-10-CM

## 2021-09-09 DIAGNOSIS — N18.31 STAGE 3A CHRONIC KIDNEY DISEASE (HCC): ICD-10-CM

## 2021-09-09 DIAGNOSIS — E11.22 TYPE 2 DIABETES MELLITUS WITH STAGE 3A CHRONIC KIDNEY DISEASE, WITHOUT LONG-TERM CURRENT USE OF INSULIN (HCC): Primary | ICD-10-CM

## 2021-09-09 DIAGNOSIS — E78.2 HYPERLIPIDEMIA, MIXED: ICD-10-CM

## 2021-09-09 PROCEDURE — G0008 ADMIN INFLUENZA VIRUS VAC: HCPCS

## 2021-09-09 PROCEDURE — G0438 PPPS, INITIAL VISIT: HCPCS | Performed by: FAMILY MEDICINE

## 2021-09-09 PROCEDURE — 90682 RIV4 VACC RECOMBINANT DNA IM: CPT

## 2021-09-09 PROCEDURE — 99214 OFFICE O/P EST MOD 30 MIN: CPT | Performed by: FAMILY MEDICINE

## 2021-09-09 NOTE — PROGRESS NOTES
Assessment and Plan:     Problem List Items Addressed This Visit        Active Problems    Type 2 diabetes mellitus with stage 3a chronic kidney disease, without long-term current use of insulin (HCC) - Primary    CKD (chronic kidney disease) stage 3, GFR 30-59 ml/min (HCC)    Hyperlipidemia, mixed      Other Visit Diagnoses     Medicare annual wellness visit, subsequent               Preventive health issues were discussed with patient, and age appropriate screening tests were ordered as noted in patient's After Visit Summary  Personalized health advice and appropriate referrals for health education or preventive services given if needed, as noted in patient's After Visit Summary       History of Present Illness:     Patient presents for Medicare Annual Wellness visit    Patient Care Team:  Jessica Welsh DO as PCP - General (Family Medicine)  Brian Méndez MD as PCP - [address] [place], [place] [address]Th [facility])  Yvonne Farah MD as Consulting Physician (Cardiology)  Rima Stevens MD as Consulting Physician (Cardiothoracic Surgery)  Benedicto Soto MD as Consulting Physician (Orthopedic Surgery)  Roderick Bhandari MD as Consulting Physician (Neurology)  Sherri Levine MD as Consulting Physician (Nephrology)  Emperatriz Gray MD (Pain Medicine)  Abelino Lee MD as [facility]     Problem List:     Patient Active Problem List   Diagnosis    Mild intermittent asthma without complication    Ascending aortic aneurysm [facility] )    Bicuspid aortic valve    Allergic rhinitis    GERD (gastroesophageal reflux disease)    Hiatal hernia    Type 2 diabetes mellitus with stage 3a chronic kidney disease, without long-term current use of insulin ([facility] )    CKD (chronic kidney disease) stage 3, GFR 30-59 ml/min ([facility])    Hyperlipidemia, mixed    Depression with anxiety    Vitamin D deficiency    Renal cyst, left    Hepatic cyst    Fatty liver disease, nonalcoholic    Erectile dysfunction    Carpal tunnel syndrome of right wrist    Chronic pain of right knee    Vestibular migraine    Patellar tendinitis of right knee    Benign paroxysmal positional vertigo due to bilateral vestibular disorder    Hip impingement syndrome, right    Primary osteoarthritis of right hip    Ulnar neuropathy of both upper extremities    Lumbosacral strain    Tarsal tunnel syndrome of right side    Cubital tunnel syndrome, bilateral    Groin pain, chronic, right    Chronic pain syndrome    Chronic kidney disease-mineral and bone disorder    Uncomplicated opioid dependence ([facility] )    Arthritis of right hip      Past Medical and Surgical History:     Past Medical History:   Diagnosis Date    Aorta aneurysm ([facility] )     4 3    Arm DVT (deep venous thromboembolism), acute ([facility] ) 9459    complications of cardiac cath    Asthma     Chronic kidney disease     CKD (chronic kidney disease), stage III (HCC)     COPD (chronic obstructive pulmonary disease) [facility]     Depression     Diabetes mellitus ([facility] )     Essential hypertriglyceridemia     GERD (gastroesophageal reflux disease)     Headache     Hiatal hernia     Hyperlipidemia, mixed 5/7/2018    Kidney stone     Liver disease     FATTY LIVER    PAC (premature atrial contraction)     Prediabetes     Renal calculi     Sleep apnea     Vestibular migraine      Past Surgical History:   Procedure Laterality Date    CARPAL TUNNEL RELEASE Left     COLONOSCOPY      FL INJECTION RIGHT HIP (ARTHROGRAM)  8/20/2018    FOOT SURGERY Left     FOREIGN BODY REMOVAL    HERNIA REPAIR      ID COLONOSCOPY FLX DX W/COLLJ SPEC WHEN PFRMD N/A 8/7/2017    Procedure: COLONOSCOPY;  Surgeon: Charles Bellamy MD;  [facility]: [facility]; Service: Gastroenterology    ID ESOPHAGOGASTRODUODENOSCOPY TRANSORAL DIAGNOSTIC N/A 10/31/2017    Procedure: ESOPHAGOGASTRODUODENOSCOPY (EGD); Surgeon: Charles Bellamy MD;  Location: [facility];   Service: Gastroenterology    ID TOTAL HIP ARTHROPLASTY Right 2020    Procedure: ARTHROPLASTY HIP TOTAL;  Surgeon: Sonido Jesus MD;  Location: [facility];  Service: Orthopedics      Family History:     Family History   Problem Relation Age of Onset    Cancer Brother     Prostate cancer Brother     Leukemia Brother         his only brother dies from [address] or leukemia pt unsure he was only 46    Arthritis Mother     Heart attack Father     Clotting disorder Father     Schizoaffective Disorder  Sister     Aortic aneurysm Other         abdominal      Social History:     Social History     Socioeconomic History    Marital status:      Spouse name: None    Number of children: 2    Years of education: None    Highest education level: None   Occupational History    Occupation: unemployed   Tobacco Use    Smoking status: Former Smoker     Types: Cigars, Cigarettes     Quit date: 2014     Years since quittin 0    Smokeless tobacco: Never Used    Tobacco comment: never inhaled   Vaping Use    Vaping Use: Never used   Substance and Sexual Activity    Alcohol use: Yes     Comment: occasional beer    Drug use: No    Sexual activity: Yes     Partners: Female   Other Topics Concern    None   Social History Narrative    Caffeine use    Lives alone     Social Determinants of Health     Financial Resource Strain:     Difficulty of Paying Living Expenses:    Food Insecurity:     Worried About Running Out of Food in the Last Year:     Ran Out of Food in the Last Year:    Transportation Needs:     Lack of Transportation (Medical):      Lack of Transportation (Non-Medical):    Physical Activity:     Days of Exercise per Week:     Minutes of Exercise per Session:    Stress:     Feeling of Stress :    Social Connections:     Frequency of Communication with Friends and Family:     Frequency of Social Gatherings with Friends and Family:     Attends [employer] Services:     Active Member of Clubs or Organizations:     Attends Club or Organization Meetings:     Marital Status:    Intimate Partner Violence:     Fear of Current or Ex-Partner:     Emotionally Abused:     Physically Abused:     Sexually Abused:       Medications and Allergies:     Current Outpatient Medications   Medication Sig Dispense Refill    Dulaglutide (Trulicity) 6 86 EV/9 9QV SOPN Inject 0 5 mL (0 75 mg total) under the skin once a week 12 pen 1    fenofibrate (TRIGLIDE) 160 MG tablet Take 1 tablet (160 mg total) by mouth daily 90 tablet 3    pantoprazole (PROTONIX) 40 mg tablet take 1 tablet by mouth once daily 90 tablet 3    rosuvastatin (CRESTOR) 20 MG tablet take 1 tablet by mouth once daily 30 tablet 5    traMADol (ULTRAM) 50 mg tablet Take 1 PO three times daily PRN for pain  Ongoing therapy Do not fill until 9/17/2021 90 tablet 2    Turmeric 500 MG CAPS Take by mouth      glucose blood test strip TEST BS  each 5    glucose monitoring kit (FREESTYLE) monitoring kit Use 1 each daily before breakfast 1 each 0    Lancets MISC Use daily before breakfast 100 each 5    montelukast (SINGULAIR) 10 mg tablet Take 10 mg by mouth daily at bedtime (Patient not taking: Reported on 9/9/2021)       No current facility-administered medications for this visit       No Known Allergies   Immunizations:     Immunization History   Administered Date(s) Administered    INFLUENZA 12/07/2016, 10/26/2017    Influenza, recombinant, quadrivalent,injectable, preservative free 10/02/2018, 09/17/2019, 09/09/2020    Influenza, seasonal, injectable 04/15/2016    Pneumococcal Conjugate 13-Valent 12/07/2016    Pneumococcal Polysaccharide PPV23 11/20/2018    SARS-CoV-2 / COVID-19 mRNA IM (Pfizer-BioNTech) 03/12/2021, 04/02/2021    Tdap 06/09/2017    Zoster 06/09/2017      Health Maintenance:         Topic Date Due    Colorectal Cancer Screening  08/07/2022    HIV Screening  Completed    Hepatitis C Screening  Completed         Topic Date Due    Influenza Vaccine (1) 09/01/2021      Medicare Health Risk Assessment:     /70 (BP Location: Left arm, Patient Position: Sitting, Cuff Size: Large)   Pulse 70   Temp 98 9 °F (37 2 °C)   Ht 6' 1" (1 854 m)   Wt 97 1 kg (214 lb)   SpO2 99%   BMI 28 23 kg/m²      Kong Median is here for his Subsequent Wellness visit  Health Risk Assessment:   Patient rates overall health as fair  Patient feels that their physical health rating is same  Patient is very satisfied with their life  Eyesight was rated as same  Hearing was rated as same  Patient feels that their emotional and mental health rating is same  Patients states they are never, rarely angry  Patient states they are never, rarely unusually tired/fatigued  Pain experienced in the last 7 days has been a lot  Patient's pain rating has been 9/10  Patient states that he has experienced no weight loss or gain in last 6 months  Fall Risk Screening: In the past year, patient has experienced: no history of falling in past year      Home Safety:  Patient does not have trouble with stairs inside or outside of their home  Patient has working smoke alarms and has working carbon monoxide detector  Home safety hazards include: none  Nutrition:   Current diet is Regular  Medications:   Patient is not currently taking any over-the-counter supplements  Patient is able to manage medications  Activities of Daily Living (ADLs)/Instrumental Activities of Daily Living (IADLs):   Walk and transfer into and out of bed and chair?: Yes  Dress and groom yourself?: Yes    Bathe or shower yourself?: Yes    Feed yourself?  Yes  Do your laundry/housekeeping?: Yes  Manage your money, pay your bills and track your expenses?: Yes  Make your own meals?: Yes    Do your own shopping?: Yes    Durable Medical Equipment Suppliers  N/A    Previous Hospitalizations:   Any hospitalizations or ED visits within the last 12 months?: No    How many hospitalizations have you had in the last year?: 3-4    Advance Care Planning:   Living will: No    Durable POA for healthcare: No    Advanced directive: No    Advanced directive counseling given: Yes    Five wishes given: Yes    Patient declined ACP directive: No    End of Life Decisions reviewed with patient: Yes    Provider agrees with end of life decisions: Yes      Cognitive Screening:   Provider or family/friend/caregiver concerned regarding cognition?: No    PREVENTIVE SCREENINGS      Cardiovascular Screening:    General: Screening Not Indicated and History Lipid Disorder      Diabetes Screening:     General: Screening Not Indicated and History Diabetes      Colorectal Cancer Screening:     General: Screening Current      Prostate Cancer Screening:      Due for: PSA      Osteoporosis Screening:    General: Screening Not Indicated      Abdominal Aortic Aneurysm (AAA) Screening:    Risk factors include: tobacco use        General: Screening Not Indicated      Lung Cancer Screening:     General: Screening Not Indicated      Hepatitis C Screening:    General: Screening Current    Screening, Brief Intervention, and Referral to Treatment (SBIRT)    Screening  Typical number of drinks in a day: 0  Typical number of drinks in a week: 0  Interpretation: Low risk drinking behavior  Single Item Drug Screening:  How often have you used an illegal drug (including marijuana) or a prescription medication for non-medical reasons in the past year? never    Single Item Drug Screen Score: 0  Interpretation: Negative screen for possible drug use disorder    Review of Current Opioid Use    Opioid Risk Tool (ORT) Interpretation: Complete Opioid Risk Tool (ORT)    Other Counseling Topics:   Car/seat belt/driving safety, skin self-exam, sunscreen and regular weightbearing exercise         Kyara Spaulding,

## 2021-09-09 NOTE — PROGRESS NOTES
Lizzy Moreno 1965 male MRN: [id number]      ASSESSMENT/PLAN  Problem List Items Addressed This Visit        Endocrine    Type 2 diabetes mellitus with stage 3a chronic kidney disease, without long-term current use of insulin ([facility] ) - Primary       Genitourinary    CKD (chronic kidney disease) stage 3, GFR 30-59 ml/min ([facility])       Other    Hyperlipidemia, mixed      Other Visit Diagnoses     Medicare annual wellness visit, subsequent        Need for influenza vaccination            DM: Too early for A1c in office; update prior to next appointment  Blood sugars overall close to goal per pt report   HLD: Update lipids   CKD3a: F/u with Nephro as scheduled, avoid NSAIDs       Future Appointments   Date Time Provider Kristen Fine   11/9/2021  8:45 AM Radha Garcia MD [facility]          SUBJECTIVE  CC: Diabetes (Patient seen in office today for a 3 month check up), Medicare Wellness Visit, and Back Pain (Lumbar - athritis)      HPI:  Lizzy Moreno is a 61 y o  male who presents for chronic follow up as below  DM: "Now and then" home sugars are "up and down" -- sometimes 130s, sometimes 160s; denies hypo/hyperglycemic symptoms    HLD: Tolerating statin without issue   CKD3a: Follows with Nephro     Has been taking Tylenol + Tramadol for low back pain, having a flare up recently  Review of Systems   Constitutional: Negative for diaphoresis  Respiratory: Negative for shortness of breath  Cardiovascular: Negative for chest pain and palpitations  Gastrointestinal: Negative for abdominal pain, constipation and diarrhea (intermittently)  Endocrine: Negative for polydipsia and polyuria  Musculoskeletal: Positive for back pain  Neurological: Negative for dizziness, tremors and headaches (at times -- once per week)         Historical Information   The patient history was reviewed and updated as follows:    Past Medical History:   Diagnosis Date    Aorta aneurysm ([facility] )     4 3    Arm DVT (deep venous thromboembolism), acute ([facility] ) 5783    complications of cardiac cath    Asthma     Chronic kidney disease     CKD (chronic kidney disease), stage III (HCC)     COPD (chronic obstructive pulmonary disease) (HCC)     Depression     Diabetes mellitus (HCC)     Essential hypertriglyceridemia     GERD (gastroesophageal reflux disease)     Headache     Hiatal hernia     Hyperlipidemia, mixed 5/7/2018    Kidney stone     Liver disease     FATTY LIVER    PAC (premature atrial contraction)     Prediabetes     Renal calculi     Sleep apnea     Vestibular migraine      Past Surgical History:   Procedure Laterality Date    CARPAL TUNNEL RELEASE Left     COLONOSCOPY      FL INJECTION RIGHT HIP (ARTHROGRAM)  8/20/2018    FOOT SURGERY Left     FOREIGN BODY REMOVAL    HERNIA REPAIR      MI COLONOSCOPY FLX DX W/COLLJ SPEC WHEN PFRMD N/A 8/7/2017    Procedure: COLONOSCOPY;  Surgeon: Meryl Cueva MD;  [facility]: [facility]; Service: Gastroenterology    MI ESOPHAGOGASTRODUODENOSCOPY TRANSORAL DIAGNOSTIC N/A 10/31/2017    Procedure: ESOPHAGOGASTRODUODENOSCOPY (EGD); Surgeon: Meryl Cueva MD;  [facility];   Service: Gastroenterology    MI TOTAL HIP ARTHROPLASTY Right 9/28/2020    Procedure: ARTHROPLASTY HIP TOTAL;  Surgeon: Rosie Miller MD;  [facility];  Service: Orthopedics     Family History   Problem Relation Age of Onset    Cancer Brother     Prostate cancer Brother     Leukemia Brother         his only brother dies from [address] or leukemia pt unsure he was only 46    Arthritis Mother     Heart attack Father     Clotting disorder Father     Schizoaffective Disorder  Sister     Aortic aneurysm Other         abdominal      Social History   Social History     Substance and Sexual Activity   Alcohol Use Yes    Comment: occasional beer     Social History     Substance and Sexual Activity   Drug Use No     Social History     Tobacco Use   Smoking Status Former Smoker    Types: [de-identified], Cigarettes    Quit date: 2014    Years since quittin 0   Smokeless Tobacco Never Used   Tobacco Comment    never inhaled       Medications:     Current Outpatient Medications:     Dulaglutide (Trulicity) 8 42 TA/2 1TL SOPN, Inject 0 5 mL (0 75 mg total) under the skin once a week, Disp: 12 pen, Rfl: 1    fenofibrate (TRIGLIDE) 160 MG tablet, Take 1 tablet (160 mg total) by mouth daily, Disp: 90 tablet, Rfl: 3    pantoprazole (PROTONIX) 40 mg tablet, take 1 tablet by mouth once daily, Disp: 90 tablet, Rfl: 3    rosuvastatin (CRESTOR) 20 MG tablet, take 1 tablet by mouth once daily, Disp: 30 tablet, Rfl: 5    traMADol (ULTRAM) 50 mg tablet, Take 1 PO three times daily PRN for pain  Ongoing therapy Do not fill until 2021, Disp: 90 tablet, Rfl: 2    Turmeric 500 MG CAPS, Take by mouth, Disp: , Rfl:     glucose blood test strip, TEST BS TID, Disp: 300 each, Rfl: 5    glucose monitoring kit (FREESTYLE) monitoring kit, Use 1 each daily before breakfast, Disp: 1 each, Rfl: 0    Lancets MISC, Use daily before breakfast, Disp: 100 each, Rfl: 5    montelukast (SINGULAIR) 10 mg tablet, Take 10 mg by mouth daily at bedtime (Patient not taking: Reported on 2021), Disp: , Rfl:   No Known Allergies    OBJECTIVE    Vitals:   Vitals:    21 0814   BP: 102/70   BP Location: Left arm   Patient Position: Sitting   Cuff Size: Large   Pulse: 70   Temp: 98 9 °F (37 2 °C)   SpO2: 99%   Weight: 97 1 kg (214 lb)   Height: 6' 1" (1 854 m)           Physical Exam  Vitals and nursing note reviewed  Constitutional:       General: He is not in acute distress  Appearance: Normal appearance  HENT:      Head: Normocephalic and atraumatic        Right Ear: Tympanic membrane, ear canal and external ear normal       Left Ear: Tympanic membrane, ear canal and external ear normal    Eyes:      Conjunctiva/sclera: Conjunctivae normal    Cardiovascular:      Rate and Rhythm: Normal rate and regular rhythm  Pulmonary:      Effort: Pulmonary effort is normal  No respiratory distress  Breath sounds: Normal breath sounds  Abdominal:      General: Bowel sounds are normal  There is no distension  Palpations: Abdomen is soft  Tenderness: There is no abdominal tenderness  Musculoskeletal:      Right lower leg: No edema  Left lower leg: No edema  Lymphadenopathy:      Cervical: No cervical adenopathy  Skin:     General: Skin is warm and dry  Neurological:      General: No focal deficit present  Mental Status: He is alert     Psychiatric:         Mood and Affect: Mood normal                     Adenike Garg DO  [facility]   9/9/2021  8:19 AM

## 2021-09-18 DIAGNOSIS — E78.2 MIXED HYPERLIPIDEMIA: ICD-10-CM

## 2021-09-20 RX ORDER — ROSUVASTATIN CALCIUM 20 MG/1
TABLET, COATED ORAL
Qty: 30 TABLET | Refills: 5 | Status: SHIPPED | OUTPATIENT
Start: 2021-09-20 | End: 2021-12-09

## 2021-10-19 DIAGNOSIS — E78.2 ELEVATED TRIGLYCERIDES WITH HIGH CHOLESTEROL: ICD-10-CM

## 2021-10-19 RX ORDER — FENOFIBRATE 160 MG/1
TABLET ORAL
Qty: 90 TABLET | Refills: 3 | Status: SHIPPED | OUTPATIENT
Start: 2021-10-19

## 2021-10-28 DIAGNOSIS — K21.9 GASTROESOPHAGEAL REFLUX DISEASE WITHOUT ESOPHAGITIS: ICD-10-CM

## 2021-10-28 RX ORDER — PANTOPRAZOLE SODIUM 40 MG/1
TABLET, DELAYED RELEASE ORAL
Qty: 90 TABLET | Refills: 3 | Status: SHIPPED | OUTPATIENT
Start: 2021-10-28

## 2021-11-09 ENCOUNTER — OFFICE VISIT (OUTPATIENT)
Dept: PAIN MEDICINE | Facility: CLINIC | Age: 56
End: 2021-11-09
Payer: MEDICARE

## 2021-11-09 ENCOUNTER — TELEPHONE (OUTPATIENT)
Dept: PAIN MEDICINE | Facility: CLINIC | Age: 56
End: 2021-11-09

## 2021-11-09 VITALS
BODY MASS INDEX: 28.49 KG/M2 | DIASTOLIC BLOOD PRESSURE: 72 MMHG | WEIGHT: 215 LBS | SYSTOLIC BLOOD PRESSURE: 113 MMHG | HEART RATE: 55 BPM | HEIGHT: 73 IN

## 2021-11-09 DIAGNOSIS — M79.18 MYOFASCIAL PAIN SYNDROME: ICD-10-CM

## 2021-11-09 DIAGNOSIS — M54.2 CHRONIC NECK PAIN: ICD-10-CM

## 2021-11-09 DIAGNOSIS — M54.50 CHRONIC BILATERAL LOW BACK PAIN WITHOUT SCIATICA: Primary | ICD-10-CM

## 2021-11-09 DIAGNOSIS — G89.29 CHRONIC BILATERAL LOW BACK PAIN WITHOUT SCIATICA: Primary | ICD-10-CM

## 2021-11-09 DIAGNOSIS — M47.816 LUMBAR SPONDYLOSIS: ICD-10-CM

## 2021-11-09 DIAGNOSIS — G89.29 CHRONIC NECK PAIN: ICD-10-CM

## 2021-11-09 PROCEDURE — 99214 OFFICE O/P EST MOD 30 MIN: CPT | Performed by: STUDENT IN AN ORGANIZED HEALTH CARE EDUCATION/TRAINING PROGRAM

## 2021-11-09 RX ORDER — LIDOCAINE 50 MG/G
1 PATCH TOPICAL DAILY
Qty: 10 PATCH | Refills: 0 | Status: SHIPPED | OUTPATIENT
Start: 2021-11-09 | End: 2022-02-22 | Stop reason: ALTCHOICE

## 2021-11-10 ENCOUNTER — TELEPHONE (OUTPATIENT)
Dept: PAIN MEDICINE | Facility: CLINIC | Age: 56
End: 2021-11-10

## 2021-11-10 DIAGNOSIS — M16.11 PRIMARY OSTEOARTHRITIS OF RIGHT HIP: ICD-10-CM

## 2021-12-08 ENCOUNTER — APPOINTMENT (OUTPATIENT)
Dept: LAB | Facility: MEDICAL CENTER | Age: 56
End: 2021-12-08
Payer: MEDICARE

## 2021-12-08 DIAGNOSIS — E11.22 TYPE 2 DIABETES MELLITUS WITH STAGE 3A CHRONIC KIDNEY DISEASE, WITHOUT LONG-TERM CURRENT USE OF INSULIN (HCC): ICD-10-CM

## 2021-12-08 DIAGNOSIS — E78.2 HYPERLIPIDEMIA, MIXED: ICD-10-CM

## 2021-12-08 DIAGNOSIS — N18.31 TYPE 2 DIABETES MELLITUS WITH STAGE 3A CHRONIC KIDNEY DISEASE, WITHOUT LONG-TERM CURRENT USE OF INSULIN (HCC): ICD-10-CM

## 2021-12-08 DIAGNOSIS — Z12.5 SCREENING FOR MALIGNANT NEOPLASM OF PROSTATE: ICD-10-CM

## 2021-12-08 DIAGNOSIS — N18.31 STAGE 3A CHRONIC KIDNEY DISEASE (HCC): ICD-10-CM

## 2021-12-08 LAB
ALBUMIN SERPL BCP-MCNC: 4 G/DL (ref 3.5–5)
ALP SERPL-CCNC: 57 U/L (ref 46–116)
ALT SERPL W P-5'-P-CCNC: 40 U/L (ref 12–78)
ANION GAP SERPL CALCULATED.3IONS-SCNC: 3 MMOL/L (ref 4–13)
AST SERPL W P-5'-P-CCNC: 27 U/L (ref 5–45)
BILIRUB SERPL-MCNC: 0.69 MG/DL (ref 0.2–1)
BUN SERPL-MCNC: 22 MG/DL (ref 5–25)
CALCIUM SERPL-MCNC: 9.2 MG/DL (ref 8.3–10.1)
CHLORIDE SERPL-SCNC: 108 MMOL/L (ref 100–108)
CHOLEST SERPL-MCNC: 96 MG/DL
CO2 SERPL-SCNC: 26 MMOL/L (ref 21–32)
CREAT SERPL-MCNC: 1.66 MG/DL (ref 0.6–1.3)
EST. AVERAGE GLUCOSE BLD GHB EST-MCNC: 126 MG/DL
GFR SERPL CREATININE-BSD FRML MDRD: 45 ML/MIN/1.73SQ M
GLUCOSE P FAST SERPL-MCNC: 124 MG/DL (ref 65–99)
HBA1C MFR BLD: 6 %
HDLC SERPL-MCNC: 31 MG/DL
LDLC SERPL CALC-MCNC: 23 MG/DL (ref 0–100)
NONHDLC SERPL-MCNC: 65 MG/DL
POTASSIUM SERPL-SCNC: 4.5 MMOL/L (ref 3.5–5.3)
PROT SERPL-MCNC: 7.3 G/DL (ref 6.4–8.2)
PSA SERPL-MCNC: 0.4 NG/ML (ref 0–4)
SODIUM SERPL-SCNC: 137 MMOL/L (ref 136–145)
TRIGL SERPL-MCNC: 212 MG/DL

## 2021-12-08 PROCEDURE — 80053 COMPREHEN METABOLIC PANEL: CPT

## 2021-12-08 PROCEDURE — G0103 PSA SCREENING: HCPCS

## 2021-12-08 PROCEDURE — 83036 HEMOGLOBIN GLYCOSYLATED A1C: CPT

## 2021-12-08 PROCEDURE — 80061 LIPID PANEL: CPT

## 2021-12-08 PROCEDURE — 36415 COLL VENOUS BLD VENIPUNCTURE: CPT

## 2021-12-09 ENCOUNTER — OFFICE VISIT (OUTPATIENT)
Dept: FAMILY MEDICINE CLINIC | Facility: CLINIC | Age: 56
End: 2021-12-09
Payer: MEDICARE

## 2021-12-09 VITALS
SYSTOLIC BLOOD PRESSURE: 110 MMHG | HEIGHT: 73 IN | DIASTOLIC BLOOD PRESSURE: 64 MMHG | WEIGHT: 217 LBS | TEMPERATURE: 98.4 F | BODY MASS INDEX: 28.76 KG/M2

## 2021-12-09 DIAGNOSIS — J30.1 NON-SEASONAL ALLERGIC RHINITIS DUE TO POLLEN: ICD-10-CM

## 2021-12-09 DIAGNOSIS — N18.31 STAGE 3A CHRONIC KIDNEY DISEASE (HCC): ICD-10-CM

## 2021-12-09 DIAGNOSIS — J45.20 MILD INTERMITTENT ASTHMA WITHOUT COMPLICATION: ICD-10-CM

## 2021-12-09 DIAGNOSIS — E78.2 HYPERLIPIDEMIA, MIXED: ICD-10-CM

## 2021-12-09 DIAGNOSIS — E78.2 MIXED HYPERLIPIDEMIA: ICD-10-CM

## 2021-12-09 DIAGNOSIS — N18.31 TYPE 2 DIABETES MELLITUS WITH STAGE 3A CHRONIC KIDNEY DISEASE, WITHOUT LONG-TERM CURRENT USE OF INSULIN (HCC): Primary | ICD-10-CM

## 2021-12-09 DIAGNOSIS — E11.22 TYPE 2 DIABETES MELLITUS WITH STAGE 3A CHRONIC KIDNEY DISEASE, WITHOUT LONG-TERM CURRENT USE OF INSULIN (HCC): Primary | ICD-10-CM

## 2021-12-09 PROCEDURE — 99214 OFFICE O/P EST MOD 30 MIN: CPT | Performed by: FAMILY MEDICINE

## 2021-12-09 RX ORDER — ROSUVASTATIN CALCIUM 5 MG/1
20 TABLET, COATED ORAL DAILY
Qty: 90 TABLET | Refills: 1 | Status: SHIPPED | OUTPATIENT
Start: 2021-12-09 | End: 2021-12-09 | Stop reason: SDUPTHER

## 2021-12-09 RX ORDER — ROSUVASTATIN CALCIUM 5 MG/1
5 TABLET, COATED ORAL DAILY
Qty: 90 TABLET | Refills: 1 | Status: SHIPPED | OUTPATIENT
Start: 2021-12-09 | End: 2022-06-01

## 2021-12-16 ENCOUNTER — TELEPHONE (OUTPATIENT)
Dept: NEPHROLOGY | Facility: CLINIC | Age: 56
End: 2021-12-16

## 2021-12-20 ENCOUNTER — APPOINTMENT (OUTPATIENT)
Dept: LAB | Facility: MEDICAL CENTER | Age: 56
End: 2021-12-20
Payer: MEDICARE

## 2021-12-20 DIAGNOSIS — E83.9 CHRONIC KIDNEY DISEASE-MINERAL AND BONE DISORDER: ICD-10-CM

## 2021-12-20 DIAGNOSIS — N18.31 STAGE 3A CHRONIC KIDNEY DISEASE (HCC): ICD-10-CM

## 2021-12-20 DIAGNOSIS — M89.9 CHRONIC KIDNEY DISEASE-MINERAL AND BONE DISORDER: ICD-10-CM

## 2021-12-20 DIAGNOSIS — N18.9 CHRONIC KIDNEY DISEASE-MINERAL AND BONE DISORDER: ICD-10-CM

## 2021-12-20 LAB
25(OH)D3 SERPL-MCNC: 26.3 NG/ML (ref 30–100)
ANION GAP SERPL CALCULATED.3IONS-SCNC: 4 MMOL/L (ref 4–13)
BASOPHILS # BLD AUTO: 0.03 THOUSANDS/ΜL (ref 0–0.1)
BASOPHILS NFR BLD AUTO: 1 % (ref 0–1)
BILIRUB UR QL STRIP: NEGATIVE
BUN SERPL-MCNC: 18 MG/DL (ref 5–25)
CALCIUM SERPL-MCNC: 10.1 MG/DL (ref 8.3–10.1)
CHLORIDE SERPL-SCNC: 108 MMOL/L (ref 100–108)
CLARITY UR: CLEAR
CO2 SERPL-SCNC: 27 MMOL/L (ref 21–32)
COLOR UR: YELLOW
CREAT SERPL-MCNC: 1.42 MG/DL (ref 0.6–1.3)
CREAT UR-MCNC: 182 MG/DL
EOSINOPHIL # BLD AUTO: 0.11 THOUSAND/ΜL (ref 0–0.61)
EOSINOPHIL NFR BLD AUTO: 2 % (ref 0–6)
ERYTHROCYTE [DISTWIDTH] IN BLOOD BY AUTOMATED COUNT: 12 % (ref 11.6–15.1)
GFR SERPL CREATININE-BSD FRML MDRD: 54 ML/MIN/1.73SQ M
GLUCOSE P FAST SERPL-MCNC: 124 MG/DL (ref 65–99)
GLUCOSE UR STRIP-MCNC: NEGATIVE MG/DL
HCT VFR BLD AUTO: 48.5 % (ref 36.5–49.3)
HGB BLD-MCNC: 15.7 G/DL (ref 12–17)
HGB UR QL STRIP.AUTO: NEGATIVE
IMM GRANULOCYTES # BLD AUTO: 0.02 THOUSAND/UL (ref 0–0.2)
IMM GRANULOCYTES NFR BLD AUTO: 0 % (ref 0–2)
KETONES UR STRIP-MCNC: NEGATIVE MG/DL
LEUKOCYTE ESTERASE UR QL STRIP: NEGATIVE
LYMPHOCYTES # BLD AUTO: 1.72 THOUSANDS/ΜL (ref 0.6–4.47)
LYMPHOCYTES NFR BLD AUTO: 27 % (ref 14–44)
MCH RBC QN AUTO: 31.6 PG (ref 26.8–34.3)
MCHC RBC AUTO-ENTMCNC: 32.4 G/DL (ref 31.4–37.4)
MCV RBC AUTO: 98 FL (ref 82–98)
MONOCYTES # BLD AUTO: 0.54 THOUSAND/ΜL (ref 0.17–1.22)
MONOCYTES NFR BLD AUTO: 8 % (ref 4–12)
NEUTROPHILS # BLD AUTO: 4.08 THOUSANDS/ΜL (ref 1.85–7.62)
NEUTS SEG NFR BLD AUTO: 62 % (ref 43–75)
NITRITE UR QL STRIP: NEGATIVE
NRBC BLD AUTO-RTO: 0 /100 WBCS
PH UR STRIP.AUTO: 5.5 [PH]
PHOSPHATE SERPL-MCNC: 2.6 MG/DL (ref 2.7–4.5)
PLATELET # BLD AUTO: 208 THOUSANDS/UL (ref 149–390)
PMV BLD AUTO: 11.4 FL (ref 8.9–12.7)
POTASSIUM SERPL-SCNC: 4.1 MMOL/L (ref 3.5–5.3)
PROT UR STRIP-MCNC: NEGATIVE MG/DL
PROT UR-MCNC: 6 MG/DL
PROT/CREAT UR: 0.03 MG/G{CREAT} (ref 0–0.1)
PTH-INTACT SERPL-MCNC: 41.4 PG/ML (ref 18.4–80.1)
RBC # BLD AUTO: 4.97 MILLION/UL (ref 3.88–5.62)
SODIUM SERPL-SCNC: 139 MMOL/L (ref 136–145)
SP GR UR STRIP.AUTO: 1.02 (ref 1–1.03)
UROBILINOGEN UR QL STRIP.AUTO: 0.2 E.U./DL
WBC # BLD AUTO: 6.5 THOUSAND/UL (ref 4.31–10.16)

## 2021-12-20 PROCEDURE — 80048 BASIC METABOLIC PNL TOTAL CA: CPT

## 2021-12-20 PROCEDURE — 85025 COMPLETE CBC W/AUTO DIFF WBC: CPT

## 2021-12-20 PROCEDURE — 83970 ASSAY OF PARATHORMONE: CPT

## 2021-12-20 PROCEDURE — 81003 URINALYSIS AUTO W/O SCOPE: CPT

## 2021-12-20 PROCEDURE — 82306 VITAMIN D 25 HYDROXY: CPT

## 2021-12-20 PROCEDURE — 84100 ASSAY OF PHOSPHORUS: CPT

## 2021-12-20 PROCEDURE — 36415 COLL VENOUS BLD VENIPUNCTURE: CPT

## 2021-12-20 PROCEDURE — 82570 ASSAY OF URINE CREATININE: CPT

## 2021-12-20 PROCEDURE — 84156 ASSAY OF PROTEIN URINE: CPT

## 2021-12-22 ENCOUNTER — OFFICE VISIT (OUTPATIENT)
Dept: NEPHROLOGY | Facility: CLINIC | Age: 56
End: 2021-12-22
Payer: MEDICARE

## 2021-12-22 VITALS — WEIGHT: 219.4 LBS | TEMPERATURE: 97.8 F | BODY MASS INDEX: 29.08 KG/M2 | HEIGHT: 73 IN | RESPIRATION RATE: 16 BRPM

## 2021-12-22 DIAGNOSIS — M89.9 CHRONIC KIDNEY DISEASE-MINERAL AND BONE DISORDER: ICD-10-CM

## 2021-12-22 DIAGNOSIS — N18.9 CHRONIC KIDNEY DISEASE-MINERAL AND BONE DISORDER: ICD-10-CM

## 2021-12-22 DIAGNOSIS — N18.31 STAGE 3A CHRONIC KIDNEY DISEASE (HCC): Primary | ICD-10-CM

## 2021-12-22 DIAGNOSIS — M16.11 PRIMARY OSTEOARTHRITIS OF RIGHT HIP: ICD-10-CM

## 2021-12-22 DIAGNOSIS — S39.012D LUMBOSACRAL STRAIN, SUBSEQUENT ENCOUNTER: ICD-10-CM

## 2021-12-22 DIAGNOSIS — E11.22 TYPE 2 DIABETES MELLITUS WITH STAGE 3A CHRONIC KIDNEY DISEASE, WITHOUT LONG-TERM CURRENT USE OF INSULIN (HCC): ICD-10-CM

## 2021-12-22 DIAGNOSIS — E83.9 CHRONIC KIDNEY DISEASE-MINERAL AND BONE DISORDER: ICD-10-CM

## 2021-12-22 DIAGNOSIS — N18.31 TYPE 2 DIABETES MELLITUS WITH STAGE 3A CHRONIC KIDNEY DISEASE, WITHOUT LONG-TERM CURRENT USE OF INSULIN (HCC): ICD-10-CM

## 2021-12-22 PROCEDURE — 99214 OFFICE O/P EST MOD 30 MIN: CPT | Performed by: INTERNAL MEDICINE

## 2021-12-27 ENCOUNTER — TELEPHONE (OUTPATIENT)
Dept: PAIN MEDICINE | Facility: CLINIC | Age: 56
End: 2021-12-27

## 2021-12-27 RX ORDER — TRAMADOL HYDROCHLORIDE 50 MG/1
50 TABLET ORAL 3 TIMES DAILY PRN
Qty: 90 TABLET | Refills: 0 | Status: SHIPPED | OUTPATIENT
Start: 2022-01-10 | End: 2022-02-09

## 2022-02-01 DIAGNOSIS — E11.9 TYPE 2 DIABETES MELLITUS WITHOUT COMPLICATION, WITHOUT LONG-TERM CURRENT USE OF INSULIN (HCC): ICD-10-CM

## 2022-02-01 RX ORDER — DULAGLUTIDE 0.75 MG/.5ML
INJECTION, SOLUTION SUBCUTANEOUS
Qty: 12 ML | Refills: 1 | Status: SHIPPED | OUTPATIENT
Start: 2022-02-01

## 2022-02-22 ENCOUNTER — OFFICE VISIT (OUTPATIENT)
Dept: PAIN MEDICINE | Facility: CLINIC | Age: 57
End: 2022-02-22
Payer: MEDICARE

## 2022-02-22 VITALS — HEIGHT: 73 IN | RESPIRATION RATE: 18 BRPM | WEIGHT: 226 LBS | BODY MASS INDEX: 29.95 KG/M2

## 2022-02-22 DIAGNOSIS — M79.18 MYOFASCIAL PAIN SYNDROME: ICD-10-CM

## 2022-02-22 DIAGNOSIS — G89.29 CHRONIC NECK PAIN: ICD-10-CM

## 2022-02-22 DIAGNOSIS — M54.2 CHRONIC NECK PAIN: ICD-10-CM

## 2022-02-22 DIAGNOSIS — M54.50 CHRONIC BILATERAL LOW BACK PAIN WITHOUT SCIATICA: Primary | ICD-10-CM

## 2022-02-22 DIAGNOSIS — M54.16 LUMBAR RADICULOPATHY: ICD-10-CM

## 2022-02-22 DIAGNOSIS — M47.816 LUMBAR SPONDYLOSIS: ICD-10-CM

## 2022-02-22 DIAGNOSIS — G89.29 CHRONIC BILATERAL LOW BACK PAIN WITHOUT SCIATICA: Primary | ICD-10-CM

## 2022-02-22 PROCEDURE — 80305 DRUG TEST PRSMV DIR OPT OBS: CPT | Performed by: STUDENT IN AN ORGANIZED HEALTH CARE EDUCATION/TRAINING PROGRAM

## 2022-02-22 PROCEDURE — 99214 OFFICE O/P EST MOD 30 MIN: CPT | Performed by: STUDENT IN AN ORGANIZED HEALTH CARE EDUCATION/TRAINING PROGRAM

## 2022-02-22 RX ORDER — TRAMADOL HYDROCHLORIDE 50 MG/1
50 TABLET ORAL EVERY 8 HOURS PRN
Qty: 90 TABLET | Refills: 2 | Status: SHIPPED | OUTPATIENT
Start: 2022-02-22 | End: 2022-03-23

## 2022-02-22 RX ORDER — TRAMADOL HYDROCHLORIDE 50 MG/1
50 TABLET ORAL EVERY 8 HOURS PRN
COMMUNITY
End: 2022-05-12 | Stop reason: SDUPTHER

## 2022-02-22 NOTE — H&P (VIEW-ONLY)
Pain Medicine Follow-Up Note    Assessment:  1  Chronic bilateral low back pain without sciatica    2  Lumbar spondylosis    3  Chronic neck pain    4  Myofascial pain syndrome    5   Lumbar radiculopathy        Plan:  Orders Placed This Encounter   Procedures    MM ALL_Prescribed Meds and Special Instructions    MM DT_Alprazolam Definitive Test    MM DT_Amphetamine Definitive Test    MM DT_Aripiprazole Definitive Test    MM DT_Bath Salts Definitive Test    MM DT_Buprenorphine Definitive Test    MM DT_Butalbital Definitive Test    MM DT_Clonazepam Definitive Test    MM DT_Clozapine Definitive Test    MM DT_Cocaine Definitive Test    MM DT_Codeine Definitive Test    MM DT_Desipramine Definitive Test    MM DT_Dextromethorphan Definitive Test    MM Diazepam Definitive Test    MM DT_Ethyl Glucuronide/Ethyl Sulfate Definitive Test    MM DT_Fentanyl Definitive Test    MM DT_Heroin Definitive Test    MM DT_Hydrocodone Definitive Test    MM DT_Hydromorphone Definitive Test    MM DT_Kratom Definitive Test    MM DT_Levorphanol Definitive Test    MM Lorazepam Definitive Test    MM DT_MDMA Definitive Test    MM DT_Meperidine Definitive Test    MM DT_Methadone Definitive Test    MM DT_Methamphetamine Definitive Test    MM DT_Morphine Definitive Test    MM DT_Olanzapine Definitive Test    MM DT_Oxazepam Definitive Test    MM DT_Oxycodone Definitive Test    MM DT_Oxymorphone Definitive Test    MM DT_Phenobarbital Definitive Test    MM DT_Phentermine Definitive Test    MM DT_Secobarbital Definitive Test    MM DT_Spice Definitive Test    MM DT_Tapentadol Definitive Test    MM DT_Temazapam Definitive Test    MM DT_THC Definitive Test    MM DT_Tramadol Definitive Test       New Medications Ordered This Visit   Medications    traMADol (Ultram) 50 mg tablet     Sig: Take 1 tablet (50 mg total) by mouth every 8 (eight) hours as needed for severe pain for up to 29 days Future start dates are 3/24/22 and 4/23/22     Dispense:  90 tablet     Refill:  2    traMADol (ULTRAM) 50 mg tablet     Sig: Take 50 mg by mouth every 8 (eight) hours as needed for moderate pain       My impressions and treatment recommendations were discussed in detail with the patient who verbalized understanding and had no further questions  This is a 40-year-old male who returns to our office with complaints as noted below  Continues tramadol 50 mg t i d  p r n  with continued relief in pain and function without any significant side effects  Therefore will continue medication as is without any changes  He endorses a newer complaint of pain radiating to the right anterior thigh when he coughs  He does have MRI from 2020 showing bulging disc towards the right  At the L3 level but no nerve compression  To rule out as a potential cause we discussed right L3 TFESI which we will discuss further at next visit if this continues  Discussed this will be diagnostic and potentially therapeautic  Also advised patient to bring Tramadol to next office visit  He took his last pill today so does not have medication with him  [place] Prescription Drug Monitoring Program report was reviewed and was appropriate     A urine drug screen was collected at today's office visit as part of our medication management protocol  The point of care testing results were appropriate for what was being prescribed  The specimen will be sent for confirmatory testing  The drug screen is medically necessary because the patient is either dependent on opioid medication or is being considered for opioid medication therapy and the results could impact ongoing or future treatment  The drug screen is to evaluate for the presences or absence of prescribed, non-prescribed, and/or illicit drugs/substances  There are risks associated with opioid medications, including dependence, addiction and tolerance   The patient understands and agrees to use these medications only as prescribed  Potential side effects of the medications include, but are not limited to, constipation, drowsiness, addiction, impaired judgment and risk of fatal overdose if not taken as prescribed  The patient was warned against driving while taking sedation medications  Sharing medications is a felony  At this point in time, the patient is showing no signs of addiction, abuse, diversion or suicidal ideation  Follow-up is planned in 12w time or sooner as warranted  Discharge instructions were provided  I personally saw and examined the patient and I agree with the above discussed plan of care  History of Present Illness:    Ivana Castaneda is a 61 y o  male who presents to [facility] for interval re-evaluation of the above stated pain complaints  The patient has a past medical and chronic pain history as outlined in the assessment section  He was last seen on 11/09/2021 in regards to chronic pain syndrome secondary to lumbar spondylosis  Returns today with same pain  6/10, worse in the morning, constant, sharp/shooting, and pressure-like  Also reports coldness  Reports pain in the low back and in the right upper thigh/groin area and also the right knee       Current medications include tramadol 50 mg t i d  p r n  reports moderate relief without any significant side effects  Last tramadol was taken this morning  Patient notably had appt 2 weeks ago and had to cancel appointment becaue he had to take father to the doctor  He took his last pill this AM      Denies recent changes in medications or ER visit  Reports he gets pain in then right hip when he coughs  If he leans forward this is less severe  This has been over last couple months  Has history of R HAYDEE without complication  Going on for 2 months       Pain Contract Signed:  02/22/22  Last Urine Drug Screen:  02/22/22    Other than as stated above, the patient denies any interval changes in medications, medical condition, mental condition, symptoms, or allergies since the last office visit  Review of Systems:    Review of Systems   Respiratory: Negative for shortness of breath  Cardiovascular: Negative for chest pain  Gastrointestinal: Negative for constipation, diarrhea, nausea and vomiting  Musculoskeletal: Negative for arthralgias, gait problem, joint swelling and myalgias  Joint stiffness   Skin: Negative for rash  Neurological: Positive for dizziness  Negative for seizures and weakness  All other systems reviewed and are negative          Patient Active Problem List   Diagnosis    Mild intermittent asthma without complication    Ascending aortic aneurysm ([facility])    Bicuspid aortic valve    Allergic rhinitis    GERD (gastroesophageal reflux disease)    Hiatal hernia    Type 2 diabetes mellitus with stage 3a chronic kidney disease, without long-term current use of insulin ([facility])    CKD (chronic kidney disease) stage 3, GFR 30-59 ml/min ([facility])    Hyperlipidemia, mixed    Depression with anxiety    Vitamin D deficiency    Renal cyst, left    Hepatic cyst    Fatty liver disease, nonalcoholic    Erectile dysfunction    Carpal tunnel syndrome of right wrist    Chronic pain of right knee    Vestibular migraine    Patellar tendinitis of right knee    Benign paroxysmal positional vertigo due to bilateral vestibular disorder    Hip impingement syndrome, right    Primary osteoarthritis of right hip    Ulnar neuropathy of both upper extremities    Lumbosacral strain    Tarsal tunnel syndrome of right side    Cubital tunnel syndrome, bilateral    Groin pain, chronic, right    Chronic pain syndrome    Chronic kidney disease-mineral and bone disorder    Uncomplicated opioid dependence ([facility] [address] )    Arthritis of right hip       Past Medical History:   Diagnosis Date    Aorta aneurysm ([facility])     4 3    Arm DVT (deep venous thromboembolism), acute ([facility] [address] ) 9840    complications of cardiac cath    Asthma     Chronic kidney disease     CKD (chronic kidney disease), stage III ([facility])     COPD (chronic obstructive pulmonary disease) (HCC)     Depression     Diabetes mellitus (H[facility])     Essential hypertriglyceridemia     GERD (gastroesophageal reflux disease)     Headache     Hiatal hernia     Hyperlipidemia, mixed 2018    Kidney stone     Liver disease     FATTY LIVER    PAC (premature atrial contraction)     Prediabetes     Renal calculi     Sleep apnea     Vestibular migraine        Past Surgical History:   Procedure Laterality Date    CARPAL TUNNEL RELEASE Left     COLONOSCOPY      FL INJECTION RIGHT HIP (ARTHROGRAM)  2018    FOOT SURGERY Left     FOREIGN BODY REMOVAL    HERNIA REPAIR      VT COLONOSCOPY FLX DX W/COLLJ SPEC WHEN PFRMD N/A 2017    Procedure: COLONOSCOPY;  Surgeon: Werner Robertson MD;  Location: [facility]; Service: Gastroenterology    VT ESOPHAGOGASTRODUODENOSCOPY TRANSORAL DIAGNOSTIC N/A 10/31/2017    Procedure: ESOPHAGOGASTRODUODENOSCOPY (EGD); Surgeon: Werner Robertson MD;  Location: [facility];   Service: Gastroenterology    VT TOTAL HIP ARTHROPLASTY Right 2020    Procedure: ARTHROPLASTY HIP TOTAL;  Surgeon: Martín Melgoza MD;  Location: [facility];  Service: Orthopedics       Family History   Problem Relation Age of Onset    Cancer Brother     Prostate cancer Brother     Leukemia Brother         his only brother dies from [address] or leukemia pt unsure he was only 46    Arthritis Mother     Heart attack Father     Clotting disorder Father     Schizoaffective Disorder  Sister     Aortic aneurysm Other         abdominal       Social History     Occupational History    Occupation: unemployed   Tobacco Use    Smoking status: Former Smoker     Types: Cigars     Quit date: 2014     Years since quittin 5    Smokeless tobacco: Never Used    Tobacco comment: never inhaled   Vaping Use    Vaping Use: Never used   Substance and Sexual Activity    Alcohol use: Yes     Comment: occasional beer    Drug use: No    Sexual activity: Yes     Partners: Female         Current Outpatient Medications:     fenofibrate 160 MG tablet, take 1 tablet by mouth once daily, Disp: 90 tablet, Rfl: 3    glucose blood test strip, TEST BS TID, Disp: 300 each, Rfl: 5    glucose monitoring kit (FREESTYLE) monitoring kit, Use 1 each daily before breakfast, Disp: 1 each, Rfl: 0    Lancets MISC, Use daily before breakfast, Disp: 100 each, Rfl: 5    pantoprazole (PROTONIX) 40 mg tablet, take 1 tablet by mouth once daily, Disp: 90 tablet, Rfl: 3    rosuvastatin (CRESTOR) 5 mg tablet, Take 1 tablet (5 mg total) by mouth daily, Disp: 90 tablet, Rfl: 1    traMADol (ULTRAM) 50 mg tablet, Take 50 mg by mouth every 8 (eight) hours as needed for moderate pain, Disp: , Rfl:     Trulicity 8 85 YQ/5 3TD SOPN, inject 0 5 milliliters ( 0 75 milligrams ) subcutaneously every week, Disp: 12 mL, Rfl: 1    Turmeric 500 MG CAPS, Take by mouth, Disp: , Rfl:     traMADol (Ultram) 50 mg tablet, Take 1 tablet (50 mg total) by mouth every 8 (eight) hours as needed for severe pain for up to 29 days Future start dates are 3/24/22 and 4/23/22, Disp: 90 tablet, Rfl: 2    No Known Allergies    Physical Exam:    Resp 18   Ht 6' 1" (1 854 m)   Wt 103 kg (226 lb)   BMI 29 82 kg/m²     Constitutional:normal, well developed, well nourished, alert, in no distress and non-toxic and no overt pain behavior    Eyes:anicteric  HEENT:grossly intact  Neck:supple, symmetric, trachea midline and no masses   Pulmonary:even and unlabored  Cardiovascular:No edema or pitting edema present  Skin:Normal without rashes or lesions and well hydrated  Psychiatric:Mood and affect appropriate  Neurologic:Cranial Nerves II-XII grossly intact  Musculoskeletal:normal      Imaging  No orders to display         Orders Placed This Encounter   Procedures    MM ALL_Prescribed Meds and Special Instructions    MM DT_Alprazolam Definitive Test    MM DT_Amphetamine Definitive Test    MM DT_Aripiprazole Definitive Test    MM DT_Bath Salts Definitive Test    MM DT_Buprenorphine Definitive Test    MM DT_Butalbital Definitive Test    MM DT_Clonazepam Definitive Test    MM DT_Clozapine Definitive Test    MM DT_Cocaine Definitive Test    MM DT_Codeine Definitive Test    MM DT_Desipramine Definitive Test    MM DT_Dextromethorphan Definitive Test    MM Diazepam Definitive Test    MM DT_Ethyl Glucuronide/Ethyl Sulfate Definitive Test    MM DT_Fentanyl Definitive Test    MM DT_Heroin Definitive Test    MM DT_Hydrocodone Definitive Test    MM DT_Hydromorphone Definitive Test    MM DT_Kratom Definitive Test    MM DT_Levorphanol Definitive Test    MM Lorazepam Definitive Test    MM DT_MDMA Definitive Test    MM DT_Meperidine Definitive Test    MM DT_Methadone Definitive Test    MM DT_Methamphetamine Definitive Test    MM DT_Morphine Definitive Test    MM DT_Olanzapine Definitive Test    MM DT_Oxazepam Definitive Test    MM DT_Oxycodone Definitive Test    MM DT_Oxymorphone Definitive Test    MM DT_Phenobarbital Definitive Test    MM DT_Phentermine Definitive Test    MM DT_Secobarbital Definitive Test    MM DT_Spice Definitive Test    MM DT_Tapentadol Definitive Test    MM DT_Temazapam Definitive Test    MM DT_THC Definitive Test    MM DT_Tramadol Definitive Test

## 2022-02-22 NOTE — PROGRESS NOTES
Pain Medicine Follow-Up Note    Assessment:  1  Chronic bilateral low back pain without sciatica    2  Lumbar spondylosis    3  Chronic neck pain    4  Myofascial pain syndrome    5   Lumbar radiculopathy        Plan:  Orders Placed This Encounter   Procedures    MM ALL_Prescribed Meds and Special Instructions    MM DT_Alprazolam Definitive Test    MM DT_Amphetamine Definitive Test    MM DT_Aripiprazole Definitive Test    MM DT_Bath Salts Definitive Test    MM DT_Buprenorphine Definitive Test    MM DT_Butalbital Definitive Test    MM DT_Clonazepam Definitive Test    MM DT_Clozapine Definitive Test    MM DT_Cocaine Definitive Test    MM DT_Codeine Definitive Test    MM DT_Desipramine Definitive Test    MM DT_Dextromethorphan Definitive Test    MM Diazepam Definitive Test    MM DT_Ethyl Glucuronide/Ethyl Sulfate Definitive Test    MM DT_Fentanyl Definitive Test    MM DT_Heroin Definitive Test    MM DT_Hydrocodone Definitive Test    MM DT_Hydromorphone Definitive Test    MM DT_Kratom Definitive Test    MM DT_Levorphanol Definitive Test    MM Lorazepam Definitive Test    MM DT_MDMA Definitive Test    MM DT_Meperidine Definitive Test    MM DT_Methadone Definitive Test    MM DT_Methamphetamine Definitive Test    MM DT_Morphine Definitive Test    MM DT_Olanzapine Definitive Test    MM DT_Oxazepam Definitive Test    MM DT_Oxycodone Definitive Test    MM DT_Oxymorphone Definitive Test    MM DT_Phenobarbital Definitive Test    MM DT_Phentermine Definitive Test    MM DT_Secobarbital Definitive Test    MM DT_Spice Definitive Test    MM DT_Tapentadol Definitive Test    MM DT_Temazapam Definitive Test    MM DT_THC Definitive Test    MM DT_Tramadol Definitive Test       New Medications Ordered This Visit   Medications    traMADol (Ultram) 50 mg tablet     Sig: Take 1 tablet (50 mg total) by mouth every 8 (eight) hours as needed for severe pain for up to 29 days Future start dates are 3/24/22 and 4/23/22     Dispense:  90 tablet     Refill:  2    traMADol (ULTRAM) 50 mg tablet     Sig: Take 50 mg by mouth every 8 (eight) hours as needed for moderate pain       My impressions and treatment recommendations were discussed in detail with the patient who verbalized understanding and had no further questions  This is a 58-year-old male who returns to our office with complaints as noted below  Continues tramadol 50 mg t i d  p r n  with continued relief in pain and function without any significant side effects  Therefore will continue medication as is without any changes  He endorses a newer complaint of pain radiating to the right anterior thigh when he coughs  He does have MRI from 2020 showing bulging disc towards the right  At the L3 level but no nerve compression  To rule out as a potential cause we discussed right L3 TFESI which we will discuss further at next visit if this continues  Discussed this will be diagnostic and potentially therapeautic  Also advised patient to bring Tramadol to next office visit  He took his last pill today so does not have medication with him  [place] Prescription Drug Monitoring Program report was reviewed and was appropriate     A urine drug screen was collected at today's office visit as part of our medication management protocol  The point of care testing results were appropriate for what was being prescribed  The specimen will be sent for confirmatory testing  The drug screen is medically necessary because the patient is either dependent on opioid medication or is being considered for opioid medication therapy and the results could impact ongoing or future treatment  The drug screen is to evaluate for the presences or absence of prescribed, non-prescribed, and/or illicit drugs/substances  There are risks associated with opioid medications, including dependence, addiction and tolerance   The patient understands and agrees to use these medications only as prescribed  Potential side effects of the medications include, but are not limited to, constipation, drowsiness, addiction, impaired judgment and risk of fatal overdose if not taken as prescribed  The patient was warned against driving while taking sedation medications  Sharing medications is a felony  At this point in time, the patient is showing no signs of addiction, abuse, diversion or suicidal ideation  Follow-up is planned in 12w time or sooner as warranted  Discharge instructions were provided  I personally saw and examined the patient and I agree with the above discussed plan of care  History of Present Illness:    Mary Jane Hartley is a 61 y o  male who presents to [facility] for interval re-evaluation of the above stated pain complaints  The patient has a past medical and chronic pain history as outlined in the assessment section  He was last seen on 11/09/2021 in regards to chronic pain syndrome secondary to lumbar spondylosis  Returns today with same pain  6/10, worse in the morning, constant, sharp/shooting, and pressure-like  Also reports coldness  Reports pain in the low back and in the right upper thigh/groin area and also the right knee       Current medications include tramadol 50 mg t i d  p r n  reports moderate relief without any significant side effects  Last tramadol was taken this morning  Patient notably had appt 2 weeks ago and had to cancel appointment becaue he had to take father to the doctor  He took his last pill this AM      Denies recent changes in medications or ER visit  Reports he gets pain in then right hip when he coughs  If he leans forward this is less severe  This has been over last couple months  Has history of R HAYDEE without complication  Going on for 2 months       Pain Contract Signed:  02/22/22  Last Urine Drug Screen:  02/22/22    Other than as stated above, the patient denies any interval changes in medications, medical condition, mental condition, symptoms, or allergies since the last office visit  Review of Systems:    Review of Systems   Respiratory: Negative for shortness of breath  Cardiovascular: Negative for chest pain  Gastrointestinal: Negative for constipation, diarrhea, nausea and vomiting  Musculoskeletal: Negative for arthralgias, gait problem, joint swelling and myalgias  Joint stiffness   Skin: Negative for rash  Neurological: Positive for dizziness  Negative for seizures and weakness  All other systems reviewed and are negative          Patient Active Problem List   Diagnosis    Mild intermittent asthma without complication    Ascending aortic [facility])    Bicuspid aortic valve    Allergic rhinitis    GERD (gastroesophageal reflux disease)    Hiatal hernia    Type 2 diabetes mellitus with stage 3a chronic kidney disease, without long-term current use of insulin ([facility])    CKD (chronic kidney disease) stage 3, GFR 30-59 ml/min ([facility])    Hyperlipidemia, mixed    Depression with anxiety    Vitamin D deficiency    Renal cyst, left    Hepatic cyst    Fatty liver disease, nonalcoholic    Erectile dysfunction    Carpal tunnel syndrome of right wrist    Chronic pain of right knee    Vestibular migraine    Patellar tendinitis of right knee    Benign paroxysmal positional vertigo due to bilateral vestibular disorder    Hip impingement syndrome, right    Primary osteoarthritis of right hip    Ulnar neuropathy of both upper extremities    Lumbosacral strain    Tarsal tunnel syndrome of right side    Cubital tunnel syndrome, bilateral    Groin pain, chronic, right    Chronic pain syndrome    Chronic kidney disease-mineral and bone disorder    Uncomplicated opioid dependence ([facility] )    Arthritis of right hip       Past Medical History:   Diagnosis Date    Aorta aneurysm ([facility])     4 3    Arm DVT (deep venous thromboembolism), acute ([facility] ) 6114    complications of cardiac cath    Asthma     Chronic kidney disease     CKD (chronic kidney disease), stage III (HCC)     COPD (chronic obstructive pulmonary disease) (HCC)     Depression     Diabetes mellitus (HCC)     Essential hypertriglyceridemia     GERD (gastroesophageal reflux disease)     Headache     Hiatal hernia     Hyperlipidemia, mixed 2018    Kidney stone     Liver disease     FATTY LIVER    PAC (premature atrial contraction)     Prediabetes     Renal calculi     Sleep apnea     Vestibular migraine        Past Surgical History:   Procedure Laterality Date    CARPAL TUNNEL RELEASE Left     COLONOSCOPY      FL INJECTION RIGHT HIP (ARTHROGRAM)  2018    FOOT SURGERY Left     FOREIGN BODY REMOVAL    HERNIA REPAIR      VA COLONOSCOPY FLX DX W/COLLJ SPEC WHEN PFRMD N/A 2017    Procedure: COLONOSCOPY;  Surgeon: Ritu Barrera MD;  [facility]; Service: Gastroenterology    VA ESOPHAGOGASTRODUODENOSCOPY TRANSORAL DIAGNOSTIC N/A 10/31/2017    Procedure: ESOPHAGOGASTRODUODENOSCOPY (EGD); Surgeon: Ritu Barrera MD;  [facility];   Service: Gastroenterology    VA TOTAL HIP ARTHROPLASTY Right 2020    Procedure: ARTHROPLASTY HIP TOTAL;  Surgeon: Chelsea Braga MD;  [facility];  Service: Orthopedics       Family History   Problem Relation Age of Onset    Cancer Brother     Prostate cancer Brother     Leukemia Brother         his only brother dies from [address] [facility] or leukemia pt unsure he was only 46    Arthritis Mother     Heart attack Father     Clotting disorder Father     Schizoaffective Disorder  Sister     Aortic aneurysm Other         abdominal       Social History     Occupational History    Occupation: unemployed   Tobacco Use    Smoking status: Former Smoker     Types: Cigars     Quit date: 2014     Years since quittin 5    Smokeless tobacco: Never Used    Tobacco comment: never inhaled   Vaping Use    Vaping Use: Never used   Substance and Sexual Activity    Alcohol use: Yes     Comment: occasional beer    Drug use: No    Sexual activity: Yes     Partners: Female         Current Outpatient Medications:     fenofibrate 160 MG tablet, take 1 tablet by mouth once daily, Disp: 90 tablet, Rfl: 3    glucose blood test strip, TEST BS TID, Disp: 300 each, Rfl: 5    glucose monitoring kit (FREESTYLE) monitoring kit, Use 1 each daily before breakfast, Disp: 1 each, Rfl: 0    Lancets MISC, Use daily before breakfast, Disp: 100 each, Rfl: 5    pantoprazole (PROTONIX) 40 mg tablet, take 1 tablet by mouth once daily, Disp: 90 tablet, Rfl: 3    rosuvastatin (CRESTOR) 5 mg tablet, Take 1 tablet (5 mg total) by mouth daily, Disp: 90 tablet, Rfl: 1    traMADol (ULTRAM) 50 mg tablet, Take 50 mg by mouth every 8 (eight) hours as needed for moderate pain, Disp: , Rfl:     Trulicity 1 60 XF/0 6XU SOPN, inject 0 5 milliliters ( 0 75 milligrams ) subcutaneously every week, Disp: 12 mL, Rfl: 1    Turmeric 500 MG CAPS, Take by mouth, Disp: , Rfl:     traMADol (Ultram) 50 mg tablet, Take 1 tablet (50 mg total) by mouth every 8 (eight) hours as needed for severe pain for up to 29 days Future start dates are 3/24/22 and 4/23/22, Disp: 90 tablet, Rfl: 2    No Known Allergies    Physical Exam:    Resp 18   Ht 6' 1" (1 854 m)   Wt 103 kg (226 lb)   BMI 29 82 kg/m²     Constitutional:normal, well developed, well nourished, alert, in no distress and non-toxic and no overt pain behavior    Eyes:anicteric  HEENT:grossly intact  Neck:supple, symmetric, trachea midline and no masses   Pulmonary:even and unlabored  Cardiovascular:No edema or pitting edema present  Skin:Normal without rashes or lesions and well hydrated  Psychiatric:Mood and affect appropriate  Neurologic:Cranial Nerves II-XII grossly intact  Musculoskeletal:normal      Imaging  No orders to display         Orders Placed This Encounter   Procedures    MM ALL_Prescribed Meds and Special Instructions    MM DT_Alprazolam Definitive Test    MM DT_Amphetamine Definitive Test    MM DT_Aripiprazole Definitive Test    MM DT_Bath Salts Definitive Test    MM DT_Buprenorphine Definitive Test    MM DT_Butalbital Definitive Test    MM DT_Clonazepam Definitive Test    MM DT_Clozapine Definitive Test    MM DT_Cocaine Definitive Test    MM DT_Codeine Definitive Test    MM DT_Desipramine Definitive Test    MM DT_Dextromethorphan Definitive Test    MM Diazepam Definitive Test    MM DT_Ethyl Glucuronide/Ethyl Sulfate Definitive Test    MM DT_Fentanyl Definitive Test    MM DT_Heroin Definitive Test    MM DT_Hydrocodone Definitive Test    MM DT_Hydromorphone Definitive Test    MM DT_Kratom Definitive Test    MM DT_Levorphanol Definitive Test    MM Lorazepam Definitive Test    MM DT_MDMA Definitive Test    MM DT_Meperidine Definitive Test    MM DT_Methadone Definitive Test    MM DT_Methamphetamine Definitive Test    MM DT_Morphine Definitive Test    MM DT_Olanzapine Definitive Test    MM DT_Oxazepam Definitive Test    MM DT_Oxycodone Definitive Test    MM DT_Oxymorphone Definitive Test    MM DT_Phenobarbital Definitive Test    MM DT_Phentermine Definitive Test    MM DT_Secobarbital Definitive Test    MM DT_Spice Definitive Test    MM DT_Tapentadol Definitive Test    MM DT_Temazapam Definitive Test    MM DT_THC Definitive Test    MM DT_Tramadol Definitive Test

## 2022-02-26 LAB
6MAM UR QL CFM: NEGATIVE NG/ML
7AMINOCLONAZEPAM UR QL CFM: NEGATIVE NG/ML
A-OH ALPRAZ UR QL CFM: NEGATIVE NG/ML
AMPHET UR QL CFM: NEGATIVE NG/ML
AMPHET UR QL CFM: NEGATIVE NG/ML
BUPRENORPHINE UR QL CFM: NEGATIVE NG/ML
BUTALBITAL UR QL CFM: NEGATIVE NG/ML
BZE UR QL CFM: NEGATIVE NG/ML
CODEINE UR QL CFM: NEGATIVE NG/ML
DESIPRAMINE UR QL CFM: NEGATIVE NG/ML
EDDP UR QL CFM: NEGATIVE NG/ML
ETHYL GLUCURONIDE UR QL CFM: NEGATIVE NG/ML
ETHYL SULFATE UR QL SCN: NEGATIVE NG/ML
EUTYLONE UR QL: NEGATIVE NG/ML
FENTANYL UR QL CFM: NEGATIVE NG/ML
GLIADIN IGG SER IA-ACNC: NEGATIVE NG/ML
GLUCOSE 30M P 50 G LAC PO SERPL-MCNC: NEGATIVE NG/ML
HYDROCODONE UR QL CFM: NEGATIVE NG/ML
HYDROCODONE UR QL CFM: NEGATIVE NG/ML
HYDROMORPHONE UR QL CFM: NEGATIVE NG/ML
LORAZEPAM UR QL CFM: NEGATIVE NG/ML
MDMA UR QL CFM: NEGATIVE NG/ML
MEPERIDINE UR QL CFM: NEGATIVE NG/ML
METHADONE UR QL CFM: NEGATIVE NG/ML
METHAMPHET UR QL CFM: NEGATIVE NG/ML
MORPHINE UR QL CFM: NEGATIVE NG/ML
MORPHINE UR QL CFM: NEGATIVE NG/ML
NORBUPRENORPHINE UR QL CFM: NEGATIVE NG/ML
NORDIAZEPAM UR QL CFM: NEGATIVE NG/ML
NORFENTANYL UR QL CFM: NEGATIVE NG/ML
NORHYDROCODONE UR QL CFM: NEGATIVE NG/ML
NORHYDROCODONE UR QL CFM: NEGATIVE NG/ML
NORMEPERIDINE UR QL CFM: NEGATIVE NG/ML
NOROXYCODONE UR QL CFM: NEGATIVE NG/ML
OLANZAPINE QUANTIFICATION: NEGATIVE NG/ML
OPC-3373 QUANTIFICATION: NEGATIVE
OXAZEPAM UR QL CFM: NEGATIVE NG/ML
OXYCODONE UR QL CFM: NEGATIVE NG/ML
OXYMORPHONE UR QL CFM: NEGATIVE NG/ML
OXYMORPHONE UR QL CFM: NEGATIVE NG/ML
PHENOBARB UR QL CFM: NEGATIVE NG/ML
RESULT ALL_PRESCRIBED MEDS AND SPECIAL INSTRUCTIONS: NORMAL
SECOBARBITAL UR QL CFM: NEGATIVE NG/ML
SL AMB 3-METHYL-FENTANYL QUANTIFICATION: NORMAL NG/ML
SL AMB 4-ANPP QUANTIFICATION: NORMAL NG/ML
SL AMB 4-FIBF QUANTIFICATION: NORMAL NG/ML
SL AMB 5F-ADB-M7 METABOLITE QUANTIFICATION: NEGATIVE NG/ML
SL AMB 7-OH-MITRAGYNINE (KRATOM ALKALOID) QUANTIFICATION: NEGATIVE NG/ML
SL AMB AB-FUBINACA-M3 METABOLITE QUANTIFICATION: NEGATIVE NG/ML
SL AMB ACETYL FENTANYL QUANTIFICATION: NORMAL NG/ML
SL AMB ACETYL NORFENTANYL QUANTIFICATION: NORMAL NG/ML
SL AMB ACRYL FENTANYL QUANTIFICATION: NORMAL NG/ML
SL AMB BUTRYL FENTANYL QUANTIFICATION: NORMAL NG/ML
SL AMB CARFENTANIL QUANTIFICATION: NORMAL NG/ML
SL AMB CLOZAPINE QUANTIFICATION: NEGATIVE NG/ML
SL AMB CTHC (MARIJUANA METABOLITE) QUANTIFICATION: NEGATIVE NG/ML
SL AMB CYCLOPROPYL FENTANYL QUANTIFICATION: NORMAL NG/ML
SL AMB DEXTROMETHORPHAN QUANTIFICATION: NEGATIVE NG/ML
SL AMB DEXTRORPHAN (DEXTROMETHORPHAN METABOLITE) QUANT: NEGATIVE NG/ML
SL AMB DEXTRORPHAN (DEXTROMETHORPHAN METABOLITE) QUANT: NEGATIVE NG/ML
SL AMB FURANYL FENTANYL QUANTIFICATION: NORMAL NG/ML
SL AMB JWH018 METABOLITE QUANTIFICATION: NEGATIVE NG/ML
SL AMB JWH073 METABOLITE QUANTIFICATION: NEGATIVE NG/ML
SL AMB MDMB-FUBINACA-M1 METABOLITE QUANTIFICATION: NEGATIVE NG/ML
SL AMB METHOXYACETYL FENTANYL QUANTIFICATION: NORMAL NG/ML
SL AMB METHYLONE QUANTIFICATION: NEGATIVE NG/ML
SL AMB N-DESMETHYL U-47700 QUANTIFICATION: NORMAL NG/ML
SL AMB N-DESMETHYL-TRAMADOL QUANTIFICATION: NORMAL NG/ML
SL AMB N-DESMETHYLCLOZAPINE QUANTIFICATION: NEGATIVE NG/ML
SL AMB PHENTERMINE QUANTIFICATION: NEGATIVE NG/ML
SL AMB RCS4 METABOLITE QUANTIFICATION: NEGATIVE NG/ML
SL AMB U-47700 QUANTIFICATION: NORMAL NG/ML
SPECIMEN DRAWN SERPL: NEGATIVE NG/ML
TAPENTADOL UR QL CFM: NEGATIVE NG/ML
TEMAZEPAM UR QL CFM: NEGATIVE NG/ML
TEMAZEPAM UR QL CFM: NEGATIVE NG/ML
TRAMADOL UR QL CFM: NORMAL NG/ML
URATE/CREAT 24H UR: NORMAL NG/ML

## 2022-03-04 ENCOUNTER — RA CDI HCC (OUTPATIENT)
Dept: OTHER | Facility: HOSPITAL | Age: 57
End: 2022-03-04

## 2022-03-04 NOTE — PROGRESS NOTES
[facility]  coding opportunities       Chart reviewed, no opportunity found: CHART REVIEWED, NO OPPORTUNITY FOUND                        Patients insurance company: [facility]

## 2022-03-07 ENCOUNTER — TELEPHONE (OUTPATIENT)
Dept: PAIN MEDICINE | Facility: CLINIC | Age: 57
End: 2022-03-07

## 2022-03-07 NOTE — TELEPHONE ENCOUNTER
Pt called to schedule right L3 TFESI procedure- pain level 4/10, pain is constant         cbn [phone]

## 2022-03-08 NOTE — TELEPHONE ENCOUNTER
S/w pt and scheduled TFESI for 3/17/22 1130 am  Gave pre procedure instructions and /vaccine policy

## 2022-03-10 ENCOUNTER — OFFICE VISIT (OUTPATIENT)
Dept: FAMILY MEDICINE CLINIC | Facility: CLINIC | Age: 57
End: 2022-03-10
Payer: MEDICARE

## 2022-03-10 VITALS
HEART RATE: 69 BPM | DIASTOLIC BLOOD PRESSURE: 64 MMHG | WEIGHT: 219 LBS | BODY MASS INDEX: 29.03 KG/M2 | HEIGHT: 73 IN | OXYGEN SATURATION: 98 % | SYSTOLIC BLOOD PRESSURE: 102 MMHG

## 2022-03-10 DIAGNOSIS — J45.20 MILD INTERMITTENT ASTHMA WITHOUT COMPLICATION: ICD-10-CM

## 2022-03-10 DIAGNOSIS — E78.2 HYPERLIPIDEMIA, MIXED: ICD-10-CM

## 2022-03-10 DIAGNOSIS — N18.31 STAGE 3A CHRONIC KIDNEY DISEASE (HCC): ICD-10-CM

## 2022-03-10 DIAGNOSIS — J30.1 NON-SEASONAL ALLERGIC RHINITIS DUE TO POLLEN: ICD-10-CM

## 2022-03-10 DIAGNOSIS — I71.2 ASCENDING AORTIC ANEURYSM (HCC): ICD-10-CM

## 2022-03-10 DIAGNOSIS — E11.22 TYPE 2 DIABETES MELLITUS WITH STAGE 3A CHRONIC KIDNEY DISEASE, WITHOUT LONG-TERM CURRENT USE OF INSULIN (HCC): Primary | ICD-10-CM

## 2022-03-10 DIAGNOSIS — G89.4 CHRONIC PAIN SYNDROME: ICD-10-CM

## 2022-03-10 DIAGNOSIS — F11.20 UNCOMPLICATED OPIOID DEPENDENCE (HCC): ICD-10-CM

## 2022-03-10 DIAGNOSIS — N18.31 TYPE 2 DIABETES MELLITUS WITH STAGE 3A CHRONIC KIDNEY DISEASE, WITHOUT LONG-TERM CURRENT USE OF INSULIN (HCC): Primary | ICD-10-CM

## 2022-03-10 PROBLEM — F41.9 ANXIETY: Status: RESOLVED | Noted: 2022-03-10 | Resolved: 2022-03-10

## 2022-03-10 PROBLEM — F41.9 ANXIETY: Status: ACTIVE | Noted: 2022-03-10

## 2022-03-10 PROBLEM — F33.0 MILD EPISODE OF RECURRENT MAJOR DEPRESSIVE DISORDER (HCC): Status: RESOLVED | Noted: 2022-03-10 | Resolved: 2022-03-10

## 2022-03-10 PROBLEM — F33.0 MILD EPISODE OF RECURRENT MAJOR DEPRESSIVE DISORDER (HCC): Status: ACTIVE | Noted: 2022-03-10

## 2022-03-10 PROBLEM — F41.8 DEPRESSION WITH ANXIETY: Status: RESOLVED | Noted: 2018-05-07 | Resolved: 2022-03-10

## 2022-03-10 LAB — SL AMB POCT HEMOGLOBIN AIC: 6.3 (ref ?–6.5)

## 2022-03-10 PROCEDURE — 99214 OFFICE O/P EST MOD 30 MIN: CPT | Performed by: FAMILY MEDICINE

## 2022-03-10 PROCEDURE — 83036 HEMOGLOBIN GLYCOSYLATED A1C: CPT | Performed by: FAMILY MEDICINE

## 2022-03-10 RX ORDER — ALBUTEROL SULFATE 90 UG/1
2 AEROSOL, METERED RESPIRATORY (INHALATION) EVERY 6 HOURS PRN
Qty: 18 G | Refills: 1 | Status: SHIPPED | OUTPATIENT
Start: 2022-03-10

## 2022-03-10 NOTE — PROGRESS NOTES
Yesy Lynne 1965 male MRN: [id number]      ASSESSMENT/PLAN  Problem List Items Addressed This Visit        Endocrine    Type 2 diabetes mellitus with stage 3a chronic kidney disease, without long-term current use of insulin ([facility] ) - Primary    Relevant Orders    POCT hemoglobin A1c (Completed)       Respiratory    Allergic rhinitis    Mild intermittent asthma without complication    Relevant Medications    albuterol (Ventolin HFA) 90 mcg/act inhaler       Cardiovascular and Mediastinum    Ascending aortic aneurysm (HCC)       Genitourinary    CKD (chronic kidney disease) stage 3, GFR 30-59 ml/min (HCC)       Other    Chronic pain syndrome    Hyperlipidemia, mixed    Relevant Orders    Lipid panel    Comprehensive metabolic panel    Uncomplicated opioid dependence ([facility] )        DM: A1c 6 3% (up from 6 0%), though still very well controlled  Continue current regimen  HLD: Update lipids prior to next appointment   AAA: Encouraged to schedule follow up with Cardio   CKD3a: Continue supportive care, f/u with Nephro as scheduled   Asthma/Allergies: Trial PRN RUDY for cough  GERD: Well controlled   Chronic Pain on Opioids: F/u with Pain Management as scheduled         Future Appointments   Date Time Provider Kristen Fine   3/17/2022 11:40 AM AN ES S&P 1 AN ES Pain AN POC E [facility]   5/12/2022  9:15 AM [facility]          SUBJECTIVE  CC: Check up (diabetic check up )      HPI:  Yesy Lynne is a 61 y o  male who presents for chronic follow up as below  DM: Home sugars every now and then -- 120 this AM; denies hypo/hyperglycemic symptoms   HLD: Tolerating statin + fenofibrate without issue   AAA: Following with Cardio -- last in 09/2020  CKD3a: Drinking plenty of fluids, only taking Tylenol; follows with Nephro   Asthma/Allergies: Wakes up coughing at night; no significant allergy symptoms   GERD: Asymptomatic on PPI   Chronic Pain on Opioids:  Follows with Pain Management, planning for injections next week -- having a lot of pain in back, R hip/R knee     Review of Systems   Constitutional: Negative for diaphoresis  HENT: Negative for congestion, ear pain, postnasal drip, rhinorrhea and sore throat  Eyes: Negative for visual disturbance  Respiratory: Positive for cough (mostly at night)  Negative for shortness of breath and wheezing  Cardiovascular: Negative for chest pain, palpitations and leg swelling  Gastrointestinal: Negative for abdominal pain, constipation and diarrhea  Endocrine: Negative for polydipsia and polyuria  Musculoskeletal: Positive for arthralgias and back pain  Neurological: Negative for dizziness, tremors and headaches  Historical Information   The patient history was reviewed and updated as follows:    Past Medical History:   Diagnosis Date    Anxiety 3/10/2022    Aorta aneurysm (HCC)     4 3    Arm DVT (deep venous thromboembolism), acute ([facility] ) 8238    complications of cardiac cath    Asthma     Chronic kidney disease     CKD (chronic kidney disease), stage III (HCC)     COPD (chronic obstructive pulmonary disease) (HCC)     Depression     Diabetes mellitus (HCC)     Essential hypertriglyceridemia     GERD (gastroesophageal reflux disease)     Headache     Hiatal hernia     Hyperlipidemia, mixed 5/7/2018    Kidney stone     Liver disease     FATTY LIVER    Mild episode of recurrent major depressive disorder ([facility] ) 3/10/2022    PAC (premature atrial contraction)     Prediabetes     Renal calculi     Sleep apnea     Vestibular migraine      Past Surgical History:   Procedure Laterality Date    CARPAL TUNNEL RELEASE Left     COLONOSCOPY      FL INJECTION RIGHT HIP (ARTHROGRAM)  8/20/2018    FOOT SURGERY Left     FOREIGN BODY REMOVAL    HERNIA REPAIR      IN COLONOSCOPY FLX DX W/COLLJ SPEC WHEN PFRMD N/A 8/7/2017    Procedure: COLONOSCOPY;  Surgeon: Fab Lim MD;  [facility];   Service: Gastroenterology    MA ESOPHAGOGASTRODUODENOSCOPY TRANSORAL DIAGNOSTIC N/A 10/31/2017    Procedure: ESOPHAGOGASTRODUODENOSCOPY (EGD); Surgeon: Francesca Duque MD;  [facility];   Service: Gastroenterology    MA TOTAL HIP ARTHROPLASTY Right 2020    Procedure: ARTHROPLASTY HIP TOTAL;  Surgeon: Albina Morales MD;  [facility];  Service: Orthopedics     Family History   Problem Relation Age of Onset    Cancer Brother     Prostate cancer Brother     Leukemia Brother         his only brother dies from [address] or leukemia pt unsure he was only 46    Arthritis Mother     Heart attack Father     Clotting disorder Father     Schizoaffective Disorder  Sister     Aortic aneurysm Other         abdominal      Social History   Social History     Substance and Sexual Activity   Alcohol Use Yes    Comment: occasional beer     Social History     Substance and Sexual Activity   Drug Use No     Social History     Tobacco Use   Smoking Status Former Smoker    Types: Cigars    Quit date: 2014    Years since quittin 5   Smokeless Tobacco Never Used   Tobacco Comment    never inhaled       Medications:     Current Outpatient Medications:     fenofibrate 160 MG tablet, take 1 tablet by mouth once daily, Disp: 90 tablet, Rfl: 3    glucose blood test strip, TEST BS TID, Disp: 300 each, Rfl: 5    glucose monitoring kit (FREESTYLE) monitoring kit, Use 1 each daily before breakfast, Disp: 1 each, Rfl: 0    Lancets MISC, Use daily before breakfast, Disp: 100 each, Rfl: 5    pantoprazole (PROTONIX) 40 mg tablet, take 1 tablet by mouth once daily, Disp: 90 tablet, Rfl: 3    rosuvastatin (CRESTOR) 5 mg tablet, Take 1 tablet (5 mg total) by mouth daily, Disp: 90 tablet, Rfl: 1    traMADol (Ultram) 50 mg tablet, Take 1 tablet (50 mg total) by mouth every 8 (eight) hours as needed for severe pain for up to 29 days Future start dates are 3/24/22 and 22, Disp: 90 tablet, Rfl: 2    traMADol (ULTRAM) 50 mg tablet, Take 50 mg by mouth every 8 (eight) hours as needed for moderate pain, Disp: , Rfl:     Trulicity 9 69 EY/6 8EY SOPN, inject 0 5 milliliters ( 0 75 milligrams ) subcutaneously every week, Disp: 12 mL, Rfl: 1    Turmeric 500 MG CAPS, Take by mouth, Disp: , Rfl:     albuterol (Ventolin HFA) 90 mcg/act inhaler, Inhale 2 puffs every 6 (six) hours as needed for wheezing or shortness of breath (cough), Disp: 18 g, Rfl: 1  No Known Allergies    OBJECTIVE    Vitals:   Vitals:    03/10/22 0824   BP: 102/64   Pulse: 69   SpO2: 98%   Weight: 99 3 kg (219 lb)   Height: 6' 1" (1 854 m)           Physical Exam  Vitals and nursing note reviewed  Constitutional:       General: He is not in acute distress  Appearance: Normal appearance  HENT:      Head: Normocephalic and atraumatic  Right Ear: Tympanic membrane, ear canal and external ear normal       Left Ear: Tympanic membrane, ear canal and external ear normal    Eyes:      Conjunctiva/sclera: Conjunctivae normal    Cardiovascular:      Rate and Rhythm: Normal rate and regular rhythm  Pulmonary:      Effort: Pulmonary effort is normal  No respiratory distress  Breath sounds: Normal breath sounds  Abdominal:      General: Bowel sounds are normal  There is no distension  Palpations: Abdomen is soft  Tenderness: There is no abdominal tenderness  Musculoskeletal:      Right lower leg: No edema  Left lower leg: No edema  Lymphadenopathy:      Cervical: No cervical adenopathy  Skin:     General: Skin is warm and dry  Neurological:      General: No focal deficit present  Mental Status: He is alert     Psychiatric:         Mood and Affect: Mood normal                     Adenike Garg DO  [facility]   3/10/2022  8:39 AM

## 2022-03-17 ENCOUNTER — HOSPITAL ENCOUNTER (OUTPATIENT)
Dept: RADIOLOGY | Facility: CLINIC | Age: 57
Discharge: HOME | End: 2022-03-17
Attending: STUDENT IN AN ORGANIZED HEALTH CARE EDUCATION/TRAINING PROGRAM | Admitting: STUDENT IN AN ORGANIZED HEALTH CARE EDUCATION/TRAINING PROGRAM
Payer: MEDICARE

## 2022-03-17 VITALS
HEART RATE: 76 BPM | SYSTOLIC BLOOD PRESSURE: 129 MMHG | RESPIRATION RATE: 20 BRPM | DIASTOLIC BLOOD PRESSURE: 79 MMHG | OXYGEN SATURATION: 98 % | TEMPERATURE: 98.8 F

## 2022-03-17 DIAGNOSIS — M54.16 LUMBAR RADICULOPATHY: ICD-10-CM

## 2022-03-17 PROCEDURE — 64483 NJX AA&/STRD TFRM EPI L/S 1: CPT | Performed by: STUDENT IN AN ORGANIZED HEALTH CARE EDUCATION/TRAINING PROGRAM

## 2022-03-17 RX ORDER — BUPIVACAINE HCL/PF 2.5 MG/ML
1 VIAL (ML) INJECTION ONCE
Status: COMPLETED | OUTPATIENT
Start: 2022-03-17 | End: 2022-03-17

## 2022-03-17 RX ORDER — PAPAVERINE HCL 150 MG
10 CAPSULE, EXTENDED RELEASE ORAL ONCE
Status: COMPLETED | OUTPATIENT
Start: 2022-03-17 | End: 2022-03-17

## 2022-03-17 RX ADMIN — IOHEXOL 1 ML: 300 INJECTION, SOLUTION INTRAVENOUS at 11:40

## 2022-03-17 RX ADMIN — Medication 1 ML: at 11:40

## 2022-03-17 RX ADMIN — Medication 10 MG: at 11:40

## 2022-03-17 NOTE — INTERVAL H&P NOTE
Update: (This section must be completed if the H&P was completed greater than 24 hrs to procedure or admission)    H&P reviewed  After examining the patient, I find no changed to the H&P since it had been written  Patient re-evaluated   Accept as history and physical     Chante Le MD/March 17, 2022/11:29 AM

## 2022-03-17 NOTE — DISCHARGE INSTR - LAB
Epidural Steroid Injection   WHAT YOU NEED TO KNOW:   An epidural steroid injection (CHANTEL) is a procedure to inject steroid medicine into the epidural space  The epidural space is between your spinal cord and vertebrae  Steroids reduce inflammation and fluid buildup in your spine that may be causing pain  You may be given pain medicine along with the steroids  ACTIVITY  Do not drive or operate machinery today  No strenuous activity today - bending, lifting, etc   You may resume normal activites starting tomorrow - start slowly and as tolerated  You may shower today, but no tub baths or hot tubs  You may have numbness for several hours from the local anesthetic  Please use caution and common sense, especially with weight-bearing activities  CARE OF THE INJECTION SITE  If you have soreness or pain, apply ice to the area today (20 minutes on/20 minutes off)  Starting tomorrow, you may use warm, moist heat or ice if needed  You may have an increase or change in your discomfort for 36-48 hours after your treatment  Apply ice and continue with any pain medication you have been prescribed  Notify the [facility] if you have any of the following: redness, drainage, swelling, headache, stiff neck or fever above 100°F     SPECIAL INSTRUCTIONS  Our office will contact you in approximately 7 days for a progress report  MEDICATIONS  Continue to take all routine medications  Our office may have instructed you to hold some medications  As no general anesthesia was used in today's procedure, you should not experience any side effects related to anesthesia  If you have a problem specifically related to your procedure, please call our office at [phone]  Problems not related to your procedure should be directed to your primary care physician

## 2022-03-24 ENCOUNTER — TELEPHONE (OUTPATIENT)
Dept: PAIN MEDICINE | Facility: CLINIC | Age: 57
End: 2022-03-24

## 2022-03-24 NOTE — TELEPHONE ENCOUNTER
Pt reports 45% improvement post inj  Pain level 3/10  Pt aware I will call next week for an update

## 2022-04-04 ENCOUNTER — TELEPHONE (OUTPATIENT)
Dept: FAMILY MEDICINE CLINIC | Facility: CLINIC | Age: 57
End: 2022-04-04

## 2022-04-04 NOTE — TELEPHONE ENCOUNTER
4/4 ml called pt to [facility] appt, nothing avail today   Pt said he will call in am to [facility] appt

## 2022-04-05 ENCOUNTER — TELEPHONE (OUTPATIENT)
Dept: NEPHROLOGY | Facility: CLINIC | Age: 57
End: 2022-04-05

## 2022-05-03 ENCOUNTER — TELEPHONE (OUTPATIENT)
Dept: PAIN MEDICINE | Facility: CLINIC | Age: 57
End: 2022-05-03

## 2022-05-11 NOTE — PROGRESS NOTES
Pain Medicine Follow-Up Note    Assessment:  1  Chronic pain syndrome    2  Lumbar radiculopathy    3  Chronic right hip pain    4  Chronic right-sided low back pain with right-sided sciatica        Plan:  No orders of the defined types were placed in this encounter  New Medications Ordered This Visit   Medications    acetaminophen (TYLENOL) 500 mg tablet     Sig: Take 500 mg by mouth every 6 (six) hours as needed for mild pain    traMADol (ULTRAM) 50 mg tablet     Sig: Take 1 tablet (50 mg total) by mouth every 8 (eight) hours as needed for moderate pain     Dispense:  90 tablet     Refill:  2       My impressions and treatment recommendations were discussed in detail with the patient who verbalized understanding and had no further questions  The patient is a 23-year-old male with a history of chronic pain secondary to low back pain, lumbar spondylosis, lumbar radiculopathy and myofascial pain who presents with ongoing low back pain and pain into the right hip  Because he receives a mild to moderate relief of his pain symptoms taking tramadol 50 mg 1 tablet 3 times a day as needed for pain I will continue him on this medication as prescribed  A script for tramadol 50 mg with 2 refills was electronically sent to the patient's pharmacy with a do not fill date of 05/23/2022  The right-sided L3 TFESI performed on 03/17/2022 did not provide him any relief of his right-sided low back pain symptoms and right hip pain, therefore I instructed patient to reach out to the orthopedic surgeon who performed a right hip replacement  I did offer to get x-rays of his right hip however he state he will contact to the orthopedic office as they can perform and read the x-rays when he is there      The patient did not bring his pills with him to the office visit today, I did instruct him to bring the tramadol pills to his next office visit and explained that we need to do pill counts on narcotic medications a few times during the year  He states he will bring his pills to the next office visit  [place] Prescription Drug Monitoring Program report was reviewed and was appropriate     There are risks associated with opioid medications, including dependence, addiction and tolerance  The patient understands and agrees to use these medications only as prescribed  Potential side effects of the medications include, but are not limited to, constipation, drowsiness, addiction, impaired judgment and risk of fatal overdose if not taken as prescribed  The patient was warned against driving while taking sedation medications  Sharing medications is a felony  At this point in time, the patient is showing no signs of addiction, abuse, diversion or suicidal ideation  Follow-up is planned in 8 weeks time or sooner as warranted  Discharge instructions were provided  I personally saw and examined the patient and I agree with the above discussed plan of care  History of Present Illness:    Emmy Garcia is a 61 y o  male with a history of chronic pain secondary to low back pain, lumbar spondylosis, lumbar radiculopathy and myofascial pain who presents to [facility] for interval re-evaluation of the above stated pain complaints  He was last seen on 02/22/2022 where he was continued on tramadol 50 mg 1 tablet 3 times a day as needed for pain  He also underwent a right-sided L3 TFESI on 03/17/2022 and states that procedure did not provide him any relief of his right-sided low back pain and right hip pain  He states he has ongoing pain in his right hip  He states his right hip was replaced approximately 2 years ago but has not gotten in contact with the physician who performed his right hip replacement for re-evaluation  He states his pain is the same since the last office visit and intermittent but worse in the morning and evening    He rates the quality of his pain as dull/aching, sharp and pressure-like and is currently rating it a 3/10 on a numeric scale  Current pain medications include tramadol 50 mg 1 tablet 3 times a day as needed for pain  He does also take Tylenol as needed  He states this medication regimen does provide him mild to moderate relief of his pain symptoms and denies any side effects at this time  Pain Contract Signed:  05/12/2022  Last Urine Drug Screen:  02/22/2022    Other than as stated above, the patient denies any interval changes in medications, medical condition, mental condition, symptoms, or allergies since the last office visit  Review of Systems:    Review of Systems   Respiratory: Negative for shortness of breath  Cardiovascular: Negative for chest pain  Gastrointestinal: Negative for constipation, diarrhea, nausea and vomiting  Musculoskeletal: Positive for myalgias  Negative for arthralgias, gait problem and joint swelling  Pain Rt Hip   Skin: Negative for rash  Neurological: Positive for dizziness  Negative for seizures and weakness  All other systems reviewed and are negative          Patient Active Problem List   Diagnosis    Mild intermittent asthma without complication    Ascending aortic aneurysm ([facility])    Bicuspid aortic valve    Allergic rhinitis    GERD (gastroesophageal reflux disease)    Hiatal hernia    Type 2 diabetes mellitus with stage 3a chronic kidney disease, without long-term current use of insulin ([facility])    CKD (chronic kidney disease) stage 3, GFR 30-59 ml/min ([facility])    Hyperlipidemia, mixed    Vitamin D deficiency    Renal cyst, left    Hepatic cyst    Fatty liver disease, nonalcoholic    Erectile dysfunction    Carpal tunnel syndrome of right wrist    Chronic pain of right knee    Vestibular migraine    Patellar tendinitis of right knee    Benign paroxysmal positional vertigo due to bilateral vestibular disorder    Hip impingement syndrome, right    Primary osteoarthritis of right hip    Ulnar neuropathy of both upper extremities    Lumbosacral strain    Tarsal tunnel syndrome of right side    Cubital tunnel syndrome, bilateral    Groin pain, chronic, right    Chronic pain syndrome    Chronic kidney disease-mineral and bone disorder    Uncomplicated opioid dependence ([place] [id number] )    Arthritis of right hip       Past Medical History:   Diagnosis Date    Anxiety 3/10/2022    Aorta aneurysm (HCC)     4 3    Arm DVT (deep venous thromboembolism), acute ([place] [address] ) 1761    complications of cardiac cath    Asthma     Chronic kidney disease     CKD (chronic kidney disease), stage III (HCC)     COPD (chronic obstructive pulmonary disease) (HCC)     Depression     Diabetes mellitus ([place] [address] )     Essential hypertriglyceridemia     GERD (gastroesophageal reflux disease)     Headache     Hiatal hernia     Hyperlipidemia, mixed 5/7/2018    Kidney stone     Liver disease     FATTY LIVER    Mild episode of recurrent major depressive disorder ([place] [address] ) 3/10/2022    PAC (premature atrial contraction)     Prediabetes     Renal calculi     Sleep apnea     Vestibular migraine        Past Surgical History:   Procedure Laterality Date    CARPAL TUNNEL RELEASE Left     COLONOSCOPY      FL INJECTION RIGHT HIP (ARTHROGRAM)  8/20/2018    FOOT SURGERY Left     FOREIGN BODY REMOVAL    HERNIA REPAIR      AK COLONOSCOPY FLX DX W/COLLJ SPEC WHEN PFRMD N/A 8/7/2017    Procedure: COLONOSCOPY;  Surgeon: Josh Merchant MD;  [facility]: [facility]; Service: Gastroenterology    AK ESOPHAGOGASTRODUODENOSCOPY TRANSORAL DIAGNOSTIC N/A 10/31/2017    Procedure: ESOPHAGOGASTRODUODENOSCOPY (EGD); Surgeon: Josh Merchant MD;  Location: [facility];   Service: Gastroenterology    AK TOTAL HIP ARTHROPLASTY Right 9/28/2020    Procedure: ARTHROPLASTY HIP TOTAL;  Surgeon: Dejon Chang MD;  [facility]: [facility];  Service: Orthopedics       Family History   Problem Relation Age of Onset    Cancer Brother     Prostate cancer Brother     Leukemia Brother         his only brother dies from [address] or leukemia pt unsure he was only 46    Arthritis Mother     Heart attack Father     Clotting disorder Father     Schizoaffective Disorder  Sister     Aortic aneurysm Other         abdominal       Social History     Occupational History    Occupation: unemployed   Tobacco Use    Smoking status: Former Smoker     Types: Cigars     Quit date: 2014     Years since quittin 7    Smokeless tobacco: Never Used    Tobacco comment: never inhaled   Vaping Use    Vaping Use: Never used   Substance and Sexual Activity    Alcohol use: Not Currently     Comment: occasional beer    Drug use: No    Sexual activity: Yes     Partners: Female         Current Outpatient Medications:     acetaminophen (TYLENOL) 500 mg tablet, Take 500 mg by mouth every 6 (six) hours as needed for mild pain, Disp: , Rfl:     albuterol (Ventolin HFA) 90 mcg/act inhaler, Inhale 2 puffs every 6 (six) hours as needed for wheezing or shortness of breath (cough) (Patient taking differently: Inhale 2 puffs every 6 (six) hours as needed for wheezing or shortness of breath (cough) PRN), Disp: 18 g, Rfl: 1    fenofibrate 160 MG tablet, take 1 tablet by mouth once daily, Disp: 90 tablet, Rfl: 3    glucose blood test strip, TEST BS TID, Disp: 300 each, Rfl: 5    glucose monitoring kit (FREESTYLE) monitoring kit, Use 1 each daily before breakfast, Disp: 1 each, Rfl: 0    Lancets MISC, Use daily before breakfast, Disp: 100 each, Rfl: 5    pantoprazole (PROTONIX) 40 mg tablet, take 1 tablet by mouth once daily, Disp: 90 tablet, Rfl: 3    rosuvastatin (CRESTOR) 5 mg tablet, Take 1 tablet (5 mg total) by mouth daily, Disp: 90 tablet, Rfl: 1    [START ON 2022] traMADol (ULTRAM) 50 mg tablet, Take 1 tablet (50 mg total) by mouth every 8 (eight) hours as needed for moderate pain, Disp: 90 tablet, Rfl: 2    Trulicity 9 97 BA/0 0VI SOPN, inject 0 5 milliliters ( 0 75 milligrams ) subcutaneously every week, Disp: 12 mL, Rfl: 1    No Known Allergies    Physical Exam:    /82 (BP Location: Right arm, Patient Position: Sitting, Cuff Size: Standard)   Pulse 62   Wt 101 kg (221 lb 12 8 oz)   BMI 29 26 kg/m²     Constitutional:normal, well developed, well nourished, alert, in no distress and non-toxic and no overt pain behavior  Eyes:anicteric  HEENT:grossly intact  Neck:supple, symmetric, trachea midline and no masses   Pulmonary:even and unlabored  Cardiovascular:No edema or pitting edema present  Skin:Normal without rashes or lesions and well hydrated  Psychiatric:Mood and affect appropriate  Neurologic:Cranial Nerves II-XII grossly intact  Musculoskeletal:normal      Imaging  No orders to display         No orders of the defined types were placed in this encounter

## 2022-05-12 ENCOUNTER — OFFICE VISIT (OUTPATIENT)
Dept: PAIN MEDICINE | Facility: CLINIC | Age: 57
End: 2022-05-12
Payer: MEDICARE

## 2022-05-12 VITALS
WEIGHT: 221.8 LBS | DIASTOLIC BLOOD PRESSURE: 82 MMHG | HEART RATE: 62 BPM | BODY MASS INDEX: 29.26 KG/M2 | SYSTOLIC BLOOD PRESSURE: 126 MMHG

## 2022-05-12 DIAGNOSIS — G89.29 CHRONIC RIGHT HIP PAIN: ICD-10-CM

## 2022-05-12 DIAGNOSIS — G89.29 CHRONIC RIGHT-SIDED LOW BACK PAIN WITH RIGHT-SIDED SCIATICA: ICD-10-CM

## 2022-05-12 DIAGNOSIS — M54.41 CHRONIC RIGHT-SIDED LOW BACK PAIN WITH RIGHT-SIDED SCIATICA: ICD-10-CM

## 2022-05-12 DIAGNOSIS — M54.16 LUMBAR RADICULOPATHY: ICD-10-CM

## 2022-05-12 DIAGNOSIS — M25.551 CHRONIC RIGHT HIP PAIN: ICD-10-CM

## 2022-05-12 DIAGNOSIS — G89.4 CHRONIC PAIN SYNDROME: Primary | ICD-10-CM

## 2022-05-12 PROCEDURE — 99214 OFFICE O/P EST MOD 30 MIN: CPT

## 2022-05-12 RX ORDER — ACETAMINOPHEN 500 MG
500 TABLET ORAL EVERY 6 HOURS PRN
COMMUNITY

## 2022-05-12 RX ORDER — TRAMADOL HYDROCHLORIDE 50 MG/1
50 TABLET ORAL EVERY 8 HOURS PRN
Qty: 90 TABLET | Refills: 2 | Status: SHIPPED | OUTPATIENT
Start: 2022-05-23 | End: 2022-08-04 | Stop reason: SDUPTHER

## 2022-05-12 NOTE — PATIENT INSTRUCTIONS

## 2022-05-16 ENCOUNTER — TELEPHONE (OUTPATIENT)
Dept: NEPHROLOGY | Facility: CLINIC | Age: 57
End: 2022-05-16

## 2022-05-18 ENCOUNTER — OFFICE VISIT (OUTPATIENT)
Dept: OBGYN CLINIC | Facility: CLINIC | Age: 57
End: 2022-05-18
Payer: MEDICARE

## 2022-05-18 ENCOUNTER — APPOINTMENT (OUTPATIENT)
Dept: RADIOLOGY | Facility: CLINIC | Age: 57
End: 2022-05-18
Payer: MEDICARE

## 2022-05-18 VITALS — BODY MASS INDEX: 29.55 KG/M2 | RESPIRATION RATE: 18 BRPM | HEIGHT: 73 IN | WEIGHT: 223 LBS

## 2022-05-18 DIAGNOSIS — S76.011A STRAIN OF HIP FLEXOR, RIGHT, INITIAL ENCOUNTER: Primary | ICD-10-CM

## 2022-05-18 DIAGNOSIS — M54.16 LUMBAR RADICULOPATHY: ICD-10-CM

## 2022-05-18 DIAGNOSIS — Z96.641 STATUS POST TOTAL HIP REPLACEMENT, RIGHT: ICD-10-CM

## 2022-05-18 PROCEDURE — 99213 OFFICE O/P EST LOW 20 MIN: CPT | Performed by: ORTHOPAEDIC SURGERY

## 2022-05-18 PROCEDURE — 73502 X-RAY EXAM HIP UNI 2-3 VIEWS: CPT

## 2022-05-18 NOTE — PROGRESS NOTES
SUBJECTIVE  60 y/o male who presents today for a follow up visit due to right hip pain for about 6 months without any acute injury or trauma  He is 2 years s/p right total hip arthroplasty  Patients main complaints today is pain when standing for a prolonged period of time and groin pain when coughing and sneezing  He also mentions when he lies flat he has an increased pain about the anterolateral hip  He has been treating with pain management and recently had an L3 TFESI on the right side without benefit  He takes Tramadol TID as per pain management doctor and supplements with Tylenol prn  No fevers or chills         ROS:   General: No fever, no chills, no weight loss, no weight gain  HEENT:  No loss of hearing, no nose bleeds, no sore throat  Eyes:  No eye pain, no red eyes, no visual disturbance  Respiratory:  No cough, no shortness of breath, no wheezing  Cardiovascular:  No chest pain, no palpitations, no edema  GI: No abdominal pain, no nausea, no vomiting  Endocrine: No frequent urination, no excessive thirst  Urinary:  No dysuria, no hematuria, no incontinence  Musculoskeletal: see HPI and PE  Skin:  No rash, no wounds  Neurological:  No dizziness, no headache, no numbness  Psychiatric:  No difficulty concentrating, no depression, no suicide thoughts, no anxiety  Review of all other systems is negative    PMH:  Past Medical History:   Diagnosis Date    Anxiety 3/10/2022    Aorta aneurysm (HCC)     4 3    Arm DVT (deep venous thromboembolism), acute ([facility] [address] ) 2149    complications of cardiac cath    Asthma     Chronic kidney disease     CKD (chronic kidney disease), stage III (HCC)     COPD (chronic obstructive pulmonary disease) (HCC)     Depression     Diabetes mellitus (HCC)     Essential hypertriglyceridemia     GERD (gastroesophageal reflux disease)     Headache     Hiatal hernia     Hyperlipidemia, mixed 5/7/2018    Kidney stone     Liver disease     FATTY LIVER    Mild episode of recurrent major depressive disorder ([facility] [address] ) 3/10/2022    PAC (premature atrial contraction)     Prediabetes     Renal calculi     Sleep apnea     Vestibular migraine        PSH:  Past Surgical History:   Procedure Laterality Date    CARPAL TUNNEL RELEASE Left     COLONOSCOPY      FL INJECTION RIGHT HIP (ARTHROGRAM)  8/20/2018    FOOT SURGERY Left     FOREIGN BODY REMOVAL    HERNIA REPAIR      MS COLONOSCOPY FLX DX W/COLLJ SPEC WHEN PFRMD N/A 8/7/2017    Procedure: COLONOSCOPY;  Surgeon: Douglas Prince MD;  [facility]: [facility]; Service: Gastroenterology    MS ESOPHAGOGASTRODUODENOSCOPY TRANSORAL DIAGNOSTIC N/A 10/31/2017    Procedure: ESOPHAGOGASTRODUODENOSCOPY (EGD); Surgeon: Douglas Prince MD;  [facility];   Service: Gastroenterology    MS TOTAL HIP ARTHROPLASTY Right 9/28/2020    Procedure: ARTHROPLASTY HIP TOTAL;  Surgeon: João Guan MD;  [facility];  Service: Orthopedics       Medications:  Current Outpatient Medications   Medication Sig Dispense Refill    acetaminophen (TYLENOL) 500 mg tablet Take 500 mg by mouth every 6 (six) hours as needed for mild pain      albuterol (Ventolin HFA) 90 mcg/act inhaler Inhale 2 puffs every 6 (six) hours as needed for wheezing or shortness of breath (cough) (Patient taking differently: Inhale 2 puffs every 6 (six) hours as needed for wheezing or shortness of breath (cough) PRN) 18 g 1    fenofibrate 160 MG tablet take 1 tablet by mouth once daily 90 tablet 3    glucose blood test strip TEST BS  each 5    glucose monitoring kit (FREESTYLE) monitoring kit Use 1 each daily before breakfast 1 each 0    Lancets MISC Use daily before breakfast 100 each 5    pantoprazole (PROTONIX) 40 mg tablet take 1 tablet by mouth once daily 90 tablet 3    rosuvastatin (CRESTOR) 5 mg tablet Take 1 tablet (5 mg total) by mouth daily 90 tablet 1    [START ON 5/23/2022] traMADol (ULTRAM) 50 mg tablet Take 1 tablet (50 mg total) by mouth every 8 (eight) hours as needed for moderate pain 90 tablet 2    Trulicity 7 64 HG/7 1YJ SOPN inject 0 5 milliliters ( 0 75 milligrams ) subcutaneously every week 12 mL 1     No current facility-administered medications for this visit  Allergies:  No Known Allergies    Family History:  Family History   Problem Relation Age of Onset    Cancer Brother     Prostate cancer Brother     Leukemia Brother         his only brother dies from lymphoma or leukemia pt unsure he was only 47    Arthritis Mother     Heart attack Father     Clotting disorder Father     Schizoaffective Disorder  Sister     Aortic aneurysm Other         abdominal       Social History:  Social History     Occupational History    Occupation: unemployed   Tobacco Use    Smoking status: Former Smoker     Types: Cigars     Quit date: 2014     Years since quittin 7    Smokeless tobacco: Never Used    Tobacco comment: never inhaled   Vaping Use    Vaping Use: Never used   Substance and Sexual Activity    Alcohol use: Not Currently     Comment: occasional beer    Drug use: No    Sexual activity: Yes     Partners: Female       Physical Exam:  General :  Alert, cooperative, no distress, appears stated age  Resp  rate 18, height 6' 1" (1 854 m), weight 101 kg (223 lb)  Head:  Normocephalic, without obvious abnormality, atraumatic   Eyes:  Conjunctiva/corneas clear, EOM's intact,   Ears: Both ears normal appearance, no hearing deficits      Nose: Nares normal, septum midline, no drainage    Neck: Supple,  trachea midline, no adenopathy, no tenderness, no mass   Back:   Symmetric, no curvature, ROM normal, no tenderness   Lungs:   Respirations unlabored   Chest Wall:  No tenderness or deformity   Extremities: Extremities normal, atraumatic, no cyanosis or edema      Pulses: 2+ and symmetric   Skin: Skin color, texture, turgor normal, no rashes or lesions      Neurologic: Normal           Right Hip Exam     Tenderness   The patient is experiencing no tenderness  Muscle Strength   Flexion: 5/5     Other   Erythema: absent  Scars: present (Well healed incision without signs of infection)  Sensation: normal  Pulse: present    Comments:  Negative Log Roll and compression test  No pain with IR or ER  Some limitation in ER  Pain with extending the right leg  Minimal groin pain with resisted hip flexion  5/5 EHL, dorsi and plantar flexion  Negative compression test            Imaging Studies: The following imaging studies were reviewed in office today  My findings are noted  X Ray Right Hip 5/18/2022 demonstrates well seated total hip arthroplasty without signs of loosening or complications  No other acute osseous abnormalities  Assessment  Encounter Diagnoses   Name Primary?  Strain of hip flexor, right, initial encounter Yes    Status post total hip replacement, right     Lumbar radiculopathy, right          Plan:  2 years s/p right HAYDEE, DOS: 9/28/2021 with possible hip flexor strain/irritation vs right sided L4 radiculopathy  · Explained to the patient that there is a low suspicion for an infection about his right HAYDEE due to no pain with ROM or strength testing of the hip but will order SED rate and CRP as well as an MRI with MARS protocol to evaluate hip flexors  · Patient was instructed to continue with treatment with pain management as most of his symptoms seem to be stemming from a pinched nerve from his lumbar spine, most likely at L4 based on physical examination today  · He was also instructed to perform HEP for ROM of the hip as he is mildly restricted with ER of the hip  He understood and all questions were answered  · He will follow up after MRI and lab results become available for discussion of results and further treatment options based on these results       Scribe Attestation    I,:  Obed Rodriguez am acting as a scribe while in the presence of the attending physician :       I,:  Brayan Oden MD personally performed the services described in this documentation    as scribed in my presence :

## 2022-05-19 ENCOUNTER — APPOINTMENT (OUTPATIENT)
Dept: LAB | Facility: MEDICAL CENTER | Age: 57
End: 2022-05-19
Payer: MEDICARE

## 2022-05-19 DIAGNOSIS — E83.9 CHRONIC KIDNEY DISEASE-MINERAL AND BONE DISORDER: ICD-10-CM

## 2022-05-19 DIAGNOSIS — N18.9 CHRONIC KIDNEY DISEASE-MINERAL AND BONE DISORDER: ICD-10-CM

## 2022-05-19 DIAGNOSIS — Z96.641 STATUS POST TOTAL HIP REPLACEMENT, RIGHT: ICD-10-CM

## 2022-05-19 DIAGNOSIS — M89.9 CHRONIC KIDNEY DISEASE-MINERAL AND BONE DISORDER: ICD-10-CM

## 2022-05-19 DIAGNOSIS — N18.31 STAGE 3A CHRONIC KIDNEY DISEASE (HCC): ICD-10-CM

## 2022-05-19 LAB
25(OH)D3 SERPL-MCNC: 47.4 NG/ML (ref 30–100)
ANION GAP SERPL CALCULATED.3IONS-SCNC: 5 MMOL/L (ref 4–13)
BASOPHILS # BLD AUTO: 0.03 THOUSANDS/ΜL (ref 0–0.1)
BASOPHILS NFR BLD AUTO: 1 % (ref 0–1)
BILIRUB UR QL STRIP: NEGATIVE
BUN SERPL-MCNC: 18 MG/DL (ref 5–25)
CALCIUM SERPL-MCNC: 9.7 MG/DL (ref 8.3–10.1)
CHLORIDE SERPL-SCNC: 105 MMOL/L (ref 100–108)
CLARITY UR: CLEAR
CO2 SERPL-SCNC: 28 MMOL/L (ref 21–32)
COLOR UR: ABNORMAL
CREAT SERPL-MCNC: 1.53 MG/DL (ref 0.6–1.3)
CREAT UR-MCNC: 154 MG/DL
CRP SERPL QL: <3 MG/L
EOSINOPHIL # BLD AUTO: 0.13 THOUSAND/ΜL (ref 0–0.61)
EOSINOPHIL NFR BLD AUTO: 2 % (ref 0–6)
ERYTHROCYTE [DISTWIDTH] IN BLOOD BY AUTOMATED COUNT: 12.4 % (ref 11.6–15.1)
ERYTHROCYTE [SEDIMENTATION RATE] IN BLOOD: 11 MM/HOUR (ref 0–19)
GFR SERPL CREATININE-BSD FRML MDRD: 49 ML/MIN/1.73SQ M
GLUCOSE P FAST SERPL-MCNC: 142 MG/DL (ref 65–99)
GLUCOSE UR STRIP-MCNC: NEGATIVE MG/DL
HCT VFR BLD AUTO: 47.7 % (ref 36.5–49.3)
HGB BLD-MCNC: 15.5 G/DL (ref 12–17)
HGB UR QL STRIP.AUTO: NEGATIVE
IMM GRANULOCYTES # BLD AUTO: 0.02 THOUSAND/UL (ref 0–0.2)
IMM GRANULOCYTES NFR BLD AUTO: 0 % (ref 0–2)
KETONES UR STRIP-MCNC: NEGATIVE MG/DL
LEUKOCYTE ESTERASE UR QL STRIP: NEGATIVE
LYMPHOCYTES # BLD AUTO: 1.77 THOUSANDS/ΜL (ref 0.6–4.47)
LYMPHOCYTES NFR BLD AUTO: 33 % (ref 14–44)
MCH RBC QN AUTO: 32 PG (ref 26.8–34.3)
MCHC RBC AUTO-ENTMCNC: 32.5 G/DL (ref 31.4–37.4)
MCV RBC AUTO: 98 FL (ref 82–98)
MONOCYTES # BLD AUTO: 0.47 THOUSAND/ΜL (ref 0.17–1.22)
MONOCYTES NFR BLD AUTO: 9 % (ref 4–12)
NEUTROPHILS # BLD AUTO: 2.89 THOUSANDS/ΜL (ref 1.85–7.62)
NEUTS SEG NFR BLD AUTO: 55 % (ref 43–75)
NITRITE UR QL STRIP: NEGATIVE
NRBC BLD AUTO-RTO: 0 /100 WBCS
PH UR STRIP.AUTO: 6 [PH]
PHOSPHATE SERPL-MCNC: 2.7 MG/DL (ref 2.7–4.5)
PLATELET # BLD AUTO: 254 THOUSANDS/UL (ref 149–390)
PMV BLD AUTO: 10.4 FL (ref 8.9–12.7)
POTASSIUM SERPL-SCNC: 5 MMOL/L (ref 3.5–5.3)
PROT UR STRIP-MCNC: NEGATIVE MG/DL
PROT UR-MCNC: 9 MG/DL
PROT/CREAT UR: 0.06 MG/G{CREAT} (ref 0–0.1)
PTH-INTACT SERPL-MCNC: 41.7 PG/ML (ref 18.4–80.1)
RBC # BLD AUTO: 4.85 MILLION/UL (ref 3.88–5.62)
SODIUM SERPL-SCNC: 138 MMOL/L (ref 136–145)
SP GR UR STRIP.AUTO: 1.02 (ref 1–1.03)
UROBILINOGEN UR STRIP-ACNC: 2 MG/DL
WBC # BLD AUTO: 5.31 THOUSAND/UL (ref 4.31–10.16)

## 2022-05-19 PROCEDURE — 36415 COLL VENOUS BLD VENIPUNCTURE: CPT

## 2022-05-19 PROCEDURE — 85652 RBC SED RATE AUTOMATED: CPT

## 2022-05-19 PROCEDURE — 83970 ASSAY OF PARATHORMONE: CPT

## 2022-05-19 PROCEDURE — 84100 ASSAY OF PHOSPHORUS: CPT

## 2022-05-19 PROCEDURE — 82306 VITAMIN D 25 HYDROXY: CPT

## 2022-05-19 PROCEDURE — 86140 C-REACTIVE PROTEIN: CPT

## 2022-05-19 PROCEDURE — 85025 COMPLETE CBC W/AUTO DIFF WBC: CPT

## 2022-05-19 PROCEDURE — 84156 ASSAY OF PROTEIN URINE: CPT

## 2022-05-19 PROCEDURE — 80048 BASIC METABOLIC PNL TOTAL CA: CPT

## 2022-05-19 PROCEDURE — 81003 URINALYSIS AUTO W/O SCOPE: CPT

## 2022-05-19 PROCEDURE — 82570 ASSAY OF URINE CREATININE: CPT

## 2022-05-23 ENCOUNTER — TELEPHONE (OUTPATIENT)
Dept: NEPHROLOGY | Facility: CLINIC | Age: 57
End: 2022-05-23

## 2022-05-24 ENCOUNTER — OFFICE VISIT (OUTPATIENT)
Dept: NEPHROLOGY | Facility: CLINIC | Age: 57
End: 2022-05-24
Payer: MEDICARE

## 2022-05-24 VITALS
WEIGHT: 224.6 LBS | HEART RATE: 77 BPM | OXYGEN SATURATION: 97 % | BODY MASS INDEX: 29.77 KG/M2 | TEMPERATURE: 97.6 F | RESPIRATION RATE: 16 BRPM | DIASTOLIC BLOOD PRESSURE: 80 MMHG | HEIGHT: 73 IN | SYSTOLIC BLOOD PRESSURE: 110 MMHG

## 2022-05-24 DIAGNOSIS — M16.11 PRIMARY OSTEOARTHRITIS OF RIGHT HIP: ICD-10-CM

## 2022-05-24 DIAGNOSIS — E11.22 TYPE 2 DIABETES MELLITUS WITH STAGE 3A CHRONIC KIDNEY DISEASE, WITHOUT LONG-TERM CURRENT USE OF INSULIN (HCC): ICD-10-CM

## 2022-05-24 DIAGNOSIS — N18.31 TYPE 2 DIABETES MELLITUS WITH STAGE 3A CHRONIC KIDNEY DISEASE, WITHOUT LONG-TERM CURRENT USE OF INSULIN (HCC): ICD-10-CM

## 2022-05-24 DIAGNOSIS — N18.31 STAGE 3A CHRONIC KIDNEY DISEASE (HCC): Primary | ICD-10-CM

## 2022-05-24 DIAGNOSIS — N28.1 RENAL CYST, LEFT: ICD-10-CM

## 2022-05-24 DIAGNOSIS — E83.9 CHRONIC KIDNEY DISEASE-MINERAL AND BONE DISORDER: ICD-10-CM

## 2022-05-24 DIAGNOSIS — N18.9 CHRONIC KIDNEY DISEASE-MINERAL AND BONE DISORDER: ICD-10-CM

## 2022-05-24 DIAGNOSIS — M89.9 CHRONIC KIDNEY DISEASE-MINERAL AND BONE DISORDER: ICD-10-CM

## 2022-05-24 PROCEDURE — 99214 OFFICE O/P EST MOD 30 MIN: CPT | Performed by: INTERNAL MEDICINE

## 2022-05-24 NOTE — ASSESSMENT & PLAN NOTE
Lab Results   Component Value Date    EGFR 49 05/19/2022    EGFR 54 12/20/2021    EGFR 45 12/08/2021    CREATININE 1 53 (H) 05/19/2022    CREATININE 1 42 (H) 12/20/2021    CREATININE 1 66 (H) 12/08/2021   PTH and phosphorus along with vitamin-D are within acceptable range and will continue to monitor

## 2022-05-24 NOTE — ASSESSMENT & PLAN NOTE
Still quite painful and being monitored by Orthopedics  He claims it is coming from his back    Advised to avoid any nonsteroidal pain killer

## 2022-05-24 NOTE — ASSESSMENT & PLAN NOTE
Lab Results   Component Value Date    HGBA1C 6 3 03/10/2022   Seems reasonably well control    Importance of diabetic control and effect on kidney disease discussed with him

## 2022-05-24 NOTE — ASSESSMENT & PLAN NOTE
Lab Results   Component Value Date    EGFR 49 05/19/2022    EGFR 54 12/20/2021    EGFR 45 12/08/2021    CREATININE 1 53 (H) 05/19/2022    CREATININE 1 42 (H) 12/20/2021    CREATININE 1 66 (H) 12/08/2021   Does have stage IIIA CKD which seems stable overall  Likely because of diabetes and hypertension    Importance of hydration and avoiding nephrotoxic medicine discussed with the patient

## 2022-05-24 NOTE — PROGRESS NOTES
NEPHROLOGY OFFICE FOLLOW UP  Petey Newell 61 y o  male MRN: [id number]    Encounter: [id number] 5/24/2022    REASON FOR VISIT: Raleigh Ramirez is a 61 y o  male who is here on 5/24/2022 for Chronic Kidney Disease and Follow-up    HPI:    Chastity Posey came in today for follow-up of CKD  53-year-old gentleman with CKD stage 3  Patient is suffering from back pain and right hip pain and being closely monitored by orthopedic surgeon  Not taking any nonsteroidal pain killer     Denies any other acute complaint     No chest pain no palpitation or shortness of breath     No nausea no vomiting      REVIEW OF SYSTEMS:    Review of Systems   Constitutional: Negative for activity change and fatigue  HENT: Negative for congestion and ear discharge  Eyes: Negative for photophobia and pain  Respiratory: Negative for apnea and choking  Cardiovascular: Negative for chest pain and palpitations  Gastrointestinal: Negative for abdominal distention and blood in stool  Endocrine: Negative for heat intolerance and polyphagia  Genitourinary: Negative for flank pain and urgency  Musculoskeletal: Positive for arthralgias and back pain  Negative for neck pain and neck stiffness  Skin: Negative for color change and wound  Allergic/Immunologic: Negative for food allergies and immunocompromised state  Neurological: Negative for seizures and facial asymmetry  Hematological: Negative for adenopathy  Does not bruise/bleed easily  Psychiatric/Behavioral: Negative for self-injury and suicidal ideas           PAST MEDICAL HISTORY:  Past Medical History:   Diagnosis Date    Anxiety 3/10/2022    Aorta aneurysm (HCC)     4 3    Arm DVT (deep venous thromboembolism), acute ([facility] [address] ) 6411    complications of cardiac cath    Asthma     Chronic kidney disease     CKD (chronic kidney disease), stage III (HCC)     COPD (chronic obstructive pulmonary disease) (HCC)     Depression     Diabetes mellitus ([facility] [address] )     Essential hypertriglyceridemia     GERD (gastroesophageal reflux disease)     Headache     Hiatal hernia     Hyperlipidemia, mixed 2018    Kidney stone     Liver disease     FATTY LIVER    Mild episode of recurrent major depressive disorder ([facility] ) 3/10/2022    PAC (premature atrial contraction)     Prediabetes     Renal calculi     Sleep apnea     Vestibular migraine        PAST SURGICAL HISTORY:  Past Surgical History:   Procedure Laterality Date    CARPAL TUNNEL RELEASE Left     COLONOSCOPY      FL INJECTION RIGHT HIP (ARTHROGRAM)  2018    FOOT SURGERY Left     FOREIGN BODY REMOVAL    HERNIA REPAIR      MT COLONOSCOPY FLX DX W/COLLJ SPEC WHEN PFRMD N/A 2017    Procedure: COLONOSCOPY;  Surgeon: Ildefonso Haines MD;  [facility]; Service: Gastroenterology    MT ESOPHAGOGASTRODUODENOSCOPY TRANSORAL DIAGNOSTIC N/A 10/31/2017    Procedure: ESOPHAGOGASTRODUODENOSCOPY (EGD); Surgeon: Ildefonso Haines MD;  [facility];   Service: Gastroenterology    MT TOTAL HIP ARTHROPLASTY Right 2020    Procedure: ARTHROPLASTY HIP TOTAL;  Surgeon: Tim Bradshaw MD;  [facility];  Service: Orthopedics       SOCIAL HISTORY:  Social History     Substance and Sexual Activity   Alcohol Use Not Currently    Comment: occasional beer     Social History     Substance and Sexual Activity   Drug Use No     Social History     Tobacco Use   Smoking Status Former Smoker    Types: Cigars    Quit date: 2014    Years since quittin 8   Smokeless Tobacco Never Used   Tobacco Comment    never inhaled       FAMILY HISTORY:  Family History   Problem Relation Age of Onset    Cancer Brother     Prostate cancer Brother     Leukemia Brother         his only brother dies from lymphoma or leukemia pt unsure he was only 47    Arthritis Mother     Heart attack Father     Clotting disorder Father     Schizoaffective Disorder  Sister     Aortic aneurysm Other         abdominal MEDICATIONS:    Current Outpatient Medications:     acetaminophen (TYLENOL) 500 mg tablet, Take 500 mg by mouth every 6 (six) hours as needed for mild pain, Disp: , Rfl:     albuterol (Ventolin HFA) 90 mcg/act inhaler, Inhale 2 puffs every 6 (six) hours as needed for wheezing or shortness of breath (cough) (Patient taking differently: Inhale 2 puffs every 6 (six) hours as needed for wheezing or shortness of breath (cough) PRN), Disp: 18 g, Rfl: 1    fenofibrate 160 MG tablet, take 1 tablet by mouth once daily, Disp: 90 tablet, Rfl: 3    glucose blood test strip, TEST BS TID, Disp: 300 each, Rfl: 5    glucose monitoring kit (FREESTYLE) monitoring kit, Use 1 each daily before breakfast, Disp: 1 each, Rfl: 0    Lancets MISC, Use daily before breakfast, Disp: 100 each, Rfl: 5    pantoprazole (PROTONIX) 40 mg tablet, take 1 tablet by mouth once daily, Disp: 90 tablet, Rfl: 3    rosuvastatin (CRESTOR) 5 mg tablet, Take 1 tablet (5 mg total) by mouth daily, Disp: 90 tablet, Rfl: 1    traMADol (ULTRAM) 50 mg tablet, Take 1 tablet (50 mg total) by mouth every 8 (eight) hours as needed for moderate pain, Disp: 90 tablet, Rfl: 2    Trulicity 0 42 GW/0 2PG SOPN, inject 0 5 milliliters ( 0 75 milligrams ) subcutaneously every week, Disp: 12 mL, Rfl: 1    PHYSICAL EXAM:  Vitals:    05/24/22 0859   BP: 110/80   BP Location: Right arm   Patient Position: Sitting   Pulse: 77   Resp: 16   Temp: 97 6 °F (36 4 °C)   TempSrc: Temporal   SpO2: 97%   Weight: 102 kg (224 lb 9 6 oz)   Height: 6' 1" (1 854 m)     Body mass index is 29 63 kg/m²  Physical Exam  Constitutional:       General: He is not in acute distress  Appearance: He is well-developed  He is obese  HENT:      Head: Normocephalic  Eyes:      General: No scleral icterus  Conjunctiva/sclera: Conjunctivae normal    Neck:      Vascular: No JVD  Cardiovascular:      Rate and Rhythm: Normal rate  Heart sounds: Normal heart sounds     Pulmonary: Effort: Pulmonary effort is normal       Breath sounds: Normal breath sounds  No wheezing  Abdominal:      General: Bowel sounds are normal       Palpations: Abdomen is soft  Tenderness: There is no abdominal tenderness  Musculoskeletal:         General: No swelling  Normal range of motion  Cervical back: Normal range of motion and neck supple  Skin:     General: Skin is warm  Findings: No rash  Neurological:      General: No focal deficit present  Mental Status: He is alert and oriented to person, place, and time     Psychiatric:         Behavior: Behavior normal          LAB RESULTS:  Results for orders placed or performed in visit on 77/51/81   Basic metabolic panel   Result Value Ref Range    Sodium 138 136 - 145 mmol/L    Potassium 5 0 3 5 - 5 3 mmol/L    Chloride 105 100 - 108 mmol/L    CO2 28 21 - 32 mmol/L    ANION GAP 5 4 - 13 mmol/L    BUN 18 5 - 25 mg/dL    Creatinine 1 53 (H) 0 60 - 1 30 mg/dL    Glucose, Fasting 142 (H) 65 - 99 mg/dL    Calcium 9 7 8 3 - 10 1 mg/dL    eGFR 49 ml/min/1 73sq m   CBC and differential   Result Value Ref Range    WBC 5 31 4 31 - 10 16 Thousand/uL    RBC 4 85 3 88 - 5 62 Million/uL    Hemoglobin 15 5 12 0 - 17 0 g/dL    Hematocrit 47 7 36 5 - 49 3 %    MCV 98 82 - 98 fL    MCH 32 0 26 8 - 34 3 pg    MCHC 32 5 31 4 - 37 4 g/dL    RDW 12 4 11 6 - 15 1 %    MPV 10 4 8 9 - 12 7 fL    Platelets 889 815 - 870 Thousands/uL    nRBC 0 /100 WBCs    Neutrophils Relative 55 43 - 75 %    Immat GRANS % 0 0 - 2 %    Lymphocytes Relative 33 14 - 44 %    Monocytes Relative 9 4 - 12 %    Eosinophils Relative 2 0 - 6 %    Basophils Relative 1 0 - 1 %    Neutrophils Absolute 2 89 1 85 - 7 62 Thousands/µL    Immature Grans Absolute 0 02 0 00 - 0 20 Thousand/uL    Lymphocytes Absolute 1 77 0 60 - 4 47 Thousands/µL    Monocytes Absolute 0 47 0 17 - 1 22 Thousand/µL    Eosinophils Absolute 0 13 0 00 - 0 61 Thousand/µL    Basophils Absolute 0 03 0 00 - 0 10 Thousands/µL   Phosphorus   Result Value Ref Range    Phosphorus 2 7 2 7 - 4 5 mg/dL   Protein / creatinine ratio, urine   Result Value Ref Range    Creatinine, Ur 154 0 mg/dL    Protein Urine Random 9 mg/dL    Prot/Creat Ratio, Ur 0 06 0 00 - 0 10   PTH, intact   Result Value Ref Range    PTH 41 7 18 4 - 80 1 pg/mL   UA (URINE) with reflex to Scope   Result Value Ref Range    Color, UA Light Yellow     Clarity, UA Clear     Specific Gravity, UA 1 021 1 003 - 1 030    pH, UA 6 0 4 5, 5 0, 5 5, 6 0, 6 5, 7 0, 7 5, 8 0    Leukocytes, UA Negative Negative    Nitrite, UA Negative Negative    Protein, UA Negative Negative mg/dl    Glucose, UA Negative Negative mg/dl    Ketones, UA Negative Negative mg/dl    Urobilinogen, UA 2 0 (A) <2 0 mg/dl mg/dl    Bilirubin, UA Negative Negative    Blood, UA Negative Negative   Vitamin D 25 hydroxy   Result Value Ref Range    Vit D, 25-Hydroxy 47 4 30 0 - 100 0 ng/mL   Sedimentation rate, automated   Result Value Ref Range    Sed Rate 11 0 - 19 mm/hour   C-reactive protein   Result Value Ref Range    CRP <3 0 <3 0 mg/L       ASSESSMENT and PLAN:      CKD (chronic kidney disease) stage 3, GFR 30-59 ml/min  Lab Results   Component Value Date    EGFR 49 05/19/2022    EGFR 54 12/20/2021    EGFR 45 12/08/2021    CREATININE 1 53 (H) 05/19/2022    CREATININE 1 42 (H) 12/20/2021    CREATININE 1 66 (H) 12/08/2021   Does have stage IIIA CKD which seems stable overall  Likely because of diabetes and hypertension    Importance of hydration and avoiding nephrotoxic medicine discussed with the patient    Chronic kidney disease-mineral and bone disorder  Lab Results   Component Value Date    EGFR 49 05/19/2022    EGFR 54 12/20/2021    EGFR 45 12/08/2021    CREATININE 1 53 (H) 05/19/2022    CREATININE 1 42 (H) 12/20/2021    CREATININE 1 66 (H) 12/08/2021   PTH and phosphorus along with vitamin-D are within acceptable range and will continue to monitor    Primary osteoarthritis of right hip  Still quite painful and being monitored by Orthopedics  He claims it is coming from his back  Advised to avoid any nonsteroidal pain killer    Renal cyst, left  Benign in nature by last CT scan  Type 2 diabetes mellitus with stage 3a chronic kidney disease, without long-term current use of insulin ([facility] )    Lab Results   Component Value Date    HGBA1C 6 3 03/10/2022   Seems reasonably well control  Importance of diabetic control and effect on kidney disease discussed with him      Everything discussed with patient at length  I will see him back in 6 months  Will get blood and urine test before that visit        Portions of the record may have been created with voice recognition software  Occasional wrong word or "sound a like" substitutions may have occurred due to the inherent limitations of voice recognition software  Read the chart carefully and recognize, using context, where substitutions have occurred  If you have any questions, please contact the dictating provider

## 2022-06-01 DIAGNOSIS — E78.2 MIXED HYPERLIPIDEMIA: ICD-10-CM

## 2022-06-01 RX ORDER — ROSUVASTATIN CALCIUM 5 MG/1
TABLET, COATED ORAL
Qty: 90 TABLET | Refills: 1 | Status: SHIPPED | OUTPATIENT
Start: 2022-06-01 | End: 2022-06-07

## 2022-06-02 ENCOUNTER — APPOINTMENT (OUTPATIENT)
Dept: LAB | Facility: MEDICAL CENTER | Age: 57
End: 2022-06-02
Payer: MEDICARE

## 2022-06-02 DIAGNOSIS — E78.2 HYPERLIPIDEMIA, MIXED: ICD-10-CM

## 2022-06-02 LAB
ALBUMIN SERPL BCP-MCNC: 3.9 G/DL (ref 3.5–5)
ALP SERPL-CCNC: 58 U/L (ref 46–116)
ALT SERPL W P-5'-P-CCNC: 40 U/L (ref 12–78)
ANION GAP SERPL CALCULATED.3IONS-SCNC: 2 MMOL/L (ref 4–13)
AST SERPL W P-5'-P-CCNC: 34 U/L (ref 5–45)
BILIRUB SERPL-MCNC: 0.67 MG/DL (ref 0.2–1)
BUN SERPL-MCNC: 16 MG/DL (ref 5–25)
CALCIUM SERPL-MCNC: 9.9 MG/DL (ref 8.3–10.1)
CHLORIDE SERPL-SCNC: 113 MMOL/L (ref 100–108)
CHOLEST SERPL-MCNC: 100 MG/DL
CO2 SERPL-SCNC: 27 MMOL/L (ref 21–32)
CREAT SERPL-MCNC: 1.43 MG/DL (ref 0.6–1.3)
GFR SERPL CREATININE-BSD FRML MDRD: 53 ML/MIN/1.73SQ M
GLUCOSE P FAST SERPL-MCNC: 125 MG/DL (ref 65–99)
HDLC SERPL-MCNC: 31 MG/DL
LDLC SERPL CALC-MCNC: 28 MG/DL (ref 0–100)
NONHDLC SERPL-MCNC: 69 MG/DL
POTASSIUM SERPL-SCNC: 4.7 MMOL/L (ref 3.5–5.3)
PROT SERPL-MCNC: 7.2 G/DL (ref 6.4–8.2)
SODIUM SERPL-SCNC: 142 MMOL/L (ref 136–145)
TRIGL SERPL-MCNC: 206 MG/DL

## 2022-06-02 PROCEDURE — 80061 LIPID PANEL: CPT

## 2022-06-02 PROCEDURE — 36415 COLL VENOUS BLD VENIPUNCTURE: CPT

## 2022-06-02 PROCEDURE — 80053 COMPREHEN METABOLIC PANEL: CPT

## 2022-06-07 DIAGNOSIS — E78.2 MIXED HYPERLIPIDEMIA: ICD-10-CM

## 2022-06-07 RX ORDER — ROSUVASTATIN CALCIUM 5 MG/1
TABLET, COATED ORAL
Qty: 90 TABLET | Refills: 1 | Status: SHIPPED | OUTPATIENT
Start: 2022-06-07 | End: 2022-06-15

## 2022-06-15 ENCOUNTER — OFFICE VISIT (OUTPATIENT)
Dept: FAMILY MEDICINE CLINIC | Facility: CLINIC | Age: 57
End: 2022-06-15
Payer: MEDICARE

## 2022-06-15 VITALS
SYSTOLIC BLOOD PRESSURE: 124 MMHG | OXYGEN SATURATION: 97 % | WEIGHT: 218 LBS | BODY MASS INDEX: 28.89 KG/M2 | HEIGHT: 73 IN | DIASTOLIC BLOOD PRESSURE: 76 MMHG | HEART RATE: 74 BPM

## 2022-06-15 DIAGNOSIS — E78.2 HYPERLIPIDEMIA, MIXED: ICD-10-CM

## 2022-06-15 DIAGNOSIS — J30.1 NON-SEASONAL ALLERGIC RHINITIS DUE TO POLLEN: ICD-10-CM

## 2022-06-15 DIAGNOSIS — N18.31 TYPE 2 DIABETES MELLITUS WITH STAGE 3A CHRONIC KIDNEY DISEASE, WITHOUT LONG-TERM CURRENT USE OF INSULIN (HCC): Primary | ICD-10-CM

## 2022-06-15 DIAGNOSIS — Z86.010 HISTORY OF COLON POLYPS: ICD-10-CM

## 2022-06-15 DIAGNOSIS — E11.22 TYPE 2 DIABETES MELLITUS WITH STAGE 3A CHRONIC KIDNEY DISEASE, WITHOUT LONG-TERM CURRENT USE OF INSULIN (HCC): Primary | ICD-10-CM

## 2022-06-15 DIAGNOSIS — N18.31 STAGE 3A CHRONIC KIDNEY DISEASE (HCC): ICD-10-CM

## 2022-06-15 DIAGNOSIS — Z12.11 ENCOUNTER FOR SCREENING COLONOSCOPY: ICD-10-CM

## 2022-06-15 DIAGNOSIS — E78.2 MIXED HYPERLIPIDEMIA: ICD-10-CM

## 2022-06-15 DIAGNOSIS — J45.20 MILD INTERMITTENT ASTHMA WITHOUT COMPLICATION: ICD-10-CM

## 2022-06-15 LAB — SL AMB POCT HEMOGLOBIN AIC: 6.3 (ref ?–6.5)

## 2022-06-15 PROCEDURE — 83036 HEMOGLOBIN GLYCOSYLATED A1C: CPT | Performed by: FAMILY MEDICINE

## 2022-06-15 PROCEDURE — 99214 OFFICE O/P EST MOD 30 MIN: CPT | Performed by: FAMILY MEDICINE

## 2022-06-15 RX ORDER — ROSUVASTATIN CALCIUM 5 MG/1
2.5 TABLET, COATED ORAL DAILY
Qty: 90 TABLET | Refills: 1
Start: 2022-06-15

## 2022-06-15 NOTE — PROGRESS NOTES
Avery Banegas 1965 male MRN: [id number]      ASSESSMENT/PLAN  Problem List Items Addressed This Visit        Endocrine    Type 2 diabetes mellitus with stage 3a chronic kidney disease, without long-term current use of insulin ([facility] ) - Primary    Relevant Orders    POCT hemoglobin A1c (Completed)       Respiratory    Allergic rhinitis    Mild intermittent asthma without complication       Genitourinary    CKD (chronic kidney disease) stage 3, GFR 30-59 ml/min ([facility])       Other    Hyperlipidemia, mixed    Relevant Medications    rosuvastatin (CRESTOR) 5 mg tablet      Other Visit Diagnoses     Mixed hyperlipidemia        Relevant Medications    rosuvastatin (CRESTOR) 5 mg tablet    Encounter for screening colonoscopy        Relevant Orders    Ambulatory referral for colonoscopy    History of colon polyps        Relevant Orders    Ambulatory referral for colonoscopy        DM: A1c 6 3% (stable), continue current regimen   HLD: Will cut back on Crestor to 1/2 tab daily   CKD3a: Stable, f/u with Nephrology as scheduled   Asthma/Allergies: Stable, continue PRN RUDY       Future Appointments   Date Time Provider Kristen Fine   6/15/2022  2:00 PM DO ALFONZO Slade  [facility]   6/17/2022  7:30 PM [facility] 1 [facility]   6/22/2022 11:15 AM Chaparro Lemons MD ORTHO [facility]   8/4/2022  9:15 AM NICK Richter [facility]   9/14/2022  8:00 AM DO ALFONZO Allison  [facility]          SUBJECTIVE  CC: Follow-up (Diabetic check up - lab review )      HPI:  Avery Banegas is a 61 y o  male who presents for chronic follow up and lab review as below  DM: Intermittent home sugars; denies hypo/hyperglycemic symptoms   HLD: Lipids:  Total 100, LDL 28, HDL 31, ; LFTs WNL   CKD3a: Cr 1 43/GFR 53 (stable)  Asthma/Allergies: Has some cough at night if the fan is blowing, otherwise doing ok     Is on call list for Cardiology f/u     Review of Systems   Constitutional: Negative for diaphoresis  Eyes: Negative for visual disturbance  Respiratory: Negative for cough, shortness of breath and wheezing  Cardiovascular: Negative for chest pain, palpitations and leg swelling  Gastrointestinal: Negative for abdominal pain, constipation and diarrhea  Endocrine: Negative for polydipsia and polyuria  Musculoskeletal: Positive for arthralgias  Neurological: Negative for dizziness, tremors and headaches (every once in awhile)  Historical Information   The patient history was reviewed and updated as follows:    Past Medical History:   Diagnosis Date    Anxiety 3/10/2022    Aorta aneurysm (HCC)     4 3    Arm DVT (deep venous thromboembolism), acute ([facility] ) 9199    complications of cardiac cath    Asthma     Chronic kidney disease     CKD (chronic kidney disease), stage III (HCC)     COPD (chronic obstructive pulmonary disease) (HCC)     Depression     Diabetes mellitus (HCC)     Essential hypertriglyceridemia     GERD (gastroesophageal reflux disease)     Headache     Hiatal hernia     Hyperlipidemia, mixed 5/7/2018    Kidney stone     Liver disease     FATTY LIVER    Mild episode of recurrent major depressive disorder ([facility] ) 3/10/2022    PAC (premature atrial contraction)     Prediabetes     Renal calculi     Sleep apnea     Vestibular migraine      Past Surgical History:   Procedure Laterality Date    CARPAL TUNNEL RELEASE Left     COLONOSCOPY      FL INJECTION RIGHT HIP (ARTHROGRAM)  8/20/2018    FOOT SURGERY Left     FOREIGN BODY REMOVAL    HERNIA REPAIR      MA COLONOSCOPY FLX DX W/COLLJ SPEC WHEN PFRMD N/A 8/7/2017    Procedure: COLONOSCOPY;  Surgeon: Mireya Coulter MD;  [facility]; Service: Gastroenterology    MA ESOPHAGOGASTRODUODENOSCOPY TRANSORAL DIAGNOSTIC N/A 10/31/2017    Procedure: ESOPHAGOGASTRODUODENOSCOPY (EGD); Surgeon: Mireya Coulter MD;  [facility];   Service: Gastroenterology    MA TOTAL HIP ARTHROPLASTY Right 2020    Procedure: ARTHROPLASTY HIP TOTAL;  Surgeon: Daylin Leon MD;  [facility];  Service: Orthopedics     Family History   Problem Relation Age of Onset    Cancer Brother     Prostate cancer Brother     Leukemia Brother         his only brother dies from [address] or leukemia pt unsure he was only 47    Arthritis Mother     Heart attack Father     Clotting disorder Father     Schizoaffective Disorder  Sister     Aortic aneurysm Other         abdominal      Social History   Social History     Substance and Sexual Activity   Alcohol Use Not Currently    Comment: occasional beer     Social History     Substance and Sexual Activity   Drug Use No     Social History     Tobacco Use   Smoking Status Former Smoker    Types: Cigars    Quit date: 2014    Years since quittin 8   Smokeless Tobacco Never Used   Tobacco Comment    never inhaled       Medications:     Current Outpatient Medications:     rosuvastatin (CRESTOR) 5 mg tablet, Take 0 5 tablets (2 5 mg total) by mouth daily, Disp: 90 tablet, Rfl: 1    acetaminophen (TYLENOL) 500 mg tablet, Take 500 mg by mouth every 6 (six) hours as needed for mild pain, Disp: , Rfl:     albuterol (Ventolin HFA) 90 mcg/act inhaler, Inhale 2 puffs every 6 (six) hours as needed for wheezing or shortness of breath (cough) (Patient taking differently: Inhale 2 puffs every 6 (six) hours as needed for wheezing or shortness of breath (cough) PRN), Disp: 18 g, Rfl: 1    fenofibrate 160 MG tablet, take 1 tablet by mouth once daily, Disp: 90 tablet, Rfl: 3    glucose blood test strip, TEST BS TID, Disp: 300 each, Rfl: 5    glucose monitoring kit (FREESTYLE) monitoring kit, Use 1 each daily before breakfast, Disp: 1 each, Rfl: 0    Lancets MISC, Use daily before breakfast, Disp: 100 each, Rfl: 5    pantoprazole (PROTONIX) 40 mg tablet, take 1 tablet by mouth once daily, Disp: 90 tablet, Rfl: 3    traMADol (ULTRAM) 50 mg tablet, Take 1 tablet (50 mg total) by mouth every 8 (eight) hours as needed for moderate pain, Disp: 90 tablet, Rfl: 2    Trulicity 9 82 MB/8 4JX SOPN, inject 0 5 milliliters ( 0 75 milligrams ) subcutaneously every week, Disp: 12 mL, Rfl: 1  No Known Allergies    OBJECTIVE    Vitals:   Vitals:    06/15/22 1340   BP: 124/76   Pulse: 74   SpO2: 97%   Weight: 98 9 kg (218 lb)   Height: 6' 1" (1 854 m)           Physical Exam  Vitals and nursing note reviewed  Constitutional:       General: He is not in acute distress  Appearance: Normal appearance  HENT:      Head: Normocephalic and atraumatic  Right Ear: Tympanic membrane, ear canal and external ear normal       Left Ear: Tympanic membrane, ear canal and external ear normal       Nose: Nose normal       Mouth/Throat:      Mouth: Mucous membranes are moist       Pharynx: No oropharyngeal exudate or posterior oropharyngeal erythema  Eyes:      Conjunctiva/sclera: Conjunctivae normal    Cardiovascular:      Rate and Rhythm: Normal rate and regular rhythm  Pulmonary:      Effort: Pulmonary effort is normal  No respiratory distress  Breath sounds: Normal breath sounds  Abdominal:      General: Bowel sounds are normal  There is no distension  Palpations: Abdomen is soft  Tenderness: There is no abdominal tenderness  Musculoskeletal:      Right lower leg: No edema  Left lower leg: No edema  Lymphadenopathy:      Cervical: No cervical adenopathy  Skin:     General: Skin is warm and dry  Neurological:      General: No focal deficit present  Mental Status: He is alert     Psychiatric:         Mood and Affect: Mood normal                     Adenike Garg DO  [facility]   6/15/2022  1:59 PM

## 2022-06-17 ENCOUNTER — HOSPITAL ENCOUNTER (OUTPATIENT)
Dept: MRI IMAGING | Facility: HOSPITAL | Age: 57
Discharge: HOME | End: 2022-06-17
Attending: ORTHOPAEDIC SURGERY
Payer: MEDICARE

## 2022-06-17 DIAGNOSIS — Z96.641 STATUS POST TOTAL HIP REPLACEMENT, RIGHT: ICD-10-CM

## 2022-06-17 DIAGNOSIS — S76.011A STRAIN OF HIP FLEXOR, RIGHT, INITIAL ENCOUNTER: ICD-10-CM

## 2022-06-17 PROCEDURE — 73721 MRI JNT OF LWR EXTRE W/O DYE: CPT

## 2022-06-17 PROCEDURE — G1004 CDSM NDSC: HCPCS

## 2022-06-22 ENCOUNTER — OFFICE VISIT (OUTPATIENT)
Dept: OBGYN CLINIC | Facility: CLINIC | Age: 57
End: 2022-06-22
Payer: MEDICARE

## 2022-06-22 VITALS
SYSTOLIC BLOOD PRESSURE: 115 MMHG | WEIGHT: 220 LBS | DIASTOLIC BLOOD PRESSURE: 79 MMHG | HEART RATE: 64 BPM | BODY MASS INDEX: 28.23 KG/M2 | RESPIRATION RATE: 18 BRPM | HEIGHT: 74 IN

## 2022-06-22 DIAGNOSIS — M54.16 LUMBAR RADICULOPATHY: Primary | ICD-10-CM

## 2022-06-22 DIAGNOSIS — S76.011A STRAIN OF HIP FLEXOR, RIGHT, INITIAL ENCOUNTER: ICD-10-CM

## 2022-06-22 PROCEDURE — 99213 OFFICE O/P EST LOW 20 MIN: CPT | Performed by: ORTHOPAEDIC SURGERY

## 2022-06-22 NOTE — PROGRESS NOTES
SUBJECTIVE  62 y/o male who presents today for a follow up visit for his right hip as well as to discuss MRI results  He is now 2 years s/p right HAYDEE  Today, patient continues to complain of pain about the groin and lateral aspect of his hip  He states that his pain is worse when lying down or standing and improved when bending over or lying on side in fetal position  He also has increased pain when he coughs or sneezes  He does treat every 3 months with pain management for chronic lower back pain   He takes Tramadol and Tylenol prn for pain relief      ROS:   General: No fever, no chills, no weight loss, no weight gain  HEENT:  No loss of hearing, no nose bleeds, no sore throat  Eyes:  No eye pain, no red eyes, no visual disturbance  Respiratory:  No cough, no shortness of breath, no wheezing  Cardiovascular:  No chest pain, no palpitations, no edema  GI: No abdominal pain, no nausea, no vomiting  Endocrine: No frequent urination, no excessive thirst  Urinary:  No dysuria, no hematuria, no incontinence  Musculoskeletal: see HPI and PE  Skin:  No rash, no wounds  Neurological:  No dizziness, no headache, no numbness  Psychiatric:  No difficulty concentrating, no depression, no suicide thoughts, no anxiety  Review of all other systems is negative    PMH:  Past Medical History:   Diagnosis Date    Anxiety 3/10/2022    Aorta aneurysm (HCC)     4 3    Arm DVT (deep venous thromboembolism), acute ([address] ) 1516    complications of cardiac cath    Asthma     Chronic kidney disease     CKD (chronic kidney disease), stage III (HCC)     COPD (chronic obstructive pulmonary disease) (HCC)     Depression     Diabetes mellitus (HCC)     Essential hypertriglyceridemia     GERD (gastroesophageal reflux disease)     Headache     Hiatal hernia     Hyperlipidemia, mixed 5/7/2018    Kidney stone     Liver disease     FATTY LIVER    Mild episode of recurrent major depressive disorder ([address] ) 3/10/2022    PAC (premature atrial contraction)     Prediabetes     Renal calculi     Sleep apnea     Vestibular migraine        PSH:  Past Surgical History:   Procedure Laterality Date    CARPAL TUNNEL RELEASE Left     COLONOSCOPY      FL INJECTION RIGHT HIP (ARTHROGRAM)  8/20/2018    FOOT SURGERY Left     FOREIGN BODY REMOVAL    HERNIA REPAIR      NM COLONOSCOPY FLX DX W/COLLJ SPEC WHEN PFRMD N/A 8/7/2017    Procedure: COLONOSCOPY;  Surgeon: Nanda Sharpe MD;  Location: [facility]; Service: Gastroenterology    NM ESOPHAGOGASTRODUODENOSCOPY TRANSORAL DIAGNOSTIC N/A 10/31/2017    Procedure: ESOPHAGOGASTRODUODENOSCOPY (EGD); Surgeon: Nanda Sharpe MD;  [facility];   Service: Gastroenterology    NM TOTAL HIP ARTHROPLASTY Right 9/28/2020    Procedure: ARTHROPLASTY HIP TOTAL;  Surgeon: Ean Garcia MD;  [facility];  Service: Orthopedics       Medications:  Current Outpatient Medications   Medication Sig Dispense Refill    acetaminophen (TYLENOL) 500 mg tablet Take 500 mg by mouth every 6 (six) hours as needed for mild pain      albuterol (Ventolin HFA) 90 mcg/act inhaler Inhale 2 puffs every 6 (six) hours as needed for wheezing or shortness of breath (cough) (Patient taking differently: Inhale 2 puffs every 6 (six) hours as needed for wheezing or shortness of breath (cough) PRN) 18 g 1    fenofibrate 160 MG tablet take 1 tablet by mouth once daily 90 tablet 3    glucose blood test strip TEST BS  each 5    glucose monitoring kit (FREESTYLE) monitoring kit Use 1 each daily before breakfast 1 each 0    Lancets MISC Use daily before breakfast 100 each 5    pantoprazole (PROTONIX) 40 mg tablet take 1 tablet by mouth once daily 90 tablet 3    rosuvastatin (CRESTOR) 5 mg tablet Take 0 5 tablets (2 5 mg total) by mouth daily 90 tablet 1    traMADol (ULTRAM) 50 mg tablet Take 1 tablet (50 mg total) by mouth every 8 (eight) hours as needed for moderate pain 90 tablet 2    Trulicity 1 71 UU/6 5JT SOPN inject 0 5 milliliters ( 0 75 milligrams ) subcutaneously every week 12 mL 1     No current facility-administered medications for this visit  Allergies:  No Known Allergies    Family History:  Family History   Problem Relation Age of Onset    Cancer Brother     Prostate cancer Brother     Leukemia Brother         his only brother dies from lymphoma or leukemia pt unsure he was only 47    Arthritis Mother     Heart attack Father     Clotting disorder Father     Schizoaffective Disorder  Sister     Aortic aneurysm Other         abdominal       Social History:  Social History     Occupational History    Occupation: unemployed   Tobacco Use    Smoking status: Former Smoker     Types: Cigars     Quit date: 2014     Years since quittin 8    Smokeless tobacco: Never Used    Tobacco comment: never inhaled   Vaping Use    Vaping Use: Never used   Substance and Sexual Activity    Alcohol use: Not Currently     Comment: occasional beer    Drug use: No    Sexual activity: Yes     Partners: Female       Physical Exam:  General :  Alert, cooperative, no distress, appears stated age  Blood pressure 115/79, pulse 64, resp  rate 18, height 6' 1 5" (1 867 m), weight 99 8 kg (220 lb)  Head:  Normocephalic, without obvious abnormality, atraumatic   Eyes:  Conjunctiva/corneas clear, EOM's intact,   Ears: Both ears normal appearance, no hearing deficits  Nose: Nares normal, septum midline, no drainage    Neck: Supple,  trachea midline, no adenopathy, no tenderness, no mass   Back:   Symmetric, no curvature, ROM normal, no tenderness   Lungs:   Respirations unlabored   Chest Wall:  No tenderness or deformity   Extremities: Extremities normal, atraumatic, no cyanosis or edema      Pulses: 2+ and symmetric   Skin: Skin color, texture, turgor normal, no rashes or lesions      Neurologic: Normal           Ortho Exam   Right Hip:  Skin is intact   No erythema or ecchymosis  Negative Log Roll and compression test   No pain with IR or ER  Abd or Add  Some limitation in ER   Some discomfort with hyper extending the right leg  Minimal to no pain with resisted hip flexion   5/5 EHL, dorsi and plantar flexion   Negative compression test      Imaging Studies: The following imaging studies were reviewed in office today  My findings are noted  MRI Right Hip 6/17/2022 demonstrates post operative changes from a right HAYDEE  No acute musculotendinous strain  No evidence of GT bursitis  Intact adductors   Minimal fluid in psoas bursae  No prominence of hardware of evidence of soft tissue irritation from hardware  Assessment  Encounter Diagnoses   Name Primary?  Lumbar radiculopathy Yes    Strain of hip flexor, right, initial encounter          Plan:  62 y/o male with a history of a right HAYDEE about 2 years ago  Patient c/o lower back and lumbar radiculopathy symptoms  · Explained to the patient that his MRI and x-rays of the right hip does not reveal a structural pathology for his pain  Symptoms are likely stemming from his lumbar spine causing radicular type symptoms  Symptoms not consistent with infection  ESR and CRP tests were normal   · Patient was instructed to follow up with pain management for possible injections vs obtaining a new lumbar spine MRI  He understood and all questions were answered     · Follow up with us 1 year    Scribe Attestation    I,:  Tod Dickson am acting as a scribe while in the presence of the attending physician :       I,:  Chaya Curry MD personally performed the services described in this documentation    as scribed in my presence :

## 2022-06-29 ENCOUNTER — TELEPHONE (OUTPATIENT)
Dept: GASTROENTEROLOGY | Facility: CLINIC | Age: 57
End: 2022-06-29

## 2022-07-07 DIAGNOSIS — N52.9 ERECTILE DYSFUNCTION, UNSPECIFIED ERECTILE DYSFUNCTION TYPE: Primary | ICD-10-CM

## 2022-07-07 RX ORDER — SILDENAFIL CITRATE 20 MG/1
TABLET ORAL
Qty: 90 TABLET | Refills: 3 | Status: SHIPPED | OUTPATIENT
Start: 2022-07-07 | End: 2022-07-21

## 2022-07-21 DIAGNOSIS — N52.9 ERECTILE DYSFUNCTION, UNSPECIFIED ERECTILE DYSFUNCTION TYPE: ICD-10-CM

## 2022-07-21 RX ORDER — SILDENAFIL CITRATE 20 MG/1
TABLET ORAL
Qty: 90 TABLET | Refills: 3 | Status: SHIPPED | OUTPATIENT
Start: 2022-07-21

## 2022-07-27 NOTE — TELEPHONE ENCOUNTER
Caller: [facility]  Callback# [phone]  Dr Rashaun Elena        Need prior authorization for diclofenac sodium (VOLTAREN) 1 % please advise thanks  No

## 2022-08-01 NOTE — PROGRESS NOTES
Pain Medicine Follow-Up Note    Assessment:  No diagnosis found  Plan:  No orders of the defined types were placed in this encounter  No orders of the defined types were placed in this encounter  My impressions and treatment recommendations were discussed in detail with the patient who verbalized understanding and had no further questions  ***    {PDMP Statement:26713}    {UDS Statement:93780}    {Opioid Statement:91888}    {Pain Management Procedure Risk Statement:64656}    {Oral Swab Statement:28557}    Follow-up is planned in *** time or sooner as warranted  Discharge instructions were provided  I personally saw and examined the patient and I agree with the above discussed plan of care  History of Present Illness:    Michael Holguin is a 61 y o  adult who presents to [facility] for interval re-evaluation of the above stated pain complaints  The patient has a past medical and chronic pain history as outlined in the assessment section  {He/she (caps):62035} was last seen on ***     ***    Pain Contract Signed:  ***  Last Urine Drug Screen:  ***    Other than as stated above, the patient denies any interval changes in medications, medical condition, mental condition, symptoms, or allergies since the last office visit           Review of Systems:    Review of Systems      Patient Active Problem List   Diagnosis    Mild intermittent asthma without complication    Ascending aortic aneurysm (HCC)    Bicuspid aortic valve    Allergic rhinitis    GERD (gastroesophageal reflux disease)    Hiatal hernia    Type 2 diabetes mellitus with stage 3a chronic kidney disease, without long-term current use of insulin (HCC)    CKD (chronic kidney disease) stage 3, GFR 30-59 ml/min ([facility])    Hyperlipidemia, mixed    Vitamin D deficiency    Renal cyst, left    Hepatic cyst    Fatty liver disease, nonalcoholic    Erectile dysfunction    Carpal tunnel syndrome of right wrist    Chronic pain of right knee    Vestibular migraine    Patellar tendinitis of right knee    Benign paroxysmal positional vertigo due to bilateral vestibular disorder    Hip impingement syndrome, right    Primary osteoarthritis of right hip    Ulnar neuropathy of both upper extremities    Lumbosacral strain    Tarsal tunnel syndrome of right side    Cubital tunnel syndrome, bilateral    Groin pain, chronic, right    Chronic pain syndrome    Chronic kidney disease-mineral and bone disorder    Uncomplicated opioid dependence [facility]    Arthritis of right hip       Past Medical History:   Diagnosis Date    Anxiety 3/10/2022    Aorta aneurysm (HCC)     4 3    Arm DVT (deep venous thromboembolism), acute ([facility] ) 8405    complications of cardiac cath    Asthma     Chronic kidney disease     CKD (chronic kidney disease), stage III (HCC)     COPD (chronic obstructive pulmonary disease) ([facility] )     Depression     Diabetes mellitus [facility]     Essential hypertriglyceridemia     GERD (gastroesophageal reflux disease)     Headache     Hiatal hernia     Hyperlipidemia, mixed 5/7/2018    Kidney stone     Liver disease     FATTY LIVER    Mild episode of recurrent major depressive disorder ([facility] ) 3/10/2022    PAC (premature atrial contraction)     Prediabetes     Renal calculi     Sleep apnea     Vestibular migraine        Past Surgical History:   Procedure Laterality Date    CARPAL TUNNEL RELEASE Left     COLONOSCOPY      FL INJECTION RIGHT HIP (ARTHROGRAM)  8/20/2018    FOOT SURGERY Left     FOREIGN BODY REMOVAL    HERNIA REPAIR      VT COLONOSCOPY FLX DX W/COLLJ SPEC WHEN PFRMD N/A 8/7/2017    Procedure: COLONOSCOPY;  Surgeon: Yasemin Cantu MD;  [facility]; Service: Gastroenterology    VT ESOPHAGOGASTRODUODENOSCOPY TRANSORAL DIAGNOSTIC N/A 10/31/2017    Procedure: ESOPHAGOGASTRODUODENOSCOPY (EGD); Surgeon: Yasemin Cantu MD;  [facility];   Service: Gastroenterology   [place] TOTAL HIP ARTHROPLASTY Right 2020    Procedure: ARTHROPLASTY HIP TOTAL;  Surgeon: Pepper Bartholomew MD;  [facility];  Service: Orthopedics       Family History   Problem Relation Age of Onset    Cancer Brother     Prostate cancer Brother     Leukemia Brother         his only brother dies from [address] or leukemia pt unsure he was only 47    Arthritis Mother     Heart attack Father     Clotting disorder Father     Schizoaffective Disorder  Sister     Aortic aneurysm Other         abdominal       Social History     Occupational History    Occupation: unemployed   Tobacco Use    Smoking status: Former Smoker     Types: Cigars     Quit date: 2014     Years since quittin 9    Smokeless tobacco: Never Used    Tobacco comment: never inhaled   Vaping Use    Vaping Use: Never used   Substance and Sexual Activity    Alcohol use: Not Currently     Comment: occasional beer    Drug use: No    Sexual activity: Yes     Partners: Female         Current Outpatient Medications:     acetaminophen (TYLENOL) 500 mg tablet, Take 500 mg by mouth every 6 (six) hours as needed for mild pain, Disp: , Rfl:     albuterol (Ventolin HFA) 90 mcg/act inhaler, Inhale 2 puffs every 6 (six) hours as needed for wheezing or shortness of breath (cough) (Patient taking differently: Inhale 2 puffs every 6 (six) hours as needed for wheezing or shortness of breath (cough) PRN), Disp: 18 g, Rfl: 1    fenofibrate 160 MG tablet, take 1 tablet by mouth once daily, Disp: 90 tablet, Rfl: 3    glucose blood test strip, TEST BS TID, Disp: 300 each, Rfl: 5    glucose monitoring kit (FREESTYLE) monitoring kit, Use 1 each daily before breakfast, Disp: 1 each, Rfl: 0    Lancets MISC, Use daily before breakfast, Disp: 100 each, Rfl: 5    pantoprazole (PROTONIX) 40 mg tablet, take 1 tablet by mouth once daily, Disp: 90 tablet, Rfl: 3    rosuvastatin (CRESTOR) 5 mg tablet, Take 0 5 tablets (2 5 mg total) by mouth daily, Disp: 90 tablet, Rfl: 1    sildenafil (REVATIO) 20 mg tablet, TAKE 1 TABLET (20 MG TOTAL) BY MOUTH DAILY AS DIRECTED, Disp: 90 tablet, Rfl: 3    traMADol (ULTRAM) 50 mg tablet, Take 1 tablet (50 mg total) by mouth every 8 (eight) hours as needed for moderate pain, Disp: 90 tablet, Rfl: 2    Trulicity 8 57 IG/2 3BB SOPN, inject 0 5 milliliters ( 0 75 milligrams ) subcutaneously every week, Disp: 12 mL, Rfl: 1    No Known Allergies    Physical Exam:    There were no vitals taken for this visit  Constitutional:{General Appearance:63683::"normal, well developed, well nourished, alert, in no distress and non-toxic and no overt pain behavior  "}  Eyes:{Sclera:16061::"anicteric"}  HEENT:{Hearin::"grossly intact"}  Neck:{Neck:30502::"supple, symmetric, trachea midline and no masses "}  Pulmonary:{Respiratory effort:32885::"even and unlabored"}  Cardiovascular:{Examination of Extremities:85485::"No edema or pitting edema present"}  Skin:{Skin and Subcutaneous tissues:12441::"Normal without rashes or lesions and well hydrated"}  Psychiatric:{Mood and Affect:30230::"Mood and affect appropriate"}  Neurologic:{Cranial Nerves:70950::"Cranial Nerves II-XII grossly intact"}  Musculoskeletal:{[place]:56151::"normal"}      Imaging  MRI HIP RIGHT WO CONTRAST   2022     INDICATION:   [id number]: Presence of right artificial hip joint  S76 011A: Strain of muscle, fascia and tendon of right hip, initial encounter      COMPARISON: None      TECHNIQUE:  MR sequences were obtained of the right hip and pelvis including: Localizer, axial T2 fat sat, coronal T1/STIR, cone-down axial oblique PD of the affected hip, coronal PD of the affected hip, sagittal T2 fat sat of the affected hip        Gadolinium was not used      FINDINGS:     RIGHT HIP:  Joint Effusion: None      Bones: Normal marrow signal demonstrated without hip fracture or AVN   Right hip arthroplasty seen     Trochanteric Bursa: Normal      Muscles:No acute musculotendinous strain seen  Iliopsoas tendon is intact  There is small amount of fluid in the right iliopsoas bursa seen  Abductor tendons are intact  Adductors are intact     Tendons: Intact      LEFT HIP:   No gross abnormality on large field of view images      REST OF PELVIS:  Bones: Normal      Si Joints And Symphysis Pubis:  Intact      Visualized Lumbar Spine:  Unremarkable      Muscles: Intact      Pelvic Soft Tissues: Normal      Subcutaneous Tissues: Normal      IMPRESSION:     Postoperative changes from a right hip arthroplasty with intact right iliopsoas tendon with trace amount of fluid in the right iliopsoas bursa  No acute musculotendinous strain  No evidence of lateral trochanteric bursa  Intact adductors  Mild arthritic changes in the symphysis pubis       RIGHT HIP   5/18/2022       INDICATION:   [id number]: Presence of right artificial hip joint      COMPARISON:  Compared with 11/20/2020     VIEWS:  XR HIP/PELV 2-3 VWS RIGHT W PELVIS IF PERFORMED         FINDINGS:     There is no acute fracture or dislocation      Right hip prosthesis in alignment  No lucency to suggest loosening      No lytic or blastic osseous lesion      Soft tissues are unremarkable      The visualized lumbar spine is unremarkable      IMPRESSION:     No acute osseous abnormality      No orders to display         No orders of the defined types were placed in this encounter

## 2022-08-04 ENCOUNTER — OFFICE VISIT (OUTPATIENT)
Dept: PAIN MEDICINE | Facility: CLINIC | Age: 57
End: 2022-08-04
Payer: MEDICARE

## 2022-08-04 VITALS
HEART RATE: 58 BPM | WEIGHT: 224.4 LBS | SYSTOLIC BLOOD PRESSURE: 120 MMHG | BODY MASS INDEX: 29.2 KG/M2 | DIASTOLIC BLOOD PRESSURE: 74 MMHG

## 2022-08-04 DIAGNOSIS — M47.816 LUMBAR SPONDYLOSIS: ICD-10-CM

## 2022-08-04 DIAGNOSIS — M51.16 INTERVERTEBRAL DISC DISORDER WITH RADICULOPATHY OF LUMBAR REGION: ICD-10-CM

## 2022-08-04 DIAGNOSIS — F11.20 UNCOMPLICATED OPIOID DEPENDENCE (HCC): ICD-10-CM

## 2022-08-04 DIAGNOSIS — G89.4 CHRONIC PAIN SYNDROME: Primary | ICD-10-CM

## 2022-08-04 DIAGNOSIS — M79.18 MYOFASCIAL PAIN SYNDROME: ICD-10-CM

## 2022-08-04 DIAGNOSIS — Z79.891 LONG-TERM CURRENT USE OF OPIATE ANALGESIC: ICD-10-CM

## 2022-08-04 PROCEDURE — 80305 DRUG TEST PRSMV DIR OPT OBS: CPT

## 2022-08-04 PROCEDURE — 99214 OFFICE O/P EST MOD 30 MIN: CPT

## 2022-08-04 RX ORDER — TRAMADOL HYDROCHLORIDE 50 MG/1
50 TABLET ORAL EVERY 8 HOURS PRN
Qty: 90 TABLET | Refills: 2 | Status: SHIPPED | OUTPATIENT
Start: 2022-08-04 | End: 2022-10-28 | Stop reason: SDUPTHER

## 2022-08-04 NOTE — H&P (VIEW-ONLY)
Pain Medicine Follow-Up Note    Assessment:  1  Chronic pain syndrome    2  Intervertebral disc disorder with radiculopathy of lumbar region    3  Lumbar spondylosis    4  Myofascial pain syndrome    5  Long-term current use of opiate analgesic    6  Uncomplicated opioid dependence ([address] )        Plan:  Orders Placed This Encounter   Procedures    FL spine and pain procedure     Dr Martine Crockett     Standing Status:   Future     Standing Expiration Date:   8/4/2026     Order Specific Question:   Reason for Exam:     Answer:   Right sided L3-L4 TFESI     Order Specific Question:   Is the patient pregnant? Answer:   No     Order Specific Question:   Anticoagulant hold needed? Answer:   No    MM ALL_Prescribed Meds and Special Instructions     Order Specific Question:   [place] Is ALBUTEROL prescribed? Answer:   Yes     Order Specific Question:   [place] Is TRAMADOL prescribed? Answer:    Yes    MM DT_Alprazolam Definitive Test    MM DT_Amphetamine Definitive Test    MM DT_Buprenorphine Definitive Test    MM DT_Butalbital Definitive Test    MM DT_Clonazepam Definitive Test    MM DT_Cocaine Definitive Test    MM DT_Codeine Definitive Test    MM DT_Dextromethorphan Definitive Test    MM Diazepam Definitive Test    MM DT_Ethyl Glucuronide/Ethyl Sulfate Definitive Test    MM DT_Fentanyl Definitive Test    MM DT_Heroin Definitive Test    MM DT_Hydrocodone Definitive Test    MM DT_Hydromorphone Definitive Test    MM DT_Kratom Definitive Test    MM DT_Levorphanol Definitive Test    MM Lorazepam Definitive Test    MM DT_MDMA Definitive Test    MM DT_Meperidine Definitive Test    MM DT_Methadone Definitive Test    MM DT_Methamphetamine Definitive Test    MM DT_Methylphenidate Definitive Test    MM DT_Morphine Definitive Test    MM DT_Oxazepam Definitive Test    MM DT_Oxycodone Definitive Test    MM DT_Oxymorphone Definitive Test    MM DT_Phencyclidine Definitive Test    MM DT_Phenobarbital Definitive Test    MM DT_Phentermine Definitive Test    MM DT_Secobarbital Definitive Test    MM DT_Spice Definitive Test    MM DT_Tapentadol Definitive Test    MM DT_Temazapam Definitive Test    MM DT_THC Definitive Test    MM DT_Tramadol Definitive Test       New Medications Ordered This Visit   Medications    traMADol (ULTRAM) 50 mg tablet     Sig: Take 1 tablet (50 mg total) by mouth every 8 (eight) hours as needed for moderate pain     Dispense:  90 tablet     Refill:  2       My impressions and treatment recommendations were discussed in detail with the patient who verbalized understanding and had no further questions  The patient is a 50-year-old male with a history of chronic pain secondary to low back pain, lumbar spondylosis, lumbar radiculopathy and myofascial pain who presents to the office with ongoing midline low back pain that radiates into the right hip and into the right thigh with occasional pain into the right groin  He continues to receive mild to moderate relief of his pain symptoms by taking tramadol 50 mg 1 tablet 3 times a day as needed for pain, therefore I will continue him on this medication as prescribed  A script for tramadol with 2 refills was electronically sent to the patient's pharmacy with a do not fill date of today 08/04/2022  A prescription pill count was performed on tramadol 50 mg that was filled on 06/27/2022, there were no tablets remaining which is appropriate  Patient was recently on vacation which is why he ran out of medication before his office visit  To decrease inflammation and provide him relief of his low back pain symptoms, I will order a right-sided L3-L4 TFESI  I instructed our  will call to schedule him  Complete risks and benefits including bleeding, infection, tissue reaction, nerve injury and allergic reaction were discussed  The approach was demonstrated using models and literature was provided    Verbal and written consent was obtained  [place] Prescription Drug Monitoring Program report was reviewed and was appropriate     A urine drug screen was collected at today's office visit as part of our medication management protocol  The point of care testing results were appropriate for what was being prescribed  The specimen will be sent for confirmatory testing  The drug screen is medically necessary because the patient is either dependent on opioid medication or is being considered for opioid medication therapy and the results could impact ongoing or future treatment  The drug screen is to evaluate for the presences or absence of prescribed, non-prescribed, and/or illicit drugs/substances  There are risks associated with opioid medications, including dependence, addiction and tolerance  The patient understands and agrees to use these medications only as prescribed  Potential side effects of the medications include, but are not limited to, constipation, drowsiness, addiction, impaired judgment and risk of fatal overdose if not taken as prescribed  The patient was warned against driving while taking sedation medications  Sharing medications is a felony  At this point in time, the patient is showing no signs of addiction, abuse, diversion or suicidal ideation  Follow-up is planned in 12 weeks time or sooner as warranted  Discharge instructions were provided  I personally saw and examined the patient and I agree with the above discussed plan of care  History of Present Illness:    Mike Ramirez is a 61 y o  adult with a history of chronic pain secondary to low back pain, lumbar spondylosis, lumbar radiculopathy and myofascial pain who presents to [facility] for interval re-evaluation of the above stated pain complaints  He was last seen on 05/12/2022 where he was continued on tramadol 50 mg 1 tablet 3 times a day as needed for pain    He presents to the office with ongoing midline low back pain that radiates into the right hip with intermittent radiation into the right groin and into the right thigh  He states his pain is the same since the last office visit and intermittent but worse in the morning and evening  He rates the quality of his pain as sharp and pressure-like and is currently rating it a 5/10 on a numeric    Current pain medications include tramadol 50 mg 1 tablet 3 times a day as needed for pain  He does also take Tylenol as needed  He states this medication regimen does provide him mild to moderate relief of his pain symptoms and denies any side effects at this time  Since his last office visit, he saw orthopedics in regards to his right hip pain  Orthopedics did x-rays which showed a stable right hip joint replacement  Pain Contract Signed:  05/12/2022  Last Urine Drug Screen:  02/22/2022    Other than as stated above, the patient denies any interval changes in medications, medical condition, mental condition, symptoms, or allergies since the last office visit  Review of Systems:    Review of Systems   Respiratory: Negative for shortness of breath  Cardiovascular: Negative for chest pain  Gastrointestinal: Negative for constipation, diarrhea, nausea and vomiting  Musculoskeletal: Positive for gait problem and myalgias  Negative for arthralgias and joint swelling  Pain in lower back and Right hip   Skin: Negative for rash  Neurological: Positive for dizziness  Negative for seizures and weakness  All other systems reviewed and are negative          Patient Active Problem List   Diagnosis    Mild intermittent asthma without complication    Ascending aortic aneurysm (HCC)    Bicuspid aortic valve    Allergic rhinitis    GERD (gastroesophageal reflux disease)    Hiatal hernia    Type 2 diabetes mellitus with stage 3a chronic kidney disease, without long-term current use of insulin (HCC)    CKD (chronic kidney disease) stage 3, GFR 30-59 ml/min (HCC)    Hyperlipidemia, mixed    Vitamin D deficiency    Renal cyst, left    Hepatic cyst    Fatty liver disease, nonalcoholic    Erectile dysfunction    Carpal tunnel syndrome of right wrist    Chronic pain of right knee    Vestibular migraine    Patellar tendinitis of right knee    Benign paroxysmal positional vertigo due to bilateral vestibular disorder    Hip impingement syndrome, right    Primary osteoarthritis of right hip    Ulnar neuropathy of both upper extremities    Lumbosacral strain    Tarsal tunnel syndrome of right side    Cubital tunnel syndrome, bilateral    Groin pain, chronic, right    Chronic pain syndrome    Chronic kidney disease-mineral and bone disorder    Uncomplicated opioid dependence ([facility] [address] )    Arthritis of right hip       Past Medical History:   Diagnosis Date    Anxiety 3/10/2022    Aorta aneurysm (HCC)     4 3    Arm DVT (deep venous thromboembolism), acute ([address] ) 5003    complications of cardiac cath    Asthma     Chronic kidney disease     CKD (chronic kidney disease), stage III (HCC)     COPD (chronic obstructive pulmonary disease) (HCC)     Depression     Diabetes mellitus (HCC)     Essential hypertriglyceridemia     GERD (gastroesophageal reflux disease)     Headache     Hiatal hernia     Hyperlipidemia, mixed 5/7/2018    Kidney stone     Liver disease     FATTY LIVER    Mild episode of recurrent major depressive disorder ([facility] [address] ) 3/10/2022    PAC (premature atrial contraction)     Prediabetes     Renal calculi     Sleep apnea     Vestibular migraine        Past Surgical History:   Procedure Laterality Date    CARPAL TUNNEL RELEASE Left     COLONOSCOPY      FL INJECTION RIGHT HIP (ARTHROGRAM)  8/20/2018    FOOT SURGERY Left     FOREIGN BODY REMOVAL    HERNIA REPAIR      AZ COLONOSCOPY FLX DX W/COLLJ SPEC WHEN PFRMD N/A 8/7/2017    Procedure: COLONOSCOPY;  Surgeon: Tom Cordon MD;  Location: [facility];   Service: Gastroenterology    CA ESOPHAGOGASTRODUODENOSCOPY TRANSORAL DIAGNOSTIC N/A 10/31/2017    Procedure: ESOPHAGOGASTRODUODENOSCOPY (EGD); Surgeon: Ethan Quevedo MD;  Location: [facility];   Service: Gastroenterology    CA TOTAL HIP ARTHROPLASTY Right 2020    Procedure: ARTHROPLASTY HIP TOTAL;  Surgeon: Familia Rudd MD;  Location: [facility];  Service: Orthopedics       Family History   Problem Relation Age of Onset    Cancer Brother     Prostate cancer Brother     Leukemia Brother         his only brother dies from [address] or leukemia pt unsure he was only 47    Arthritis Mother     Heart attack Father     Clotting disorder Father     Schizoaffective Disorder  Sister     Aortic aneurysm Other         abdominal       Social History     Occupational History    Occupation: unemployed   Tobacco Use    Smoking status: Current Some Day Smoker     Types: Cigars     Last attempt to quit: 2014     Years since quittin 9    Smokeless tobacco: Never Used    Tobacco comment: never inhaled   Vaping Use    Vaping Use: Never used   Substance and Sexual Activity    Alcohol use: Not Currently     Comment: occasional beer    Drug use: No    Sexual activity: Yes     Partners: Female         Current Outpatient Medications:     acetaminophen (TYLENOL) 500 mg tablet, Take 500 mg by mouth every 6 (six) hours as needed for mild pain, Disp: , Rfl:     albuterol (Ventolin HFA) 90 mcg/act inhaler, Inhale 2 puffs every 6 (six) hours as needed for wheezing or shortness of breath (cough) (Patient taking differently: Inhale 2 puffs every 6 (six) hours as needed for wheezing or shortness of breath (cough) PRN), Disp: 18 g, Rfl: 1    fenofibrate 160 MG tablet, take 1 tablet by mouth once daily, Disp: 90 tablet, Rfl: 3    glucose blood test strip, TEST BS TID, Disp: 300 each, Rfl: 5    glucose monitoring kit (FREESTYLE) monitoring kit, Use 1 each daily before breakfast, Disp: 1 each, Rfl: 0    Lancets MISC, Use daily before breakfast, Disp: 100 each, Rfl: 5    pantoprazole (PROTONIX) 40 mg tablet, take 1 tablet by mouth once daily, Disp: 90 tablet, Rfl: 3    rosuvastatin (CRESTOR) 5 mg tablet, Take 0 5 tablets (2 5 mg total) by mouth daily, Disp: 90 tablet, Rfl: 1    traMADol (ULTRAM) 50 mg tablet, Take 1 tablet (50 mg total) by mouth every 8 (eight) hours as needed for moderate pain, Disp: 90 tablet, Rfl: 2    Trulicity 0 97 PW/8 9SR SOPN, inject 0 5 milliliters ( 0 75 milligrams ) subcutaneously every week, Disp: 12 mL, Rfl: 1    sildenafil (REVATIO) 20 mg tablet, TAKE 1 TABLET (20 MG TOTAL) BY MOUTH DAILY AS DIRECTED (Patient not taking: Reported on 8/4/2022), Disp: 90 tablet, Rfl: 3    No Known Allergies    Physical Exam:    /74   Pulse 58   Wt 102 kg (224 lb 6 4 oz)   BMI 29 20 kg/m²     Constitutional:normal, well developed, well nourished, alert, in no distress and non-toxic and no overt pain behavior    Eyes:anicteric  HEENT:grossly intact  Neck:supple, symmetric, trachea midline and no masses   Pulmonary:even and unlabored  Cardiovascular:No edema or pitting edema present  Skin:Normal without rashes or lesions and well hydrated  Psychiatric:Mood and affect appropriate  Neurologic:Cranial Nerves II-XII grossly intact  Musculoskeletal:normal     Lumbar Spine Exam    Appearance:  Normal lordosis  Palpation/Tenderness:  no tenderness or spasm  Sensory:  no sensory deficits noted  Range of Motion:  Full range of motion with no pain or limitations in flexion, extension, lateral flexion and rotation  Motor Strength:  Left hip flexion:  5/5  Right hip flexion:  5/5  Left knee flexion:  5/5  Left knee extension:  5/5  Right knee flexion:  5/5  Right knee extension:  5/5  Left foot dorsiflexion:  5/5  Left foot plantar flexion:  5/5  Right foot dorsiflexion:  5/5  Right foot plantar flexion:  5/5      Imaging  FL spine and pain procedure    (Results Pending)         Orders Placed This Encounter   Procedures  FL spine and pain procedure    MM ALL_Prescribed Meds and Special Instructions    MM DT_Alprazolam Definitive Test    MM DT_Amphetamine Definitive Test    MM DT_Buprenorphine Definitive Test    MM DT_Butalbital Definitive Test    MM DT_Clonazepam Definitive Test    MM DT_Cocaine Definitive Test    MM DT_Codeine Definitive Test    MM DT_Dextromethorphan Definitive Test    MM Diazepam Definitive Test    MM DT_Ethyl Glucuronide/Ethyl Sulfate Definitive Test    MM DT_Fentanyl Definitive Test    MM DT_Heroin Definitive Test    MM DT_Hydrocodone Definitive Test    MM DT_Hydromorphone Definitive Test    MM DT_Kratom Definitive Test    MM DT_Levorphanol Definitive Test    MM Lorazepam Definitive Test    MM DT_MDMA Definitive Test    MM DT_Meperidine Definitive Test    MM DT_Methadone Definitive Test    MM DT_Methamphetamine Definitive Test    MM DT_Methylphenidate Definitive Test    MM DT_Morphine Definitive Test    MM DT_Oxazepam Definitive Test    MM DT_Oxycodone Definitive Test    MM DT_Oxymorphone Definitive Test    MM DT_Phencyclidine Definitive Test    MM DT_Phenobarbital Definitive Test    MM DT_Phentermine Definitive Test    MM DT_Secobarbital Definitive Test    MM DT_Spice Definitive Test    MM DT_Tapentadol Definitive Test    MM DT_Temazapam Definitive Test    MM DT_THC Definitive Test    MM DT_Tramadol Definitive Test

## 2022-08-04 NOTE — PROGRESS NOTES
Pain Medicine Follow-Up Note    Assessment:  1  Chronic pain syndrome    2  Intervertebral disc disorder with radiculopathy of lumbar region    3  Lumbar spondylosis    4  Myofascial pain syndrome    5  Long-term current use of opiate analgesic    6  Uncomplicated opioid dependence ([address] )        Plan:  Orders Placed This Encounter   Procedures    FL spine and pain procedure     Dr Nicole Marie     Standing Status:   Future     Standing Expiration Date:   8/4/2026     Order Specific Question:   Reason for Exam:     Answer:   Right sided L3-L4 TFESI     Order Specific Question:   Is the patient pregnant? Answer:   No     Order Specific Question:   Anticoagulant hold needed? Answer:   No    MM ALL_Prescribed Meds and Special Instructions     Order Specific Question:   [place] Is ALBUTEROL prescribed? Answer:   Yes     Order Specific Question:   [place] Is TRAMADOL prescribed? Answer:    Yes    MM DT_Alprazolam Definitive Test    MM DT_Amphetamine Definitive Test    MM DT_Buprenorphine Definitive Test    MM DT_Butalbital Definitive Test    MM DT_Clonazepam Definitive Test    MM DT_Cocaine Definitive Test    MM DT_Codeine Definitive Test    MM DT_Dextromethorphan Definitive Test    MM Diazepam Definitive Test    MM DT_Ethyl Glucuronide/Ethyl Sulfate Definitive Test    MM DT_Fentanyl Definitive Test    MM DT_Heroin Definitive Test    MM DT_Hydrocodone Definitive Test    MM DT_Hydromorphone Definitive Test    MM DT_Kratom Definitive Test    MM DT_Levorphanol Definitive Test    MM Lorazepam Definitive Test    MM DT_MDMA Definitive Test    MM DT_Meperidine Definitive Test    MM DT_Methadone Definitive Test    MM DT_Methamphetamine Definitive Test    MM DT_Methylphenidate Definitive Test    MM DT_Morphine Definitive Test    MM DT_Oxazepam Definitive Test    MM DT_Oxycodone Definitive Test    MM DT_Oxymorphone Definitive Test    MM DT_Phencyclidine Definitive Test    MM DT_Phenobarbital Definitive Test    MM DT_Phentermine Definitive Test    MM DT_Secobarbital Definitive Test    MM DT_Spice Definitive Test    MM DT_Tapentadol Definitive Test    MM DT_Temazapam Definitive Test    MM DT_THC Definitive Test    MM DT_Tramadol Definitive Test       New Medications Ordered This Visit   Medications    traMADol (ULTRAM) 50 mg tablet     Sig: Take 1 tablet (50 mg total) by mouth every 8 (eight) hours as needed for moderate pain     Dispense:  90 tablet     Refill:  2       My impressions and treatment recommendations were discussed in detail with the patient who verbalized understanding and had no further questions  The patient is a 48-year-old male with a history of chronic pain secondary to low back pain, lumbar spondylosis, lumbar radiculopathy and myofascial pain who presents to the office with ongoing midline low back pain that radiates into the right hip and into the right thigh with occasional pain into the right groin  He continues to receive mild to moderate relief of his pain symptoms by taking tramadol 50 mg 1 tablet 3 times a day as needed for pain, therefore I will continue him on this medication as prescribed  A script for tramadol with 2 refills was electronically sent to the patient's pharmacy with a do not fill date of today 08/04/2022  A prescription pill count was performed on tramadol 50 mg that was filled on 06/27/2022, there were no tablets remaining which is appropriate  Patient was recently on vacation which is why he ran out of medication before his office visit  To decrease inflammation and provide him relief of his low back pain symptoms, I will order a right-sided L3-L4 TFESI  I instructed our  will call to schedule him  Complete risks and benefits including bleeding, infection, tissue reaction, nerve injury and allergic reaction were discussed  The approach was demonstrated using models and literature was provided    Verbal and written consent was obtained  [place] Prescription Drug Monitoring Program report was reviewed and was appropriate     A urine drug screen was collected at today's office visit as part of our medication management protocol  The point of care testing results were appropriate for what was being prescribed  The specimen will be sent for confirmatory testing  The drug screen is medically necessary because the patient is either dependent on opioid medication or is being considered for opioid medication therapy and the results could impact ongoing or future treatment  The drug screen is to evaluate for the presences or absence of prescribed, non-prescribed, and/or illicit drugs/substances  There are risks associated with opioid medications, including dependence, addiction and tolerance  The patient understands and agrees to use these medications only as prescribed  Potential side effects of the medications include, but are not limited to, constipation, drowsiness, addiction, impaired judgment and risk of fatal overdose if not taken as prescribed  The patient was warned against driving while taking sedation medications  Sharing medications is a felony  At this point in time, the patient is showing no signs of addiction, abuse, diversion or suicidal ideation  Follow-up is planned in 12 weeks time or sooner as warranted  Discharge instructions were provided  I personally saw and examined the patient and I agree with the above discussed plan of care  History of Present Illness:    Prerna Gomez is a 61 y o  adult with a history of chronic pain secondary to low back pain, lumbar spondylosis, lumbar radiculopathy and myofascial pain who presents to [facility] for interval re-evaluation of the above stated pain complaints  He was last seen on 05/12/2022 where he was continued on tramadol 50 mg 1 tablet 3 times a day as needed for pain    He presents to the office with ongoing midline low back pain that radiates into the right hip with intermittent radiation into the right groin and into the right thigh  He states his pain is the same since the last office visit and intermittent but worse in the morning and evening  He rates the quality of his pain as sharp and pressure-like and is currently rating it a 5/10 on a numeric    Current pain medications include tramadol 50 mg 1 tablet 3 times a day as needed for pain  He does also take Tylenol as needed  He states this medication regimen does provide him mild to moderate relief of his pain symptoms and denies any side effects at this time  Since his last office visit, he saw orthopedics in regards to his right hip pain  Orthopedics did x-rays which showed a stable right hip joint replacement  Pain Contract Signed:  05/12/2022  Last Urine Drug Screen:  02/22/2022    Other than as stated above, the patient denies any interval changes in medications, medical condition, mental condition, symptoms, or allergies since the last office visit  Review of Systems:    Review of Systems   Respiratory: Negative for shortness of breath  Cardiovascular: Negative for chest pain  Gastrointestinal: Negative for constipation, diarrhea, nausea and vomiting  Musculoskeletal: Positive for gait problem and myalgias  Negative for arthralgias and joint swelling  Pain in lower back and Right hip   Skin: Negative for rash  Neurological: Positive for dizziness  Negative for seizures and weakness  All other systems reviewed and are negative          Patient Active Problem List   Diagnosis    Mild intermittent asthma without complication    Ascending aortic aneurysm (HCC)    Bicuspid aortic valve    Allergic rhinitis    GERD (gastroesophageal reflux disease)    Hiatal hernia    Type 2 diabetes mellitus with stage 3a chronic kidney disease, without long-term current use of insulin (HCC)    CKD (chronic kidney disease) stage 3, GFR 30-59 ml/min (HCC)    Hyperlipidemia, mixed    Vitamin D deficiency    Renal cyst, left    Hepatic cyst    Fatty liver disease, nonalcoholic    Erectile dysfunction    Carpal tunnel syndrome of right wrist    Chronic pain of right knee    Vestibular migraine    Patellar tendinitis of right knee    Benign paroxysmal positional vertigo due to bilateral vestibular disorder    Hip impingement syndrome, right    Primary osteoarthritis of right hip    Ulnar neuropathy of both upper extremities    Lumbosacral strain    Tarsal tunnel syndrome of right side    Cubital tunnel syndrome, bilateral    Groin pain, chronic, right    Chronic pain syndrome    Chronic kidney disease-mineral and bone disorder    Uncomplicated opioid dependence [address] [address]    Arthritis of right hip       Past Medical History:   Diagnosis Date    Anxiety 3/10/2022    Aorta aneurysm (HCC)     4 3    Arm DVT (deep venous thromboembolism), acute ([facility] [address] ) 4016    complications of cardiac cath    Asthma     Chronic kidney disease     CKD (chronic kidney disease), stage III (HCC)     COPD (chronic obstructive pulmonary disease) (HCC)     Depression     Diabetes mellitus (HCC)     Essential hypertriglyceridemia     GERD (gastroesophageal reflux disease)     Headache     Hiatal hernia     Hyperlipidemia, mixed 5/7/2018    Kidney stone     Liver disease     FATTY LIVER    Mild episode of recurrent major depressive disorder ([facility] [address] ) 3/10/2022    PAC (premature atrial contraction)     Prediabetes     Renal calculi     Sleep apnea     Vestibular migraine        Past Surgical History:   Procedure Laterality Date    CARPAL TUNNEL RELEASE Left     COLONOSCOPY      FL INJECTION RIGHT HIP (ARTHROGRAM)  8/20/2018    FOOT SURGERY Left     FOREIGN BODY REMOVAL    HERNIA REPAIR      [place] COLONOSCOPY FLX DX W/COLLJ SPEC WHEN PFRMD N/A 8/7/2017    Procedure: COLONOSCOPY;  Surgeon: Jeanna Sorensen MD;  Location: [facility];   Service: Gastroenterology    NH ESOPHAGOGASTRODUODENOSCOPY TRANSORAL DIAGNOSTIC N/A 10/31/2017    Procedure: ESOPHAGOGASTRODUODENOSCOPY (EGD); Surgeon: Jaqui Clifton MD;  Location: [facility];   Service: Gastroenterology    NH TOTAL HIP ARTHROPLASTY Right 2020    Procedure: ARTHROPLASTY HIP TOTAL;  Surgeon: Pepper Bartholomew MD;  Location: [facility];  Service: Orthopedics       Family History   Problem Relation Age of Onset    Cancer Brother     Prostate cancer Brother     Leukemia Brother         his only brother dies from [address] or leukemia pt unsure he was only 46    Arthritis Mother     Heart attack Father     Clotting disorder Father     Schizoaffective Disorder  Sister     Aortic aneurysm Other         abdominal       Social History     Occupational History    Occupation: unemployed   Tobacco Use    Smoking status: Current Some Day Smoker     Types: Cigars     Last attempt to quit: 2014     Years since quittin 9    Smokeless tobacco: Never Used    Tobacco comment: never inhaled   Vaping Use    Vaping Use: Never used   Substance and Sexual Activity    Alcohol use: Not Currently     Comment: occasional beer    Drug use: No    Sexual activity: Yes     Partners: Female         Current Outpatient Medications:     acetaminophen (TYLENOL) 500 mg tablet, Take 500 mg by mouth every 6 (six) hours as needed for mild pain, Disp: , Rfl:     albuterol (Ventolin HFA) 90 mcg/act inhaler, Inhale 2 puffs every 6 (six) hours as needed for wheezing or shortness of breath (cough) (Patient taking differently: Inhale 2 puffs every 6 (six) hours as needed for wheezing or shortness of breath (cough) PRN), Disp: 18 g, Rfl: 1    fenofibrate 160 MG tablet, take 1 tablet by mouth once daily, Disp: 90 tablet, Rfl: 3    glucose blood test strip, TEST BS TID, Disp: 300 each, Rfl: 5    glucose monitoring kit (FREESTYLE) monitoring kit, Use 1 each daily before breakfast, Disp: 1 each, Rfl: 0    Lancets MISC, Use daily before breakfast, Disp: 100 each, Rfl: 5    pantoprazole (PROTONIX) 40 mg tablet, take 1 tablet by mouth once daily, Disp: 90 tablet, Rfl: 3    rosuvastatin (CRESTOR) 5 mg tablet, Take 0 5 tablets (2 5 mg total) by mouth daily, Disp: 90 tablet, Rfl: 1    traMADol (ULTRAM) 50 mg tablet, Take 1 tablet (50 mg total) by mouth every 8 (eight) hours as needed for moderate pain, Disp: 90 tablet, Rfl: 2    Trulicity 5 08 DW/3 9HR SOPN, inject 0 5 milliliters ( 0 75 milligrams ) subcutaneously every week, Disp: 12 mL, Rfl: 1    sildenafil (REVATIO) 20 mg tablet, TAKE 1 TABLET (20 MG TOTAL) BY MOUTH DAILY AS DIRECTED (Patient not taking: Reported on 8/4/2022), Disp: 90 tablet, Rfl: 3    No Known Allergies    Physical Exam:    /74   Pulse 58   Wt 102 kg (224 lb 6 4 oz)   BMI 29 20 kg/m²     Constitutional:normal, well developed, well nourished, alert, in no distress and non-toxic and no overt pain behavior    Eyes:anicteric  HEENT:grossly intact  Neck:supple, symmetric, trachea midline and no masses   Pulmonary:even and unlabored  Cardiovascular:No edema or pitting edema present  Skin:Normal without rashes or lesions and well hydrated  Psychiatric:Mood and affect appropriate  Neurologic:Cranial Nerves II-XII grossly intact  Musculoskeletal:normal     Lumbar Spine Exam    Appearance:  Normal lordosis  Palpation/Tenderness:  no tenderness or spasm  Sensory:  no sensory deficits noted  Range of Motion:  Full range of motion with no pain or limitations in flexion, extension, lateral flexion and rotation  Motor Strength:  Left hip flexion:  5/5  Right hip flexion:  5/5  Left knee flexion:  5/5  Left knee extension:  5/5  Right knee flexion:  5/5  Right knee extension:  5/5  Left foot dorsiflexion:  5/5  Left foot plantar flexion:  5/5  Right foot dorsiflexion:  5/5  Right foot plantar flexion:  5/5      Imaging  FL spine and pain procedure    (Results Pending)         Orders Placed This Encounter   Procedures  FL spine and pain procedure    MM ALL_Prescribed Meds and Special Instructions    MM DT_Alprazolam Definitive Test    MM DT_Amphetamine Definitive Test    MM DT_Buprenorphine Definitive Test    MM DT_Butalbital Definitive Test    MM DT_Clonazepam Definitive Test    MM DT_Cocaine Definitive Test    MM DT_Codeine Definitive Test    MM DT_Dextromethorphan Definitive Test    MM Diazepam Definitive Test    MM DT_Ethyl Glucuronide/Ethyl Sulfate Definitive Test    MM DT_Fentanyl Definitive Test    MM DT_Heroin Definitive Test    MM DT_Hydrocodone Definitive Test    MM DT_Hydromorphone Definitive Test    MM DT_Kratom Definitive Test    MM DT_Levorphanol Definitive Test    MM Lorazepam Definitive Test    MM DT_MDMA Definitive Test    MM DT_Meperidine Definitive Test    MM DT_Methadone Definitive Test    MM DT_Methamphetamine Definitive Test    MM DT_Methylphenidate Definitive Test    MM DT_Morphine Definitive Test    MM DT_Oxazepam Definitive Test    MM DT_Oxycodone Definitive Test    MM DT_Oxymorphone Definitive Test    MM DT_Phencyclidine Definitive Test    MM DT_Phenobarbital Definitive Test    MM DT_Phentermine Definitive Test    MM DT_Secobarbital Definitive Test    MM DT_Spice Definitive Test    MM DT_Tapentadol Definitive Test    MM DT_Temazapam Definitive Test    MM DT_THC Definitive Test    MM DT_Tramadol Definitive Test

## 2022-08-04 NOTE — PATIENT INSTRUCTIONS

## 2022-08-06 LAB

## 2022-08-16 ENCOUNTER — OFFICE VISIT (OUTPATIENT)
Dept: CARDIOLOGY CLINIC | Facility: MEDICAL CENTER | Age: 57
End: 2022-08-16
Payer: MEDICARE

## 2022-08-16 VITALS
DIASTOLIC BLOOD PRESSURE: 72 MMHG | BODY MASS INDEX: 28.11 KG/M2 | WEIGHT: 219 LBS | OXYGEN SATURATION: 98 % | SYSTOLIC BLOOD PRESSURE: 120 MMHG | HEIGHT: 74 IN | HEART RATE: 66 BPM

## 2022-08-16 DIAGNOSIS — I71.21 ASCENDING AORTIC ANEURYSM: Primary | ICD-10-CM

## 2022-08-16 DIAGNOSIS — E78.2 HYPERLIPIDEMIA, MIXED: ICD-10-CM

## 2022-08-16 DIAGNOSIS — Q23.1 BICUSPID AORTIC VALVE: ICD-10-CM

## 2022-08-16 PROCEDURE — 99214 OFFICE O/P EST MOD 30 MIN: CPT | Performed by: INTERNAL MEDICINE

## 2022-08-16 PROCEDURE — 93000 ELECTROCARDIOGRAM COMPLETE: CPT | Performed by: INTERNAL MEDICINE

## 2022-08-16 NOTE — PROGRESS NOTES
Cardiology Follow Up    Natalie Boo  1965  [id number]  [address] [facility] [address] [address] [phone]    1  Ascending aortic aneurysm (HCC)  POCT ECG   2  Bicuspid aortic valve     3  Hyperlipidemia, mixed       Chief Complaint   Patient presents with    Follow-up     1 year f/up       Interval History: Patient feels well, without complaints  No reported chest pain, shortness of breath, palpitations, lightheadedness, syncope, LE edema, orthopnea, PND, or significant weight changes  Patient remains active without any increased fatigue out of the ordinary        Patient Active Problem List   Diagnosis    Mild intermittent asthma without complication    Ascending aortic aneurysm (HCC)    Bicuspid aortic valve    Allergic rhinitis    GERD (gastroesophageal reflux disease)    Hiatal hernia    Type 2 diabetes mellitus with stage 3a chronic kidney disease, without long-term current use of insulin (HCC)    CKD (chronic kidney disease) stage 3, GFR 30-59 ml/min ([facility])    Hyperlipidemia, mixed    Vitamin D deficiency    Renal cyst, left    Hepatic cyst    Fatty liver disease, nonalcoholic    Erectile dysfunction    Carpal tunnel syndrome of right wrist    Chronic pain of right knee    Vestibular migraine    Patellar tendinitis of right knee    Benign paroxysmal positional vertigo due to bilateral vestibular disorder    Hip impingement syndrome, right    Primary osteoarthritis of right hip    Ulnar neuropathy of both upper extremities    Lumbosacral strain    Tarsal tunnel syndrome of right side    Cubital tunnel syndrome, bilateral    Groin pain, chronic, right    Chronic pain syndrome    Chronic kidney disease-mineral and bone disorder    Uncomplicated opioid dependence ([address] )    Arthritis of right hip     Past Medical History:   Diagnosis Date    Anxiety 3/10/2022    Aorta aneurysm (HCC)     4 3    Arm DVT (deep venous thromboembolism), acute ([facility] [place] [facility] ) 0816    complications of cardiac cath    Asthma     Chronic kidney disease     CKD (chronic kidney disease), stage III (HCC)     COPD (chronic obstructive pulmonary disease) (HCC)     Depression     Diabetes mellitus (HCC)     Essential hypertriglyceridemia     GERD (gastroesophageal reflux disease)     Headache     Hiatal hernia     Hyperlipidemia, mixed 2018    Kidney stone     Liver disease     FATTY LIVER    Mild episode of recurrent major depressive disorder ([facility] [place] [facility] ) 3/10/2022    PAC (premature atrial contraction)     Prediabetes     Renal calculi     Sleep apnea     Vestibular migraine      Social History     Socioeconomic History    Marital status:      Spouse name: Not on file    Number of children: 2    Years of education: Not on file    Highest education level: Not on file   Occupational History    Occupation: unemployed   Tobacco Use    Smoking status: Current Some Day Smoker     Types: Cigars     Last attempt to quit: 2014     Years since quittin 0    Smokeless tobacco: Never Used    Tobacco comment: never inhaled   Vaping Use    Vaping Use: Never used   Substance and Sexual Activity    Alcohol use: Not Currently     Comment: occasional beer    Drug use: No    Sexual activity: Yes     Partners: Female   Other Topics Concern    Not on file   Social History Narrative    Caffeine use    Lives alone     Social Determinants of Health     Financial Resource Strain: Not on file   Food Insecurity: Not on file   Transportation Needs: Not on file   Physical Activity: Not on file   Stress: Not on file   Social Connections: Not on file   Intimate Partner Violence: Not on file   Housing Stability: Not on file      Family History   Problem Relation Age of Onset    Cancer Brother     Prostate cancer Brother     Leukemia Brother         his only brother dies from [address] [facility] [address] or leukemia pt unsure he was only 47    Arthritis Mother     Heart attack Father     Clotting disorder Father     Schizoaffective Disorder  Sister     Aortic aneurysm Other         abdominal     Past Surgical History:   Procedure Laterality Date    CARPAL TUNNEL RELEASE Left     COLONOSCOPY      FL INJECTION RIGHT HIP (ARTHROGRAM)  8/20/2018    FOOT SURGERY Left     FOREIGN BODY REMOVAL    HERNIA REPAIR      MO COLONOSCOPY FLX DX W/COLLJ SPEC WHEN PFRMD N/A 8/7/2017    Procedure: COLONOSCOPY;  Surgeon: Michelle Tran MD;  [facility]; Service: Gastroenterology    MO ESOPHAGOGASTRODUODENOSCOPY TRANSORAL DIAGNOSTIC N/A 10/31/2017    Procedure: ESOPHAGOGASTRODUODENOSCOPY (EGD); Surgeon: Michelle Tran MD;  [facility];   Service: Gastroenterology    MO TOTAL HIP ARTHROPLASTY Right 9/28/2020    Procedure: ARTHROPLASTY HIP TOTAL;  Surgeon: Freddy Tai MD;  [facility];  Service: Orthopedics       Current Outpatient Medications:     acetaminophen (TYLENOL) 500 mg tablet, Take 500 mg by mouth every 6 (six) hours as needed for mild pain, Disp: , Rfl:     albuterol (Ventolin HFA) 90 mcg/act inhaler, Inhale 2 puffs every 6 (six) hours as needed for wheezing or shortness of breath (cough) (Patient taking differently: Inhale 2 puffs every 6 (six) hours as needed for wheezing or shortness of breath (cough) PRN), Disp: 18 g, Rfl: 1    fenofibrate 160 MG tablet, take 1 tablet by mouth once daily, Disp: 90 tablet, Rfl: 3    glucose blood test strip, TEST BS TID, Disp: 300 each, Rfl: 5    glucose monitoring kit (FREESTYLE) monitoring kit, Use 1 each daily before breakfast, Disp: 1 each, Rfl: 0    Lancets MISC, Use daily before breakfast, Disp: 100 each, Rfl: 5    pantoprazole (PROTONIX) 40 mg tablet, take 1 tablet by mouth once daily, Disp: 90 tablet, Rfl: 3    rosuvastatin (CRESTOR) 5 mg tablet, Take 0 5 tablets (2 5 mg total) by mouth daily, Disp: 90 tablet, Rfl: 1    sildenafil (REVATIO) 20 mg tablet, TAKE 1 TABLET (20 MG TOTAL) BY MOUTH DAILY AS DIRECTED, Disp: 90 tablet, Rfl: 3    traMADol (ULTRAM) 50 mg tablet, Take 1 tablet (50 mg total) by mouth every 8 (eight) hours as needed for moderate pain, Disp: 90 tablet, Rfl: 2    Trulicity 7 98 NG/8 2QU SOPN, inject 0 5 milliliters ( 0 75 milligrams ) subcutaneously every week, Disp: 12 mL, Rfl: 1  No Known Allergies    Labs:  Office Visit on 08/04/2022   Component Date Value    RESULT ALL_PRESC MEDS SP* 99/39/6464 Not Applicable     Alpha-Hydroxyalprazolam * 08/04/2022 negative     Amphetamine Quantificati* 08/04/2022 negative     Buprenorphine Quantifica* 08/04/2022 negative     Norbuprenorphine Quantif* 08/04/2022 negative     Butalbital Quantification 08/04/2022 negative     7-Amino-Clonazepam Quant* 08/04/2022 negative     Cocaine metabolite Quant* 08/04/2022 negative     Codeine Quantification 08/04/2022 negative     Morphine Quantification 08/04/2022 negative     Hydrocodone Quantificati* 08/04/2022 negative     Norhydrocodone Quantific* 08/04/2022 negative     Hydromorphone Quantifica* 08/04/2022 negative     Dextromethorphan Quantif* 08/04/2022 negative     Dextrorphan (Dextrometho* 08/04/2022 negative     Nordiazepam Quantificati* 08/04/2022 negative     Temazepam Quantification 08/04/2022 negative     Oxazepam Quantification 08/04/2022 negative     Ethyl Glucuronide Quanti* 08/04/2022 negative     Ethyl Sulfate Quantifica* 08/04/2022 negative     Fentanyl Quantification 08/04/2022 negative     Norfentanyl Quantificati* 08/04/2022 negative     3-methyl-fentanyl Quanti* 08/04/2022 Fen Neg     4-ANPP Quantification 08/04/2022 Fen Neg     4-FiBF Quantification 08/04/2022 Fen Neg     Acetyl fentanyl Quantifi* 08/04/2022 Fen Neg     Acetyl norfentanyl Quant* 08/04/2022 Fen Neg     Acryl fentanyl Quantific* 08/04/2022 Fen Neg     Butryl fentanyl Quantifi* 08/04/2022 Fen Neg     Carfentanil Quantificati* 08/04/2022 Fen Neg     Cyclopropyl fentanyl Ravi* 08/04/2022 Fen Neg     Furanyl fentanyl Quantif* 08/04/2022 Fen Neg     Methoxyacetyl fentanyl Q* 08/04/2022 Fen Neg     [id number] Quantification 08/04/2022 Fen Neg     N-desmethyl L-41065 Polo* 08/04/2022 Fen Neg     6-ROMMEL (Heroin metabolite* 08/04/2022 negative     Hydrocodone Quantificati* 08/04/2022 negative     Norhydrocodone Quantific* 08/04/2022 negative     Hydromorphone Quantifica* 08/04/2022 negative     Hydromorphone Quantifica* 08/04/2022 negative     Mitragynine (Kratom bill* 08/04/2022 negative     6-KL-Awjclaqtwap (Kratom* 08/04/2022 negative     Dextrorphan (Dextrometho* 08/04/2022 negative     Lorazepam Quantification 08/04/2022 negative     MDMA Quantification 08/04/2022 negative     Meperidine Quantification 08/04/2022 negative     Normeperidine Quantifica* 08/04/2022 negative     Methadone Quantification 08/04/2022 negative     EDDP (Methadone metaboli* 08/04/2022 negative     Amphetamine Quantificati* 08/04/2022 negative     Methamphetamine Quantifi* 08/04/2022 negative     Methylphenidate Quantifi* 08/04/2022 negative     RITALINIC ACID QUANTIFIC* 08/04/2022 negative     Morphine Quantification 08/04/2022 negative     Hydromorphone Quantifica* 08/04/2022 negative     Oxazepam Quantification 08/04/2022 negative     Oxycodone Quantification 08/04/2022 negative     Noroxycodone Quantificat* 08/04/2022 negative     Oxymorphone Quantificati* 08/04/2022 negative     Oxymorphone Quantificati* 08/04/2022 negative     Phencyclidine Quantifica* 08/04/2022 negative     Phenobarbital Quantifica* 08/04/2022 negative     PHENTERMINE QUANTIFICATI* 08/04/2022 negative     Secobarbital Quantificat* 08/04/2022 negative     5F-ADB-M7 08/04/2022 negative     QH-ASQVIGXL-G4 METABOLIT* 08/04/2022 negative     ZAPX-ZWOCJEGH-V0 METABOL* 08/04/2022 negative     IVS050 metabolite Quanti* 08/04/2022 negative     YQK308 metabolite Quanti* 08/04/2022 negative     RCS4 METABOLITE QUANTIFI* 08/04/2022 negative     DKT79/ METABOLITE Q* 08/04/2022 negative     Tapentadol Quantification 08/04/2022 negative     Temazepam Quantification 08/04/2022 negative     Oxazepam Quantification 08/04/2022 negative     cTHC (Marijuana metaboli* 08/04/2022 negative     Tramadol Quantification 08/04/2022 negative     O-desmethyl-tramadol Ravi* 08/04/2022 positive-[id number]     N-DESMETHYL-TRAMADOL RAVI* 08/04/2022 negative    Office Visit on 06/15/2022   Component Date Value    Hemoglobin A1C 06/15/2022 6 3    Appointment on 06/02/2022   Component Date Value    Cholesterol 06/02/2022 100     Triglycerides 06/02/2022 206 (A)    HDL, Direct 06/02/2022 31 (A)    LDL Calculated 06/02/2022 28     Non-HDL-Chol (CHOL-HDL) 06/02/2022 69     Sodium 06/02/2022 142     Potassium 06/02/2022 4 7     Chloride 06/02/2022 113 (A)    CO2 06/02/2022 27     ANION GAP 06/02/2022 2 (A)    BUN 06/02/2022 16     Creatinine 06/02/2022 1 43 (A)    Glucose, Fasting 06/02/2022 125 (A)    Calcium 06/02/2022 9 9     AST 06/02/2022 34     ALT 06/02/2022 40     Alkaline Phosphatase 06/02/2022 58     Total Protein 06/02/2022 7 2     Albumin 06/02/2022 3 9     Total Bilirubin 06/02/2022 0 67     eGFR 06/02/2022 53    Appointment on 05/19/2022   Component Date Value    Sodium 05/19/2022 138     Potassium 05/19/2022 5 0     Chloride 05/19/2022 105     CO2 05/19/2022 28     ANION GAP 05/19/2022 5     BUN 05/19/2022 18     Creatinine 05/19/2022 1 53 (A)    Glucose, Fasting 05/19/2022 142 (A)    Calcium 05/19/2022 9 7     eGFR 05/19/2022 49     WBC 05/19/2022 5 31     RBC 05/19/2022 4 85     Hemoglobin 05/19/2022 15 5     Hematocrit 05/19/2022 47 7     MCV 05/19/2022 98     MCH 05/19/2022 32 0     MCHC 05/19/2022 32 5     RDW 05/19/2022 12 4     MPV 05/19/2022 10 4     Platelets 88/49/7958 254     nRBC 05/19/2022 0     Neutrophils Relative 05/19/2022 55     Immat GRANS % 05/19/2022 0     Lymphocytes Relative 05/19/2022 33     Monocytes Relative 05/19/2022 9     Eosinophils Relative 05/19/2022 2     Basophils Relative 05/19/2022 1     Neutrophils Absolute 05/19/2022 2 89     Immature Grans Absolute 05/19/2022 0 02     Lymphocytes Absolute 05/19/2022 1 77     Monocytes Absolute 05/19/2022 0 47     Eosinophils Absolute 05/19/2022 0 13     Basophils Absolute 05/19/2022 0 03     Phosphorus 05/19/2022 2 7     Creatinine, Ur 05/19/2022 154 0     Protein Urine Random 05/19/2022 9     Prot/Creat Ratio, Ur 05/19/2022 0 06     PTH 05/19/2022 41 7     Color, UA 05/19/2022 Light Yellow     Clarity, UA 05/19/2022 Clear     Specific Gravity, UA 05/19/2022 1 021     pH, UA 05/19/2022 6 0     Leukocytes, UA 05/19/2022 Negative     Nitrite, UA 05/19/2022 Negative     Protein, UA 05/19/2022 Negative     Glucose, UA 05/19/2022 Negative     Ketones, UA 05/19/2022 Negative     Urobilinogen, UA 05/19/2022 2 0 (A)    Bilirubin, UA 05/19/2022 Negative     Occult Blood, UA 05/19/2022 Negative     Vit D, 25-Hydroxy 05/19/2022 47 4     Sed Rate 05/19/2022 11     CRP 05/19/2022 <3 0    Office Visit on 03/10/2022   Component Date Value    Hemoglobin A1C 03/10/2022 6 3    Office Visit on 02/22/2022   Component Date Value    RESULT ALL_PRESC MEDS SP* 76/84/3864 Not Applicable     Alpha-Hydroxyalprazolam * 02/22/2022 negative     Amphetamine Quantificati* 02/22/2022 negative     Aripiprazole Quantificat* 02/22/2022 negative     [id number] QUANTIFICATION 02/22/2022 negative     EUTYLONE QUANTIFICATION 02/22/2022 negative     METHYLONE QUANTIFICATION 02/22/2022 negative     Buprenorphine Quantifica* 02/22/2022 negative     Norbuprenorphine Quantif* 02/22/2022 negative     Butalbital Quantification 02/22/2022 negative     7-Amino-Clonazepam Quant* 02/22/2022 negative     Clozapine Quantification 02/22/2022 negative     N-desmethylclozapine Nina Torre* 02/22/2022 negative     Cocaine metabolite Quant* 02/22/2022 negative     Codeine Quantification 02/22/2022 negative     Morphine Quantification 02/22/2022 negative     Hydrocodone Quantificati* 02/22/2022 negative     Norhydrocodone Quantific* 02/22/2022 negative     Hydromorphone Quantifica* 02/22/2022 negative     Desipramine Quantificati* 02/22/2022 negative     Dextromethorphan Quantif* 02/22/2022 negative     Dextrorphan (Dextrometho* 02/22/2022 negative     Nordiazepam Quantificati* 02/22/2022 negative     Temazepam Quantification 02/22/2022 negative     Oxazepam Quantification 02/22/2022 negative     Ethyl Glucuronide Quanti* 02/22/2022 negative     Ethyl Sulfate Quantifica* 02/22/2022 negative     Fentanyl Quantification 02/22/2022 negative     Norfentanyl Quantificati* 02/22/2022 negative     3-methyl-fentanyl Quanti* 02/22/2022 Fen Neg     4-ANPP Quantification 02/22/2022 Fen Neg     4-FiBF Quantification 02/22/2022 Fen Neg     Acetyl fentanyl Quantifi* 02/22/2022 Fen Neg     Acetyl norfentanyl Quant* 02/22/2022 Fen Neg     Acryl fentanyl Quantific* 02/22/2022 Fen Neg     Butryl fentanyl Quantifi* 02/22/2022 Fen Neg     Carfentanil Quantificati* 02/22/2022 Fen Neg     Cyclopropyl fentanyl Ravi* 02/22/2022 Fen Neg     Furanyl fentanyl Quantif* 02/22/2022 Fen Neg     Methoxyacetyl fentanyl Q* 02/22/2022 Fen Neg     [id number] Quantification 02/22/2022 Fen Neg     N-desmethyl C-71288 Polo* 02/22/2022 Fen Neg     6-ROMMEL (Heroin metabolite* 02/22/2022 negative     Hydrocodone Quantificati* 02/22/2022 negative     Norhydrocodone Quantific* 02/22/2022 negative     Hydromorphone Quantifica* 02/22/2022 negative     Hydromorphone Quantifica* 02/22/2022 negative     Mitragynine (Kratom bill* 02/22/2022 negative     8-LD-Irekoehlwuh (Kratom* 02/22/2022 negative     Dextrorphan (Dextrometho* 02/22/2022 negative     Lorazepam Quantification 02/22/2022 negative     MDMA Quantification 02/22/2022 negative     Meperidine Quantification 02/22/2022 negative     Normeperidine Quantifica* 02/22/2022 negative     Methadone Quantification 02/22/2022 negative     EDDP (Methadone metaboli* 02/22/2022 negative     Amphetamine Quantificati* 02/22/2022 negative     Methamphetamine Quantifi* 02/22/2022 negative     Morphine Quantification 02/22/2022 negative     Hydromorphone Quantifica* 02/22/2022 negative     OLANZAPINE QUANTIFICATION 02/22/2022 negative     Oxazepam Quantification 02/22/2022 negative     Oxycodone Quantification 02/22/2022 negative     Noroxycodone Quantificat* 02/22/2022 negative     Oxymorphone Quantificati* 02/22/2022 negative     Oxymorphone Quantificati* 02/22/2022 negative     Phenobarbital Quantifica* 02/22/2022 negative     PHENTERMINE QUANTIFICATI* 02/22/2022 negative     Secobarbital Quantificat* 02/22/2022 negative     5F-ADB-M7 02/22/2022 negative     JS-QIMKNJXF-L2 METABOLIT* 02/22/2022 negative     NMLI-DFYGHCLP-T6 METABOL* 02/22/2022 negative     HMP533 metabolite Quanti* 02/22/2022 negative     NSR815 metabolite Quanti* 02/22/2022 negative     RCS4 METABOLITE QUANTIFI* 02/22/2022 negative     QSG74/ METABOLITE Q* 02/22/2022 negative     Tapentadol Quantification 02/22/2022 negative     Temazepam Quantification 02/22/2022 negative     Oxazepam Quantification 02/22/2022 negative     cTHC (Marijuana metaboli* 02/22/2022 negative     Tramadol Quantification 02/22/2022 positive-> [id number]     O-desmethyl-tramadol Ravi* 02/22/2022 positive-[id number]  146     N-DESMETHYL-TRAMADOL RAVI* 02/22/2022 positive-[id number]      Lab Results   Component Value Date    CHOL 114 03/17/2015    TRIG 206 (H) 06/02/2022    TRIG 222 03/17/2015    HDL 31 (L) 06/02/2022    HDL 32 03/17/2015     Imaging: Xr Chest Pa & Lateral    Result Date: 4/10/2019  Narrative: CHEST INDICATION:   J45 41: Moderate persistent asthma with (acute) exacerbation   COMPARISON:  Previous study from May 6, 2016, previous CT from January 10, 2017 EXAM PERFORMED/VIEWS:  XR CHEST PA & LATERAL FINDINGS: Heart size normal   There is 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 No acute consolidation or congestion No acute compression collapse of the vertebra seen Sclerotic density seen projecting over the thoracic spine on the lateral view due to prominent osteophytic spurring seen in this area     Impression: No acute consolidation or congestion Workstation performed: [id number]       Review of Systems:  Review of Systems   Constitutional: Negative for activity change, appetite change, fatigue and fever  HENT: Negative for nosebleeds and sore throat  Eyes: Negative for photophobia and visual disturbance  Respiratory: Negative for cough, chest tightness, shortness of breath and wheezing  Cardiovascular: Negative for chest pain, palpitations and leg swelling  Gastrointestinal: Negative for abdominal pain, diarrhea, nausea and vomiting  Endocrine: Negative for polyuria  Genitourinary: Negative for dysuria, frequency and hematuria  Musculoskeletal: Negative for arthralgias, back pain and gait problem  Skin: Negative for pallor and rash  Neurological: Negative for dizziness, syncope, speech difficulty and light-headedness  Hematological: Does not bruise/bleed easily  Psychiatric/Behavioral: Negative for agitation, behavioral problems and confusion  Physical Exam:  Physical Exam  Vitals reviewed  Constitutional:       General: He is not in acute distress  Appearance: He is well-developed  He is not diaphoretic  HENT:      Head: Normocephalic and atraumatic  Nose: Nose normal    Eyes:      General: No scleral icterus  Pupils: Pupils are equal, round, and reactive to light  Neck:      Vascular: No JVD  Cardiovascular:      Rate and Rhythm: Normal rate and regular rhythm  Heart sounds: S1 normal and S2 normal  Heart sounds not distant   No murmur heard  No systolic murmur is present  No friction rub  No gallop  No S3 sounds  Pulmonary:      Effort: Pulmonary effort is normal  No respiratory distress  Breath sounds: Normal breath sounds  No wheezing or rales  Abdominal:      General: Bowel sounds are normal  There is no distension  Palpations: Abdomen is soft  Musculoskeletal:         General: No deformity  Cervical back: Normal range of motion and neck supple  Skin:     General: Skin is warm and dry  Findings: No erythema  Neurological:      Mental Status: He is alert and oriented to person, place, and time  Cranial Nerves: No cranial nerve deficit  Psychiatric:         Behavior: Behavior normal        Blood pressure 120/72, pulse 66, height 6' 1 5" (1 867 m), weight 99 3 kg (219 lb), SpO2 98 %  EKG:  Normal sinus rhythm  Left axis deviation  ST & T wave abnormality, consider inferolateral ischemia  Abnormal ECG    Discussion/Summary:  Bicuspid AV: with no evidence of stenosis or regurgitation of any significance  Continue surveillance with echocardiograms  Recheck in May 2018 revealed normal LVEF, bicuspid AV with trace AR, no stenosis  Will recheck echo now      Chest Pain: evaluated with a stress test that ruled out CAD in April 2019       Ascending Aortic Aneurysm: measuring 4 3cm on echo, had repeat CTA in 6 months as directed by Dr Marianne Cortes, with stable size  Stable at 4 3cm in Jan 2017  He was due again to have this evaluated  April 2019 revealed stable size at 43mm  Will evaluate with echo this year       Palpitations: with COPD, we evaluated further with a Holter that ruled out significant arrhythmias - he did have PACs and 1 brief 7 beat run of PAT, which is not unexpected with his lung disease      Lipids: , LDL unable to be calculated, HDL 29, and   Would continue dietary efforts to reduce carbohydrates and sugars in diet to help with TG levels along with continuing on fenofibrate   His fasting sugar ws 110 - suggesting a diagnosis of DM - which could be causing his increase in TG and his symptoms of diaphoresis and increased fatigue  TG remain elevated 206 and LDL 28 in June 2022 - HgbA1C 6 0% this year

## 2022-08-18 ENCOUNTER — HOSPITAL ENCOUNTER (OUTPATIENT)
Dept: RADIOLOGY | Facility: CLINIC | Age: 57
Discharge: HOME | End: 2022-08-18
Payer: MEDICARE

## 2022-08-18 VITALS
RESPIRATION RATE: 20 BRPM | SYSTOLIC BLOOD PRESSURE: 127 MMHG | OXYGEN SATURATION: 94 % | HEART RATE: 66 BPM | TEMPERATURE: 98.6 F | DIASTOLIC BLOOD PRESSURE: 77 MMHG

## 2022-08-18 DIAGNOSIS — M51.16 INTERVERTEBRAL DISC DISORDER WITH RADICULOPATHY OF LUMBAR REGION: ICD-10-CM

## 2022-08-18 PROCEDURE — 64483 NJX AA&/STRD TFRM EPI L/S 1: CPT | Performed by: STUDENT IN AN ORGANIZED HEALTH CARE EDUCATION/TRAINING PROGRAM

## 2022-08-18 PROCEDURE — 64484 NJX AA&/STRD TFRM EPI L/S EA: CPT | Performed by: STUDENT IN AN ORGANIZED HEALTH CARE EDUCATION/TRAINING PROGRAM

## 2022-08-18 RX ORDER — PAPAVERINE HCL 150 MG
10 CAPSULE, EXTENDED RELEASE ORAL ONCE
Status: DISCONTINUED | OUTPATIENT
Start: 2022-08-18 | End: 2022-08-18

## 2022-08-18 RX ORDER — BUPIVACAINE HCL/PF 2.5 MG/ML
1 VIAL (ML) INJECTION ONCE
Status: DISCONTINUED | OUTPATIENT
Start: 2022-08-18 | End: 2022-08-18

## 2022-08-18 RX ORDER — BUPIVACAINE HCL/PF 2.5 MG/ML
2 VIAL (ML) INJECTION ONCE
Status: COMPLETED | OUTPATIENT
Start: 2022-08-18 | End: 2022-08-18

## 2022-08-18 RX ORDER — PAPAVERINE HCL 150 MG
20 CAPSULE, EXTENDED RELEASE ORAL ONCE
Status: COMPLETED | OUTPATIENT
Start: 2022-08-18 | End: 2022-08-18

## 2022-08-18 RX ADMIN — Medication 2 ML: at 13:53

## 2022-08-18 RX ADMIN — IOHEXOL 1 ML: 300 INJECTION, SOLUTION INTRAVENOUS at 13:52

## 2022-08-18 RX ADMIN — DEXAMETHASONE SODIUM PHOSPHATE 20 MG: 10 INJECTION, SOLUTION INTRAMUSCULAR; INTRAVENOUS at 13:53

## 2022-08-18 NOTE — INTERVAL H&P NOTE
Update: (This section must be completed if the H&P was completed greater than 24 hrs to procedure or admission)    H&P reviewed  After examining the patient, I find no changed to the H&P since it had been written  Patient re-evaluated   Accept as history and physical     Katie Altamirano MD/August 18, 2022/1:32 PM

## 2022-08-18 NOTE — DISCHARGE INSTR - LAB
Epidural Steroid Injection   WHAT YOU NEED TO KNOW:   An epidural steroid injection (CHANTEL) is a procedure to inject steroid medicine into the epidural space  The epidural space is between your spinal cord and vertebrae  Steroids reduce inflammation and fluid buildup in your spine that may be causing pain  You may be given pain medicine along with the steroids  ACTIVITY  Do not drive or operate machinery today  No strenuous activity today - bending, lifting, etc   You may resume normal activites starting tomorrow - start slowly and as tolerated  You may shower today, but no tub baths or hot tubs  You may have numbness for several hours from the local anesthetic  Please use caution and common sense, especially with weight-bearing activities  CARE OF THE INJECTION SITE  If you have soreness or pain, apply ice to the area today (20 minutes on/20 minutes off)  Starting tomorrow, you may use warm, moist heat or ice if needed  You may have an increase or change in your discomfort for 36-48 hours after your treatment  Apply ice and continue with any pain medication you have been prescribed  Notify the [facility] if you have any of the following: redness, drainage, swelling, headache, stiff neck or fever above 100°F     SPECIAL INSTRUCTIONS  Our office will contact you in approximately 7 days for a progress report  MEDICATIONS  Continue to take all routine medications  Our office may have instructed you to hold some medications  As no general anesthesia was used in today's procedure, you should not experience any side effects related to anesthesia  If you are diabetic, the steroids used in today's injection may temporarily increase your blood sugar levels after the first few days after your injection  Please keep a close eye on your sugars and alert the doctor who manages your diabetes if your sugars are significantly high from your baseline or you are symptomatic       If you have a problem specifically related to your procedure, please call our office at [phone]  Problems not related to your procedure should be directed to your primary care physician

## 2022-08-25 ENCOUNTER — APPOINTMENT (OUTPATIENT)
Dept: RADIOLOGY | Facility: MEDICAL CENTER | Age: 57
End: 2022-08-25
Payer: MEDICARE

## 2022-08-25 ENCOUNTER — TELEPHONE (OUTPATIENT)
Dept: PAIN MEDICINE | Facility: CLINIC | Age: 57
End: 2022-08-25

## 2022-08-25 ENCOUNTER — HOSPITAL ENCOUNTER (OUTPATIENT)
Dept: NON INVASIVE DIAGNOSTICS | Facility: MEDICAL CENTER | Age: 57
Discharge: HOME | End: 2022-08-25
Payer: MEDICARE

## 2022-08-25 VITALS
HEART RATE: 66 BPM | HEIGHT: 73 IN | WEIGHT: 219 LBS | DIASTOLIC BLOOD PRESSURE: 77 MMHG | BODY MASS INDEX: 29.03 KG/M2 | SYSTOLIC BLOOD PRESSURE: 127 MMHG

## 2022-08-25 DIAGNOSIS — Q23.1 BICUSPID AORTIC VALVE: ICD-10-CM

## 2022-08-25 LAB
AORTIC ROOT: 3.5 CM
APICAL FOUR CHAMBER EJECTION FRACTION: 45 %
ASCENDING AORTA: 4.2 CM
AV LVOT MEAN GRADIENT: 2 MMHG
AV LVOT PEAK GRADIENT: 4 MMHG
DOP CALC LVOT PEAK VEL VTI: 21 CM
DOP CALC LVOT PEAK VEL: 1.04 M/S
E WAVE DECELERATION TIME: 232 MS
FRACTIONAL SHORTENING: 40 (ref 28–44)
INTERVENTRICULAR SEPTUM IN DIASTOLE (PARASTERNAL SHORT AXIS VIEW): 1 CM
INTERVENTRICULAR SEPTUM: 1 CM (ref 0.6–1.1)
LAAS-AP2: 22.7 CM2
LAAS-AP4: 17.3 CM2
LEFT ATRIUM SIZE: 2.2 CM
LEFT INTERNAL DIMENSION IN SYSTOLE: 2.7 CM (ref 2.1–4)
LEFT VENTRICULAR INTERNAL DIMENSION IN DIASTOLE: 4.5 CM (ref 3.5–6)
LEFT VENTRICULAR POSTERIOR WALL IN END DIASTOLE: 0.9 CM
LEFT VENTRICULAR STROKE VOLUME: 65 ML
LVSV (TEICH): 65 ML
MV E'TISSUE VEL-SEP: 6 CM/S
MV PEAK A VEL: 0.56 M/S
MV PEAK E VEL: 71 CM/S
MV STENOSIS PRESSURE HALF TIME: 67 MS
MV VALVE AREA P 1/2 METHOD: 3.28
RIGHT ATRIAL 2D VOLUME: 65 ML
RIGHT ATRIUM AREA SYSTOLE A4C: 21 CM2
RIGHT VENTRICLE ID DIMENSION: 4.1 CM
SL CV LEFT ATRIUM LENGTH A2C: 5.2 CM
SL CV LV EF: 55
SL CV PED ECHO LEFT VENTRICLE DIASTOLIC VOLUME (MOD BIPLANE) 2D: 93 ML
SL CV PED ECHO LEFT VENTRICLE SYSTOLIC VOLUME (MOD BIPLANE) 2D: 28 ML

## 2022-08-25 PROCEDURE — 93306 TTE W/DOPPLER COMPLETE: CPT | Performed by: INTERNAL MEDICINE

## 2022-08-25 PROCEDURE — 93306 TTE W/DOPPLER COMPLETE: CPT

## 2022-09-06 ENCOUNTER — TELEPHONE (OUTPATIENT)
Dept: FAMILY MEDICINE CLINIC | Facility: CLINIC | Age: 57
End: 2022-09-06

## 2022-09-06 NOTE — TELEPHONE ENCOUNTER
Pt's appt is coming up and he needs lab orders placed in his chart    then call pt to notify the orders are in

## 2022-09-14 ENCOUNTER — OFFICE VISIT (OUTPATIENT)
Dept: FAMILY MEDICINE CLINIC | Facility: CLINIC | Age: 57
End: 2022-09-14
Payer: MEDICARE

## 2022-09-14 VITALS
DIASTOLIC BLOOD PRESSURE: 78 MMHG | HEART RATE: 63 BPM | SYSTOLIC BLOOD PRESSURE: 122 MMHG | OXYGEN SATURATION: 98 % | WEIGHT: 220 LBS | TEMPERATURE: 98.4 F | HEIGHT: 73 IN | BODY MASS INDEX: 29.16 KG/M2

## 2022-09-14 DIAGNOSIS — N18.9 CHRONIC KIDNEY DISEASE-MINERAL AND BONE DISORDER: ICD-10-CM

## 2022-09-14 DIAGNOSIS — N18.31 TYPE 2 DIABETES MELLITUS WITH STAGE 3A CHRONIC KIDNEY DISEASE, WITHOUT LONG-TERM CURRENT USE OF INSULIN (HCC): Primary | ICD-10-CM

## 2022-09-14 DIAGNOSIS — N18.31 STAGE 3A CHRONIC KIDNEY DISEASE (HCC): ICD-10-CM

## 2022-09-14 DIAGNOSIS — Z23 NEED FOR IMMUNIZATION AGAINST INFLUENZA: ICD-10-CM

## 2022-09-14 DIAGNOSIS — J30.1 NON-SEASONAL ALLERGIC RHINITIS DUE TO POLLEN: ICD-10-CM

## 2022-09-14 DIAGNOSIS — M89.9 CHRONIC KIDNEY DISEASE-MINERAL AND BONE DISORDER: ICD-10-CM

## 2022-09-14 DIAGNOSIS — E11.22 TYPE 2 DIABETES MELLITUS WITH STAGE 3A CHRONIC KIDNEY DISEASE, WITHOUT LONG-TERM CURRENT USE OF INSULIN (HCC): Primary | ICD-10-CM

## 2022-09-14 DIAGNOSIS — Z00.00 MEDICARE ANNUAL WELLNESS VISIT, SUBSEQUENT: ICD-10-CM

## 2022-09-14 DIAGNOSIS — E78.2 HYPERLIPIDEMIA, MIXED: ICD-10-CM

## 2022-09-14 DIAGNOSIS — Z12.5 SCREENING FOR PROSTATE CANCER: ICD-10-CM

## 2022-09-14 DIAGNOSIS — Z13.31 SCREENING FOR DEPRESSION: ICD-10-CM

## 2022-09-14 DIAGNOSIS — I71.21 ASCENDING AORTIC ANEURYSM: ICD-10-CM

## 2022-09-14 DIAGNOSIS — J45.20 MILD INTERMITTENT ASTHMA WITHOUT COMPLICATION: ICD-10-CM

## 2022-09-14 DIAGNOSIS — E83.9 CHRONIC KIDNEY DISEASE-MINERAL AND BONE DISORDER: ICD-10-CM

## 2022-09-14 LAB — SL AMB POCT HEMOGLOBIN AIC: 7.5 (ref ?–6.5)

## 2022-09-14 PROCEDURE — 99214 OFFICE O/P EST MOD 30 MIN: CPT | Performed by: FAMILY MEDICINE

## 2022-09-14 PROCEDURE — 83036 HEMOGLOBIN GLYCOSYLATED A1C: CPT | Performed by: FAMILY MEDICINE

## 2022-09-14 PROCEDURE — G0008 ADMIN INFLUENZA VIRUS VAC: HCPCS

## 2022-09-14 PROCEDURE — 90682 RIV4 VACC RECOMBINANT DNA IM: CPT

## 2022-09-14 PROCEDURE — G0439 PPPS, SUBSEQ VISIT: HCPCS | Performed by: FAMILY MEDICINE

## 2022-09-14 PROCEDURE — G0444 DEPRESSION SCREEN ANNUAL: HCPCS | Performed by: FAMILY MEDICINE

## 2022-09-14 NOTE — PROGRESS NOTES
Assessment and Plan:     Problem List Items Addressed This Visit        Endocrine    Type 2 diabetes mellitus with stage 3a chronic kidney disease, without long-term current use of insulin ([facility] [address] ) - Primary    Relevant Orders    Comprehensive metabolic panel    Lipid panel    HEMOGLOBIN A1C W/ EAG ESTIMATION       Respiratory    Allergic rhinitis    Mild intermittent asthma without complication    Relevant Orders    CBC and differential       Cardiovascular and Mediastinum    Ascending aortic aneurysm (HCC)       Genitourinary    Chronic kidney disease-mineral and bone disorder    Relevant Orders    Vitamin D 25 hydroxy    CKD (chronic kidney disease) stage 3, GFR 30-59 ml/min (HCC)    Relevant Orders    Comprehensive metabolic panel    Lipid panel       Other    Hyperlipidemia, mixed    Relevant Orders    Comprehensive metabolic panel    Lipid panel      Other Visit Diagnoses     Medicare annual wellness visit, subsequent        Relevant Orders    CBC and differential    Need for immunization against influenza        Relevant Orders    influenza vaccine, quadrivalent, recombinant, PF, 0 5 mL, for patients 18 yr+ (FLUBLOK) (Completed)    Screening for prostate cancer        Relevant Orders    PSA, Total Screen    Screening for depression            DM: A1c 7 5% (up from 6 3) -- likely secondary to injections, as pt previously very well controlled on current regimen  Will continue regimen without change and f/u in 3 months -- if persistently elevated, consider increase in Trulicity   HLD: Update lipids prior to next visit   CKD3a: Continue good hydration, f/u with Nephro as scheduled   AAA: F/u with Cardio, though appears stable   Asthma/Allergies: Discussed trial of OTC allergy medication and/or nasal spray for sinus pressure, continue PRN RUDY      Preventive health issues were discussed with patient, and age appropriate screening tests were ordered as noted in patient's After Visit Summary    Super[employer]atives advice and appropriate referrals for health education or preventive services given if needed, as noted in patient's After Visit Summary  History of Present Illness:     Patient presents for chronic follow up and a Medicare Wellness Visit    HPI     DM w/ CKD: Home sugars -- was quite elevated after injections in his back; denies hypo/hyperglycemic symptoms   HLD: Tolerating statin + fenofibrate without issue  CKD3a w/ bone disease: Drinking plenty of fluids, f/u with Nephro  AAA: Recent ECHO shows mild dilation, following with Cardio    Asthma/Allergies: Rare RUDY use, does have sinus pressure     Had injections -- hip and knee are improved, but back "still hurts"      Patient Care Team:  Marjorie Munoz DO as PCP - General (Family Medicine)  Asad Lew MD as PCP - [address] [place], [place] [address]Th [facility] ([facility])  Mason Adams MD as Consulting Physician (Cardiology)  Bel Scott MD as Consulting Physician (Cardiothoracic Surgery)  Briscoe Osgood, MD as Consulting Physician (Orthopedic Surgery)  Ger Parsons MD as Consulting Physician (Neurology)  Mechelle Becerril MD as Consulting Physician (Nephrology)  Frandy Vo MD (Pain Medicine)  Arlette Osborn MD as [address]     Review of Systems:     Review of Systems   Constitutional: Positive for fatigue  Negative for diaphoresis  HENT: Positive for sinus pressure  Eyes: Negative for visual disturbance  Respiratory: Negative for cough, shortness of breath and wheezing  Cardiovascular: Negative for chest pain, palpitations and leg swelling  Gastrointestinal: Positive for abdominal pain (intermittent)  Negative for constipation and diarrhea  Endocrine: Negative for polydipsia and polyuria  Genitourinary: Negative for difficulty urinating and dysuria  Musculoskeletal: Positive for back pain  Neurological: Positive for headaches (takes Tylenol)  Negative for dizziness and tremors          Problem List:     Patient Active Problem List Diagnosis    Mild intermittent asthma without complication    Ascending aortic aneurysm (HCC)    Bicuspid aortic valve    Allergic rhinitis    GERD (gastroesophageal reflux disease)    Hiatal hernia    Type 2 diabetes mellitus with stage 3a chronic kidney disease, without long-term current use of insulin (HCC)    CKD (chronic kidney disease) stage 3, GFR 30-59 ml/min ([facility])    Hyperlipidemia, mixed    Vitamin D deficiency    Renal cyst, left    Hepatic cyst    Fatty liver disease, nonalcoholic    Erectile dysfunction    Carpal tunnel syndrome of right wrist    Chronic pain of right knee    Vestibular migraine    Patellar tendinitis of right knee    Benign paroxysmal positional vertigo due to bilateral vestibular disorder    Hip impingement syndrome, right    Primary osteoarthritis of right hip    Ulnar neuropathy of both upper extremities    Lumbosacral strain    Tarsal tunnel syndrome of right side    Cubital tunnel syndrome, bilateral    Groin pain, chronic, right    Chronic pain syndrome    Chronic kidney disease-mineral and bone disorder    Uncomplicated opioid dependence ([address] )    Arthritis of right hip      Past Medical and Surgical History:     Past Medical History:   Diagnosis Date    Anxiety 3/10/2022    Aorta aneurysm (HCC)     4 3    Arm DVT (deep venous thromboembolism), acute ([address] ) 5308    complications of cardiac cath    Asthma     Chronic kidney disease     CKD (chronic kidney disease), stage III ([address] )     COPD (chronic obstructive pulmonary disease) ([address] )     Depression     Diabetes mellitus ([address] )     Essential hypertriglyceridemia     GERD (gastroesophageal reflux disease)     Headache     Hiatal hernia     Hyperlipidemia, mixed 5/7/2018    Kidney stone     Liver disease     FATTY LIVER    Mild episode of recurrent major depressive disorder ([address] ) 3/10/2022    PAC (premature atrial contraction)     Prediabetes     Renal calculi     Sleep apnea     Vestibular migraine      Past Surgical History:   Procedure Laterality Date    CARPAL TUNNEL RELEASE Left     COLONOSCOPY      FL INJECTION RIGHT HIP (ARTHROGRAM)  2018    FOOT SURGERY Left     FOREIGN BODY REMOVAL    HERNIA REPAIR      [place] COLONOSCOPY FLX DX W/COLLJ SPEC WHEN PFRMD N/A 2017    Procedure: COLONOSCOPY;  Surgeon: Kiah Tamez MD;  Location: [facility]; Service: Gastroenterology    [place] ESOPHAGOGASTRODUODENOSCOPY TRANSORAL DIAGNOSTIC N/A 10/31/2017    Procedure: ESOPHAGOGASTRODUODENOSCOPY (EGD); Surgeon: Kiah Tamez MD;  Location: [facility];   Service: Gastroenterology    [place] TOTAL HIP ARTHROPLASTY Right 2020    Procedure: ARTHROPLASTY HIP TOTAL;  Surgeon: Jose Chatman MD;  Location: [facility];  Service: Orthopedics      Family History:     Family History   Problem Relation Age of Onset    Cancer Brother     Prostate cancer Brother     Leukemia Brother         his only brother dies from [address] or leukemia pt unsure he was only 46    Arthritis Mother     Heart attack Father     Clotting disorder Father     Schizoaffective Disorder  Sister     Aortic aneurysm Other         abdominal      Social History:     Social History     Socioeconomic History    Marital status:      Spouse name: None    Number of children: 2    Years of education: None    Highest education level: None   Occupational History    Occupation: unemployed   Tobacco Use    Smoking status: Current Some Day Smoker     Types: Cigars     Last attempt to quit: 2014     Years since quittin 1    Smokeless tobacco: Never Used    Tobacco comment: never inhaled   Vaping Use    Vaping Use: Never used   Substance and Sexual Activity    Alcohol use: Not Currently     Comment: occasional beer    Drug use: No    Sexual activity: Yes     Partners: Female   Other Topics Concern    None   Social History Narrative    Caffeine use    Lives alone     Social Determinants of Health Financial Resource Strain: Medium Risk    Difficulty of Paying Living Expenses: Somewhat hard   Food Insecurity: Not on file   Transportation Needs: No Transportation Needs    Lack of Transportation (Medical): No    Lack of Transportation (Non-Medical): No   Physical Activity: Not on file   Stress: Not on file   Social Connections: Not on file   Intimate Partner Violence: Not on file   Housing Stability: Not on file      Medications and Allergies:     Current Outpatient Medications   Medication Sig Dispense Refill    acetaminophen (TYLENOL) 500 mg tablet Take 500 mg by mouth every 6 (six) hours as needed for mild pain      albuterol (Ventolin HFA) 90 mcg/act inhaler Inhale 2 puffs every 6 (six) hours as needed for wheezing or shortness of breath (cough) (Patient taking differently: Inhale 2 puffs every 6 (six) hours as needed for wheezing or shortness of breath (cough) PRN) 18 g 1    fenofibrate 160 MG tablet take 1 tablet by mouth once daily 90 tablet 3    glucose blood test strip TEST BS  each 5    glucose monitoring kit (FREESTYLE) monitoring kit Use 1 each daily before breakfast 1 each 0    Lancets MISC Use daily before breakfast 100 each 5    pantoprazole (PROTONIX) 40 mg tablet take 1 tablet by mouth once daily 90 tablet 3    rosuvastatin (CRESTOR) 5 mg tablet Take 0 5 tablets (2 5 mg total) by mouth daily 90 tablet 1    sildenafil (REVATIO) 20 mg tablet TAKE 1 TABLET (20 MG TOTAL) BY MOUTH DAILY AS DIRECTED 90 tablet 3    traMADol (ULTRAM) 50 mg tablet Take 1 tablet (50 mg total) by mouth every 8 (eight) hours as needed for moderate pain 90 tablet 2    Trulicity 0 30 BV/2 6BX SOPN inject 0 5 milliliters ( 0 75 milligrams ) subcutaneously every week 12 mL 1     No current facility-administered medications for this visit       No Known Allergies   Immunizations:     Immunization History   Administered Date(s) Administered    COVID-19 PFIZER VACCINE 0 3 ML IM 03/12/2021, 04/02/2021, 11/12/2021    INFLUENZA 12/07/2016, 10/26/2017    Influenza, recombinant, quadrivalent,injectable, preservative free 10/02/2018, 09/17/2019, 09/09/2020, 09/09/2021, 09/14/2022    Influenza, seasonal, injectable 04/15/2016    Pneumococcal Conjugate 13-Valent 12/07/2016    Pneumococcal Polysaccharide PPV23 11/20/2018    Tdap 06/09/2017    Zoster 06/09/2017      Health Maintenance:         Topic Date Due    Colorectal Cancer Screening  08/07/2022    HIV Screening  Completed    Hepatitis C Screening  Completed         Topic Date Due    COVID-19 Vaccine (4 - Booster for [place] Edy series) 03/12/2022      Medicare Screening Tests and Risk Assessments:     Desmond Khanna is here for his Subsequent Wellness visit  Health Risk Assessment:   Patient rates overall health as fair  Patient feels that their physical health rating is same  Patient is satisfied with their life  Eyesight was rated as same  Hearing was rated as same  Patient feels that their emotional and mental health rating is same  Patients states they are never, rarely angry  Patient states they are often unusually tired/fatigued  Pain experienced in the last 7 days has been some  Patient states that he has experienced no weight loss or gain in last 6 months  Depression Screening:   PHQ-2 Score: 0      Fall Risk Screening: In the past year, patient has experienced: no history of falling in past year      Home Safety:  Patient does not have trouble with stairs inside or outside of their home  Patient has working smoke alarms and has working carbon monoxide detector  Home safety hazards include: none  Nutrition:   Current diet is Regular  Medications:   Patient is not currently taking any over-the-counter supplements  Patient is able to manage medications       Activities of Daily Living (ADLs)/Instrumental Activities of Daily Living (IADLs):   Walk and transfer into and out of bed and chair?: Yes  Dress and groom yourself?: Yes    Bathe or shower yourself?: Yes    Feed yourself? Yes  Do your laundry/housekeeping?: Yes  Manage your money, pay your bills and track your expenses?: Yes  Make your own meals?: Yes    Do your own shopping?: Yes    Durable Medical Equipment Suppliers  N/A    Previous Hospitalizations:   Any hospitalizations or ED visits within the last 12 months?: No      Advance Care Planning:   Living will: No    Five wishes given: Yes      Cognitive Screening:   Provider or family/friend/caregiver concerned regarding cognition?: No    PREVENTIVE SCREENINGS      Cardiovascular Screening:    General: Screening Current      Diabetes Screening:     General: Screening Current      Colorectal Cancer Screening:     General: Screening Current      Prostate Cancer Screening:    General: Screening Current      Osteoporosis Screening:    General: Screening Not Indicated      Abdominal Aortic Aneurysm (AAA) Screening:        General: History AAA      Lung Cancer Screening:     General: Screening Not Indicated      Hepatitis C Screening:    General: Screening Current    Screening, Brief Intervention, and Referral to Treatment (SBIRT)    Screening  Typical number of drinks in a day: 0  Typical number of drinks in a week: 0    Single Item Drug Screening:  How often have you used an illegal drug (including marijuana) or a prescription medication for non-medical reasons in the past year? never    Single Item Drug Screen Score: 0  Interpretation: Negative screen for possible drug use disorder    Review of Current Opioid Use    Opioid Risk Tool (ORT) Interpretation: Complete Opioid Risk Tool (ORT)    No exam data present     Physical Exam:     /78   Pulse 63   Temp 98 4 °F (36 9 °C)   Ht 6' 1" (1 854 m)   Wt 99 8 kg (220 lb)   SpO2 98%   BMI 29 03 kg/m²     Physical Exam  Vitals and nursing note reviewed  Constitutional:       General: He is not in acute distress  Appearance: He is well-developed  HENT:      Head: Normocephalic and atraumatic  Right Ear: Tympanic membrane, ear canal and external ear normal       Left Ear: Tympanic membrane, ear canal and external ear normal       Nose: Nose normal  No rhinorrhea  Mouth/Throat:      Mouth: Mucous membranes are moist       Pharynx: No oropharyngeal exudate or posterior oropharyngeal erythema  Eyes:      Conjunctiva/sclera: Conjunctivae normal    Neck:      Thyroid: No thyromegaly  Cardiovascular:      Rate and Rhythm: Normal rate and regular rhythm  Pulmonary:      Effort: Pulmonary effort is normal  No respiratory distress  Breath sounds: Normal breath sounds  Abdominal:      General: Bowel sounds are normal  There is no distension  Palpations: Abdomen is soft  Tenderness: There is no abdominal tenderness  Musculoskeletal:         General: Normal range of motion  Lymphadenopathy:      Cervical: No cervical adenopathy  Skin:     General: Skin is warm and dry  Neurological:      General: No focal deficit present  Mental Status: He is alert     Psychiatric:         Mood and Affect: Mood normal           Adenike Garg DO

## 2022-09-14 NOTE — PATIENT INSTRUCTIONS
Medicare Preventive Visit Patient Instructions  Thank you for completing your Welcome to Medicare Visit or Medicare Annual Wellness Visit today  Your next wellness visit will be due in one year (9/15/2023)  The screening/preventive services that you may require over the next 5-10 years are detailed below  Some tests may not apply to you based off risk factors and/or age  Screening tests ordered at today's visit but not completed yet may show as past due  Also, please note that scanned in results may not display below  Preventive Screenings:  Service Recommendations Previous Testing/Comments   Colorectal Cancer Screening  · Colonoscopy    · Fecal Occult Blood Test (FOBT)/Fecal Immunochemical Test (FIT)  · Fecal DNA/Cologuard Test  · Flexible Sigmoidoscopy Age: 39-70 years old   Colonoscopy: every 10 years (May be performed more frequently if at higher risk)  OR  FOBT/FIT: every 1 year  OR  Cologuard: every 3 years  OR  Sigmoidoscopy: every 5 years  Screening may be recommended earlier than age 39 if at higher risk for colorectal cancer  Also, an individualized decision between you and your healthcare provider will decide whether screening between the ages of 74-80 would be appropriate  Colonoscopy: 08/07/2017  FOBT/FIT: Not on file  Cologuard: Not on file  Sigmoidoscopy: Not on file          Prostate Cancer Screening Individualized decision between patient and health care provider in men between ages of 53-78   Medicare will cover every 12 months beginning on the day after your 50th birthday PSA: 0 4 ng/mL           Hepatitis C Screening Once for adults born between 1945 and 1965  More frequently in patients at high risk for Hepatitis C Hep C Antibody: 01/31/2019        Diabetes Screening 1-2 times per year if you're at risk for diabetes or have pre-diabetes Fasting glucose: 125 mg/dL (6/2/2022)  A1C: 6 3 (6/15/2022)      Cholesterol Screening Once every 5 years if you don't have a lipid disorder   May order more often based on risk factors  Lipid panel: 06/02/2022         Other Preventive Screenings Covered by Medicare:  1  Abdominal Aortic Aneurysm (AAA) Screening: covered once if your at risk  You're considered to be at risk if you have a family history of AAA or a male between the age of 73-68 who smoking at least 100 cigarettes in your lifetime  2  Lung Cancer Screening: covers low dose CT scan once per year if you meet all of the following conditions: (1) Age 50-69; (2) No signs or symptoms of lung cancer; (3) Current smoker or have quit smoking within the last 15 years; (4) You have a tobacco smoking history of at least 20 pack years (packs per day x number of years you smoked); (5) You get a written order from a healthcare provider  3  Glaucoma Screening: covered annually if you're considered high risk: (1) You have diabetes OR (2) Family history of glaucoma OR (3)  aged 48 and older OR (3)  American aged 72 and older  3  Osteoporosis Screening: covered every 2 years if you meet one of the following conditions: (1) Have a vertebral abnormality; (2) On glucocorticoid therapy for more than 3 months; (3) Have primary hyperparathyroidism; (4) On osteoporosis medications and need to assess response to drug therapy  5  HIV Screening: covered annually if you're between the age of 12-76  Also covered annually if you are younger than 13 and older than 72 with risk factors for HIV infection  For pregnant patients, it is covered up to 3 times per pregnancy      Immunizations:  Immunization Recommendations   Influenza Vaccine Annual influenza vaccination during flu season is recommended for all persons aged >= 6 months who do not have contraindications   Pneumococcal Vaccine   * Pneumococcal conjugate vaccine = PCV13 (Prevnar 13), PCV15 (Vaxneuvance), PCV20 (Prevnar 20)  * Pneumococcal polysaccharide vaccine = PPSV23 (Pneumovax) Adults 25-60 years old: 1-3 doses may be recommended based on certain risk factors  Adults 72 years old: 1-2 doses may be recommended based off what pneumonia vaccine you previously received   Hepatitis B Vaccine 3 dose series if at intermediate or high risk (ex: diabetes, end stage renal disease, liver disease)   Tetanus (Td) Vaccine - COST NOT COVERED BY MEDICARE PART B Following completion of primary series, a booster dose should be given every 10 years to maintain immunity against tetanus  Td may also be given as tetanus wound prophylaxis  Tdap Vaccine - COST NOT COVERED BY MEDICARE PART B Recommended at least once for all adults  For pregnant patients, recommended with each pregnancy  Shingles Vaccine (Shingrix) - COST NOT COVERED BY MEDICARE PART B  2 shot series recommended in those aged 48 and above     Health Maintenance Due:      Topic Date Due    Colorectal Cancer Screening  08/07/2022    HIV Screening  Completed    Hepatitis C Screening  Completed     Immunizations Due:      Topic Date Due    COVID-19 Vaccine (4 - Booster for Pfizer series) 03/12/2022    Influenza Vaccine (1) 09/01/2022     Advance Directives   What are advance directives? Advance directives are legal documents that state your wishes and plans for medical care  These plans are made ahead of time in case you lose your ability to make decisions for yourself  Advance directives can apply to any medical decision, such as the treatments you want, and if you want to donate organs  What are the types of advance directives? There are many types of advance directives, and each state has rules about how to use them  You may choose a combination of any of the following:  · Living will: This is a written record of the treatment you want  You can also choose which treatments you do not want, which to limit, and which to stop at a certain time  This includes surgery, medicine, IV fluid, and tube feedings  · Durable power of  for healthcare [place] [facility]):   This is a written record that states who you want to make healthcare choices for you when you are unable to make them for yourself  This person, called a proxy, is usually a family member or a friend  You may choose more than 1 proxy  · Do not resuscitate (DNR) order:  A DNR order is used in case your heart stops beating or you stop breathing  It is a request not to have certain forms of treatment, such as CPR  A DNR order may be included in other types of advance directives  · Medical directive: This covers the care that you want if you are in a coma, near death, or unable to make decisions for yourself  You can list the treatments you want for each condition  Treatment may include pain medicine, surgery, blood transfusions, dialysis, IV or tube feedings, and a ventilator (breathing machine)  · Values history: This document has questions about your views, beliefs, and how you feel and think about life  This information can help others choose the care that you would choose  Why are advance directives important? An advance directive helps you control your care  Although spoken wishes may be used, it is better to have your wishes written down  Spoken wishes can be misunderstood, or not followed  Treatments may be given even if you do not want them  An advance directive may make it easier for your family to make difficult choices about your care  Cigarette Smoking and Your Health   Risks to your health if you smoke:  Nicotine and other chemicals found in tobacco damage every cell in your body  Even if you are a light smoker, you have an increased risk for cancer, heart disease, and lung disease  If you are pregnant or have diabetes, smoking increases your risk for complications  Benefits to your health if you stop smoking:   · You decrease respiratory symptoms such as coughing, wheezing, and shortness of breath  · You reduce your risk for cancers of the lung, mouth, throat, kidney, bladder, pancreas, stomach, and cervix   If you already have cancer, you increase the benefits of chemotherapy  You also reduce your risk for cancer returning or a second cancer from developing  · You reduce your risk for heart disease, blood clots, heart attack, and stroke  · You reduce your risk for lung infections, and diseases such as pneumonia, asthma, chronic bronchitis, and emphysema  · Your circulation improves  More oxygen can be delivered to your body  If you have diabetes, you lower your risk for complications, such as kidney, artery, and eye diseases  You also lower your risk for nerve damage  Nerve damage can lead to amputations, poor vision, and blindness  · You improve your body's ability to heal and to fight infections  For more information and support to stop smoking:   · [employer]  Phone: 0- 570 - [phone]  Web Address: [employer]  Weight Management   Why it is important to manage your weight:  Being overweight increases your risk of health conditions such as heart disease, high blood pressure, type 2 diabetes, and certain types of cancer  It can also increase your risk for osteoarthritis, sleep apnea, and other respiratory problems  Aim for a slow, steady weight loss  Even a small amount of weight loss can lower your risk of health problems  How to lose weight safely:  A safe and healthy way to lose weight is to eat fewer calories and get regular exercise  You can lose up about 1 pound a week by decreasing the number of calories you eat by 500 calories each day  Healthy meal plan for weight management:  A healthy meal plan includes a variety of foods, contains fewer calories, and helps you stay healthy  A healthy meal plan includes the following:  · Eat whole-grain foods more often  A healthy meal plan should contain fiber  Fiber is the part of grains, fruits, and vegetables that is not broken down by your body  Whole-grain foods are healthy and provide extra fiber in your diet   Some examples of whole-grain foods are whole-wheat breads and pastas, oatmeal, brown rice, and bulgur  · Eat a variety of vegetables every day  Include dark, leafy greens such as spinach, kale, ernesto greens, and mustard greens  Eat yellow and orange vegetables such as carrots, sweet potatoes, and winter squash  · Eat a variety of fruits every day  Choose fresh or canned fruit (canned in its own juice or light syrup) instead of juice  Fruit juice has very little or no fiber  · Eat low-fat dairy foods  Drink fat-free (skim) milk or 1% milk  Eat fat-free yogurt and low-fat cottage cheese  Try low-fat cheeses such as mozzarella and other reduced-fat cheeses  · Choose meat and other protein foods that are low in fat  Choose beans or other legumes such as split peas or lentils  Choose fish, skinless poultry (chicken or turkey), or lean cuts of red meat (beef or pork)  Before you cook meat or poultry, cut off any visible fat  · Use less fat and oil  Try baking foods instead of frying them  Add less fat, such as margarine, sour cream, regular salad dressing and mayonnaise to foods  Eat fewer high-fat foods  Some examples of high-fat foods include french fries, doughnuts, ice cream, and cakes  · Eat fewer sweets  Limit foods and drinks that are high in sugar  This includes candy, cookies, regular soda, and sweetened drinks  Exercise:  Exercise at least 30 minutes per day on most days of the week  Some examples of exercise include walking, biking, dancing, and swimming  You can also fit in more physical activity by taking the stairs instead of the elevator or parking farther away from stores  Ask your healthcare provider about the best exercise plan for you  © Copyright [employer] 2018 Information is for End User's use only and may not be sold, redistributed or otherwise used for commercial purposes   All illustrations and images included in CareNotes® are the copyrighted property of [employer] A M , Inc  or [facility] Preventive Visit Patient Instructions  Thank you for completing your Welcome to Medicare Visit or Medicare Annual Wellness Visit today  Your next wellness visit will be due in one year (9/15/2023)  The screening/preventive services that you may require over the next 5-10 years are detailed below  Some tests may not apply to you based off risk factors and/or age  Screening tests ordered at today's visit but not completed yet may show as past due  Also, please note that scanned in results may not display below  Preventive Screenings:  Service Recommendations Previous Testing/Comments   Colorectal Cancer Screening  * Colonoscopy    * Fecal Occult Blood Test (FOBT)/Fecal Immunochemical Test (FIT)  * Fecal DNA/Cologuard Test  * Flexible Sigmoidoscopy Age: 39-70 years old   Colonoscopy: every 10 years (may be performed more frequently if at higher risk)  OR  FOBT/FIT: every 1 year  OR  Cologuard: every 3 years  OR  Sigmoidoscopy: every 5 years  Screening may be recommended earlier than age 39 if at higher risk for colorectal cancer  Also, an individualized decision between you and your healthcare provider will decide whether screening between the ages of 74-80 would be appropriate  Colonoscopy: 08/07/2017  FOBT/FIT: Not on file  Cologuard: Not on file  Sigmoidoscopy: Not on file          Breast Cancer Screening Age: 36 years old  Frequency: every 1-2 years  Not required if history of left and right mastectomy Mammogram: Not on file        Cervical Cancer Screening Between the ages of 21-29, pap smear recommended once every 3 years  Between the ages of 33-67, can perform pap smear with HPV co-testing every 5 years     Recommendations may differ for women with a history of total hysterectomy, cervical cancer, or abnormal pap smears in past  Pap Smear: Not on file        Hepatitis C Screening Once for adults born between 1945 and 1965  More frequently in patients at high risk for Hepatitis C Hep C Antibody: 01/31/2019        Diabetes Screening 1-2 times per year if you're at risk for diabetes or have pre-diabetes Fasting glucose: 125 mg/dL (6/2/2022)  A1C: 6 3 (6/15/2022)      Cholesterol Screening Once every 5 years if you don't have a lipid disorder  May order more often based on risk factors  Lipid panel: 06/02/2022          Other Preventive Screenings Covered by Medicare:  6  Abdominal Aortic Aneurysm (AAA) Screening: covered once if your at risk  You're considered to be at risk if you have a family history of AAA  7  Lung Cancer Screening: covers low dose CT scan once per year if you meet all of the following conditions: (1) Age 50-69; (2) No signs or symptoms of lung cancer; (3) Current smoker or have quit smoking within the last 15 years; (4) You have a tobacco smoking history of at least 20 pack years (packs per day multiplied by number of years you smoked); (5) You get a written order from a healthcare provider  8  Glaucoma Screening: covered annually if you're considered high risk: (1) You have diabetes OR (2) Family history of glaucoma OR (3)  aged 48 and older OR (3)  American aged 72 and older  5  Osteoporosis Screening: covered every 2 years if you meet one of the following conditions: (1) You're estrogen deficient and at risk for osteoporosis based off medical history and other findings; (2) Have a vertebral abnormality; (3) On glucocorticoid therapy for more than 3 months; (4) Have primary hyperparathyroidism; (5) On osteoporosis medications and need to assess response to drug therapy  · Last bone density test (DXA Scan): Not on file  10  HIV Screening: covered annually if you're between the age of 12-76  Also covered annually if you are younger than 13 and older than 72 with risk factors for HIV infection  For pregnant patients, it is covered up to 3 times per pregnancy      Immunizations:  Immunization Recommendations   Influenza Vaccine Annual influenza vaccination during flu season is recommended for all persons aged >= 6 months who do not have contraindications   Pneumococcal Vaccine   * Pneumococcal conjugate vaccine = PCV13 (Prevnar 13), PCV15 (Vaxneuvance), PCV20 (Prevnar 20)  * Pneumococcal polysaccharide vaccine = PPSV23 (Pneumovax) Adults 2364 years old: 1-3 doses may be recommended based on certain risk factors  Adults 72 years old: 1-2 doses may be recommended based off what pneumonia vaccine you previously received   Hepatitis B Vaccine 3 dose series if at intermediate or high risk (ex: diabetes, end stage renal disease, liver disease)   Tetanus (Td) Vaccine - COST NOT COVERED BY MEDICARE PART B Following completion of primary series, a booster dose should be given every 10 years to maintain immunity against tetanus  Td may also be given as tetanus wound prophylaxis  Tdap Vaccine - COST NOT COVERED BY MEDICARE PART B Recommended at least once for all adults  For pregnant patients, recommended with each pregnancy  Shingles Vaccine (Shingrix) - COST NOT COVERED BY MEDICARE PART B  2 shot series recommended in those aged 48 and above     Health Maintenance Due:      Topic Date Due    Colorectal Cancer Screening  08/07/2022    HIV Screening  Completed    Hepatitis C Screening  Completed     Immunizations Due:      Topic Date Due    COVID-19 Vaccine (4 - Booster for Pfizer series) 03/12/2022    Influenza Vaccine (1) 09/01/2022     Advance Directives   What are advance directives? Advance directives are legal documents that state your wishes and plans for medical care  These plans are made ahead of time in case you lose your ability to make decisions for yourself  Advance directives can apply to any medical decision, such as the treatments you want, and if you want to donate organs  What are the types of advance directives? There are many types of advance directives, and each state has rules about how to use them  You may choose a combination of any of the following:  · Living will:   This is a written record of the treatment you want  You can also choose which treatments you do not want, which to limit, and which to stop at a certain time  This includes surgery, medicine, IV fluid, and tube feedings  · Durable power of  for healthcare [place] [facility]): This is a written record that states who you want to make healthcare choices for you when you are unable to make them for yourself  This person, called a proxy, is usually a family member or a friend  You may choose more than 1 proxy  · Do not resuscitate (DNR) order:  A DNR order is used in case your heart stops beating or you stop breathing  It is a request not to have certain forms of treatment, such as CPR  A DNR order may be included in other types of advance directives  · Medical directive: This covers the care that you want if you are in a coma, near death, or unable to make decisions for yourself  You can list the treatments you want for each condition  Treatment may include pain medicine, surgery, blood transfusions, dialysis, IV or tube feedings, and a ventilator (breathing machine)  · Values history: This document has questions about your views, beliefs, and how you feel and think about life  This information can help others choose the care that you would choose  Why are advance directives important? An advance directive helps you control your care  Although spoken wishes may be used, it is better to have your wishes written down  Spoken wishes can be misunderstood, or not followed  Treatments may be given even if you do not want them  An advance directive may make it easier for your family to make difficult choices about your care  Cigarette Smoking and Your Health   Risks to your health if you smoke:  Nicotine and other chemicals found in tobacco damage every cell in your body  Even if you are a light smoker, you have an increased risk for cancer, heart disease, and lung disease   If you are pregnant or have diabetes, smoking increases your risk for complications  Benefits to your health if you stop smoking:   · You decrease respiratory symptoms such as coughing, wheezing, and shortness of breath  · You reduce your risk for cancers of the lung, mouth, throat, kidney, bladder, pancreas, stomach, and cervix  If you already have cancer, you increase the benefits of chemotherapy  You also reduce your risk for cancer returning or a second cancer from developing  · You reduce your risk for heart disease, blood clots, heart attack, and stroke  · You reduce your risk for lung infections, and diseases such as pneumonia, asthma, chronic bronchitis, and emphysema  · Your circulation improves  More oxygen can be delivered to your body  If you have diabetes, you lower your risk for complications, such as kidney, artery, and eye diseases  You also lower your risk for nerve damage  Nerve damage can lead to amputations, poor vision, and blindness  · You improve your body's ability to heal and to fight infections  For more information and support to stop smoking:   · [employer]  Phone: 8- 753 - [phone]  Web Address: [employer]  Weight Management   Why it is important to manage your weight:  Being overweight increases your risk of health conditions such as heart disease, high blood pressure, type 2 diabetes, and certain types of cancer  It can also increase your risk for osteoarthritis, sleep apnea, and other respiratory problems  Aim for a slow, steady weight loss  Even a small amount of weight loss can lower your risk of health problems  How to lose weight safely:  A safe and healthy way to lose weight is to eat fewer calories and get regular exercise  You can lose up about 1 pound a week by decreasing the number of calories you eat by 500 calories each day  Healthy meal plan for weight management:  A healthy meal plan includes a variety of foods, contains fewer calories, and helps you stay healthy   A healthy meal plan includes the following:  · Eat whole-grain foods more often  A healthy meal plan should contain fiber  Fiber is the part of grains, fruits, and vegetables that is not broken down by your body  Whole-grain foods are healthy and provide extra fiber in your diet  Some examples of whole-grain foods are whole-wheat breads and pastas, oatmeal, brown rice, and bulgur  · Eat a variety of vegetables every day  Include dark, leafy greens such as spinach, kale, ernesto greens, and mustard greens  Eat yellow and orange vegetables such as carrots, sweet potatoes, and winter squash  · Eat a variety of fruits every day  Choose fresh or canned fruit (canned in its own juice or light syrup) instead of juice  Fruit juice has very little or no fiber  · Eat low-fat dairy foods  Drink fat-free (skim) milk or 1% milk  Eat fat-free yogurt and low-fat cottage cheese  Try low-fat cheeses such as mozzarella and other reduced-fat cheeses  · Choose meat and other protein foods that are low in fat  Choose beans or other legumes such as split peas or lentils  Choose fish, skinless poultry (chicken or turkey), or lean cuts of red meat (beef or pork)  Before you cook meat or poultry, cut off any visible fat  · Use less fat and oil  Try baking foods instead of frying them  Add less fat, such as margarine, sour cream, regular salad dressing and mayonnaise to foods  Eat fewer high-fat foods  Some examples of high-fat foods include french fries, doughnuts, ice cream, and cakes  · Eat fewer sweets  Limit foods and drinks that are high in sugar  This includes candy, cookies, regular soda, and sweetened drinks  Exercise:  Exercise at least 30 minutes per day on most days of the week  Some examples of exercise include walking, biking, dancing, and swimming  You can also fit in more physical activity by taking the stairs instead of the elevator or parking farther away from stores  Ask your healthcare provider about the best exercise plan for you  © Copyright [employer] 2018 Information is for End User's use only and may not be sold, redistributed or otherwise used for commercial purposes   All illustrations and images included in CareNotes® are the copyrighted property of A D A M , Inc  or Cecil Pendleton

## 2022-09-22 ENCOUNTER — TELEPHONE (OUTPATIENT)
Dept: NEPHROLOGY | Facility: CLINIC | Age: 57
End: 2022-09-22

## 2022-10-06 ENCOUNTER — TELEPHONE (OUTPATIENT)
Dept: NEPHROLOGY | Facility: CLINIC | Age: 57
End: 2022-10-06

## 2022-10-12 DIAGNOSIS — E78.2 ELEVATED TRIGLYCERIDES WITH HIGH CHOLESTEROL: ICD-10-CM

## 2022-10-12 RX ORDER — FENOFIBRATE 160 MG/1
TABLET ORAL
Qty: 90 TABLET | Refills: 3 | Status: SHIPPED | OUTPATIENT
Start: 2022-10-12

## 2022-10-26 DIAGNOSIS — K21.9 GASTROESOPHAGEAL REFLUX DISEASE WITHOUT ESOPHAGITIS: ICD-10-CM

## 2022-10-26 RX ORDER — PANTOPRAZOLE SODIUM 40 MG/1
TABLET, DELAYED RELEASE ORAL
Qty: 90 TABLET | Refills: 3 | Status: SHIPPED | OUTPATIENT
Start: 2022-10-26

## 2022-10-26 NOTE — PROGRESS NOTES
Pain Medicine Follow-Up Note    Assessment:  1  Chronic pain syndrome    2  Mid back pain    3  Lumbar spondylosis    4  Long-term current use of opiate analgesic    5  Uncomplicated opioid dependence ([address] )    6  Groin pain, chronic, right        Plan:  Orders Placed This Encounter   Procedures   • X-ray thoracic spine 3 views     Standing Status:   Future     Number of Occurrences:   1     Standing Expiration Date:   10/28/2026     Scheduling Instructions:      Bring along any outside films relating to this procedure  Order Specific Question:   When should the test be performed? Answer:   Elective- non urgent     Order Specific Question:   Is the patient pregnant? Answer:   No       New Medications Ordered This Visit   Medications   • traMADol (ULTRAM) 50 mg tablet     Sig: Take 1 tablet (50 mg total) by mouth every 8 (eight) hours as needed for moderate pain Do not start before November 16, 2022  Dispense:  90 tablet     Refill:  2     Fill on 11/16/2022   Refill on 12/16/2022 and 1/16/2023       My impressions and treatment recommendations were discussed in detail with the patient who verbalized understanding and had no further questions  Patient continues to report an overall reduction of his pain level with improvement in his level of functioning without significant side effects using tramadol 50 mg every 8 hours as needed for severe pain therefore I will continue the patient on this medication  Tramadol 50 mg E prescribed to the patient's pharmacy with fill date of 11/16/2022 and refill dates of 12/16/2022 and 01/16/2023  Patient recently underwent a right L3-L4 transforaminal epidural steroid injection which provided him approximately 2 weeks of excellent relief of his hip and groin pain  Patient states that his mid back is now providing him pain therefore I will order a thoracic x-ray at this time  Patient is in agreement with this plan      [place] Prescription Drug Monitoring Program report was reviewed and was appropriate     There are risks associated with opioid medications, including dependence, addiction and tolerance  The patient understands and agrees to use these medications only as prescribed  Potential side effects of the medications include, but are not limited to, constipation, drowsiness, addiction, impaired judgment and risk of fatal overdose if not taken as prescribed  The patient was warned against driving while taking sedation medications  Sharing medications is a felony  At this point in time, the patient is showing no signs of addiction, abuse, diversion or suicidal ideation  Follow-up is planned in 12 weeks time or sooner as warranted  Discharge instructions were provided  I personally saw and examined the patient and I agree with the above discussed plan of care  History of Present Illness:    Camden Parra is a 61 y o  adult who presents to [facility] for interval re-evaluation of the above stated pain complaints  The patient has a past medical and chronic pain history as outlined in the assessment section  He was last seen on 8/18/2022  At today's visit patient states that his pain symptoms are the same with a pain score of 4/10 on the verbal numeric pain scale  On 08/18/2022 patient underwent a right L3-L4 transforaminal epidural steroid injection which provided him approximately 2 weeks of excellent relief  Patient states that his pain is now worse in the morning and in the evening  The patient's pain is constant in nature  And the quality of the patient's pain is described as pressure-like and shooting  Patient indicates that his pain is located in his mid back, mid low back, right groin, right hip, right thigh and bilateral knees      Pain Contract Signed:  05/12/2022  Last Urine Drug Screen:  08/04/2022    Other than as stated above, the patient denies any interval changes in medications, medical condition, mental condition, symptoms, or allergies since the last office visit  Review of Systems:    Review of Systems   Respiratory: Negative for shortness of breath  Cardiovascular: Negative for chest pain  Gastrointestinal: Negative for constipation, diarrhea, nausea and vomiting  Musculoskeletal: Positive for arthralgias and gait problem  Negative for joint swelling and myalgias  Decreased ROM    Skin: Negative for rash  Neurological: Positive for dizziness  Negative for seizures and weakness  All other systems reviewed and are negative          Patient Active Problem List   Diagnosis   • Mild intermittent asthma without complication   • Ascending aortic aneurysm   • Bicuspid aortic valve   • Allergic rhinitis   • GERD (gastroesophageal reflux disease)   • Hiatal hernia   • Type 2 diabetes mellitus with stage 3a chronic kidney disease, without long-term current use of insulin ([facility])   • CKD (chronic kidney disease) stage 3, GFR 30-59 ml/min ([facility])   • Hyperlipidemia, mixed   • Vitamin D deficiency   • Renal cyst, left   • Hepatic cyst   • Fatty liver disease, nonalcoholic   • Erectile dysfunction   • Carpal tunnel syndrome of right wrist   • Chronic pain of right knee   • Vestibular migraine   • Patellar tendinitis of right knee   • Benign paroxysmal positional vertigo due to bilateral vestibular disorder   • Hip impingement syndrome, right   • Primary osteoarthritis of right hip   • Ulnar neuropathy of both upper extremities   • Lumbosacral strain   • Tarsal tunnel syndrome of right side   • Cubital tunnel syndrome, bilateral   • Groin pain, chronic, right   • Chronic pain syndrome   • Chronic kidney disease-mineral and bone disorder   • Uncomplicated opioid dependence ([facility] )   • Arthritis of right hip       Past Medical History:   Diagnosis Date   • Anxiety 3/10/2022   • Aorta aneurysm ([facility])     4 3   • Arm DVT (deep venous thromboembolism), acute ([address] ) 4612    complications of cardiac cath   • Asthma    • Chronic kidney disease    • CKD (chronic kidney disease), stage III (H[facility])    • COPD (chronic obstructive pulmonary disease) (H[facility])    • Depression    • Diabetes mellitus (HCC)    • Essential hypertriglyceridemia    • GERD (gastroesophageal reflux disease)    • Headache    • Hiatal hernia    • Hyperlipidemia, mixed 2018   • Kidney stone    • Liver disease     FATTY LIVER   • Mild episode of recurrent major depressive disorder ([facility] [address] ) 3/10/2022   • PAC (premature atrial contraction)    • Prediabetes    • Renal calculi    • Sleep apnea    • Vestibular migraine        Past Surgical History:   Procedure Laterality Date   • CARPAL TUNNEL RELEASE Left    • COLONOSCOPY     • FL INJECTION RIGHT HIP (ARTHROGRAM)  2018   • FOOT SURGERY Left     FOREIGN BODY REMOVAL   • HERNIA REPAIR     • [place] COLONOSCOPY FLX DX W/COLLJ SPEC WHEN PFRMD N/A 2017    Procedure: COLONOSCOPY;  Surgeon: Al Shah MD;  Location: [facility]; Service: Gastroenterology   • [place] ESOPHAGOGASTRODUODENOSCOPY TRANSORAL DIAGNOSTIC N/A 10/31/2017    Procedure: ESOPHAGOGASTRODUODENOSCOPY (EGD); Surgeon: Al Shah MD;  Location: [facility];   Service: Gastroenterology   • [place] TOTAL HIP ARTHROPLASTY Right 2020    Procedure: ARTHROPLASTY HIP TOTAL;  Surgeon: Jose Kiran MD;  Location: [facility];  Service: Orthopedics       Family History   Problem Relation Age of Onset   • Cancer Brother    • Prostate cancer Brother    • Leukemia Brother         his only brother dies from [address] or leukemia pt unsure he was only 47   • Arthritis Mother    • Heart attack Father    • Clotting disorder Father    • Schizoaffective Disorder  Sister    • Aortic aneurysm Other         abdominal       Social History     Occupational History   • Occupation: unemployed   Tobacco Use   • Smoking status: Current Some Day Smoker     Types: Cigars     Last attempt to quit: 2014     Years since quittin 2   • Smokeless tobacco: Never Used   • Tobacco comment: never inhaled   Vaping Use   • Vaping Use: Never used   Substance and Sexual Activity   • Alcohol use: Not Currently     Comment: occasional beer   • Drug use: No   • Sexual activity: Yes     Partners: Female         Current Outpatient Medications:   •  acetaminophen (TYLENOL) 500 mg tablet, Take 500 mg by mouth every 6 (six) hours as needed for mild pain, Disp: , Rfl:   •  albuterol (Ventolin HFA) 90 mcg/act inhaler, Inhale 2 puffs every 6 (six) hours as needed for wheezing or shortness of breath (cough) (Patient taking differently: Inhale 2 puffs every 6 (six) hours as needed for wheezing or shortness of breath (cough) PRN), Disp: 18 g, Rfl: 1  •  fenofibrate 160 MG tablet, take 1 tablet by mouth once daily, Disp: 90 tablet, Rfl: 3  •  glucose blood test strip, TEST BS TID, Disp: 300 each, Rfl: 5  •  glucose monitoring kit (FREESTYLE) monitoring kit, Use 1 each daily before breakfast, Disp: 1 each, Rfl: 0  •  Lancets MISC, Use daily before breakfast, Disp: 100 each, Rfl: 5  •  pantoprazole (PROTONIX) 40 mg tablet, take 1 tablet by mouth once daily, Disp: 90 tablet, Rfl: 3  •  rosuvastatin (CRESTOR) 5 mg tablet, Take 0 5 tablets (2 5 mg total) by mouth daily, Disp: 90 tablet, Rfl: 1  •  sildenafil (REVATIO) 20 mg tablet, TAKE 1 TABLET (20 MG TOTAL) BY MOUTH DAILY AS DIRECTED, Disp: 90 tablet, Rfl: 3  •  [START ON 11/16/2022] traMADol (ULTRAM) 50 mg tablet, Take 1 tablet (50 mg total) by mouth every 8 (eight) hours as needed for moderate pain Do not start before November 16, 2022 , Disp: 90 tablet, Rfl: 2  •  Trulicity 7 35 ES/9 2WC SOPN, inject 0 5 milliliters ( 0 75 milligrams ) subcutaneously every week, Disp: 12 mL, Rfl: 1    No Known Allergies    Physical Exam:    /70 (BP Location: Left arm, Patient Position: Sitting, Cuff Size: Standard)   Pulse 60   Ht 6' 1" (1 854 m)   Wt 100 kg (221 lb 6 4 oz)   BMI 29 21 kg/m²     Constitutional:normal, well developed, well nourished, alert, in no distress and non-toxic and no overt pain behavior    Eyes:anicteric  HEENT:grossly intact  Neck:supple, symmetric, trachea midline and no masses   Pulmonary:even and unlabored  Cardiovascular:No edema or pitting edema present  Skin:Normal without rashes or lesions and well hydrated  Psychiatric:Mood and affect appropriate  Neurologic:Cranial Nerves II-XII grossly intact  Musculoskeletal:antalgic     Thoracic Spine Exam    Appearance:  Normal lordosis  Palpation/Tenderness:  left thoracic paraspinal tenderness  right thoracic paraspinal tenderness            Imaging        Orders Placed This Encounter   Procedures   • X-ray thoracic spine 3 views

## 2022-10-28 ENCOUNTER — OFFICE VISIT (OUTPATIENT)
Dept: PAIN MEDICINE | Facility: CLINIC | Age: 57
End: 2022-10-28

## 2022-10-28 VITALS
HEART RATE: 60 BPM | BODY MASS INDEX: 29.34 KG/M2 | WEIGHT: 221.4 LBS | HEIGHT: 73 IN | SYSTOLIC BLOOD PRESSURE: 108 MMHG | DIASTOLIC BLOOD PRESSURE: 70 MMHG

## 2022-10-28 DIAGNOSIS — G89.4 CHRONIC PAIN SYNDROME: Primary | ICD-10-CM

## 2022-10-28 DIAGNOSIS — M54.9 MID BACK PAIN: ICD-10-CM

## 2022-10-28 DIAGNOSIS — R10.31 GROIN PAIN, CHRONIC, RIGHT: ICD-10-CM

## 2022-10-28 DIAGNOSIS — F11.20 UNCOMPLICATED OPIOID DEPENDENCE (HCC): ICD-10-CM

## 2022-10-28 DIAGNOSIS — Z79.891 LONG-TERM CURRENT USE OF OPIATE ANALGESIC: ICD-10-CM

## 2022-10-28 DIAGNOSIS — M47.816 LUMBAR SPONDYLOSIS: ICD-10-CM

## 2022-10-28 DIAGNOSIS — G89.29 GROIN PAIN, CHRONIC, RIGHT: ICD-10-CM

## 2022-10-28 RX ORDER — TRAMADOL HYDROCHLORIDE 50 MG/1
50 TABLET ORAL EVERY 8 HOURS PRN
Qty: 90 TABLET | Refills: 2 | Status: SHIPPED | OUTPATIENT
Start: 2022-11-16

## 2022-10-28 NOTE — PATIENT INSTRUCTIONS

## 2022-11-01 ENCOUNTER — APPOINTMENT (OUTPATIENT)
Dept: RADIOLOGY | Facility: MEDICAL CENTER | Age: 57
End: 2022-11-01

## 2022-11-07 ENCOUNTER — TELEPHONE (OUTPATIENT)
Dept: NEPHROLOGY | Facility: CLINIC | Age: 57
End: 2022-11-07

## 2022-11-07 NOTE — TELEPHONE ENCOUNTER
Called spoke with patient advised patient to get labs done prior to 11/16 Appt  Patient understood and is okay with it

## 2022-11-08 ENCOUNTER — TELEPHONE (OUTPATIENT)
Dept: RADIOLOGY | Facility: CLINIC | Age: 57
End: 2022-11-08

## 2022-11-08 NOTE — TELEPHONE ENCOUNTER
----- Message from Croal Magaña, [address] sent at 11/8/2022  1:12 PM EST -----  X-ray of the thoracic spine showed age-related degenerative changes    Otherwise alignment was normal and no acute osseous abnormalities see note

## 2022-11-08 NOTE — TELEPHONE ENCOUNTER
Caller: patient    Doctor: Nicko Walker    Reason for call: returning nurses call    Call back#: [phone]

## 2022-11-11 ENCOUNTER — APPOINTMENT (OUTPATIENT)
Dept: LAB | Facility: MEDICAL CENTER | Age: 57
End: 2022-11-11

## 2022-11-11 DIAGNOSIS — N18.31 TYPE 2 DIABETES MELLITUS WITH STAGE 3A CHRONIC KIDNEY DISEASE, WITHOUT LONG-TERM CURRENT USE OF INSULIN (HCC): ICD-10-CM

## 2022-11-11 DIAGNOSIS — N18.31 STAGE 3A CHRONIC KIDNEY DISEASE (HCC): ICD-10-CM

## 2022-11-11 DIAGNOSIS — E11.22 TYPE 2 DIABETES MELLITUS WITH STAGE 3A CHRONIC KIDNEY DISEASE, WITHOUT LONG-TERM CURRENT USE OF INSULIN (HCC): ICD-10-CM

## 2022-11-11 DIAGNOSIS — E83.9 CHRONIC KIDNEY DISEASE-MINERAL AND BONE DISORDER: ICD-10-CM

## 2022-11-11 DIAGNOSIS — J45.20 MILD INTERMITTENT ASTHMA WITHOUT COMPLICATION: ICD-10-CM

## 2022-11-11 DIAGNOSIS — N18.9 CHRONIC KIDNEY DISEASE-MINERAL AND BONE DISORDER: ICD-10-CM

## 2022-11-11 DIAGNOSIS — M89.9 CHRONIC KIDNEY DISEASE-MINERAL AND BONE DISORDER: ICD-10-CM

## 2022-11-11 DIAGNOSIS — Z12.5 SCREENING FOR PROSTATE CANCER: ICD-10-CM

## 2022-11-11 DIAGNOSIS — E78.2 HYPERLIPIDEMIA, MIXED: ICD-10-CM

## 2022-11-11 DIAGNOSIS — Z00.00 MEDICARE ANNUAL WELLNESS VISIT, SUBSEQUENT: ICD-10-CM

## 2022-11-11 LAB
25(OH)D3 SERPL-MCNC: 33 NG/ML (ref 30–100)
ALBUMIN SERPL BCP-MCNC: 3.7 G/DL (ref 3.5–5)
ALP SERPL-CCNC: 47 U/L (ref 46–116)
ALT SERPL W P-5'-P-CCNC: 33 U/L (ref 12–78)
ANION GAP SERPL CALCULATED.3IONS-SCNC: 4 MMOL/L (ref 4–13)
AST SERPL W P-5'-P-CCNC: 23 U/L (ref 5–45)
BACTERIA UR QL AUTO: NORMAL /HPF
BASOPHILS # BLD AUTO: 0.05 THOUSANDS/ÂΜL (ref 0–0.1)
BASOPHILS NFR BLD AUTO: 1 % (ref 0–1)
BILIRUB SERPL-MCNC: 0.57 MG/DL (ref 0.2–1)
BILIRUB UR QL STRIP: NEGATIVE
BUN SERPL-MCNC: 20 MG/DL (ref 5–25)
CALCIUM SERPL-MCNC: 9.7 MG/DL (ref 8.3–10.1)
CHLORIDE SERPL-SCNC: 107 MMOL/L (ref 96–108)
CHOLEST SERPL-MCNC: 103 MG/DL
CLARITY UR: CLEAR
CO2 SERPL-SCNC: 28 MMOL/L (ref 21–32)
COLOR UR: ABNORMAL
CREAT SERPL-MCNC: 1.56 MG/DL (ref 0.6–1.3)
CREAT UR-MCNC: 201 MG/DL
EOSINOPHIL # BLD AUTO: 0.08 THOUSAND/ÂΜL (ref 0–0.61)
EOSINOPHIL NFR BLD AUTO: 2 % (ref 0–6)
ERYTHROCYTE [DISTWIDTH] IN BLOOD BY AUTOMATED COUNT: 12.5 % (ref 11.6–15.1)
EST. AVERAGE GLUCOSE BLD GHB EST-MCNC: 148 MG/DL
GLUCOSE P FAST SERPL-MCNC: 162 MG/DL (ref 65–99)
GLUCOSE UR STRIP-MCNC: ABNORMAL MG/DL
HBA1C MFR BLD: 6.8 %
HCT VFR BLD AUTO: 45.8 % (ref 36.5–46.1)
HDLC SERPL-MCNC: 22 MG/DL
HGB BLD-MCNC: 14.6 G/DL (ref 12–15.4)
HGB UR QL STRIP.AUTO: NEGATIVE
IMM GRANULOCYTES # BLD AUTO: 0.01 THOUSAND/UL (ref 0–0.2)
IMM GRANULOCYTES NFR BLD AUTO: 0 % (ref 0–2)
KETONES UR STRIP-MCNC: NEGATIVE MG/DL
LDLC SERPL CALC-MCNC: 15 MG/DL (ref 0–100)
LEUKOCYTE ESTERASE UR QL STRIP: NEGATIVE
LYMPHOCYTES # BLD AUTO: 1.43 THOUSANDS/ÂΜL (ref 0.6–4.47)
LYMPHOCYTES NFR BLD AUTO: 28 % (ref 14–44)
MCH RBC QN AUTO: 31.7 PG (ref 26.8–34.3)
MCHC RBC AUTO-ENTMCNC: 31.9 G/DL (ref 31.4–37.4)
MCV RBC AUTO: 100 FL (ref 82–98)
MONOCYTES # BLD AUTO: 0.43 THOUSAND/ÂΜL (ref 0.17–1.22)
MONOCYTES NFR BLD AUTO: 8 % (ref 4–12)
NEUTROPHILS # BLD AUTO: 3.14 THOUSANDS/ÂΜL (ref 1.85–7.62)
NEUTS SEG NFR BLD AUTO: 61 % (ref 43–75)
NITRITE UR QL STRIP: NEGATIVE
NON-SQ EPI CELLS URNS QL MICRO: NORMAL /HPF
NONHDLC SERPL-MCNC: 81 MG/DL
NRBC BLD AUTO-RTO: 0 /100 WBCS
PH UR STRIP.AUTO: 6 [PH]
PHOSPHATE SERPL-MCNC: 2.4 MG/DL (ref 2.7–4.5)
PLATELET # BLD AUTO: 229 THOUSANDS/UL (ref 149–390)
PMV BLD AUTO: 11.1 FL (ref 8.9–12.7)
POTASSIUM SERPL-SCNC: 4.5 MMOL/L (ref 3.5–5.3)
PROT SERPL-MCNC: 7 G/DL (ref 6.4–8.4)
PROT UR STRIP-MCNC: ABNORMAL MG/DL
PROT UR-MCNC: 10 MG/DL
PROT/CREAT UR: 0.05 MG/G{CREAT} (ref 0–0.1)
PSA SERPL-MCNC: 0.3 NG/ML (ref 0–4)
PTH-INTACT SERPL-MCNC: 42.6 PG/ML (ref 18.4–80.1)
RBC # BLD AUTO: 4.6 MILLION/UL (ref 3.88–5.12)
RBC #/AREA URNS AUTO: NORMAL /HPF
SODIUM SERPL-SCNC: 139 MMOL/L (ref 135–147)
SP GR UR STRIP.AUTO: 1.02 (ref 1–1.03)
TRIGL SERPL-MCNC: 328 MG/DL
UROBILINOGEN UR STRIP-ACNC: 2 MG/DL
WBC # BLD AUTO: 5.14 THOUSAND/UL (ref 4.31–10.16)
WBC #/AREA URNS AUTO: NORMAL /HPF

## 2022-11-16 ENCOUNTER — OFFICE VISIT (OUTPATIENT)
Dept: NEPHROLOGY | Facility: CLINIC | Age: 57
End: 2022-11-16

## 2022-11-16 VITALS
BODY MASS INDEX: 29.18 KG/M2 | WEIGHT: 220.2 LBS | DIASTOLIC BLOOD PRESSURE: 80 MMHG | SYSTOLIC BLOOD PRESSURE: 120 MMHG | OXYGEN SATURATION: 92 % | HEIGHT: 73 IN | HEART RATE: 66 BPM | RESPIRATION RATE: 16 BRPM | TEMPERATURE: 97.6 F

## 2022-11-16 DIAGNOSIS — E83.9 CHRONIC KIDNEY DISEASE-MINERAL AND BONE DISORDER: ICD-10-CM

## 2022-11-16 DIAGNOSIS — M89.9 CHRONIC KIDNEY DISEASE-MINERAL AND BONE DISORDER: ICD-10-CM

## 2022-11-16 DIAGNOSIS — S39.012D LUMBOSACRAL STRAIN, SUBSEQUENT ENCOUNTER: ICD-10-CM

## 2022-11-16 DIAGNOSIS — N18.31 TYPE 2 DIABETES MELLITUS WITH STAGE 3A CHRONIC KIDNEY DISEASE, WITHOUT LONG-TERM CURRENT USE OF INSULIN (HCC): ICD-10-CM

## 2022-11-16 DIAGNOSIS — N18.31 STAGE 3A CHRONIC KIDNEY DISEASE (HCC): Primary | ICD-10-CM

## 2022-11-16 DIAGNOSIS — E11.22 TYPE 2 DIABETES MELLITUS WITH STAGE 3A CHRONIC KIDNEY DISEASE, WITHOUT LONG-TERM CURRENT USE OF INSULIN (HCC): ICD-10-CM

## 2022-11-16 DIAGNOSIS — N18.9 CHRONIC KIDNEY DISEASE-MINERAL AND BONE DISORDER: ICD-10-CM

## 2022-11-16 NOTE — ASSESSMENT & PLAN NOTE
Lab Results   Component Value Date    EGFR 53 06/02/2022    EGFR 49 05/19/2022    EGFR 54 12/20/2021    CREATININE 1 56 (H) 11/11/2022    CREATININE 1 43 (H) 06/02/2022    CREATININE 1 53 (H) 05/19/2022   PTH and phosphorus along with the vitamin-D are within acceptable range and will continue to monitor

## 2022-11-16 NOTE — ASSESSMENT & PLAN NOTE
Lab Results   Component Value Date    HGBA1C 6 8 (H) 11/11/2022   Reasonable control    Importance of diabetic control and affect on kidneys to discuss with the patient

## 2022-11-16 NOTE — ASSESSMENT & PLAN NOTE
Lab Results   Component Value Date    EGFR 53 06/02/2022    EGFR 49 05/19/2022    EGFR 54 12/20/2021    CREATININE 1 56 (H) 11/11/2022    CREATININE 1 43 (H) 06/02/2022    CREATININE 1 53 (H) 05/19/2022   Renal function is quite stable    Advised hydration and avoiding nephrotoxic medication

## 2022-11-16 NOTE — PROGRESS NOTES
NEPHROLOGY OFFICE FOLLOW UP  Marisa Newell 61 y o  adult MRN: [id number]    Encounter: [id number] 11/16/2022    REASON FOR VISIT: Keena Acevedo is a 61 y o  adult who is here on 11/16/2022 for Chronic Kidney Disease and Follow-up    HPI:    Mei Antonio came in today for follow-up of CKD  65-year-old gentleman who is doing quite well  Does have chronic back pain for which he recently got injection     No other acute complaint     No chest pain no palpitation or shortness of breath  No nausea no vomiting     No abdominal discomfort      REVIEW OF SYSTEMS:    Review of Systems   Constitutional: Negative for activity change and fatigue  HENT: Negative for congestion and ear discharge  Eyes: Negative for photophobia and pain  Respiratory: Negative for apnea and choking  Cardiovascular: Negative for chest pain and palpitations  Gastrointestinal: Negative for abdominal distention and blood in stool  Endocrine: Negative for heat intolerance and polyphagia  Genitourinary: Negative for flank pain and urgency  Musculoskeletal: Negative for neck pain and neck stiffness  Skin: Negative for color change and wound  Allergic/Immunologic: Negative for food allergies and immunocompromised state  Neurological: Negative for seizures and facial asymmetry  Hematological: Negative for adenopathy  Does not bruise/bleed easily  Psychiatric/Behavioral: Negative for self-injury and suicidal ideas           PAST MEDICAL HISTORY:  Past Medical History:   Diagnosis Date   • Anxiety 3/10/2022   • Aorta aneurysm ([facility])     4 3   • Arm DVT (deep venous thromboembolism), acute ([facility] [address] ) 2035    complications of cardiac cath   • Asthma    • Chronic kidney disease    • CKD (chronic kidney disease), stage III ([facility])    • COPD (chronic obstructive pulmonary disease) ([facility])    • Depression    • Diabetes mellitus ([facility])    • Essential hypertriglyceridemia    • GERD (gastroesophageal reflux disease)    • Headache    • Hiatal hernia • Hyperlipidemia, mixed 2018   • Kidney stone    • Liver disease     FATTY LIVER   • Mild episode of recurrent major depressive disorder ([address] ) 3/10/2022   • PAC (premature atrial contraction)    • Prediabetes    • Renal calculi    • Sleep apnea    • Vestibular migraine        PAST SURGICAL HISTORY:  Past Surgical History:   Procedure Laterality Date   • CARPAL TUNNEL RELEASE Left    • COLONOSCOPY     • FL INJECTION RIGHT HIP (ARTHROGRAM)  2018   • FOOT SURGERY Left     FOREIGN BODY REMOVAL   • HERNIA REPAIR     • [place] COLONOSCOPY FLX DX W/COLLJ SPEC WHEN PFRMD N/A 2017    Procedure: COLONOSCOPY;  Surgeon: Tomy Singh MD;  Location: [facility]; Service: Gastroenterology   • [place] ESOPHAGOGASTRODUODENOSCOPY TRANSORAL DIAGNOSTIC N/A 10/31/2017    Procedure: ESOPHAGOGASTRODUODENOSCOPY (EGD); Surgeon: Tomy Singh MD;  Location: [facility];   Service: Gastroenterology   • MS TOTAL HIP ARTHROPLASTY Right 2020    Procedure: ARTHROPLASTY HIP TOTAL;  Surgeon: Beryle Scarpa, MD;  Location: [facility];  Service: Orthopedics       SOCIAL HISTORY:  Social History     Substance and Sexual Activity   Alcohol Use Not Currently    Comment: occasional beer     Social History     Substance and Sexual Activity   Drug Use No     Social History     Tobacco Use   Smoking Status Some Days   • Types: Cigars   • Last attempt to quit: 2014   • Years since quittin 2   Smokeless Tobacco Never   Tobacco Comments    never inhaled       FAMILY HISTORY:  Family History   Problem Relation Age of Onset   • Cancer Brother    • Prostate cancer Brother    • Leukemia Brother         his only brother dies from [address] or leukemia pt unsure he was only 47   • Arthritis Mother    • Heart attack Father    • Clotting disorder Father    • Schizoaffective Disorder  Sister    • Aortic aneurysm Other         abdominal       MEDICATIONS:    Current Outpatient Medications:   •  acetaminophen (TYLENOL) 500 mg tablet, Take 500 mg by mouth every 6 (six) hours as needed for mild pain, Disp: , Rfl:   •  albuterol (Ventolin HFA) 90 mcg/act inhaler, Inhale 2 puffs every 6 (six) hours as needed for wheezing or shortness of breath (cough) (Patient taking differently: Inhale 2 puffs every 6 (six) hours as needed for wheezing or shortness of breath (cough) PRN), Disp: 18 g, Rfl: 1  •  fenofibrate 160 MG tablet, take 1 tablet by mouth once daily, Disp: 90 tablet, Rfl: 3  •  glucose blood test strip, TEST BS TID, Disp: 300 each, Rfl: 5  •  glucose monitoring kit (FREESTYLE) monitoring kit, Use 1 each daily before breakfast, Disp: 1 each, Rfl: 0  •  Lancets MISC, Use daily before breakfast, Disp: 100 each, Rfl: 5  •  pantoprazole (PROTONIX) 40 mg tablet, take 1 tablet by mouth once daily, Disp: 90 tablet, Rfl: 3  •  rosuvastatin (CRESTOR) 5 mg tablet, Take 0 5 tablets (2 5 mg total) by mouth daily, Disp: 90 tablet, Rfl: 1  •  sildenafil (REVATIO) 20 mg tablet, TAKE 1 TABLET (20 MG TOTAL) BY MOUTH DAILY AS DIRECTED, Disp: 90 tablet, Rfl: 3  •  traMADol (ULTRAM) 50 mg tablet, Take 1 tablet (50 mg total) by mouth every 8 (eight) hours as needed for moderate pain Do not start before November 16, 2022 , Disp: 90 tablet, Rfl: 2  •  Trulicity 6 12 GL/3 8IG SOPN, inject 0 5 milliliters ( 0 75 milligrams ) subcutaneously every week, Disp: 12 mL, Rfl: 1    PHYSICAL EXAM:  Vitals:    11/16/22 1510   BP: 120/80   BP Location: Right arm   Patient Position: Sitting   Pulse: 66   Resp: 16   Temp: 97 6 °F (36 4 °C)   TempSrc: Temporal   SpO2: 92%   Weight: 99 9 kg (220 lb 3 2 oz)   Height: 6' 1" (1 854 m)     Body mass index is 29 05 kg/m²  Physical Exam  Constitutional:       General: He is not in acute distress  Appearance: He is well-developed  HENT:      Head: Normocephalic  Mouth/Throat:      Mouth: Oropharynx is clear and moist    Eyes:      General: No scleral icterus  Conjunctiva/sclera: Conjunctivae normal    Neck:      Vascular: No JVD  Cardiovascular:      Rate and Rhythm: Normal rate  Heart sounds: Normal heart sounds  Pulmonary:      Effort: Pulmonary effort is normal       Breath sounds: No wheezing  Abdominal:      Palpations: Abdomen is soft  Tenderness: There is no abdominal tenderness  Musculoskeletal:         General: No edema  Normal range of motion  Cervical back: Neck supple  Skin:     General: Skin is warm  Findings: No rash  Neurological:      Mental Status: He is alert and oriented to person, place, and time     Psychiatric:         Mood and Affect: Mood and affect normal          Behavior: Behavior normal          LAB RESULTS:  Results for orders placed or performed in visit on 11/11/22   CBC and differential   Result Value Ref Range    WBC 5 14 4 31 - 10 16 Thousand/uL    RBC 4 60 3 88 - 5 12 Million/uL    Hemoglobin 14 6 12 0 - 15 4 g/dL    Hematocrit 45 8 36 5 - 46 1 %     (H) 82 - 98 fL    MCH 31 7 26 8 - 34 3 pg    MCHC 31 9 31 4 - 37 4 g/dL    RDW 12 5 11 6 - 15 1 %    MPV 11 1 8 9 - 12 7 fL    Platelets 188 182 - 270 Thousands/uL    nRBC 0 /100 WBCs    Neutrophils Relative 61 43 - 75 %    Immat GRANS % 0 0 - 2 %    Lymphocytes Relative 28 14 - 44 %    Monocytes Relative 8 4 - 12 %    Eosinophils Relative 2 0 - 6 %    Basophils Relative 1 0 - 1 %    Neutrophils Absolute 3 14 1 85 - 7 62 Thousands/µL    Immature Grans Absolute 0 01 0 00 - 0 20 Thousand/uL    Lymphocytes Absolute 1 43 0 60 - 4 47 Thousands/µL    Monocytes Absolute 0 43 0 17 - 1 22 Thousand/µL    Eosinophils Absolute 0 08 0 00 - 0 61 Thousand/µL    Basophils Absolute 0 05 0 00 - 0 10 Thousands/µL   Phosphorus   Result Value Ref Range    Phosphorus 2 4 (L) 2 7 - 4 5 mg/dL   Protein / creatinine ratio, urine   Result Value Ref Range    Creatinine, Ur 201 0 mg/dL    Protein Urine Random 10 mg/dL    Prot/Creat Ratio, Ur 0 05 0 00 - 0 10   PTH, intact   Result Value Ref Range    PTH 42 6 18 4 - 80 1 pg/mL   UA (URINE) with reflex to Scope   Result Value Ref Range    Color, UA Light Yellow     Clarity, UA Clear     Specific Gravity, UA 1 021 1 003 - 1 030    pH, UA 6 0 4 5, 5 0, 5 5, 6 0, 6 5, 7 0, 7 5, 8 0    Leukocytes, UA Negative Negative    Nitrite, UA Negative Negative    Protein, UA Trace (A) Negative mg/dl    Glucose, UA Trace (A) Negative mg/dl    Ketones, UA Negative Negative mg/dl    Urobilinogen, UA 2 0 (A) <2 0 mg/dl mg/dl    Bilirubin, UA Negative Negative    Occult Blood, UA Negative Negative   Vitamin D 25 hydroxy   Result Value Ref Range    Vit D, 25-Hydroxy 33 0 30 0 - 100 0 ng/mL   Comprehensive metabolic panel   Result Value Ref Range    Sodium 139 135 - 147 mmol/L    Potassium 4 5 3 5 - 5 3 mmol/L    Chloride 107 96 - 108 mmol/L    CO2 28 21 - 32 mmol/L    ANION GAP 4 4 - 13 mmol/L    BUN 20 5 - 25 mg/dL    Creatinine 1 56 (H) 0 60 - 1 30 mg/dL    Glucose, Fasting 162 (H) 65 - 99 mg/dL    Calcium 9 7 8 3 - 10 1 mg/dL    AST 23 5 - 45 U/L    ALT 33 12 - 78 U/L    Alkaline Phosphatase 47 46 - 116 U/L    Total Protein 7 0 6 4 - 8 4 g/dL    Albumin 3 7 3 5 - 5 0 g/dL    Total Bilirubin 0 57 0 20 - 1 00 mg/dL    eGFR     Lipid panel   Result Value Ref Range    Cholesterol 103 See Comment mg/dL    Triglycerides 328 (H) See Comment mg/dL    HDL, Direct 22 mg/dL    LDL Calculated 15 0 - 100 mg/dL    Non-HDL-Chol (CHOL-HDL) 81 mg/dl   PSA, Total Screen   Result Value Ref Range    PSA 0 3 0 0 - 4 0 ng/mL   HEMOGLOBIN A1C W/ EAG ESTIMATION   Result Value Ref Range    Hemoglobin A1C 6 8 (H) Normal 3 8-5 6%; PreDiabetic 5 7-6 4%;  Diabetic >=6 5%; Glycemic control for adults with diabetes <7 0% %     mg/dl   Urine Microscopic   Result Value Ref Range    RBC, UA None Seen None Seen, 1-2 /hpf    WBC, UA None Seen None Seen, 1-2 /hpf    Epithelial Cells None Seen None Seen, Occasional /hpf    Bacteria, UA None Seen None Seen, Occasional /hpf       ASSESSMENT and PLAN:      CKD (chronic kidney disease) stage 3, GFR 30-59 ml/min  Lab Results   Component Value Date    EGFR 53 06/02/2022    EGFR 49 05/19/2022    EGFR 54 12/20/2021    CREATININE 1 56 (H) 11/11/2022    CREATININE 1 43 (H) 06/02/2022    CREATININE 1 53 (H) 05/19/2022   Renal function is quite stable  Advised hydration and avoiding nephrotoxic medication    Chronic kidney disease-mineral and bone disorder  Lab Results   Component Value Date    EGFR 53 06/02/2022    EGFR 49 05/19/2022    EGFR 54 12/20/2021    CREATININE 1 56 (H) 11/11/2022    CREATININE 1 43 (H) 06/02/2022    CREATININE 1 53 (H) 05/19/2022   PTH and phosphorus along with the vitamin-D are within acceptable range and will continue to monitor    Lumbosacral strain  Advised to avoid any nephrotoxic pain killer    Type 2 diabetes mellitus with stage 3a chronic kidney disease, without long-term current use of insulin ([address] )    Lab Results   Component Value Date    HGBA1C 6 8 (H) 11/11/2022   Reasonable control  Importance of diabetic control and affect on kidneys to discuss with the patient      Everything discussed with the patient at length  I will see him back in 6 months  We will get blood and urine test before that visit      Portions of the record may have been created with voice recognition software  Occasional wrong word or "sound a like" substitutions may have occurred due to the inherent limitations of voice recognition software  Read the chart carefully and recognize, using context, where substitutions have occurred  If you have any questions, please contact the dictating provider

## 2022-11-22 DIAGNOSIS — E78.2 MIXED HYPERLIPIDEMIA: ICD-10-CM

## 2022-11-22 RX ORDER — ROSUVASTATIN CALCIUM 5 MG/1
TABLET, COATED ORAL
Qty: 90 TABLET | Refills: 1 | Status: SHIPPED | OUTPATIENT
Start: 2022-11-22

## 2022-12-09 ENCOUNTER — RA CDI HCC (OUTPATIENT)
Dept: OTHER | Facility: HOSPITAL | Age: 57
End: 2022-12-09

## 2022-12-09 NOTE — PROGRESS NOTES
[address]  coding opportunities       Chart reviewed, no opportunity found: CHART REVIEWED, NO OPPORTUNITY FOUND        Patients Insurance     Medicare Insurance: Medicare

## 2022-12-16 ENCOUNTER — TELEMEDICINE (OUTPATIENT)
Dept: FAMILY MEDICINE CLINIC | Facility: CLINIC | Age: 57
End: 2022-12-16

## 2022-12-16 DIAGNOSIS — N18.9 CHRONIC KIDNEY DISEASE-MINERAL AND BONE DISORDER: ICD-10-CM

## 2022-12-16 DIAGNOSIS — E11.22 TYPE 2 DIABETES MELLITUS WITH STAGE 3A CHRONIC KIDNEY DISEASE, WITHOUT LONG-TERM CURRENT USE OF INSULIN (HCC): Primary | ICD-10-CM

## 2022-12-16 DIAGNOSIS — N18.31 TYPE 2 DIABETES MELLITUS WITH STAGE 3A CHRONIC KIDNEY DISEASE, WITHOUT LONG-TERM CURRENT USE OF INSULIN (HCC): Primary | ICD-10-CM

## 2022-12-16 DIAGNOSIS — E78.2 HYPERLIPIDEMIA, MIXED: ICD-10-CM

## 2022-12-16 DIAGNOSIS — E78.2 MIXED HYPERLIPIDEMIA: ICD-10-CM

## 2022-12-16 DIAGNOSIS — N18.31 STAGE 3A CHRONIC KIDNEY DISEASE (HCC): ICD-10-CM

## 2022-12-16 DIAGNOSIS — M89.9 CHRONIC KIDNEY DISEASE-MINERAL AND BONE DISORDER: ICD-10-CM

## 2022-12-16 DIAGNOSIS — E83.9 CHRONIC KIDNEY DISEASE-MINERAL AND BONE DISORDER: ICD-10-CM

## 2022-12-16 RX ORDER — ROSUVASTATIN CALCIUM 5 MG/1
2.5 TABLET, COATED ORAL DAILY
Qty: 90 TABLET | Refills: 1
Start: 2022-12-16

## 2022-12-16 NOTE — PROGRESS NOTES
Virtual Regular Visit    Verification of patient location:    Patient is located in the following state in which I hold an active license [place]      Assessment/Plan:    Problem List Items Addressed This Visit        Endocrine    Type 2 diabetes mellitus with stage 3a chronic kidney disease, without long-term current use of insulin ([facility] [address] ) - Primary       Genitourinary    Chronic kidney disease-mineral and bone disorder    CKD (chronic kidney disease) stage 3, GFR 30-59 ml/min ([facility])       Other    Hyperlipidemia, mixed    Relevant Medications    rosuvastatin (CRESTOR) 5 mg tablet   Other Visit Diagnoses     Mixed hyperlipidemia        Relevant Medications    rosuvastatin (CRESTOR) 5 mg tablet        DM: Much improved, continue current regimen   HLD: LDL persistently low, but TG still elevated -- pt already on fenofibrate   CKD: Stable, f/u with Nephro as scheduled          Reason for visit is   Chief Complaint   Patient presents with   • Virtual Regular Visit        Encounter provider James Ramos DO    Provider located at [place] [address]  [address] [place] [id number]      Recent Visits  No visits were found meeting these conditions  Showing recent visits within past 7 days and meeting all other requirements  Today's Visits  Date Type Provider Dept   12/16/22 Telemedicine James Ramos DO [facility]   Showing today's visits and meeting all other requirements  Future Appointments  No visits were found meeting these conditions  Showing future appointments within next 150 days and meeting all other requirements       The patient was identified by name and date of birth  Natalie Boo was informed that this is a telemedicine visit and that the visit is being conducted through the [employer]  He agrees to proceed     My office door was closed  No one else was in the room    He acknowledged consent and understanding of privacy and security of the video platform  The patient has agreed to participate and understands they can discontinue the visit at any time  Visit initiated on video, but provider unable to hear patient, so transition to telephone only  Provider was able to visualize pt on video prior  Patient is aware this is a billable service  Subjective  Giovana Gilbert is a 61 y o  adult who presents for chronic follow up and lab review as below  HPI     DM: A1c 6 8% (down from 7 5); home sugars once per day; denies hypo/hyperglycemic symptoms   HLD: Lipids: Total 103, LDL 15, HDL 22, ; LFTs WNL; following with Cardio   CKD w/ mineral/bone disease: Cr 1 56 (stable); follows with Nephro     PSA WNL     About 1 5 months ago, had a significant illness -- symptoms have resolved  Past Medical History:   Diagnosis Date   • Anxiety 3/10/2022   • Aorta aneurysm (HCC)     4 3   • Arm DVT (deep venous thromboembolism), acute ([facility] [address] ) 7006    complications of cardiac cath   • Asthma    • Chronic kidney disease    • CKD (chronic kidney disease), stage III (HCC)    • COPD (chronic obstructive pulmonary disease) ([facility])    • Depression    • Diabetes mellitus (HCC)    • Essential hypertriglyceridemia    • GERD (gastroesophageal reflux disease)    • Headache    • Hiatal hernia    • Hyperlipidemia, mixed 5/7/2018   • Kidney stone    • Liver disease     FATTY LIVER   • Mild episode of recurrent major depressive disorder ([facility] [address] ) 3/10/2022   • PAC (premature atrial contraction)    • Prediabetes    • Renal calculi    • Sleep apnea    • Vestibular migraine        Past Surgical History:   Procedure Laterality Date   • CARPAL TUNNEL RELEASE Left    • COLONOSCOPY     • FL INJECTION RIGHT HIP (ARTHROGRAM)  8/20/2018   • FOOT SURGERY Left     FOREIGN BODY REMOVAL   • HERNIA REPAIR     • NV COLONOSCOPY FLX DX W/COLLJ SPEC WHEN PFRMD N/A 8/7/2017    Procedure: COLONOSCOPY;  Surgeon: Lilliana Siddiqi MD;  Location: [facility];   Service: Gastroenterology   • NV ESOPHAGOGASTRODUODENOSCOPY TRANSORAL DIAGNOSTIC N/A 10/31/2017    Procedure: ESOPHAGOGASTRODUODENOSCOPY (EGD); Surgeon: Isaias Oh MD;  Location: [facility]; Service: Gastroenterology   • [place] TOTAL HIP ARTHROPLASTY Right 9/28/2020    Procedure: ARTHROPLASTY HIP TOTAL;  Surgeon: Liliya Banda MD;  [facility];  Service: Orthopedics       Current Outpatient Medications   Medication Sig Dispense Refill   • rosuvastatin (CRESTOR) 5 mg tablet Take 0 5 tablets (2 5 mg total) by mouth daily 90 tablet 1   • acetaminophen (TYLENOL) 500 mg tablet Take 500 mg by mouth every 6 (six) hours as needed for mild pain     • albuterol (Ventolin HFA) 90 mcg/act inhaler Inhale 2 puffs every 6 (six) hours as needed for wheezing or shortness of breath (cough) (Patient taking differently: Inhale 2 puffs every 6 (six) hours as needed for wheezing or shortness of breath (cough) PRN) 18 g 1   • fenofibrate 160 MG tablet take 1 tablet by mouth once daily 90 tablet 3   • glucose blood test strip TEST BS  each 5   • glucose monitoring kit (FREESTYLE) monitoring kit Use 1 each daily before breakfast 1 each 0   • Lancets MISC Use daily before breakfast 100 each 5   • pantoprazole (PROTONIX) 40 mg tablet take 1 tablet by mouth once daily 90 tablet 3   • sildenafil (REVATIO) 20 mg tablet TAKE 1 TABLET (20 MG TOTAL) BY MOUTH DAILY AS DIRECTED 90 tablet 3   • traMADol (ULTRAM) 50 mg tablet Take 1 tablet (50 mg total) by mouth every 8 (eight) hours as needed for moderate pain Do not start before November 16, 2022  90 tablet 2   • Trulicity 4 81 VJ/1 0GB SOPN inject 0 5 milliliters ( 0 75 milligrams ) subcutaneously every week 12 mL 1     No current facility-administered medications for this visit  No Known Allergies    Review of Systems   Constitutional: Negative for diaphoresis  Eyes: Negative for visual disturbance  Respiratory: Negative for cough and shortness of breath      Cardiovascular: Negative for chest pain, palpitations and leg swelling  Gastrointestinal: Negative for abdominal pain, constipation and diarrhea  Endocrine: Negative for polydipsia and polyuria  Neurological: Negative for dizziness, tremors and headaches  Video Exam    There were no vitals filed for this visit  Physical Exam  Vitals and nursing note reviewed  Constitutional:       General: He is not in acute distress  Appearance: Normal appearance  HENT:      Head: Normocephalic and atraumatic  Pulmonary:      Effort: Pulmonary effort is normal  No respiratory distress  Neurological:      General: No focal deficit present  Mental Status: He is alert     Psychiatric:         Mood and Affect: Mood normal           I spent 8 minutes directly with the patient during this visit

## 2023-01-27 ENCOUNTER — OFFICE VISIT (OUTPATIENT)
Dept: PAIN MEDICINE | Facility: CLINIC | Age: 58
End: 2023-01-27

## 2023-01-27 VITALS — HEIGHT: 73 IN | WEIGHT: 220 LBS | BODY MASS INDEX: 29.16 KG/M2

## 2023-01-27 DIAGNOSIS — M47.816 LUMBAR SPONDYLOSIS: ICD-10-CM

## 2023-01-27 DIAGNOSIS — G89.4 CHRONIC PAIN SYNDROME: Primary | ICD-10-CM

## 2023-01-27 DIAGNOSIS — F11.20 UNCOMPLICATED OPIOID DEPENDENCE (HCC): ICD-10-CM

## 2023-01-27 DIAGNOSIS — Z79.891 LONG-TERM CURRENT USE OF OPIATE ANALGESIC: ICD-10-CM

## 2023-01-27 RX ORDER — TRAMADOL HYDROCHLORIDE 50 MG/1
50 TABLET ORAL EVERY 8 HOURS PRN
Qty: 90 TABLET | Refills: 2 | Status: SHIPPED | OUTPATIENT
Start: 2023-02-26

## 2023-01-27 NOTE — PROGRESS NOTES
Assessment:  1  Chronic pain syndrome    2  Mid back pain    3  Lumbar spondylosis    4  Long-term current use of opiate analgesic    5  Uncomplicated opioid dependence ([address] )    6  Groin pain, chronic, right          Plan:  Orders Placed This Encounter   Procedures   • MM ALL_Prescribed Meds and Special Instructions     Order Specific Question:   Mill[facility]ium Is ALBUTEROL prescribed? Answer:   Yes     Order Specific Question:   [place] Is TRAMADOL prescribed? Answer:    Yes   • MM DT_Alprazolam Definitive Test   • MM DT_Amphetamine Definitive Test   • MM DT_Buprenorphine Definitive Test   • MM DT_Butalbital Definitive Test   • MM DT_Clonazepam Definitive Test   • MM DT_Cocaine Definitive Test   • MM DT_Codeine Definitive Test   • MM DT_Dextromethorphan Definitive Test   • MM Diazepam Definitive Test   • MM DT_Ethyl Glucuronide/Ethyl Sulfate Definitive Test   • MM DT_Fentanyl Definitive Test   • MM DT_Heroin Definitive Test   • MM DT_Hydrocodone Definitive Test   • MM DT_Hydromorphone Definitive Test   • MM DT_Kratom Definitive Test   • MM DT_Levorphanol Definitive Test   • MM Lorazepam Definitive Test   • MM DT_MDMA Definitive Test   • MM DT_Meperidine Definitive Test   • MM DT_Methadone Definitive Test   • MM DT_Methamphetamine Definitive Test   • MM DT_Methylphenidate Definitive Test   • MM DT_Morphine Definitive Test   • MM DT_Oxazepam Definitive Test   • MM DT_Oxycodone Definitive Test   • MM DT_Oxymorphone Definitive Test   • MM DT_Phencyclidine Definitive Test   • MM DT_Phenobarbital Definitive Test   • MM DT_Phentermine Definitive Test   • MM DT_Secobarbital Definitive Test   • MM DT_Spice Definitive Test   • MM DT_Tapentadol Definitive Test   • MM DT_Temazapam Definitive Test   • MM DT_THC Definitive Test   • MM DT_Tramadol Definitive Test     New Medications Ordered This Visit   Medications   • traMADol (Ultram) 50 mg tablet     Sig: Take 1 tablet (50 mg total) by mouth every 8 (eight) hours as needed for moderate pain Do not fill until 2/26/2023 and refill 3/28/2023 and 4/27/2023 Do not start before February 26, 2023  Dispense:  90 tablet     Refill:  2       The patient continues to report an overall reduction of his pain level and improvement with his functioning without significant side effects using tramadol 50 mg tablet patient uses 1 tablet every 8 hours as needed for moderate pain, therefore I will continue the patient on this medication  Tramadol 50 mg tablet E-prescribed to the patient's pharmacy with a do not fill until date of 2/26/2023 , and refill dates of 3/28/2023 and 4/27/2023  Patient does report worsening low back pain patient does have positive bilateral facet loading and his imaging does have facet hypertrophy  I explained to the patient that he may benefit from medial branch block which then would lead to a confirmatory medial branch block with a possible radiofrequency ablation  Patient provided pamphlets on these procedures as well and will consider this procedure in the future  There are risks associated with opioid medications, including dependence, addiction and tolerance  The patient understands and agrees to use these medications only as prescribed  Potential side effects of the medications include, but are not limited to, constipation, drowsiness, addiction, impaired judgment and risk of fatal overdose if not taken as prescribed  The patient was warned against driving while taking sedation medications  Sharing medications is a felony  At this point in time, the patient is showing no signs of addiction, abuse, diversion or suicidal ideation  A urine drug screen was collected at today's office visit as part of our medication management protocol  The point of care testing results were appropriate for what was being prescribed  The specimen will be sent for confirmatory testing   The drug screen is medically necessary because the patient is either dependent on opioid medication or is being considered for opioid medication therapy and the results could impact ongoing or future treatment  The drug screen is to evaluate for the presences or absence of prescribed, non-prescribed, and/or illicit drugs/substances  [place] Prescription Drug Monitoring Program report was reviewed and was appropriate       My impressions and treatment recommendations were discussed in detail with the patient who verbalized understanding and had no further questions  Discharge instructions were provided  I personally saw and examined the patient and I agree with the above discussed plan of care  History of Present Illness:  Milagros Carty is a 61 y o  adult who presents for a follow up office visit in regards to Back Pain  Patient states that his pain is now worse in the morning and in the evening  The patient's pain is constant in nature  And the quality of the patient's pain is described as pressure-like and shooting  Patient indicates that his pain is located in his mid back, mid low back, right groin, right hip, right thigh and bilateral knees  Patient states his pain is a 3/10 on the verbal scale  Patient does state the amount of pain relief he is now obtaining from his current pain relievers is not enough to make a real difference in his life  Pain Contract Signed: 5/12/2022    Last Urine Drug Screen: 1/27/2023    Other than as stated above, the patient denies any interval changes in medications, medical condition, mental condition, symptoms, or allergies since the last office visit  Other Symptoms:  The patient does not have numbness  The patient does not have weakness  The patient does not have bladder dysfunction  The patient does not have bowel dysfunction  The patient does not have chills and fever, night sweats or unintentional weight loss         Current Pain Medications:  traMADol (ULTRAM) 50 mg tablet        Sig: Take 1 tablet (50 mg total) by mouth every 8 (eight) hours as needed for moderate pain Do not start before November 16, 2022        Dispense:  90 tablet       Refill:  2       Fill on 11/16/2022   Refill on 12/16/2022 and 1/16/2023       A urine drug screen was collected at today's office visit as part of our medication management protocol  The point of care testing results were appropriate for what was being prescribed  The specimen will be sent for confirmatory testing  The drug screen is medically necessary because the patient is either dependent on opioid medication or is being considered for opioid medication therapy and the results could impact ongoing or future treatment  The drug screen is to evaluate for the presences or absence of prescribed, non-prescribed, and/or illicit drugs/substances  Imaging:    XR hip/pelv 2-3 vws right    Result Date: 5/21/2022  Impression     No acute osseous abnormality  XR spine thoracic 3 vw    Result Date: 11/1/2022      I have personally reviewed and/or updated the patient's past medical history, past surgical history, family history, social history, current medications, allergies, and vital signs today  Review of Systems  Respiratory: Negative for shortness of breath  Cardiovascular: Negative for chest pain  Gastrointestinal: Negative for constipation, diarrhea, nausea and vomiting  Musculoskeletal: Positive for arthralgias and gait problem  Negative for joint swelling and myalgias  Decreased ROM    Skin: Negative for rash  Neurological: Positive for dizziness  Negative for seizures and weakness  All other systems reviewed and are negative      Patient Active Problem List   Diagnosis   • Mild intermittent asthma without complication   • Ascending aortic aneurysm   • Bicuspid aortic valve   • Allergic rhinitis   • GERD (gastroesophageal reflux disease)   • Hiatal hernia   • Type 2 diabetes mellitus with stage 3a chronic kidney disease, without long-term current use of insulin ([facility] ) • CKD (chronic kidney disease) stage 3, GFR 30-59 ml/min ([facility])   • Hyperlipidemia, mixed   • Vitamin D deficiency   • Renal cyst, left   • Hepatic cyst   • Fatty liver disease, nonalcoholic   • Erectile dysfunction   • Carpal tunnel syndrome of right wrist   • Chronic pain of right knee   • Vestibular migraine   • Patellar tendinitis of right knee   • Benign paroxysmal positional vertigo due to bilateral vestibular disorder   • Hip impingement syndrome, right   • Primary osteoarthritis of right hip   • Ulnar neuropathy of both upper extremities   • Lumbosacral strain   • Tarsal tunnel syndrome of right side   • Cubital tunnel syndrome, bilateral   • Groin pain, chronic, right   • Chronic pain syndrome   • Chronic kidney disease-mineral and bone disorder   • Uncomplicated opioid dependence ([facility] [address] )   • Arthritis of right hip       Past Medical History:   Diagnosis Date   • Anxiety 3/10/2022   • Aorta aneurysm ([facility])     4 3   • Arm D[facility]embolism[facility] 9157    complications of cardiac cath   • Asthma    • Chronic kidney disease    • CKD (chronic kidney disease), stage III ([facility])    • COPD (chronic obstructive pulmonary disease) ([facility])    • Depression    • Diabetes mellitus ([facility] [address] )    • Essential hypertriglyceridemia    • GERD (gastroesophageal reflux disease)    • Headache    • Hiatal hernia    • Hyperlipidemia, mixed 5/7/2018   • Kidney stone    • Liver disease     FATTY LIVER   • Mild episode of recurrent major depressive disorder ([facility] [address] ) 3/10/2022   • PAC (premature atrial contraction)    • Prediabetes    • Renal calculi    • Sleep apnea    • Vestibular migraine        Past Surgical History:   Procedure Laterality Date   • CARPAL TUNNEL RELEASE Left    • COLONOSCOPY     • FL INJECTION RIGHT HIP (ARTHROGRAM)  8/20/2018   • FOOT SURGERY Left     FOREIGN BODY REMOVAL   • HERNIA REPAIR     • GA ARTHRP ACETBLR/PROX FEM PROSTC AGRFT/ALGRFT Right 9/28/2020    Procedure: ARTHROPLASTY HIP TOTAL;  Surgeon: Shabnam Martinez MD;  [facility];  Service: Orthopedics   • GA COLONOSCOPY FLX DX W/COLLJ [facility] WHEN PFRMD N/A 2017    Procedure: COLONOSCOPY;  Surgeon: Cassandra Carrasco MD;  [facility]: [facility]; Service: Gastroenterology   • GA ESOPHAGOGASTRODUODENOSCOPY TRANSORAL DIAGNOSTIC N/A 10/31/2017    Procedure: ESOPHAGOGASTRODUODENOSCOPY (EGD); Surgeon: Cassandra Carrasco MD;  [facility];   Service: Gastroenterology       Family History   Problem Relation Age of Onset   • Cancer Brother    • Prostate cancer Brother    • Leukemia Brother         his only brother dies from [address] or leukemia pt unsure he was only 47   • Arthritis Mother    • Heart attack Father    • Clotting disorder Father    • Schizoaffective Disorder  Sister    • Aortic aneurysm Other         abdominal       Social History     Occupational History   • Occupation: unemployed   Tobacco Use   • Smoking status: Some Days     Types: Cigars     Last attempt to quit: 2014     Years since quittin 4   • Smokeless tobacco: Never   • Tobacco comments:     never inhaled   Vaping Use   • Vaping Use: Never used   Substance and Sexual Activity   • Alcohol use: Not Currently     Comment: occasional beer   • Drug use: No   • Sexual activity: Yes     Partners: Female       Current Outpatient Medications on File Prior to Visit   Medication Sig   • acetaminophen (TYLENOL) 500 mg tablet Take 500 mg by mouth every 6 (six) hours as needed for mild pain   • albuterol (Ventolin HFA) 90 mcg/act inhaler Inhale 2 puffs every 6 (six) hours as needed for wheezing or shortness of breath (cough) (Patient taking differently: Inhale 2 puffs every 6 (six) hours as needed for wheezing or shortness of breath (cough) PRN)   • fenofibrate 160 MG tablet take 1 tablet by mouth once daily   • glucose blood test strip TEST BS TID   • glucose monitoring kit (FREESTYLE) monitoring kit Use 1 each daily before breakfast   • Lancets MISC Use daily before breakfast   • pantoprazole (PROTONIX) 40 mg tablet take 1 tablet by mouth once daily   • rosuvastatin (CRESTOR) 5 mg tablet Take 0 5 tablets (2 5 mg total) by mouth daily   • sildenafil (REVATIO) 20 mg tablet TAKE 1 TABLET (20 MG TOTAL) BY MOUTH DAILY AS DIRECTED   • traMADol (ULTRAM) 50 mg tablet Take 1 tablet (50 mg total) by mouth every 8 (eight) hours as needed for moderate pain Do not start before November 16, 2022  • Trulicity 2 96 DV/3 8KV SOPN inject 0 5 milliliters ( 0 75 milligrams ) subcutaneously every week     No current facility-administered medications on file prior to visit  No Known Allergies    Physical Exam:    /74  Bac[place] 99 8 kg (220 lb)  Height 6'1"  Pain 3/10    Constitutional:normal, well developed, well nourished, alert, in no distress and non-toxic and no overt pain behavior    Eyes:anicteric  HEENT:grossly intact  Neck:supple, symmetric, trachea midline and no masses   Pulmonary:even and unlabored  Cardiovascular:No edema or pitting edema present  Skin:Normal without rashes or lesions and well hydrated  Psychiatric:Mood and affect appropriate  Neurologic:Cranial Nerves II-XII grossly intact  Musculoskeletal:antalgic bilateral lumbar facet loading: Positive

## 2023-01-27 NOTE — PATIENT INSTRUCTIONS

## 2023-01-31 ENCOUNTER — OFFICE VISIT (OUTPATIENT)
Dept: URGENT CARE | Facility: MEDICAL CENTER | Age: 58
End: 2023-01-31

## 2023-01-31 VITALS
TEMPERATURE: 97.3 F | WEIGHT: 220 LBS | RESPIRATION RATE: 18 BRPM | OXYGEN SATURATION: 96 % | DIASTOLIC BLOOD PRESSURE: 77 MMHG | SYSTOLIC BLOOD PRESSURE: 130 MMHG | HEART RATE: 68 BPM | BODY MASS INDEX: 29.16 KG/M2 | HEIGHT: 73 IN

## 2023-01-31 DIAGNOSIS — R05.1 ACUTE COUGH: Primary | ICD-10-CM

## 2023-01-31 RX ORDER — BENZONATATE 100 MG/1
100 CAPSULE ORAL 3 TIMES DAILY PRN
Qty: 20 CAPSULE | Refills: 0 | Status: SHIPPED | OUTPATIENT
Start: 2023-01-31

## 2023-01-31 RX ORDER — AZITHROMYCIN 250 MG/1
TABLET, FILM COATED ORAL
Qty: 6 TABLET | Refills: 0 | Status: SHIPPED | OUTPATIENT
Start: 2023-01-31 | End: 2023-02-04

## 2023-01-31 NOTE — PATIENT INSTRUCTIONS
Cough  Zithromax as directed  Tessalon process needed for cough  COVID test sent  Follow up with PCP in 3-5 days  Proceed to  ER if symptoms worsen

## 2023-01-31 NOTE — PROGRESS NOTES
[facility]        NAME: Kim Linda is a 61 y o  adult  : 1965    MRN: [id number]  DATE: 2023  TIME: 8:32 AM    Assessment and Plan   Acute cough [R05 1]  1  Acute cough  azithromycin (ZITHROMAX) 250 mg tablet    benzonatate (TESSALON PERLES) 100 mg capsule    Covid/Flu-Office Collect            Patient Instructions     Cough  Zithromax as directed  Tessalon process needed for cough  COVID test sent  Follow up with PCP in 3-5 days  Proceed to  ER if symptoms worsen  Chief Complaint     Chief Complaint   Patient presents with   • Cold Like Symptoms     Pt  With cough, congestion, intermittent headaches that began about a week ago  Reports that he had sore throat and ear pain at the beginning of the illness,but no longer has pain  He was exposed to RSV         History of Present Illness       79-year-old male who presents complaining of cough that was nonproductive at the beginning but has become productive of yellow sputum over the last 3 days  Patient states that the cough has been there for 8 days after exposure to RSV from his parents  Patient denies chest pain, shortness of breath, fevers, chills  Has been taking over-the-counter medications with no relief  Review of Systems   Review of Systems   Constitutional: Negative for activity change, appetite change, chills, diaphoresis, fatigue and fever  HENT: Positive for congestion and rhinorrhea  Negative for ear discharge, ear pain, facial swelling, hearing loss, mouth sores, nosebleeds, postnasal drip, sinus pressure, sinus pain, sneezing, sore throat and voice change  Respiratory: Positive for cough  Negative for apnea, choking, chest tightness, shortness of breath, wheezing and stridor  Cardiovascular: Negative            Current Medications       Current Outpatient Medications:   •  acetaminophen (TYLENOL) 500 mg tablet, Take 500 mg by mouth every 6 (six) hours as needed for mild pain, Disp: , Rfl:   • albuterol (Ventolin HFA) 90 mcg/act inhaler, Inhale 2 puffs every 6 (six) hours as needed for wheezing or shortness of breath (cough) (Patient taking differently: Inhale 2 puffs every 6 (six) hours as needed for wheezing or shortness of breath (cough) PRN), Disp: 18 g, Rfl: 1  •  azithromycin (ZITHROMAX) 250 mg tablet, Take 2 tablets today then 1 tablet daily x 4 days, Disp: 6 tablet, Rfl: 0  •  benzonatate (TESSALON PERLES) 100 mg capsule, Take 1 capsule (100 mg total) by mouth 3 (three) times a day as needed for cough, Disp: 20 capsule, Rfl: 0  •  fenofibrate 160 MG tablet, take 1 tablet by mouth once daily, Disp: 90 tablet, Rfl: 3  •  glucose blood test strip, TEST BS TID, Disp: 300 each, Rfl: 5  •  glucose monitoring kit (FREESTYLE) monitoring kit, Use 1 each daily before breakfast, Disp: 1 each, Rfl: 0  •  Lancets MISC, Use daily before breakfast, Disp: 100 each, Rfl: 5  •  pantoprazole (PROTONIX) 40 mg tablet, take 1 tablet by mouth once daily, Disp: 90 tablet, Rfl: 3  •  rosuvastatin (CRESTOR) 5 mg tablet, Take 0 5 tablets (2 5 mg total) by mouth daily, Disp: 90 tablet, Rfl: 1  •  traMADol (ULTRAM) 50 mg tablet, Take 1 tablet (50 mg total) by mouth every 8 (eight) hours as needed for moderate pain Do not start before November 16, 2022 , Disp: 90 tablet, Rfl: 2  •  Trulicity 3 84 XD/9 4HQ SOPN, inject 0 5 milliliters ( 0 75 milligrams ) subcutaneously every week, Disp: 12 mL, Rfl: 1  •  sildenafil (REVATIO) 20 mg tablet, TAKE 1 TABLET (20 MG TOTAL) BY MOUTH DAILY AS DIRECTED, Disp: 90 tablet, Rfl: 3  •  [START ON 2/26/2023] traMADol (Ultram) 50 mg tablet, Take 1 tablet (50 mg total) by mouth every 8 (eight) hours as needed for moderate pain Do not fill until 2/26/2023 and refill 3/28/2023 and 4/27/2023 Do not start before February 26, 2023 , Disp: 90 tablet, Rfl: 2    Current Allergies     Allergies as of 01/31/2023   • (No Known Allergies)            The following portions of the patient's history were reviewed and updated as appropriate: allergies, current medications, past family history, past medical history, past social history, past surgical history and problem list      Past Medical History:   Diagnosis Date   • Anxiety 3/10/2022   • Aorta aneurysm ([place] [address] )     4 3   • Arm DVT (deep venous thromboembolism), acute ([place] [address] ) 0165    complications of cardiac cath   • Asthma    • Chronic kidney disease    • CKD (chronic kidney disease), stage III (HCC)    • COPD (chronic obstructive pulmonary disease) ([place] [address] )    • Depression    • Diabetes mellitus ([place] [address] )    • Essential hypertriglyceridemia    • GERD (gastroesophageal reflux disease)    • Headache    • Hiatal hernia    • Hyperlipidemia, mixed 5/7/2018   • Kidney stone    • Liver disease     FATTY LIVER   • Mild episode of recurrent major depressive disorder ([place] [address] ) 3/10/2022   • PAC (premature atrial contraction)    • Prediabetes    • Renal calculi    • Sleep apnea    • Vestibular migraine        Past Surgical History:   Procedure Laterality Date   • CARPAL TUNNEL RELEASE Left    • COLONOSCOPY     • FL INJECTION RIGHT HIP (ARTHROGRAM)  8/20/2018   • FOOT SURGERY Left     FOREIGN BODY REMOVAL   • HERNIA REPAIR     • LA ARTHRP ACETBLR/PROX FEM PROSTC AGRFT/ALGRFT Right 9/28/2020    Procedure: ARTHROPLASTY HIP TOTAL;  Surgeon: Good Diallo MD;  [facility]: [facility];  Service: Orthopedics   • LA COLONOSCOPY FLX DX W/COLLJ SPEC WHEN PFRMD N/A 8/7/2017    Procedure: COLONOSCOPY;  Surgeon: Ángel Smith MD;  Location: [facility]; Service: Gastroenterology   • LA ESOPHAGOGASTRODUODENOSCOPY TRANSORAL DIAGNOSTIC N/A 10/31/2017    Procedure: ESOPHAGOGASTRODUODENOSCOPY (EGD); Surgeon: Ángel Smith MD;  Location: [facility];   Service: Gastroenterology       Family History   Problem Relation Age of Onset   • Cancer Brother    • Prostate cancer Brother    • Leukemia Brother         his only brother dies from lymphoma or leukemia pt unsure he was only 47   • Arthritis Mother • Heart attack Father    • Clotting disorder Father    • Schizoaffective Disorder  Sister    • Aortic aneurysm Other         abdominal         Medications have been verified  Objective   /77   Pulse 68   Temp (!) 97 3 °F (36 3 °C)   Resp 18   Ht 6' 1" (1 854 m)   Wt 99 8 kg (220 lb)   SpO2 96%   BMI 29 03 kg/m²        Physical Exam     Physical Exam  Constitutional:       General: He is not in acute distress  Appearance: Normal appearance  He is well-developed  He is not diaphoretic  HENT:      Head: Normocephalic and atraumatic  Right Ear: Hearing, tympanic membrane, ear canal and external ear normal       Left Ear: Hearing, tympanic membrane, ear canal and external ear normal       Nose: Rhinorrhea present  Right Sinus: No maxillary sinus tenderness or frontal sinus tenderness  Left Sinus: No maxillary sinus tenderness or frontal sinus tenderness  Mouth/Throat:      Pharynx: Uvula midline  Cardiovascular:      Rate and Rhythm: Normal rate and regular rhythm  Heart sounds: Normal heart sounds  Pulmonary:      Effort: Pulmonary effort is normal  No respiratory distress  Breath sounds: Normal breath sounds  No wheezing or rales  Chest:      Chest wall: No tenderness  Musculoskeletal:      Cervical back: Normal range of motion and neck supple  Lymphadenopathy:      Cervical: No cervical adenopathy  Neurological:      Mental Status: He is alert

## 2023-02-01 LAB
6MAM UR QL CFM: NEGATIVE NG/ML
7AMINOCLONAZEPAM UR QL CFM: NEGATIVE NG/ML
A-OH ALPRAZ UR QL CFM: NEGATIVE NG/ML
AMPHET UR QL CFM: NEGATIVE NG/ML
AMPHET UR QL CFM: NEGATIVE NG/ML
BUPRENORPHINE UR QL CFM: NEGATIVE NG/ML
BUTALBITAL UR QL CFM: NEGATIVE NG/ML
BZE UR QL CFM: NEGATIVE NG/ML
CODEINE UR QL CFM: NEGATIVE NG/ML
EDDP UR QL CFM: NEGATIVE NG/ML
ETHYL GLUCURONIDE UR QL CFM: NEGATIVE NG/ML
ETHYL SULFATE UR QL SCN: NEGATIVE NG/ML
FENTANYL UR QL CFM: NEGATIVE NG/ML
FLUAV RNA RESP QL NAA+PROBE: NEGATIVE
FLUBV RNA RESP QL NAA+PROBE: NEGATIVE
GLIADIN IGG SER IA-ACNC: NEGATIVE NG/ML
HYDROCODONE UR QL CFM: NEGATIVE NG/ML
HYDROCODONE UR QL CFM: NEGATIVE NG/ML
HYDROMORPHONE UR QL CFM: NEGATIVE NG/ML
LORAZEPAM UR QL CFM: NEGATIVE NG/ML
MDMA UR QL CFM: NEGATIVE NG/ML
ME-PHENIDATE UR QL CFM: NEGATIVE NG/ML
MEPERIDINE UR QL CFM: NEGATIVE NG/ML
METHADONE UR QL CFM: NEGATIVE NG/ML
METHAMPHET UR QL CFM: NEGATIVE NG/ML
MORPHINE UR QL CFM: NEGATIVE NG/ML
MORPHINE UR QL CFM: NEGATIVE NG/ML
NORBUPRENORPHINE UR QL CFM: NEGATIVE NG/ML
NORDIAZEPAM UR QL CFM: NEGATIVE NG/ML
NORFENTANYL UR QL CFM: NEGATIVE NG/ML
NORHYDROCODONE UR QL CFM: NEGATIVE NG/ML
NORHYDROCODONE UR QL CFM: NEGATIVE NG/ML
NORMEPERIDINE UR QL CFM: NEGATIVE NG/ML
NOROXYCODONE UR QL CFM: NEGATIVE NG/ML
OXAZEPAM UR QL CFM: NEGATIVE NG/ML
OXYCODONE UR QL CFM: NEGATIVE NG/ML
OXYMORPHONE UR QL CFM: NEGATIVE NG/ML
OXYMORPHONE UR QL CFM: NEGATIVE NG/ML
PARA-FLUOROFENTANYL QUANTIFICATION: NORMAL NG/ML
PCP UR QL CFM: NEGATIVE NG/ML
PHENOBARB UR QL CFM: NEGATIVE NG/ML
RESULT ALL_PRESCRIBED MEDS AND SPECIAL INSTRUCTIONS: NORMAL
SARS-COV-2 RNA RESP QL NAA+PROBE: NEGATIVE
SECOBARBITAL UR QL CFM: NEGATIVE NG/ML
SL AMB 4-ANPP QUANTIFICATION: NORMAL NG/ML
SL AMB 5F-ADB-M7 METABOLITE QUANTIFICATION: NEGATIVE NG/ML
SL AMB 7-OH-MITRAGYNINE (KRATOM ALKALOID) QUANTIFICATION: NEGATIVE NG/ML
SL AMB AB-FUBINACA-M3 METABOLITE QUANTIFICATION: NEGATIVE NG/ML
SL AMB ACETYL FENTANYL QUANTIFICATION: NORMAL NG/ML
SL AMB ACETYL NORFENTANYL QUANTIFICATION: NORMAL NG/ML
SL AMB ACRYL FENTANYL QUANTIFICATION: NORMAL NG/ML
SL AMB CARFENTANIL QUANTIFICATION: NORMAL NG/ML
SL AMB CTHC (MARIJUANA METABOLITE) QUANTIFICATION: NEGATIVE NG/ML
SL AMB DEXTROMETHORPHAN QUANTIFICATION: NEGATIVE NG/ML
SL AMB DEXTRORPHAN (DEXTROMETHORPHAN METABOLITE) QUANT: ABNORMAL NG/ML
SL AMB DEXTRORPHAN (DEXTROMETHORPHAN METABOLITE) QUANT: ABNORMAL NG/ML
SL AMB JWH018 METABOLITE QUANTIFICATION: NEGATIVE NG/ML
SL AMB JWH073 METABOLITE QUANTIFICATION: NEGATIVE NG/ML
SL AMB MDMB-FUBINACA-M1 METABOLITE QUANTIFICATION: NEGATIVE NG/ML
SL AMB N-DESMETHYL-TRAMADOL QUANTIFICATION: NORMAL NG/ML
SL AMB PHENTERMINE QUANTIFICATION: NEGATIVE NG/ML
SL AMB RCS4 METABOLITE QUANTIFICATION: NEGATIVE NG/ML
SL AMB RITALINIC ACID QUANTIFICATION: NEGATIVE NG/ML
SPECIMEN DRAWN SERPL: NEGATIVE NG/ML
TAPENTADOL UR QL CFM: NEGATIVE NG/ML
TEMAZEPAM UR QL CFM: NEGATIVE NG/ML
TEMAZEPAM UR QL CFM: NEGATIVE NG/ML
TRAMADOL UR QL CFM: NORMAL NG/ML
URATE/CREAT 24H UR: NORMAL NG/ML

## 2023-02-24 DIAGNOSIS — E11.9 TYPE 2 DIABETES MELLITUS WITHOUT COMPLICATION, WITHOUT LONG-TERM CURRENT USE OF INSULIN (HCC): ICD-10-CM

## 2023-02-24 RX ORDER — DULAGLUTIDE 0.75 MG/.5ML
INJECTION, SOLUTION SUBCUTANEOUS
Qty: 12 ML | Refills: 1 | Status: SHIPPED | OUTPATIENT
Start: 2023-02-24

## 2023-03-06 ENCOUNTER — TELEPHONE (OUTPATIENT)
Dept: PAIN MEDICINE | Facility: MEDICAL CENTER | Age: 58
End: 2023-03-06

## 2023-03-06 DIAGNOSIS — M62.838 MUSCLE SPASM: Primary | ICD-10-CM

## 2023-03-06 RX ORDER — TIZANIDINE 4 MG/1
4 TABLET ORAL EVERY 8 HOURS PRN
Qty: 60 TABLET | Refills: 0 | Status: SHIPPED | OUTPATIENT
Start: 2023-03-06 | End: 2023-03-24

## 2023-03-06 NOTE — TELEPHONE ENCOUNTER
I see the patient has used cyclobenzaprine, methocarbamol and tizanidine in the past   At this time I will prescribe the patient tizanidine 4 mg tablet patient may use 1 tablet every 8 hours as needed for muscle spasms  His pain could be related to muscle spasms and/or arthritis

## 2023-03-06 NOTE — TELEPHONE ENCOUNTER
Caller: patient    Doctor: kena    Reason for call: Would like to know if he can get a script for muscle relaxer    Call back#:

## 2023-03-06 NOTE — TELEPHONE ENCOUNTER
S/w pt  States lower back pain upon awakening and after laying in one position for too long  Pt states once he gets up and loosens up, pain starts to improve  Pt is asking if a muscle relaxer may be helpful at this time     If appropriate, please send script to [facility] on file

## 2023-03-20 ENCOUNTER — RA CDI HCC (OUTPATIENT)
Dept: OTHER | Facility: HOSPITAL | Age: 58
End: 2023-03-20

## 2023-03-20 ENCOUNTER — TELEPHONE (OUTPATIENT)
Dept: FAMILY MEDICINE CLINIC | Facility: CLINIC | Age: 58
End: 2023-03-20

## 2023-03-20 NOTE — TELEPHONE ENCOUNTER
Patient has upcoming appointment with you - called asking if he needs labs completed prior to his appointment this Friday with you

## 2023-03-24 ENCOUNTER — OFFICE VISIT (OUTPATIENT)
Dept: FAMILY MEDICINE CLINIC | Facility: CLINIC | Age: 58
End: 2023-03-24

## 2023-03-24 VITALS
WEIGHT: 222 LBS | TEMPERATURE: 98.2 F | HEART RATE: 67 BPM | SYSTOLIC BLOOD PRESSURE: 116 MMHG | BODY MASS INDEX: 29.42 KG/M2 | OXYGEN SATURATION: 97 % | DIASTOLIC BLOOD PRESSURE: 76 MMHG | HEIGHT: 73 IN

## 2023-03-24 DIAGNOSIS — N18.31 TYPE 2 DIABETES MELLITUS WITH STAGE 3A CHRONIC KIDNEY DISEASE, WITHOUT LONG-TERM CURRENT USE OF INSULIN (HCC): Primary | ICD-10-CM

## 2023-03-24 DIAGNOSIS — N18.31 STAGE 3A CHRONIC KIDNEY DISEASE (HCC): ICD-10-CM

## 2023-03-24 DIAGNOSIS — J30.1 NON-SEASONAL ALLERGIC RHINITIS DUE TO POLLEN: ICD-10-CM

## 2023-03-24 DIAGNOSIS — I71.20 THORACIC AORTIC ANEURYSM WITHOUT RUPTURE, UNSPECIFIED PART (HCC): ICD-10-CM

## 2023-03-24 DIAGNOSIS — J45.20 MILD INTERMITTENT ASTHMA WITHOUT COMPLICATION: ICD-10-CM

## 2023-03-24 DIAGNOSIS — F11.20 UNCOMPLICATED OPIOID DEPENDENCE (HCC): ICD-10-CM

## 2023-03-24 DIAGNOSIS — E11.22 TYPE 2 DIABETES MELLITUS WITH STAGE 3A CHRONIC KIDNEY DISEASE, WITHOUT LONG-TERM CURRENT USE OF INSULIN (HCC): Primary | ICD-10-CM

## 2023-03-24 DIAGNOSIS — G89.4 CHRONIC PAIN SYNDROME: ICD-10-CM

## 2023-03-24 DIAGNOSIS — E78.2 HYPERLIPIDEMIA, MIXED: ICD-10-CM

## 2023-03-24 LAB
ALBUMIN/CREAT UR: <30
CREATINE UR-MCNC: 300 MG/DL
LEFT EYE DIABETIC RETINOPATHY: ABNORMAL
LEFT EYE IMAGE QUALITY: ABNORMAL
LEFT EYE MACULAR EDEMA: ABNORMAL
LEFT EYE OTHER RETINOPATHY: ABNORMAL
RIGHT EYE DIABETIC RETINOPATHY: ABNORMAL
RIGHT EYE IMAGE QUALITY: ABNORMAL
RIGHT EYE MACULAR EDEMA: ABNORMAL
RIGHT EYE OTHER RETINOPATHY: ABNORMAL
SEVERITY (EYE EXAM): ABNORMAL
SL AMB POCT HEMOGLOBIN AIC: 7.7 (ref ?–6.5)
SL AMB POCT UR MICROALBUMIN: 30

## 2023-03-24 RX ORDER — DULAGLUTIDE 1.5 MG/.5ML
1.5 INJECTION, SOLUTION SUBCUTANEOUS
Qty: 6 ML | Refills: 1 | Status: SHIPPED | OUTPATIENT
Start: 2023-03-24 | End: 2023-09-20

## 2023-03-24 NOTE — PROGRESS NOTES
Diabetic Foot Exam    Patient's shoes and socks removed  Right Foot/Ankle   Right Foot Inspection  Skin Exam: dry skin  No callus and no callus  Toe Exam:  no right toe deformity    Sensory   Vibration: intact  Monofilament testing: intact    Vascular  Capillary refills: < 3 seconds  The right DP pulse is 2+  Left Foot/Ankle  Left Foot Inspection  Skin Exam: dry skin  No callus  Toe Exam: No left toe deformity  Sensory   Vibration: intact  Monofilament testing: intact    Vascular  Capillary refills: < 3 seconds  The left DP pulse is 2+       Assign Risk Category  No deformity present  No loss of protective sensation  No weak pulses  Risk: 0

## 2023-03-24 NOTE — PROGRESS NOTES
Yasmany Caceres 1965 adult MRN: [id number]      ASSESSMENT/PLAN  Problem List Items Addressed This Visit        Endocrine    Type 2 diabetes mellitus with stage 3a chronic kidney disease, without long-term current use of insulin ([facility] [address] ) - Primary    Relevant Medications    dulaglutide (Trulicity) 1 5 IH/4 7UJ injection    Other Relevant Orders    POCT hemoglobin A1c (Completed)    POCT urine microalbumin/creatinine (Completed)    IRIS Diabetic eye exam    HEMOGLOBIN A1C W/ EAG ESTIMATION       Respiratory    Allergic rhinitis    Relevant Orders    CBC and differential    Mild intermittent asthma without complication    Relevant Orders    CBC and differential       Cardiovascular and Mediastinum    Thoracic aortic aneurysm without rupture, unspecified part    Relevant Orders    CTA chest wo w contrast       Genitourinary    CKD (chronic kidney disease) stage 3, GFR 30-59 ml/min ([facility])    Relevant Orders    Comprehensive metabolic panel    Lipid panel       Other    Chronic pain syndrome    Hyperlipidemia, mixed    Relevant Orders    Comprehensive metabolic panel    Lipid panel    Uncomplicated opioid dependence ([facility] [address] )     DM: A1c 7 7% (from 6 8) -- above goal, pt notes no change in diet, but decreased activity level   Will increase Trulicity to 1 5 mg and f/u in 3 months to monitor   Foot exam benign   Eye exam collected   Urine microalbumin/cr ratio WNL   HLD: Update lipids prior to next visit   CKD: F/u with Nephro as scheduled  Thoracic Aneurysm: Due for updated imaging -- will coordinate with Nephrology due to dye load   Asthma/Allergies: Overall stable with PRN RUDY   Chronic Pain Syndrome on Opioids: F/u with Pain Management       Future Appointments   Date Time Provider Kristen Fine   4/28/2023  8:15 AM NICK Ryder [facility]   5/16/2023 10:00 AM Mehrdad Dominique MD [facility]   6/22/2023  8:40 AM DO ALFONZO [facility]          SUBJECTIVE  CC: Diabetes (Patient seen in office today for a check up with DM)      HPI:  Prabhjot Jones is a 61 y o  adult who presents for chronic follow up as below  DM: Home sugars 140s (though does have trouble sticking his finger sometimes)    HLD: Tolerating statin without issue   CKD: Trying to drink plenty of fluids, following with Nephro  Thoracic Aneurysm: Following with Cardio once yearly   Asthma/Allergies: Nighttime symptoms every 2-3 weeks, rare RUDY use    Chronic Pain Syndrome on Opioids: Following with Pain Management     Review of Systems   Constitutional: Negative for diaphoresis  HENT: Negative for congestion, ear pain, rhinorrhea and sore throat  Eyes: Negative for visual disturbance  Respiratory: Negative for cough, shortness of breath and wheezing  Cardiovascular: Negative for chest pain, palpitations and leg swelling  Gastrointestinal: Positive for constipation (was having issues -- thinks it may be coming from cheese; used stool softeners with good relief)  Negative for abdominal pain and diarrhea  Endocrine: Negative for polydipsia and polyuria  Musculoskeletal: Positive for back pain (chronic, unchanged  -- had a recent low back pain flare up requiring muscle relaxants)  Neurological: Negative for dizziness, tremors and headaches         Historical Information   The patient history was reviewed and updated as follows:    Past Medical History:   Diagnosis Date   • Anxiety 3/10/2022   • Aorta aneurysm ([facility] [address] )     4 3   • Arm DVT (deep venous thromboembolism), acute ([facility] [address] ) 9058    complications of cardiac cath   • Asthma    • Chronic kidney disease    • CKD (chronic kidney disease), stage III (HCC)    • COPD (chronic obstructive pulmonary disease) ([facility])    • Depression    • Diabetes mellitus (HCC)    • Essential hypertriglyceridemia    • GERD (gastroesophageal reflux disease)    • Headache    • Hiatal hernia    • Hyperlipidemia, mixed 5/7/2018   • Kidney stone    • Liver disease     FATTY LIVER   • Mild episode of recurrent major depressive disorder ([facility] [address] ) 3/10/2022   • PAC (premature atrial contraction)    • Prediabetes    • Renal calculi    • Sleep apnea    • Vestibular migraine      Past Surgical History:   Procedure Laterality Date   • CARPAL TUNNEL RELEASE Left    • COLONOSCOPY     • FL INJECTION RIGHT HIP (ARTHROGRAM)  2018   • FOOT SURGERY Left     FOREIGN BODY REMOVAL   • HERNIA REPAIR     • [place] ARTHRP ACETBLR/PROX FEM PROSTC AGRFT/ALGRFT Right 2020    Procedure: ARTHROPLASTY HIP TOTAL;  Surgeon: Martín Melgoza MD;  [facility];  Service: Orthopedics   • [place] COLONOSCOPY FLX DX W/COLLJ SPEC WHEN PFRMD N/A 2017    Procedure: COLONOSCOPY;  Surgeon: Werner Robertson MD;  Location: [facility]; Service: Gastroenterology   • [place] ESOPHAGOGASTRODUODENOSCOPY TRANSORAL DIAGNOSTIC N/A 10/31/2017    Procedure: ESOPHAGOGASTRODUODENOSCOPY (EGD); Surgeon: Werner Robertson MD;  [facility];   Service: Gastroenterology     Family History   Problem Relation Age of Onset   • Cancer Brother    • Prostate cancer Brother    • Leukemia Brother         his only brother dies from [address] or leukemia pt unsure he was only 47   • Arthritis Mother    • Heart attack Father    • Clotting disorder Father    • Schizoaffective Disorder  Sister    • Aortic aneurysm Other         abdominal      Social History   Social History     Substance and Sexual Activity   Alcohol Use Not Currently    Comment: occasional beer     Social History     Substance and Sexual Activity   Drug Use No     Social History     Tobacco Use   Smoking Status Some Days   • Types: Cigars   • Last attempt to quit: 2014   • Years since quittin 6   Smokeless Tobacco Never   Tobacco Comments    never inhaled       Medications:     Current Outpatient Medications:   •  albuterol (Ventolin HFA) 90 mcg/act inhaler, Inhale 2 puffs every 6 (six) hours as needed for wheezing or shortness of breath (cough) (Patient taking differently: Inhale 2 puffs every 6 (six) hours as needed for wheezing or shortness of breath (cough) PRN), Disp: 18 g, Rfl: 1  •  dulaglutide (Trulicity) 1 5 EN/0 4SQ injection, Inject 0 5 mL (1 5 mg total) under the skin every 7 days, Disp: 6 mL, Rfl: 1  •  fenofibrate 160 MG tablet, take 1 tablet by mouth once daily, Disp: 90 tablet, Rfl: 3  •  pantoprazole (PROTONIX) 40 mg tablet, take 1 tablet by mouth once daily, Disp: 90 tablet, Rfl: 3  •  rosuvastatin (CRESTOR) 5 mg tablet, Take 0 5 tablets (2 5 mg total) by mouth daily, Disp: 90 tablet, Rfl: 1  •  traMADol (Ultram) 50 mg tablet, Take 1 tablet (50 mg total) by mouth every 8 (eight) hours as needed for moderate pain Do not fill until 2/26/2023 and refill 3/28/2023 and 4/27/2023 Do not start before February 26, 2023 , Disp: 90 tablet, Rfl: 2  •  acetaminophen (TYLENOL) 500 mg tablet, Take 500 mg by mouth every 6 (six) hours as needed for mild pain, Disp: , Rfl:   •  glucose blood test strip, TEST BS TID, Disp: 300 each, Rfl: 5  •  glucose monitoring kit (FREESTYLE) monitoring kit, Use 1 each daily before breakfast, Disp: 1 each, Rfl: 0  •  Lancets MISC, Use daily before breakfast, Disp: 100 each, Rfl: 5  •  sildenafil (REVATIO) 20 mg tablet, TAKE 1 TABLET (20 MG TOTAL) BY MOUTH DAILY AS DIRECTED, Disp: 90 tablet, Rfl: 3  No Known Allergies    OBJECTIVE    Vitals:   Vitals:    03/24/23 0928   BP: 116/76   BP Location: Left arm   Patient Position: Sitting   Cuff Size: Large   Pulse: 67   Temp: 98 2 °F (36 8 °C)   SpO2: 97%   Weight: 101 kg (222 lb)   Height: 6' 1" (1 854 m)           Physical Exam  Vitals and nursing note reviewed  Constitutional:       General: He is not in acute distress  Appearance: Normal appearance  HENT:      Head: Normocephalic and atraumatic        Right Ear: Tympanic membrane, ear canal and external ear normal       Left Ear: Tympanic membrane, ear canal and external ear normal       Nose: Nose normal       Mouth/Throat:      Mouth: Mucous membranes are moist       Pharynx: No oropharyngeal exudate or posterior oropharyngeal erythema  Eyes:      Conjunctiva/sclera: Conjunctivae normal    Cardiovascular:      Rate and Rhythm: Normal rate and regular rhythm  Pulmonary:      Effort: Pulmonary effort is normal  No respiratory distress  Breath sounds: Normal breath sounds  Abdominal:      General: Bowel sounds are normal  There is no distension  Palpations: Abdomen is soft  Tenderness: There is no abdominal tenderness  Musculoskeletal:      Right lower leg: No edema  Left lower leg: No edema  Lymphadenopathy:      Cervical: No cervical adenopathy  Skin:     General: Skin is warm and dry  Neurological:      General: No focal deficit present  Mental Status: He is alert     Psychiatric:         Mood and Affect: Mood normal                     DO Franki [address] [facility]   3/24/2023  10:00 AM

## 2023-03-27 ENCOUNTER — TELEPHONE (OUTPATIENT)
Dept: FAMILY MEDICINE CLINIC | Facility: CLINIC | Age: 58
End: 2023-03-27

## 2023-03-27 DIAGNOSIS — I71.20 THORACIC AORTIC ANEURYSM WITHOUT RUPTURE, UNSPECIFIED PART (HCC): Primary | ICD-10-CM

## 2023-03-27 NOTE — TELEPHONE ENCOUNTER
Please let pt know I spoke with his cardiologist who said he can do a CT scan without contrast to monitor his aneurysm -- he should cancel the CTA he had previously scheduled and change to regular CT   This will be safer for his kidneys

## 2023-03-31 ENCOUNTER — HOSPITAL ENCOUNTER (OUTPATIENT)
Dept: RADIOLOGY | Facility: MEDICAL CENTER | Age: 58
Discharge: HOME | End: 2023-03-31

## 2023-03-31 DIAGNOSIS — I71.20 THORACIC AORTIC ANEURYSM WITHOUT RUPTURE, UNSPECIFIED PART (HCC): ICD-10-CM

## 2023-04-11 DIAGNOSIS — I71.20 THORACIC AORTIC ANEURYSM WITHOUT RUPTURE, UNSPECIFIED PART (HCC): Primary | ICD-10-CM

## 2023-04-25 NOTE — PROGRESS NOTES
Pain Medicine Follow-Up Note    Assessment:  1  Chronic pain syndrome    2  Lumbar spondylosis    3  Uncomplicated opioid dependence ([address] )    4  Long-term current use of opiate analgesic        Plan:  Orders Placed This Encounter   Procedures   • FL spine and pain procedure     Standing Status:   Future     Standing Expiration Date:   4/28/2027     Order Specific Question:   Reason for Exam:     Answer:   MBB #1    L3-L4, L4-L5, L5-S1     Order Specific Question:   Is the patient pregnant? Answer:   No     Order Specific Question:   Anticoagulant hold needed? Answer:   no   • MM ALL_Prescribed Meds and Special Instructions     Order Specific Question:   [place] Is ALBUTEROL prescribed? Answer:   Yes     Order Specific Question:   [place] Is TRAMADOL prescribed? Answer:    Yes   • MM DT_Alprazolam Definitive Test   • MM DT_Amphetamine Definitive Test   • MM DT_Buprenorphine Definitive Test   • MM DT_Butalbital Definitive Test   • MM DT_Clonazepam Definitive Test   • MM DT_Cocaine Definitive Test   • MM DT_Codeine Definitive Test   • MM DT_Dextromethorphan Definitive Test   • MM Diazepam Definitive Test   • MM DT_Ethyl Glucuronide/Ethyl Sulfate Definitive Test   • MM DT_Fentanyl Definitive Test   • MM DT_Heroin Definitive Test   • MM DT_Hydrocodone Definitive Test   • MM DT_Hydromorphone Definitive Test   • MM DT_Kratom Definitive Test   • MM DT_Levorphanol Definitive Test   • MM DT_MDMA Definitive Test   • MM DT_Meperidine Definitive Test   • MM DT_Methadone Definitive Test   • MM DT_Methamphetamine Definitive Test   • MM DT_Methylphenidate Definitive Test   • MM DT_Morphine Definitive Test   • MM Lorazepam Definitive Test   • MM DT_Oxazepam Definitive Test   • MM DT_Oxycodone Definitive Test   • MM DT_Oxymorphone Definitive Test   • MM DT_Phencyclidine Definitive Test   • MM DT_Phenobarbital Definitive Test   • MM DT_Phentermine Definitive Test   • MM DT_Secobarbital Definitive Test   • MM DT_Spice Definitive Test   • MM DT_Tapentadol Definitive Test   • MM DT_Temazapam Definitive Test   • MM DT_THC Definitive Test   • MM DT_Tramadol Definitive Test       New Medications Ordered This Visit   Medications   • traMADol (Ultram) 50 mg tablet     Sig: Take 1 tablet (50 mg total) by mouth every 8 (eight) hours as needed for moderate pain     Dispense:  90 tablet     Refill:  2     Fill on 5/7/2023 and refill on 6/6/2023 and 7/5/2023   • tiZANidine (ZANAFLEX) 2 mg tablet     Sig: Take 1 tablet (2 mg total) by mouth every 8 (eight) hours as needed for muscle spasms     Dispense:  30 tablet     Refill:  0       My impressions and treatment recommendations were discussed in detail with the patient who verbalized understanding and had no further questions  The patient continues to report an overall reduction of his pain level and improvement with his functioning without significant side effects using tramadol 50 mg tablet patient uses 1 tablet up to every 8 hours as needed for moderate pain, therefore I will continue the patient on this medication  Tramadol 50 mg tablet E-prescribed to the patient's pharmacy with a do not fill until date of 5/7/2023 and refill dates of 6/6/2023 and 7/5/2023  Patient also states that his axial back pain has worsened  At this time I recommend the patient trial a medial branch block of the L3-L4, L4-L5, L5-S1 facet joints bilaterally  I did explain that the patient will have a medial branch block #1 if this provides the patient pain relief he will then proceed to a medial branch block #2 which is a confirmatory block, if that provides relief he will then have a radiofrequency ablation which would provide the patient meaningful pain relief lasting from 6 months to 2 years  I will order tizanidine 2 mg tablet patient may use 1 tablet every 8 hours as needed for pain/muscle spasm      [place] Prescription Drug Monitoring Program report was reviewed and was appropriate A urine drug screen was collected at today's office visit as part of our medication management protocol  The point of care testing results were appropriate for what was being prescribed  The specimen will be sent for confirmatory testing  The drug screen is medically necessary because the patient is either dependent on opioid medication or is being considered for opioid medication therapy and the results could impact ongoing or future treatment  The drug screen is to evaluate for the presences or absence of prescribed, non-prescribed, and/or illicit drugs/substances  There are risks associated with opioid medications, including dependence, addiction and tolerance  The patient understands and agrees to use these medications only as prescribed  Potential side effects of the medications include, but are not limited to, constipation, drowsiness, addiction, impaired judgment and risk of fatal overdose if not taken as prescribed  The patient was warned against driving while taking sedation medications  Sharing medications is a felony  At this point in time, the patient is showing no signs of addiction, abuse, diversion or suicidal ideation  Complete risks and benefits including bleeding, infection, tissue reaction, nerve injury and allergic reaction were discussed  The approach was demonstrated using models and literature was provided  Verbal and written consent was obtained  Follow-up is planned in 3 months time or sooner as warranted  Discharge instructions were provided  I personally saw and examined the patient and I agree with the above discussed plan of care  History of Present Illness:    Gustavo Machado is a 61 y o  adult who presents to [facility] for interval re-evaluation of the above stated pain complaints  The patient has a past medical and chronic pain history as outlined in the assessment section  He was last seen on 1/27/2023      At today's visit patient states that their pain symptoms are worse with a pain score of 5/10 on the verbal numeric pain scale  The patient's pain is worse in the morning and evening  The patient's pain is occasional in nature  And the quality of the patient's pain is described as sharp, pressure-like, shooting, and pins-and-needles  The patient's pain is located in the mid low back, groin, and bilateral knees  Patient states the amount of pain relief he is currently obtaining from his pain relievers is not enough to make a difference in his life  Pain Contract Signed: 5/12/2022  Last Urine Drug Screen: 4/28/2023    Other than as stated above, the patient denies any interval changes in medications, medical condition, mental condition, symptoms, or allergies since the last office visit  Review of Systems:    Review of Systems   Respiratory: Positive for shortness of breath  Cardiovascular: Negative for chest pain  Gastrointestinal: Negative for constipation, diarrhea, nausea and vomiting  Musculoskeletal: Positive for arthralgias and gait problem  Negative for joint swelling and myalgias  Decreased ROM   Pain in extremity: lower back (sometimes)   Skin: Negative for rash  Neurological: Negative for dizziness, seizures and weakness  All other systems reviewed and are negative          Past Medical History:   Diagnosis Date   • Anxiety 3/10/2022   • Aorta aneurysm (HCC)     4 3   • Arm DVT (deep venous thromboembolism), acute ([facility] [address] ) 3662    complications of cardiac cath   • Asthma    • Chronic kidney disease    • CKD (chronic kidney disease), stage III ([facility])    • COPD (chronic obstructive pulmonary disease) ([facility])    • Depression    • Diabetes mellitus ([facility])    • Essential hypertriglyceridemia    • GERD (gastroesophageal reflux disease)    • Headache    • Hiatal hernia    • Hyperlipidemia, mixed 5/7/2018   • Kidney stone    • Liver disease     FATTY LIVER   • Mild episode of recurrent major depressive disorder ([facility] [address] ) 3/10/2022   • PAC (premature atrial contraction)    • Prediabetes    • Renal calculi    • Sleep apnea    • Vestibular migraine        Past Surgical History:   Procedure Laterality Date   • CARPAL TUNNEL RELEASE Left    • COLONOSCOPY     • FL INJECTION RIGHT HIP (ARTHROGRAM)  2018   • FOOT SURGERY Left     FOREIGN BODY REMOVAL   • HERNIA REPAIR     • [place] ARTHRP ACETBLR/PROX FEM PROSTC AGRFT/ALGRFT Right 2020    Procedure: ARTHROPLASTY HIP TOTAL;  Surgeon: Alessandro Saldana MD;  Location: [facility];  Service: Orthopedics   • [place] COLONOSCOPY FLX DX W/COLLJ SPEC WHEN PFRMD N/A 2017    Procedure: COLONOSCOPY;  Surgeon: Concetta Muhammad MD;  Location: [facility]; Service: Gastroenterology   • [place] ESOPHAGOGASTRODUODENOSCOPY TRANSORAL DIAGNOSTIC N/A 10/31/2017    Procedure: ESOPHAGOGASTRODUODENOSCOPY (EGD); Surgeon: Concetta Muhammad MD;  Location: [facility];   Service: Gastroenterology       Family History   Problem Relation Age of Onset   • Cancer Brother    • Prostate cancer Brother    • Leukemia Brother         his only brother dies from [address] or leukemia pt unsure he was only 47   • Arthritis Mother    • Heart attack Father    • Clotting disorder Father    • Schizoaffective Disorder  Sister    • Aortic aneurysm Other         abdominal       Social History     Occupational History   • Occupation: unemployed   Tobacco Use   • Smoking status: Some Days     Types: Cigars     Last attempt to quit: 2014     Years since quittin 7   • Smokeless tobacco: Never   • Tobacco comments:     never inhaled   Vaping Use   • Vaping Use: Never used   Substance and Sexual Activity   • Alcohol use: Not Currently     Comment: occasional beer   • Drug use: No   • Sexual activity: Yes     Partners: Female         Current Outpatient Medications:   •  acetaminophen (TYLENOL) 500 mg tablet, Take 500 mg by mouth every 6 (six) hours as needed for mild pain, Disp: , Rfl:   •  albuterol (Ventolin HFA) 90 mcg/act inhaler, "Inhale 2 puffs every 6 (six) hours as needed for wheezing or shortness of breath (cough) (Patient taking differently: Inhale 2 puffs every 6 (six) hours as needed for wheezing or shortness of breath (cough) PRN), Disp: 18 g, Rfl: 1  •  dulaglutide (Trulicity) 1 5 CY/0 0SD injection, Inject 0 5 mL (1 5 mg total) under the skin every 7 days, Disp: 6 mL, Rfl: 1  •  fenofibrate 160 MG tablet, take 1 tablet by mouth once daily, Disp: 90 tablet, Rfl: 3  •  glucose blood test strip, TEST BS TID, Disp: 300 each, Rfl: 5  •  glucose monitoring kit (FREESTYLE) monitoring kit, Use 1 each daily before breakfast, Disp: 1 each, Rfl: 0  •  Lancets MISC, Use daily before breakfast, Disp: 100 each, Rfl: 5  •  pantoprazole (PROTONIX) 40 mg tablet, take 1 tablet by mouth once daily, Disp: 90 tablet, Rfl: 3  •  rosuvastatin (CRESTOR) 5 mg tablet, Take 0 5 tablets (2 5 mg total) by mouth daily, Disp: 90 tablet, Rfl: 1  •  sildenafil (REVATIO) 20 mg tablet, TAKE 1 TABLET (20 MG TOTAL) BY MOUTH DAILY AS DIRECTED, Disp: 90 tablet, Rfl: 3  •  tiZANidine (ZANAFLEX) 2 mg tablet, Take 1 tablet (2 mg total) by mouth every 8 (eight) hours as needed for muscle spasms, Disp: 30 tablet, Rfl: 0  •  traMADol (Ultram) 50 mg tablet, Take 1 tablet (50 mg total) by mouth every 8 (eight) hours as needed for moderate pain, Disp: 90 tablet, Rfl: 2    No Known Allergies    Physical Exam:    /86 (BP Location: Left arm, Patient Position: Sitting, Cuff Size: Standard)   Pulse 65   Ht 6' 1\" (1 854 m)   Wt 98 4 kg (217 lb)   BMI 28 63 kg/m²     Constitutional:normal, well developed, well nourished, alert, in no distress and non-toxic and no overt pain behavior   and overweight  Eyes:anicteric  HEENT:grossly intact  Neck:supple, symmetric, trachea midline and no masses   Pulmonary:even and unlabored  Cardiovascular:No edema or pitting edema present  Skin:Normal without rashes or lesions and well hydrated  Psychiatric:Mood and affect " appropriate  Neurologic:Cranial Nerves II-XII grossly intact  Musculoskeletal:antalgic and Positive bilateral facet loading of the lumbar spine      Imaging  FL spine and pain procedure    (Results Pending)         Orders Placed This Encounter   Procedures   • FL spine and pain procedure   • MM ALL_Prescribed Meds and Special Instructions   • MM DT_Alprazolam Definitive Test   • MM DT_Amphetamine Definitive Test   • MM DT_Buprenorphine Definitive Test   • MM DT_Butalbital Definitive Test   • MM DT_Clonazepam Definitive Test   • MM DT_Cocaine Definitive Test   • MM DT_Codeine Definitive Test   • MM DT_Dextromethorphan Definitive Test   • MM Diazepam Definitive Test   • MM DT_Ethyl Glucuronide/Ethyl Sulfate Definitive Test   • MM DT_Fentanyl Definitive Test   • MM DT_Heroin Definitive Test   • MM DT_Hydrocodone Definitive Test   • MM DT_Hydromorphone Definitive Test   • MM DT_Kratom Definitive Test   • MM DT_Levorphanol Definitive Test   • MM DT_MDMA Definitive Test   • MM DT_Meperidine Definitive Test   • MM DT_Methadone Definitive Test   • MM DT_Methamphetamine Definitive Test   • MM DT_Methylphenidate Definitive Test   • MM DT_Morphine Definitive Test   • MM Lorazepam Definitive Test   • MM DT_Oxazepam Definitive Test   • MM DT_Oxycodone Definitive Test   • MM DT_Oxymorphone Definitive Test   • MM DT_Phencyclidine Definitive Test   • MM DT_Phenobarbital Definitive Test   • MM DT_Phentermine Definitive Test   • MM DT_Secobarbital Definitive Test   • MM DT_Spice Definitive Test   • MM DT_Tapentadol Definitive Test   • MM DT_Temazapam Definitive Test   • MM DT_THC Definitive Test   • MM DT_Tramadol Definitive Test     This document was created using speech voice recognition software  Grammatical errors, random word insertions, pronoun errors, and incomplete sentences are an occasional consequence of this system due to software limitations, ambient noise, and hardware issues     Any formal questions or concerns about content, text, or information contained within the body of this dictation should be directly addressed to the provider for clarification

## 2023-04-28 ENCOUNTER — TELEPHONE (OUTPATIENT)
Dept: RADIOLOGY | Facility: CLINIC | Age: 58
End: 2023-04-28

## 2023-04-28 ENCOUNTER — OFFICE VISIT (OUTPATIENT)
Dept: PAIN MEDICINE | Facility: CLINIC | Age: 58
End: 2023-04-28

## 2023-04-28 VITALS
DIASTOLIC BLOOD PRESSURE: 86 MMHG | HEIGHT: 73 IN | WEIGHT: 217 LBS | HEART RATE: 65 BPM | BODY MASS INDEX: 28.76 KG/M2 | SYSTOLIC BLOOD PRESSURE: 122 MMHG

## 2023-04-28 DIAGNOSIS — M47.816 LUMBAR SPONDYLOSIS: ICD-10-CM

## 2023-04-28 DIAGNOSIS — F11.20 UNCOMPLICATED OPIOID DEPENDENCE (HCC): ICD-10-CM

## 2023-04-28 DIAGNOSIS — Z79.891 LONG-TERM CURRENT USE OF OPIATE ANALGESIC: ICD-10-CM

## 2023-04-28 DIAGNOSIS — G89.4 CHRONIC PAIN SYNDROME: Primary | ICD-10-CM

## 2023-04-28 RX ORDER — TRAMADOL HYDROCHLORIDE 50 MG/1
50 TABLET ORAL EVERY 8 HOURS PRN
Qty: 90 TABLET | Refills: 2 | Status: SHIPPED | OUTPATIENT
Start: 2023-04-28

## 2023-04-28 RX ORDER — TIZANIDINE 2 MG/1
2 TABLET ORAL EVERY 8 HOURS PRN
Qty: 30 TABLET | Refills: 0 | Status: SHIPPED | OUTPATIENT
Start: 2023-04-28

## 2023-04-28 NOTE — TELEPHONE ENCOUNTER
----- Message from Maximus Espitia Natan Pierce sent at 4/28/2023  1:24 PM EDT -----  Regarding: Muscle relaxer  Please let the patient know that I am unsure why his PCP discontinued the tizanidine I did not see any reason why it would be contraindicated for you at this time  I did prescribe tizanidine 2 mg tablet you may take 1 tablet up to 3 times a day as needed for pain/spasm      Thank you

## 2023-04-28 NOTE — PATIENT INSTRUCTIONS

## 2023-04-30 LAB
6MAM UR QL CFM: NEGATIVE NG/ML
7AMINOCLONAZEPAM UR QL CFM: NEGATIVE NG/ML
A-OH ALPRAZ UR QL CFM: NEGATIVE NG/ML
AMPHET UR QL CFM: NEGATIVE NG/ML
AMPHET UR QL CFM: NEGATIVE NG/ML
BUPRENORPHINE UR QL CFM: NEGATIVE NG/ML
BUTALBITAL UR QL CFM: NEGATIVE NG/ML
BZE UR QL CFM: NEGATIVE NG/ML
CODEINE UR QL CFM: NEGATIVE NG/ML
EDDP UR QL CFM: NEGATIVE NG/ML
ETHYL GLUCURONIDE UR QL CFM: NEGATIVE NG/ML
ETHYL SULFATE UR QL SCN: NEGATIVE NG/ML
FENTANYL UR QL CFM: NEGATIVE NG/ML
GLIADIN IGG SER IA-ACNC: NEGATIVE NG/ML
HYDROCODONE UR QL CFM: NEGATIVE NG/ML
HYDROCODONE UR QL CFM: NEGATIVE NG/ML
HYDROMORPHONE UR QL CFM: NEGATIVE NG/ML
LORAZEPAM UR QL CFM: NEGATIVE NG/ML
MDMA UR QL CFM: NEGATIVE NG/ML
ME-PHENIDATE UR QL CFM: NEGATIVE NG/ML
MEPERIDINE UR QL CFM: NEGATIVE NG/ML
METHADONE UR QL CFM: NEGATIVE NG/ML
METHAMPHET UR QL CFM: NEGATIVE NG/ML
MORPHINE UR QL CFM: NEGATIVE NG/ML
MORPHINE UR QL CFM: NEGATIVE NG/ML
NORBUPRENORPHINE UR QL CFM: NEGATIVE NG/ML
NORDIAZEPAM UR QL CFM: NEGATIVE NG/ML
NORFENTANYL UR QL CFM: NEGATIVE NG/ML
NORHYDROCODONE UR QL CFM: NEGATIVE NG/ML
NORHYDROCODONE UR QL CFM: NEGATIVE NG/ML
NORMEPERIDINE UR QL CFM: NEGATIVE NG/ML
NOROXYCODONE UR QL CFM: NEGATIVE NG/ML
OXAZEPAM UR QL CFM: NEGATIVE NG/ML
OXYCODONE UR QL CFM: NEGATIVE NG/ML
OXYMORPHONE UR QL CFM: NEGATIVE NG/ML
OXYMORPHONE UR QL CFM: NEGATIVE NG/ML
PARA-FLUOROFENTANYL QUANTIFICATION: NORMAL NG/ML
PCP UR QL CFM: NEGATIVE NG/ML
PHENOBARB UR QL CFM: NEGATIVE NG/ML
RESULT ALL_PRESCRIBED MEDS AND SPECIAL INSTRUCTIONS: NORMAL
SECOBARBITAL UR QL CFM: NEGATIVE NG/ML
SL AMB 4-ANPP QUANTIFICATION: NORMAL NG/ML
SL AMB 5F-ADB-M7 METABOLITE QUANTIFICATION: NEGATIVE NG/ML
SL AMB 7-OH-MITRAGYNINE (KRATOM ALKALOID) QUANTIFICATION: NEGATIVE NG/ML
SL AMB AB-FUBINACA-M3 METABOLITE QUANTIFICATION: NEGATIVE NG/ML
SL AMB ACETYL FENTANYL QUANTIFICATION: NORMAL NG/ML
SL AMB ACETYL NORFENTANYL QUANTIFICATION: NORMAL NG/ML
SL AMB ACRYL FENTANYL QUANTIFICATION: NORMAL NG/ML
SL AMB CARFENTANIL QUANTIFICATION: NORMAL NG/ML
SL AMB CTHC (MARIJUANA METABOLITE) QUANTIFICATION: NEGATIVE NG/ML
SL AMB DEXTROMETHORPHAN QUANTIFICATION: NEGATIVE NG/ML
SL AMB DEXTRORPHAN (DEXTROMETHORPHAN METABOLITE) QUANT: NEGATIVE NG/ML
SL AMB DEXTRORPHAN (DEXTROMETHORPHAN METABOLITE) QUANT: NEGATIVE NG/ML
SL AMB JWH018 METABOLITE QUANTIFICATION: NEGATIVE NG/ML
SL AMB JWH073 METABOLITE QUANTIFICATION: NEGATIVE NG/ML
SL AMB MDMB-FUBINACA-M1 METABOLITE QUANTIFICATION: NEGATIVE NG/ML
SL AMB N-DESMETHYL-TRAMADOL QUANTIFICATION: NORMAL NG/ML
SL AMB PHENTERMINE QUANTIFICATION: NEGATIVE NG/ML
SL AMB RCS4 METABOLITE QUANTIFICATION: NEGATIVE NG/ML
SL AMB RITALINIC ACID QUANTIFICATION: NEGATIVE NG/ML
SPECIMEN DRAWN SERPL: NEGATIVE NG/ML
TAPENTADOL UR QL CFM: NEGATIVE NG/ML
TEMAZEPAM UR QL CFM: NEGATIVE NG/ML
TEMAZEPAM UR QL CFM: NEGATIVE NG/ML
TRAMADOL UR QL CFM: NORMAL NG/ML
URATE/CREAT 24H UR: NORMAL NG/ML

## 2023-05-03 ENCOUNTER — TELEPHONE (OUTPATIENT)
Dept: PAIN MEDICINE | Facility: MEDICAL CENTER | Age: 58
End: 2023-05-03

## 2023-05-03 NOTE — TELEPHONE ENCOUNTER
Caller: Chavez Newell    Doctor: Dr Giraldo     Reason for call: Patient calling to schedule procedure     Call back#: [phone]

## 2023-05-08 ENCOUNTER — TELEPHONE (OUTPATIENT)
Dept: NEPHROLOGY | Facility: CLINIC | Age: 58
End: 2023-05-08

## 2023-05-12 ENCOUNTER — APPOINTMENT (OUTPATIENT)
Dept: LAB | Facility: MEDICAL CENTER | Age: 58
End: 2023-05-12

## 2023-05-12 DIAGNOSIS — J30.1 NON-SEASONAL ALLERGIC RHINITIS DUE TO POLLEN: ICD-10-CM

## 2023-05-12 DIAGNOSIS — E83.9 CHRONIC KIDNEY DISEASE-MINERAL AND BONE DISORDER: ICD-10-CM

## 2023-05-12 DIAGNOSIS — M89.9 CHRONIC KIDNEY DISEASE-MINERAL AND BONE DISORDER: ICD-10-CM

## 2023-05-12 DIAGNOSIS — E78.2 HYPERLIPIDEMIA, MIXED: ICD-10-CM

## 2023-05-12 DIAGNOSIS — N18.31 STAGE 3A CHRONIC KIDNEY DISEASE (HCC): ICD-10-CM

## 2023-05-12 DIAGNOSIS — S39.012D LUMBOSACRAL STRAIN, SUBSEQUENT ENCOUNTER: ICD-10-CM

## 2023-05-12 DIAGNOSIS — E11.22 TYPE 2 DIABETES MELLITUS WITH STAGE 3A CHRONIC KIDNEY DISEASE, WITHOUT LONG-TERM CURRENT USE OF INSULIN (HCC): ICD-10-CM

## 2023-05-12 DIAGNOSIS — N18.9 CHRONIC KIDNEY DISEASE-MINERAL AND BONE DISORDER: ICD-10-CM

## 2023-05-12 DIAGNOSIS — N18.31 TYPE 2 DIABETES MELLITUS WITH STAGE 3A CHRONIC KIDNEY DISEASE, WITHOUT LONG-TERM CURRENT USE OF INSULIN (HCC): ICD-10-CM

## 2023-05-12 DIAGNOSIS — J45.20 MILD INTERMITTENT ASTHMA WITHOUT COMPLICATION: ICD-10-CM

## 2023-05-12 LAB
25(OH)D3 SERPL-MCNC: 27.8 NG/ML (ref 30–100)
ALBUMIN SERPL BCP-MCNC: 3.7 G/DL (ref 3.5–5)
ALP SERPL-CCNC: 40 U/L (ref 46–116)
ALT SERPL W P-5'-P-CCNC: 32 U/L (ref 12–78)
ANION GAP SERPL CALCULATED.3IONS-SCNC: 4 MMOL/L (ref 4–13)
AST SERPL W P-5'-P-CCNC: 27 U/L (ref 5–45)
BASOPHILS # BLD AUTO: 0.03 THOUSANDS/ÂΜL (ref 0–0.1)
BASOPHILS NFR BLD AUTO: 1 % (ref 0–1)
BILIRUB SERPL-MCNC: 0.46 MG/DL (ref 0.2–1)
BILIRUB UR QL STRIP: NEGATIVE
BUN SERPL-MCNC: 13 MG/DL (ref 5–25)
CALCIUM SERPL-MCNC: 9.6 MG/DL (ref 8.3–10.1)
CHLORIDE SERPL-SCNC: 109 MMOL/L (ref 96–108)
CHOLEST SERPL-MCNC: 106 MG/DL
CLARITY UR: CLEAR
CO2 SERPL-SCNC: 25 MMOL/L (ref 21–32)
COLOR UR: ABNORMAL
CREAT SERPL-MCNC: 1.38 MG/DL (ref 0.6–1.3)
CREAT UR-MCNC: 171 MG/DL
EOSINOPHIL # BLD AUTO: 0.14 THOUSAND/ÂΜL (ref 0–0.61)
EOSINOPHIL NFR BLD AUTO: 3 % (ref 0–6)
ERYTHROCYTE [DISTWIDTH] IN BLOOD BY AUTOMATED COUNT: 12.4 % (ref 11.6–15.1)
EST. AVERAGE GLUCOSE BLD GHB EST-MCNC: 137 MG/DL
GLUCOSE P FAST SERPL-MCNC: 133 MG/DL (ref 65–99)
GLUCOSE UR STRIP-MCNC: NEGATIVE MG/DL
HBA1C MFR BLD: 6.4 %
HCT VFR BLD AUTO: 42.9 % (ref 36.5–46.1)
HDLC SERPL-MCNC: 30 MG/DL
HGB BLD-MCNC: 14.3 G/DL (ref 12–15.4)
HGB UR QL STRIP.AUTO: NEGATIVE
IMM GRANULOCYTES # BLD AUTO: 0.01 THOUSAND/UL (ref 0–0.2)
IMM GRANULOCYTES NFR BLD AUTO: 0 % (ref 0–2)
KETONES UR STRIP-MCNC: NEGATIVE MG/DL
LDLC SERPL CALC-MCNC: 25 MG/DL (ref 0–100)
LEUKOCYTE ESTERASE UR QL STRIP: NEGATIVE
LYMPHOCYTES # BLD AUTO: 1.65 THOUSANDS/ÂΜL (ref 0.6–4.47)
LYMPHOCYTES NFR BLD AUTO: 29 % (ref 14–44)
MCH RBC QN AUTO: 32.1 PG (ref 26.8–34.3)
MCHC RBC AUTO-ENTMCNC: 33.3 G/DL (ref 31.4–37.4)
MCV RBC AUTO: 96 FL (ref 82–98)
MONOCYTES # BLD AUTO: 0.43 THOUSAND/ÂΜL (ref 0.17–1.22)
MONOCYTES NFR BLD AUTO: 8 % (ref 4–12)
NEUTROPHILS # BLD AUTO: 3.4 THOUSANDS/ÂΜL (ref 1.85–7.62)
NEUTS SEG NFR BLD AUTO: 59 % (ref 43–75)
NITRITE UR QL STRIP: NEGATIVE
NONHDLC SERPL-MCNC: 76 MG/DL
NRBC BLD AUTO-RTO: 0 /100 WBCS
PH UR STRIP.AUTO: 6 [PH]
PHOSPHATE SERPL-MCNC: 2.2 MG/DL (ref 2.7–4.5)
PLATELET # BLD AUTO: 215 THOUSANDS/UL (ref 149–390)
PMV BLD AUTO: 10.9 FL (ref 8.9–12.7)
POTASSIUM SERPL-SCNC: 3.9 MMOL/L (ref 3.5–5.3)
PROT SERPL-MCNC: 6.8 G/DL (ref 6.4–8.4)
PROT UR STRIP-MCNC: NEGATIVE MG/DL
PROT UR-MCNC: 9 MG/DL
PROT/CREAT UR: 0.05 MG/G{CREAT} (ref 0–0.1)
PTH-INTACT SERPL-MCNC: 32 PG/ML (ref 18.4–80.1)
RBC # BLD AUTO: 4.46 MILLION/UL (ref 3.88–5.12)
SODIUM SERPL-SCNC: 138 MMOL/L (ref 135–147)
SP GR UR STRIP.AUTO: 1.02 (ref 1–1.03)
TRIGL SERPL-MCNC: 255 MG/DL
UROBILINOGEN UR STRIP-ACNC: 2 MG/DL
WBC # BLD AUTO: 5.66 THOUSAND/UL (ref 4.31–10.16)

## 2023-05-15 ENCOUNTER — TELEPHONE (OUTPATIENT)
Dept: NEPHROLOGY | Facility: CLINIC | Age: 58
End: 2023-05-15

## 2023-05-15 NOTE — TELEPHONE ENCOUNTER
Caller: Chavez PICKENS     Doctor: Dr Giraldo     Reason for call: PT would like to cancel and no reschedule pain level is less than 5    Call back#: N/A

## 2023-05-16 ENCOUNTER — OFFICE VISIT (OUTPATIENT)
Dept: NEPHROLOGY | Facility: CLINIC | Age: 58
End: 2023-05-16

## 2023-05-16 VITALS
WEIGHT: 215 LBS | HEIGHT: 73 IN | OXYGEN SATURATION: 98 % | DIASTOLIC BLOOD PRESSURE: 80 MMHG | SYSTOLIC BLOOD PRESSURE: 110 MMHG | RESPIRATION RATE: 16 BRPM | TEMPERATURE: 97.7 F | BODY MASS INDEX: 28.49 KG/M2 | HEART RATE: 68 BPM

## 2023-05-16 DIAGNOSIS — M16.11 PRIMARY OSTEOARTHRITIS OF RIGHT HIP: ICD-10-CM

## 2023-05-16 DIAGNOSIS — M89.9 CHRONIC KIDNEY DISEASE-MINERAL AND BONE DISORDER: ICD-10-CM

## 2023-05-16 DIAGNOSIS — N18.9 CHRONIC KIDNEY DISEASE-MINERAL AND BONE DISORDER: ICD-10-CM

## 2023-05-16 DIAGNOSIS — N18.31 STAGE 3A CHRONIC KIDNEY DISEASE (HCC): Primary | ICD-10-CM

## 2023-05-16 DIAGNOSIS — E83.9 CHRONIC KIDNEY DISEASE-MINERAL AND BONE DISORDER: ICD-10-CM

## 2023-05-16 DIAGNOSIS — E11.22 TYPE 2 DIABETES MELLITUS WITH STAGE 3A CHRONIC KIDNEY DISEASE, WITHOUT LONG-TERM CURRENT USE OF INSULIN (HCC): ICD-10-CM

## 2023-05-16 DIAGNOSIS — N18.31 TYPE 2 DIABETES MELLITUS WITH STAGE 3A CHRONIC KIDNEY DISEASE, WITHOUT LONG-TERM CURRENT USE OF INSULIN (HCC): ICD-10-CM

## 2023-05-16 DIAGNOSIS — N28.1 RENAL CYST, LEFT: ICD-10-CM

## 2023-05-16 NOTE — PROGRESS NOTES
NEPHROLOGY OFFICE FOLLOW UP  Amalia Newell 60 y o  adult MRN: [id number]    Encounter: [id number] 5/16/2023    REASON FOR VISIT: Mariely Elena is a 60 y o  adult who is here on 5/16/2023 for Chronic Kidney Disease and Follow-up    HPI:    Leonila Ramirez came in today for follow-up of CKD  Overall doing well  Just had back pain which is chronic in nature with some flareup    Denies taking nonsteroidal painkiller    No chest pain no palpitation    Denies any urinary complaint      REVIEW OF SYSTEMS:    Review of Systems   Constitutional: Negative for activity change and fatigue  HENT: Negative for congestion and ear discharge  Eyes: Negative for photophobia and pain  Respiratory: Negative for apnea and choking  Cardiovascular: Negative for chest pain and palpitations  Gastrointestinal: Negative for abdominal distention and blood in stool  Endocrine: Negative for heat intolerance and polyphagia  Genitourinary: Negative for flank pain and urgency  Musculoskeletal: Positive for back pain  Negative for neck pain and neck stiffness  Skin: Negative for color change and wound  Allergic/Immunologic: Negative for food allergies and immunocompromised state  Neurological: Negative for seizures and facial asymmetry  Hematological: Negative for adenopathy  Does not bruise/bleed easily  Psychiatric/Behavioral: Negative for self-injury and suicidal ideas           PAST MEDICAL HISTORY:  Past Medical History:   Diagnosis Date   • Anxiety 3/10/2022   • Aorta aneurysm ([facility])     4 3   • Arm DVT (deep venous thromboembolism), acute ([facility] [address] ) 4124    complications of cardiac cath   • Asthma    • Chronic kidney disease    • CKD (chronic kidney disease), stage III ([facility])    • COPD (chronic obstructive pulmonary disease) ([facility])    • Depression    • Diabetes mellitus ([facility])    • Essential hypertriglyceridemia    • GERD (gastroesophageal reflux disease)    • Headache    • Hiatal hernia    • Hyperlipidemia, mixed 5/7/2018 • Kidney stone    • Liver disease     FATTY LIVER   • Mild episode of recurrent major depressive disorder ([facility] [address] ) 3/10/2022   • PAC (premature atrial contraction)    • Prediabetes    • Renal calculi    • Sleep apnea    • Vestibular migraine        PAST SURGICAL HISTORY:  Past Surgical History:   Procedure Laterality Date   • CARPAL TUNNEL RELEASE Left    • COLONOSCOPY     • FL INJECTION RIGHT HIP (ARTHROGRAM)  2018   • FOOT SURGERY Left     FOREIGN BODY REMOVAL   • HERNIA REPAIR     • MS ARTHRP ACETBLR/PROX FEM PROSTC AGRFT/ALGRFT Right 2020    Procedure: ARTHROPLASTY HIP TOTAL;  Surgeon: Jony Crouch MD;  Location: [facility];  Service: Orthopedics   • MS COLONOSCOPY FLX DX W/COLLJ SPEC WHEN PFRMD N/A 2017    Procedure: COLONOSCOPY;  Surgeon: Dalia Srinivasan MD;  Location: [facility]; Service: Gastroenterology   • MS ESOPHAGOGASTRODUODENOSCOPY TRANSORAL DIAGNOSTIC N/A 10/31/2017    Procedure: ESOPHAGOGASTRODUODENOSCOPY (EGD); Surgeon: Dalia Srinivasan MD;  Location: [facility];   Service: Gastroenterology       SOCIAL HISTORY:  Social History     Substance and Sexual Activity   Alcohol Use Not Currently    Comment: occasional beer     Social History     Substance and Sexual Activity   Drug Use No     Social History     Tobacco Use   Smoking Status Some Days   • Types: Cigars   • Last attempt to quit: 2014   • Years since quittin 7   Smokeless Tobacco Never   Tobacco Comments    never inhaled       FAMILY HISTORY:  Family History   Problem Relation Age of Onset   • Cancer Brother    • Prostate cancer Brother    • Leukemia Brother         his only brother dies from [address] or leukemia pt unsure he was only 47   • Arthritis Mother    • Heart attack Father    • Clotting disorder Father    • Schizoaffective Disorder  Sister    • Aortic aneurysm Other         abdominal       MEDICATIONS:    Current Outpatient Medications:   •  acetaminophen (TYLENOL) 500 mg tablet, Take 500 mg by mouth "every 6 (six) hours as needed for mild pain, Disp: , Rfl:   •  albuterol (Ventolin HFA) 90 mcg/act inhaler, Inhale 2 puffs every 6 (six) hours as needed for wheezing or shortness of breath (cough) (Patient taking differently: Inhale 2 puffs every 6 (six) hours as needed for wheezing or shortness of breath (cough) PRN), Disp: 18 g, Rfl: 1  •  dulaglutide (Trulicity) 1 5 HF/1 4WW injection, Inject 0 5 mL (1 5 mg total) under the skin every 7 days, Disp: 6 mL, Rfl: 1  •  fenofibrate 160 MG tablet, take 1 tablet by mouth once daily, Disp: 90 tablet, Rfl: 3  •  glucose blood test strip, TEST BS TID, Disp: 300 each, Rfl: 5  •  glucose monitoring kit (FREESTYLE) monitoring kit, Use 1 each daily before breakfast, Disp: 1 each, Rfl: 0  •  Lancets MISC, Use daily before breakfast, Disp: 100 each, Rfl: 5  •  pantoprazole (PROTONIX) 40 mg tablet, take 1 tablet by mouth once daily, Disp: 90 tablet, Rfl: 3  •  rosuvastatin (CRESTOR) 5 mg tablet, Take 0 5 tablets (2 5 mg total) by mouth daily, Disp: 90 tablet, Rfl: 1  •  sildenafil (REVATIO) 20 mg tablet, TAKE 1 TABLET (20 MG TOTAL) BY MOUTH DAILY AS DIRECTED, Disp: 90 tablet, Rfl: 3  •  tiZANidine (ZANAFLEX) 2 mg tablet, Take 1 tablet (2 mg total) by mouth every 8 (eight) hours as needed for muscle spasms, Disp: 30 tablet, Rfl: 0  •  traMADol (Ultram) 50 mg tablet, Take 1 tablet (50 mg total) by mouth every 8 (eight) hours as needed for moderate pain, Disp: 90 tablet, Rfl: 2    PHYSICAL EXAM:  Vitals:    05/16/23 0943   BP: 110/80   BP Location: Right arm   Patient Position: Sitting   Pulse: 68   Resp: 16   Temp: 97 7 °F (36 5 °C)   TempSrc: Temporal   SpO2: 98%   Weight: 97 5 kg (215 lb)   Height: 6' 1\" (1 854 m)     Body mass index is 28 37 kg/m²  Physical Exam  Constitutional:       General: He is not in acute distress  Appearance: He is well-developed  HENT:      Head: Normocephalic  Eyes:      General: No scleral icterus       Conjunctiva/sclera: Conjunctivae " normal    Neck:      Vascular: No JVD  Cardiovascular:      Rate and Rhythm: Normal rate  Heart sounds: Normal heart sounds  Pulmonary:      Effort: Pulmonary effort is normal       Breath sounds: No wheezing  Abdominal:      Palpations: Abdomen is soft  Tenderness: There is no abdominal tenderness  Musculoskeletal:         General: Normal range of motion  Cervical back: Neck supple  Skin:     General: Skin is warm  Findings: No rash  Neurological:      Mental Status: He is alert and oriented to person, place, and time     Psychiatric:         Behavior: Behavior normal          LAB RESULTS:  Results for orders placed or performed in visit on 05/12/23   CBC and differential   Result Value Ref Range    WBC 5 66 4 31 - 10 16 Thousand/uL    RBC 4 46 3 88 - 5 12 Million/uL    Hemoglobin 14 3 12 0 - 15 4 g/dL    Hematocrit 42 9 36 5 - 46 1 %    MCV 96 82 - 98 fL    MCH 32 1 26 8 - 34 3 pg    MCHC 33 3 31 4 - 37 4 g/dL    RDW 12 4 11 6 - 15 1 %    MPV 10 9 8 9 - 12 7 fL    Platelets 113 086 - 345 Thousands/uL    nRBC 0 /100 WBCs    Neutrophils Relative 59 43 - 75 %    Immat GRANS % 0 0 - 2 %    Lymphocytes Relative 29 14 - 44 %    Monocytes Relative 8 4 - 12 %    Eosinophils Relative 3 0 - 6 %    Basophils Relative 1 0 - 1 %    Neutrophils Absolute 3 40 1 85 - 7 62 Thousands/µL    Immature Grans Absolute 0 01 0 00 - 0 20 Thousand/uL    Lymphocytes Absolute 1 65 0 60 - 4 47 Thousands/µL    Monocytes Absolute 0 43 0 17 - 1 22 Thousand/µL    Eosinophils Absolute 0 14 0 00 - 0 61 Thousand/µL    Basophils Absolute 0 03 0 00 - 0 10 Thousands/µL   Phosphorus   Result Value Ref Range    Phosphorus 2 2 (L) 2 7 - 4 5 mg/dL   Protein / creatinine ratio, urine   Result Value Ref Range    Creatinine, Ur 171 0 mg/dL    Protein Urine Random 9 mg/dL    Prot/Creat Ratio, Ur 0 05 0 00 - 0 10   PTH, intact   Result Value Ref Range    PTH 32 0 18 4 - 80 1 pg/mL   UA (URINE) with reflex to Scope   Result Value Ref Range    Color, UA Light Yellow     Clarity, UA Clear     Specific Gravity, UA 1 020 1 003 - 1 030    pH, UA 6 0 4 5, 5 0, 5 5, 6 0, 6 5, 7 0, 7 5, 8 0    Leukocytes, UA Negative Negative    Nitrite, UA Negative Negative    Protein, UA Negative Negative mg/dl    Glucose, UA Negative Negative mg/dl    Ketones, UA Negative Negative mg/dl    Urobilinogen, UA 2 0 (A) <2 0 mg/dl mg/dl    Bilirubin, UA Negative Negative    Occult Blood, UA Negative Negative   Vitamin D 25 hydroxy   Result Value Ref Range    Vit D, 25-Hydroxy 27 8 (L) 30 0 - 100 0 ng/mL   Comprehensive metabolic panel   Result Value Ref Range    Sodium 138 135 - 147 mmol/L    Potassium 3 9 3 5 - 5 3 mmol/L    Chloride 109 (H) 96 - 108 mmol/L    CO2 25 21 - 32 mmol/L    ANION GAP 4 4 - 13 mmol/L    BUN 13 5 - 25 mg/dL    Creatinine 1 38 (H) 0 60 - 1 30 mg/dL    Glucose, Fasting 133 (H) 65 - 99 mg/dL    Calcium 9 6 8 3 - 10 1 mg/dL    AST 27 5 - 45 U/L    ALT 32 12 - 78 U/L    Alkaline Phosphatase 40 (L) 46 - 116 U/L    Total Protein 6 8 6 4 - 8 4 g/dL    Albumin 3 7 3 5 - 5 0 g/dL    Total Bilirubin 0 46 0 20 - 1 00 mg/dL    eGFR     Lipid panel   Result Value Ref Range    Cholesterol 106 See Comment mg/dL    Triglycerides 255 (H) See Comment mg/dL    HDL, Direct 30 mg/dL    LDL Calculated 25 0 - 100 mg/dL    Non-HDL-Chol (CHOL-HDL) 76 mg/dl   HEMOGLOBIN A1C W/ EAG ESTIMATION   Result Value Ref Range    Hemoglobin A1C 6 4 (H) Normal 3 8-5 6%; PreDiabetic 5 7-6 4%; Diabetic >=6 5%; Glycemic control for adults with diabetes <7 0% %     mg/dl       ASSESSMENT and PLAN:      CKD (chronic kidney disease) stage 3, GFR 30-59 ml/min  Lab Results   Component Value Date    EGFR 53 06/02/2022    EGFR 49 05/19/2022    EGFR 54 12/20/2021    CREATININE 1 38 (H) 05/12/2023    CREATININE 1 56 (H) 11/11/2022    CREATININE 1 43 (H) 06/02/2022   Kidney function is quite stable    Advise hydration and avoiding nephrotoxic medication    Chronic kidney disease-mineral "and bone disorder  Lab Results   Component Value Date    EGFR 53 06/02/2022    EGFR 49 05/19/2022    EGFR 54 12/20/2021    CREATININE 1 38 (H) 05/12/2023    CREATININE 1 56 (H) 11/11/2022    CREATININE 1 43 (H) 06/02/2022   PTH and phosphorus along with vitamin D are within acceptable range and will continue to monitor    Primary osteoarthritis of right hip  Chronic in nature  Advised to avoid any nephrotoxic painkiller    Type 2 diabetes mellitus with stage 3a chronic kidney disease, without long-term current use of insulin ([facility])    Lab Results   Component Value Date    HGBA1C 6 4 (H) 05/12/2023   Reasonable well-controlled  Importance of diabetic control effect on kidney disease discussed with him      Everything discussed with patient at length  I will see him back in 6 months  We will get blood and urine test before that visit        Portions of the record may have been created with voice recognition software  Occasional wrong word or \"sound a like\" substitutions may have occurred due to the inherent limitations of voice recognition software  Read the chart carefully and recognize, using context, where substitutions have occurred  If you have any questions, please contact the dictating provider     "

## 2023-05-16 NOTE — ASSESSMENT & PLAN NOTE
Lab Results   Component Value Date    EGFR 53 06/02/2022    EGFR 49 05/19/2022    EGFR 54 12/20/2021    CREATININE 1 38 (H) 05/12/2023    CREATININE 1 56 (H) 11/11/2022    CREATININE 1 43 (H) 06/02/2022   Kidney function is quite stable    Advise hydration and avoiding nephrotoxic medication

## 2023-05-16 NOTE — ASSESSMENT & PLAN NOTE
Lab Results   Component Value Date    HGBA1C 6 4 (H) 05/12/2023   Reasonable well-controlled    Importance of diabetic control effect on kidney disease discussed with him

## 2023-05-16 NOTE — ASSESSMENT & PLAN NOTE
Lab Results   Component Value Date    EGFR 53 06/02/2022    EGFR 49 05/19/2022    EGFR 54 12/20/2021    CREATININE 1 38 (H) 05/12/2023    CREATININE 1 56 (H) 11/11/2022    CREATININE 1 43 (H) 06/02/2022   PTH and phosphorus along with vitamin D are within acceptable range and will continue to monitor

## 2023-06-14 ENCOUNTER — TELEPHONE (OUTPATIENT)
Dept: PAIN MEDICINE | Facility: MEDICAL CENTER | Age: 58
End: 2023-06-14

## 2023-06-14 NOTE — ASSESSMENT & PLAN NOTE
He does have stage III CKD with GFR of about 50  I discussed at length with him  Advised to avoid any nephrotoxic medicine like ibuprofen or Mobic  He is going to get procedure on Thursday so  I advised him to take for couple of days but not for the long-term  If he need more pain killer Tylenol or other narcotic pain killer in may need to be prescribed  This was a shared visit with the NATHANIEL. I reviewed and verified the documentation and independently performed the documented:

## 2023-06-14 NOTE — TELEPHONE ENCOUNTER
S/W pt who states about 5 days ago, he started having right lower extremity pain.  States pain is in right hip area, into groin, top of leg, in the back of his knee and at times on top of his foot.  Pt has had right leg pain in the past and had Right TFESI but states there seems to be more areas of pain. Pain is 9/10  Was going to undergo MBB for LBP but pt states he does not have much pain there  Taking Tramadol, Tizanidine and Tylenol with some help.   Pain is mostly in the am and at night.  Since pt is having different pain that in the past, scheduled eval with MG

## 2023-06-14 NOTE — TELEPHONE ENCOUNTER
Caller: Chavez Newell     Doctor: Dr Giraldo     Reason for call: Patient calling stating needs to speak to nurse pain level 9/10 please advise     Call back#: [phone]

## 2023-06-16 ENCOUNTER — APPOINTMENT (EMERGENCY)
Dept: VASCULAR ULTRASOUND | Facility: HOSPITAL | Age: 58
End: 2023-06-16
Payer: MEDICARE

## 2023-06-16 ENCOUNTER — TELEPHONE (OUTPATIENT)
Dept: FAMILY MEDICINE CLINIC | Facility: CLINIC | Age: 58
End: 2023-06-16

## 2023-06-16 ENCOUNTER — HOSPITAL ENCOUNTER (EMERGENCY)
Facility: HOSPITAL | Age: 58
Discharge: HOME | End: 2023-06-16
Attending: EMERGENCY MEDICINE
Payer: MEDICARE

## 2023-06-16 VITALS
SYSTOLIC BLOOD PRESSURE: 125 MMHG | HEART RATE: 62 BPM | TEMPERATURE: 98.1 F | RESPIRATION RATE: 16 BRPM | OXYGEN SATURATION: 100 % | DIASTOLIC BLOOD PRESSURE: 83 MMHG

## 2023-06-16 DIAGNOSIS — I82.409 ACUTE DVT (DEEP VENOUS THROMBOSIS) (HCC): Primary | ICD-10-CM

## 2023-06-16 LAB
ALBUMIN SERPL BCP-MCNC: 4 G/DL (ref 3.5–5)
ALP SERPL-CCNC: 42 U/L (ref 34–104)
ALT SERPL W P-5'-P-CCNC: 15 U/L (ref 7–52)
ANION GAP SERPL CALCULATED.3IONS-SCNC: 4 MMOL/L (ref 4–13)
APTT PPP: 23 SECONDS (ref 23–37)
AST SERPL W P-5'-P-CCNC: 16 U/L (ref 5–45)
BASOPHILS # BLD AUTO: 0.05 THOUSANDS/ÂΜL (ref 0–0.1)
BASOPHILS NFR BLD AUTO: 1 % (ref 0–1)
BILIRUB SERPL-MCNC: 0.33 MG/DL (ref 0.2–1)
BUN SERPL-MCNC: 17 MG/DL (ref 5–25)
CALCIUM SERPL-MCNC: 8.9 MG/DL (ref 8.4–10.2)
CHLORIDE SERPL-SCNC: 105 MMOL/L (ref 96–108)
CO2 SERPL-SCNC: 29 MMOL/L (ref 21–32)
CREAT SERPL-MCNC: 1.44 MG/DL (ref 0.6–1.3)
EOSINOPHIL # BLD AUTO: 0.19 THOUSAND/ÂΜL (ref 0–0.61)
EOSINOPHIL NFR BLD AUTO: 3 % (ref 0–6)
ERYTHROCYTE [DISTWIDTH] IN BLOOD BY AUTOMATED COUNT: 12 % (ref 11.6–15.1)
GLUCOSE SERPL-MCNC: 148 MG/DL (ref 65–140)
HCT VFR BLD AUTO: 42.9 % (ref 36.5–46.1)
HGB BLD-MCNC: 14 G/DL (ref 12–15.4)
IMM GRANULOCYTES # BLD AUTO: 0.04 THOUSAND/UL (ref 0–0.2)
IMM GRANULOCYTES NFR BLD AUTO: 1 % (ref 0–2)
INR PPP: 0.99 (ref 0.84–1.19)
LYMPHOCYTES # BLD AUTO: 1.99 THOUSANDS/ÂΜL (ref 0.6–4.47)
LYMPHOCYTES NFR BLD AUTO: 30 % (ref 14–44)
MCH RBC QN AUTO: 31.8 PG (ref 26.8–34.3)
MCHC RBC AUTO-ENTMCNC: 32.6 G/DL (ref 31.4–37.4)
MCV RBC AUTO: 98 FL (ref 82–98)
MONOCYTES # BLD AUTO: 0.66 THOUSAND/ÂΜL (ref 0.17–1.22)
MONOCYTES NFR BLD AUTO: 10 % (ref 4–12)
NEUTROPHILS # BLD AUTO: 3.76 THOUSANDS/ÂΜL (ref 1.85–7.62)
NEUTS SEG NFR BLD AUTO: 55 % (ref 43–75)
NRBC BLD AUTO-RTO: 0 /100 WBCS
PLATELET # BLD AUTO: 205 THOUSANDS/UL (ref 149–390)
PMV BLD AUTO: 10.2 FL (ref 8.9–12.7)
POTASSIUM SERPL-SCNC: 4.1 MMOL/L (ref 3.5–5.3)
PROT SERPL-MCNC: 6.6 G/DL (ref 6.4–8.4)
PROTHROMBIN TIME: 13.3 SECONDS (ref 11.6–14.5)
RBC # BLD AUTO: 4.4 MILLION/UL (ref 3.88–5.12)
SODIUM SERPL-SCNC: 138 MMOL/L (ref 135–147)
WBC # BLD AUTO: 6.69 THOUSAND/UL (ref 4.31–10.16)

## 2023-06-16 PROCEDURE — 99284 EMERGENCY DEPT VISIT MOD MDM: CPT

## 2023-06-16 PROCEDURE — 93971 EXTREMITY STUDY: CPT

## 2023-06-16 PROCEDURE — 85610 PROTHROMBIN TIME: CPT

## 2023-06-16 PROCEDURE — 85730 THROMBOPLASTIN TIME PARTIAL: CPT

## 2023-06-16 PROCEDURE — 80053 COMPREHEN METABOLIC PANEL: CPT

## 2023-06-16 PROCEDURE — 93971 EXTREMITY STUDY: CPT | Performed by: SURGERY

## 2023-06-16 PROCEDURE — 85025 COMPLETE CBC W/AUTO DIFF WBC: CPT

## 2023-06-16 PROCEDURE — 36415 COLL VENOUS BLD VENIPUNCTURE: CPT

## 2023-06-16 RX ORDER — RIVAROXABAN 15 MG-20MG
KIT ORAL
Qty: 51 EACH | Refills: 0 | OUTPATIENT
Start: 2023-06-16 | End: 2023-06-17

## 2023-06-16 NOTE — ED RE-EVALUATION NOTE
[facility] pharmacy called stating that Eliquis was not covered by insurance, therefore prescription switched to Xarelto  I spoke with patient's primary care office to notify his physician of this medication change       Dar Esparza PA-C  06/16/23 9540

## 2023-06-16 NOTE — TELEPHONE ENCOUNTER
Pt was positive for acute RLE DVT  Was initially ordered eliquis but was not covered  Per Magaly Islas was covered so pt was ordered starter pack  She wanted to see if you are willing to follow pt while he is on this medication?

## 2023-06-16 NOTE — ED PROVIDER NOTES
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  This was 7 years ago and patient has been off of blood thinners for approximately 6-1/2 years patient denies any fever, chills, chest pain, shortness of breath, paresthesias, trauma to the area, nausea, vomiting, or any other symptoms at this time  Prior to Admission Medications   Prescriptions Last Dose Informant Patient Reported? Taking?    Lancets MISC  Self No No   Sig: Use daily before breakfast   acetaminophen (TYLENOL) 500 mg tablet  Self Yes No   Sig: Take 500 mg by mouth every 6 (six) hours as needed for mild pain   albuterol (Ventolin HFA) 90 mcg/act inhaler  Self No No   Sig: Inhale 2 puffs every 6 (six) hours as needed for wheezing or shortness of breath (cough)   Patient taking differently: Inhale 2 puffs every 6 (six) hours as needed for wheezing or shortness of breath (cough) PRN   dulaglutide (Trulicity) 1 5 XK/1 2HN injection  Self No No   Sig: Inject 0 5 mL (1 5 mg total) under the skin every 7 days   fenofibrate 160 MG tablet  Self No No   Sig: take 1 tablet by mouth once daily   glucose blood test strip  Self No No   Sig: TEST BS TID   glucose monitoring kit (FREESTYLE) monitoring kit  Self No No   Sig: Use 1 each daily before breakfast   pantoprazole (PROTONIX) 40 mg tablet  Self No No   Sig: take 1 tablet by mouth once daily   rosuvastatin (CRESTOR) 5 mg tablet  Self No No   Sig: Take 0 5 tablets (2 5 mg total) by mouth daily   sildenafil (REVATIO) 20 mg tablet  Self No No   Sig: TAKE 1 TABLET (20 MG TOTAL) BY MOUTH DAILY AS DIRECTED   tiZANidine (ZANAFLEX) 2 mg tablet 6/16/2023 Self No Yes   Sig: Take 1 tablet (2 mg total) by mouth every 8 (eight) hours as needed for muscle spasms   traMADol (Ultram) 50 mg tablet 6/16/2023 Self No Yes   Sig: Take 1 tablet (50 mg total) by mouth every 8 (eight) hours as needed for moderate pain      Facility-Administered Medications: None       Past Medical History:   Diagnosis Date   • Anxiety 3/10/2022   • Aorta aneurysm (HCC)     4 3   • Arm DVT (deep venous thromboembolism), acute ([facility] ) 4652    complications of cardiac cath   • Asthma    • Chronic kidney disease    • CKD (chronic kidney disease), stage III ([facility])    • COPD (chronic obstructive pulmonary disease) ([facility])    • Depression    • Diabetes mellitus ([facility])    • Essential hypertriglyceridemia    • GERD (gastroesophageal reflux disease)    • Headache    • Hiatal hernia    • Hyperlipidemia, mixed 5/7/2018   • Kidney stone    • Liver disease     FATTY LIVER   • Mild episode of recurrent major depressive disorder ([facility] ) 3/10/2022   • PAC (premature atrial contraction)    • Prediabetes    • Renal calculi    • Sleep apnea    • Vestibular migraine        Past Surgical History:   Procedure Laterality Date   • CARPAL TUNNEL RELEASE Left    • COLONOSCOPY     • FL INJECTION RIGHT HIP (ARTHROGRAM)  8/20/2018   • FOOT SURGERY Left     FOREIGN BODY REMOVAL   • HERNIA REPAIR     • VA ARTHRP ACETBLR/PROX FEM PROSTC AGRFT/ALGRFT Right 9/28/2020    Procedure: ARTHROPLASTY HIP TOTAL;  Surgeon: Shayla Gomez MD;  Location: [facility];  Service: Orthopedics   • VA COLONOSCOPY FLX DX W/COLLJ SPEC WHEN PFRMD N/A 8/7/2017    Procedure: COLONOSCOPY;  Surgeon: Yvonna Kawasaki, MD;  Location: [facility]; Service: Gastroenterology   • VA ESOPHAGOGASTRODUODENOSCOPY TRANSORAL DIAGNOSTIC N/A 10/31/2017    Procedure: ESOPHAGOGASTRODUODENOSCOPY (EGD); Surgeon: Yvonna Kawasaki, MD;  Location: [facility];   Service: Gastroenterology       Family History   Problem Relation Age of Onset   • Cancer Brother    • Prostate cancer Brother    • Leukemia Brother         his only brother dies from lymphoma or leukemia pt unsure he was only 47   • Arthritis Mother    • Heart attack Father    • Clotting disorder Father    • Schizoaffective Disorder  Sister    • Aortic aneurysm Other         abdominal     I have reviewed and agree with the history as documented  E-Cigarette/Vaping   • E-Cigarette Use Never User      E-Cigarette/Vaping Substances   • Nicotine No    • THC No    • CBD No    • Flavoring No    • Other No    • Unknown No      Social History     Tobacco Use   • Smoking status: Some Days     Types: Cigars     Last attempt to quit: 2014     Years since quittin 8   • Smokeless tobacco: Never   • Tobacco comments:     never inhaled   Vaping Use   • Vaping Use: Never used   Substance Use Topics   • Alcohol use: Not Currently     Comment: occasional beer   • Drug use: No       Review of Systems   Constitutional: Negative for chills and fever  HENT: Negative for ear pain and sore throat  Eyes: Negative for pain and visual disturbance  Respiratory: Negative for cough and shortness of breath  Cardiovascular: Positive for leg swelling  Negative for chest pain and palpitations  Gastrointestinal: Negative for abdominal pain, constipation, diarrhea, nausea and vomiting  Genitourinary: Negative for dysuria and hematuria  Musculoskeletal: Positive for arthralgias (When walking, no pain with palpation of the joint ) and joint swelling  Negative for back pain, gait problem and myalgias  Skin: Negative for color change and rash  Neurological: Negative for dizziness, seizures, syncope, weakness and headaches  All other systems reviewed and are negative  Physical Exam  Physical Exam  Vitals and nursing note reviewed  Constitutional:       General: He is not in acute distress  Appearance: He is well-developed  HENT:      Head: Normocephalic and atraumatic     Eyes:      Conjunctiva/sclera: Conjunctivae normal    Cardiovascular:      Rate and Rhythm: Normal rate and regular rhythm  Pulses: Normal pulses  Heart sounds: Normal heart sounds  No murmur heard  No friction rub  No gallop  Pulmonary:      Effort: Pulmonary effort is normal  No respiratory distress  Breath sounds: Normal breath sounds  No stridor  No wheezing, rhonchi or rales  Chest:      Chest wall: No tenderness  Abdominal:      Palpations: Abdomen is soft  Tenderness: There is no abdominal tenderness  There is no right CVA tenderness or left CVA tenderness  Musculoskeletal:         General: Swelling and tenderness present  No deformity or signs of injury  Cervical back: Neck supple  Right lower leg: Edema present  Left lower leg: No edema  Comments: 2+ pedal edema right ,  Erythema to the anterior and posterior calf  Mild tenderness palpation of the calf as well as above the knee  No tenderness palpation to bony areas of the knee, ankle or toes  No paresthesias  Skin:     General: Skin is warm and dry  Capillary Refill: Capillary refill takes less than 2 seconds  Neurological:      Mental Status: He is alert     Psychiatric:         Mood and Affect: Mood normal          Vital Signs  ED Triage Vitals   Temperature Pulse Respirations Blood Pressure SpO2   06/16/23 1159 06/16/23 1200 06/16/23 1200 06/16/23 1200 06/16/23 1200   98 1 °F (36 7 °C) 65 16 141/74 97 %      Temp Source Heart Rate Source Patient Position - Orthostatic VS BP Location FiO2 (%)   06/16/23 1159 06/16/23 1200 -- 06/16/23 1200 --   Oral Monitor  Right arm       Pain Score       06/16/23 1200       8           Vitals:    06/16/23 1230 06/16/23 1300 06/16/23 1330 06/16/23 1400   BP: 113/70 117/73 130/77 125/83   Pulse: 66 64 63 62         Visual Acuity      ED Medications  Medications - No data to display    Diagnostic Studies  Results Reviewed     Procedure Component Value Units Date/Time    Comprehensive metabolic panel [[id number]]  (Abnormal) Collected: 06/16/23 1424    Lab Status: Final result Specimen: Blood from Arm, Left Updated: 06/16/23 1447     Sodium 138 mmol/L      Potassium 4 1 mmol/L      Chloride 105 mmol/L      CO2 29 mmol/L      ANION GAP 4 mmol/L      BUN 17 mg/dL      Creatinine 1 44 mg/dL      Glucose 148 mg/dL      Calcium 8 9 mg/dL      AST 16 U/L      ALT 15 U/L      Alkaline Phosphatase 42 U/L      Total Protein 6 6 g/dL      Albumin 4 0 g/dL      Total Bilirubin 0 33 mg/dL      eGFR --    Narrative:      Notes:     1  eGFR calculation is only valid for adults 18 years and older  2  EGFR calculation cannot be performed for patients who are transgender, non-binary, or whose legal sex, sex at birth, and gender identity differ      APTT [[id number]]  (Normal) Collected: 06/16/23 1424    Lab Status: Final result Specimen: Blood from Arm, Left Updated: 06/16/23 1443     PTT 23 seconds     Protime-INR [[id number]]  (Normal) Collected: 06/16/23 1424    Lab Status: Final result Specimen: Blood from Arm, Left Updated: 06/16/23 1443     Protime 13 3 seconds      INR 0 99    CBC and differential [[id number]] Collected: 06/16/23 1424    Lab Status: Final result Specimen: Blood from Arm, Left Updated: 06/16/23 1432     WBC 6 69 Thousand/uL      RBC 4 40 Million/uL      Hemoglobin 14 0 g/dL      Hematocrit 42 9 %      MCV 98 fL      MCH 31 8 pg      MCHC 32 6 g/dL      RDW 12 0 %      MPV 10 2 fL      Platelets 973 Thousands/uL      nRBC 0 /100 WBCs      Neutrophils Relative 55 %      Immat GRANS % 1 %      Lymphocytes Relative 30 %      Monocytes Relative 10 %      Eosinophils Relative 3 %      Basophils Relative 1 %      Neutrophils Absolute 3 76 Thousands/µL      Immature Grans Absolute 0 04 Thousand/uL      Lymphocytes Absolute 1 99 Thousands/µL      Monocytes Absolute 0 66 Thousand/µL      Eosinophils Absolute 0 19 Thousand/µL      Basophils Absolute 0 05 Thousands/µL                  VAS lower limb venous duplex study, "unilateral/limited    (Results Pending)              Procedures  Procedures         ED Course  ED Course as of 06/18/23 1534   Fri Jun 16, 2023   1424 Vascular ultrasound tech informed me that patient is positive for acute DVT in the \"right proximal to distal superficial femoral, popliteal, gastrocnemius, posterior tibial and perineal veins  \"  We will follow-up with blood testing  1435 CBC and differential  wnl                               SBIRT 22yo+    Flowsheet Row Most Recent Value   Initial Alcohol Screen: US AUDIT-C     1  How often do you have a drink containing alcohol? 0 Filed at: 06/16/2023 1211   2  How many drinks containing alcohol do you have on a typical day you are drinking? 0 Filed at: 06/16/2023 1211   3b  FEMALE Any Age, or MALE 65+: How often do you have 4 or more drinks on one occassion? 0 Filed at: 06/16/2023 1211   Audit-C Score 0 Filed at: 06/16/2023 1211   ABBEY: How many times in the past year have you    Used an illegal drug or used a prescription medication for non-medical reasons? Never Filed at: 06/16/2023 1211                    Medical Decision Making  63-year male presenting today with concerns of right lower extremity swelling, pain, erythema and warmth  Vascular ultrasound of right lower extremity  Patient does have a history of right arm DVT in the past, 7 years ago  Patient has been off blood thinners for approximately 6-6 and half years  Vascular ultrasound tech informing the patient was positive for DVT in the right lower extremity  We will follow-up with blood work including coag studies  Coags normal   We will place patient on Xarelto, starting dose, and have patient follow-up with vascular surgery for further evaluation and treatment of this unprovoked DVT --will require hypercoagulability work-up  Patient safe for discharge  Patient at time of discharge well-appearing in no acute distress  All questions answered      Patient's vitals, lab/imaging results, " "diagnosis, and treatment plan were discussed with the patient  All new/changed medications were discussed with patient, specifically, route of administration, how often and when to take, and where they can be picked up  Strict return precautions as well as close follow up with PCP was discussed with the patient and the patient was agreeable to my recommendations  Patient verbally acknowledged understanding of the above communications  All labs reviewed and utilized in the medical decision making process (if labs were ordered)  Portions of the record may have been created with voice recognition software   Occasional wrong word or \"sound a like\" substitutions may have occurred due to the inherent limitations of voice recognition software   Read the chart carefully and recognize, using context, where substitutions have occurred  Amount and/or Complexity of Data Reviewed  Labs: ordered  Decision-making details documented in ED Course  Disposition  Final diagnoses:   Acute DVT (deep venous thrombosis) ([facility]) - right proximal to distal superficial femoral, popliteal, gastrocnemius, posterior tibial and perineal veins  Time reflects when diagnosis was documented in both MDM as applicable and the Disposition within this note     Time User Action Codes Description Comment    6/16/2023  2:48 PM Javon Colon Add [I82 409] Acute DVT (deep venous thrombosis) ([facility] )     6/16/2023  2:48 PM Javon Colon Modify [I82 409] Acute DVT (deep venous thrombosis) ([facility] ) right proximal to distal superficial femoral, popliteal, gastrocnemius, posterior tibial and perineal veins  ED Disposition     ED Disposition   Discharge    Condition   Stable    Date/Time   Fri Jun 16, 2023  2:48 PM    Comment   Nery Newell discharge to home/self care                 Follow-up Information     Follow up With Specialties Details Why Contact Info Additional [address] Emergency Department Emergency " Medicine Go to  If symptoms worsen [address] [facility] [address] [facility] Emergency Department, [address], [place] [facility] [place], [place], [address]    The [address] [place] [address] Vascular Surgery Schedule an appointment as soon as possible for a visit   [address] [facility] [phone] The [address] [place] [address], [place], [place] [facility] [place], [place], [address] [facility],[address]    Miko Ibanez DO Family Medicine   [address] [facility]  [address]  [phone]             Discharge Medication List as of 6/16/2023  2:51 PM      START taking these medications    Details   apixaban (Eliquis) 5 mg Multiple Dosages:Starting Fri 6/16/2023, Until Thu 6/22/2023 at 2359, THEN Starting Fri 6/23/2023, Until Sat 7/15/2023 at 2359Take 2 tablets (10 mg total) by mouth 2 (two) times a day for 7 days, THEN 1 tablet (5 mg total) 2 (two) times a day for 23 d ays , Normal         CONTINUE these medications which have NOT CHANGED    Details   acetaminophen (TYLENOL) 500 mg tablet Take 500 mg by mouth every 6 (six) hours as needed for mild pain, Historical Med      albuterol (Ventolin HFA) 90 mcg/act inhaler Inhale 2 puffs every 6 (six) hours as needed for wheezing or shortness of breath (cough), Starting Thu 3/10/2022, Normal      dulaglutide (Trulicity) 1 5 PG/9 5MT injection Inject 0 5 mL (1 5 mg total) under the skin every 7 days, Starting Fri 3/24/2023, Until Wed 9/20/2023, Normal      fenofibrate 160 MG tablet take 1 tablet by mouth once daily, Normal      glucose blood test strip TEST BS TID, Normal      glucose monitoring kit (FREESTYLE) monitoring kit Use 1 each daily before breakfast, Starting Fri 12/4/2020, Normal      Lancets MISC Use daily before breakfast, Starting Fri 12/4/2020, Normal      pantoprazole (PROTONIX) 40 mg tablet take 1 tablet by mouth once daily, Normal      rosuvastatin (CRESTOR) 5 mg tablet Take 0 5 tablets (2 5 mg total) by mouth daily, Starting Fri 12/16/2022, No Print      sildenafil (REVATIO) 20 mg tablet TAKE 1 TABLET (20 MG TOTAL) BY MOUTH DAILY AS DIRECTED, Normal      tiZANidine (ZANAFLEX) 2 mg tablet Take 1 tablet (2 mg total) by mouth every 8 (eight) hours as needed for muscle spasms, Starting Fri 4/28/2023, Normal      traMADol (Ultram) 50 mg tablet Take 1 tablet (50 mg total) by mouth every 8 (eight) hours as needed for moderate pain, Starting Fri 4/28/2023, Normal                 PDMP Review       Value Time User    PDMP Reviewed  Yes 4/28/2023  8:42 AM Donavon Fraser, [facility] Provider  Electronically Signed by           Irina Garcia PA-C  06/18/23 1307

## 2023-06-17 ENCOUNTER — HOSPITAL ENCOUNTER (EMERGENCY)
Facility: HOSPITAL | Age: 58
Discharge: HOME | End: 2023-06-17
Attending: EMERGENCY MEDICINE
Payer: MEDICARE

## 2023-06-17 VITALS
OXYGEN SATURATION: 97 % | RESPIRATION RATE: 18 BRPM | TEMPERATURE: 98.1 F | HEART RATE: 66 BPM | DIASTOLIC BLOOD PRESSURE: 81 MMHG | WEIGHT: 216.93 LBS | BODY MASS INDEX: 28.62 KG/M2 | SYSTOLIC BLOOD PRESSURE: 136 MMHG

## 2023-06-17 DIAGNOSIS — I82.409 DVT (DEEP VENOUS THROMBOSIS) (HCC): Primary | ICD-10-CM

## 2023-06-17 PROCEDURE — 99283 EMERGENCY DEPT VISIT LOW MDM: CPT

## 2023-06-17 RX ORDER — OXYCODONE HYDROCHLORIDE 5 MG/1
5 TABLET ORAL ONCE
Status: COMPLETED | OUTPATIENT
Start: 2023-06-17 | End: 2023-06-17

## 2023-06-17 RX ORDER — OXYCODONE HYDROCHLORIDE 5 MG/1
5 TABLET ORAL EVERY 6 HOURS PRN
Qty: 14 TABLET | Refills: 0 | Status: SHIPPED | OUTPATIENT
Start: 2023-06-17 | End: 2023-06-27

## 2023-06-17 RX ADMIN — OXYCODONE HYDROCHLORIDE 5 MG: 5 TABLET ORAL at 13:31

## 2023-06-17 RX ADMIN — APIXABAN 10 MG: 5 TABLET, FILM COATED ORAL at 13:51

## 2023-06-17 NOTE — DISCHARGE INSTRUCTIONS
Please follow up with your family doctor  Next dose of eliquis tonight  Please follow directions on pill packaging

## 2023-06-19 NOTE — TELEPHONE ENCOUNTER
See update below from pt.  Pt canceled 6/16 ov and went to ED. Started on eliquis and oxycodone for DVT. Pt prescribed tramadol by [facility]

## 2023-06-19 NOTE — TELEPHONE ENCOUNTER
Caller:Chavez  Doctor: Iman Sepulveda    Reason for call: Chavez was suppose to ler us know what happened in the Er  They found a over 2 foot long blood clot in his right leg.they have him on blood thinners right now. They are going to have a Dr give him a call. A Vascular SurgeonThey gave him 14 pills of oxycodone 5 mg only takes one when he really needs it.he is suppose to keep him leg elevated     Call back#: [phone]

## 2023-06-20 ENCOUNTER — OFFICE VISIT (OUTPATIENT)
Dept: VASCULAR SURGERY | Facility: CLINIC | Age: 58
End: 2023-06-20
Payer: MEDICARE

## 2023-06-20 VITALS
HEART RATE: 81 BPM | OXYGEN SATURATION: 97 % | BODY MASS INDEX: 28.02 KG/M2 | HEIGHT: 73 IN | TEMPERATURE: 97.5 F | WEIGHT: 211.4 LBS | SYSTOLIC BLOOD PRESSURE: 122 MMHG | DIASTOLIC BLOOD PRESSURE: 80 MMHG

## 2023-06-20 DIAGNOSIS — I82.411 DVT OF DEEP FEMORAL VEIN, RIGHT (HCC): ICD-10-CM

## 2023-06-20 DIAGNOSIS — I71.21 ANEURYSM OF ASCENDING AORTA WITHOUT RUPTURE (HCC): Primary | ICD-10-CM

## 2023-06-20 PROCEDURE — 99204 OFFICE O/P NEW MOD 45 MIN: CPT | Performed by: SURGERY

## 2023-06-20 RX ORDER — APIXABAN 5 MG (74)
KIT ORAL
COMMUNITY
Start: 2023-06-17 | End: 2023-06-22

## 2023-06-20 NOTE — PATIENT INSTRUCTIONS
1) DVT  -your test showed a DVT blood clot in the right leg  -you do not need surgery for this  -please continue taking your blood thinners and talk to your PCP regarding the kind of blood thinner and duration of treatment  -elevate your leg as much as possible and use compression (either a sock or an ACE wrap until your sunburn heals)    2) Aneurysm  -I think you should get checked for aneurysm in the abdomen every 5 years; you last got checked in '20 and it was normal

## 2023-06-20 NOTE — PROGRESS NOTES
Assessment/Plan:    Pt is a 61 yo M w/ hiatal hernia, GERD, fatty liver, DM, asthma, migraine, asc aortic aneurysm, bicuspid aortic valve, back pain, hip pain s/p R THR 2/2 OA, CKD, HLD, ED, presents after finding of RLE DVT    DVT of deep femoral vein, right (HCC)  -second episode, unprovoked RLE DVT  -reviewed LEV which shows R FV/popV/PT/per/gastoc DVT  -hx of L axillary vein DVT after procedure [facility] '15  -discussed DVT and management of this; given limited extent to the FV, minimal swelling, I do not recommend any intervention; discussed anticoagulation for 3-6 months and reasonable to do lifelong in him as he has had another episode of clotting prior to this, unprovoked, and family hx; also discussed that he could see heme after his 3-6mo anticoagulation if he wanted to have hypercoagulability testing  -discussed medical management including compression and leg elevation to prevent PTS  -f/u PRN    Aneurysm of ascending aorta without rupture (HCC)  Family hx of aneurysm  -known asc AA 4 3cm, followed by Dr Jake Malone and Javier Brumfield  -reviewed MRI lumbar spine from '20 which does not show any distal thoracic or abdominal or DEVANTE aneurysm  -I think it would be reasonable to screen his abdomen on a 5year basis    Medications  -continue statin    Subjective:      Patient ID: Ted Reeves is a 60 y o  adult  New patient referred from ED Yusef JIANG for DVT had VISHNU done 6/16/23  Pt reports presenting to ED on 6/16/23 for RLE pain and swelling for 7 days Pt reports still having pain to right upper and lower leg and redness  Pt is taking Eliquis, Fenofibrate and Rosuvastatin  Pt currently smokes and occasional cigar but states he does not inhale  HPI:    Patient referred for DVT, found in ER  Patient has chronic pain from back and hip  He noticed new symptoms including R hip pain, popliteal fossa pain, and pain at the ankle and top of foot, about 2 weeks ago    He also noted swelling in the lower "leg   He was supposed to see pain management for scheduled appt and when he told them his new symptoms, they sent him to the ER and found to have DVT  Patient denies any inciting factors  Denies recent travel, illness, injury, any changes  He reports a blood clot in '15 at [facility] (L axillary vein DVT)  He thinks this was after a heart cath via LUE access? ?? He also has asc aortic aneurysm, monitored by Dr Laila Summers and seen by Conrado Woodruff in the past, stable at 43mm    Reports family hx of aneurysm in his grandfather, doesn't know details  Reports family hx of blood clots    He is on disability for his hip  Doesn't work  The following portions of the patient's history were reviewed and updated as appropriate: allergies, current medications, past family history, past medical history, past social history, past surgical history and problem list     Review of Systems   Constitutional: Negative  Eyes: Negative  Respiratory: Negative  Cardiovascular: Positive for leg swelling  Gastrointestinal: Negative  Endocrine: Negative  Genitourinary: Negative  Musculoskeletal: Positive for back pain  Skin: Negative  Allergic/Immunologic: Negative  Neurological: Positive for headaches  Hematological: Bruises/bleeds easily  Psychiatric/Behavioral: Negative  Objective:      /80 (BP Location: Right arm, Patient Position: Sitting, Cuff Size: Standard)   Pulse 81   Temp 97 5 °F (36 4 °C) (Tympanic)   Ht 6' 1\" (1 854 m)   Wt 95 9 kg (211 lb 6 4 oz)   SpO2 97%   BMI 27 89 kg/m²          Physical Exam  Cardiovascular:      Rate and Rhythm: Normal rate and regular rhythm  Pulses:           Radial pulses are 2+ on the right side and 2+ on the left side  Popliteal pulses are 0 on the right side and 0 on the left side  Dorsalis pedis pulses are 2+ on the right side and 2+ on the left side          Posterior tibial pulses are 2+ on the right side and 0 on the left " "side       Heart sounds: No murmur heard  Pulmonary:      Effort: No respiratory distress  Breath sounds: No wheezing or rales  Musculoskeletal:      Right lower leg: No edema  Left lower leg: No edema  Skin:     Comments: There is significant sunburn on the R lower leg without blistering; no chronic stasis changes; there are some spiders at the R ankle and a few retics distal leg           I have reviewed and made appropriate changes to the review of systems input by the medical assistant      Vitals:    06/20/23 0843   BP: 122/80   BP Location: Right arm   Patient Position: Sitting   Cuff Size: Standard   Pulse: 81   Temp: 97 5 °F (36 4 °C)   TempSrc: Tympanic   SpO2: 97%   Weight: 95 9 kg (211 lb 6 4 oz)   Height: 6' 1\" (1 854 m)       Patient Active Problem List   Diagnosis   • Mild intermittent asthma without complication   • Ascending aortic aneurysm (HCC)   • Bicuspid aortic valve   • Allergic rhinitis   • GERD (gastroesophageal reflux disease)   • Hiatal hernia   • Type 2 diabetes mellitus with stage 3a chronic kidney disease, without long-term current use of insulin ([facility])   • CKD (chronic kidney disease) stage 3, GFR 30-59 ml/min ([facility])   • Hyperlipidemia, mixed   • Vitamin D deficiency   • Renal cyst, left   • Hepatic cyst   • Fatty liver disease, nonalcoholic   • Erectile dysfunction   • Carpal tunnel syndrome of right wrist   • Chronic pain of right knee   • Vestibular migraine   • Patellar tendinitis of right knee   • Benign paroxysmal positional vertigo due to bilateral vestibular disorder   • Hip impingement syndrome, right   • Primary osteoarthritis of right hip   • Ulnar neuropathy of both upper extremities   • Lumbosacral strain   • Tarsal tunnel syndrome of right side   • Cubital tunnel syndrome, bilateral   • Groin pain, chronic, right   • Chronic pain syndrome   • Chronic kidney disease-mineral and bone disorder   • Uncomplicated opioid dependence (HCC)   • Arthritis of right hip " • Thoracic aortic aneurysm without rupture, unspecified part [facility])       Past Surgical History:   Procedure Laterality Date   • CARPAL TUNNEL RELEASE Left    • COLONOSCOPY     • FL INJECTION RIGHT HIP (ARTHROGRAM)  2018   • FOOT SURGERY Left     FOREIGN BODY REMOVAL   • HERNIA REPAIR     • [place] ARTHRP ACETBLR/PROX FEM PROSTC AGRFT/ALGRFT Right 2020    Procedure: ARTHROPLASTY HIP TOTAL;  Surgeon: Kelly Kunz MD;  [facility]: [facility];  Service: Orthopedics   • [place] COLONOSCOPY FLX DX W/COLLJ SPEC WHEN PFRMD N/A 2017    Procedure: COLONOSCOPY;  Surgeon: Valarie Ortiz MD;  Location: [facility]; Service: Gastroenterology   • [place] ESOPHAGOGASTRODUODENOSCOPY TRANSORAL DIAGNOSTIC N/A 10/31/2017    Procedure: ESOPHAGOGASTRODUODENOSCOPY (EGD); Surgeon: Valarie Ortiz MD;  Location: [facility];   Service: Gastroenterology       Family History   Problem Relation Age of Onset   • Cancer Brother    • Prostate cancer Brother    • Leukemia Brother         his only brother dies from [address] or leukemia pt unsure he was only 47   • Arthritis Mother    • Heart attack Father    • Clotting disorder Father    • Schizoaffective Disorder  Sister    • Aortic aneurysm Other         abdominal       Social History     Socioeconomic History   • Marital status:      Spouse name: Not on file   • Number of children: 2   • Years of education: Not on file   • Highest education level: Not on file   Occupational History   • Occupation: unemployed   Tobacco Use   • Smoking status: Some Days     Types: Cigars     Last attempt to quit: 2014     Years since quittin 8   • Smokeless tobacco: Never   • Tobacco comments:     never inhaled   Vaping Use   • Vaping Use: Never used   Substance and Sexual Activity   • Alcohol use: Not Currently     Comment: occasional beer   • Drug use: No   • Sexual activity: Yes     Partners: Female   Other Topics Concern   • Not on file   Social History Narrative    Caffeine use    Lives alone     Social Determinants of Health     Financial Resource Strain: Medium Risk (9/14/2022)    Overall Financial Resource Strain (CARDIA)    • Difficulty of Paying Living Expenses: Somewhat hard   Food Insecurity: Not on file   Transportation Needs: No Transportation Needs (9/14/2022)    PRAPARE - Transportation    • Lack of Transportation (Medical): No    • Lack of Transportation (Non-Medical): No   Physical Activity: Not on file   Stress: Not on file   Social Connections: Not on file   Intimate Partner Violence: Not on file   Housing Stability: Not on file       No Known Allergies      Current Outpatient Medications:   •  acetaminophen (TYLENOL) 500 mg tablet, Take 500 mg by mouth every 6 (six) hours as needed for mild pain, Disp: , Rfl:   •  albuterol (Ventolin HFA) 90 mcg/act inhaler, Inhale 2 puffs every 6 (six) hours as needed for wheezing or shortness of breath (cough) (Patient taking differently: Inhale 2 puffs every 6 (six) hours as needed for wheezing or shortness of breath (cough) PRN), Disp: 18 g, Rfl: 1  •  apixaban (Eliquis) 5 mg, Take 2 tablets (10 mg total) by mouth 2 (two) times a day for 7 days, THEN 1 tablet (5 mg total) 2 (two) times a day for 23 days  , Disp: 74 tablet, Rfl: 0  •  dulaglutide (Trulicity) 1 5 WS/5 9TW injection, Inject 0 5 mL (1 5 mg total) under the skin every 7 days, Disp: 6 mL, Rfl: 1  •  fenofibrate 160 MG tablet, take 1 tablet by mouth once daily, Disp: 90 tablet, Rfl: 3  •  glucose blood test strip, TEST BS TID, Disp: 300 each, Rfl: 5  •  glucose monitoring kit (FREESTYLE) monitoring kit, Use 1 each daily before breakfast, Disp: 1 each, Rfl: 0  •  Lancets MISC, Use daily before breakfast, Disp: 100 each, Rfl: 5  •  oxyCODONE (Roxicodone) 5 immediate release tablet, Take 1 tablet (5 mg total) by mouth every 6 (six) hours as needed for moderate pain for up to 10 days Max Daily Amount: 20 mg, Disp: 14 tablet, Rfl: 0  •  pantoprazole (PROTONIX) 40 mg tablet, take 1 tablet by mouth once daily, Disp: 90 tablet, Rfl: 3  •  rosuvastatin (CRESTOR) 5 mg tablet, Take 0 5 tablets (2 5 mg total) by mouth daily, Disp: 90 tablet, Rfl: 1  •  sildenafil (REVATIO) 20 mg tablet, TAKE 1 TABLET (20 MG TOTAL) BY MOUTH DAILY AS DIRECTED, Disp: 90 tablet, Rfl: 3  •  traMADol (Ultram) 50 mg tablet, Take 1 tablet (50 mg total) by mouth every 8 (eight) hours as needed for moderate pain, Disp: 90 tablet, Rfl: 2  •  Eliquis DVT/PE Starter Pack 5 MG TBPK, , Disp: , Rfl:   •  tiZANidine (ZANAFLEX) 2 mg tablet, Take 1 tablet (2 mg total) by mouth every 8 (eight) hours as needed for muscle spasms (Patient not taking: Reported on 6/20/2023), Disp: 30 tablet, Rfl: 0

## 2023-06-21 PROBLEM — I71.20 THORACIC AORTIC ANEURYSM WITHOUT RUPTURE, UNSPECIFIED PART (HCC): Status: RESOLVED | Noted: 2023-03-24 | Resolved: 2023-06-21

## 2023-06-21 NOTE — ED PROVIDER NOTES
History  Chief Complaint   Patient presents with   • Leg Pain     Known dvt, mishap getting medication prescribed yesterday post ED visit     60year old male presenting today with concerns of not being able to get his medication for the DVT that I prescribed yesterday  Patient states that he has had no increased pain but states that it still does hurt  States that he was unable to get the medication due to 1 pharmacy not having it and the other 1 about discharge from the thousand dollars  Prior to Admission Medications   Prescriptions Last Dose Informant Patient Reported? Taking? Lancets MISC  Self No No   Sig: Use daily before breakfast   acetaminophen (TYLENOL) 500 mg tablet  Self Yes No   Sig: Take 500 mg by mouth every 6 (six) hours as needed for mild pain   albuterol (Ventolin HFA) 90 mcg/act inhaler  Self No No   Sig: Inhale 2 puffs every 6 (six) hours as needed for wheezing or shortness of breath (cough)   Patient taking differently: Inhale 2 puffs every 6 (six) hours as needed for wheezing or shortness of breath (cough) PRN   dulaglutide (Trulicity) 1 5 EZ/2 6LZ injection  Self No No   Sig: Inject 0 5 mL (1 5 mg total) under the skin every 7 days   fenofibrate 160 MG tablet  Self No No   Sig: take 1 tablet by mouth once daily   glucose blood test strip  Self No No   Sig: TEST BS TID   glucose monitoring kit (FREESTYLE) monitoring kit  Self No No   Sig: Use 1 each daily before breakfast   pantoprazole (PROTONIX) 40 mg tablet  Self No No   Sig: take 1 tablet by mouth once daily   rivaroxaban (Xarelto Starter Pack) 15 & 20 MG starter pack   No No   Sig: Take 15 mg by mouth twice daily for 21 days, then 20 mg once daily thereafter     rosuvastatin (CRESTOR) 5 mg tablet  Self No No   Sig: Take 0 5 tablets (2 5 mg total) by mouth daily   sildenafil (REVATIO) 20 mg tablet  Self No No   Sig: TAKE 1 TABLET (20 MG TOTAL) BY MOUTH DAILY AS DIRECTED   tiZANidine (ZANAFLEX) 2 mg tablet  Self No No   Sig: Take 1 tablet (2 mg total) by mouth every 8 (eight) hours as needed for muscle spasms   Patient not taking: Reported on 6/20/2023   traMADol (Ultram) 50 mg tablet  Self No No   Sig: Take 1 tablet (50 mg total) by mouth every 8 (eight) hours as needed for moderate pain      Facility-Administered Medications: None       Past Medical History:   Diagnosis Date   • Anxiety 3/10/2022   • Aorta aneurysm (HCC)     4 3   • Arm DVT (deep venous thromboembolism), acute ([facility] ) 5601    complications of cardiac cath   • Asthma    • Chronic kidney disease    • CKD (chronic kidney disease), stage III ([facility])    • COPD (chronic obstructive pulmonary disease) ([facility])    • Depression    • Diabetes mellitus ([facility])    • Essential hypertriglyceridemia    • GERD (gastroesophageal reflux disease)    • Headache    • Hiatal hernia    • Hyperlipidemia, mixed 5/7/2018   • Kidney stone    • Liver disease     FATTY LIVER   • Mild episode of recurrent major depressive disorder ([facility] ) 3/10/2022   • PAC (premature atrial contraction)    • Prediabetes    • Renal calculi    • Sleep apnea    • Vestibular migraine        Past Surgical History:   Procedure Laterality Date   • CARPAL TUNNEL RELEASE Left    • COLONOSCOPY     • FL INJECTION RIGHT HIP (ARTHROGRAM)  8/20/2018   • FOOT SURGERY Left     FOREIGN BODY REMOVAL   • HERNIA REPAIR     • NE ARTHRP ACETBLR/PROX FEM PROSTC AGRFT/ALGRFT Right 9/28/2020    Procedure: ARTHROPLASTY HIP TOTAL;  Surgeon: Nito Jameson MD;  [facility];  Service: Orthopedics   • NE COLONOSCOPY FLX DX W/COLLJ SPEC WHEN PFRMD N/A 8/7/2017    Procedure: COLONOSCOPY;  Surgeon: Eliza Guajardo MD;  [facility]; Service: Gastroenterology   • NE ESOPHAGOGASTRODUODENOSCOPY TRANSORAL DIAGNOSTIC N/A 10/31/2017    Procedure: ESOPHAGOGASTRODUODENOSCOPY (EGD); Surgeon: Eliza Guajardo MD;  [facility];   Service: Gastroenterology       Family History   Problem Relation Age of Onset   • Cancer Brother    • Prostate cancer Brother    • Leukemia Brother         his only brother dies from lymphoma or leukemia pt unsure he was only 47   • Arthritis Mother    • Heart attack Father    • Clotting disorder Father    • Schizoaffective Disorder  Sister    • Aortic aneurysm Other         abdominal     I have reviewed and agree with the history as documented  E-Cigarette/Vaping   • E-Cigarette Use Never User      E-Cigarette/Vaping Substances   • Nicotine No    • THC No    • CBD No    • Flavoring No    • Other No    • Unknown No      Social History     Tobacco Use   • Smoking status: Some Days     Types: Cigars     Last attempt to quit: 2014     Years since quittin 8   • Smokeless tobacco: Never   • Tobacco comments:     never inhaled   Vaping Use   • Vaping Use: Never used   Substance Use Topics   • Alcohol use: Not Currently     Comment: occasional beer   • Drug use: No       Review of Systems   Constitutional: Negative for chills and fever  HENT: Negative for ear pain and sore throat  Eyes: Negative for pain and visual disturbance  Respiratory: Negative for cough and shortness of breath  Cardiovascular: Negative for chest pain and palpitations  Gastrointestinal: Negative for abdominal pain and vomiting  Genitourinary: Negative for dysuria and hematuria  Musculoskeletal: Positive for arthralgias  Negative for back pain, joint swelling and neck stiffness  Leg swelling and pain  Skin: Positive for color change  Negative for pallor, rash and wound  Neurological: Negative for seizures and syncope  All other systems reviewed and are negative  Physical Exam  Physical Exam  Vitals and nursing note reviewed  Constitutional:       General: He is not in acute distress  Appearance: He is well-developed  HENT:      Head: Normocephalic and atraumatic  Eyes:      Conjunctiva/sclera: Conjunctivae normal    Cardiovascular:      Rate and Rhythm: Normal rate and regular rhythm        Heart sounds: No murmur heard  Pulmonary:      Effort: Pulmonary effort is normal  No respiratory distress  Breath sounds: Normal breath sounds  Abdominal:      Palpations: Abdomen is soft  Tenderness: There is no abdominal tenderness  Musculoskeletal:         General: Swelling and tenderness present  No deformity or signs of injury  Cervical back: Neck supple  Right lower leg: Edema (Obvious swelling and redness to the right leg  Pulses intact ) present  Left lower leg: No edema  Skin:     General: Skin is warm and dry  Capillary Refill: Capillary refill takes less than 2 seconds  Neurological:      Mental Status: He is alert  Psychiatric:         Mood and Affect: Mood normal          Vital Signs  ED Triage Vitals [06/17/23 1230]   Temperature Pulse Respirations Blood Pressure SpO2   98 1 °F (36 7 °C) 66 18 136/81 97 %      Temp Source Heart Rate Source Patient Position - Orthostatic VS BP Location FiO2 (%)   Oral Monitor Lying Right arm --      Pain Score       10 - Worst Possible Pain           Vitals:    06/17/23 1230   BP: 136/81   Pulse: 66   Patient Position - Orthostatic VS: Lying         Visual Acuity      ED Medications  Medications   oxyCODONE (ROXICODONE) IR tablet 5 mg (5 mg Oral Given 6/17/23 1331)       Diagnostic Studies  Results Reviewed     None                 No orders to display              Procedures  Procedures         ED Course                                             Medical Decision Making  78-year-old male presents today with concerns of not be able to get his anticoagulant medication and I prescribed him yesterday for his acute DVT  [facility] and they were able to get him 1 month free of Eliquis with a voucher  We will give patient a dose of Eliquis here and have him  the medication before the pharmacy closed today    Patient states understanding and follow-up with vascular surgeon for quick "follow-up of his DVT  Patient at time of discharge well-appearing in no acute distress, denies any other symptoms or changes since seeing him the day before  All questions answered  Patient's vitals, lab/imaging results, diagnosis, and treatment plan were discussed with the patient  All new/changed medications were discussed with patient, specifically, route of administration, how often and when to take, and where they can be picked up  Strict return precautions as well as close follow up with PCP was discussed with the patient and the patient was agreeable to my recommendations  Patient verbally acknowledged understanding of the above communications  All labs reviewed and utilized in the medical decision making process (if labs were ordered)  Portions of the record may have been created with voice recognition software   Occasional wrong word or \"sound a like\" substitutions may have occurred due to the inherent limitations of voice recognition software   Read the chart carefully and recognize, using context, where substitutions have occurred  Risk  Prescription drug management  Disposition  Final diagnoses:   DVT (deep venous thrombosis) ([facility] )     Time reflects when diagnosis was documented in both MDM as applicable and the Disposition within this note     Time User Action Codes Description Comment    6/17/2023  1:06 PM Pinky Khan Add [O22 30] DVT (deep vein thrombosis) in pregnancy     6/17/2023  1:06 PM Pinky Khan Remove [O22 30] DVT (deep vein thrombosis) in pregnancy     6/17/2023  1:06 PM Pinky Khan Add [I82 409] DVT (deep venous thrombosis) [facility])       ED Disposition     ED Disposition   Discharge    Condition   Stable    Date/Time   Sat Jun 17, 2023  1:06 PM    [address] [place] discharge to home/self care                 Follow-up Information     Follow up With Specialties Details Why Contact Info Additional [address] Emergency " Department Emergency Medicine Go to  If symptoms worsen [address] [facility] [address] [facility] Department, [address], [place], [place], [address]          Discharge Medication List as of 6/17/2023  2:04 PM      START taking these medications    Details   apixaban (Eliquis) 5 mg Multiple Dosages:Starting Sat 6/17/2023, Until Fri 6/23/2023 at 2359, THEN Starting Sat 6/24/2023, Until Sun 7/16/2023 at 2359Take 2 tablets (10 mg total) by mouth 2 (two) times a day for 7 days, THEN 1 tablet (5 mg total) 2 (two) times a day for 23 d ays , Normal      oxyCODONE (Roxicodone) 5 immediate release tablet Take 1 tablet (5 mg total) by mouth every 6 (six) hours as needed for moderate pain for up to 10 days Max Daily Amount: 20 mg, Starting Sat 6/17/2023, Until Tue 6/27/2023 at 2359, Normal         CONTINUE these medications which have NOT CHANGED    Details   acetaminophen (TYLENOL) 500 mg tablet Take 500 mg by mouth every 6 (six) hours as needed for mild pain, Historical Med      albuterol (Ventolin HFA) 90 mcg/act inhaler Inhale 2 puffs every 6 (six) hours as needed for wheezing or shortness of breath (cough), Starting Thu 3/10/2022, Normal      dulaglutide (Trulicity) 1 5 BS/0 4DH injection Inject 0 5 mL (1 5 mg total) under the skin every 7 days, Starting Fri 3/24/2023, Until Wed 9/20/2023, Normal      fenofibrate 160 MG tablet take 1 tablet by mouth once daily, Normal      glucose blood test strip TEST BS TID, Normal      glucose monitoring kit (FREESTYLE) monitoring kit Use 1 each daily before breakfast, Starting Fri 12/4/2020, Normal      Lancets MISC Use daily before breakfast, Starting Fri 12/4/2020, Normal      pantoprazole (PROTONIX) 40 mg tablet take 1 tablet by mouth once daily, Normal      rosuvastatin (CRESTOR) 5 mg tablet Take 0 5 tablets (2 5 mg total) by mouth daily, Starting Fri 12/16/2022, No Print      sildenafil (REVATIO) 20 mg tablet TAKE 1 TABLET (20 MG TOTAL) BY MOUTH DAILY AS DIRECTED, Normal      tiZANidine (ZANAFLEX) 2 mg tablet Take 1 tablet (2 mg total) by mouth every 8 (eight) hours as needed for muscle spasms, Starting Fri 4/28/2023, Normal      traMADol (Ultram) 50 mg tablet Take 1 tablet (50 mg total) by mouth every 8 (eight) hours as needed for moderate pain, Starting Fri 4/28/2023, Normal         STOP taking these medications       rivaroxaban (Xarelto Starter Pack) 15 & 20 MG starter pack Comments:   Reason for Stopping:               No discharge procedures on file      PDMP Review       Value Time User    PDMP Reviewed  Yes 4/28/2023  8:42 AM NICK Hernandez          ED Provider  Electronically Signed by           Kim Almaraz PA-C  06/21/23 2857

## 2023-06-21 NOTE — PROGRESS NOTES
Imelda Book 1965 male MRN: [id number]      ASSESSMENT/PLAN  Problem List Items Addressed This Visit        Endocrine    Type 2 diabetes mellitus with stage 3a chronic kidney disease, without long-term current use of insulin ([facility] ) - Primary       Respiratory    Allergic rhinitis    Mild intermittent asthma without complication       Cardiovascular and Mediastinum    Acute deep vein thrombosis (DVT) of femoral vein of right lower extremity ([facility] )    Relevant Orders    Ambulatory referral to Hematology / Oncology    Ambulatory Referral to Social Work Care Management Program    Ascending aortic aneurysm [facility])       Genitourinary    Chronic kidney disease-mineral and bone disorder    CKD (chronic kidney disease) stage 3, GFR 30-59 ml/min ([facility])       Other    Chronic pain syndrome    Hyperlipidemia, mixed    Uncomplicated opioid dependence ([facility] )     DM: Well controlled, continue current regimen   HLD: Reviewed sources of triglycerides to cut back on    TAA: Continue monitoring with imaging   CKD: Stable, f/u with Nephro as scheduled  Asthma/Allergies: Asymptomatic, continue to monitor   Chronic Pain on Opioids: F/u with Spine & Pain as scheduled     Continue Eliquis -- reviewed signs/symptoms of bleeding to monitor for, to go to ED for any head strike  Continue compression stocking, elevation, regular ambulation  Will refer to Heme/Onc for hypercoagulability work up, whether lifelong treatment indicated given previous DVT (though this was provoked)  Will also refer to social work for assistance with Eliquis cost, if no savings options available, pt understands we may need to switch to Coumadin  Will continue anticoagulation for at least 3-6 months, consider repeat US if planning to discontinue       Future Appointments   Date Time Provider Kristen Fine   8/8/2023  2:00 PM Kings Farias MD [facility]   9/26/2023  8:00 AM DO ALFONZO Chowdhury [facility]   11/14/2023  4:00 PM Frankey Money, MD [facility]          SUBJECTIVE  CC: Follow-up (DVT (deep venous thrombosis)  - Pt was in ED on 06/17)      HPI:  Savita Amor is a 60 y o  male who presents for chronic follow up as below  Labs from 05/2023  DM: A1c 6 4% (from 7 7) -- has been walking more, drinking more water; denies hypoglycemic symptoms; home sugars 130-160s in AM, 90-120s in afternoon   HLD: Lipids: Total 106, LDL 25, HDL 30, ; LFTs WNL   TAA: Follows with Vascular    CKD: Cr 1 38; D 27, Phos 2 2, PTH/Ca WNL; follows with Nephro, function stable   Asthma/Allergies: ROS as below   Chronic Pain on Opioids: Follows with Spine & Pain, on Tramadol and takes Tylenol     Of note, pt diagnosed with RLE DVT on 06/16 -- initiated on Xarelto  Unprovoked  Review of Systems   Constitutional: Negative for diaphoresis  HENT: Negative for congestion, ear pain, rhinorrhea and sore throat  Respiratory: Negative for cough and shortness of breath  Cardiovascular: Positive for leg swelling  Negative for chest pain and palpitations  Gastrointestinal: Negative for abdominal pain (was having pain, scheduled with GI in August), constipation and diarrhea  Endocrine: Negative for polyuria  Genitourinary: Negative for dysuria  Neurological: Negative for dizziness and tremors         Historical Information   The patient history was reviewed and updated as follows:    Past Medical History:   Diagnosis Date   • Anxiety 3/10/2022   • Aorta aneurysm ([facility] )     4 3   • Arm DVT (deep venous thromboembolism), acute ([facility] ) 2188    complications of cardiac cath   • Asthma    • Chronic kidney disease    • CKD (chronic kidney disease), stage III ([facility])    • COPD (chronic obstructive pulmonary disease) ([facility])    • Depression    • Diabetes mellitus ([facility])    • Essential hypertriglyceridemia    • GERD (gastroesophageal reflux disease)    • Headache    • Hiatal hernia    • Hyperlipidemia, mixed 5/7/2018   • Kidney stone    • Liver disease     FATTY LIVER   • Mild episode of recurrent major depressive disorder ([facility] ) 3/10/2022   • PAC (premature atrial contraction)    • Prediabetes    • Renal calculi    • Sleep apnea    • Vestibular migraine      Past Surgical History:   Procedure Laterality Date   • CARPAL TUNNEL RELEASE Left    • COLONOSCOPY     • FL INJECTION RIGHT HIP (ARTHROGRAM)  2018   • FOOT SURGERY Left     FOREIGN BODY REMOVAL   • HERNIA REPAIR     • NE ARTHRP ACETBLR/PROX FEM PROSTC AGRFT/ALGRFT Right 2020    Procedure: ARTHROPLASTY HIP TOTAL;  Surgeon: Francy Becerril MD;  Location: [facility];  Service: Orthopedics   • NE COLONOSCOPY FLX DX W/COLLJ SPEC WHEN PFRMD N/A 2017    Procedure: COLONOSCOPY;  Surgeon: Bonnie Mckeon MD;  Location: [facility]; Service: Gastroenterology   • NE ESOPHAGOGASTRODUODENOSCOPY TRANSORAL DIAGNOSTIC N/A 10/31/2017    Procedure: ESOPHAGOGASTRODUODENOSCOPY (EGD); Surgeon: Bonnie Mckeon MD;  Location: [facility];   Service: Gastroenterology     Family History   Problem Relation Age of Onset   • Cancer Brother    • Prostate cancer Brother    • Leukemia Brother         his only brother dies from [address] or leukemia pt unsure he was only 47   • Arthritis Mother    • Heart attack Father    • Clotting disorder Father    • Schizoaffective Disorder  Sister    • Aortic aneurysm Other         abdominal      Social History   Social History     Substance and Sexual Activity   Alcohol Use Not Currently    Comment: occasional beer     Social History     Substance and Sexual Activity   Drug Use No     Social History     Tobacco Use   Smoking Status Some Days   • Types: Cigars   • Last attempt to quit: 2014   • Years since quittin 8   Smokeless Tobacco Never   Tobacco Comments    never inhaled       Medications:     Current Outpatient Medications:   •  acetaminophen (TYLENOL) 500 mg tablet, Take 500 mg by mouth every 6 (six) hours as needed for mild pain, Disp: , Rfl:   •  albuterol (Ventolin HFA) 90 "mcg/act inhaler, Inhale 2 puffs every 6 (six) hours as needed for wheezing or shortness of breath (cough) (Patient taking differently: Inhale 2 puffs every 6 (six) hours as needed for wheezing or shortness of breath (cough) PRN), Disp: 18 g, Rfl: 1  •  apixaban (Eliquis) 5 mg, Take 2 tablets (10 mg total) by mouth 2 (two) times a day for 7 days, THEN 1 tablet (5 mg total) 2 (two) times a day for 23 days  , Disp: 74 tablet, Rfl: 0  •  dulaglutide (Trulicity) 1 5 IB/7 4VX injection, Inject 0 5 mL (1 5 mg total) under the skin every 7 days, Disp: 6 mL, Rfl: 1  •  fenofibrate 160 MG tablet, take 1 tablet by mouth once daily, Disp: 90 tablet, Rfl: 3  •  glucose blood test strip, TEST BS TID, Disp: 300 each, Rfl: 5  •  glucose monitoring kit (FREESTYLE) monitoring kit, Use 1 each daily before breakfast, Disp: 1 each, Rfl: 0  •  Lancets MISC, Use daily before breakfast, Disp: 100 each, Rfl: 5  •  oxyCODONE (Roxicodone) 5 immediate release tablet, Take 1 tablet (5 mg total) by mouth every 6 (six) hours as needed for moderate pain for up to 10 days Max Daily Amount: 20 mg, Disp: 14 tablet, Rfl: 0  •  pantoprazole (PROTONIX) 40 mg tablet, take 1 tablet by mouth once daily, Disp: 90 tablet, Rfl: 3  •  rosuvastatin (CRESTOR) 5 mg tablet, Take 0 5 tablets (2 5 mg total) by mouth daily, Disp: 90 tablet, Rfl: 1  •  sildenafil (REVATIO) 20 mg tablet, TAKE 1 TABLET (20 MG TOTAL) BY MOUTH DAILY AS DIRECTED, Disp: 90 tablet, Rfl: 3  •  traMADol (Ultram) 50 mg tablet, Take 1 tablet (50 mg total) by mouth every 8 (eight) hours as needed for moderate pain, Disp: 90 tablet, Rfl: 2  No Known Allergies    OBJECTIVE    Vitals:   Vitals:    06/22/23 0830   BP: 112/74   BP Location: Left arm   Patient Position: Sitting   Cuff Size: Large   Pulse: 70   Temp: 97 7 °F (36 5 °C)   SpO2: 98%   Weight: 96 6 kg (213 lb)   Height: 6' 1\" (1 854 m)           Physical Exam  Vitals and nursing note reviewed     Constitutional:       General: He is not in " acute distress  Appearance: Normal appearance  HENT:      Head: Normocephalic and atraumatic  Right Ear: Tympanic membrane, ear canal and external ear normal       Left Ear: Tympanic membrane, ear canal and external ear normal       Nose: Nose normal       Mouth/Throat:      Mouth: Mucous membranes are moist       Pharynx: No oropharyngeal exudate or posterior oropharyngeal erythema  Eyes:      Conjunctiva/sclera: Conjunctivae normal    Cardiovascular:      Rate and Rhythm: Normal rate and regular rhythm  Pulmonary:      Effort: Pulmonary effort is normal  No respiratory distress  Breath sounds: Normal breath sounds  Abdominal:      General: Bowel sounds are normal  There is no distension  Palpations: Abdomen is soft  Tenderness: There is no abdominal tenderness  Musculoskeletal:      Right lower leg: Edema (tender to palpation, wearing compression stocking) present  Left lower leg: No edema  Lymphadenopathy:      Cervical: No cervical adenopathy  Skin:     General: Skin is warm and dry  Neurological:      General: No focal deficit present  Mental Status: He is alert     Psychiatric:         Mood and Affect: Mood normal                     DO Franki [address] [facility]   6/22/2023  9:33 AM

## 2023-06-22 ENCOUNTER — OFFICE VISIT (OUTPATIENT)
Dept: FAMILY MEDICINE CLINIC | Facility: CLINIC | Age: 58
End: 2023-06-22
Payer: MEDICARE

## 2023-06-22 ENCOUNTER — PATIENT OUTREACH (OUTPATIENT)
Dept: FAMILY MEDICINE CLINIC | Facility: CLINIC | Age: 58
End: 2023-06-22

## 2023-06-22 VITALS
OXYGEN SATURATION: 98 % | DIASTOLIC BLOOD PRESSURE: 74 MMHG | WEIGHT: 213 LBS | HEIGHT: 73 IN | BODY MASS INDEX: 28.23 KG/M2 | TEMPERATURE: 97.7 F | SYSTOLIC BLOOD PRESSURE: 112 MMHG | HEART RATE: 70 BPM

## 2023-06-22 DIAGNOSIS — J30.1 NON-SEASONAL ALLERGIC RHINITIS DUE TO POLLEN: ICD-10-CM

## 2023-06-22 DIAGNOSIS — G89.4 CHRONIC PAIN SYNDROME: ICD-10-CM

## 2023-06-22 DIAGNOSIS — N18.31 TYPE 2 DIABETES MELLITUS WITH STAGE 3A CHRONIC KIDNEY DISEASE, WITHOUT LONG-TERM CURRENT USE OF INSULIN (HCC): Primary | ICD-10-CM

## 2023-06-22 DIAGNOSIS — M89.9 CHRONIC KIDNEY DISEASE-MINERAL AND BONE DISORDER: ICD-10-CM

## 2023-06-22 DIAGNOSIS — I71.21 ANEURYSM OF ASCENDING AORTA WITHOUT RUPTURE (HCC): ICD-10-CM

## 2023-06-22 DIAGNOSIS — N18.31 STAGE 3A CHRONIC KIDNEY DISEASE (HCC): ICD-10-CM

## 2023-06-22 DIAGNOSIS — I82.411 ACUTE DEEP VEIN THROMBOSIS (DVT) OF FEMORAL VEIN OF RIGHT LOWER EXTREMITY (HCC): ICD-10-CM

## 2023-06-22 DIAGNOSIS — N18.9 CHRONIC KIDNEY DISEASE-MINERAL AND BONE DISORDER: ICD-10-CM

## 2023-06-22 DIAGNOSIS — E78.2 HYPERLIPIDEMIA, MIXED: ICD-10-CM

## 2023-06-22 DIAGNOSIS — F11.20 UNCOMPLICATED OPIOID DEPENDENCE (HCC): ICD-10-CM

## 2023-06-22 DIAGNOSIS — E11.22 TYPE 2 DIABETES MELLITUS WITH STAGE 3A CHRONIC KIDNEY DISEASE, WITHOUT LONG-TERM CURRENT USE OF INSULIN (HCC): Primary | ICD-10-CM

## 2023-06-22 DIAGNOSIS — J45.20 MILD INTERMITTENT ASTHMA WITHOUT COMPLICATION: ICD-10-CM

## 2023-06-22 DIAGNOSIS — E83.9 CHRONIC KIDNEY DISEASE-MINERAL AND BONE DISORDER: ICD-10-CM

## 2023-06-22 PROCEDURE — 99214 OFFICE O/P EST MOD 30 MIN: CPT | Performed by: FAMILY MEDICINE

## 2023-06-22 NOTE — PROGRESS NOTES
FABIENNE YANEZ received referral for assistance with patient's Eliquis cost   FABIENNE YANEZ spoke with patient whom stated that he thinks he has Medicare Part D prescription insurance, but is not entirely sure  He confirmed he prefers [employer] in [place], but will use [facility] if it makes a difference in cost   FABIENNE YANEZ informed patient that there are monthly discount coupons, however only for private prescription insurance plans  With Medicare, he would be able to apply for the [address] PAP, however discussed that it is income based  Patient agreeable to FABIENNE YANEZ e-mailing him information to [email or web address]  FABIENNE YANEZ called [facility] and confirmed patient does have Medicare Part D   FABIENNE YANEZ e-mailed patient informing him of the above and providing him with the PAP

## 2023-06-28 NOTE — PROGRESS NOTES
Treatment was given in 5 months  Juani Michael  1965  [address] [facility] [address] [place] [facility] [address]    Chief Complaint   Patient presents with   • Consult     Acute deep vein thrombosis (DVT) of femoral vein of right lower extremity ([address])   Had episode of acute DVT on the right leg 2 weeks ago started on oral Eliquis 5 mg twice a day recently had a DVT on the left upper extremity was related to the procedure  Was referred for hypercoagulable hypercoagulable work-up      Oncology History    No history exists. Oncological history    History of Present Illness:      DVT in the right lower extremity    Review of Systems   Constitutional: Negative for chills and fever. HENT: Negative for ear pain and sore throat. Eyes: Negative for pain and visual disturbance. Respiratory: Negative for cough and shortness of breath. Cardiovascular: Negative for chest pain and palpitations. Gastrointestinal: Negative for abdominal pain and vomiting. Genitourinary: Negative for dysuria and hematuria. Musculoskeletal: Negative for arthralgias and back pain. Skin: Negative for color change and rash. Neurological: Negative for seizures and syncope. All other systems reviewed and are negative.       Patient Active Problem List   Diagnosis   • Mild intermittent asthma without complication   • Ascending aortic aneurysm (HCC)   • Bicuspid aortic valve   • Allergic rhinitis   • GERD (gastroesophageal reflux disease)   • Hiatal hernia   • Type 2 diabetes mellitus with stage 3a chronic kidney disease, without long-term current use of insulin ([facility])   • CKD (chronic kidney disease) stage 3, GFR 30-59 ml/min ([facility])   • Hyperlipidemia, mixed   • Vitamin D deficiency   • Renal cyst, left   • Hepatic cyst   • Fatty liver disease, nonalcoholic   • Erectile dysfunction   • Carpal tunnel syndrome of right wrist   • Chronic pain of right knee   • Vestibular migraine   • Patellar tendinitis of right knee   • Benign paroxysmal positional vertigo due to bilateral vestibular disorder   • Hip impingement syndrome, right   • Primary osteoarthritis of right hip   • Ulnar neuropathy of both upper extremities   • Lumbosacral strain   • Tarsal tunnel syndrome of right side   • Cubital tunnel syndrome, bilateral   • Groin pain, chronic, right   • Chronic pain syndrome   • Chronic kidney disease-mineral and bone disorder   • Uncomplicated opioid dependence (HCC)   • Arthritis of right hip   • Acute deep vein thrombosis (DVT) of femoral vein of right lower extremity (HCC)     Past Medical History:   Diagnosis Date   • Anxiety 3/10/2022   • Aorta aneurysm ([facility])     4.3   • Arm DVT (deep venous thromboembolism), acute ([address]) 2576    complications of cardiac cath   • Asthma    • Chronic kidney disease    • CKD (chronic kidney disease), stage III ([facility])    • COPD (chronic obstructive pulmonary disease) ([address])    • Depression    • Diabetes mellitus ([address])    • Essential hypertriglyceridemia    • GERD (gastroesophageal reflux disease)    • Headache    • Hiatal hernia    • Hyperlipidemia, mixed 5/7/2018   • Kidney stone    • Liver disease     FATTY LIVER   • Mild episode of recurrent major depressive disorder ([address]) 3/10/2022   • PAC (premature atrial contraction)    • Prediabetes    • Renal calculi    • Sleep apnea    • Vestibular migraine      Past Surgical History:   Procedure Laterality Date   • CARPAL TUNNEL RELEASE Left    • COLONOSCOPY     • FL INJECTION RIGHT HIP (ARTHROGRAM)  08/20/2018   • FOOT SURGERY Left     FOREIGN BODY REMOVAL   • HERNIA REPAIR     • AL ARTHRP ACETBLR/PROX FEM PROSTC AGRFT/ALGRFT Right 09/28/2020    Procedure: ARTHROPLASTY HIP TOTAL;  Surgeon: Cassie De La Rosa MD;  Location: [facility];  Service: Orthopedics   • AL COLONOSCOPY FLX DX W/COLLJ SPEC WHEN PFRMD N/A 08/07/2017    Procedure: COLONOSCOPY;  Surgeon: Lisset Blas MD;  Location: [facility];   Service: Gastroenterology   • UT ESOPHAGOGASTRODUODENOSCOPY TRANSORAL DIAGNOSTIC N/A 10/31/2017    Procedure: ESOPHAGOGASTRODUODENOSCOPY (EGD); Surgeon: Mireya Chavez MD;  Location: [facility]; Service: Gastroenterology   • TOTAL HIP ARTHROPLASTY Right      Family History   Problem Relation Age of Onset   • Arthritis Mother    • Heart attack Father    • Clotting disorder Father    • Schizoaffective Disorder  Sister    • Cancer Brother    • Prostate cancer Brother    • Leukemia Brother         his only brother dies from [address] or leukemia pt unsure he was only 47   • Aortic aneurysm Other         abdominal     Social History     Socioeconomic History   • Marital status:      Spouse name: Not on file   • Number of children: 2   • Years of education: Not on file   • Highest education level: Not on file   Occupational History   • Occupation: unemployed   Tobacco Use   • Smoking status: Some Days     Types: Cigars     Last attempt to quit: 2014     Years since quittin.9   • Smokeless tobacco: Never   • Tobacco comments:     never inhaled   Vaping Use   • Vaping Use: Never used   Substance and Sexual Activity   • Alcohol use: Not Currently     Comment: occasional beer   • Drug use: No   • Sexual activity: Yes     Partners: Female   Other Topics Concern   • Not on file   Social History Narrative    Caffeine use    Lives alone     Social Determinants of Health     Financial Resource Strain: Medium Risk (2022)    Overall Financial Resource Strain (CARDIA)    • Difficulty of Paying Living Expenses: Somewhat hard   Food Insecurity: Not on file   Transportation Needs: No Transportation Needs (2022)    PRAPARE - Transportation    • Lack of Transportation (Medical): No    • Lack of Transportation (Non-Medical):  No   Physical Activity: Not on file   Stress: Not on file   Social Connections: Not on file   Intimate Partner Violence: Not on file   Housing Stability: Not on file       Current Outpatient Medications:   •  acetaminophen (TYLENOL) 500 mg tablet, Take 500 mg by mouth every 6 (six) hours as needed for mild pain, Disp: , Rfl:   •  albuterol (Ventolin HFA) 90 mcg/act inhaler, Inhale 2 puffs every 6 (six) hours as needed for wheezing or shortness of breath (cough) (Patient taking differently: Inhale 2 puffs every 6 (six) hours as needed for wheezing or shortness of breath (cough) PRN), Disp: 18 g, Rfl: 1  •  apixaban (Eliquis) 5 mg, Take 2 tablets (10 mg total) by mouth 2 (two) times a day for 7 days, THEN 1 tablet (5 mg total) 2 (two) times a day for 23 days. , Disp: 74 tablet, Rfl: 0  •  dulaglutide (Trulicity) 1.5 OC/8.5DG injection, Inject 0.5 mL (1.5 mg total) under the skin every 7 days, Disp: 6 mL, Rfl: 1  •  fenofibrate 160 MG tablet, take 1 tablet by mouth once daily, Disp: 90 tablet, Rfl: 3  •  glucose blood test strip, TEST BS TID, Disp: 300 each, Rfl: 5  •  glucose monitoring kit (FREESTYLE) monitoring kit, Use 1 each daily before breakfast, Disp: 1 each, Rfl: 0  •  Lancets MISC, Use daily before breakfast, Disp: 100 each, Rfl: 5  •  pantoprazole (PROTONIX) 40 mg tablet, take 1 tablet by mouth once daily, Disp: 90 tablet, Rfl: 3  •  rosuvastatin (CRESTOR) 5 mg tablet, Take 0.5 tablets (2.5 mg total) by mouth daily, Disp: 90 tablet, Rfl: 1  •  sildenafil (REVATIO) 20 mg tablet, TAKE 1 TABLET (20 MG TOTAL) BY MOUTH DAILY AS DIRECTED, Disp: 90 tablet, Rfl: 3  •  traMADol (Ultram) 50 mg tablet, Take 1 tablet (50 mg total) by mouth every 8 (eight) hours as needed for moderate pain, Disp: 90 tablet, Rfl: 2  No Known Allergies  Vitals:    07/03/23 0938   BP: 128/74   Pulse: 70   Resp: 16   Temp: 98.2 °F (36.8 °C)   SpO2: 96%       Physical Exam  Vitals reviewed. Constitutional:       Appearance: Normal appearance. HENT:      Head: Normocephalic. Mouth/Throat:      Pharynx: Oropharynx is clear. Eyes:      Pupils: Pupils are equal, round, and reactive to light.    Cardiovascular:      Rate and Rhythm: Regular rhythm. Heart sounds: Normal heart sounds. Abdominal:      General: Bowel sounds are normal.      Palpations: Abdomen is soft. Musculoskeletal:         General: Normal range of motion. Cervical back: Neck supple. Skin:     General: Skin is warm. Neurological:      General: No focal deficit present. Psychiatric:         Mood and Affect: Mood normal.           Labs:  Lab Results   Component Value Date    WBC 6.69 06/16/2023    HGB 14.0 06/16/2023    HCT 42.9 06/16/2023    MCV 98 06/16/2023     06/16/2023     Lab Results   Component Value Date    SODIUM 138 06/16/2023    K 4.1 06/16/2023     06/16/2023    CO2 29 06/16/2023    BUN 17 06/16/2023    CREATININE 1.44 (H) 06/16/2023    GLUC 148 (H) 06/16/2023    CALCIUM 8.9 06/16/2023     Lab Results   Component Value Date    HGBA1C 6.4 (H) 05/12/2023         Imaging  VAS lower limb venous duplex study, unilateral/limited    Result Date: 6/16/2023  Narrative:  [facility] REPORT CLINICAL: Indications: Patient presents with right lower extremity pain and swelling x approximately 7 days. Operative History: cardiac cath Risk Factors The patient has history of Diabetes (NIDDM (Diet)), Hyperlipidemia, CKD, DVT and smoking (current) 0.25 ppd. FINDINGS:  Right          Impression              FV Prox        Non Occlusive Thrombus  FV Mid         Occlusive Subsegmental  FV Dist        Occlusive Subsegmental  Popliteal      Non Occlusive Thrombus  PostTibial     Non Occlusive Thrombus  Peroneal       Non Occlusive Thrombus  Gastrocnemius  Non Occlusive Thrombus     CONCLUSION:  Impression: RIGHT LOWER LIMB Evidence of acute deep vein thrombosis throughout the right lower extremity which appears occlusive in the mid and distal femoral vein and non-occlusive in the proximal femoral, popliteal, posterior tibial, peroneal, and gastrocnemius veins. No evidence of superficial thrombophlebitis noted.  Doppler evaluation shows a normal response to augmentation maneuvers. Popliteal, posterior tibial and anterior tibial arterial Doppler waveform's are triphasic. LEFT LOWER LIMB LIMITED Evaluation shows no evidence of thrombus in the common femoral vein. Doppler evaluation shows a normal response to augmentation maneuvers. Tech note: A portion of the study was performed by current DMS student under direct supervision from a registered senior vascular technologist with approval from patient. Technical findings were given to Osman España PA-C via tiger text. SIGNATURE: Electronically Signed by: Bon Ruiz MD on 2023-06-16 10:40:36 PM    Ct chest   4.6 cm ascending aortic aneurysm, stable since April 2019.   Recommend follow-up with a chest CT with no contrast in one year.     Discussion/Summary:  With acute DVT of right lower extremity episode previously had a DVT on left upper extremity though it was related to the vascular procedure patient had right hip replacement and multiple injections in the back unclear whether this episode was provoked and unprovoked he also had a trauma on the right foot in the past at this point averagely, would recommend to continue Eliquis for at least 1 year after 6 months of therapy the treatment could be  with 2.5 mg twice a day instead of 5 mg twice a day I would recommend to stop the Eliquis in 5 months to test for hypercoagulable state at this point the testing would be problematic because of the recent clot history as well as anticoagulation on board so a repeat ultrasound of the leg venous Doppler point between now and 3 months from now because the patient had 3 clots to see the resolution of those

## 2023-07-03 ENCOUNTER — CONSULT (OUTPATIENT)
Dept: HEMATOLOGY ONCOLOGY | Facility: CLINIC | Age: 58
End: 2023-07-03
Payer: MEDICARE

## 2023-07-03 VITALS
HEIGHT: 73 IN | DIASTOLIC BLOOD PRESSURE: 74 MMHG | OXYGEN SATURATION: 96 % | RESPIRATION RATE: 16 BRPM | TEMPERATURE: 98.2 F | HEART RATE: 70 BPM | BODY MASS INDEX: 28.76 KG/M2 | SYSTOLIC BLOOD PRESSURE: 128 MMHG | WEIGHT: 217 LBS

## 2023-07-03 DIAGNOSIS — I82.411 ACUTE DEEP VEIN THROMBOSIS (DVT) OF FEMORAL VEIN OF RIGHT LOWER EXTREMITY (HCC): ICD-10-CM

## 2023-07-03 PROCEDURE — 99204 OFFICE O/P NEW MOD 45 MIN: CPT | Performed by: INTERNAL MEDICINE

## 2023-07-12 ENCOUNTER — PATIENT OUTREACH (OUTPATIENT)
Dept: FAMILY MEDICINE CLINIC | Facility: CLINIC | Age: 58
End: 2023-07-12

## 2023-07-12 NOTE — PROGRESS NOTES
SULEIMAN LOVING received a voicemail from patient inquiring about the [employer] PA[facility] application he submitted about 2 weeks ago. SULEIMAN LOVING reached out to to the foundation[employer] whom confirmed the general turn around time is 5-7 business days so long as all items have been received. Per discussion with [place], patient's application as not yet been processed. The patient agreement and consent needs the date added, and the prescription form with the dose and frequency needs to be adjusted to 2.5 or 5mg. The corrections should be reflected within 4 hours of receipt. SULEIMAN LOVING sent a message to Dr. Mallika Arana regarding the above and returned call to patient for an update.     Plan to follow up with PAF tomorrow to ensure corrections are received

## 2023-07-13 ENCOUNTER — PATIENT OUTREACH (OUTPATIENT)
Dept: FAMILY MEDICINE CLINIC | Facility: CLINIC | Age: 58
End: 2023-07-13

## 2023-07-13 ENCOUNTER — TELEPHONE (OUTPATIENT)
Dept: FAMILY MEDICINE CLINIC | Facility: CLINIC | Age: 58
End: 2023-07-13

## 2023-07-13 NOTE — TELEPHONE ENCOUNTER
He should not go without it -- I did resubmit his paperwork yesterday.  I am going to [facility] our social work team here to see if there are any updates for him

## 2023-07-13 NOTE — PROGRESS NOTES
SULEIMAN LOVING received message from Dr. Von Briggs, as patient called regarding his Eliquis. He reported only having 4 days left. SULEIMAN LOVING reached out to [employer] whom confirmed that the updated form has been received, however still pending a correction from the patient's form as well. Once received, it will take 5-7 days to process and then will send to pharmacy. SLUEIMAN LOVING inquired about any expedited process, of which there is none. SULEIMAN LOVING sent a message to the provider inquiring if there are any samples in the office in the meantime. SULEIMAN LOVING left a voicemail for patient regarding the above. SULEIMAN LOVING also inquired about a refund process if patient were to  his refill. Per [employer], there is a process however this representative was not aware of how to request one if needed. SULEIMAN LOVING plan to look into refund process in the meantime.

## 2023-07-14 ENCOUNTER — DOCUMENTATION (OUTPATIENT)
Dept: FAMILY MEDICINE CLINIC | Facility: CLINIC | Age: 58
End: 2023-07-14

## 2023-07-14 NOTE — PROGRESS NOTES
Call placed to Vascular department to see if they would be able to provide patient samples of the Eliquis until his patient assistance application goes through. Office stated that they would be able to provide patient with 5 boxes. Patient made aware and is heading down to their office to  samples.

## 2023-07-17 ENCOUNTER — PATIENT OUTREACH (OUTPATIENT)
Dept: FAMILY MEDICINE CLINIC | Facility: CLINIC | Age: 58
End: 2023-07-17

## 2023-07-17 NOTE — PROGRESS NOTES
OPCANN LOVING received IB message from Dr. Teresa Murdock. Vascular was able to provide some samples to patient in the meantime. OPCANN LOVING reached out to patient for follow up whom confirmed he added the date to the form and re-faxed it on Friday.     OPCANN LOVING will f/u at the end of this week to ensure form has been full yprocessed

## 2023-07-25 ENCOUNTER — TELEPHONE (OUTPATIENT)
Dept: FAMILY MEDICINE CLINIC | Facility: CLINIC | Age: 58
End: 2023-07-25

## 2023-07-25 NOTE — TELEPHONE ENCOUNTER
Patient called and said that he was denied for the patient assistance on his Eliquis so he will need something more affordable.  Please send in to [facility]

## 2023-07-26 ENCOUNTER — TELEPHONE (OUTPATIENT)
Dept: FAMILY MEDICINE CLINIC | Facility: CLINIC | Age: 58
End: 2023-07-26

## 2023-07-26 ENCOUNTER — PATIENT OUTREACH (OUTPATIENT)
Dept: FAMILY MEDICINE CLINIC | Facility: CLINIC | Age: 58
End: 2023-07-26

## 2023-07-26 DIAGNOSIS — I82.411 ACUTE DEEP VEIN THROMBOSIS (DVT) OF FEMORAL VEIN OF RIGHT LOWER EXTREMITY (HCC): Primary | ICD-10-CM

## 2023-07-26 RX ORDER — WARFARIN SODIUM 5 MG/1
5 TABLET ORAL
Qty: 90 TABLET | Refills: 1 | Status: SHIPPED | OUTPATIENT
Start: 2023-07-26 | End: 2023-09-22

## 2023-07-26 RX ORDER — ENOXAPARIN SODIUM 100 MG/ML
1 INJECTION SUBCUTANEOUS EVERY 12 HOURS
Qty: 20 ML | Refills: 2 | Status: SHIPPED | OUTPATIENT
Start: 2023-07-26 | End: 2023-08-14

## 2023-07-26 NOTE — TELEPHONE ENCOUNTER
Pt will be stopping by to  instructions for new medication at some point -- please give him the letter written today

## 2023-07-26 NOTE — PROGRESS NOTES
SULEIMAN LOVING reviewed patient's chart. He was denied for the Assistance Program for Eliquis. SULEIMAN LOVING reached out to patient and he confirmed it was due to not meeting eligibility as he does not spend more than 3% of his income on medications . Patient has no further needs from [facility] at this time. SULEIMAN LOVING encouraged him to reach out of needed in the future.

## 2023-07-26 NOTE — TELEPHONE ENCOUNTER
Spoke with pt via phone -- will switch to coumadin with Lovenox bridge. Scripts sent. Letter entered explaining how to do so.

## 2023-08-07 ENCOUNTER — CLINICAL SUPPORT (OUTPATIENT)
Dept: FAMILY MEDICINE CLINIC | Facility: CLINIC | Age: 58
End: 2023-08-07

## 2023-08-07 ENCOUNTER — TELEPHONE (OUTPATIENT)
Dept: OTHER | Facility: OTHER | Age: 58
End: 2023-08-07

## 2023-08-07 DIAGNOSIS — I82.411 ACUTE DEEP VEIN THROMBOSIS (DVT) OF FEMORAL VEIN OF RIGHT LOWER EXTREMITY (HCC): Primary | ICD-10-CM

## 2023-08-07 NOTE — TELEPHONE ENCOUNTER
Patient is calling regarding cancelling an appointment.     Date/Time:8/8/23    Patient was rescheduled: YES [] NO [x]    Patient requesting call back to reschedule: YES [x] NO []

## 2023-08-08 ENCOUNTER — TELEPHONE (OUTPATIENT)
Dept: FAMILY MEDICINE CLINIC | Facility: CLINIC | Age: 58
End: 2023-08-08

## 2023-08-08 NOTE — TELEPHONE ENCOUNTER
Hi I'd like to get a message to my family doctor, Tian Klein. I'm taking these blood thinner shots right now and the first one that I got, I got out there at your office and I have so much pain. I've been doing the other shots, I've been pinching my skin. When I take the shot it doesn't hurt, but that one from yesterday morning is very, very sore and when I cough and all, I gotta hold my stomach, that's how much it hurts. So I just wanted to let her know this.  Alright, thank you

## 2023-08-08 NOTE — TELEPHONE ENCOUNTER
Would recommend he alternate hot and cold on the area (or just one if the other doesn't feel good) and monitor -- please have him call to give an update tomorrow

## 2023-08-08 NOTE — TELEPHONE ENCOUNTER
Is the injection site red/swollen/oozing? Is he having any pain at other injection sites or just this one?

## 2023-08-09 ENCOUNTER — APPOINTMENT (EMERGENCY)
Dept: CT IMAGING | Facility: HOSPITAL | Age: 58
End: 2023-08-09
Payer: MEDICARE

## 2023-08-09 ENCOUNTER — HOSPITAL ENCOUNTER (EMERGENCY)
Facility: HOSPITAL | Age: 58
Discharge: HOME | End: 2023-08-09
Attending: EMERGENCY MEDICINE
Payer: MEDICARE

## 2023-08-09 VITALS
RESPIRATION RATE: 17 BRPM | OXYGEN SATURATION: 96 % | SYSTOLIC BLOOD PRESSURE: 133 MMHG | TEMPERATURE: 98.1 F | HEART RATE: 60 BPM | DIASTOLIC BLOOD PRESSURE: 83 MMHG

## 2023-08-09 DIAGNOSIS — R10.9 ABDOMINAL PAIN: Primary | ICD-10-CM

## 2023-08-09 DIAGNOSIS — S30.1XXA ABDOMINAL WALL HEMATOMA, INITIAL ENCOUNTER: ICD-10-CM

## 2023-08-09 LAB
ALBUMIN SERPL BCP-MCNC: 4.4 G/DL (ref 3.5–5)
ALP SERPL-CCNC: 44 U/L (ref 34–104)
ALT SERPL W P-5'-P-CCNC: 22 U/L (ref 7–52)
ANION GAP SERPL CALCULATED.3IONS-SCNC: 3 MMOL/L
APTT PPP: 36 SECONDS (ref 23–37)
AST SERPL W P-5'-P-CCNC: 22 U/L (ref 13–39)
BASOPHILS # BLD AUTO: 0.04 THOUSANDS/ÂΜL (ref 0–0.1)
BASOPHILS NFR BLD AUTO: 1 % (ref 0–1)
BILIRUB SERPL-MCNC: 0.3 MG/DL (ref 0.2–1)
BUN SERPL-MCNC: 17 MG/DL (ref 5–25)
CALCIUM SERPL-MCNC: 9.5 MG/DL (ref 8.4–10.2)
CHLORIDE SERPL-SCNC: 103 MMOL/L (ref 96–108)
CO2 SERPL-SCNC: 30 MMOL/L (ref 21–32)
CREAT SERPL-MCNC: 1.57 MG/DL (ref 0.6–1.3)
EOSINOPHIL # BLD AUTO: 0.19 THOUSAND/ÂΜL (ref 0–0.61)
EOSINOPHIL NFR BLD AUTO: 3 % (ref 0–6)
ERYTHROCYTE [DISTWIDTH] IN BLOOD BY AUTOMATED COUNT: 12.4 % (ref 11.6–15.1)
GFR SERPL CREATININE-BSD FRML MDRD: 47 ML/MIN/1.73SQ M
GLUCOSE SERPL-MCNC: 203 MG/DL (ref 65–140)
HCT VFR BLD AUTO: 45.2 % (ref 36.5–49.3)
HGB BLD-MCNC: 15 G/DL (ref 12–17)
IMM GRANULOCYTES # BLD AUTO: 0.01 THOUSAND/UL (ref 0–0.2)
IMM GRANULOCYTES NFR BLD AUTO: 0 % (ref 0–2)
INR PPP: 1.6 (ref 0.84–1.19)
LIPASE SERPL-CCNC: 65 U/L (ref 11–82)
LYMPHOCYTES # BLD AUTO: 2.33 THOUSANDS/ÂΜL (ref 0.6–4.47)
LYMPHOCYTES NFR BLD AUTO: 41 % (ref 14–44)
MCH RBC QN AUTO: 32.4 PG (ref 26.8–34.3)
MCHC RBC AUTO-ENTMCNC: 33.2 G/DL (ref 31.4–37.4)
MCV RBC AUTO: 98 FL (ref 82–98)
MONOCYTES # BLD AUTO: 0.58 THOUSAND/ÂΜL (ref 0.17–1.22)
MONOCYTES NFR BLD AUTO: 10 % (ref 4–12)
NEUTROPHILS # BLD AUTO: 2.51 THOUSANDS/ÂΜL (ref 1.85–7.62)
NEUTS SEG NFR BLD AUTO: 45 % (ref 43–75)
NRBC BLD AUTO-RTO: 0 /100 WBCS
PLATELET # BLD AUTO: 200 THOUSANDS/UL (ref 149–390)
PMV BLD AUTO: 10.5 FL (ref 8.9–12.7)
POTASSIUM SERPL-SCNC: 4.2 MMOL/L (ref 3.5–5.3)
PROT SERPL-MCNC: 6.8 G/DL (ref 6.4–8.4)
PROTHROMBIN TIME: 19 SECONDS (ref 11.6–14.5)
RBC # BLD AUTO: 4.63 MILLION/UL (ref 3.88–5.62)
SODIUM SERPL-SCNC: 136 MMOL/L (ref 135–147)
WBC # BLD AUTO: 5.66 THOUSAND/UL (ref 4.31–10.16)

## 2023-08-09 PROCEDURE — 99284 EMERGENCY DEPT VISIT MOD MDM: CPT | Performed by: SURGERY

## 2023-08-09 PROCEDURE — 85025 COMPLETE CBC W/AUTO DIFF WBC: CPT | Performed by: EMERGENCY MEDICINE

## 2023-08-09 PROCEDURE — 36415 COLL VENOUS BLD VENIPUNCTURE: CPT | Performed by: EMERGENCY MEDICINE

## 2023-08-09 PROCEDURE — 80053 COMPREHEN METABOLIC PANEL: CPT | Performed by: EMERGENCY MEDICINE

## 2023-08-09 PROCEDURE — 74177 CT ABD & PELVIS W/CONTRAST: CPT

## 2023-08-09 PROCEDURE — 83690 ASSAY OF LIPASE: CPT | Performed by: EMERGENCY MEDICINE

## 2023-08-09 PROCEDURE — 85730 THROMBOPLASTIN TIME PARTIAL: CPT | Performed by: EMERGENCY MEDICINE

## 2023-08-09 PROCEDURE — 85610 PROTHROMBIN TIME: CPT | Performed by: EMERGENCY MEDICINE

## 2023-08-09 PROCEDURE — G1004 CDSM NDSC: HCPCS

## 2023-08-09 RX ADMIN — IOHEXOL 100 ML: 350 INJECTION, SOLUTION INTRAVENOUS at 04:00

## 2023-08-09 NOTE — ED PROVIDER NOTES
History  Chief Complaint   Patient presents with   • Abdominal Pain     RLQ pain starting Monday morning after visiting the family doctor; Just started a blood thinner and received a shot of it in his RLQ Monday; has been feeling nauseas       61-YEAR-OLD MALE    PMH:   Asthma  Aortic Aneurysm  allergic rhinitis  GERD  Hiatal hernia  Type 2 DM  CKD  HLD  DVT    Chief complaint:   Right lower abdominal wall pain     Pt has been getting Lovenox shots for DVT  He had a shot in the RLQ Monday morning  Has been having pain since this time    Pain is local, non radiating    PAIN  RATED 7/10  DESCRIBED AS SHARP      ASSOCIATED SYMPTOMS:  URINARY  SYMPTOMS: THERE IS NO DYSURIA, NO HEMATURIA, NO FREQUENCY  HE REPORTS NAUSEA, BUT DENIES ANY VOMITING. DENIES FEVERS, CHILLS    ALLEVIATING OR EXACERBATING FACTORS:  UNCERTAIN     INTERVENTIONS: NONE        History provided by:  Patient  Abdominal Pain  Associated symptoms: no chest pain, no chills, no cough, no dysuria, no fatigue, no fever, no nausea, no shortness of breath and no vomiting        Prior to Admission Medications   Prescriptions Last Dose Informant Patient Reported? Taking?    Lancets MISC  Self No No   Sig: Use daily before breakfast   acetaminophen (TYLENOL) 500 mg tablet  Self Yes No   Sig: Take 500 mg by mouth every 6 (six) hours as needed for mild pain   albuterol (Ventolin HFA) 90 mcg/act inhaler  Self No No   Sig: Inhale 2 puffs every 6 (six) hours as needed for wheezing or shortness of breath (cough)   Patient taking differently: Inhale 2 puffs every 6 (six) hours as needed for wheezing or shortness of breath (cough) PRN   dulaglutide (Trulicity) 1.5 NR/0.3CN injection  Self No No   Sig: Inject 0.5 mL (1.5 mg total) under the skin every 7 days   enoxaparin (Lovenox) 100 mg/mL   No No   Sig: Inject 1 mL (100 mg total) under the skin every 12 (twelve) hours   fenofibrate 160 MG tablet  Self No No   Sig: take 1 tablet by mouth once daily   glucose blood test strip  Self No No   Sig: TEST BS TID   glucose monitoring kit (FREESTYLE) monitoring kit  Self No No   Sig: Use 1 each daily before breakfast   pantoprazole (PROTONIX) 40 mg tablet  Self No No   Sig: take 1 tablet by mouth once daily   rosuvastatin (CRESTOR) 5 mg tablet  Self No No   Sig: Take 0.5 tablets (2.5 mg total) by mouth daily   sildenafil (REVATIO) 20 mg tablet  Self No No   Sig: TAKE 1 TABLET (20 MG TOTAL) BY MOUTH DAILY AS DIRECTED   traMADol (Ultram) 50 mg tablet  Self No No   Sig: Take 1 tablet (50 mg total) by mouth every 8 (eight) hours as needed for moderate pain   warfarin (Coumadin) 5 mg tablet   No No   Sig: Take 1 tablet (5 mg total) by mouth daily Or as directed by PCP      Facility-Administered Medications: None       Past Medical History:   Diagnosis Date   • Anxiety 3/10/2022   • Aorta aneurysm (HCC)     4.3   • Arm DVT (deep venous thromboembolism), acute ([facility]) 6520    complications of cardiac cath   • Asthma    • Chronic kidney disease    • CKD (chronic kidney disease), stage III (HCC)    • COPD (chronic obstructive pulmonary disease) (HCC)    • Depression    • Diabetes mellitus (HCC)    • Essential hypertriglyceridemia    • GERD (gastroesophageal reflux disease)    • Headache    • Hiatal hernia    • Hyperlipidemia, mixed 5/7/2018   • Kidney stone    • Liver disease     FATTY LIVER   • Mild episode of recurrent major depressive disorder ([address]) 3/10/2022   • PAC (premature atrial contraction)    • Prediabetes    • Renal calculi    • Sleep apnea    • Vestibular migraine        Past Surgical History:   Procedure Laterality Date   • CARPAL TUNNEL RELEASE Left    • COLONOSCOPY     • FL INJECTION RIGHT HIP (ARTHROGRAM)  08/20/2018   • FOOT SURGERY Left     FOREIGN BODY REMOVAL   • HERNIA REPAIR     • [place] ARTHRP ACETBLR/PROX FEM PROSTC AGRFT/ALGRFT Right 09/28/2020    Procedure: ARTHROPLASTY HIP TOTAL;  Surgeon: Hai Wheeler MD;  Location: [facility];  Service: Orthopedics   • [place] COLONOSCOPY FLX DX W/COLLJ [facility] WHEN PFRMD N/A 2017    Procedure: COLONOSCOPY;  Surgeon: Dayana Montejo MD;  Location: [facility]; Service: Gastroenterology   • [place] ESOPHAGOGASTRODUODENOSCOPY TRANSORAL DIAGNOSTIC N/A 10/31/2017    Procedure: ESOPHAGOGASTRODUODENOSCOPY (EGD); Surgeon: Dayana Montejo MD;  Location: [facility]; Service: Gastroenterology   • TOTAL HIP ARTHROPLASTY Right        Family History   Problem Relation Age of Onset   • Arthritis Mother    • Heart attack Father    • Clotting disorder Father    • Schizoaffective Disorder  Sister    • Cancer Brother    • Prostate cancer Brother    • Leukemia Brother         his only brother dies from lymphoma or leukemia pt unsure he was only 47   • Aortic aneurysm Other         abdominal     I have reviewed and agree with the history as documented. E-Cigarette/Vaping   • E-Cigarette Use Never User      E-Cigarette/Vaping Substances   • Nicotine No    • THC No    • CBD No    • Flavoring No    • Other No    • Unknown No      Social History     Tobacco Use   • Smoking status: Some Days     Types: Cigars     Last attempt to quit: 2014     Years since quittin.0   • Smokeless tobacco: Never   • Tobacco comments:     never inhaled   Vaping Use   • Vaping Use: Never used   Substance Use Topics   • Alcohol use: Not Currently     Comment: occasional beer   • Drug use: No       Review of Systems   Constitutional: Negative for chills, diaphoresis, fatigue and fever. Respiratory: Negative for cough, shortness of breath, wheezing and stridor. Cardiovascular: Negative for chest pain, palpitations and leg swelling. Gastrointestinal: Negative for abdominal pain, blood in stool, nausea and vomiting. Genitourinary: Negative for difficulty urinating, dysuria, flank pain and frequency. Musculoskeletal: Negative for arthralgias, back pain, gait problem, joint swelling, myalgias, neck pain and neck stiffness. Skin: Negative for rash and wound.    Neurological: Negative for dizziness, light-headedness and headaches. All other systems reviewed and are negative. Physical Exam  Physical Exam  Constitutional:       General: He is not in acute distress. Appearance: He is well-developed. He is not ill-appearing, toxic-appearing or diaphoretic. HENT:      Head: Normocephalic and atraumatic. Nose: Nose normal.      Mouth/Throat:      Pharynx: No pharyngeal swelling or oropharyngeal exudate. Eyes:      General: No scleral icterus. Right eye: No discharge. Left eye: No discharge. Conjunctiva/sclera: Conjunctivae normal.      Pupils: Pupils are equal, round, and reactive to light. Neck:      Vascular: No JVD. Trachea: No tracheal deviation. Cardiovascular:      Rate and Rhythm: Normal rate and regular rhythm. Heart sounds: Normal heart sounds. No murmur heard. No friction rub. No gallop. Pulmonary:      Effort: Pulmonary effort is normal. No respiratory distress. Breath sounds: Normal breath sounds. No stridor. No wheezing, rhonchi or rales. Chest:      Chest wall: No tenderness. Abdominal:      General: Bowel sounds are normal. There is no distension. Palpations: Abdomen is soft. There is no mass. Tenderness: There is no abdominal tenderness. There is no guarding or rebound. Hernia: No hernia is present. Musculoskeletal:         General: No tenderness or deformity. Normal range of motion. Cervical back: Normal range of motion and neck supple. Lymphadenopathy:      Cervical: No cervical adenopathy. Skin:     General: Skin is warm. Capillary Refill: Capillary refill takes less than 2 seconds. Coloration: Skin is not pale. Findings: No erythema or rash. Neurological:      Mental Status: He is alert and oriented to person, place, and time. Cranial Nerves: No cranial nerve deficit. Sensory: No sensory deficit. Motor: No abnormal muscle tone.       Coordination: Coordination normal.   Psychiatric:         Behavior: Behavior normal.         Thought Content: Thought content normal.         Judgment: Judgment normal.         Vital Signs  ED Triage Vitals [08/09/23 0307]   Temperature Pulse Respirations Blood Pressure SpO2   98.1 °F (36.7 °C) 64 17 119/76 97 %      Temp Source Heart Rate Source Patient Position - Orthostatic VS BP Location FiO2 (%)   Oral Monitor Sitting Left arm --      Pain Score       7           Vitals:    08/09/23 0307   BP: 119/76   Pulse: 64   Patient Position - Orthostatic VS: Sitting         Visual Acuity      ED Medications  Medications - No data to display    Diagnostic Studies  Results Reviewed     None                 No orders to display              Procedures  Procedures         ED Course  ED Course as of 08/09/23 0714   Wed Aug 09, 2023   0325 Pt seen and evaluated    Here is here for Right lower abdominal wall pain after Lovenox injection   Pt non toxic  Vss and wnl    0404 Protime-INR(!)   0404 PTT: 36   0404 CMP(!)   0404 CBC and differential   0404 Lipase: 65   0544 Pt resting comfortably  Awaiting CT scan for dispo    0608 CT ABDOMEN AND PELVIS WITH IV CONTRAST     IMPRESSION:     Fat stranding in the right lower anterior abdominal wall, probably related to recent intervention. Mild prominence as well as small focal area of hyperdensity within the inferior right rectus abdominous muscle (axial image 105, series 2) highly   suspicious for a small rectus hemorrhage/bleed.     Fatty infiltration of the liver is suspected.   In the setting of abdominal pain and/or elevated liver function tests, consider steatohepatitis.     Hepatic cysts, left renal cyst, colonic diverticulosis without evidence of acute diverticulitis, small hiatal hernia, and other findings as above.   5521 Dw Pt his work up results    Dw Dr Eric Perdomo, on for Gen Surg  Reviewed the work up results  CT findings concerning for blush in the RLQ abdominal wall   0618 Dr Eric Perdomo aware of consult placed   0654 Dr Cameron Doshi here to see the pt    [phone] Dr Cameron Doshi has seen the pt   Feels strongly pt is fine to go home  Stop lovenox, continue Coumadin  F/u w/ PCP and hae H/H in 1-2 days                                             Medical Decision Making    Patient with history as above presented with Patient presents with:  Abdominal Pain: RLQ pain starting Monday morning after visiting the family doctor; Just started a blood thinner and received a shot of it in his RLQ Monday; has been feeling nauseas    History obtained from patient    Patient was nontoxic, stable. Ambulatory. Exam as above. Labs reviewed. Dispo: pt cleared by Dr Cameron Doshi from Gen Surg for home  Pt given return strict precautions   Pt to f/u w/ PCP    Abdominal pain: acute illness or injury  Abdominal wall hematoma, initial encounter: acute illness or injury  Amount and/or Complexity of Data Reviewed  Labs: ordered. Decision-making details documented in ED Course. Radiology: ordered. Risk  OTC drugs. Prescription drug management. Disposition  Final diagnoses:   None     ED Disposition     None      Follow-up Information    None         Patient's Medications   Discharge Prescriptions    No medications on file       No discharge procedures on file.     PDMP Review       Value Time User    PDMP Reviewed  Yes 4/28/2023  8:42 AM [place], [place]          ED Provider  Electronically Signed by           Debby Mohr MD  08/09/23 [facility] Miroslava Cheek MD  08/09/23 5917

## 2023-08-09 NOTE — CONSULTS
Consultation - General Surgery   Cristopher Tamayo 60 y.o. male MRN: [id number]  Unit/Bed#: ED 26 Encounter: [id number]    Assessment/Plan     Assessment:  The patient is a pleasant 62-year-old male with a recent history for DVT being bridged with Lovenox while his INR becomes therapeutic who has developed a small rectus sheath hematoma. Plan:  After thorough history, physical examination and review of the CT images I believe the patient is stable. There is no indications for additional surgical or vascular interventions. His INR is 1.6. I have advised the patient to discontinue his Lovenox injections, continue his Coumadin therapy and follow-up with his primary care provider as an outpatient postdischarge. Contact information was provided for our office should he have additional questions or concerns. Thank for the consultation and for the privilege of assisting in the care of this pleasant gentleman. History of Present Illness   HPI:  Cristopher Tamayo is a 60 y.o. male who presents with complaints of right lower quadrant abdominal pain due to a small rectus sheath hematoma for medical evaluation. Consult to surgery general  Consult performed by: Ravinder Castellanos MD  Consult ordered by: Alexei Loo MD          Review of Systems   Constitutional: Negative for chills and fever. HENT: Negative for ear pain and sore throat. Eyes: Negative for pain and visual disturbance. Respiratory: Negative for cough and shortness of breath. Cardiovascular: Negative for chest pain and palpitations. Gastrointestinal: Positive for abdominal pain. Negative for vomiting. Genitourinary: Negative for dysuria and hematuria. Musculoskeletal: Negative for arthralgias and back pain. Skin: Negative for color change and rash. Neurological: Negative for seizures and syncope. Hematological:        Recent history of DVT.   His insurance does not cover Eliquis so the patient is being bridged with Lovenox while his Coumadin level becomes therapeutic. Lovenox injections initiated Monday. This is the site of his pain. He has subsequently received 3 additional injections none of which have caused him pain. He is currently taking Coumadin orally. All other systems reviewed and are negative. Historical Information   Past Medical History:   Diagnosis Date   • Anxiety 3/10/2022   • Aorta aneurysm (HCC)     4.3   • Arm DVT (deep venous thromboembolism), acute ([address]) 6347    complications of cardiac cath   • Asthma    • Chronic kidney disease    • CKD (chronic kidney disease), stage III (HCC)    • COPD (chronic obstructive pulmonary disease) (HCC)    • Depression    • Diabetes mellitus (HCC)    • Essential hypertriglyceridemia    • GERD (gastroesophageal reflux disease)    • Headache    • Hiatal hernia    • Hyperlipidemia, mixed 5/7/2018   • Kidney stone    • Liver disease     FATTY LIVER   • Mild episode of recurrent major depressive disorder ([address]) 3/10/2022   • PAC (premature atrial contraction)    • Prediabetes    • Renal calculi    • Sleep apnea    • Vestibular migraine      Past Surgical History:   Procedure Laterality Date   • CARPAL TUNNEL RELEASE Left    • COLONOSCOPY     • FL INJECTION RIGHT HIP (ARTHROGRAM)  08/20/2018   • FOOT SURGERY Left     FOREIGN BODY REMOVAL   • HERNIA REPAIR     • [place] ARTHRP ACETBLR/PROX FEM PROSTC AGRFT/ALGRFT Right 09/28/2020    Procedure: ARTHROPLASTY HIP TOTAL;  Surgeon: Divya Swenson MD;  Location: [facility];  Service: Orthopedics   • [place] COLONOSCOPY FLX DX W/COLLJ SPEC WHEN PFRMD N/A 08/07/2017    Procedure: COLONOSCOPY;  Surgeon: Earl Oliver MD;  Location: [facility]; Service: Gastroenterology   • [place] ESOPHAGOGASTRODUODENOSCOPY TRANSORAL DIAGNOSTIC N/A 10/31/2017    Procedure: ESOPHAGOGASTRODUODENOSCOPY (EGD); Surgeon: Earl Oliver MD;  Location: [facility];   Service: Gastroenterology   • TOTAL HIP ARTHROPLASTY Right 2020     Social History   Social History Substance and Sexual Activity   Alcohol Use Not Currently    Comment: occasional beer     Social History     Substance and Sexual Activity   Drug Use No     E-Cigarette/Vaping   • E-Cigarette Use Never User      E-Cigarette/Vaping Substances   • Nicotine No    • THC No    • CBD No    • Flavoring No    • Other No    • Unknown No      Social History     Tobacco Use   Smoking Status Some Days   • Types: Cigars   • Last attempt to quit: 2014   • Years since quittin.0   Smokeless Tobacco Never   Tobacco Comments    never inhaled     Family History: non-contributory    Meds/Allergies   all current active meds have been reviewed  No Known Allergies    Objective   First Vitals:   Blood Pressure: 119/76 (23 030)  Pulse: 64 (23 030)  Temperature: 98.1 °F (36.7 °C) (23)  Temp Source: Oral (23)  Respirations: 17 (23 030)  SpO2: 97 % (23)    Current Vitals:   Blood Pressure: 133/83 (23 0702)  Pulse: 60 (23 07)  Temperature: 98.1 °F (36.7 °C) (23 030)  Temp Source: Oral (23 030)  Respirations: 17 (23 030)  SpO2: 96 % (23 0702)    No intake or output data in the 24 hours ending 23 0731    Invasive Devices     Peripheral Intravenous Line  Duration           Peripheral IV 20 Right Hand 1045 days                Physical Exam  Vitals and nursing note reviewed. Constitutional:       General: He is not in acute distress. Appearance: He is well-developed. Comments: Well-nourished well-developed male. He is in no acute distress. Pleasant competent reliable as a historian. HENT:      Head: Normocephalic and atraumatic. Eyes:      Conjunctiva/sclera: Conjunctivae normal.   Cardiovascular:      Rate and Rhythm: Normal rate and regular rhythm. Heart sounds: No murmur heard. Pulmonary:      Effort: Pulmonary effort is normal. No respiratory distress. Breath sounds: Normal breath sounds.    Abdominal: Palpations: Abdomen is soft. Tenderness: There is no abdominal tenderness. Comments: Rotund. Quarter sized palpable subcutaneous lump in the right lower quadrant. Focally tender for a 3 cm area around the palpable lump. The remainder the abdomen is soft benign and free from tenderness. Musculoskeletal:         General: No swelling. Cervical back: Neck supple. Skin:     General: Skin is warm and dry. Capillary Refill: Capillary refill takes less than 2 seconds. Neurological:      Mental Status: He is alert. Psychiatric:         Mood and Affect: Mood normal.         Lab Results: I have personally reviewed pertinent lab results. Imaging: I have personally reviewed pertinent reports. EKG, Pathology, and Other Studies: I have personally reviewed pertinent reports. Counseling / Coordination of Care  Total floor / unit time spent today 35 minutes. Greater than 50% of total time was spent with the patient and / or family counseling and / or coordination of care. A description of the counseling / coordination of care: 15.

## 2023-08-09 NOTE — PROGRESS NOTES
Pain Medicine Follow-Up Note    Assessment:  No diagnosis found. Plan:  No orders of the defined types were placed in this encounter. No orders of the defined types were placed in this encounter. My impressions and treatment recommendations were discussed in detail with the patient who verbalized understanding and had no further questions. ***    {PDMP Statement:27807}    {UDS Statement:27273}    {Opioid Statement:76178}    {Pain Management Procedure Risk Statement:61670}    {Oral Swab Statement:19553}    Follow-up is planned in *** time or sooner as warranted. Discharge instructions were provided. I personally saw and examined the patient and I agree with the above discussed plan of care. History of Present Illness:    Albina Saenz is a 60 y.o. male who presents to [facility] for interval re-evaluation of the above stated pain complaints. The patient has a past medical and chronic pain history as outlined in the assessment section. {He/she (caps):48179} was last seen on ***.    ***    Pain Contract Signed:  ***  Last Urine Drug Screen:  ***    Other than as stated above, the patient denies any interval changes in medications, medical condition, mental condition, symptoms, or allergies since the last office visit. Review of Systems:    Review of Systems   Respiratory: Negative for shortness of breath. Cardiovascular: Negative for chest pain. Gastrointestinal: Negative for constipation, diarrhea, nausea and vomiting. Musculoskeletal: Positive for back pain. Negative for arthralgias, gait problem, joint swelling and myalgias. Skin: Negative for rash. Neurological: Negative for dizziness, seizures and weakness. All other systems reviewed and are negative.         Past Medical History:   Diagnosis Date   • Anxiety 3/10/2022   • Aorta aneurysm ([address])     4.3   • Arm DVT (deep venous thromboembolism), acute ([address]) 5586    complications of cardiac cath   • Asthma    • Chronic kidney disease    • CKD (chronic kidney disease), stage III (HCC)    • COPD (chronic obstructive pulmonary disease) (HCC)    • Depression    • Diabetes mellitus (HCC)    • Essential hypertriglyceridemia    • GERD (gastroesophageal reflux disease)    • Headache    • Hiatal hernia    • Hyperlipidemia, mixed 5/7/2018   • Kidney stone    • Liver disease     FATTY LIVER   • Mild episode of recurrent major depressive disorder ([address]) 3/10/2022   • PAC (premature atrial contraction)    • Prediabetes    • Renal calculi    • Sleep apnea    • Vestibular migraine        Past Surgical History:   Procedure Laterality Date   • CARPAL TUNNEL RELEASE Left    • COLONOSCOPY     • FL INJECTION RIGHT HIP (ARTHROGRAM)  08/20/2018   • FOOT SURGERY Left     FOREIGN BODY REMOVAL   • HERNIA REPAIR     • OH ARTHRP ACETBLR/PROX FEM PROSTC AGRFT/ALGRFT Right 09/28/2020    Procedure: ARTHROPLASTY HIP TOTAL;  Surgeon: Melanie Barkley MD;  [facility];  Service: Orthopedics   • OH COLONOSCOPY FLX DX W/COLLJ SPEC WHEN PFRMD N/A 08/07/2017    Procedure: COLONOSCOPY;  Surgeon: Timbo Bowen MD;  [facility]; Service: Gastroenterology   • OH ESOPHAGOGASTRODUODENOSCOPY TRANSORAL DIAGNOSTIC N/A 10/31/2017    Procedure: ESOPHAGOGASTRODUODENOSCOPY (EGD); Surgeon: Timbo Bowen MD;  Location[facility];   Service: Gastroenterology   • TOTAL HIP ARTHROPLASTY Right 2020       Family History   Problem Relation Age of Onset   • Arthritis Mother    • Heart attack Father    • Clotting disorder Father    • Schizoaffective Disorder  Sister    • Cancer Brother    • Prostate cancer Brother    • Leukemia Brother         his only brother dies from [facility] [address] or leukemia pt unsure he was only 47   • Aortic aneurysm Other         abdominal       Social History     Occupational History   • Occupation: unemployed   Tobacco Use   • Smoking status: Some Days     Types: Cigars     Last attempt to quit: 8/7/2014     Years since quittin.0   • Smokeless tobacco: Never   • Tobacco comments:     never inhaled   Vaping Use   • Vaping Use: Never used   Substance and Sexual Activity   • Alcohol use: Not Currently     Comment: occasional beer   • Drug use: No   • Sexual activity: Yes     Partners: Female         Current Outpatient Medications:   •  acetaminophen (TYLENOL) 500 mg tablet, Take 500 mg by mouth every 6 (six) hours as needed for mild pain, Disp: , Rfl:   •  albuterol (Ventolin HFA) 90 mcg/act inhaler, Inhale 2 puffs every 6 (six) hours as needed for wheezing or shortness of breath (cough) (Patient taking differently: Inhale 2 puffs every 6 (six) hours as needed for wheezing or shortness of breath (cough) PRN), Disp: 18 g, Rfl: 1  •  dulaglutide (Trulicity) 1.5 AG/0.2KO injection, Inject 0.5 mL (1.5 mg total) under the skin every 7 days, Disp: 6 mL, Rfl: 1  •  enoxaparin (Lovenox) 100 mg/mL, Inject 1 mL (100 mg total) under the skin every 12 (twelve) hours, Disp: 20 mL, Rfl: 2  •  fenofibrate 160 MG tablet, take 1 tablet by mouth once daily, Disp: 90 tablet, Rfl: 3  •  glucose blood test strip, TEST BS TID, Disp: 300 each, Rfl: 5  •  glucose monitoring kit (FREESTYLE) monitoring kit, Use 1 each daily before breakfast, Disp: 1 each, Rfl: 0  •  Lancets MISC, Use daily before breakfast, Disp: 100 each, Rfl: 5  •  pantoprazole (PROTONIX) 40 mg tablet, take 1 tablet by mouth once daily, Disp: 90 tablet, Rfl: 3  •  rosuvastatin (CRESTOR) 5 mg tablet, Take 0.5 tablets (2.5 mg total) by mouth daily, Disp: 90 tablet, Rfl: 1  •  sildenafil (REVATIO) 20 mg tablet, TAKE 1 TABLET (20 MG TOTAL) BY MOUTH DAILY AS DIRECTED, Disp: 90 tablet, Rfl: 3  •  traMADol (Ultram) 50 mg tablet, Take 1 tablet (50 mg total) by mouth every 8 (eight) hours as needed for moderate pain, Disp: 90 tablet, Rfl: 2  •  warfarin (Coumadin) 5 mg tablet, Take 1 tablet (5 mg total) by mouth daily Or as directed by PCP, Disp: 90 tablet, Rfl: 1  No current facility-administered medications for this visit. No Known Allergies    Physical Exam:    There were no vitals taken for this visit. Constitutional:{General Appearance:57701::"normal, well developed, well nourished, alert, in no distress and non-toxic and no overt pain behavior. "}  Eyes:{Sclera:12010::"anicteric"}  HEENT:{Hearin::"grossly intact"}  Neck:{Neck:01913::"supple, symmetric, trachea midline and no masses "}  Pulmonary:{Respiratory effort:37209::"even and unlabored"}  Cardiovascular:{Examination of Extremities:26741::"No edema or pitting edema present"}  Skin:{Skin and Subcutaneous tissues:60655::"Normal without rashes or lesions and well hydrated"}  Psychiatric:{Mood and Affect:58869::"Mood and affect appropriate"}  Neurologic:{Cranial Nerves:04374::"Cranial Nerves II-XII grossly intact"}  Musculoskeletal:{[place] and [place]:81535::"normal"}      Imaging  No orders to display         No orders of the defined types were placed in this encounter.

## 2023-08-09 NOTE — TELEPHONE ENCOUNTER
Lorrie Winn called the office, he was seen in the ER last evening for the pain he was having from his first Lovenox shot on Monday. He said that the ER doctor told him that a hematoma formed at the site. He added that the ER doctor advised he stop taking the Lovenox shots and stick to just the Warfarin. He wanted to touch base with you on this.  Please advise

## 2023-08-09 NOTE — TELEPHONE ENCOUNTER
Reviewed his ER visit. His INR (which is the blood marker of his coumadin) is not in the right range yet, so technically taking just the coumadin is not giving him enough blood thinner. I would suggest he continue the Lovenox, though should not inject into the area that is painful and should also keep an eye on this area to monitor for any change in size/worsening pain. He should go to the lab to recheck his INR tomorrow (this is ordered already).

## 2023-08-09 NOTE — DISCHARGE INSTRUCTIONS
RETURN IF WORSE IN ANY WAY:   WORSENING PAIN,   FEVER OR FLU LIKE SYMPTOMS,   Dizziness, lightheadedness, feeling like you may pass out  OR NEW AND CONCERNING SYMPTOMS SIGNS OR SYMPTOMS      PLEASE CALL YOUR PRIMARY DOCTOR IN THE MORNING TO SET UP FOLLOW UP for TOMORROW or the Next day  PLEASE REVIEW THE WORK UP RESULTS WITH YOUR DOCTOR

## 2023-08-10 ENCOUNTER — APPOINTMENT (OUTPATIENT)
Dept: LAB | Facility: CLINIC | Age: 58
End: 2023-08-10
Payer: MEDICARE

## 2023-08-11 ENCOUNTER — ANTICOAG VISIT (OUTPATIENT)
Dept: FAMILY MEDICINE CLINIC | Facility: CLINIC | Age: 58
End: 2023-08-11

## 2023-08-11 ENCOUNTER — OFFICE VISIT (OUTPATIENT)
Dept: PAIN MEDICINE | Facility: CLINIC | Age: 58
End: 2023-08-11
Payer: MEDICARE

## 2023-08-11 VITALS
DIASTOLIC BLOOD PRESSURE: 83 MMHG | SYSTOLIC BLOOD PRESSURE: 122 MMHG | BODY MASS INDEX: 25.62 KG/M2 | HEART RATE: 71 BPM | WEIGHT: 194.2 LBS

## 2023-08-11 DIAGNOSIS — F11.20 UNCOMPLICATED OPIOID DEPENDENCE (HCC): ICD-10-CM

## 2023-08-11 DIAGNOSIS — G89.4 CHRONIC PAIN SYNDROME: Primary | ICD-10-CM

## 2023-08-11 DIAGNOSIS — M47.816 LUMBAR SPONDYLOSIS: ICD-10-CM

## 2023-08-11 DIAGNOSIS — G89.29 GROIN PAIN, CHRONIC, RIGHT: ICD-10-CM

## 2023-08-11 DIAGNOSIS — Z79.891 LONG-TERM CURRENT USE OF OPIATE ANALGESIC: ICD-10-CM

## 2023-08-11 DIAGNOSIS — R10.31 GROIN PAIN, CHRONIC, RIGHT: ICD-10-CM

## 2023-08-11 PROCEDURE — 99214 OFFICE O/P EST MOD 30 MIN: CPT

## 2023-08-11 RX ORDER — TRAMADOL HYDROCHLORIDE 50 MG/1
50 TABLET ORAL EVERY 8 HOURS PRN
Qty: 90 TABLET | Refills: 2 | Status: SHIPPED | OUTPATIENT
Start: 2023-08-15

## 2023-08-11 NOTE — PATIENT INSTRUCTIONS

## 2023-08-13 NOTE — PROGRESS NOTES
Pain Medicine Follow-Up Note    Assessment:  1. Chronic pain syndrome    2. Lumbar spondylosis    3. Uncomplicated opioid dependence ([address])    4. Long-term current use of opiate analgesic    5. Groin pain, chronic, right        Plan:  Orders Placed This Encounter   Procedures   • MM ALL_Prescribed Meds and Special Instructions     Order Specific Question:   [place] Is ALBUTEROL prescribed? Answer:   Yes     Order Specific Question:   [place] Is TRAMADOL prescribed? Answer:    Yes   • MM DT_Alprazolam Definitive Test   • MM DT_Amphetamine Definitive Test   • MM DT_Buprenorphine Definitive Test   • MM DT_Butalbital Definitive Test   • MM DT_Clonazepam Definitive Test   • MM DT_Cocaine Definitive Test   • MM DT_Codeine Definitive Test   • MM DT_Dextromethorphan Definitive Test   • MM Diazepam Definitive Test   • MM DT_Ethyl Glucuronide/Ethyl Sulfate Definitive Test   • MM DT_Fentanyl Definitive Test   • MM DT_Heroin Definitive Test   • MM DT_Hydrocodone Definitive Test   • MM DT_Hydromorphone Definitive Test   • MM DT_Kratom Definitive Test   • MM DT_Levorphanol Definitive Test   • MM Lorazepam Definitive Test   • MM DT_MDMA Definitive Test   • MM DT_Meperidine Definitive Test   • MM DT_Methadone Definitive Test   • MM DT_Methamphetamine Definitive Test   • MM DT_Methylphenidate Definitive Test   • MM DT_Morphine Definitive Test   • MM DT_Oxazepam Definitive Test   • MM DT_Oxycodone Definitive Test   • MM DT_Oxymorphone Definitive Test   • MM DT_Phencyclidine Definitive Test   • MM DT_Phenobarbital Definitive Test   • MM DT_Phentermine Definitive Test   • MM DT_Secobarbital Definitive Test   • MM DT_Spice Definitive Test   • MM DT_Tapentadol Definitive Test   • MM DT_Temazapam Definitive Test   • MM DT_THC Definitive Test   • MM DT_Tramadol Definitive Test       New Medications Ordered This Visit   Medications   • traMADol (Ultram) 50 mg tablet     Sig: Take 1 tablet (50 mg total) by mouth every 8 (eight) hours as needed for moderate pain Do not start before August 15, 2023. Dispense:  90 tablet     Refill:  2     Fill on 8/15/2023, and refill on 9/14/2023 and 10/14/2023       My impressions and treatment recommendations were discussed in detail with the patient who verbalized understanding and had no further questions. The patient continues to report an overall reduction of his pain level and improvement with his functioning without significant side effects using tramadol 50 mg tablet patient uses 1 tablet up to every 8 hours as needed for moderate pain, therefore I will continue the patient on this medication. Tramadol 50 mg tablet E-prescribed to the patient's pharmacy with a do not fill until date of 8/15/2023 and refill on 9/14/2023 and 10/14/2023. [place] Prescription Drug Monitoring Program report was reviewed and was appropriate     A urine drug screen was collected at today's office visit as part of our medication management protocol. The point of care testing results were appropriate for what was being prescribed. The specimen will be sent for confirmatory testing. The drug screen is medically necessary because the patient is either dependent on opioid medication or is being considered for opioid medication therapy and the results could impact ongoing or future treatment. The drug screen is to evaluate for the presences or absence of prescribed, non-prescribed, and/or illicit drugs/substances. There are risks associated with opioid medications, including dependence, addiction and tolerance. The patient understands and agrees to use these medications only as prescribed. Potential side effects of the medications include, but are not limited to, constipation, drowsiness, addiction, impaired judgment and risk of fatal overdose if not taken as prescribed. The patient was warned against driving while taking sedation medications. Sharing medications is a felony.  At this point in time, the patient is showing no signs of addiction, abuse, diversion or suicidal ideation. Patient has yet to have his medial branch block of his lumbar spine due to complications from a DVT. Follow-up is planned in 3 months time or sooner as warranted. Discharge instructions were provided. I personally saw and examined the patient and I agree with the above discussed plan of care. History of Present Illness:    Beatriz Ross is a 60 y.o. male who presents to [facility] for interval re-evaluation of the above stated pain complaints. The patient has a past medical and chronic pain history as outlined in the assessment section. He was last seen on 4/28/2023. At today's visit patient states that their pain symptoms are the same with a pain score of 6/10 on the verbal numeric pain scale. The patient's pain is worse in the morning and in the evening. The patient's pain is constant in nature. And the quality of the patient's pain is described as sharp, pressure-like, and shooting. The patient's pain is located in the mid low back, right groin/abdominal area (injection site pain) and right knee. Patient states the amount of pain relief he is currently obtaining from his current pain relievers is not enough to make a difference in his life. Patient implied it is hard for him to say since since he has multiple pain generators at this time. Last Urine Drug Screen: 8/11/2023    Other than as stated above, the patient denies any interval changes in medications, medical condition, mental condition, symptoms, or allergies since the last office visit. Review of Systems:    Review of Systems   Respiratory: Negative for shortness of breath. Cardiovascular: Negative for chest pain. Gastrointestinal: Negative for constipation, diarrhea, nausea and vomiting. Musculoskeletal: Positive for back pain. Negative for arthralgias, gait problem, joint swelling and myalgias.    Skin: Negative for rash. Neurological: Negative for dizziness, seizures and weakness. All other systems reviewed and are negative. Past Medical History:   Diagnosis Date   • Anxiety 3/10/2022   • Aorta aneurysm (HCC)     4.3   • Arm DVT (deep venous thromboembolism), acute ([address]) 4283    complications of cardiac cath   • Asthma    • Chronic kidney disease    • CKD (chronic kidney disease), stage III (HCC)    • COPD (chronic obstructive pulmonary disease) (HCC)    • Depression    • Diabetes mellitus (HCC)    • Essential hypertriglyceridemia    • GERD (gastroesophageal reflux disease)    • Headache    • Hiatal hernia    • Hyperlipidemia, mixed 5/7/2018   • Kidney stone    • Liver disease     FATTY LIVER   • Mild episode of recurrent major depressive disorder ([address]) 3/10/2022   • PAC (premature atrial contraction)    • Prediabetes    • Renal calculi    • Sleep apnea    • Vestibular migraine        Past Surgical History:   Procedure Laterality Date   • CARPAL TUNNEL RELEASE Left    • COLONOSCOPY     • FL INJECTION RIGHT HIP (ARTHROGRAM)  08/20/2018   • FOOT SURGERY Left     FOREIGN BODY REMOVAL   • HERNIA REPAIR     • [place] ARTHRP ACETBLR/PROX FEM PROSTC AGRFT/ALGRFT Right 09/28/2020    Procedure: ARTHROPLASTY HIP TOTAL;  Surgeon: Nora Romero MD;  [facility];  Service: Orthopedics   • AK COLONOSCOPY FLX DX W/COLLJ SPEC WHEN PFRMD N/A 08/07/2017    Procedure: COLONOSCOPY;  Surgeon: Rosales Ross MD;  [facility]; Service: Gastroenterology   • AK ESOPHAGOGASTRODUODENOSCOPY TRANSORAL DIAGNOSTIC N/A 10/31/2017    Procedure: ESOPHAGOGASTRODUODENOSCOPY (EGD); Surgeon: Rosales Ross MD;  [facility];   Service: Gastroenterology   • TOTAL HIP ARTHROPLASTY Right 2020       Family History   Problem Relation Age of Onset   • Arthritis Mother    • Heart attack Father    • Clotting disorder Father    • Schizoaffective Disorder  Sister    • Cancer Brother    • Prostate cancer Brother    • Leukemia Brother         his only [facility] [employer] pt unsure he was only 47   • Aortic aneurysm Other         abdominal       Social History     Occupational History   • Occupation: unemployed   Tobacco Use   • Smoking status: Some Days     Types: Cigars     Last attempt to quit: 2014     Years since quittin.0   • Smokeless tobacco: Never   • Tobacco comments:     never inhaled   Vaping Use   • Vaping Use: Never used   Substance and Sexual Activity   • Alcohol use: Not Currently     Comment: occasional beer   • Drug use: No   • Sexual activity: Yes     Partners: Female         Current Outpatient Medications:   •  acetaminophen (TYLENOL) 500 mg tablet, Take 500 mg by mouth every 6 (six) hours as needed for mild pain, Disp: , Rfl:   •  albuterol (Ventolin HFA) 90 mcg/act inhaler, Inhale 2 puffs every 6 (six) hours as needed for wheezing or shortness of breath (cough) (Patient taking differently: Inhale 2 puffs every 6 (six) hours as needed for wheezing or shortness of breath (cough) PRN), Disp: 18 g, Rfl: 1  •  dulaglutide (Trulicity) 1.5 JV/9.5MJ injection, Inject 0.5 mL (1.5 mg total) under the skin every 7 days, Disp: 6 mL, Rfl: 1  •  fenofibrate 160 MG tablet, take 1 tablet by mouth once daily, Disp: 90 tablet, Rfl: 3  •  glucose blood test strip, TEST BS TID, Disp: 300 each, Rfl: 5  •  glucose monitoring kit (FREESTYLE) monitoring kit, Use 1 each daily before breakfast, Disp: 1 each, Rfl: 0  •  Lancets MISC, Use daily before breakfast, Disp: 100 each, Rfl: 5  •  pantoprazole (PROTONIX) 40 mg tablet, take 1 tablet by mouth once daily, Disp: 90 tablet, Rfl: 3  •  rosuvastatin (CRESTOR) 5 mg tablet, Take 0.5 tablets (2.5 mg total) by mouth daily, Disp: 90 tablet, Rfl: 1  •  sildenafil (REVATIO) 20 mg tablet, TAKE 1 TABLET (20 MG TOTAL) BY MOUTH DAILY AS DIRECTED, Disp: 90 tablet, Rfl: 3  •  [START ON 8/15/2023] traMADol (Ultram) 50 mg tablet, Take 1 tablet (50 mg total) by mouth every 8 (eight) hours as needed for moderate pain Do not start before August 15, 2023., Disp: 90 tablet, Rfl: 2  •  warfarin (Coumadin) 5 mg tablet, Take 1 tablet (5 mg total) by mouth daily Or as directed by PCP, Disp: 90 tablet, Rfl: 1    No Known Allergies    Physical Exam:    /83 (BP Location: Right arm, Patient Position: Sitting, Cuff Size: Standard)   Pulse 71   Wt 88.1 kg (194 lb 3.2 oz)   BMI 25.62 kg/m²     Constitutional:normal, well developed, well nourished, alert, in no distress and non-toxic and no overt pain behavior.   Eyes:anicteric  HEENT:grossly intact  Neck:supple, symmetric, trachea midline and no masses   Pulmonary:even and unlabored  Cardiovascular:No edema or pitting edema present  Skin:Normal without rashes or lesions and well hydrated  Psychiatric:Mood and affect appropriate  Neurologic:Cranial Nerves II-XII grossly intact  Musculoskeletal:antalgic      Orders Placed This Encounter   Procedures   • MM ALL_Prescribed Meds and Special Instructions   • MM DT_Alprazolam Definitive Test   • MM DT_Amphetamine Definitive Test   • MM DT_Buprenorphine Definitive Test   • MM DT_Butalbital Definitive Test   • MM DT_Clonazepam Definitive Test   • MM DT_Cocaine Definitive Test   • MM DT_Codeine Definitive Test   • MM DT_Dextromethorphan Definitive Test   • MM Diazepam Definitive Test   • MM DT_Ethyl Glucuronide/Ethyl Sulfate Definitive Test   • MM DT_Fentanyl Definitive Test   • MM DT_Heroin Definitive Test   • MM DT_Hydrocodone Definitive Test   • MM DT_Hydromorphone Definitive Test   • MM DT_Kratom Definitive Test   • MM DT_Levorphanol Definitive Test   • MM Lorazepam Definitive Test   • MM DT_MDMA Definitive Test   • MM DT_Meperidine Definitive Test   • MM DT_Methadone Definitive Test   • MM DT_Methamphetamine Definitive Test   • MM DT_Methylphenidate Definitive Test   • MM DT_Morphine Definitive Test   • MM DT_Oxazepam Definitive Test   • MM DT_Oxycodone Definitive Test   • MM DT_Oxymorphone Definitive Test   • MM DT_Phencyclidine Definitive Test   • MM DT_Phenobarbital Definitive Test   • MM DT_Phentermine Definitive Test   • MM DT_Secobarbital Definitive Test   • MM DT_Spice Definitive Test   • MM DT_Tapentadol Definitive Test   • MM DT_Temazapam Definitive Test   • MM DT_THC Definitive Test   • MM DT_Tramadol Definitive Test     This document was created using speech voice recognition software. Grammatical errors, random word insertions, pronoun errors, and incomplete sentences are an occasional consequence of this system due to software limitations, ambient noise, and hardware issues. Any formal questions or concerns about content, text, or information contained within the body of this dictation should be directly addressed to the provider for clarification.

## 2023-08-17 ENCOUNTER — APPOINTMENT (OUTPATIENT)
Dept: LAB | Facility: CLINIC | Age: 58
End: 2023-08-17
Payer: MEDICARE

## 2023-08-18 ENCOUNTER — ANTICOAG VISIT (OUTPATIENT)
Dept: FAMILY MEDICINE CLINIC | Facility: CLINIC | Age: 58
End: 2023-08-18

## 2023-08-18 DIAGNOSIS — I82.411 ACUTE DEEP VEIN THROMBOSIS (DVT) OF FEMORAL VEIN OF RIGHT LOWER EXTREMITY (HCC): Primary | ICD-10-CM

## 2023-08-18 RX ORDER — WARFARIN SODIUM 4 MG/1
TABLET ORAL
Qty: 90 TABLET | Refills: 1 | Status: SHIPPED | OUTPATIENT
Start: 2023-08-18 | End: 2023-09-22

## 2023-08-24 ENCOUNTER — TELEPHONE (OUTPATIENT)
Dept: OTHER | Facility: OTHER | Age: 58
End: 2023-08-24

## 2023-08-24 ENCOUNTER — APPOINTMENT (OUTPATIENT)
Dept: LAB | Facility: CLINIC | Age: 58
End: 2023-08-24
Payer: MEDICARE

## 2023-08-25 ENCOUNTER — ANTICOAG VISIT (OUTPATIENT)
Dept: FAMILY MEDICINE CLINIC | Facility: CLINIC | Age: 58
End: 2023-08-25

## 2023-08-25 NOTE — TELEPHONE ENCOUNTER
Lab Result: 5.86 INR   Date/Time Drawn: 8/24 @1442   Ordering Provider: Moriah Covarrubias Name: Regina Frey       The following critical/stat result was read back to the lab as stated above and [employer] to the on-call provider. The provider confirmed receipt of the message.

## 2023-08-28 ENCOUNTER — APPOINTMENT (OUTPATIENT)
Dept: LAB | Facility: CLINIC | Age: 58
End: 2023-08-28
Payer: MEDICARE

## 2023-08-29 ENCOUNTER — ANTICOAG VISIT (OUTPATIENT)
Dept: FAMILY MEDICINE CLINIC | Facility: CLINIC | Age: 58
End: 2023-08-29

## 2023-08-30 DIAGNOSIS — N18.31 TYPE 2 DIABETES MELLITUS WITH STAGE 3A CHRONIC KIDNEY DISEASE, WITHOUT LONG-TERM CURRENT USE OF INSULIN (HCC): ICD-10-CM

## 2023-08-30 DIAGNOSIS — E11.22 TYPE 2 DIABETES MELLITUS WITH STAGE 3A CHRONIC KIDNEY DISEASE, WITHOUT LONG-TERM CURRENT USE OF INSULIN (HCC): ICD-10-CM

## 2023-08-30 RX ORDER — DULAGLUTIDE 1.5 MG/.5ML
INJECTION, SOLUTION SUBCUTANEOUS
Qty: 6 ML | Refills: 1 | Status: SHIPPED | OUTPATIENT
Start: 2023-08-30

## 2023-09-06 ENCOUNTER — APPOINTMENT (OUTPATIENT)
Dept: LAB | Facility: CLINIC | Age: 58
End: 2023-09-06
Payer: MEDICARE

## 2023-09-07 ENCOUNTER — ANTICOAG VISIT (OUTPATIENT)
Dept: FAMILY MEDICINE CLINIC | Facility: CLINIC | Age: 58
End: 2023-09-07

## 2023-09-07 DIAGNOSIS — I82.411 ACUTE DEEP VEIN THROMBOSIS (DVT) OF FEMORAL VEIN OF RIGHT LOWER EXTREMITY (HCC): Primary | ICD-10-CM

## 2023-09-07 RX ORDER — WARFARIN SODIUM 3 MG/1
TABLET ORAL
Qty: 90 TABLET | Refills: 1 | Status: SHIPPED | OUTPATIENT
Start: 2023-09-07

## 2023-09-14 ENCOUNTER — APPOINTMENT (OUTPATIENT)
Dept: LAB | Facility: CLINIC | Age: 58
End: 2023-09-14
Payer: MEDICARE

## 2023-09-15 ENCOUNTER — ANTICOAG VISIT (OUTPATIENT)
Dept: FAMILY MEDICINE CLINIC | Facility: CLINIC | Age: 58
End: 2023-09-15

## 2023-09-21 ENCOUNTER — APPOINTMENT (OUTPATIENT)
Dept: LAB | Facility: CLINIC | Age: 58
End: 2023-09-21
Payer: MEDICARE

## 2023-09-22 ENCOUNTER — ANTICOAG VISIT (OUTPATIENT)
Dept: FAMILY MEDICINE CLINIC | Facility: CLINIC | Age: 58
End: 2023-09-22

## 2023-09-22 DIAGNOSIS — I82.411 ACUTE DEEP VEIN THROMBOSIS (DVT) OF FEMORAL VEIN OF RIGHT LOWER EXTREMITY (HCC): Primary | ICD-10-CM

## 2023-09-22 RX ORDER — WARFARIN SODIUM 2 MG/1
TABLET ORAL
Qty: 30 TABLET | Refills: 1 | Status: SHIPPED | OUTPATIENT
Start: 2023-09-22

## 2023-09-25 PROBLEM — S30.1XXA RECTUS SHEATH HEMATOMA, INITIAL ENCOUNTER: Status: RESOLVED | Noted: 2023-08-09 | Resolved: 2023-09-25

## 2023-09-26 ENCOUNTER — OFFICE VISIT (OUTPATIENT)
Dept: FAMILY MEDICINE CLINIC | Facility: CLINIC | Age: 58
End: 2023-09-26
Payer: MEDICARE

## 2023-09-26 VITALS
HEART RATE: 69 BPM | BODY MASS INDEX: 28.89 KG/M2 | OXYGEN SATURATION: 98 % | HEIGHT: 73 IN | DIASTOLIC BLOOD PRESSURE: 78 MMHG | SYSTOLIC BLOOD PRESSURE: 128 MMHG | TEMPERATURE: 98.2 F | WEIGHT: 218 LBS

## 2023-09-26 DIAGNOSIS — Z23 FLU VACCINE NEED: ICD-10-CM

## 2023-09-26 DIAGNOSIS — Z12.5 SCREENING FOR PROSTATE CANCER: ICD-10-CM

## 2023-09-26 DIAGNOSIS — N18.31 STAGE 3A CHRONIC KIDNEY DISEASE (HCC): ICD-10-CM

## 2023-09-26 DIAGNOSIS — I82.411 ACUTE DEEP VEIN THROMBOSIS (DVT) OF FEMORAL VEIN OF RIGHT LOWER EXTREMITY (HCC): ICD-10-CM

## 2023-09-26 DIAGNOSIS — E78.2 HYPERLIPIDEMIA, MIXED: ICD-10-CM

## 2023-09-26 DIAGNOSIS — Z00.00 MEDICARE ANNUAL WELLNESS VISIT, SUBSEQUENT: ICD-10-CM

## 2023-09-26 DIAGNOSIS — N18.31 TYPE 2 DIABETES MELLITUS WITH STAGE 3A CHRONIC KIDNEY DISEASE, WITHOUT LONG-TERM CURRENT USE OF INSULIN (HCC): Primary | ICD-10-CM

## 2023-09-26 DIAGNOSIS — E11.22 TYPE 2 DIABETES MELLITUS WITH STAGE 3A CHRONIC KIDNEY DISEASE, WITHOUT LONG-TERM CURRENT USE OF INSULIN (HCC): Primary | ICD-10-CM

## 2023-09-26 PROBLEM — Z97.2 WEARS DENTURES: Status: ACTIVE | Noted: 2023-09-26

## 2023-09-26 LAB — SL AMB POCT HEMOGLOBIN AIC: 7.4 (ref ?–6.5)

## 2023-09-26 PROCEDURE — G0439 PPPS, SUBSEQ VISIT: HCPCS | Performed by: FAMILY MEDICINE

## 2023-09-26 PROCEDURE — G0008 ADMIN INFLUENZA VIRUS VAC: HCPCS | Performed by: FAMILY MEDICINE

## 2023-09-26 PROCEDURE — 99214 OFFICE O/P EST MOD 30 MIN: CPT | Performed by: FAMILY MEDICINE

## 2023-09-26 PROCEDURE — 90686 IIV4 VACC NO PRSV 0.5 ML IM: CPT | Performed by: FAMILY MEDICINE

## 2023-09-26 PROCEDURE — 83036 HEMOGLOBIN GLYCOSYLATED A1C: CPT | Performed by: FAMILY MEDICINE

## 2023-09-26 NOTE — PATIENT INSTRUCTIONS
Medicare Preventive Visit Patient Instructions  Thank you for completing your Welcome to Medicare Visit or Medicare Annual Wellness Visit today. Your next wellness visit will be due in one year (9/26/2024). The screening/preventive services that you may require over the next 5-10 years are detailed below. Some tests may not apply to you based off risk factors and/or age. Screening tests ordered at today's visit but not completed yet may show as past due. Also, please note that scanned in results may not display below. Preventive Screenings:  Service Recommendations Previous Testing/Comments   Colorectal Cancer Screening  · Colonoscopy    · Fecal Occult Blood Test (FOBT)/Fecal Immunochemical Test (FIT)  · Fecal DNA/Cologuard Test  · Flexible Sigmoidoscopy Age: 43-73 years old   Colonoscopy: every 10 years (May be performed more frequently if at higher risk)  OR  FOBT/FIT: every 1 year  OR  Cologuard: every 3 years  OR  Sigmoidoscopy: every 5 years  Screening may be recommended earlier than age 39 if at higher risk for colorectal cancer. Also, an individualized decision between you and your healthcare provider will decide whether screening between the ages of 77-80 would be appropriate.  Colonoscopy: 08/07/2017  FOBT/FIT: Not on file  Cologuard: Not on file  Sigmoidoscopy: Not on file    Screening Current     Prostate Cancer Screening Individualized decision between patient and health care provider in men between ages of 53-66   Medicare will cover every 12 months beginning on the day after your 50th birthday PSA: 0.3 ng/mL     Screening Current     Hepatitis C Screening Once for adults born between 1945 and 1965  More frequently in patients at high risk for Hepatitis C Hep C Antibody: 01/31/2019    Screening Current   Diabetes Screening 1-2 times per year if you're at risk for diabetes or have pre-diabetes Fasting glucose: 133 mg/dL (5/12/2023)  A1C: 6.4 % (5/12/2023)  Screening Not Indicated  History Diabetes Cholesterol Screening Once every 5 years if you don't have a lipid disorder. May order more often based on risk factors. Lipid panel: 05/12/2023  Screening Not Indicated  History Lipid Disorder      Other Preventive Screenings Covered by Medicare:  1. Abdominal Aortic Aneurysm (AAA) Screening: covered once if your at risk. You're considered to be at risk if you have a family history of AAA or a male between the age of 70-76 who smoking at least 100 cigarettes in your lifetime. 2. Lung Cancer Screening: covers low dose CT scan once per year if you meet all of the following conditions: (1) Age 48-67; (2) No signs or symptoms of lung cancer; (3) Current smoker or have quit smoking within the last 15 years; (4) You have a tobacco smoking history of at least 20 pack years (packs per day x number of years you smoked); (5) You get a written order from a healthcare provider. 3. Glaucoma Screening: covered annually if you're considered high risk: (1) You have diabetes OR (2) Family history of glaucoma OR (3)  aged 48 and older OR (3)  American aged 72 and older  3. Osteoporosis Screening: covered every 2 years if you meet one of the following conditions: (1) Have a vertebral abnormality; (2) On glucocorticoid therapy for more than 3 months; (3) Have primary hyperparathyroidism; (4) On osteoporosis medications and need to assess response to drug therapy. 5. HIV Screening: covered annually if you're between the age of 14-79. Also covered annually if you are younger than 13 and older than 72 with risk factors for HIV infection. For pregnant patients, it is covered up to 3 times per pregnancy.     Immunizations:  Immunization Recommendations   Influenza Vaccine Annual influenza vaccination during flu season is recommended for all persons aged >= 6 months who do not have contraindications   Pneumococcal Vaccine   * Pneumococcal conjugate vaccine = PCV13 (Prevnar 13), PCV15 (Vaxneuvance), PCV20 (Prevnar 20)  * Pneumococcal polysaccharide vaccine = PPSV23 (Pneumovax) Adults 2364 years old: 1-3 doses may be recommended based on certain risk factors  Adults 72 years old: 1-2 doses may be recommended based off what pneumonia vaccine you previously received   Hepatitis B Vaccine 3 dose series if at intermediate or high risk (ex: diabetes, end stage renal disease, liver disease)   Tetanus (Td) Vaccine - COST NOT COVERED BY MEDICARE PART B Following completion of primary series, a booster dose should be given every 10 years to maintain immunity against tetanus. Td may also be given as tetanus wound prophylaxis. Tdap Vaccine - COST NOT COVERED BY MEDICARE PART B Recommended at least once for all adults. For pregnant patients, recommended with each pregnancy. Shingles Vaccine (Shingrix) - COST NOT COVERED BY MEDICARE PART B  2 shot series recommended in those aged 48 and above     Health Maintenance Due:      Topic Date Due   • Colorectal Cancer Screening  08/07/2022   • HIV Screening  Completed   • Hepatitis C Screening  Completed     Immunizations Due:      Topic Date Due   • COVID-19 Vaccine (4 - Pfizer series) 01/07/2022   • Influenza Vaccine (1) 09/01/2023     Advance Directives   What are advance directives? Advance directives are legal documents that state your wishes and plans for medical care. These plans are made ahead of time in case you lose your ability to make decisions for yourself. Advance directives can apply to any medical decision, such as the treatments you want, and if you want to donate organs. What are the types of advance directives? There are many types of advance directives, and each state has rules about how to use them. You may choose a combination of any of the following:  · Living will: This is a written record of the treatment you want. You can also choose which treatments you do not want, which to limit, and which to stop at a certain time.  This includes surgery, medicine, IV fluid, and tube feedings. · Durable power of  for healthcare [facility]): This is a written record that states who you want to make healthcare choices for you when you are unable to make them for yourself. This person, called a proxy, is usually a family member or a friend. You may choose more than 1 proxy. · Do not resuscitate (DNR) order:  A DNR order is used in case your heart stops beating or you stop breathing. It is a request not to have certain forms of treatment, such as CPR. A DNR order may be included in other types of advance directives. · Medical directive: This covers the care that you want if you are in a coma, near death, or unable to make decisions for yourself. You can list the treatments you want for each condition. Treatment may include pain medicine, surgery, blood transfusions, dialysis, IV or tube feedings, and a ventilator (breathing machine). · Values history: This document has questions about your views, beliefs, and how you feel and think about life. This information can help others choose the care that you would choose. Why are advance directives important? An advance directive helps you control your care. Although spoken wishes may be used, it is better to have your wishes written down. Spoken wishes can be misunderstood, or not followed. Treatments may be given even if you do not want them. An advance directive may make it easier for your family to make difficult choices about your care. Cigarette Smoking and Your Health   Risks to your health if you smoke:  Nicotine and other chemicals found in tobacco damage every cell in your body. Even if you are a light smoker, you have an increased risk for cancer, heart disease, and lung disease. If you are pregnant or have diabetes, smoking increases your risk for complications. Benefits to your health if you stop smoking:   · You decrease respiratory symptoms such as coughing, wheezing, and shortness of breath.    · You reduce your risk for cancers of the lung, mouth, throat, kidney, bladder, pancreas, stomach, and cervix. If you already have cancer, you increase the benefits of chemotherapy. You also reduce your risk for cancer returning or a second cancer from developing. · You reduce your risk for heart disease, blood clots, heart attack, and stroke. · You reduce your risk for lung infections, and diseases such as pneumonia, asthma, chronic bronchitis, and emphysema. · Your circulation improves. More oxygen can be delivered to your body. If you have diabetes, you lower your risk for complications, such as kidney, artery, and eye diseases. You also lower your risk for nerve damage. Nerve damage can lead to amputations, poor vision, and blindness. · You improve your body's ability to heal and to fight infections. For more information and support to stop smoking:   · [employer] [place]  Phone: 8- 575 - 980-1612  Web Address: www.[employer]  Weight Management   Why it is important to manage your weight:  Being overweight increases your risk of health conditions such as heart disease, high blood pressure, type 2 diabetes, and certain types of cancer. It can also increase your risk for osteoarthritis, sleep apnea, and other respiratory problems. Aim for a slow, steady weight loss. Even a small amount of weight loss can lower your risk of health problems. How to lose weight safely:  A safe and healthy way to lose weight is to eat fewer calories and get regular exercise. You can lose up about 1 pound a week by decreasing the number of calories you eat by 500 calories each day. Healthy meal plan for weight management:  A healthy meal plan includes a variety of foods, contains fewer calories, and helps you stay healthy. A healthy meal plan includes the following:  · Eat whole-grain foods more often. A healthy meal plan should contain fiber. Fiber is the part of grains, fruits, and vegetables that is not broken down by your body. Whole-grain foods are healthy and provide extra fiber in your diet. Some examples of whole-grain foods are whole-wheat breads and pastas, oatmeal, brown rice, and bulgur. · Eat a variety of vegetables every day. Include dark, leafy greens such as spinach, kale, ernesto greens, and mustard greens. Eat yellow and orange vegetables such as carrots, sweet potatoes, and winter squash. · Eat a variety of fruits every day. Choose fresh or canned fruit (canned in its own juice or light syrup) instead of juice. Fruit juice has very little or no fiber. · Eat low-fat dairy foods. Drink fat-free (skim) milk or 1% milk. Eat fat-free yogurt and low-fat cottage cheese. Try low-fat cheeses such as mozzarella and other reduced-fat cheeses. · Choose meat and other protein foods that are low in fat. Choose beans or other legumes such as split peas or lentils. Choose fish, skinless poultry (chicken or turkey), or lean cuts of red meat (beef or pork). Before you cook meat or poultry, cut off any visible fat. · Use less fat and oil. Try baking foods instead of frying them. Add less fat, such as margarine, sour cream, regular salad dressing and mayonnaise to foods. Eat fewer high-fat foods. Some examples of high-fat foods include french fries, doughnuts, ice cream, and cakes. · Eat fewer sweets. Limit foods and drinks that are high in sugar. This includes candy, cookies, regular soda, and sweetened drinks. Exercise:  Exercise at least 30 minutes per day on most days of the week. Some examples of exercise include walking, biking, dancing, and swimming. You can also fit in more physical activity by taking the stairs instead of the elevator or parking farther away from stores. Ask your healthcare provider about the best exercise plan for you.    Narcotic (Opioid) Safety    Use narcotics safely:  · Take prescribed narcotics exactly as directed  · Do not give narcotics to others or take narcotics that belong to someone else  · Do not mix narcotics without medicines or alcohol  · Do not drive or operate heavy machinery after you take the narcotic  · Monitor for side effects and notify your healthcare provider if you experienced side effects such as nausea, sleepiness, itching, or trouble thinking clearly. Manage constipation:    Constipation is the most common side effect of narcotic medicine. Constipation is when you have hard, dry bowel movements, or you go longer than usual between bowel movements. Tell your healthcare provider about all changes in your bowel movements while you are taking narcotics. He or she may recommend laxative medicine to help you have a bowel movement. He or she may also change the kind of narcotic you are taking, or change when you take it. The following are more ways you can prevent or relieve constipation:    · Drink liquids as directed. You may need to drink extra liquids to help soften and move your bowels. Ask how much liquid to drink each day and which liquids are best for you. · Eat high-fiber foods. This may help decrease constipation by adding bulk to your bowel movements. High-fiber foods include fruits, vegetables, whole-grain breads and cereals, and beans. Your healthcare provider or dietitian can help you create a high-fiber meal plan. Your provider may also recommend a fiber supplement if you cannot get enough fiber from food. · Exercise regularly. Regular physical activity can help stimulate your intestines. Walking is a good exercise to prevent or relieve constipation. Ask which exercises are best for you. · Schedule a time each day to have a bowel movement. This may help train your body to have regular bowel movements. Bend forward while you are on the toilet to help move the bowel movement out. Sit on the toilet for at least 10 minutes, even if you do not have a bowel movement. Store narcotics safely:   · Store narcotics where others cannot easily get them.   Keep them in a locked cabinet or secure area. Do not  keep them in a purse or other bag you carry with you. A person may be looking for something else and find the narcotics. · Make sure narcotics are stored out of the reach of children. A child can easily overdose on narcotics. Narcotics may look like candy to a small child. The best way to dispose of narcotics: The laws vary by country and area. In the [facility], the best way is to return the narcotics through a take-back program. This program is offered by the [employer] The following are options for using the program:  · Take the narcotics to a [employer] collection site. The site is often a law enforcement center. Call your local law enforcement center for scheduled take-back days in your area. You will be given information on where to go if the collection site is in a different location. · Take the narcotics to an approved pharmacy or hospital.  A pharmacy or hospital may be set up as a collection site. You will need to ask if it is a [employer] collection site if you were not directed there. A pharmacy or doctor's office may not be able to take back narcotics unless it is a [employer] site. · Use a mail-back system. This means you are given containers to put the narcotics into. You will then mail them in the containers. · Use a take-back drop box. This is a place to leave the narcotics at any time. People and animals will not be able to get into the box. Your local law enforcement agency can tell you where to find a drop box in your area. Other ways to manage pain:   · Ask your healthcare provider about non-narcotic medicines to control pain. Nonprescription medicines include NSAIDs (such as ibuprofen) and acetaminophen. Prescription medicines include muscle relaxers, antidepressants, and steroids. · Pain may be managed without any medicines. Some ways to relieve pain include massage, aromatherapy, or meditation.  Physical or occupational therapy may also help. For more information:   · Drug Enforcement Administration  [address] , [address]  Phone: 9- 937 - [phone]  Web Address: [facility] [employer]/drug_disposal/    · [address] [facility] [address] and Drug Administration  [address] , [address]  Phone: 3- 763 - [phone]  Web Address: http://[employer]/     © Copyright [employer] 2018 Information is for End User's use only and may not be sold, redistributed or otherwise used for commercial purposes.  All illustrations and images included in CareNotes® are the copyrighted property of [employer] or [address] [place] [address]

## 2023-09-26 NOTE — PROGRESS NOTES
Assessment and Plan:     Problem List Items Addressed This Visit        Endocrine    Type 2 diabetes mellitus with stage 3a chronic kidney disease, without long-term current use of insulin ([address]) - Primary    Relevant Orders    POCT hemoglobin A1c       Cardiovascular and Mediastinum    Acute deep vein thrombosis (DVT) of femoral vein of right lower extremity (HCC)    Relevant Orders    VAS lower limb venous duplex study, unilateral/limited       Genitourinary    CKD (chronic kidney disease) stage 3, GFR 30-59 ml/min (HCC)       Other    Hyperlipidemia, mixed    Relevant Orders    Lipid panel   Other Visit Diagnoses     Medicare annual wellness visit, subsequent        Flu vaccine need        Relevant Orders    influenza vaccine, quadrivalent, 0.5 mL, preservative-free, for adult and pediatric patients 6 mos+ (AFLURIA, FLUARIX, FLULAVAL, FLUZONE)    Screening for prostate cancer        Relevant Orders    PSA, Total Screen        DM: A1c 7.4% (from 6.4) -- discussed increase in Trulicity vs diet modification, pt just got a 3 month supply of current dosing, so will continue this for now and f/u in 3 months to monitor   CKD: Update labs, f/u with Nephro   HLD: Update lipids   DVT: Update US to monitor clot burden, continue Coumadin        Preventive health issues were discussed with patient, and age appropriate screening tests were ordered as noted in patient's After Visit Summary. Personalized health advice and appropriate referrals for health education or preventive services given if needed, as noted in patient's After Visit Summary.      History of Present Illness:     Patient presents for chronic follow up and a Medicare Wellness Visit    HPI     DM: Home sugars "every now and then"; denies hypoglycemic symptoms    CKD: Following with Nephro   HLD: Tolerating statin without issue   DVT: On Coumadin (Eliquis was cost prohibitive) -- did establish with Heme/Onc who suggested at least 1 year of anticoagulation, hypercoagulability workup at 6 months, and repeat US at 3 months     Patient Care Team:  Joslyn Marcelino DO as PCP - General (Family Medicine)  Kamilah Queen MD as PCP - [facility]  Mine Bonilla MD as Consulting Physician (Cardiology)  Dar Shah MD as Consulting Physician (Cardiothoracic Surgery)  Roopa Blank MD as Consulting Physician (Orthopedic Surgery)  Debra Aguiar MD as Consulting Physician (Neurology)  Kristin Triplett MD as Consulting Physician (Nephrology)  Geraldine Pearson MD (Pain Medicine)  John Jefferson MD as [address]     Review of Systems:     Review of Systems   Constitutional: Negative for diaphoresis and unexpected weight change. HENT: Negative for congestion, ear pain, rhinorrhea and sore throat. Eyes: Negative for visual disturbance. Respiratory: Negative for cough and shortness of breath. Cardiovascular: Negative for chest pain, palpitations and leg swelling. Gastrointestinal: Negative for abdominal pain, constipation and diarrhea. Endocrine: Negative for polyuria (wakes up twice per night). Genitourinary: Negative for dysuria. Neurological: Negative for dizziness, tremors and headaches. Psychiatric/Behavioral: Negative for sleep disturbance.         Problem List:     Patient Active Problem List   Diagnosis   • Mild intermittent asthma without complication   • Ascending aortic aneurysm (HCC)   • Bicuspid aortic valve   • Allergic rhinitis   • GERD (gastroesophageal reflux disease)   • Hiatal hernia   • Type 2 diabetes mellitus with stage 3a chronic kidney disease, without long-term current use of insulin ([facility])   • CKD (chronic kidney disease) stage 3, GFR 30-59 ml/min ([facility])   • Hyperlipidemia, mixed   • Vitamin D deficiency   • Renal cyst, left   • Hepatic cyst   • Fatty liver disease, nonalcoholic   • Erectile dysfunction   • Carpal tunnel syndrome of right wrist   • Chronic pain of right knee   • Vestibular migraine • Patellar tendinitis of right knee   • Benign paroxysmal positional vertigo due to bilateral vestibular disorder   • Hip impingement syndrome, right   • Primary osteoarthritis of right hip   • Ulnar neuropathy of both upper extremities   • Lumbosacral strain   • Tarsal tunnel syndrome of right side   • Cubital tunnel syndrome, bilateral   • Groin pain, chronic, right   • Chronic pain syndrome   • Chronic kidney disease-mineral and bone disorder   • Uncomplicated opioid dependence (HCC)   • Arthritis of right hip   • Acute deep vein thrombosis (DVT) of femoral vein of right lower extremity ([facility])   • Wears dentures      Past Medical and Surgical History:     Past Medical History:   Diagnosis Date   • Anxiety 3/10/2022   • Aorta aneurysm ([facility])     4.3   • Arm DVT (deep venous thromboembolism), acute ([address]) 1229    complications of cardiac cath   • Asthma    • Chronic kidney disease    • CKD (chronic kidney disease), stage III ([facility])    • COPD (chronic obstructive pulmonary disease) ([facility])    • Depression    • Diabetes mellitus ([address])    • Essential hypertriglyceridemia    • GERD (gastroesophageal reflux disease)    • Headache    • Hiatal hernia    • Hyperlipidemia, mixed 5/7/2018   • Kidney stone    • Liver disease     FATTY LIVER   • Mild episode of recurrent major depressive disorder ([address]) 3/10/2022   • PAC (premature atrial contraction)    • Prediabetes    • Rectus sheath hematoma, initial encounter 8/9/2023   • Renal calculi    • Sleep apnea    • Vestibular migraine      Past Surgical History:   Procedure Laterality Date   • CARPAL TUNNEL RELEASE Left    • COLONOSCOPY     • FL INJECTION RIGHT HIP (ARTHROGRAM)  08/20/2018   • FOOT SURGERY Left     FOREIGN BODY REMOVAL   • HERNIA REPAIR     • FL ARTHRP ACETBLR/PROX FEM PROSTC AGRFT/ALGRFT Right 09/28/2020    Procedure: ARTHROPLASTY HIP TOTAL;  Surgeon: Yola Barragan MD;  Location: [facility];  Service: Orthopedics   • FL COLONOSCOPY FLX DX W/COLLJ SPEC WHEN PFRMD N/A 2017    Procedure: COLONOSCOPY;  Surgeon: Mann Valadez MD;  Location: [facility]; Service: Gastroenterology   • TX ESOPHAGOGASTRODUODENOSCOPY TRANSORAL DIAGNOSTIC N/A 10/31/2017    Procedure: ESOPHAGOGASTRODUODENOSCOPY (EGD); Surgeon: Mann Valadez MD;  Location: [facility]; Service: Gastroenterology   • TOTAL HIP ARTHROPLASTY Right       Family History:     Family History   Problem Relation Age of Onset   • Arthritis Mother    • Heart attack Father    • Clotting disorder Father    • Schizoaffective Disorder  Sister    • Cancer Brother    • Prostate cancer Brother    • Leukemia Brother         his only brother dies from [employer] or leukemia pt unsure he was only 47   • Aortic aneurysm Other         abdominal      Social History:     Social History     Socioeconomic History   • Marital status:      Spouse name: None   • Number of children: 2   • Years of education: None   • Highest education level: None   Occupational History   • Occupation: unemployed   Tobacco Use   • Smoking status: Some Days     Types: Cigars     Last attempt to quit: 2014     Years since quittin.1   • Smokeless tobacco: Never   • Tobacco comments:     never inhaled   Vaping Use   • Vaping Use: Never used   Substance and Sexual Activity   • Alcohol use: Not Currently     Comment: occasional beer   • Drug use: No   • Sexual activity: Yes     Partners: Female   Other Topics Concern   • None   Social History Narrative    Caffeine use    Lives alone     Social Determinants of Health     Financial Resource Strain: Low Risk  (2023)    Overall Financial Resource Strain (CARDIA)    • Difficulty of Paying Living Expenses: Not hard at all   Food Insecurity: Not on file   Transportation Needs: No Transportation Needs (2023)    PRAPARE - Transportation    • Lack of Transportation (Medical): No    • Lack of Transportation (Non-Medical):  No   Physical Activity: Not on file   Stress: Not on file   Social Connections: Not on file   Intimate Partner Violence: Not on file   Housing Stability: Not on file      Medications and Allergies:     Current Outpatient Medications   Medication Sig Dispense Refill   • acetaminophen (TYLENOL) 500 mg tablet Take 500 mg by mouth every 6 (six) hours as needed for mild pain     • albuterol (Ventolin HFA) 90 mcg/act inhaler Inhale 2 puffs every 6 (six) hours as needed for wheezing or shortness of breath (cough) (Patient taking differently: Inhale 2 puffs every 6 (six) hours as needed for wheezing or shortness of breath (cough) PRN) 18 g 1   • fenofibrate 160 MG tablet take 1 tablet by mouth once daily 90 tablet 3   • glucose blood test strip TEST BS  each 5   • glucose monitoring kit (FREESTYLE) monitoring kit Use 1 each daily before breakfast 1 each 0   • Lancets MISC Use daily before breakfast 100 each 5   • pantoprazole (PROTONIX) 40 mg tablet take 1 tablet by mouth once daily 90 tablet 3   • rosuvastatin (CRESTOR) 5 mg tablet Take 0.5 tablets (2.5 mg total) by mouth daily 90 tablet 1   • sildenafil (REVATIO) 20 mg tablet TAKE 1 TABLET (20 MG TOTAL) BY MOUTH DAILY AS DIRECTED 90 tablet 3   • traMADol (Ultram) 50 mg tablet Take 1 tablet (50 mg total) by mouth every 8 (eight) hours as needed for moderate pain Do not start before August 15, 2023. 90 tablet 2   • Trulicity 1.5 ER/9.2HJ injection inject 0.5 milliliter subcutaneously every 7 days 6 mL 1   • warfarin (Coumadin) 2 mg tablet Take 1 tablet by mouth Monday, Wednesday, Friday or as directed by PCP 30 tablet 1   • warfarin (Coumadin) 3 mg tablet Take 1 tablet by mouth Tuesday, Thursday, Saturday, Sunday; or as directed by PCP 90 tablet 1     No current facility-administered medications for this visit.      No Known Allergies   Immunizations:     Immunization History   Administered Date(s) Administered   • COVID-19 PFIZER VACCINE 0.3 ML IM 03/12/2021, 04/02/2021, 11/12/2021   • INFLUENZA 12/07/2016, 10/26/2017   • Influenza, recombinant, quadrivalent,injectable, preservative free 10/02/2018, 09/17/2019, 09/09/2020, 09/09/2021, 09/14/2022   • Influenza, seasonal, injectable 04/15/2016   • Pneumococcal Conjugate 13-Valent 12/07/2016   • Pneumococcal Polysaccharide PPV23 11/20/2018   • Tdap 06/09/2017   • Zoster 06/09/2017      Health Maintenance:         Topic Date Due   • Colorectal Cancer Screening  08/07/2022   • HIV Screening  Completed   • Hepatitis C Screening  Completed         Topic Date Due   • COVID-19 Vaccine (4 - Pfizer series) 01/07/2022   • Influenza Vaccine (1) 09/01/2023      Medicare Screening Tests and Risk Assessments:     Hoda Small is here for his Subsequent Wellness visit. Health Risk Assessment:   Patient rates overall health as good. Patient feels that their physical health rating is same. Patient is satisfied with their life. Eyesight was rated as same. Hearing was rated as same. Patient feels that their emotional and mental health rating is same. Patients states they are never, rarely angry. Patient states they are never, rarely unusually tired/fatigued. Pain experienced in the last 7 days has been none. Patient states that he has experienced no weight loss or gain in last 6 months. Depression Screening:   PHQ-2 Score: 0      Fall Risk Screening: In the past year, patient has experienced: no history of falling in past year      Home Safety:  Patient does not have trouble with stairs inside or outside of their home. Patient has working smoke alarms and has working carbon monoxide detector. Home safety hazards include: none. Nutrition:   Current diet is Regular. Medications:   Patient is currently taking over-the-counter supplements. OTC medications include: see medication list. Patient is able to manage medications.      Activities of Daily Living (ADLs)/Instrumental Activities of Daily Living (IADLs):   Walk and transfer into and out of bed and chair?: Yes  Dress and groom yourself?: Yes    Bathe or shower yourself?: Yes    Feed yourself? Yes  Do your laundry/housekeeping?: Yes  Manage your money, pay your bills and track your expenses?: Yes  Make your own meals?: Yes    Do your own shopping?: Yes    Durable Medical Equipment Suppliers  N/A    Previous Hospitalizations:   Any hospitalizations or ED visits within the last 12 months?: Yes    How many hospitalizations have you had in the last year?: 1-2    Advance Care Planning:   Living will: No    Five wishes given: Yes      Comments:   Pt is ok with short term intubation, but does not want to be on ventilator long term     Cognitive Screening:   Provider or family/friend/caregiver concerned regarding cognition?: No    PREVENTIVE SCREENINGS      Cardiovascular Screening:    General: Screening Not Indicated and History Lipid Disorder      Diabetes Screening:     General: Screening Not Indicated and History Diabetes      Colorectal Cancer Screening:     General: Screening Current      Prostate Cancer Screening:    General: Screening Current    Due for: PSA      Osteoporosis Screening:    General: Screening Not Indicated      Abdominal Aortic Aneurysm (AAA) Screening:    Risk factors include: tobacco use        General: Screening Not Indicated      Lung Cancer Screening:     General: Screening Not Indicated      Hepatitis C Screening:    General: Screening Current      Preventive Screening Comments:   Reviewed pt is due for repeat colonoscopy -- he is going to schedule once he gets caught up on his bills     Screening, Brief Intervention, and Referral to Treatment (SBIRT)    Screening  Typical number of drinks in a day: 0  Typical number of drinks in a week: 0  Interpretation: Low risk drinking behavior.     Single Item Drug Screening:  How often have you used an illegal drug (including marijuana) or a prescription medication for non-medical reasons in the past year? never    Single Item Drug Screen Score: 0  Interpretation: Negative screen for possible drug use disorder    Review of Current Opioid Use    Opioid Risk Tool (ORT) Interpretation: Complete Opioid Risk Tool (ORT)    No results found. Physical Exam:     /78   Pulse 69   Temp 98.2 °F (36.8 °C)   Ht 6' 1" (1.854 m)   Wt 98.9 kg (218 lb)   SpO2 98%   BMI 28.76 kg/m²     Physical Exam  Vitals and nursing note reviewed. Constitutional:       General: He is not in acute distress. Appearance: Normal appearance. He is well-developed. HENT:      Head: Normocephalic and atraumatic. Right Ear: Tympanic membrane, ear canal and external ear normal.      Left Ear: Tympanic membrane, ear canal and external ear normal.      Nose: Nose normal. No rhinorrhea. Mouth/Throat:      Mouth: Mucous membranes are moist.      Pharynx: No oropharyngeal exudate or posterior oropharyngeal erythema. Eyes:      Conjunctiva/sclera: Conjunctivae normal.   Neck:      Thyroid: No thyromegaly. Cardiovascular:      Rate and Rhythm: Normal rate and regular rhythm. Pulmonary:      Effort: Pulmonary effort is normal. No respiratory distress. Breath sounds: Normal breath sounds. Abdominal:      General: Bowel sounds are normal. There is no distension. Palpations: Abdomen is soft. Tenderness: There is no abdominal tenderness. Musculoskeletal:      Right lower leg: No edema. Left lower leg: No edema. Lymphadenopathy:      Cervical: No cervical adenopathy. Skin:     General: Skin is warm and dry. Neurological:      Mental Status: He is alert.       Comments: Grossly intact   Psychiatric:         Mood and Affect: Mood normal.        COVID completed primary + booster, Flu given, Pneumo UTD, Shingles -- had old version/reviewed to get Shingrix at pharmacy, TDap UTD    [employer], DO

## 2023-10-05 ENCOUNTER — APPOINTMENT (OUTPATIENT)
Dept: LAB | Facility: CLINIC | Age: 58
End: 2023-10-05
Payer: MEDICARE

## 2023-10-05 DIAGNOSIS — Z12.5 SCREENING FOR PROSTATE CANCER: ICD-10-CM

## 2023-10-05 DIAGNOSIS — E78.2 HYPERLIPIDEMIA, MIXED: ICD-10-CM

## 2023-10-07 NOTE — TELEPHONE ENCOUNTER
Patient called and stated that he only has about 4 days left of his BT medication - not sure if he will be ok with out until he gets it from the patient assistance program Patient placed on bipap for the hs on settings of 16/8 35%

## 2023-10-09 ENCOUNTER — ANTICOAG VISIT (OUTPATIENT)
Dept: FAMILY MEDICINE CLINIC | Facility: CLINIC | Age: 58
End: 2023-10-09

## 2023-10-09 LAB — INR PPP: 1.95 (ref 0.84–1.19)

## 2023-10-12 ENCOUNTER — HOSPITAL ENCOUNTER (OUTPATIENT)
Dept: RADIOLOGY | Facility: MEDICAL CENTER | Age: 58
Discharge: HOME | End: 2023-10-12
Payer: MEDICARE

## 2023-10-12 DIAGNOSIS — I82.411 ACUTE DEEP VEIN THROMBOSIS (DVT) OF FEMORAL VEIN OF RIGHT LOWER EXTREMITY (HCC): ICD-10-CM

## 2023-10-12 PROCEDURE — 93971 EXTREMITY STUDY: CPT | Performed by: SURGERY

## 2023-10-12 PROCEDURE — 93971 EXTREMITY STUDY: CPT

## 2023-10-18 DIAGNOSIS — K21.9 GASTROESOPHAGEAL REFLUX DISEASE WITHOUT ESOPHAGITIS: ICD-10-CM

## 2023-10-18 RX ORDER — PANTOPRAZOLE SODIUM 40 MG/1
TABLET, DELAYED RELEASE ORAL
Qty: 90 TABLET | Refills: 3 | Status: SHIPPED | OUTPATIENT
Start: 2023-10-18

## 2023-10-20 ENCOUNTER — APPOINTMENT (OUTPATIENT)
Dept: LAB | Facility: CLINIC | Age: 58
End: 2023-10-20
Payer: MEDICARE

## 2023-10-23 ENCOUNTER — ANTICOAG VISIT (OUTPATIENT)
Dept: FAMILY MEDICINE CLINIC | Facility: CLINIC | Age: 58
End: 2023-10-23

## 2023-10-23 LAB — INR PPP: 2.04 (ref 0.84–1.19)

## 2023-11-03 ENCOUNTER — APPOINTMENT (OUTPATIENT)
Dept: LAB | Facility: CLINIC | Age: 58
End: 2023-11-03
Payer: MEDICARE

## 2023-11-07 ENCOUNTER — ANTICOAG VISIT (OUTPATIENT)
Dept: FAMILY MEDICINE CLINIC | Facility: CLINIC | Age: 58
End: 2023-11-07

## 2023-11-14 NOTE — PROGRESS NOTES
Pain Medicine Follow-Up Note    Assessment:  1. Chronic pain syndrome    2. Acute pain of left knee    3. Lumbar spondylosis    4. Uncomplicated opioid dependence ([address])    5. Long-term current use of opiate analgesic    6. Groin pain, chronic, right        Plan:  Orders Placed This Encounter   Procedures    XR knee 3 vw left non injury     Standing Status:   Future     Number of Occurrences:   1     Standing Expiration Date:   11/16/2027     Scheduling Instructions:      Bring along any outside films relating to this procedure. [place] spine and pain procedure     Standing Status:   Future     Standing Expiration Date:   11/16/2027     Order Specific Question:   Reason for Exam:     Answer:   left knee injection     Order Specific Question:   Anticoagulant hold needed? Answer:   no  joint injection    MM ALL_Prescribed Meds and Special Instructions     Order Specific Question:   [facility] Is TRAMADOL prescribed?      Answer:   Yes    MM DT_Alprazolam Definitive Test    MM DT_Amphetamine Definitive Test    MM DT_Buprenorphine Definitive Test    MM DT_Butalbital Definitive Test    MM DT_Clonazepam Definitive Test    MM DT_Cocaine Definitive Test    MM DT_Codeine Definitive Test    MM DT_Dextromethorphan Definitive Test    MM Diazepam Definitive Test    MM DT_Ethyl Glucuronide/Ethyl Sulfate Definitive Test    MM DT_Fentanyl Definitive Test    MM DT_Heroin Definitive Test    MM DT_Hydrocodone Definitive Test    MM DT_Hydromorphone Definitive Test    MM DT_Kratom Definitive Test    MM DT_Levorphanol Definitive Test    MM DT_MDMA Definitive Test    MM DT_Meperidine Definitive Test    MM DT_Methadone Definitive Test    MM DT_Methamphetamine Definitive Test    MM DT_Methylphenidate Definitive Test    MM DT_Morphine Definitive Test    MM Lorazepam Definitive Test    MM DT_Oxazepam Definitive Test    MM DT_Oxycodone Definitive Test    MM DT_Oxymorphone Definitive Test    MM DT_Phencyclidine Definitive Test    MM DT_Phenobarbital Definitive Test    MM DT_Phentermine Definitive Test    MM DT_Secobarbital Definitive Test    MM DT_Spice Definitive Test    MM DT_Tapentadol Definitive Test    MM DT_Temazapam Definitive Test    MM DT_THC Definitive Test    MM DT_Tramadol Definitive Test       New Medications Ordered This Visit   Medications    traMADol (Ultram) 50 mg tablet     Sig: Take 1 tablet (50 mg total) by mouth every 6 (six) hours as needed for moderate pain     Dispense:  120 tablet     Refill:  2     Fill on  11/20/2023 and refill on 12/18/2023 and 1/16/2024 Changed dosing from tid to QID please allow earlier refill. ty    methylPREDNISolone 4 MG tablet therapy pack     Sig: Use as directed on package     Dispense:  1 each     Refill:  0       My impressions and treatment recommendations were discussed in detail with the patient who verbalized understanding and had no further questions. The patient continues to report an overall reduction of his pain level and improvement with his functioning without significant side effects using tramadol 50 mg tablet patient uses 1 tablet up to every 8 hours as needed for moderate pain, however the patient states that the medication wears off after approximately 3 to 4 hours. I recommend that the patient increase to 4 times daily as needed dosing. Patient states that his mornings are significantly worse I did state that the patient may take 2 tablets in the a.m. and take 1 tablet for each additional dose throughout the day. Patient does use Coumadin patient encouraged to follow-up with his PT/INR since tramadol may interfere with his therapeutic level. Patient verbalized understanding tramadol 50 mg tablet E-prescribed to the patient's pharmacy with a do not fill until date of 11/20/2023. I will also order an x-ray of his left knee and I encouraged the patient to trial a steroid injection into his left knee which would be diagnostic as well as therapeutic for the patient. Patient is in agreement with this plan. [place] Prescription Drug Monitoring Program report was reviewed and was appropriate     A urine drug screen was collected at today's office visit as part of our medication management protocol. The point of care testing results were appropriate for what was being prescribed. The specimen will be sent for confirmatory testing. The drug screen is medically necessary because the patient is either dependent on opioid medication or is being considered for opioid medication therapy and the results could impact ongoing or future treatment. The drug screen is to evaluate for the presences or absence of prescribed, non-prescribed, and/or illicit drugs/substances. There are risks associated with opioid medications, including dependence, addiction and tolerance. The patient understands and agrees to use these medications only as prescribed. Potential side effects of the medications include, but are not limited to, constipation, drowsiness, addiction, impaired judgment and risk of fatal overdose if not taken as prescribed. The patient was warned against driving while taking sedation medications. Sharing medications is a felony. At this point in time, the patient is showing no signs of addiction, abuse, diversion or suicidal ideation. Follow-up is planned in 3 months for medications time or sooner as warranted. Discharge instructions were provided. I personally saw and examined the patient and I agree with the above discussed plan of care. History of Present Illness:    Mike Llanes is a 60 y.o. male who presents to [address] [place] [address] [facility] for interval re-evaluation of the above stated pain complaints. The patient has a past medical and chronic pain history as outlined in the assessment section. He was last seen on 8/11/2023. At today's visit patient states that their pain symptoms are worse with a pain score of 5/10 on the verbal numeric pain scale. The patient's pain is worse in the morning and at night. The patient's pain is occasional in nature. And the quality of the patient's pain is described as sharp, throbbing, pressure-like, and shooting. The patient's pain is located in the bilateral low back radiating into his right posterior thigh, right groin, as well as left knee. Patient states the amount of pain relief he is obtaining from his current pain relievers is not enough to make a difference in his life at this time. Medications adjusted. Patient denies any side effects using tramadol. Opioid agreement signed: 11/16/2023  Last Urine Drug Screen: 8/11/2023  New urine drug screen: 11/16/2023    Other than as stated above, the patient denies any interval changes in medications, medical condition, mental condition, symptoms, or allergies since the last office visit. Review of Systems:    Review of Systems   Respiratory:  Negative for shortness of breath. Cardiovascular:  Negative for chest pain. Gastrointestinal:  Negative for constipation, diarrhea, nausea and vomiting. Musculoskeletal:  Positive for arthralgias, back pain and gait problem. Negative for joint swelling and myalgias. Pain in legs when laying down   Skin:  Negative for rash. Neurological:  Negative for dizziness, seizures and weakness. All other systems reviewed and are negative.         Past Medical History:   Diagnosis Date    Anxiety 3/10/2022    Aorta aneurysm (HCC)     4.3    Arm DVT (deep venous thromboembolism), acute ([address]) 7678    complications of cardiac cath    Asthma     Chronic kidney disease     CKD (chronic kidney disease), stage III (HCC)     COPD (chronic obstructive pulmonary disease) ([address])     Depression     Diabetes mellitus ([address])     Essential hypertriglyceridemia     GERD (gastroesophageal reflux disease)     Headache     Hiatal hernia     Hyperlipidemia, mixed 5/7/2018    Kidney stone     Liver disease     FATTY LIVER    Mild episode of recurrent major depressive disorder ([address]) 3/10/2022    PAC (premature atrial contraction)     Prediabetes     Rectus sheath hematoma, initial encounter 2023    Renal calculi     Sleep apnea     Vestibular migraine        Past Surgical History:   Procedure Laterality Date    CARPAL TUNNEL RELEASE Left     COLONOSCOPY      FL INJECTION RIGHT HIP (ARTHROGRAM)  2018    FOOT SURGERY Left     FOREIGN BODY REMOVAL    HERNIA REPAIR      CT ARTHRP ACETBLR/PROX FEM PROSTC AGRFT/ALGRFT Right 2020    Procedure: ARTHROPLASTY HIP TOTAL;  Surgeon: Slick Mojica MD;  Location: [facility];  Service: Orthopedics    CT COLONOSCOPY FLX DX W/COLLJ SPEC WHEN PFRMD N/A 2017    Procedure: COLONOSCOPY;  Surgeon: Laverne Bhandari MD;  Location: [facility]; Service: Gastroenterology    CT ESOPHAGOGASTRODUODENOSCOPY TRANSORAL DIAGNOSTIC N/A 10/31/2017    Procedure: ESOPHAGOGASTRODUODENOSCOPY (EGD); Surgeon: Laverne Bhandari MD;  Location: [facility];   Service: Gastroenterology    TOTAL HIP ARTHROPLASTY Right        Family History   Problem Relation Age of Onset    Arthritis Mother     Heart attack Father     Clotting disorder Father     Schizoaffective Disorder  Sister     Cancer Brother     Prostate cancer Brother     Leukemia Brother         his only brother dies from [address] [place], [place] [address] [address] or leukemia pt unsure he was only 47    Aortic aneurysm Other         abdominal       Social History     Occupational History    Occupation: unemployed   Tobacco Use    Smoking status: Some Days     Types: Cigars     Last attempt to quit: 2014     Years since quittin.2    Smokeless tobacco: Never    Tobacco comments:     never inhaled   Vaping Use    Vaping Use: Never used   Substance and Sexual Activity    Alcohol use: Not Currently     Comment: occasional beer    Drug use: No    Sexual activity: Yes     Partners: Female         Current Outpatient Medications:     acetaminophen (TYLENOL) 500 mg tablet, Take 500 mg by mouth every 6 (six) hours as needed for mild pain, Disp: , Rfl:     albuterol (Ventolin HFA) 90 mcg/act inhaler, Inhale 2 puffs every 6 (six) hours as needed for wheezing or shortness of breath (cough) (Patient taking differently: Inhale 2 puffs every 6 (six) hours as needed for wheezing or shortness of breath (cough) PRN), Disp: 18 g, Rfl: 1    fenofibrate 160 MG tablet, take 1 tablet by mouth once daily, Disp: 90 tablet, Rfl: 1    glucose blood test strip, TEST BS TID, Disp: 300 each, Rfl: 5    glucose monitoring kit (FREESTYLE) monitoring kit, Use 1 each daily before breakfast, Disp: 1 each, Rfl: 0    Lancets MISC, Use daily before breakfast, Disp: 100 each, Rfl: 5    methylPREDNISolone 4 MG tablet therapy pack, Use as directed on package, Disp: 1 each, Rfl: 0    pantoprazole (PROTONIX) 40 mg tablet, take 1 tablet by mouth once daily, Disp: 90 tablet, Rfl: 3    rosuvastatin (CRESTOR) 5 mg tablet, Take 0.5 tablets (2.5 mg total) by mouth daily, Disp: 90 tablet, Rfl: 1    sildenafil (REVATIO) 20 mg tablet, TAKE 1 TABLET (20 MG TOTAL) BY MOUTH DAILY AS DIRECTED, Disp: 90 tablet, Rfl: 3    traMADol (Ultram) 50 mg tablet, Take 1 tablet (50 mg total) by mouth every 6 (six) hours as needed for moderate pain, Disp: 120 tablet, Rfl: 2    Trulicity 1.5 OX/3.6JU injection, inject 0.5 milliliter subcutaneously every 7 days, Disp: 6 mL, Rfl: 1    warfarin (Coumadin) 2 mg tablet, Take 1 tablet by mouth Monday, Wednesday, Friday or as directed by PCP, Disp: 30 tablet, Rfl: 1    warfarin (Coumadin) 3 mg tablet, Take 1 tablet by mouth Tuesday, Thursday, Saturday, Sunday; or as directed by PCP, Disp: 90 tablet, Rfl: 1    No Known Allergies    Physical Exam:    /78   Pulse 61   Ht 6' 1" (1.854 m)   Wt 98.9 kg (218 lb)   BMI 28.76 kg/m²     Constitutional:normal, well developed, well nourished, alert, in no distress and non-toxic and no overt pain behavior.   Eyes:anicteric  HEENT:grossly intact  Neck:supple, symmetric, trachea midline and no masses   Pulmonary:even and unlabored  Cardiovascular:No edema or pitting edema present  Skin:Normal without rashes or lesions and well hydrated  Psychiatric:Mood and affect appropriate  Neurologic:Cranial Nerves II-XII grossly intact  Musculoskeletal:antalgic and shuffling      Imaging  FL spine and pain procedure    (Results Pending)         Orders Placed This Encounter   Procedures    XR knee 3 vw left non injury    FL spine and pain procedure    MM ALL_Prescribed Meds and Special Instructions    MM DT_Alprazolam Definitive Test    MM DT_Amphetamine Definitive Test    MM DT_Buprenorphine Definitive Test    MM DT_Butalbital Definitive Test    MM DT_Clonazepam Definitive Test    MM DT_Cocaine Definitive Test    MM DT_Codeine Definitive Test    MM DT_Dextromethorphan Definitive Test    MM Diazepam Definitive Test    MM DT_Ethyl Glucuronide/Ethyl Sulfate Definitive Test    MM DT_Fentanyl Definitive Test    MM DT_Heroin Definitive Test    MM DT_Hydrocodone Definitive Test    MM DT_Hydromorphone Definitive Test    MM DT_Kratom Definitive Test    MM DT_Levorphanol Definitive Test    MM DT_MDMA Definitive Test    MM DT_Meperidine Definitive Test    MM DT_Methadone Definitive Test    MM DT_Methamphetamine Definitive Test    MM DT_Methylphenidate Definitive Test    MM DT_Morphine Definitive Test    MM Lorazepam Definitive Test    MM DT_Oxazepam Definitive Test    MM DT_Oxycodone Definitive Test    MM DT_Oxymorphone Definitive Test    MM DT_Phencyclidine Definitive Test    MM DT_Phenobarbital Definitive Test    MM DT_Phentermine Definitive Test    MM DT_Secobarbital Definitive Test    MM DT_Spice Definitive Test    MM DT_Tapentadol Definitive Test    MM DT_Temazapam Definitive Test    MM DT_THC Definitive Test    MM DT_Tramadol Definitive Test       This document was created using speech voice recognition software.    Grammatical errors, random word insertions, pronoun errors, and incomplete sentences are an occasional consequence of this system due to software limitations, ambient noise, and hardware issues. Any formal questions or concerns about content, text, or information contained within the body of this dictation should be directly addressed to the provider for clarification.

## 2023-11-16 ENCOUNTER — APPOINTMENT (OUTPATIENT)
Dept: RADIOLOGY | Facility: CLINIC | Age: 58
End: 2023-11-16
Payer: MEDICARE

## 2023-11-16 ENCOUNTER — TELEPHONE (OUTPATIENT)
Dept: OBGYN CLINIC | Facility: CLINIC | Age: 58
End: 2023-11-16

## 2023-11-16 ENCOUNTER — OFFICE VISIT (OUTPATIENT)
Dept: PAIN MEDICINE | Facility: CLINIC | Age: 58
End: 2023-11-16
Payer: MEDICARE

## 2023-11-16 VITALS
BODY MASS INDEX: 28.89 KG/M2 | DIASTOLIC BLOOD PRESSURE: 78 MMHG | HEIGHT: 73 IN | SYSTOLIC BLOOD PRESSURE: 124 MMHG | HEART RATE: 61 BPM | WEIGHT: 218 LBS

## 2023-11-16 DIAGNOSIS — M47.816 LUMBAR SPONDYLOSIS: ICD-10-CM

## 2023-11-16 DIAGNOSIS — M25.562 ACUTE PAIN OF LEFT KNEE: ICD-10-CM

## 2023-11-16 DIAGNOSIS — G89.29 GROIN PAIN, CHRONIC, RIGHT: ICD-10-CM

## 2023-11-16 DIAGNOSIS — R10.31 GROIN PAIN, CHRONIC, RIGHT: ICD-10-CM

## 2023-11-16 DIAGNOSIS — G89.4 CHRONIC PAIN SYNDROME: Primary | ICD-10-CM

## 2023-11-16 DIAGNOSIS — F11.20 UNCOMPLICATED OPIOID DEPENDENCE (HCC): ICD-10-CM

## 2023-11-16 DIAGNOSIS — Z79.891 LONG-TERM CURRENT USE OF OPIATE ANALGESIC: ICD-10-CM

## 2023-11-16 PROCEDURE — 99214 OFFICE O/P EST MOD 30 MIN: CPT

## 2023-11-16 PROCEDURE — 73562 X-RAY EXAM OF KNEE 3: CPT

## 2023-11-16 RX ORDER — TRAMADOL HYDROCHLORIDE 50 MG/1
50 TABLET ORAL EVERY 6 HOURS PRN
Qty: 120 TABLET | Refills: 2 | Status: SHIPPED | OUTPATIENT
Start: 2023-11-16

## 2023-11-16 RX ORDER — METHYLPREDNISOLONE 4 MG/1
TABLET ORAL
Qty: 1 EACH | Refills: 0 | Status: SHIPPED | OUTPATIENT
Start: 2023-11-16

## 2023-11-16 NOTE — TELEPHONE ENCOUNTER
Pt. was scheduled while in the office; pre procedure instructions were reviewed. Per patient, he is in blood thinner and pt is diabetic. This procedure of joint injection has no holds.

## 2023-11-16 NOTE — PATIENT INSTRUCTIONS

## 2023-11-21 ENCOUNTER — TELEPHONE (OUTPATIENT)
Dept: NEPHROLOGY | Facility: CLINIC | Age: 58
End: 2023-11-21

## 2023-11-21 NOTE — TELEPHONE ENCOUNTER
Called spoke with patient advised patient to get labs done prior to 11/29/23 fu appt with . patient verbalized understanding and is okay with it.

## 2023-11-24 ENCOUNTER — APPOINTMENT (OUTPATIENT)
Dept: LAB | Facility: CLINIC | Age: 58
End: 2023-11-24
Payer: MEDICARE

## 2023-11-24 DIAGNOSIS — E83.9 CHRONIC KIDNEY DISEASE-MINERAL AND BONE DISORDER: ICD-10-CM

## 2023-11-24 DIAGNOSIS — M89.9 CHRONIC KIDNEY DISEASE-MINERAL AND BONE DISORDER: ICD-10-CM

## 2023-11-24 DIAGNOSIS — N18.9 CHRONIC KIDNEY DISEASE-MINERAL AND BONE DISORDER: ICD-10-CM

## 2023-11-24 DIAGNOSIS — N18.31 STAGE 3A CHRONIC KIDNEY DISEASE (HCC): ICD-10-CM

## 2023-11-24 LAB
25(OH)D3 SERPL-MCNC: 26.9 NG/ML (ref 30–100)
ANION GAP SERPL CALCULATED.3IONS-SCNC: 6 MMOL/L
BACTERIA UR QL AUTO: NORMAL /HPF
BASOPHILS # BLD AUTO: 0.06 THOUSANDS/ÂΜL (ref 0–0.1)
BASOPHILS NFR BLD AUTO: 1 % (ref 0–1)
BILIRUB UR QL STRIP: NEGATIVE
BUN SERPL-MCNC: 23 MG/DL (ref 5–25)
CALCIUM SERPL-MCNC: 9.3 MG/DL (ref 8.4–10.2)
CHLORIDE SERPL-SCNC: 100 MMOL/L (ref 96–108)
CLARITY UR: ABNORMAL
CO2 SERPL-SCNC: 32 MMOL/L (ref 21–32)
COLOR UR: YELLOW
CREAT SERPL-MCNC: 1.44 MG/DL (ref 0.6–1.3)
CREAT UR-MCNC: 178.4 MG/DL
EOSINOPHIL # BLD AUTO: 0.17 THOUSAND/ÂΜL (ref 0–0.61)
EOSINOPHIL NFR BLD AUTO: 2 % (ref 0–6)
ERYTHROCYTE [DISTWIDTH] IN BLOOD BY AUTOMATED COUNT: 12.3 % (ref 11.6–15.1)
GFR SERPL CREATININE-BSD FRML MDRD: 52 ML/MIN/1.73SQ M
GLUCOSE P FAST SERPL-MCNC: 165 MG/DL (ref 65–99)
GLUCOSE UR STRIP-MCNC: ABNORMAL MG/DL
HCT VFR BLD AUTO: 50.3 % (ref 36.5–49.3)
HGB BLD-MCNC: 16.3 G/DL (ref 12–17)
HGB UR QL STRIP.AUTO: NEGATIVE
IMM GRANULOCYTES # BLD AUTO: 0.1 THOUSAND/UL (ref 0–0.2)
IMM GRANULOCYTES NFR BLD AUTO: 1 % (ref 0–2)
KETONES UR STRIP-MCNC: NEGATIVE MG/DL
LEUKOCYTE ESTERASE UR QL STRIP: NEGATIVE
LYMPHOCYTES # BLD AUTO: 3.05 THOUSANDS/ÂΜL (ref 0.6–4.47)
LYMPHOCYTES NFR BLD AUTO: 29 % (ref 14–44)
MCH RBC QN AUTO: 31.6 PG (ref 26.8–34.3)
MCHC RBC AUTO-ENTMCNC: 32.4 G/DL (ref 31.4–37.4)
MCV RBC AUTO: 98 FL (ref 82–98)
MONOCYTES # BLD AUTO: 0.89 THOUSAND/ÂΜL (ref 0.17–1.22)
MONOCYTES NFR BLD AUTO: 8 % (ref 4–12)
NEUTROPHILS # BLD AUTO: 6.4 THOUSANDS/ÂΜL (ref 1.85–7.62)
NEUTS SEG NFR BLD AUTO: 59 % (ref 43–75)
NITRITE UR QL STRIP: NEGATIVE
NON-SQ EPI CELLS URNS QL MICRO: NORMAL /HPF
NRBC BLD AUTO-RTO: 0 /100 WBCS
PH UR STRIP.AUTO: 5.5 [PH]
PHOSPHATE SERPL-MCNC: 2.9 MG/DL (ref 2.7–4.5)
PLATELET # BLD AUTO: 287 THOUSANDS/UL (ref 149–390)
PMV BLD AUTO: 10.7 FL (ref 8.9–12.7)
POTASSIUM SERPL-SCNC: 4.6 MMOL/L (ref 3.5–5.3)
PROT UR STRIP-MCNC: ABNORMAL MG/DL
PROT UR-MCNC: 11 MG/DL
PROT/CREAT UR: 0.06 MG/G{CREAT} (ref 0–0.1)
PTH-INTACT SERPL-MCNC: 34.4 PG/ML (ref 12–88)
RBC # BLD AUTO: 5.16 MILLION/UL (ref 3.88–5.62)
RBC #/AREA URNS AUTO: NORMAL /HPF
SODIUM SERPL-SCNC: 138 MMOL/L (ref 135–147)
SP GR UR STRIP.AUTO: 1.03 (ref 1–1.03)
UROBILINOGEN UR STRIP-ACNC: 2 MG/DL
WBC # BLD AUTO: 10.67 THOUSAND/UL (ref 4.31–10.16)
WBC #/AREA URNS AUTO: NORMAL /HPF

## 2023-11-24 PROCEDURE — 81001 URINALYSIS AUTO W/SCOPE: CPT

## 2023-11-24 PROCEDURE — 84156 ASSAY OF PROTEIN URINE: CPT

## 2023-11-24 PROCEDURE — 85025 COMPLETE CBC W/AUTO DIFF WBC: CPT

## 2023-11-24 PROCEDURE — 84100 ASSAY OF PHOSPHORUS: CPT

## 2023-11-24 PROCEDURE — 36415 COLL VENOUS BLD VENIPUNCTURE: CPT

## 2023-11-24 PROCEDURE — 82306 VITAMIN D 25 HYDROXY: CPT

## 2023-11-24 PROCEDURE — 83970 ASSAY OF PARATHORMONE: CPT

## 2023-11-24 PROCEDURE — 82570 ASSAY OF URINE CREATININE: CPT

## 2023-11-24 PROCEDURE — 80048 BASIC METABOLIC PNL TOTAL CA: CPT

## 2023-11-28 ENCOUNTER — TELEPHONE (OUTPATIENT)
Dept: NEPHROLOGY | Facility: CLINIC | Age: 58
End: 2023-11-28

## 2023-11-29 ENCOUNTER — OFFICE VISIT (OUTPATIENT)
Dept: NEPHROLOGY | Facility: CLINIC | Age: 58
End: 2023-11-29
Payer: MEDICARE

## 2023-11-29 VITALS
DIASTOLIC BLOOD PRESSURE: 80 MMHG | RESPIRATION RATE: 16 BRPM | OXYGEN SATURATION: 93 % | HEIGHT: 73 IN | BODY MASS INDEX: 28.23 KG/M2 | SYSTOLIC BLOOD PRESSURE: 120 MMHG | WEIGHT: 213 LBS | HEART RATE: 65 BPM | TEMPERATURE: 97.5 F

## 2023-11-29 DIAGNOSIS — E83.9 CHRONIC KIDNEY DISEASE-MINERAL AND BONE DISORDER: ICD-10-CM

## 2023-11-29 DIAGNOSIS — N18.31 TYPE 2 DIABETES MELLITUS WITH STAGE 3A CHRONIC KIDNEY DISEASE, WITHOUT LONG-TERM CURRENT USE OF INSULIN (HCC): ICD-10-CM

## 2023-11-29 DIAGNOSIS — N18.31 STAGE 3A CHRONIC KIDNEY DISEASE (HCC): Primary | ICD-10-CM

## 2023-11-29 DIAGNOSIS — E11.22 TYPE 2 DIABETES MELLITUS WITH STAGE 3A CHRONIC KIDNEY DISEASE, WITHOUT LONG-TERM CURRENT USE OF INSULIN (HCC): ICD-10-CM

## 2023-11-29 DIAGNOSIS — N18.9 CHRONIC KIDNEY DISEASE-MINERAL AND BONE DISORDER: ICD-10-CM

## 2023-11-29 DIAGNOSIS — M89.9 CHRONIC KIDNEY DISEASE-MINERAL AND BONE DISORDER: ICD-10-CM

## 2023-11-29 PROCEDURE — 99214 OFFICE O/P EST MOD 30 MIN: CPT | Performed by: INTERNAL MEDICINE

## 2023-11-29 NOTE — PROGRESS NOTES
NEPHROLOGY OFFICE FOLLOW UP  Michael Fountain 60 y.o. male MRN: [id number]    Encounter: [id number] 11/29/2023    REASON FOR VISIT: Michael Fountain is a 60 y.o. male who is here on 11/29/2023 for Chronic Kidney Disease and Follow-up  . HPI:    Jolanta Gibson came in today for follow-up of CKD    79-year-old gentleman with a stage III CKD because of diabetes    Since I saw him last he was in hospital with DVT started on anticoagulation    He went back to hospital with abdominal hemorrhage after Lovenox injection    He is feeling better now denies any acute complaint    No chest pain no palpitation or shortness of breath    No nausea no vomiting        REVIEW OF SYSTEMS:    Review of Systems   Constitutional:  Negative for activity change and fatigue. HENT:  Negative for congestion and ear discharge. Eyes:  Negative for photophobia and pain. Respiratory:  Negative for apnea and choking. Cardiovascular:  Negative for chest pain and palpitations. Gastrointestinal:  Negative for abdominal distention and blood in stool. Endocrine: Negative for heat intolerance and polyphagia. Genitourinary:  Negative for flank pain and urgency. Musculoskeletal:  Negative for neck pain and neck stiffness. Skin:  Negative for color change and wound. Allergic/Immunologic: Negative for food allergies and immunocompromised state. Neurological:  Negative for seizures and facial asymmetry. Hematological:  Negative for adenopathy. Does not bruise/bleed easily. Psychiatric/Behavioral:  Negative for self-injury and suicidal ideas.           PAST MEDICAL HISTORY:  Past Medical History:   Diagnosis Date    Anxiety 03/10/2022    Aorta aneurysm (HCC)     4.3    Arm DVT (deep venous thromboembolism), acute ([address]) 3793    complications of cardiac cath    Asthma     Blood clot in vein     right leg    Chronic kidney disease     CKD (chronic kidney disease), stage III (HCC)     COPD (chronic obstructive pulmonary disease) ([address]) Depression     Diabetes mellitus ([address] [facility])     Essential hypertriglyceridemia     GERD (gastroesophageal reflux disease)     Headache     Hiatal hernia     Hyperlipidemia, mixed 2018    Kidney stone     Liver disease     FATTY LIVER    Mild episode of recurrent major depressive disorder ([address]) 03/10/2022    PAC (premature atrial contraction)     Prediabetes     Rectus sheath hematoma, initial encounter 2023    Renal calculi     Sleep apnea     Vestibular migraine        PAST SURGICAL HISTORY:  Past Surgical History:   Procedure Laterality Date    CARPAL TUNNEL RELEASE Left     COLONOSCOPY      FL INJECTION RIGHT HIP (ARTHROGRAM)  2018    FOOT SURGERY Left     FOREIGN BODY REMOVAL    HERNIA REPAIR      MN ARTHRP ACETBLR/PROX FEM PROSTC AGRFT/ALGRFT Right 2020    Procedure: ARTHROPLASTY HIP TOTAL;  Surgeon: Maile Mays MD;  [facility]: [facility];  Service: Orthopedics    MN COLONOSCOPY FLX DX W/COLLJ SPEC WHEN PFRMD N/A 2017    Procedure: COLONOSCOPY;  Surgeon: John Jefferson MD;  [facility]; Service: Gastroenterology    MN ESOPHAGOGASTRODUODENOSCOPY TRANSORAL DIAGNOSTIC N/A 10/31/2017    Procedure: ESOPHAGOGASTRODUODENOSCOPY (EGD); Surgeon: John Jefferson MD;  [facility];   Service: Gastroenterology    TOTAL HIP ARTHROPLASTY Right        SOCIAL HISTORY:  Social History     Substance and Sexual Activity   Alcohol Use Not Currently    Comment: occasional beer     Social History     Substance and Sexual Activity   Drug Use No     Social History     Tobacco Use   Smoking Status Some Days    Types: Cigars    Last attempt to quit: 2014    Years since quittin.3   Smokeless Tobacco Never   Tobacco Comments    never inhaled       FAMILY HISTORY:  Family History   Problem Relation Age of Onset    Arthritis Mother     Heart attack Father     Clotting disorder Father     Schizoaffective Disorder  Sister     Cancer Brother     Prostate cancer Brother     Leukemia Brother         his only brother dies from [address] or leukemia pt unsure he was only 47    Aortic aneurysm Other         abdominal       MEDICATIONS:    Current Outpatient Medications:     acetaminophen (TYLENOL) 500 mg tablet, Take 500 mg by mouth every 6 (six) hours as needed for mild pain, Disp: , Rfl:     albuterol (Ventolin HFA) 90 mcg/act inhaler, Inhale 2 puffs every 6 (six) hours as needed for wheezing or shortness of breath (cough) (Patient taking differently: Inhale 2 puffs every 6 (six) hours as needed for wheezing or shortness of breath (cough) PRN), Disp: 18 g, Rfl: 1    fenofibrate 160 MG tablet, take 1 tablet by mouth once daily, Disp: 90 tablet, Rfl: 1    glucose blood test strip, TEST BS TID, Disp: 300 each, Rfl: 5    glucose monitoring kit (FREESTYLE) monitoring kit, Use 1 each daily before breakfast, Disp: 1 each, Rfl: 0    Lancets MISC, Use daily before breakfast, Disp: 100 each, Rfl: 5    methylPREDNISolone 4 MG tablet therapy pack, Use as directed on package, Disp: 1 each, Rfl: 0    pantoprazole (PROTONIX) 40 mg tablet, take 1 tablet by mouth once daily, Disp: 90 tablet, Rfl: 3    rosuvastatin (CRESTOR) 5 mg tablet, Take 0.5 tablets (2.5 mg total) by mouth daily, Disp: 90 tablet, Rfl: 1    sildenafil (REVATIO) 20 mg tablet, TAKE 1 TABLET (20 MG TOTAL) BY MOUTH DAILY AS DIRECTED, Disp: 90 tablet, Rfl: 3    traMADol (Ultram) 50 mg tablet, Take 1 tablet (50 mg total) by mouth every 6 (six) hours as needed for moderate pain, Disp: 120 tablet, Rfl: 2    Trulicity 1.5 TG/8.4SP injection, inject 0.5 milliliter subcutaneously every 7 days, Disp: 6 mL, Rfl: 1    warfarin (Coumadin) 2 mg tablet, Take 1 tablet by mouth Monday, Wednesday, Friday or as directed by PCP, Disp: 30 tablet, Rfl: 1    warfarin (Coumadin) 3 mg tablet, Take 1 tablet by mouth Tuesday, Thursday, Saturday, Sunday; or as directed by PCP, Disp: 90 tablet, Rfl: 1    PHYSICAL EXAM:  Vitals:    11/29/23 0918   BP: 120/80   BP Location: Right arm   Patient Position: Sitting   Pulse: 65   Resp: 16   Temp: 97.5 °F (36.4 °C)   TempSrc: Temporal   SpO2: 93%   Weight: 96.6 kg (213 lb)   Height: 6' 1" (1.854 m)     Body mass index is 28.1 kg/m². Physical Exam  Constitutional:       General: He is not in acute distress. Appearance: He is well-developed. HENT:      Head: Normocephalic. Mouth/Throat:      Mouth: Mucous membranes are moist.   Eyes:      General: No scleral icterus. Conjunctiva/sclera: Conjunctivae normal.   Neck:      Vascular: No JVD. Cardiovascular:      Rate and Rhythm: Normal rate. Heart sounds: Normal heart sounds. Pulmonary:      Effort: Pulmonary effort is normal.      Breath sounds: No wheezing. Abdominal:      Palpations: Abdomen is soft. Tenderness: There is no abdominal tenderness. Musculoskeletal:         General: Normal range of motion. Cervical back: Neck supple. Skin:     General: Skin is warm. Findings: No rash. Neurological:      Mental Status: He is alert and oriented to person, place, and time.    Psychiatric:         Behavior: Behavior normal.         LAB RESULTS:  Results for orders placed or performed in visit on 85/07/90   Basic metabolic panel   Result Value Ref Range    Sodium 138 135 - 147 mmol/L    Potassium 4.6 3.5 - 5.3 mmol/L    Chloride 100 96 - 108 mmol/L    CO2 32 21 - 32 mmol/L    ANION GAP 6 mmol/L    BUN 23 5 - 25 mg/dL    Creatinine 1.44 (H) 0.60 - 1.30 mg/dL    Glucose, Fasting 165 (H) 65 - 99 mg/dL    Calcium 9.3 8.4 - 10.2 mg/dL    eGFR 52 ml/min/1.73sq m   CBC and differential   Result Value Ref Range    WBC 10.67 (H) 4.31 - 10.16 Thousand/uL    RBC 5.16 3.88 - 5.62 Million/uL    Hemoglobin 16.3 12.0 - 17.0 g/dL    Hematocrit 50.3 (H) 36.5 - 49.3 %    MCV 98 82 - 98 fL    MCH 31.6 26.8 - 34.3 pg    MCHC 32.4 31.4 - 37.4 g/dL    RDW 12.3 11.6 - 15.1 %    MPV 10.7 8.9 - 12.7 fL    Platelets 229 107 - 255 Thousands/uL    nRBC 0 /100 WBCs    Neutrophils Relative 59 43 - 75 %    Immat GRANS % 1 0 - 2 %    Lymphocytes Relative 29 14 - 44 %    Monocytes Relative 8 4 - 12 %    Eosinophils Relative 2 0 - 6 %    Basophils Relative 1 0 - 1 %    Neutrophils Absolute 6.40 1.85 - 7.62 Thousands/µL    Immature Grans Absolute 0.10 0.00 - 0.20 Thousand/uL    Lymphocytes Absolute 3.05 0.60 - 4.47 Thousands/µL    Monocytes Absolute 0.89 0.17 - 1.22 Thousand/µL    Eosinophils Absolute 0.17 0.00 - 0.61 Thousand/µL    Basophils Absolute 0.06 0.00 - 0.10 Thousands/µL   Phosphorus   Result Value Ref Range    Phosphorus 2.9 2.7 - 4.5 mg/dL   Protein / creatinine ratio, urine   Result Value Ref Range    Creatinine, Ur 178.4 Reference range not established. mg/dL    Protein Urine Random 11 Reference range not established. mg/dL    Prot/Creat Ratio, Ur 0.06 0.00 - 0.10   PTH, intact   Result Value Ref Range    PTH 34.4 12.0 - 88.0 pg/mL   UA (URINE) with reflex to Scope   Result Value Ref Range    Color, UA Yellow     Clarity, UA Turbid     Specific Gravity, UA 1.028 1.003 - 1.030    pH, UA 5.5 4.5, 5.0, 5.5, 6.0, 6.5, 7.0, 7.5, 8.0    Leukocytes, UA Negative Negative    Nitrite, UA Negative Negative    Protein, UA Trace (A) Negative mg/dl    Glucose, UA Trace (A) Negative mg/dl    Ketones, UA Negative Negative mg/dl    Urobilinogen, UA 2.0 (A) <2.0 mg/dl mg/dl    Bilirubin, UA Negative Negative    Occult Blood, UA Negative Negative   Vitamin D 25 hydroxy   Result Value Ref Range    Vit D, 25-Hydroxy 26.9 (L) 30.0 - 100.0 ng/mL   Urine Microscopic   Result Value Ref Range    RBC, UA None Seen None Seen, 1-2 /hpf    WBC, UA 1-2 None Seen, 1-2 /hpf    Epithelial Cells None Seen None Seen, Occasional /hpf    Bacteria, UA None Seen None Seen, Occasional /hpf       ASSESSMENT and PLAN:      CKD (chronic kidney disease) stage 3, GFR 30-59 ml/min  Lab Results   Component Value Date    EGFR 52 11/24/2023    EGFR 47 08/09/2023    EGFR 53 06/02/2022    CREATININE 1.44 (H) 11/24/2023    CREATININE 1.57 (H) 08/09/2023    CREATININE 1.44 (H) 06/16/2023   Patient kidney function stable overall. Advise hydration and avoiding nephrotoxic medication    Chronic kidney disease-mineral and bone disorder  Lab Results   Component Value Date    EGFR 52 11/24/2023    EGFR 47 08/09/2023    EGFR 53 06/02/2022    CREATININE 1.44 (H) 11/24/2023    CREATININE 1.57 (H) 08/09/2023    CREATININE 1.44 (H) 06/16/2023   PTH and phosphorus along with vitamin D are within acceptable range and will continue to monitor    Type 2 diabetes mellitus with stage 3a chronic kidney disease, without long-term current use of insulin ([facility])    Lab Results   Component Value Date    HGBA1C 7.4 (A) 09/26/2023   Not well-controlled partly because of steroid injection which she gets for the pain. Importance of diabetic control and effect on kidney disease discussed with the patient    Everything discussed with the patient at length. I will see him back in 6 months. We will get blood and urine test before that visit          Portions of the record may have been created with voice recognition software. Occasional wrong word or "sound a like" substitutions may have occurred due to the inherent limitations of voice recognition software. Read the chart carefully and recognize, using context, where substitutions have occurred. If you have any questions, please contact the dictating provider.

## 2023-11-29 NOTE — ASSESSMENT & PLAN NOTE
Lab Results   Component Value Date    HGBA1C 7.4 (A) 09/26/2023   Not well-controlled partly because of steroid injection which she gets for the pain.   Importance of diabetic control and effect on kidney disease discussed with the patient

## 2023-11-29 NOTE — ASSESSMENT & PLAN NOTE
Lab Results   Component Value Date    EGFR 52 11/24/2023    EGFR 47 08/09/2023    EGFR 53 06/02/2022    CREATININE 1.44 (H) 11/24/2023    CREATININE 1.57 (H) 08/09/2023    CREATININE 1.44 (H) 06/16/2023   Patient kidney function stable overall.   Advise hydration and avoiding nephrotoxic medication

## 2023-11-29 NOTE — ASSESSMENT & PLAN NOTE
Lab Results   Component Value Date    EGFR 52 11/24/2023    EGFR 47 08/09/2023    EGFR 53 06/02/2022    CREATININE 1.44 (H) 11/24/2023    CREATININE 1.57 (H) 08/09/2023    CREATININE 1.44 (H) 06/16/2023   PTH and phosphorus along with vitamin D are within acceptable range and will continue to monitor

## 2023-11-30 ENCOUNTER — TELEPHONE (OUTPATIENT)
Dept: HEMATOLOGY ONCOLOGY | Facility: CLINIC | Age: 58
End: 2023-11-30

## 2023-11-30 NOTE — TELEPHONE ENCOUNTER
Appointment Confirmation   Who are you speaking with? Patient   If it is not the patient, are they listed on an active communication consent form? N/A   Which provider is the appointment scheduled with? Dr. Madhavi Casas   When is the appointment scheduled? Please list date and time 12/04/2023 @8Am    At which location is the appointment scheduled to take place? [facility]   Did caller verbalize understanding of appointment details?  Yes

## 2023-12-01 ENCOUNTER — APPOINTMENT (OUTPATIENT)
Dept: LAB | Facility: MEDICAL CENTER | Age: 58
End: 2023-12-01
Payer: MEDICARE

## 2023-12-01 ENCOUNTER — ANTICOAG VISIT (OUTPATIENT)
Dept: FAMILY MEDICINE CLINIC | Facility: CLINIC | Age: 58
End: 2023-12-01

## 2023-12-04 ENCOUNTER — OFFICE VISIT (OUTPATIENT)
Dept: HEMATOLOGY ONCOLOGY | Facility: CLINIC | Age: 58
End: 2023-12-04
Payer: MEDICARE

## 2023-12-04 ENCOUNTER — APPOINTMENT (OUTPATIENT)
Dept: LAB | Facility: HOSPITAL | Age: 58
End: 2023-12-04
Attending: INTERNAL MEDICINE
Payer: MEDICARE

## 2023-12-04 VITALS
SYSTOLIC BLOOD PRESSURE: 112 MMHG | RESPIRATION RATE: 16 BRPM | DIASTOLIC BLOOD PRESSURE: 80 MMHG | HEIGHT: 73 IN | WEIGHT: 213 LBS | HEART RATE: 68 BPM | TEMPERATURE: 97.2 F | OXYGEN SATURATION: 99 % | BODY MASS INDEX: 28.23 KG/M2

## 2023-12-04 DIAGNOSIS — I82.411 ACUTE DEEP VEIN THROMBOSIS (DVT) OF FEMORAL VEIN OF RIGHT LOWER EXTREMITY (HCC): ICD-10-CM

## 2023-12-04 DIAGNOSIS — I82.411 ACUTE DEEP VEIN THROMBOSIS (DVT) OF FEMORAL VEIN OF RIGHT LOWER EXTREMITY (HCC): Primary | ICD-10-CM

## 2023-12-04 LAB
ALBUMIN SERPL BCP-MCNC: 4.5 G/DL (ref 3.5–5)
ALP SERPL-CCNC: 41 U/L (ref 34–104)
ALT SERPL W P-5'-P-CCNC: 33 U/L (ref 7–52)
ANION GAP SERPL CALCULATED.3IONS-SCNC: 1 MMOL/L
AST SERPL W P-5'-P-CCNC: 30 U/L (ref 13–39)
BILIRUB SERPL-MCNC: 0.53 MG/DL (ref 0.2–1)
BUN SERPL-MCNC: 15 MG/DL (ref 5–25)
CALCIUM SERPL-MCNC: 9.7 MG/DL (ref 8.4–10.2)
CHLORIDE SERPL-SCNC: 106 MMOL/L (ref 96–108)
CO2 SERPL-SCNC: 31 MMOL/L (ref 21–32)
CREAT SERPL-MCNC: 1.48 MG/DL (ref 0.6–1.3)
ERYTHROCYTE [DISTWIDTH] IN BLOOD BY AUTOMATED COUNT: 12.1 % (ref 11.6–15.1)
GFR SERPL CREATININE-BSD FRML MDRD: 51 ML/MIN/1.73SQ M
GLUCOSE SERPL-MCNC: 154 MG/DL (ref 65–140)
HCT VFR BLD AUTO: 46.7 % (ref 36.5–49.3)
HGB BLD-MCNC: 15.8 G/DL (ref 12–17)
MCH RBC QN AUTO: 31.8 PG (ref 26.8–34.3)
MCHC RBC AUTO-ENTMCNC: 33.8 G/DL (ref 31.4–37.4)
MCV RBC AUTO: 94 FL (ref 82–98)
PLATELET # BLD AUTO: 190 THOUSANDS/UL (ref 149–390)
PMV BLD AUTO: 10.9 FL (ref 8.9–12.7)
POTASSIUM SERPL-SCNC: 4.2 MMOL/L (ref 3.5–5.3)
PROT SERPL-MCNC: 7.3 G/DL (ref 6.4–8.4)
RBC # BLD AUTO: 4.97 MILLION/UL (ref 3.88–5.62)
SODIUM SERPL-SCNC: 138 MMOL/L (ref 135–147)
WBC # BLD AUTO: 5.15 THOUSAND/UL (ref 4.31–10.16)

## 2023-12-04 PROCEDURE — 36415 COLL VENOUS BLD VENIPUNCTURE: CPT

## 2023-12-04 PROCEDURE — 80053 COMPREHEN METABOLIC PANEL: CPT

## 2023-12-04 PROCEDURE — 99204 OFFICE O/P NEW MOD 45 MIN: CPT | Performed by: INTERNAL MEDICINE

## 2023-12-04 PROCEDURE — 85027 COMPLETE CBC AUTOMATED: CPT

## 2023-12-04 NOTE — PROGRESS NOTES
Bina Newell  1965  [facility] [address] [address]    CHIEF COMPLAINT:      No complaints. Currently on Coumadin. Last INR therapeutic. HISTORY OF PRESENT ILLNESS:   Had episode of acute DVT on the right leg diagnosed 6/16/23. Started on oral Eliquis 5 mg twice a day recently had a DVT on the left upper extremity was related to the procedure. Patient was switched to Coumadin due to cost, and has had therapeutic level INR's. No recurrence of DVT. F/U Doppler done 10/23 showed improvement in previously described thrombosis. No significant leg swelling or SOB/chest pains.       Patient Active Problem List   Diagnosis    Mild intermittent asthma without complication    Ascending aortic aneurysm (HCC)    Bicuspid aortic valve    Allergic rhinitis    GERD (gastroesophageal reflux disease)    Hiatal hernia    Type 2 diabetes mellitus with stage 3a chronic kidney disease, without long-term current use of insulin ([facility])    CKD (chronic kidney disease) stage 3, GFR 30-59 ml/min ([facility])    Hyperlipidemia, mixed    Vitamin D deficiency    Renal cyst, left    Hepatic cyst    Fatty liver disease, nonalcoholic    Erectile dysfunction    Carpal tunnel syndrome of right wrist    Chronic pain of right knee    Vestibular migraine    Patellar tendinitis of right knee    Benign paroxysmal positional vertigo due to bilateral vestibular disorder    Hip impingement syndrome, right    Primary osteoarthritis of right hip    Ulnar neuropathy of both upper extremities    Lumbosacral strain    Tarsal tunnel syndrome of right side    Cubital tunnel syndrome, bilateral    Groin pain, chronic, right    Chronic pain syndrome    Chronic kidney disease-mineral and bone disorder    Uncomplicated opioid dependence ([address])    Arthritis of right hip    Acute deep vein thrombosis (DVT) of femoral vein of right lower extremity ([address])    Wears dentures     Past Medical History:   Diagnosis Date    Anxiety 03/10/2022    Aorta aneurysm (HCC)     4.3    Arm DVT (deep venous thromboembolism), acute ([address]) 7387    complications of cardiac cath    Asthma     Blood clot in vein     right leg    Chronic kidney disease     CKD (chronic kidney disease), stage III (HCC)     COPD (chronic obstructive pulmonary disease) ([address])     Depression     Diabetes mellitus ([address])     Essential hypertriglyceridemia     GERD (gastroesophageal reflux disease)     Headache     Hiatal hernia     Hyperlipidemia, mixed 05/07/2018    Kidney stone     Liver disease     FATTY LIVER    Mild episode of recurrent major depressive disorder ([address]) 03/10/2022    PAC (premature atrial contraction)     Prediabetes     Rectus sheath hematoma, initial encounter 08/09/2023    Renal calculi     Sleep apnea     Vestibular migraine      Oncology History    No history exists. Past Surgical History:   Procedure Laterality Date    CARPAL TUNNEL RELEASE Left     COLONOSCOPY      FL INJECTION RIGHT HIP (ARTHROGRAM)  08/20/2018    FOOT SURGERY Left     FOREIGN BODY REMOVAL    HERNIA REPAIR      WI ARTHRP ACETBLR/PROX FEM PROSTC AGRFT/ALGRFT Right 09/28/2020    Procedure: ARTHROPLASTY HIP TOTAL;  Surgeon: Elise Weller MD;  [facility];  Service: Orthopedics    WI COLONOSCOPY FLX DX W/COLLJ SPEC WHEN PFRMD N/A 08/07/2017    Procedure: COLONOSCOPY;  Surgeon: Tona Gipson MD;  [facility]; Service: Gastroenterology    WI ESOPHAGOGASTRODUODENOSCOPY TRANSORAL DIAGNOSTIC N/A 10/31/2017    Procedure: ESOPHAGOGASTRODUODENOSCOPY (EGD); Surgeon: Tona Gipson MD;  Location: [facility];   Service: Gastroenterology    TOTAL HIP ARTHROPLASTY Right 2020     Family History   Problem Relation Age of Onset    Arthritis Mother     Heart attack Father     Clotting disorder Father     Schizoaffective Disorder  Sister     Cancer Brother     Prostate cancer Brother     Leukemia Brother         his only brother dies from lymphoma or leukemia pt unsure he was only 47    Aortic aneurysm Other         abdominal     Social History     Socioeconomic History    Marital status:      Spouse name: Not on file    Number of children: 2    Years of education: Not on file    Highest education level: Not on file   Occupational History    Occupation: unemployed   Tobacco Use    Smoking status: Some Days     Types: Cigars     Last attempt to quit: 2014     Years since quittin.3    Smokeless tobacco: Never    Tobacco comments:     never inhaled   Vaping Use    Vaping Use: Never used   Substance and Sexual Activity    Alcohol use: Not Currently     Comment: occasional beer    Drug use: No    Sexual activity: Yes     Partners: Female   Other Topics Concern    Not on file   Social History Narrative    Caffeine use    Lives alone     Social Determinants of Health     Financial Resource Strain: Low Risk  (2023)    Overall Financial Resource Strain (CARDIA)     Difficulty of Paying Living Expenses: Not hard at all   Food Insecurity: Not on file   Transportation Needs: No Transportation Needs (2023)    PRAPARE - Transportation     Lack of Transportation (Medical): No     Lack of Transportation (Non-Medical):  No   Physical Activity: Not on file   Stress: Not on file   Social Connections: Not on file   Intimate Partner Violence: Not on file   Housing Stability: Not on file       No Known Allergies        Meds:    Current Outpatient Medications:     acetaminophen (TYLENOL) 500 mg tablet, Take 500 mg by mouth every 6 (six) hours as needed for mild pain, Disp: , Rfl:     albuterol (Ventolin HFA) 90 mcg/act inhaler, Inhale 2 puffs every 6 (six) hours as needed for wheezing or shortness of breath (cough) (Patient taking differently: Inhale 2 puffs every 6 (six) hours as needed for wheezing or shortness of breath (cough) PRN), Disp: 18 g, Rfl: 1    fenofibrate 160 MG tablet, take 1 tablet by mouth once daily, Disp: 90 tablet, Rfl: 1    glucose blood test strip, TEST BS TID, Disp: 300 each, Rfl: 5    glucose monitoring kit (FREESTYLE) monitoring kit, Use 1 each daily before breakfast, Disp: 1 each, Rfl: 0    Lancets MISC, Use daily before breakfast, Disp: 100 each, Rfl: 5    methylPREDNISolone 4 MG tablet therapy pack, Use as directed on package, Disp: 1 each, Rfl: 0    pantoprazole (PROTONIX) 40 mg tablet, take 1 tablet by mouth once daily, Disp: 90 tablet, Rfl: 3    rosuvastatin (CRESTOR) 5 mg tablet, Take 0.5 tablets (2.5 mg total) by mouth daily, Disp: 90 tablet, Rfl: 1    sildenafil (REVATIO) 20 mg tablet, TAKE 1 TABLET (20 MG TOTAL) BY MOUTH DAILY AS DIRECTED, Disp: 90 tablet, Rfl: 3    traMADol (Ultram) 50 mg tablet, Take 1 tablet (50 mg total) by mouth every 6 (six) hours as needed for moderate pain, Disp: 120 tablet, Rfl: 2    Trulicity 1.5 US/4.9LM injection, inject 0.5 milliliter subcutaneously every 7 days, Disp: 6 mL, Rfl: 1    warfarin (Coumadin) 2 mg tablet, Take 1 tablet by mouth Monday, Wednesday, Friday or as directed by PCP, Disp: 30 tablet, Rfl: 1    warfarin (Coumadin) 3 mg tablet, Take 1 tablet by mouth Tuesday, Thursday, Saturday, Sunday; or as directed by PCP, Disp: 90 tablet, Rfl: 1         REVIEW OF SYSTEMS:  Constitutional:  Denies any fever; denies night sweats; denies weight loss. HEENT:  Denies blurred vission; denies eue drainage; denies bulging eyes; denies hearing impairment; denies tinnitis; denies vertigo; denies sore-throat; denies sinues drainage or post nasal drip. Neck:  Denies neck swelling. Respiratory:  Denies cough; denises coughing up blood; denies chest pains on breathing; denies shortness of breath. Cardiovascular:  Denies chest pains on exertion; denies heart palpitations; denies light headedness or feeling of fainting; denies leg swelling; denies pains in calves; denies pain in legs on walking.     GI:  Denies difficulty swallowing; denies heartburn; denies vomiting blood; denies black-colored stools; denies nausea; denies vomiting; denies abdominal paindenies passage of bright red blood. :  Denies blood in urine; denies urinary frequency; denies pain on urination; denies difficulty in passing urine. Spine:  Denies neck pain; denies radiation of pain into arms or legs; denies lower back pain. Hemotology:  Denies easy bruising. Lymphalics:  Denies new lumps anywhere    Neurological:  Denies headaches; denies dizziness; denies numbness in extremities; denies weakness in extremities; denies poor balance or disequilibrium. Dermatologic:  denies rash; denies itching. PHYSICAL EXAM:  /80   Pulse 68   Temp (!) 97.2 °F (36.2 °C) (Temporal)   Resp 16   Ht 6' 1" (1.854 m)   Wt 96.6 kg (213 lb)   SpO2 99%   BMI 28.10 kg/m²      General:  Patient alert; oriented. HEENT:  PERRL; EOM intact; conjunctiva normal; no scleral icterus. Neck:  Trachea midline; thyroid not enlarged; No carotid bruits    Lymphatics:  No submandibular adenopathy; no cervical adenopathy; no supraclavicular adenopathy; no axillary adenopathy; no inguinal adenopathy. Hent:  Regular rhythm; S1 and S2 normal; No murmurs; no rubs; no gallops; peripheral pulses normal and equal bilaterally. Lungs:  Clear to ausculation and percussion    Abdomen:  Soft; non-tender; no masses; no organomegaly; no superficial veins; no hernia; no abdominal bruise. Extremities:  No leg swelling; no calf tenderness    Spine:  No gross spinal deformity; no spinal tenderness; no CVA tenderness. Neurological: patient alert and oriented; crainials II - XII intact; no motor deficit; no sensory deficit; Gait normal.    Psychiatric:  Mood is appropriate, affect is normal, no active hallucinations or delusions.       Labs:      Imaging  VAS lower limb venous duplex study, unilateral/limited  Status: Final result     PACS Images     Show images for VAS lower limb venous duplex study, unilateral/limited  Study Result    Narrative & Impression      [facility] REPORT  CLINICAL:  Indications:  Patient presents for a follow up of RLE DVT noted on 6/16/2023, the patient has  been taking coumadin. Operative History:  cardiac cath  Risk Factors  The patient has history of Diabetes (NIDDM (Diet)), Hyperlipidemia, CKD and  smoking (current) 0.25 ppd. FINDINGS:     Right    Impression                             FV Mid   E1. Non Occlusive Thrombus (Chronic)    FV Dist  E1. Non Occlusive Thrombus (Chronic)             CONCLUSION:     Impression:  RIGHT LOWER LIMB  There is chronic thrombus noted in the mid and distal femoral vein. No evidence of superficial thrombophlebitis noted. Doppler evaluation shows a normal response to augmentation maneuvers. Popliteal, posterior tibial and anterior tibial arterial Doppler waveform's are  triphasic. LEFT LOWER LIMB LIMITED  Evaluation shows no evidence of thrombus in the common femoral vein. Doppler evaluation shows a normal response to augmentation maneuvers. In comparison to the study of 6/16/2023, there is improvement of the previously  noted DVT. I reviewed the above laboratory and imaging data. Assessment/Plan: Patient clinically stable. Will continue Coumadin. Will schedule Thrombosis panel, FVL and prothrombin gene mutation.

## 2023-12-08 ENCOUNTER — TELEPHONE (OUTPATIENT)
Dept: FAMILY MEDICINE CLINIC | Facility: CLINIC | Age: 58
End: 2023-12-08

## 2023-12-08 DIAGNOSIS — I82.411 ACUTE DEEP VEIN THROMBOSIS (DVT) OF FEMORAL VEIN OF RIGHT LOWER EXTREMITY (HCC): Primary | ICD-10-CM

## 2023-12-08 DIAGNOSIS — F43.0 ACUTE STRESS REACTION: ICD-10-CM

## 2023-12-08 DIAGNOSIS — F51.04 PSYCHOPHYSIOLOGICAL INSOMNIA: Primary | ICD-10-CM

## 2023-12-08 RX ORDER — HYDROXYZINE HYDROCHLORIDE 25 MG/1
25 TABLET, FILM COATED ORAL
Qty: 30 TABLET | Refills: 1 | Status: SHIPPED | OUTPATIENT
Start: 2023-12-08

## 2023-12-08 NOTE — TELEPHONE ENCOUNTER
Sent in script for hydroxyzine -- can take 30 minutes prior to bed. Can cause dry mouth. Should not take in combination with Tramadol.      Please let him know I'm sorry for his loss and we'll be keeping his family in our thoughts

## 2023-12-08 NOTE — TELEPHONE ENCOUNTER
Spoke to the patient, he called to request something to help him sleep. He explained that his mother passed away three days ago and his father's health is failing so he has been very stressed. He stated that he has not had a good night of sleep in 72 hours and is feeling very fatigued. He would like this sent to the [facility] in [place].

## 2023-12-11 ENCOUNTER — TELEPHONE (OUTPATIENT)
Dept: FAMILY MEDICINE CLINIC | Facility: CLINIC | Age: 58
End: 2023-12-11

## 2023-12-11 DIAGNOSIS — F51.04 PSYCHOPHYSIOLOGICAL INSOMNIA: Primary | ICD-10-CM

## 2023-12-11 RX ORDER — TRAZODONE HYDROCHLORIDE 50 MG/1
50 TABLET ORAL
Qty: 30 TABLET | Refills: 1 | Status: SHIPPED | OUTPATIENT
Start: 2023-12-11

## 2023-12-11 NOTE — TELEPHONE ENCOUNTER
Pt called and stated the medication he got for insomnia, is not working. Pt asked if Dr. Asha Sainz can give him something else.

## 2023-12-12 DIAGNOSIS — E78.2 MIXED HYPERLIPIDEMIA: ICD-10-CM

## 2023-12-12 RX ORDER — ROSUVASTATIN CALCIUM 5 MG/1
5 TABLET, COATED ORAL DAILY
Qty: 90 TABLET | Refills: 1 | Status: SHIPPED | OUTPATIENT
Start: 2023-12-12

## 2023-12-19 ENCOUNTER — HOSPITAL ENCOUNTER (OUTPATIENT)
Dept: RADIOLOGY | Facility: CLINIC | Age: 58
Discharge: HOME | End: 2023-12-19
Admitting: STUDENT IN AN ORGANIZED HEALTH CARE EDUCATION/TRAINING PROGRAM
Payer: MEDICARE

## 2023-12-19 VITALS
TEMPERATURE: 99 F | OXYGEN SATURATION: 94 % | RESPIRATION RATE: 20 BRPM | HEART RATE: 71 BPM | SYSTOLIC BLOOD PRESSURE: 116 MMHG | DIASTOLIC BLOOD PRESSURE: 75 MMHG

## 2023-12-19 DIAGNOSIS — M25.562 ACUTE PAIN OF LEFT KNEE: ICD-10-CM

## 2023-12-19 PROCEDURE — 77002 NEEDLE LOCALIZATION BY XRAY: CPT | Performed by: STUDENT IN AN ORGANIZED HEALTH CARE EDUCATION/TRAINING PROGRAM

## 2023-12-19 PROCEDURE — 20610 DRAIN/INJ JOINT/BURSA W/O US: CPT | Performed by: STUDENT IN AN ORGANIZED HEALTH CARE EDUCATION/TRAINING PROGRAM

## 2023-12-19 PROCEDURE — 77002 NEEDLE LOCALIZATION BY XRAY: CPT

## 2023-12-19 RX ORDER — METHYLPREDNISOLONE ACETATE 80 MG/ML
80 INJECTION, SUSPENSION INTRA-ARTICULAR; INTRALESIONAL; INTRAMUSCULAR; PARENTERAL; SOFT TISSUE ONCE
Status: COMPLETED | OUTPATIENT
Start: 2023-12-19 | End: 2023-12-19

## 2023-12-19 RX ORDER — BUPIVACAINE HCL/PF 2.5 MG/ML
4 VIAL (ML) INJECTION ONCE
Status: COMPLETED | OUTPATIENT
Start: 2023-12-19 | End: 2023-12-19

## 2023-12-19 RX ADMIN — IOHEXOL 1 ML: 300 INJECTION, SOLUTION INTRAVENOUS at 11:11

## 2023-12-19 RX ADMIN — Medication 4 ML: at 11:12

## 2023-12-19 RX ADMIN — METHYLPREDNISOLONE ACETATE 80 MG: 80 INJECTION, SUSPENSION INTRA-ARTICULAR; INTRALESIONAL; INTRAMUSCULAR; PARENTERAL; SOFT TISSUE at 11:12

## 2023-12-19 NOTE — DISCHARGE INSTR - LAB

## 2023-12-19 NOTE — H&P
History of Present Illness: The patient is a 58 y.o. male who presents with complaints of left knee pain    Past Medical History:   Diagnosis Date    Anxiety 03/10/2022    Aorta aneurysm (HCC)     4.3    Arm DVT (deep venous thromboembolism), acute (HCC) 2015    complications of cardiac cath    Asthma     Blood clot in vein     right leg    Chronic kidney disease     CKD (chronic kidney disease), stage III (HCC)     COPD (chronic obstructive pulmonary disease) (HCC)     Depression     Diabetes mellitus (HCC)     Essential hypertriglyceridemia     GERD (gastroesophageal reflux disease)     Headache     Hiatal hernia     Hyperlipidemia, mixed 05/07/2018    Kidney stone     Liver disease     FATTY LIVER    Mild episode of recurrent major depressive disorder (HCC) 03/10/2022    PAC (premature atrial contraction)     Prediabetes     Rectus sheath hematoma, initial encounter 08/09/2023    Renal calculi     Sleep apnea     Vestibular migraine        Past Surgical History:   Procedure Laterality Date    CARPAL TUNNEL RELEASE Left     COLONOSCOPY      FL INJECTION RIGHT HIP (ARTHROGRAM)  08/20/2018    FOOT SURGERY Left     FOREIGN BODY REMOVAL    HERNIA REPAIR      MA ARTHRP ACETBLR/PROX FEM PROSTC AGRFT/ALGRFT Right 09/28/2020    Procedure: ARTHROPLASTY HIP TOTAL;  Surgeon: Jyoti Araiza MD;  [facility]: [facility];  Service: Orthopedics    MA COLONOSCOPY FLX DX W/COLLJ SPEC WHEN PFRMD N/A 08/07/2017    Procedure: COLONOSCOPY;  Surgeon: Anthony France MD;  [facility]: [facility];  Service: Gastroenterology    MA ESOPHAGOGASTRODUODENOSCOPY TRANSORAL DIAGNOSTIC N/A 10/31/2017    Procedure: ESOPHAGOGASTRODUODENOSCOPY (EGD);  Surgeon: Anthony France MD;  Location: [facility];  Service: Gastroenterology    TOTAL HIP ARTHROPLASTY Right 2020         Current Outpatient Medications:     acetaminophen (TYLENOL) 500 mg tablet, Take 500 mg by mouth every 6 (six) hours as needed for mild pain, Disp: , Rfl:     albuterol (Ventolin  HFA) 90 mcg/act inhaler, Inhale 2 puffs every 6 (six) hours as needed for wheezing or shortness of breath (cough) (Patient taking differently: Inhale 2 puffs every 6 (six) hours as needed for wheezing or shortness of breath (cough) PRN), Disp: 18 g, Rfl: 1    fenofibrate 160 MG tablet, take 1 tablet by mouth once daily, Disp: 90 tablet, Rfl: 1    glucose blood test strip, TEST BS TID, Disp: 300 each, Rfl: 5    glucose monitoring kit (FREESTYLE) monitoring kit, Use 1 each daily before breakfast, Disp: 1 each, Rfl: 0    Lancets MISC, Use daily before breakfast, Disp: 100 each, Rfl: 5    methylPREDNISolone 4 MG tablet therapy pack, Use as directed on package, Disp: 1 each, Rfl: 0    pantoprazole (PROTONIX) 40 mg tablet, take 1 tablet by mouth once daily, Disp: 90 tablet, Rfl: 3    rosuvastatin (CRESTOR) 5 mg tablet, take 1 tablet by mouth once daily, Disp: 90 tablet, Rfl: 1    sildenafil (REVATIO) 20 mg tablet, TAKE 1 TABLET (20 MG TOTAL) BY MOUTH DAILY AS DIRECTED, Disp: 90 tablet, Rfl: 3    traMADol (Ultram) 50 mg tablet, Take 1 tablet (50 mg total) by mouth every 6 (six) hours as needed for moderate pain, Disp: 120 tablet, Rfl: 2    traZODone (DESYREL) 50 mg tablet, Take 1 tablet (50 mg total) by mouth daily at bedtime as needed for sleep, Disp: 30 tablet, Rfl: 1    Trulicity 1.5 MG/0.5ML injection, inject 0.5 milliliter subcutaneously every 7 days, Disp: 6 mL, Rfl: 1    warfarin (Coumadin) 2 mg tablet, Take 1 tablet by mouth Monday, Wednesday, Friday or as directed by PCP, Disp: 30 tablet, Rfl: 1    warfarin (Coumadin) 3 mg tablet, Take 1 tablet by mouth Tuesday, Thursday, Saturday, Sunday; or as directed by PCP, Disp: 90 tablet, Rfl: 1    No Known Allergies    Physical Exam:   Vitals:    12/19/23 1051   BP: 118/83   Pulse: 69   Resp: 18   Temp: 99 °F (37.2 °C)   SpO2: 96%     General: Awake, Alert, Oriented x 3, Mood and affect appropriate  Respiratory: Respirations even and unlabored  Cardiovascular: Peripheral  pulses intact; no edema  Musculoskeletal Exam: left knee non erythematous no lesoins    ASA Score: 3    Patient/Chart Verification  Patient ID Verified: Verbal  ID Band Applied: No  Consents Confirmed: Procedural, To be obtained in the Pre-Procedure area  H&P( within 30 days) Verified: To be obtained in the Pre-Procedure area  Interval H&P(within 24 hr) Complete (required for Outpatients and Surgery Admit only): To be obtained in the Pre-Procedure area  Allergies Reviewed: Yes  Anticoag/NSAID held?: NA  Currently on antibiotics?: No    Assessment:   1. Acute pain of left knee        Plan: left knee injection

## 2023-12-20 ENCOUNTER — RA CDI HCC (OUTPATIENT)
Dept: OTHER | Facility: HOSPITAL | Age: 58
End: 2023-12-20

## 2023-12-20 ENCOUNTER — TELEPHONE (OUTPATIENT)
Dept: FAMILY MEDICINE CLINIC | Facility: CLINIC | Age: 58
End: 2023-12-20

## 2023-12-20 DIAGNOSIS — N18.31 TYPE 2 DIABETES MELLITUS WITH STAGE 3A CHRONIC KIDNEY DISEASE, WITHOUT LONG-TERM CURRENT USE OF INSULIN (HCC): Primary | ICD-10-CM

## 2023-12-20 DIAGNOSIS — E11.22 TYPE 2 DIABETES MELLITUS WITH STAGE 3A CHRONIC KIDNEY DISEASE, WITHOUT LONG-TERM CURRENT USE OF INSULIN (HCC): Primary | ICD-10-CM

## 2023-12-20 RX ORDER — INSULIN GLARGINE 100 [IU]/ML
5 INJECTION, SOLUTION SUBCUTANEOUS DAILY
Qty: 15 ML | Refills: 0 | Status: SHIPPED | OUTPATIENT
Start: 2023-12-20

## 2023-12-20 NOTE — TELEPHONE ENCOUNTER
Ok script sent. Normally you take it in the evening, but he can start it as soon as he picks it up today and then take it in the morning from now on. He needs to eat regularly while taking as it can drop his sugar too much if he doesn't. We will start with 5 units today, but if his sugars are still high, he should give us a call and we can adjust

## 2023-12-20 NOTE — TELEPHONE ENCOUNTER
Left knee injections yesterday and last night BS was 401 and now this morning it was 357 before he ate. He is on weekly injections and he wants to know what to do? He would like to give himself an extra shot.

## 2023-12-20 NOTE — TELEPHONE ENCOUNTER
Wouldn't recommend a second shot of Trulicity -- we could use a small dose of insulin once daily until his sugars level out if he is agreeable

## 2023-12-21 DIAGNOSIS — N18.31 TYPE 2 DIABETES MELLITUS WITH STAGE 3A CHRONIC KIDNEY DISEASE, WITHOUT LONG-TERM CURRENT USE OF INSULIN (HCC): Primary | ICD-10-CM

## 2023-12-21 DIAGNOSIS — E11.22 TYPE 2 DIABETES MELLITUS WITH STAGE 3A CHRONIC KIDNEY DISEASE, WITHOUT LONG-TERM CURRENT USE OF INSULIN (HCC): Primary | ICD-10-CM

## 2023-12-21 RX ORDER — PEN NEEDLE, DIABETIC 32GX 5/32"
NEEDLE, DISPOSABLE MISCELLANEOUS DAILY
Qty: 100 EACH | Refills: 0 | Status: SHIPPED | OUTPATIENT
Start: 2023-12-21

## 2023-12-22 NOTE — TELEPHONE ENCOUNTER
Yes should continue Trulicity and can increase insulin to 10 units daily -- please have him give us a call in a few days if his sugars are still running high

## 2023-12-22 NOTE — TELEPHONE ENCOUNTER
ISAACI - Yesterday @ 4:30  pt took his 1 st indulin shot, then @ 8:30 his BS was 441.  This morning @ 7 am his BS was 225 , then 2 hr's after breakfast it was 375..    Also said he has been taking trulicity every Friday and is asking if he should continue this ??

## 2023-12-26 NOTE — PROGRESS NOTES
Chavez Newell 1965 male MRN: [id number]      ASSESSMENT/PLAN  Problem List Items Addressed This Visit        Endocrine    Type 2 diabetes mellitus with stage 3a chronic kidney disease, without long-term current use of insulin (HCC) - Primary    Relevant Medications    dulaglutide (Trulicity) 1.5 MG/0.5ML injection    Other Relevant Orders    POCT hemoglobin A1c (Completed)       Cardiovascular and Mediastinum    Ascending aortic aneurysm (HCC)    Acute deep vein thrombosis (DVT) of femoral vein of right lower extremity (HCC)       Genitourinary    CKD (chronic kidney disease) stage 3, GFR 30-59 ml/min (HCC)       Other    Hyperlipidemia, mixed    Chronic pain syndrome     DM: A1c 8.3% (from 7.4) -- elevated likely secondary to recent prednisone use (both oral and injectable) though pt notes fasting AM sugars were above goal prior to this. Will increase Trulicity to 3 mg weekly and continue insulin -- pt is going to monitor blood sugars and will reach out once they are consistently in 100s, at which time we can trial weaning off insulin   HLD: Update lipids as previously ordered   CKD: F/u with Nephro as scheduled   DVT: Continue Coumadin and f/u with Heme/Onc as schedule   AAA: Will continue monitoring with regular imaging   Chronic Pain: F/u with Pain Management as scheduled, they are managing pt's opioid prescription     Pt states he is going to schedule his colonoscopy in the next few months       Future Appointments   Date Time Provider Department Center   1/16/2024 10:30 AM Ju Woodruff PA-C HEM ONC [facility]   2/20/2024  9:00 AM NICK Solorio [facility]   3/27/2024  8:40 AM DO ALFONZO Allison  Practice-[facility]   6/11/2024 11:00 AM Jose Baker MD [facility]          SUBJECTIVE  CC: Diabetes      HPI:  Chavez eNwell is a 58 y.o. male who presents for chronic follow up as below.     DM: Had recent steroid injection and blood sugars spiked (was in  300-400s) -- initiated on insulin; has more recently been in 100s-200s; pt also notes prior to injection his fasting sugars in AM were >200   HLD: Tolerating statin without issue   CKD: Following with Nephro   DVT: On Coumadin, has f/u with Heme/Onc in January   AAA: Stable on imaging in 03/2023   Chronic Pain: Follows with Pain Management     Review of Systems   Constitutional:  Negative for diaphoresis.   Eyes:  Negative for visual disturbance.   Respiratory:  Negative for cough and shortness of breath.    Cardiovascular:  Negative for chest pain, palpitations and leg swelling.   Gastrointestinal:  Positive for constipation (seems to be improving). Negative for abdominal pain and diarrhea.   Endocrine: Negative for polyuria.   Genitourinary:  Negative for difficulty urinating and dysuria.   Neurological:  Negative for dizziness, tremors and headaches.   Psychiatric/Behavioral:  Positive for sleep disturbance (improving).        Historical Information   The patient history was reviewed and updated as follows:    Past Medical History:   Diagnosis Date   • Anxiety 03/10/2022   • Aorta aneurysm (HCC)     4.3   • Arm DVT (deep venous thromboembolism), acute (HCC) 2015    complications of cardiac cath   • Asthma    • Blood clot in vein     right leg   • Chronic kidney disease    • CKD (chronic kidney disease), stage III (HCC)    • COPD (chronic obstructive pulmonary disease) (HCC)    • Depression    • Diabetes mellitus (HCC)    • Essential hypertriglyceridemia    • GERD (gastroesophageal reflux disease)    • Headache    • Hiatal hernia    • Hyperlipidemia, mixed 05/07/2018   • Kidney stone    • Liver disease     FATTY LIVER   • Mild episode of recurrent major depressive disorder (HCC) 03/10/2022   • PAC (premature atrial contraction)    • Prediabetes    • Rectus sheath hematoma, initial encounter 08/09/2023   • Renal calculi    • Sleep apnea    • Vestibular migraine      Past Surgical History:   Procedure Laterality  Date   • CARPAL TUNNEL RELEASE Left    • COLONOSCOPY     • FL INJECTION RIGHT HIP (ARTHROGRAM)  2018   • FOOT SURGERY Left     FOREIGN BODY REMOVAL   • HERNIA REPAIR     • AK ARTHRP ACETBLR/PROX FEM PROSTC AGRFT/ALGRFT Right 2020    Procedure: ARTHROPLASTY HIP TOTAL;  Surgeon: Jyoti Araiza MD;  [facility];  Service: Orthopedics   • AK COLONOSCOPY FLX DX W/COLLJ SPEC WHEN PFRMD N/A 2017    Procedure: COLONOSCOPY;  Surgeon: Anthony France MD;  [facility];  Service: Gastroenterology   • AK ESOPHAGOGASTRODUODENOSCOPY TRANSORAL DIAGNOSTIC N/A 10/31/2017    Procedure: ESOPHAGOGASTRODUODENOSCOPY (EGD);  Surgeon: Anthony France MD;  [facility];  Service: Gastroenterology   • TOTAL HIP ARTHROPLASTY Right      Family History   Problem Relation Age of Onset   • Arthritis Mother    • Heart attack Father    • Clotting disorder Father    • Schizoaffective Disorder  Sister    • Cancer Brother    • Prostate cancer Brother    • Leukemia Brother         his only brother dies from lymphoma or leukemia pt unsure he was only 54   • Aortic aneurysm Other         abdominal      Social History   Social History     Substance and Sexual Activity   Alcohol Use Not Currently    Comment: occasional beer     Social History     Substance and Sexual Activity   Drug Use No     Social History     Tobacco Use   Smoking Status Some Days   • Types: Cigars   • Last attempt to quit: 2014   • Years since quittin.3   Smokeless Tobacco Never   Tobacco Comments    never inhaled       Medications:     Current Outpatient Medications:   •  dulaglutide (Trulicity) 1.5 MG/0.5ML injection, Inject 1 mL (3 mg total) under the skin every 7 days, Disp: , Rfl:   •  acetaminophen (TYLENOL) 500 mg tablet, Take 500 mg by mouth every 6 (six) hours as needed for mild pain, Disp: , Rfl:   •  albuterol (Ventolin HFA) 90 mcg/act inhaler, Inhale 2 puffs every 6 (six) hours as needed for wheezing or shortness  "of breath (cough) (Patient taking differently: Inhale 2 puffs every 6 (six) hours as needed for wheezing or shortness of breath (cough) PRN), Disp: 18 g, Rfl: 1  •  fenofibrate 160 MG tablet, take 1 tablet by mouth once daily, Disp: 90 tablet, Rfl: 1  •  glucose blood test strip, TEST BS TID, Disp: 300 each, Rfl: 5  •  glucose monitoring kit (FREESTYLE) monitoring kit, Use 1 each daily before breakfast, Disp: 1 each, Rfl: 0  •  Insulin Glargine Solostar (Lantus SoloStar) 100 UNIT/ML SOPN, Inject 0.05 mL (5 Units total) under the skin daily, Disp: 15 mL, Rfl: 0  •  Insulin Pen Needle (BD Pen Needle Evelina 2nd Gen) 32G X 4 MM MISC, Inject as directed in the morning, Disp: 100 each, Rfl: 0  •  Lancets MISC, Use daily before breakfast, Disp: 100 each, Rfl: 5  •  pantoprazole (PROTONIX) 40 mg tablet, take 1 tablet by mouth once daily, Disp: 90 tablet, Rfl: 3  •  rosuvastatin (CRESTOR) 5 mg tablet, take 1 tablet by mouth once daily, Disp: 90 tablet, Rfl: 1  •  sildenafil (REVATIO) 20 mg tablet, TAKE 1 TABLET (20 MG TOTAL) BY MOUTH DAILY AS DIRECTED, Disp: 90 tablet, Rfl: 3  •  traMADol (Ultram) 50 mg tablet, Take 1 tablet (50 mg total) by mouth every 6 (six) hours as needed for moderate pain, Disp: 120 tablet, Rfl: 2  •  warfarin (Coumadin) 2 mg tablet, Take 1 tablet by mouth Monday, Wednesday, Friday or as directed by PCP, Disp: 30 tablet, Rfl: 1  •  warfarin (Coumadin) 3 mg tablet, Take 1 tablet by mouth Tuesday, Thursday, Saturday, Sunday; or as directed by PCP, Disp: 90 tablet, Rfl: 1  No Known Allergies    OBJECTIVE    Vitals:   Vitals:    12/27/23 0830   BP: 128/76   Pulse: 66   Temp: 98.3 °F (36.8 °C)   SpO2: 98%   Weight: 96.2 kg (212 lb)   Height: 6' 1\" (1.854 m)           Physical Exam  Vitals and nursing note reviewed.   Constitutional:       General: He is not in acute distress.     Appearance: Normal appearance.   HENT:      Head: Normocephalic and atraumatic.      Right Ear: Ear canal and external ear normal. " There is impacted cerumen.      Left Ear: Tympanic membrane, ear canal and external ear normal.      Nose: Nose normal.      Mouth/Throat:      Mouth: Mucous membranes are moist.      Pharynx: No oropharyngeal exudate or posterior oropharyngeal erythema.   Eyes:      Conjunctiva/sclera: Conjunctivae normal.   Cardiovascular:      Rate and Rhythm: Normal rate and regular rhythm.   Pulmonary:      Effort: Pulmonary effort is normal. No respiratory distress.      Breath sounds: Normal breath sounds.   Abdominal:      General: Bowel sounds are normal. There is no distension.      Palpations: Abdomen is soft.      Tenderness: There is no abdominal tenderness.   Musculoskeletal:      Right lower leg: No edema.      Left lower leg: No edema.   Lymphadenopathy:      Cervical: No cervical adenopathy.   Skin:     General: Skin is warm and dry.   Neurological:      Mental Status: He is alert.      Comments: Grossly intact   Psychiatric:         Mood and Affect: Mood normal.                    Adenike Garg DO  [facility]   12/27/2023  8:58 AM

## 2023-12-27 ENCOUNTER — OFFICE VISIT (OUTPATIENT)
Dept: FAMILY MEDICINE CLINIC | Facility: CLINIC | Age: 58
End: 2023-12-27
Payer: MEDICARE

## 2023-12-27 VITALS
OXYGEN SATURATION: 98 % | HEIGHT: 73 IN | TEMPERATURE: 98.3 F | WEIGHT: 212 LBS | DIASTOLIC BLOOD PRESSURE: 76 MMHG | BODY MASS INDEX: 28.1 KG/M2 | HEART RATE: 66 BPM | SYSTOLIC BLOOD PRESSURE: 128 MMHG

## 2023-12-27 DIAGNOSIS — E11.22 TYPE 2 DIABETES MELLITUS WITH STAGE 3A CHRONIC KIDNEY DISEASE, WITHOUT LONG-TERM CURRENT USE OF INSULIN (HCC): Primary | ICD-10-CM

## 2023-12-27 DIAGNOSIS — I82.411 ACUTE DEEP VEIN THROMBOSIS (DVT) OF FEMORAL VEIN OF RIGHT LOWER EXTREMITY (HCC): ICD-10-CM

## 2023-12-27 DIAGNOSIS — E78.2 HYPERLIPIDEMIA, MIXED: ICD-10-CM

## 2023-12-27 DIAGNOSIS — N18.31 TYPE 2 DIABETES MELLITUS WITH STAGE 3A CHRONIC KIDNEY DISEASE, WITHOUT LONG-TERM CURRENT USE OF INSULIN (HCC): Primary | ICD-10-CM

## 2023-12-27 DIAGNOSIS — I71.21 ANEURYSM OF ASCENDING AORTA WITHOUT RUPTURE (HCC): ICD-10-CM

## 2023-12-27 DIAGNOSIS — N18.31 STAGE 3A CHRONIC KIDNEY DISEASE (HCC): ICD-10-CM

## 2023-12-27 DIAGNOSIS — G89.4 CHRONIC PAIN SYNDROME: ICD-10-CM

## 2023-12-27 LAB — SL AMB POCT HEMOGLOBIN AIC: 8.3 (ref ?–6.5)

## 2023-12-27 PROCEDURE — 99214 OFFICE O/P EST MOD 30 MIN: CPT | Performed by: FAMILY MEDICINE

## 2023-12-27 PROCEDURE — 83036 HEMOGLOBIN GLYCOSYLATED A1C: CPT | Performed by: FAMILY MEDICINE

## 2023-12-27 RX ORDER — DULAGLUTIDE 1.5 MG/.5ML
3 INJECTION, SOLUTION SUBCUTANEOUS
Start: 2023-12-27

## 2024-01-04 ENCOUNTER — APPOINTMENT (OUTPATIENT)
Dept: LAB | Facility: CLINIC | Age: 59
End: 2024-01-04
Payer: MEDICARE

## 2024-01-04 DIAGNOSIS — E83.9 CHRONIC KIDNEY DISEASE-MINERAL AND BONE DISORDER: ICD-10-CM

## 2024-01-04 DIAGNOSIS — N18.31 STAGE 3A CHRONIC KIDNEY DISEASE (HCC): ICD-10-CM

## 2024-01-04 DIAGNOSIS — I82.411 ACUTE DEEP VEIN THROMBOSIS (DVT) OF FEMORAL VEIN OF RIGHT LOWER EXTREMITY (HCC): ICD-10-CM

## 2024-01-04 DIAGNOSIS — M89.9 CHRONIC KIDNEY DISEASE-MINERAL AND BONE DISORDER: ICD-10-CM

## 2024-01-04 DIAGNOSIS — N18.9 CHRONIC KIDNEY DISEASE-MINERAL AND BONE DISORDER: ICD-10-CM

## 2024-01-04 LAB
25(OH)D3 SERPL-MCNC: 34.9 NG/ML (ref 30–100)
CHOLEST SERPL-MCNC: 118 MG/DL
DEPRECATED AT III PPP: 118 % OF NORMAL (ref 92–136)
HDLC SERPL-MCNC: 29 MG/DL
LDLC SERPL CALC-MCNC: 17 MG/DL (ref 0–100)
NONHDLC SERPL-MCNC: 89 MG/DL
PSA SERPL-MCNC: 0.35 NG/ML (ref 0–4)
TRIGL SERPL-MCNC: 361 MG/DL

## 2024-01-04 PROCEDURE — 85303 CLOT INHIBIT PROT C ACTIVITY: CPT

## 2024-01-04 PROCEDURE — 86146 BETA-2 GLYCOPROTEIN ANTIBODY: CPT

## 2024-01-04 PROCEDURE — 82306 VITAMIN D 25 HYDROXY: CPT

## 2024-01-04 PROCEDURE — 85306 CLOT INHIBIT PROT S FREE: CPT

## 2024-01-04 PROCEDURE — 85305 CLOT INHIBIT PROT S TOTAL: CPT

## 2024-01-04 PROCEDURE — 85705 THROMBOPLASTIN INHIBITION: CPT

## 2024-01-04 PROCEDURE — 85300 ANTITHROMBIN III ACTIVITY: CPT

## 2024-01-04 PROCEDURE — 86147 CARDIOLIPIN ANTIBODY EA IG: CPT

## 2024-01-04 PROCEDURE — 85613 RUSSELL VIPER VENOM DILUTED: CPT

## 2024-01-04 PROCEDURE — 85732 THROMBOPLASTIN TIME PARTIAL: CPT

## 2024-01-04 PROCEDURE — 85670 THROMBIN TIME PLASMA: CPT

## 2024-01-05 ENCOUNTER — ANTICOAG VISIT (OUTPATIENT)
Dept: FAMILY MEDICINE CLINIC | Facility: CLINIC | Age: 59
End: 2024-01-05

## 2024-01-07 LAB
PROT S ACT/NOR PPP: 72 % (ref 61–136)
PROT S PPP-ACNC: 68 % (ref 60–150)

## 2024-01-08 LAB
APTT SCREEN TO CONFIRM RATIO: 1.15 RATIO (ref 0–1.34)
CONFIRM APTT/NORMAL: 88.1 SEC (ref 0–47.6)
DRVVT IMM 1:2 NP PPP: 47.7 SEC (ref 0–40.4)
DRVVT SCREEN TO CONFIRM RATIO: 1.6 RATIO (ref 0.8–1.2)
LA PPP-IMP: ABNORMAL
PROT C AG ACT/NOR PPP IA: 26 % OF NORMAL (ref 60–150)
PROT S ACT/NOR PPP: 52 % (ref 71–117)
SCREEN APTT: 42.4 SEC (ref 0–43.5)
SCREEN DRVVT: 97.7 SEC (ref 0–47)
THROMBIN TIME: 17.6 SEC (ref 0–23)

## 2024-01-09 LAB
B2 GLYCOPROT1 IGA SERPL IA-ACNC: 1.7
B2 GLYCOPROT1 IGG SERPL IA-ACNC: <0.8
B2 GLYCOPROT1 IGM SERPL IA-ACNC: <2.4
CARDIOLIPIN IGA SER IA-ACNC: 2.2
CARDIOLIPIN IGG SER IA-ACNC: 1.1
CARDIOLIPIN IGM SER IA-ACNC: 1

## 2024-01-12 ENCOUNTER — TELEPHONE (OUTPATIENT)
Dept: NEPHROLOGY | Facility: CLINIC | Age: 59
End: 2024-01-12

## 2024-01-16 ENCOUNTER — OFFICE VISIT (OUTPATIENT)
Dept: HEMATOLOGY ONCOLOGY | Facility: CLINIC | Age: 59
End: 2024-01-16
Payer: MEDICARE

## 2024-01-16 VITALS
BODY MASS INDEX: 28.36 KG/M2 | SYSTOLIC BLOOD PRESSURE: 126 MMHG | HEIGHT: 73 IN | HEART RATE: 82 BPM | RESPIRATION RATE: 16 BRPM | WEIGHT: 214 LBS | TEMPERATURE: 97.6 F | DIASTOLIC BLOOD PRESSURE: 78 MMHG | OXYGEN SATURATION: 97 %

## 2024-01-16 DIAGNOSIS — I82.411 ACUTE DEEP VEIN THROMBOSIS (DVT) OF FEMORAL VEIN OF RIGHT LOWER EXTREMITY (HCC): Primary | ICD-10-CM

## 2024-01-16 DIAGNOSIS — R79.89 ELEVATED FERRITIN LEVEL: ICD-10-CM

## 2024-01-16 DIAGNOSIS — Z12.11 ENCOUNTER FOR SCREENING COLONOSCOPY: ICD-10-CM

## 2024-01-16 PROCEDURE — 99214 OFFICE O/P EST MOD 30 MIN: CPT | Performed by: PHYSICIAN ASSISTANT

## 2024-01-16 NOTE — PROGRESS NOTES
[facility]ATOLOGY [facility]COLOGY SPECIALISTS [address] [address]  Hematology Ambulatory Follow-Up  Chavez Newell, 1965, [id number]  1/16/2024      Assessment and Plan   1. Acute deep vein thrombosis (DVT) of femoral vein of right lower extremity (HCC)  2. Elevated ferritin level    In summary this is a 58-year-old male with past medical history of type 2 diabetes, CKD stage III, fatty liver disease and others as listed in HPI who presents for follow-up regarding RLE DVT.  Patient reports history of left upper extremity DVT in 2016 following heart catheterization.  He he had recurrent right lower extremity DVT in June 2023 which appears to be unprovoked after reviewing patient history.  Repeat Doppler ultrasound in October showed chronic thrombosis without extension.  He is initially on treatment with Eliquis however due to financial cost was switched to Coumadin.  INR is being monitored by his PCP.  Tolerating well without bleeding.  He has been recommended for lifelong anticoagulation in setting of recurrent DVT and risk factors such as male sex with  and smoking.    Patient presents today to review his hypercoagulable workup which revealed low protein C and S activity as well as positive lupus anticoagulant.  After discussion with patient, these labs were obtained while taking Coumadin and are unreliable under the circumstances.  I informed patient that he would need to come off of Coumadin (and transition to Lovenox) for accurate results. At this time, patient opted against additional hypercoagulable testing as it would not . Continue Coumadin with INR monitoring by PCP. If patient plans to switch to DOAC in future, could consider repeat testing for antiphospholipid syndrome at this time.     We did discuss findings of elevated ferritin in 2020 with upper normal iron saturation. This could be related to inflammatory process at the time  of study, however hemachromatosis has been associated with increased VTE events therefore will reevaluate iron panel.     Follow-up in 1 year. If above work-up is unrevealing and no further VTE events, patient can be referred back to PCP for further monitoring/management.    3. Encounter for screening colonoscopy  - Overdue for colonoscopy, referral placed.  Patient plans to call this week for appointment   - Ambulatory referral to Gastroenterology; Future    Patient voiced agreement and understanding to the above.   Patient advised to call the Hematology/Oncology office with any questions and concerns regarding the above.    Barrier(s) to care: None  The patient is able to self care.    Subjective   No chief complaint on file.    Patient is a 58-year-old male with past medical history of T2DM, CKD stage III, fatty liver, HLD, aortic ascending aneurysm, bicuspid aortic valve, PEE, asthma who presents as follow-up for acute RLE DVT.    Patient initially presented to the ED on 06/16/2023 for RLE pain and swelling x 7 days.  Doppler ultrasound at this time revealed which appeared occlusive in the mid and distal femoral vein and nonocclusive in the proximal femoral, popliteal, posterior tibial, peroneal and gastrocnemius veins.  Patient denies trauma, hospitalization, immobilization, surgery, travel, steroid or testosterone use prior to event.  Started on oral Eliquis 5 mg twice daily.  Switch to Coumadin due to financial burden. Patient does have history of LUE DVT in 2015 at [facility] (left axillary vein DVT).  Patient believes this was after heart catheterization via left upper extremity access.  Also with history of ascending aortic aneurysm.  Father may have history of blood clots.     Repeat Doppler US 10/2023 showed chronic thrombus in the mid and distal femoral vein, overall improved.     Thrombosis panel completed 01/04/2024: Factor V Leiden negative,  Prothrombin gene mutation negative. Anticardiolipin ab, beta-2  glycoprotein, and Antithrombin III are within normal limits. Protein C activity 26%, Protein S activity 52%, protein S antigen total 60%, protein S antigen free 72% lupus anticoagulant present. Labs obtained while on coumadin.     Surgical history: R Hip relpacement 4 years ago.     Social history: +tobacco use, smokes cigars 1-2 times per day x 10 years. (4 cigar-pack year). No alcohol use. No drug use. Currently on disability. Previously worked as . Has 2 children without VTE events.     Cancer history: No personal hx. Half brother passed form cancer, unknown type. Sister has Lung cancer (smoker, 63).       Interval history: remains on coumadin, tolerating well without hematochezia, hematuria, or melena. INR monitored by PCP.  Denies chest pain, shortness of breath.    Review of Systems   Constitutional:  Negative for chills, fever and unexpected weight change.   HENT:  Negative for nosebleeds.    Respiratory:  Negative for shortness of breath.    Cardiovascular:  Negative for chest pain.   Gastrointestinal:  Negative for blood in stool.   Genitourinary:  Negative for hematuria.   Skin:  Negative for rash.   Hematological:  Negative for adenopathy. Does not bruise/bleed easily.   All other systems reviewed and are negative.      Patient Active Problem List   Diagnosis    Mild intermittent asthma without complication    Ascending aortic aneurysm (HCC)    Bicuspid aortic valve    Allergic rhinitis    GERD (gastroesophageal reflux disease)    Hiatal hernia    Type 2 diabetes mellitus with stage 3a chronic kidney disease, without long-term current use of insulin (HCC)    CKD (chronic kidney disease) stage 3, GFR 30-59 ml/min ([facility])    Hyperlipidemia, mixed    Vitamin D deficiency    Renal cyst, left    Hepatic cyst    Fatty liver disease, nonalcoholic    Erectile dysfunction    Carpal tunnel syndrome of right wrist    Chronic pain of right knee    Vestibular migraine    Patellar tendinitis of right knee     Benign paroxysmal positional vertigo due to bilateral vestibular disorder    Hip impingement syndrome, right    Primary osteoarthritis of right hip    Ulnar neuropathy of both upper extremities    Lumbosacral strain    Tarsal tunnel syndrome of right side    Cubital tunnel syndrome, bilateral    Groin pain, chronic, right    Chronic pain syndrome    Chronic kidney disease-mineral and bone disorder    Uncomplicated opioid dependence (HCC)    Arthritis of right hip    Acute deep vein thrombosis (DVT) of femoral vein of right lower extremity (HCC)    Wears dentures     Past Medical History:   Diagnosis Date    Anxiety 03/10/2022    Aorta aneurysm (HCC)     4.3    Arm DVT (deep venous thromboembolism), acute (HCC) 2015    complications of cardiac cath    Asthma     Blood clot in vein     right leg    Chronic kidney disease     CKD (chronic kidney disease), stage III (HCC)     COPD (chronic obstructive pulmonary disease) (HCC)     Depression     Diabetes mellitus (HCC)     Essential hypertriglyceridemia     GERD (gastroesophageal reflux disease)     Headache     Hiatal hernia     Hyperlipidemia, mixed 05/07/2018    Kidney stone     Liver disease     FATTY LIVER    Mild episode of recurrent major depressive disorder (HCC) 03/10/2022    PAC (premature atrial contraction)     Prediabetes     Rectus sheath hematoma, initial encounter 08/09/2023    Renal calculi     Sleep apnea     Vestibular migraine      Past Surgical History:   Procedure Laterality Date    CARPAL TUNNEL RELEASE Left     COLONOSCOPY      FL INJECTION RIGHT HIP (ARTHROGRAM)  08/20/2018    FOOT SURGERY Left     FOREIGN BODY REMOVAL    HERNIA REPAIR      NV ARTHRP ACETBLR/PROX FEM PROSTC AGRFT/ALGRFT Right 09/28/2020    Procedure: ARTHROPLASTY HIP TOTAL;  Surgeon: Jyoti Araiza MD;  Location: [facility];  Service: Orthopedics    NV COLONOSCOPY FLX DX W/COLLJ SPEC WHEN PFRMD N/A 08/07/2017    Procedure: COLONOSCOPY;  Surgeon: Anthony France MD;   Location: [facility];  Service: Gastroenterology    ME ESOPHAGOGASTRODUODENOSCOPY TRANSORAL DIAGNOSTIC N/A 10/31/2017    Procedure: ESOPHAGOGASTRODUODENOSCOPY (EGD);  Surgeon: Anthony France MD;  Location: [facility];  Service: Gastroenterology    TOTAL HIP ARTHROPLASTY Right      Family History   Problem Relation Age of Onset    Arthritis Mother     Heart attack Father     Clotting disorder Father     Schizoaffective Disorder  Sister     Cancer Brother     Prostate cancer Brother     Leukemia Brother         his only brother dies from lymphoma or leukemia pt unsure he was only 54    Aortic aneurysm Other         abdominal     Social History     Socioeconomic History    Marital status:      Spouse name: Not on file    Number of children: 2    Years of education: Not on file    Highest education level: Not on file   Occupational History    Occupation: unemployed   Tobacco Use    Smoking status: Some Days     Types: Cigars     Last attempt to quit: 2014     Years since quittin.4    Smokeless tobacco: Never    Tobacco comments:     never inhaled   Vaping Use    Vaping status: Never Used   Substance and Sexual Activity    Alcohol use: Not Currently     Comment: occasional beer    Drug use: No    Sexual activity: Yes     Partners: Female   Other Topics Concern    Not on file   Social History Narrative    Caffeine use    Lives alone     Social Determinants of Health     Financial Resource Strain: Low Risk  (2023)    Overall Financial Resource Strain (CARDIA)     Difficulty of Paying Living Expenses: Not hard at all   Food Insecurity: Not on file   Transportation Needs: No Transportation Needs (2023)    PRAPARE - Transportation     Lack of Transportation (Medical): No     Lack of Transportation (Non-Medical): No   Physical Activity: Not on file   Stress: Not on file   Social Connections: Not on file   Intimate Partner Violence: Not on file   Housing Stability: Not on file       Current  Outpatient Medications:     acetaminophen (TYLENOL) 500 mg tablet, Take 500 mg by mouth every 6 (six) hours as needed for mild pain, Disp: , Rfl:     albuterol (Ventolin HFA) 90 mcg/act inhaler, Inhale 2 puffs every 6 (six) hours as needed for wheezing or shortness of breath (cough) (Patient taking differently: Inhale 2 puffs every 6 (six) hours as needed for wheezing or shortness of breath (cough) PRN), Disp: 18 g, Rfl: 1    dulaglutide (Trulicity) 1.5 MG/0.5ML injection, Inject 1 mL (3 mg total) under the skin every 7 days, Disp: , Rfl:     fenofibrate 160 MG tablet, take 1 tablet by mouth once daily, Disp: 90 tablet, Rfl: 1    glucose blood test strip, TEST BS TID, Disp: 300 each, Rfl: 5    glucose monitoring kit (FREESTYLE) monitoring kit, Use 1 each daily before breakfast, Disp: 1 each, Rfl: 0    Insulin Glargine Solostar (Lantus SoloStar) 100 UNIT/ML SOPN, Inject 0.05 mL (5 Units total) under the skin daily, Disp: 15 mL, Rfl: 0    Insulin Pen Needle (BD Pen Needle Evelina 2nd Gen) 32G X 4 MM MISC, Inject as directed in the morning, Disp: 100 each, Rfl: 0    Lancets MISC, Use daily before breakfast, Disp: 100 each, Rfl: 5    pantoprazole (PROTONIX) 40 mg tablet, take 1 tablet by mouth once daily, Disp: 90 tablet, Rfl: 3    rosuvastatin (CRESTOR) 5 mg tablet, take 1 tablet by mouth once daily, Disp: 90 tablet, Rfl: 1    sildenafil (REVATIO) 20 mg tablet, TAKE 1 TABLET (20 MG TOTAL) BY MOUTH DAILY AS DIRECTED, Disp: 90 tablet, Rfl: 3    traMADol (Ultram) 50 mg tablet, Take 1 tablet (50 mg total) by mouth every 6 (six) hours as needed for moderate pain, Disp: 120 tablet, Rfl: 2    warfarin (Coumadin) 2 mg tablet, Take 1 tablet by mouth Monday, Wednesday, Friday or as directed by PCP, Disp: 30 tablet, Rfl: 1    warfarin (Coumadin) 3 mg tablet, Take 1 tablet by mouth Tuesday, Thursday, Saturday, Sunday; or as directed by PCP, Disp: 90 tablet, Rfl: 1  No Known Allergies    Objective   There were no vitals taken for this  visit.   Physical Exam  Vitals reviewed.   HENT:      Head: Normocephalic.   Eyes:      General: No scleral icterus.     Extraocular Movements: Extraocular movements intact.      Pupils: Pupils are equal, round, and reactive to light.   Cardiovascular:      Rate and Rhythm: Normal rate and regular rhythm.      Heart sounds: Normal heart sounds.   Pulmonary:      Effort: Pulmonary effort is normal.      Breath sounds: Normal breath sounds.   Abdominal:      Palpations: Abdomen is soft.      Tenderness: There is no abdominal tenderness.   Musculoskeletal:         General: Normal range of motion.      Cervical back: Neck supple.   Lymphadenopathy:      Cervical: No cervical adenopathy.   Skin:     Findings: No rash.   Neurological:      Mental Status: He is alert. Mental status is at baseline.   Psychiatric:         Mood and Affect: Mood normal.         Result Review  Labs:  Appointment on 01/04/2024   Component Date Value Ref Range Status    Vit D, 25-Hydroxy 01/04/2024 34.9  30.0 - 100.0 ng/mL Final    Vitamin D guidelines established by Clinical Guidelines Subcommittee  of the Endocrine Society Task Force, 2011    Deficiency <20ng/ml   Insufficiency 20-30ng/ml   Sufficient  ng/ml    Office Visit on 12/27/2023   Component Date Value Ref Range Status    Hemoglobin A1C 12/27/2023 8.3 (A)  6.5 Final         Imaging:     VAS lower limb venous duplex study 10/12/2023   FINDINGS:     Right    Impression                             FV Mid   E1.Non Occlusive Thrombus (Chronic)    FV Dist  E1.Non Occlusive Thrombus (Chronic)       CONCLUSION:  Impression:  RIGHT LOWER LIMB  There is chronic thrombus noted in the mid and distal femoral vein.  No evidence of superficial thrombophlebitis noted.  Doppler evaluation shows a normal response to augmentation maneuvers.  Popliteal, posterior tibial and anterior tibial arterial Doppler waveform's are  triphasic.     LEFT LOWER LIMB LIMITED  Evaluation shows no evidence of  thrombus in the common femoral vein.  Doppler evaluation shows a normal response to augmentation maneuvers.     In comparison to the study of 6/16/2023, there is improvement of the previously  noted DVT.  Please note:  This report has been generated by a voice recognition software system. Therefore there may be syntax, spelling, and/or grammatical errors. Please call if you have any questions.

## 2024-01-18 ENCOUNTER — TELEPHONE (OUTPATIENT)
Age: 59
End: 2024-01-18

## 2024-01-18 NOTE — TELEPHONE ENCOUNTER
Patients GI provider:  APOLINAR Miller     Number to return call: [phone]    Reason for call: Pt on blood thinner Coumadin    Scheduled procedure/appointment date if applicable: Appt 1/24/24

## 2024-01-19 ENCOUNTER — TELEPHONE (OUTPATIENT)
Dept: FAMILY MEDICINE CLINIC | Facility: CLINIC | Age: 59
End: 2024-01-19

## 2024-01-19 DIAGNOSIS — N18.31 TYPE 2 DIABETES MELLITUS WITH STAGE 3A CHRONIC KIDNEY DISEASE, WITHOUT LONG-TERM CURRENT USE OF INSULIN (HCC): Primary | ICD-10-CM

## 2024-01-19 DIAGNOSIS — E11.22 TYPE 2 DIABETES MELLITUS WITH STAGE 3A CHRONIC KIDNEY DISEASE, WITHOUT LONG-TERM CURRENT USE OF INSULIN (HCC): Primary | ICD-10-CM

## 2024-01-19 NOTE — TELEPHONE ENCOUNTER
Patient called, he said he wanted to remind ou that he will need a replacement medication for the Trulicity as it is no longer on his insurances formulary. Can you please send in replacement med?  [facility]

## 2024-01-23 ENCOUNTER — TELEPHONE (OUTPATIENT)
Dept: FAMILY MEDICINE CLINIC | Facility: CLINIC | Age: 59
End: 2024-01-23

## 2024-01-23 DIAGNOSIS — N18.31 TYPE 2 DIABETES MELLITUS WITH STAGE 3A CHRONIC KIDNEY DISEASE, WITHOUT LONG-TERM CURRENT USE OF INSULIN (HCC): Primary | ICD-10-CM

## 2024-01-23 DIAGNOSIS — E11.22 TYPE 2 DIABETES MELLITUS WITH STAGE 3A CHRONIC KIDNEY DISEASE, WITHOUT LONG-TERM CURRENT USE OF INSULIN (HCC): Primary | ICD-10-CM

## 2024-01-23 RX ORDER — EXENATIDE 2 MG/.85ML
2 INJECTION, SUSPENSION, EXTENDED RELEASE SUBCUTANEOUS WEEKLY
Qty: 10.2 ML | Refills: 1 | Status: SHIPPED | OUTPATIENT
Start: 2024-01-23

## 2024-01-24 ENCOUNTER — APPOINTMENT (OUTPATIENT)
Dept: LAB | Facility: CLINIC | Age: 59
End: 2024-01-24
Payer: MEDICARE

## 2024-01-24 ENCOUNTER — OFFICE VISIT (OUTPATIENT)
Age: 59
End: 2024-01-24
Payer: MEDICARE

## 2024-01-24 ENCOUNTER — TELEPHONE (OUTPATIENT)
Age: 59
End: 2024-01-24

## 2024-01-24 VITALS
DIASTOLIC BLOOD PRESSURE: 70 MMHG | BODY MASS INDEX: 28.36 KG/M2 | HEIGHT: 73 IN | OXYGEN SATURATION: 96 % | WEIGHT: 214 LBS | SYSTOLIC BLOOD PRESSURE: 120 MMHG | HEART RATE: 67 BPM

## 2024-01-24 DIAGNOSIS — R79.89 ELEVATED FERRITIN LEVEL: ICD-10-CM

## 2024-01-24 DIAGNOSIS — Z86.010 HISTORY OF COLON POLYPS: Primary | ICD-10-CM

## 2024-01-24 DIAGNOSIS — K21.9 GASTROESOPHAGEAL REFLUX DISEASE, UNSPECIFIED WHETHER ESOPHAGITIS PRESENT: ICD-10-CM

## 2024-01-24 DIAGNOSIS — Z12.11 ENCOUNTER FOR SCREENING COLONOSCOPY: ICD-10-CM

## 2024-01-24 PROCEDURE — 83550 IRON BINDING TEST: CPT

## 2024-01-24 PROCEDURE — 82728 ASSAY OF FERRITIN: CPT

## 2024-01-24 PROCEDURE — 99204 OFFICE O/P NEW MOD 45 MIN: CPT | Performed by: PHYSICIAN ASSISTANT

## 2024-01-24 PROCEDURE — 36415 COLL VENOUS BLD VENIPUNCTURE: CPT

## 2024-01-24 PROCEDURE — 83540 ASSAY OF IRON: CPT

## 2024-01-24 NOTE — PATIENT INSTRUCTIONS
Scheduled date of EGD/colonoscopy (as of today): 2/19/24  Physician performing EGD/colonoscopy: Edilma  Location of EGD/colonoscopy: [facility]  Desired bowel prep reviewed with patient: Miralax  Instructions reviewed with patient by: Deanna BENEDICT  Clearances:

## 2024-01-24 NOTE — H&P (VIEW-ONLY)
[facility] Gastroenterology Specialists - Outpatient Consultation  Chavez Newell 58 y.o. male MRN: [id number]  Encounter: [id number]          ASSESSMENT AND PLAN:      1. History of colon polyps    Patient presents to schedule a colonoscopy.  He has a history of a prior colonoscopy in 2017 and a polyp was removed.  No family history of colon cancer.    Will plan for colonoscopy to investigate.    2. Gastroesophageal reflux disease    He also has a history of chronic GERD for years and a hiatal hernia.  He is on Pantoprazole 40mg po daily and will have occasional breakthrough GERD.  He also reports early satiety.    Will plan for EGD at the same time as the EGD to investigate.  Note: He is on Bydureon which is a GLP-1 agonist and delays gastric emptying which I suspect is causing his symptoms.  I recommended he discuss this further with his PCP regarding alternative diabetes treatments.    Note: He is on Coumadin for a DVT hx and will hold 5 day prior to the procedure.  ______________________________________________________________________    HPI:  Patient is a pleasant 58 year old male with a PMH of DM, DVT, hyperlipidemia who presents to the office to schedule a colonoscopy and EGD.  Patient had a prior colonoscopy in 2017 and a polyp was removed.  Repeat colonoscopy was recommended in 5 years.  He also has a history of chronic GERD x many years and history of a hiatal hernia.  He is on Pantoprazole daily but will still have some intermittent heartburn.  He reports of struggling with early satiety as well.  No vomiting.  No changes in his bowel movements.  No blood in the stool.  He is on Bydureon which is a GLP-1 agonist and delays gastric emptying. No family history of esophageal, stomach, or colon cancer.      REVIEW OF SYSTEMS:    CONSTITUTIONAL: Denies any fever, chills, rigors, and weight loss.  HEENT: No earache or tinnitus. Denies hearing loss or visual disturbances.  CARDIOVASCULAR: No chest pain or  palpitations.   RESPIRATORY: Denies any cough, hemoptysis, shortness of breath or dyspnea on exertion.  GASTROINTESTINAL: As noted in the History of Present Illness.   GENITOURINARY: No problems with urination. Denies any hematuria or dysuria.  NEUROLOGIC: No dizziness or vertigo, denies headaches.   MUSCULOSKELETAL: Denies any muscle or joint pain.   SKIN: Denies skin rashes or itching.   ENDOCRINE: Denies excessive thirst. Denies intolerance to heat or cold.  PSYCHOSOCIAL: Denies depression or anxiety. Denies any recent memory loss.       Historical Information   Past Medical History:   Diagnosis Date    Anxiety 03/10/2022    Aorta aneurysm (HCC)     4.3    Arm DVT (deep venous thromboembolism), acute (HCC) 2015    complications of cardiac cath    Asthma     Blood clot in vein     right leg    Chronic kidney disease     CKD (chronic kidney disease), stage III (HCC)     COPD (chronic obstructive pulmonary disease) (HCC)     Depression     Diabetes mellitus (HCC)     Essential hypertriglyceridemia     GERD (gastroesophageal reflux disease)     Headache     Hiatal hernia     Hyperlipidemia, mixed 05/07/2018    Kidney stone     Liver disease     FATTY LIVER    Mild episode of recurrent major depressive disorder (HCC) 03/10/2022    PAC (premature atrial contraction)     Prediabetes     Rectus sheath hematoma, initial encounter 08/09/2023    Renal calculi     Sleep apnea     Vestibular migraine      Past Surgical History:   Procedure Laterality Date    CARPAL TUNNEL RELEASE Left     COLONOSCOPY      FL INJECTION RIGHT HIP (ARTHROGRAM)  08/20/2018    FOOT SURGERY Left     FOREIGN BODY REMOVAL    HERNIA REPAIR      OH ARTHRP ACETBLR/PROX FEM PROSTC AGRFT/ALGRFT Right 09/28/2020    Procedure: ARTHROPLASTY HIP TOTAL;  Surgeon: Jyoti Araiza MD;  [facility];  Service: Orthopedics    OH COLONOSCOPY FLX DX W/COLLJ SPEC WHEN PFRMD N/A 08/07/2017    Procedure: COLONOSCOPY;  Surgeon: Anthony France MD;  Location:  "[facility];  Service: Gastroenterology    MN ESOPHAGOGASTRODUODENOSCOPY TRANSORAL DIAGNOSTIC N/A 10/31/2017    Procedure: ESOPHAGOGASTRODUODENOSCOPY (EGD);  Surgeon: Anthony France MD;  [facility]: [facility];  Service: Gastroenterology    TOTAL HIP ARTHROPLASTY Right 2020     Social History   Social History     Substance and Sexual Activity   Alcohol Use Not Currently    Comment: occasional beer     Social History     Substance and Sexual Activity   Drug Use No     Social History     Tobacco Use   Smoking Status Some Days    Types: Cigars   Smokeless Tobacco Never   Tobacco Comments    never inhaled     Family History   Problem Relation Age of Onset    Arthritis Mother     Heart attack Father     Clotting disorder Father     Schizoaffective Disorder  Sister     Cancer Brother     Prostate cancer Brother     Leukemia Brother         his only brother dies from lymphoma or leukemia pt unsure he was only 54    Aortic aneurysm Other         abdominal       Meds/Allergies       Current Outpatient Medications:     acetaminophen (TYLENOL) 500 mg tablet    albuterol (Ventolin HFA) 90 mcg/act inhaler    Exenatide ER (Bydureon BCise) 2 MG/0.85ML AUIJ    fenofibrate 160 MG tablet    glucose blood test strip    glucose monitoring kit (FREESTYLE) monitoring kit    Insulin Glargine Solostar (Lantus SoloStar) 100 UNIT/ML SOPN    Insulin Pen Needle (BD Pen Needle Evelina 2nd Gen) 32G X 4 MM MISC    Lancets MISC    pantoprazole (PROTONIX) 40 mg tablet    rosuvastatin (CRESTOR) 5 mg tablet    sildenafil (REVATIO) 20 mg tablet    traMADol (Ultram) 50 mg tablet    warfarin (Coumadin) 2 mg tablet    warfarin (Coumadin) 3 mg tablet    No Known Allergies        Objective     Blood pressure 120/70, pulse 67, height 6' 1\" (1.854 m), weight 97.1 kg (214 lb), SpO2 96%. Body mass index is 28.23 kg/m².        PHYSICAL EXAM:      General Appearance:   Alert, cooperative, no distress   HEENT:   Normocephalic, atraumatic, anicteric    Neck:  Supple, " symmetrical, trachea midline   Lungs:   Clear to auscultation bilaterally; no rales, rhonchi or wheezing; respirations unlabored    Heart::   Regular rate and rhythm; no murmur, rub, or gallop.   Abdomen:   Soft, non-tender, non-distended; normal bowel sounds; no masses, no organomegaly    Genitalia:   Deferred    Rectal:   Deferred    Extremities:  No cyanosis, clubbing or edema    Pulses:  2+ and symmetric    Skin:  No jaundice, rashes, or lesions    Lymph nodes:  No palpable cervical lymphadenopathy        Lab Results:   No visits with results within 1 Day(s) from this visit.   Latest known visit with results is:   Appointment on 01/04/2024   Component Date Value    Vit D, 25-Hydroxy 01/04/2024 34.9          Radiology Results:   No results found.

## 2024-01-24 NOTE — PROGRESS NOTES
[facility] - Outpatient Consultation  Chavez Newell 58 y.o. male MRN: [id number]  Encounter: [id number]          ASSESSMENT AND PLAN:      1. History of colon polyps    Patient presents to schedule a colonoscopy.  He has a history of a prior colonoscopy in 2017 and a polyp was removed.  No family history of colon cancer.    Will plan for colonoscopy to investigate.    2. Gastroesophageal reflux disease    He also has a history of chronic GERD for years and a hiatal hernia.  He is on Pantoprazole 40mg po daily and will have occasional breakthrough GERD.  He also reports early satiety.    Will plan for EGD at the same time as the EGD to investigate.  Note: He is on Bydureon which is a GLP-1 agonist and delays gastric emptying which I suspect is causing his symptoms.  I recommended he discuss this further with his PCP regarding alternative diabetes treatments.    Note: He is on Coumadin for a DVT hx and will hold 5 day prior to the procedure.  ______________________________________________________________________    HPI:  Patient is a pleasant 58 year old male with a PMH of DM, DVT, hyperlipidemia who presents to the office to schedule a colonoscopy and EGD.  Patient had a prior colonoscopy in 2017 and a polyp was removed.  Repeat colonoscopy was recommended in 5 years.  He also has a history of chronic GERD x many years and history of a hiatal hernia.  He is on Pantoprazole daily but will still have some intermittent heartburn.  He reports of struggling with early satiety as well.  No vomiting.  No changes in his bowel movements.  No blood in the stool.  He is on Bydureon which is a GLP-1 agonist and delays gastric emptying. No family history of esophageal, stomach, or colon cancer.      REVIEW OF SYSTEMS:    CONSTITUTIONAL: Denies any fever, chills, rigors, and weight loss.  HEENT: No earache or tinnitus. Denies hearing loss or visual disturbances.  CARDIOVASCULAR: No chest pain or  palpitations.   RESPIRATORY: Denies any cough, hemoptysis, shortness of breath or dyspnea on exertion.  GASTROINTESTINAL: As noted in the History of Present Illness.   GENITOURINARY: No problems with urination. Denies any hematuria or dysuria.  NEUROLOGIC: No dizziness or vertigo, denies headaches.   MUSCULOSKELETAL: Denies any muscle or joint pain.   SKIN: Denies skin rashes or itching.   ENDOCRINE: Denies excessive thirst. Denies intolerance to heat or cold.  PSYCHOSOCIAL: Denies depression or anxiety. Denies any recent memory loss.       Historical Information   Past Medical History:   Diagnosis Date    Anxiety 03/10/2022    Aorta aneurysm (HCC)     4.3    Arm DVT (deep venous thromboembolism), acute (HCC) 2015    complications of cardiac cath    Asthma     Blood clot in vein     right leg    Chronic kidney disease     CKD (chronic kidney disease), stage III (HCC)     COPD (chronic obstructive pulmonary disease) (HCC)     Depression     Diabetes mellitus (HCC)     Essential hypertriglyceridemia     GERD (gastroesophageal reflux disease)     Headache     Hiatal hernia     Hyperlipidemia, mixed 05/07/2018    Kidney stone     Liver disease     FATTY LIVER    Mild episode of recurrent major depressive disorder (HCC) 03/10/2022    PAC (premature atrial contraction)     Prediabetes     Rectus sheath hematoma, initial encounter 08/09/2023    Renal calculi     Sleep apnea     Vestibular migraine      Past Surgical History:   Procedure Laterality Date    CARPAL TUNNEL RELEASE Left     COLONOSCOPY      FL INJECTION RIGHT HIP (ARTHROGRAM)  08/20/2018    FOOT SURGERY Left     FOREIGN BODY REMOVAL    HERNIA REPAIR      TX ARTHRP ACETBLR/PROX FEM PROSTC AGRFT/ALGRFT Right 09/28/2020    Procedure: ARTHROPLASTY HIP TOTAL;  Surgeon: Jyoti Araiza MD;  [facility];  Service: Orthopedics    TX COLONOSCOPY FLX DX W/COLLJ SPEC WHEN PFRMD N/A 08/07/2017    Procedure: COLONOSCOPY;  Surgeon: Anthony France MD;  Location:  "[facility];  Service: Gastroenterology    LA ESOPHAGOGASTRODUODENOSCOPY TRANSORAL DIAGNOSTIC N/A 10/31/2017    Procedure: ESOPHAGOGASTRODUODENOSCOPY (EGD);  Surgeon: Anthony France MD;  [facility]: [facility];  Service: Gastroenterology    TOTAL HIP ARTHROPLASTY Right 2020     Social History   Social History     Substance and Sexual Activity   Alcohol Use Not Currently    Comment: occasional beer     Social History     Substance and Sexual Activity   Drug Use No     Social History     Tobacco Use   Smoking Status Some Days    Types: Cigars   Smokeless Tobacco Never   Tobacco Comments    never inhaled     Family History   Problem Relation Age of Onset    Arthritis Mother     Heart attack Father     Clotting disorder Father     Schizoaffective Disorder  Sister     Cancer Brother     Prostate cancer Brother     Leukemia Brother         his only brother dies from lymphoma or leukemia pt unsure he was only 54    Aortic aneurysm Other         abdominal       Meds/Allergies       Current Outpatient Medications:     acetaminophen (TYLENOL) 500 mg tablet    albuterol (Ventolin HFA) 90 mcg/act inhaler    Exenatide ER (Bydureon BCise) 2 MG/0.85ML AUIJ    fenofibrate 160 MG tablet    glucose blood test strip    glucose monitoring kit (FREESTYLE) monitoring kit    Insulin Glargine Solostar (Lantus SoloStar) 100 UNIT/ML SOPN    Insulin Pen Needle (BD Pen Needle Evelina 2nd Gen) 32G X 4 MM MISC    Lancets MISC    pantoprazole (PROTONIX) 40 mg tablet    rosuvastatin (CRESTOR) 5 mg tablet    sildenafil (REVATIO) 20 mg tablet    traMADol (Ultram) 50 mg tablet    warfarin (Coumadin) 2 mg tablet    warfarin (Coumadin) 3 mg tablet    No Known Allergies        Objective     Blood pressure 120/70, pulse 67, height 6' 1\" (1.854 m), weight 97.1 kg (214 lb), SpO2 96%. Body mass index is 28.23 kg/m².        PHYSICAL EXAM:      General Appearance:   Alert, cooperative, no distress   HEENT:   Normocephalic, atraumatic, anicteric    Neck:  Supple, " symmetrical, trachea midline   Lungs:   Clear to auscultation bilaterally; no rales, rhonchi or wheezing; respirations unlabored    Heart::   Regular rate and rhythm; no murmur, rub, or gallop.   Abdomen:   Soft, non-tender, non-distended; normal bowel sounds; no masses, no organomegaly    Genitalia:   Deferred    Rectal:   Deferred    Extremities:  No cyanosis, clubbing or edema    Pulses:  2+ and symmetric    Skin:  No jaundice, rashes, or lesions    Lymph nodes:  No palpable cervical lymphadenopathy        Lab Results:   No visits with results within 1 Day(s) from this visit.   Latest known visit with results is:   Appointment on 01/04/2024   Component Date Value    Vit D, 25-Hydroxy 01/04/2024 34.9          Radiology Results:   No results found.

## 2024-01-25 ENCOUNTER — TELEPHONE (OUTPATIENT)
Dept: FAMILY MEDICINE CLINIC | Facility: CLINIC | Age: 59
End: 2024-01-25

## 2024-01-25 ENCOUNTER — TELEPHONE (OUTPATIENT)
Dept: HEMATOLOGY ONCOLOGY | Facility: CLINIC | Age: 59
End: 2024-01-25

## 2024-01-25 DIAGNOSIS — I82.411 ACUTE DEEP VEIN THROMBOSIS (DVT) OF FEMORAL VEIN OF RIGHT LOWER EXTREMITY (HCC): ICD-10-CM

## 2024-01-25 LAB
FERRITIN SERPL-MCNC: 323 NG/ML (ref 24–336)
IRON SATN MFR SERPL: 20 % (ref 15–50)
IRON SERPL-MCNC: 82 UG/DL (ref 50–212)
TIBC SERPL-MCNC: 403 UG/DL (ref 250–450)
UIBC SERPL-MCNC: 321 UG/DL (ref 155–355)

## 2024-01-25 RX ORDER — WARFARIN SODIUM 2 MG/1
TABLET ORAL
Qty: 30 TABLET | Refills: 1 | Status: SHIPPED | OUTPATIENT
Start: 2024-01-25

## 2024-01-25 NOTE — TELEPHONE ENCOUNTER
GI symptoms are a common side effect of Trulicity and the family of medications it belongs to. We could consider switching to a different medication -- if he would like to, would suggest he schedule a visit to discuss options. Otherwise, should continue Trulicity for now, but can stay at lower dosage

## 2024-01-25 NOTE — TELEPHONE ENCOUNTER
Called patient as per Ju and let them know the iron levels were normal, patient expressed understanding

## 2024-01-25 NOTE — TELEPHONE ENCOUNTER
He had an appt with GI yesterday.. told her about his symptoms w/his stomach,  burping a lot of odor coming up..  and is getting worse.. GI said it's from trulicity.. Dr Garg increased it to double dose...due to take it tomorrow.. pt is asking if he should take the double dose.. ??  Should he stop taking this medication ??

## 2024-01-25 NOTE — TELEPHONE ENCOUNTER
The Bydureon that was sent in place of Trulicity (per insurance requirement) only comes in the one dose of 2 mg

## 2024-01-29 NOTE — PROGRESS NOTES
Chavez Newell 1965 male MRN: [id number]      ASSESSMENT/PLAN  Problem List Items Addressed This Visit        Endocrine    Type 2 diabetes mellitus with stage 3a chronic kidney disease, without long-term current use of insulin (HCC) - Primary       Other    Uncomplicated opioid dependence (HCC)    Chronic pain syndrome     Agree with decrease in GLP -- monitor with switch to Bydureon and call if GI symptoms recur   Otherwise, continue to monitor home sugars -- if persistently within goal in AM, but A1c elevated, consider checking other times of day    HCC  Chronic pain on Opioids: Follows with Pain Management, Tramadol managed by their office     Future Appointments   Date Time Provider Department Center   2/19/2024 12:15 PM Anthony France MD [facility]   2/20/2024  9:00 AM NICK Solorio SP [facility]   3/27/2024  8:40 AM DO ALFONZO Allison  Practice-[facility]   5/30/2024 10:00 AM Jose Baker MD [facility]   1/16/2025  8:30 AM Ju Woodruff PA-C [facility] ONC STR Practice-Onc          SUBJECTIVE  CC: Medication discussion (Patient looking to discuss medication options and get clarification)      HPI:  Chavez Newell is a 58 y.o. male who presents to discuss DM regimen.     At last visit, increased Trulicity to 3 mg  Pt developed increased belching/bad taste in mouth -- saw GI, who felt it was due to GLP   Pt then decided to decrease his Trulicity back to 1.5 mg and symptoms resolved -- he will be switching to Bydureon 2 mg soon (due to insurance formulary)   Fasting sugars in AM have been 130-140s   Has not required insulin, which was originally prescribed due to hyperglycemia s/p pain management injection     Review of Systems   Gastrointestinal:         (+) belching improved       Historical Information   The patient history was reviewed and updated as follows:    Past Medical History:   Diagnosis Date   • Anxiety 03/10/2022   • Aorta aneurysm (HCC)     4.3   • Arm  DVT (deep venous thromboembolism), acute (HCC) 2015    complications of cardiac cath   • Asthma    • Blood clot in vein     right leg   • Chronic kidney disease    • CKD (chronic kidney disease), stage III (HCC)    • COPD (chronic obstructive pulmonary disease) (HCC)    • Depression    • Diabetes mellitus (HCC)    • Essential hypertriglyceridemia    • GERD (gastroesophageal reflux disease)    • Headache    • Hiatal hernia    • Hyperlipidemia, mixed 05/07/2018   • Kidney stone    • Liver disease     FATTY LIVER   • Mild episode of recurrent major depressive disorder (HCC) 03/10/2022   • PAC (premature atrial contraction)    • Prediabetes    • Rectus sheath hematoma, initial encounter 08/09/2023   • Renal calculi    • Sleep apnea    • Vestibular migraine      Past Surgical History:   Procedure Laterality Date   • CARPAL TUNNEL RELEASE Left    • COLONOSCOPY     • FL INJECTION RIGHT HIP (ARTHROGRAM)  08/20/2018   • FOOT SURGERY Left     FOREIGN BODY REMOVAL   • HERNIA REPAIR     • [place] ARTHRP ACETBLR/PROX FEM PROSTC AGRFT/ALGRFT Right 09/28/2020    Procedure: ARTHROPLASTY HIP TOTAL;  Surgeon: Jyoti Araiza MD;  [facility];  Service: Orthopedics   • [place] COLONOSCOPY FLX DX W/COLLJ SPEC WHEN PFRMD N/A 08/07/2017    Procedure: COLONOSCOPY;  Surgeon: Anthony France MD;  [facility];  Service: Gastroenterology   • [place] ESOPHAGOGASTRODUODENOSCOPY TRANSORAL DIAGNOSTIC N/A 10/31/2017    Procedure: ESOPHAGOGASTRODUODENOSCOPY (EGD);  Surgeon: Anthony France MD;  [facility];  Service: Gastroenterology   • TOTAL HIP ARTHROPLASTY Right 2020     Family History   Problem Relation Age of Onset   • Arthritis Mother    • Heart attack Father    • Clotting disorder Father    • Schizoaffective Disorder  Sister    • Cancer Brother    • Prostate cancer Brother    • Leukemia Brother         his only brother dies from lymphoma or leukemia pt unsure he was only 54   • Aortic aneurysm Other         abdominal       Social History   Social History     Substance and Sexual Activity   Alcohol Use Not Currently    Comment: occasional beer     Social History     Substance and Sexual Activity   Drug Use No     Social History     Tobacco Use   Smoking Status Some Days   • Types: Cigars   Smokeless Tobacco Never   Tobacco Comments    never inhaled       Medications:     Current Outpatient Medications:   •  acetaminophen (TYLENOL) 500 mg tablet, Take 500 mg by mouth every 6 (six) hours as needed for mild pain, Disp: , Rfl:   •  albuterol (Ventolin HFA) 90 mcg/act inhaler, Inhale 2 puffs every 6 (six) hours as needed for wheezing or shortness of breath (cough) (Patient taking differently: Inhale 2 puffs every 6 (six) hours as needed for wheezing or shortness of breath (cough) PRN), Disp: 18 g, Rfl: 1  •  Exenatide ER (Bydureon BCise) 2 MG/0.85ML AUIJ, Inject 2 mg under the skin once a week, Disp: 10.2 mL, Rfl: 1  •  fenofibrate 160 MG tablet, take 1 tablet by mouth once daily, Disp: 90 tablet, Rfl: 1  •  glucose blood test strip, TEST BS TID, Disp: 300 each, Rfl: 5  •  glucose monitoring kit (FREESTYLE) monitoring kit, Use 1 each daily before breakfast, Disp: 1 each, Rfl: 0  •  Insulin Pen Needle (BD Pen Needle Evelina 2nd Gen) 32G X 4 MM MISC, Inject as directed in the morning, Disp: 100 each, Rfl: 0  •  Lancets MISC, Use daily before breakfast, Disp: 100 each, Rfl: 5  •  pantoprazole (PROTONIX) 40 mg tablet, take 1 tablet by mouth once daily, Disp: 90 tablet, Rfl: 3  •  rosuvastatin (CRESTOR) 5 mg tablet, take 1 tablet by mouth once daily, Disp: 90 tablet, Rfl: 1  •  sildenafil (REVATIO) 20 mg tablet, TAKE 1 TABLET (20 MG TOTAL) BY MOUTH DAILY AS DIRECTED, Disp: 90 tablet, Rfl: 3  •  traMADol (Ultram) 50 mg tablet, Take 1 tablet (50 mg total) by mouth every 6 (six) hours as needed for moderate pain, Disp: 120 tablet, Rfl: 2  •  warfarin (COUMADIN) 2 mg tablet, take 1 tablet by mouth MONDAY, WEDNESDAY, FRIDAY OR AS DIRECTED BY PCP,  "Disp: 30 tablet, Rfl: 1  •  warfarin (Coumadin) 3 mg tablet, Take 1 tablet by mouth Tuesday, Thursday, Saturday, Sunday; or as directed by PCP, Disp: 90 tablet, Rfl: 1  No Known Allergies    OBJECTIVE    Vitals:   Vitals:    01/30/24 1208   BP: 111/70   Pulse: 71   Temp: 97.9 °F (36.6 °C)   SpO2: 99%   Weight: 97.1 kg (214 lb)   Height: 6' 1\" (1.854 m)           Physical Exam  Vitals and nursing note reviewed.   Constitutional:       General: He is not in acute distress.     Appearance: Normal appearance.   HENT:      Head: Normocephalic and atraumatic.   Pulmonary:      Effort: Pulmonary effort is normal. No respiratory distress.   Neurological:      Mental Status: He is alert.      Comments: Grossly intact    Psychiatric:         Mood and Affect: Mood normal.                    Adenike Garg DO  [facility]   1/30/2024  1:10 PM    "

## 2024-01-30 ENCOUNTER — OFFICE VISIT (OUTPATIENT)
Dept: FAMILY MEDICINE CLINIC | Facility: CLINIC | Age: 59
End: 2024-01-30
Payer: MEDICARE

## 2024-01-30 VITALS
SYSTOLIC BLOOD PRESSURE: 111 MMHG | BODY MASS INDEX: 28.36 KG/M2 | DIASTOLIC BLOOD PRESSURE: 70 MMHG | HEIGHT: 73 IN | OXYGEN SATURATION: 99 % | HEART RATE: 71 BPM | TEMPERATURE: 97.9 F | WEIGHT: 214 LBS

## 2024-01-30 DIAGNOSIS — F11.20 UNCOMPLICATED OPIOID DEPENDENCE (HCC): ICD-10-CM

## 2024-01-30 DIAGNOSIS — E11.22 TYPE 2 DIABETES MELLITUS WITH STAGE 3A CHRONIC KIDNEY DISEASE, WITHOUT LONG-TERM CURRENT USE OF INSULIN (HCC): Primary | ICD-10-CM

## 2024-01-30 DIAGNOSIS — G89.4 CHRONIC PAIN SYNDROME: ICD-10-CM

## 2024-01-30 DIAGNOSIS — N18.31 TYPE 2 DIABETES MELLITUS WITH STAGE 3A CHRONIC KIDNEY DISEASE, WITHOUT LONG-TERM CURRENT USE OF INSULIN (HCC): Primary | ICD-10-CM

## 2024-01-30 PROCEDURE — 99213 OFFICE O/P EST LOW 20 MIN: CPT | Performed by: FAMILY MEDICINE

## 2024-02-19 ENCOUNTER — ANESTHESIA EVENT (OUTPATIENT)
Dept: GASTROENTEROLOGY | Facility: HOSPITAL | Age: 59
End: 2024-02-19

## 2024-02-19 ENCOUNTER — HOSPITAL ENCOUNTER (OUTPATIENT)
Dept: GASTROENTEROLOGY | Facility: HOSPITAL | Age: 59
Setting detail: OUTPATIENT SURGERY
Discharge: HOME | End: 2024-02-19
Payer: MEDICARE

## 2024-02-19 ENCOUNTER — ANESTHESIA (OUTPATIENT)
Dept: GASTROENTEROLOGY | Facility: HOSPITAL | Age: 59
End: 2024-02-19

## 2024-02-19 VITALS
HEART RATE: 60 BPM | DIASTOLIC BLOOD PRESSURE: 76 MMHG | RESPIRATION RATE: 20 BRPM | SYSTOLIC BLOOD PRESSURE: 119 MMHG | TEMPERATURE: 97.8 F | BODY MASS INDEX: 27.87 KG/M2 | WEIGHT: 210.32 LBS | HEIGHT: 73 IN | OXYGEN SATURATION: 97 %

## 2024-02-19 DIAGNOSIS — Z86.010 HISTORY OF COLON POLYPS: ICD-10-CM

## 2024-02-19 DIAGNOSIS — K21.9 GASTROESOPHAGEAL REFLUX DISEASE, UNSPECIFIED WHETHER ESOPHAGITIS PRESENT: ICD-10-CM

## 2024-02-19 LAB — GLUCOSE SERPL-MCNC: 176 MG/DL (ref 65–140)

## 2024-02-19 PROCEDURE — 88305 TISSUE EXAM BY PATHOLOGIST: CPT | Performed by: PATHOLOGY

## 2024-02-19 PROCEDURE — 45385 COLONOSCOPY W/LESION REMOVAL: CPT | Performed by: INTERNAL MEDICINE

## 2024-02-19 PROCEDURE — 82948 REAGENT STRIP/BLOOD GLUCOSE: CPT

## 2024-02-19 PROCEDURE — 43235 EGD DIAGNOSTIC BRUSH WASH: CPT | Performed by: INTERNAL MEDICINE

## 2024-02-19 RX ORDER — LIDOCAINE HYDROCHLORIDE 20 MG/ML
INJECTION, SOLUTION EPIDURAL; INFILTRATION; INTRACAUDAL; PERINEURAL AS NEEDED
Status: DISCONTINUED | OUTPATIENT
Start: 2024-02-19 | End: 2024-02-19

## 2024-02-19 RX ORDER — SODIUM CHLORIDE, SODIUM LACTATE, POTASSIUM CHLORIDE, CALCIUM CHLORIDE 600; 310; 30; 20 MG/100ML; MG/100ML; MG/100ML; MG/100ML
INJECTION, SOLUTION INTRAVENOUS CONTINUOUS PRN
Status: DISCONTINUED | OUTPATIENT
Start: 2024-02-19 | End: 2024-02-19

## 2024-02-19 RX ORDER — PROPOFOL 10 MG/ML
INJECTION, EMULSION INTRAVENOUS AS NEEDED
Status: DISCONTINUED | OUTPATIENT
Start: 2024-02-19 | End: 2024-02-19

## 2024-02-19 RX ADMIN — PROPOFOL 20 MG: 10 INJECTION, EMULSION INTRAVENOUS at 07:34

## 2024-02-19 RX ADMIN — PROPOFOL 20 MG: 10 INJECTION, EMULSION INTRAVENOUS at 07:30

## 2024-02-19 RX ADMIN — PROPOFOL 20 MG: 10 INJECTION, EMULSION INTRAVENOUS at 07:32

## 2024-02-19 RX ADMIN — PROPOFOL 150 MG: 10 INJECTION, EMULSION INTRAVENOUS at 07:21

## 2024-02-19 RX ADMIN — PROPOFOL 50 MG: 10 INJECTION, EMULSION INTRAVENOUS at 07:23

## 2024-02-19 RX ADMIN — PROPOFOL 20 MG: 10 INJECTION, EMULSION INTRAVENOUS at 07:28

## 2024-02-19 RX ADMIN — PROPOFOL 20 MG: 10 INJECTION, EMULSION INTRAVENOUS at 07:36

## 2024-02-19 RX ADMIN — SODIUM CHLORIDE, SODIUM LACTATE, POTASSIUM CHLORIDE, AND CALCIUM CHLORIDE: .6; .31; .03; .02 INJECTION, SOLUTION INTRAVENOUS at 07:17

## 2024-02-19 RX ADMIN — PROPOFOL 50 MG: 10 INJECTION, EMULSION INTRAVENOUS at 07:26

## 2024-02-19 RX ADMIN — LIDOCAINE HYDROCHLORIDE 100 MG: 20 INJECTION, SOLUTION EPIDURAL; INFILTRATION; INTRACAUDAL; PERINEURAL at 07:21

## 2024-02-19 NOTE — INTERVAL H&P NOTE
H&P reviewed. After examining the patient I find no changes in the patients condition since the H&P had been written.    Vitals:    02/19/24 0649   BP: 123/74   Pulse: 61   Resp: 15   Temp: (!) 97 °F (36.1 °C)   SpO2: 94%

## 2024-02-19 NOTE — ANESTHESIA PREPROCEDURE EVALUATION
Procedure:  COLONOSCOPY  EGD    Last coumadin and exenatide on 2/16  No recent URI  Denies CP or SOB with exertion    Relevant Problems   CARDIO   (+) Acute deep vein thrombosis (DVT) of femoral vein of right lower extremity (HCC)   (+) Ascending aortic aneurysm (HCC)   (+) Hyperlipidemia, mixed   (+) Vestibular migraine      ENDO   (+) Type 2 diabetes mellitus with stage 3a chronic kidney disease, without long-term current use of insulin (HCC)      GI/HEPATIC   (+) Fatty liver disease, nonalcoholic   (+) GERD (gastroesophageal reflux disease)   (+) Hepatic cyst   (+) Hiatal hernia      /RENAL   (+) CKD (chronic kidney disease) stage 3, GFR 30-59 ml/min (HCC)   (+) Chronic kidney disease-mineral and bone disorder   (+) Renal cyst, left      MUSCULOSKELETAL   (+) Hiatal hernia   (+) Lumbosacral strain   (+) Patellar tendinitis of right knee   (+) Primary osteoarthritis of right hip      NEURO/PSYCH   (+) Vestibular migraine      PULMONARY   (+) Mild intermittent asthma without complication      Nervous and Auditory   (+) Benign paroxysmal positional vertigo due to bilateral vestibular disorder      Other   (+) Uncomplicated opioid dependence (HCC)   (+) Wears dentures        Physical Exam    Airway    Mallampati score: III  TM Distance: >3 FB  Neck ROM: full     Dental    lower dentures and upper dentures    Cardiovascular      Pulmonary      Other Findings        Anesthesia Plan  ASA Score- 3     Anesthesia Type- IV sedation with anesthesia with ASA Monitors.         Additional Monitors:     Airway Plan:     Comment: Recent labs personally reviewed:  Lab Results       Component                Value               Date                       WBC                      5.15                12/04/2023                 HGB                      15.8                12/04/2023                 PLT                      190                 12/04/2023            Lab Results       Component                Value               Date                        NA                       137                 03/17/2015                 K                        4.2                 12/04/2023                 BUN                      15                  12/04/2023                 CREATININE               1.48 (H)            12/04/2023                 GLUCOSE                  92                  03/17/2015            Lab Results       Component                Value               Date                       PTT                      36                  08/09/2023             Lab Results       Component                Value               Date                       INR                      2.20 (H)            01/04/2024              Blood type O    I, Tiffany Keller MD, have personally seen and evaluated the patient prior to anesthetic care.  I have reviewed the pre-anesthetic record, medical history, allergies, medications and any other medical records if appropriate to the anesthetic care.  If a CRNA is involved in the case, I have reviewed the CRNA assessment, if present, and agree. Patient consented for IV Sedation, general anesthesia as back up. Discussed risks of aspiration, IV infiltration, indications for conversion to general anesthesia. All questions and concerns addressed.   .       Plan Factors-Exercise tolerance (METS): >4 METS.    Chart reviewed.   Existing labs reviewed. Patient summary reviewed.    Patient is not a current smoker.  Patient did not smoke on day of surgery.    Obstructive sleep apnea risk education given perioperatively.        Induction- intravenous.    Postoperative Plan-     Informed Consent- Anesthetic plan and risks discussed with patient.  I personally reviewed this patient with the CRNA. Discussed and agreed on the Anesthesia Plan with the CRNA..

## 2024-02-19 NOTE — ANESTHESIA POSTPROCEDURE EVALUATION
Post-Op Assessment Note    CV Status:  Stable  Pain Score: 0    Pain management: adequate       Mental Status:  Alert and awake   Hydration Status:  Euvolemic   PONV Controlled:  Controlled   Airway Patency:  Patent     Post Op Vitals Reviewed: Yes    No anethesia notable event occurred.    Staff: CRNA               /59 (02/19/24 0742)    Temp 97.8 °F (36.6 °C) (02/19/24 0742)    Pulse 64 (02/19/24 0742)   Resp 21 (02/19/24 0742)    SpO2 95 % (02/19/24 0742)

## 2024-02-21 PROCEDURE — 88305 TISSUE EXAM BY PATHOLOGIST: CPT | Performed by: PATHOLOGY

## 2024-02-29 NOTE — PROGRESS NOTES
Pain Medicine Follow-Up Note    Assessment:  1. Chronic pain syndrome    2. Lumbar spondylosis    3. Long-term current use of opiate analgesic    4. Uncomplicated opioid dependence (HCC)        Plan:  Orders Placed This Encounter   Procedures    X-ray lumbar spine complete 4+ views     Standing Status:   Future     Standing Expiration Date:   3/5/2028     Scheduling Instructions:      Bring along any outside films relating to this procedure.          MM ALL_Prescribed Meds and Special Instructions     Order Specific Question:   [facility] Is TRAMADOL prescribed?     Answer:   Yes    MM DT_Alprazolam Definitive Test    MM DT_Amphetamine Definitive Test    MM DT_Buprenorphine Definitive Test    MM DT_Butalbital Definitive Test    MM DT_Clonazepam Definitive Test    MM DT_Cocaine Definitive Test    MM DT_Codeine Definitive Test    MM DT_Dextromethorphan Definitive Test    MM Diazepam Definitive Test    MM DT_Ethyl Glucuronide/Ethyl Sulfate Definitive Test    MM DT_Fentanyl Definitive Test    MM DT_Heroin Definitive Test    MM DT_Hydrocodone Definitive Test    MM DT_Hydromorphone Definitive Test    MM DT_Kratom Definitive Test    MM DT_Levorphanol Definitive Test    MM DT_MDMA Definitive Test    MM DT_Meperidine Definitive Test    MM DT_Methadone Definitive Test    MM DT_Methamphetamine Definitive Test    MM DT_Methylphenidate Definitive Test    MM DT_Morphine Definitive Test    MM Lorazepam Definitive Test    MM DT_Oxazepam Definitive Test    MM DT_Oxycodone Definitive Test    MM DT_Oxymorphone Definitive Test    MM DT_Phencyclidine Definitive Test    MM DT_Phenobarbital Definitive Test    MM DT_Phentermine Definitive Test    MM DT_Secobarbital Definitive Test    MM DT_Spice Definitive Test    MM DT_Tapentadol Definitive Test    MM DT_Temazapam Definitive Test    MM DT_THC Definitive Test    MM DT_Tramadol Definitive Test       New Medications Ordered This Visit   Medications    traMADol (Ultram) 50 mg tablet     Sig:  Take 1 tablet (50 mg total) by mouth every 6 (six) hours as needed for moderate pain     Dispense:  120 tablet     Refill:  2     Fill today please   and refill on 4/3 and 5/1       My impressions and treatment recommendations were discussed in detail with the patient who verbalized understanding and had no further questions.      Patient follows up after having an genicular intra-articular steroid injection on 12/19/2023 he reports his knee pain is significantly improved, but he also admits he had difficulty controlling his sugars.  Patient now has a insulin sliding scale regimen if he should have additional steroid injections.    The patient continues to report an overall reduction of his pain level and improvement with his functioning without significant side effects using tramadol 50 mg tablet patient takes 1 tablet every 6 hours as needed for moderate pain, therefore I will continue the patient on this medication.  Tramadol 50 mg tablet e-prescribed to the patient's pharmacy with a do not fill until date of today 3/5/2024.     [place] Prescription Drug Monitoring Program report was reviewed and was appropriate     A urine drug screen was collected at today's office visit as part of our medication management protocol. The point of care testing results were appropriate for what was being prescribed. The specimen will be sent for confirmatory testing. The drug screen is medically necessary because the patient is either dependent on opioid medication or is being considered for opioid medication therapy and the results could impact ongoing or future treatment. The drug screen is to evaluate for the presences or absence of prescribed, non-prescribed, and/or illicit drugs/substances.    There are risks associated with opioid medications, including dependence, addiction and tolerance. The patient understands and agrees to use these medications only as prescribed. Potential side effects of the medications include,  but are not limited to, constipation, drowsiness, addiction, impaired judgment and risk of fatal overdose if not taken as prescribed. The patient was warned against driving while taking sedation medications.  Sharing medications is a felony. At this point in time, the patient is showing no signs of addiction, abuse, diversion or suicidal ideation.    Follow-up is planned in 12 weeks time or sooner as warranted.  Discharge instructions were provided. I personally saw and examined the patient and I agree with the above discussed plan of care.    History of Present Illness:    Chavez Newell is a 58 y.o. male who presents to [facility] for interval re-evaluation of the above stated pain complaints. The patient has a past medical and chronic pain history as outlined in the assessment section. He was last seen on 12/19/2023.    At today's visit patient states that their pain symptoms are worse with a pain score of 5/10 on the verbal numeric pain scale.  The patient's pain is worse in the morning.  The patient's pain is constant in nature.  And the quality of the patient's pain is described as sharp and shooting.  The patient's pain is located in the bilateral low back and right knee.  Patient states the amount of pain relief he is obtaining from his current pain relievers is not enough to make a difference in his life however patient states that he had nearly run out of medication due to accidentally missing his last appointment due to prepping for colonoscopy.  Patient denies any side effects using tramadol    Pain Contract Signed: 11/16/2023  Last Urine Drug Screen: 3/5/2024    Other than as stated above, the patient denies any interval changes in medications, medical condition, mental condition, symptoms, or allergies since the last office visit.         Review of Systems:    Review of Systems   Respiratory:  Negative for shortness of breath.    Cardiovascular:  Negative for chest pain.    Gastrointestinal:  Negative for constipation, diarrhea, nausea and vomiting.   Musculoskeletal:  Positive for back pain, gait problem and myalgias. Negative for arthralgias and joint swelling.        DROM  Joint stiffness   Skin:  Negative for rash.   Neurological:  Negative for dizziness, seizures and weakness.   All other systems reviewed and are negative.        Past Medical History:   Diagnosis Date    Anxiety 03/10/2022    Aorta aneurysm (HCC)     4.3    Arm DVT (deep venous thromboembolism), acute (HCC) 2015    complications of cardiac cath    Asthma     Blood clot in vein     right leg    Chronic kidney disease     CKD (chronic kidney disease), stage III (HCC)     COPD (chronic obstructive pulmonary disease) (HCC)     Depression     Diabetes mellitus (HCC)     Essential hypertriglyceridemia     GERD (gastroesophageal reflux disease)     Headache     Hiatal hernia     Hyperlipidemia, mixed 05/07/2018    Kidney stone     Liver disease     FATTY LIVER    Mild episode of recurrent major depressive disorder (HCC) 03/10/2022    PAC (premature atrial contraction)     Prediabetes     Rectus sheath hematoma, initial encounter 08/09/2023    Renal calculi     Sleep apnea     Vestibular migraine        Past Surgical History:   Procedure Laterality Date    CARPAL TUNNEL RELEASE Left     COLONOSCOPY      FL INJECTION RIGHT HIP (ARTHROGRAM)  08/20/2018    FOOT SURGERY Left     FOREIGN BODY REMOVAL    HERNIA REPAIR      LA ARTHRP ACETBLR/PROX FEM PROSTC AGRFT/ALGRFT Right 09/28/2020    Procedure: ARTHROPLASTY HIP TOTAL;  Surgeon: Jyoti Araiza MD;  [facility];  Service: Orthopedics    LA COLONOSCOPY FLX DX W/COLLJ SPEC WHEN PFRMD N/A 08/07/2017    Procedure: COLONOSCOPY;  Surgeon: Anthony France MD;  [facility]: [facility];  Service: Gastroenterology    LA ESOPHAGOGASTRODUODENOSCOPY TRANSORAL DIAGNOSTIC N/A 10/31/2017    Procedure: ESOPHAGOGASTRODUODENOSCOPY (EGD);  Surgeon: Anthony France MD;  Location:  [facility];  Service: Gastroenterology    TOTAL HIP ARTHROPLASTY Right 2020       Family History   Problem Relation Age of Onset    Arthritis Mother     Heart attack Father     Clotting disorder Father     Schizoaffective Disorder  Sister     Cancer Brother     Prostate cancer Brother     Leukemia Brother         his only brother dies from lymphoma or leukemia pt unsure he was only 54    Aortic aneurysm Other         abdominal       Social History     Occupational History    Occupation: unemployed   Tobacco Use    Smoking status: Some Days     Types: Cigars    Smokeless tobacco: Never    Tobacco comments:     never inhaled   Vaping Use    Vaping status: Never Used   Substance and Sexual Activity    Alcohol use: Not Currently     Comment: occasional beer    Drug use: No    Sexual activity: Yes     Partners: Female         Current Outpatient Medications:     acetaminophen (TYLENOL) 500 mg tablet, Take 500 mg by mouth every 6 (six) hours as needed for mild pain, Disp: , Rfl:     albuterol (Ventolin HFA) 90 mcg/act inhaler, Inhale 2 puffs every 6 (six) hours as needed for wheezing or shortness of breath (cough) (Patient taking differently: Inhale 2 puffs every 6 (six) hours as needed for wheezing or shortness of breath (cough) PRN), Disp: 18 g, Rfl: 1    Exenatide ER (Bydureon BCise) 2 MG/0.85ML AUIJ, Inject 2 mg under the skin once a week, Disp: 10.2 mL, Rfl: 1    fenofibrate 160 MG tablet, take 1 tablet by mouth once daily, Disp: 90 tablet, Rfl: 1    glucose blood test strip, TEST BS TID, Disp: 300 each, Rfl: 5    glucose monitoring kit (FREESTYLE) monitoring kit, Use 1 each daily before breakfast, Disp: 1 each, Rfl: 0    Insulin Pen Needle (BD Pen Needle Evelina 2nd Gen) 32G X 4 MM MISC, Inject as directed in the morning, Disp: 100 each, Rfl: 0    Lancets MISC, Use daily before breakfast, Disp: 100 each, Rfl: 5    pantoprazole (PROTONIX) 40 mg tablet, take 1 tablet by mouth once daily, Disp: 90 tablet, Rfl: 3     "rosuvastatin (CRESTOR) 5 mg tablet, take 1 tablet by mouth once daily, Disp: 90 tablet, Rfl: 1    sildenafil (REVATIO) 20 mg tablet, TAKE 1 TABLET (20 MG TOTAL) BY MOUTH DAILY AS DIRECTED, Disp: 90 tablet, Rfl: 3    traMADol (Ultram) 50 mg tablet, Take 1 tablet (50 mg total) by mouth every 6 (six) hours as needed for moderate pain, Disp: 120 tablet, Rfl: 2    warfarin (COUMADIN) 2 mg tablet, take 1 tablet by mouth MONDAY, WEDNESDAY, FRIDAY OR AS DIRECTED BY PCP, Disp: 30 tablet, Rfl: 1    warfarin (Coumadin) 3 mg tablet, Take 1 tablet by mouth Tuesday, Thursday, Saturday, Sunday; or as directed by PCP, Disp: 90 tablet, Rfl: 1    No Known Allergies    Physical Exam:    /85   Pulse (!) 54   Ht 6' 1\" (1.854 m)   Wt 98 kg (216 lb)   BMI 28.50 kg/m²     Constitutional:normal, well developed, well nourished, alert, in no distress and non-toxic and no overt pain behavior.  Eyes:anicteric  HEENT:grossly intact  Neck:supple, symmetric, trachea midline and no masses   Pulmonary:even and unlabored  Cardiovascular:No edema or pitting edema present  Skin:Normal without rashes or lesions and well hydrated  Psychiatric:Mood and affect appropriate  Neurologic:Cranial Nerves II-XII grossly intact  Musculoskeletal:antalgic      Imaging  X-ray lumbar spine complete 4+ views    (Results Pending)         Orders Placed This Encounter   Procedures    X-ray lumbar spine complete 4+ views    MM ALL_Prescribed Meds and Special Instructions    MM DT_Alprazolam Definitive Test    MM DT_Amphetamine Definitive Test    MM DT_Buprenorphine Definitive Test    MM DT_Butalbital Definitive Test    MM DT_Clonazepam Definitive Test    MM DT_Cocaine Definitive Test    MM DT_Codeine Definitive Test    MM DT_Dextromethorphan Definitive Test    MM Diazepam Definitive Test    MM DT_Ethyl Glucuronide/Ethyl Sulfate Definitive Test    MM DT_Fentanyl Definitive Test    MM DT_Heroin Definitive Test    MM DT_Hydrocodone Definitive Test    MM " DT_Hydromorphone Definitive Test    MM DT_Kratom Definitive Test    MM DT_Levorphanol Definitive Test    MM DT_MDMA Definitive Test    MM DT_Meperidine Definitive Test    MM DT_Methadone Definitive Test    MM DT_Methamphetamine Definitive Test    MM DT_Methylphenidate Definitive Test    MM DT_Morphine Definitive Test    MM Lorazepam Definitive Test    MM DT_Oxazepam Definitive Test    MM DT_Oxycodone Definitive Test    MM DT_Oxymorphone Definitive Test    MM DT_Phencyclidine Definitive Test    MM DT_Phenobarbital Definitive Test    MM DT_Phentermine Definitive Test    MM DT_Secobarbital Definitive Test    MM DT_Spice Definitive Test    MM DT_Tapentadol Definitive Test    MM DT_Temazapam Definitive Test    MM DT_THC Definitive Test    MM DT_Tramadol Definitive Test       This document was created using speech voice recognition software.   Grammatical errors, random word insertions, pronoun errors, and incomplete sentences are an occasional consequence of this system due to software limitations, ambient noise, and hardware issues.   Any formal questions or concerns about content, text, or information contained within the body of this dictation should be directly addressed to the provider for clarification.

## 2024-03-05 ENCOUNTER — OFFICE VISIT (OUTPATIENT)
Dept: PAIN MEDICINE | Facility: CLINIC | Age: 59
End: 2024-03-05
Payer: MEDICARE

## 2024-03-05 VITALS
HEART RATE: 54 BPM | DIASTOLIC BLOOD PRESSURE: 85 MMHG | BODY MASS INDEX: 28.63 KG/M2 | SYSTOLIC BLOOD PRESSURE: 135 MMHG | WEIGHT: 216 LBS | HEIGHT: 73 IN

## 2024-03-05 DIAGNOSIS — Z79.891 LONG-TERM CURRENT USE OF OPIATE ANALGESIC: ICD-10-CM

## 2024-03-05 DIAGNOSIS — G89.4 CHRONIC PAIN SYNDROME: Primary | ICD-10-CM

## 2024-03-05 DIAGNOSIS — F11.20 UNCOMPLICATED OPIOID DEPENDENCE (HCC): ICD-10-CM

## 2024-03-05 DIAGNOSIS — M47.816 LUMBAR SPONDYLOSIS: ICD-10-CM

## 2024-03-05 PROCEDURE — 99214 OFFICE O/P EST MOD 30 MIN: CPT

## 2024-03-05 RX ORDER — TRAMADOL HYDROCHLORIDE 50 MG/1
50 TABLET ORAL EVERY 6 HOURS PRN
Qty: 120 TABLET | Refills: 2 | Status: SHIPPED | OUTPATIENT
Start: 2024-03-05

## 2024-03-05 NOTE — PATIENT INSTRUCTIONS

## 2024-03-11 DIAGNOSIS — E11.22 TYPE 2 DIABETES MELLITUS WITH STAGE 3A CHRONIC KIDNEY DISEASE, WITHOUT LONG-TERM CURRENT USE OF INSULIN (HCC): ICD-10-CM

## 2024-03-11 DIAGNOSIS — N18.31 TYPE 2 DIABETES MELLITUS WITH STAGE 3A CHRONIC KIDNEY DISEASE, WITHOUT LONG-TERM CURRENT USE OF INSULIN (HCC): ICD-10-CM

## 2024-03-11 RX ORDER — PEN NEEDLE, DIABETIC 32GX 5/32"
NEEDLE, DISPOSABLE MISCELLANEOUS DAILY
Qty: 100 EACH | Refills: 3 | Status: SHIPPED | OUTPATIENT
Start: 2024-03-11

## 2024-03-18 ENCOUNTER — APPOINTMENT (OUTPATIENT)
Dept: LAB | Facility: MEDICAL CENTER | Age: 59
End: 2024-03-18
Payer: MEDICARE

## 2024-03-18 ENCOUNTER — ANTICOAG VISIT (OUTPATIENT)
Dept: FAMILY MEDICINE CLINIC | Facility: CLINIC | Age: 59
End: 2024-03-18

## 2024-03-18 ENCOUNTER — HOSPITAL ENCOUNTER (OUTPATIENT)
Dept: RADIOLOGY | Facility: MEDICAL CENTER | Age: 59
Discharge: HOME | End: 2024-03-18
Payer: MEDICARE

## 2024-03-18 DIAGNOSIS — I71.20 THORACIC AORTIC ANEURYSM WITHOUT RUPTURE, UNSPECIFIED PART (HCC): ICD-10-CM

## 2024-03-18 DIAGNOSIS — N18.31 STAGE 3A CHRONIC KIDNEY DISEASE (HCC): ICD-10-CM

## 2024-03-18 DIAGNOSIS — M89.9 CHRONIC KIDNEY DISEASE-MINERAL AND BONE DISORDER: ICD-10-CM

## 2024-03-18 DIAGNOSIS — N18.9 CHRONIC KIDNEY DISEASE-MINERAL AND BONE DISORDER: ICD-10-CM

## 2024-03-18 DIAGNOSIS — E83.9 CHRONIC KIDNEY DISEASE-MINERAL AND BONE DISORDER: ICD-10-CM

## 2024-03-18 LAB
ANION GAP SERPL CALCULATED.3IONS-SCNC: 9 MMOL/L (ref 4–13)
BACTERIA UR QL AUTO: NORMAL /HPF
BASOPHILS # BLD AUTO: 0.04 THOUSANDS/ÂΜL (ref 0–0.1)
BASOPHILS NFR BLD AUTO: 1 % (ref 0–1)
BILIRUB UR QL STRIP: NEGATIVE
BUN SERPL-MCNC: 19 MG/DL (ref 5–25)
CALCIUM SERPL-MCNC: 10 MG/DL (ref 8.4–10.2)
CHLORIDE SERPL-SCNC: 100 MMOL/L (ref 96–108)
CLARITY UR: CLEAR
CO2 SERPL-SCNC: 29 MMOL/L (ref 21–32)
COLOR UR: ABNORMAL
CREAT SERPL-MCNC: 1.52 MG/DL (ref 0.6–1.3)
CREAT UR-MCNC: 94.6 MG/DL
EOSINOPHIL # BLD AUTO: 0.1 THOUSAND/ÂΜL (ref 0–0.61)
EOSINOPHIL NFR BLD AUTO: 2 % (ref 0–6)
ERYTHROCYTE [DISTWIDTH] IN BLOOD BY AUTOMATED COUNT: 12.1 % (ref 11.6–15.1)
GFR SERPL CREATININE-BSD FRML MDRD: 49 ML/MIN/1.73SQ M
GLUCOSE P FAST SERPL-MCNC: 311 MG/DL (ref 65–99)
GLUCOSE UR STRIP-MCNC: ABNORMAL MG/DL
HCT VFR BLD AUTO: 49.7 % (ref 36.5–49.3)
HGB BLD-MCNC: 16.2 G/DL (ref 12–17)
HGB UR QL STRIP.AUTO: NEGATIVE
IMM GRANULOCYTES # BLD AUTO: 0.03 THOUSAND/UL (ref 0–0.2)
IMM GRANULOCYTES NFR BLD AUTO: 1 % (ref 0–2)
KETONES UR STRIP-MCNC: NEGATIVE MG/DL
LEUKOCYTE ESTERASE UR QL STRIP: ABNORMAL
LYMPHOCYTES # BLD AUTO: 1.93 THOUSANDS/ÂΜL (ref 0.6–4.47)
LYMPHOCYTES NFR BLD AUTO: 32 % (ref 14–44)
MCH RBC QN AUTO: 30.9 PG (ref 26.8–34.3)
MCHC RBC AUTO-ENTMCNC: 32.6 G/DL (ref 31.4–37.4)
MCV RBC AUTO: 95 FL (ref 82–98)
MONOCYTES # BLD AUTO: 0.51 THOUSAND/ÂΜL (ref 0.17–1.22)
MONOCYTES NFR BLD AUTO: 9 % (ref 4–12)
NEUTROPHILS # BLD AUTO: 3.39 THOUSANDS/ÂΜL (ref 1.85–7.62)
NEUTS SEG NFR BLD AUTO: 55 % (ref 43–75)
NITRITE UR QL STRIP: NEGATIVE
NON-SQ EPI CELLS URNS QL MICRO: NORMAL /HPF
NRBC BLD AUTO-RTO: 0 /100 WBCS
PH UR STRIP.AUTO: 5.5 [PH]
PHOSPHATE SERPL-MCNC: 2.4 MG/DL (ref 2.7–4.5)
PLATELET # BLD AUTO: 291 THOUSANDS/UL (ref 149–390)
PMV BLD AUTO: 11.1 FL (ref 8.9–12.7)
POTASSIUM SERPL-SCNC: 5.1 MMOL/L (ref 3.5–5.3)
PROT UR STRIP-MCNC: NEGATIVE MG/DL
PROT UR-MCNC: 5 MG/DL
PROT/CREAT UR: 0.05 MG/G{CREAT} (ref 0–0.1)
PTH-INTACT SERPL-MCNC: 35.8 PG/ML (ref 12–88)
RBC # BLD AUTO: 5.24 MILLION/UL (ref 3.88–5.62)
RBC #/AREA URNS AUTO: NORMAL /HPF
SODIUM SERPL-SCNC: 138 MMOL/L (ref 135–147)
SP GR UR STRIP.AUTO: 1.03 (ref 1–1.03)
UROBILINOGEN UR STRIP-ACNC: <2 MG/DL
WBC # BLD AUTO: 6 THOUSAND/UL (ref 4.31–10.16)
WBC #/AREA URNS AUTO: NORMAL /HPF

## 2024-03-18 PROCEDURE — 80048 BASIC METABOLIC PNL TOTAL CA: CPT

## 2024-03-18 PROCEDURE — 84156 ASSAY OF PROTEIN URINE: CPT

## 2024-03-18 PROCEDURE — 81001 URINALYSIS AUTO W/SCOPE: CPT

## 2024-03-18 PROCEDURE — 82570 ASSAY OF URINE CREATININE: CPT

## 2024-03-18 PROCEDURE — 71250 CT THORAX DX C-: CPT

## 2024-03-18 PROCEDURE — 85025 COMPLETE CBC W/AUTO DIFF WBC: CPT

## 2024-03-18 PROCEDURE — 83970 ASSAY OF PARATHORMONE: CPT

## 2024-03-18 PROCEDURE — G1004 CDSM NDSC: HCPCS

## 2024-03-18 PROCEDURE — 84100 ASSAY OF PHOSPHORUS: CPT

## 2024-03-21 ENCOUNTER — RA CDI HCC (OUTPATIENT)
Dept: OTHER | Facility: HOSPITAL | Age: 59
End: 2024-03-21

## 2024-03-21 NOTE — PROGRESS NOTES
HCC coding opportunities          Chart Reviewed number of suggestions sent to Provider: 1     Patients Insurance   E11.65  Medicare Insurance: Medicare

## 2024-03-26 NOTE — PROGRESS NOTES
"Chavez Newell 1965 male MRN: [id number]      ASSESSMENT/PLAN  Problem List Items Addressed This Visit        Cardiovascular and Mediastinum    Ascending aortic aneurysm (HCC)       Endocrine    Type 2 diabetes mellitus with stage 3a chronic kidney disease, without long-term current use of insulin ([facility]) - Primary    Relevant Medications    Continuous Blood Gluc Sensor (FreeStyle Jelly 3 Sensor) MISC    Continuous Blood Gluc  (FreeStyle Jelly 3 [place]) LES    Insulin Glargine Solostar (Lantus SoloStar) 100 UNIT/ML SOPN    Other Relevant Orders    POCT hemoglobin A1c (Completed)       Genitourinary    CKD (chronic kidney disease) stage 3, GFR 30-59 ml/min ([facility])       Other    Hyperlipidemia, mixed     DM: A1c 9.1% (8.3) -- discussed 2x2 titration of insulin. Will order CGM as pt has difficulty with fingersticks, is poorly controlled and is on insulin, so would benefit from closer monitoring. Will f/u in 3-4 weeks to monitor. Discussed referral to DM Ed -- pt defers, as he states he knows what he has to change and is going to work on it   HLD: Elevated triglycerides on last check, pt is going to work on diet changes   CKD: F/u with Nephro as scheduled   AAA: Stable, will continue yearly monitoring       Future Appointments   Date Time Provider Department Center   4/17/2024  8:40 AM DO ALFONZO Allison  Practice[facility]   5/23/2024  9:00 AM NICK Solorio [facility]   5/30/2024 10:00 AM Jose Baker MD [facility]   1/16/2025  8:30 AM Ju Woodruff PA-C HEM ONC STR Practice-Onc          SUBJECTIVE  CC: Diabetes (Follow up )      HPI:  Chavez Newell is a 58 y.o. male who presents for chronic follow up.     DM: Home sugars -- has trouble checking his sugars because he can't get the blood out; this morning his sugar was 380; does note the Bydureon sometimes leaks. Denies hypoglycemic symptoms.   HLD: On statin + Fenofibrate   CKD: \"I know I don't do it enough\" " "  AAA: CT Chest: 3 mm RML nodule (stable), 46 mm thoracic aorta (stable); Fatty liver, renal/liver cysts     Review of Systems   Constitutional:  Negative for diaphoresis.   Eyes:  Negative for visual disturbance.   Respiratory:  Negative for cough and shortness of breath.    Cardiovascular:  Negative for chest pain, palpitations and leg swelling.   Gastrointestinal:  Negative for abdominal pain, blood in stool, constipation and diarrhea.   Endocrine: Positive for polyuria.   Genitourinary:  Negative for difficulty urinating, dysuria and hematuria.   Neurological:  Negative for dizziness, tremors and headaches (\"once in awhile\").       Historical Information   The patient history was reviewed and updated as follows:    Past Medical History:   Diagnosis Date   • Anxiety 03/10/2022   • Aorta aneurysm (HCC)     4.3   • Arm DVT (deep venous thromboembolism), acute (HCC) 2015    complications of cardiac cath   • Asthma    • Blood clot in vein     right leg   • Chronic kidney disease    • CKD (chronic kidney disease), stage III (HCC)    • COPD (chronic obstructive pulmonary disease) (HCC)    • Depression    • Diabetes mellitus (HCC)    • Essential hypertriglyceridemia    • GERD (gastroesophageal reflux disease)    • Headache    • Hiatal hernia    • Hyperlipidemia, mixed 05/07/2018   • Kidney stone    • Liver disease     FATTY LIVER   • Mild episode of recurrent major depressive disorder (HCC) 03/10/2022   • PAC (premature atrial contraction)    • Prediabetes    • Rectus sheath hematoma, initial encounter 08/09/2023   • Renal calculi    • Sleep apnea    • Vestibular migraine      Past Surgical History:   Procedure Laterality Date   • CARPAL TUNNEL RELEASE Left    • COLONOSCOPY     • FL INJECTION RIGHT HIP (ARTHROGRAM)  08/20/2018   • FOOT SURGERY Left     FOREIGN BODY REMOVAL   • HERNIA REPAIR     • GA ARTHRP ACETBLR/PROX FEM PROSTC AGRFT/ALGRFT Right 09/28/2020    Procedure: ARTHROPLASTY HIP TOTAL;  Surgeon: Jyoti WILLIAM" MD Teodora;  Location: [facility];  Service: Orthopedics   • NJ COLONOSCOPY FLX DX W/COLLJ SPEC WHEN PFRMD N/A 08/07/2017    Procedure: COLONOSCOPY;  Surgeon: Anthony France MD;  [facility];  Service: Gastroenterology   • NJ ESOPHAGOGASTRODUODENOSCOPY TRANSORAL DIAGNOSTIC N/A 10/31/2017    Procedure: ESOPHAGOGASTRODUODENOSCOPY (EGD);  Surgeon: Anthony France MD;  [facility];  Service: Gastroenterology   • TOTAL HIP ARTHROPLASTY Right 2020     Family History   Problem Relation Age of Onset   • Arthritis Mother    • Heart attack Father    • Clotting disorder Father    • Schizoaffective Disorder  Sister    • Cancer Brother    • Prostate cancer Brother    • Leukemia Brother         his only brother dies from lymphoma or leukemia pt unsure he was only 54   • Aortic aneurysm Other         abdominal      Social History   Social History     Substance and Sexual Activity   Alcohol Use Not Currently    Comment: occasional beer     Social History     Substance and Sexual Activity   Drug Use No     Social History     Tobacco Use   Smoking Status Some Days   • Types: Cigars   Smokeless Tobacco Never   Tobacco Comments    never inhaled       Medications:     Current Outpatient Medications:   •  acetaminophen (TYLENOL) 500 mg tablet, Take 500 mg by mouth every 6 (six) hours as needed for mild pain, Disp: , Rfl:   •  Continuous Blood Gluc  (FreeStyle Jelly 3 [place]) LES, Use 1 each 1 (one) time for 1 dose, Disp: 1 each, Rfl: 0  •  Continuous Blood Gluc Sensor (FreeStyle Jelly 3 Sensor) MISC, Use 1 each every 14 (fourteen) days, Disp: 6 each, Rfl: 3  •  Exenatide ER (Bydureon BCise) 2 MG/0.85ML AUIJ, Inject 2 mg under the skin once a week, Disp: 10.2 mL, Rfl: 1  •  fenofibrate 160 MG tablet, take 1 tablet by mouth once daily, Disp: 90 tablet, Rfl: 1  •  glucose blood test strip, TEST BS TID, Disp: 300 each, Rfl: 5  •  glucose monitoring kit (FREESTYLE) monitoring kit, Use 1 each daily before  breakfast, Disp: 1 each, Rfl: 0  •  Insulin Glargine Solostar (Lantus SoloStar) 100 UNIT/ML SOPN, Inject 10 Units under the skin daily Or as directed by PCP, Disp: , Rfl:   •  Insulin Pen Needle (BD Pen Needle Evelina 2nd Gen) 32G X 4 MM MISC, inject as directed IN THE MORNING, Disp: 100 each, Rfl: 3  •  Lancets MISC, Use daily before breakfast, Disp: 100 each, Rfl: 5  •  pantoprazole (PROTONIX) 40 mg tablet, take 1 tablet by mouth once daily, Disp: 90 tablet, Rfl: 3  •  rosuvastatin (CRESTOR) 5 mg tablet, take 1 tablet by mouth once daily, Disp: 90 tablet, Rfl: 1  •  sildenafil (REVATIO) 20 mg tablet, TAKE 1 TABLET (20 MG TOTAL) BY MOUTH DAILY AS DIRECTED, Disp: 90 tablet, Rfl: 3  •  traMADol (Ultram) 50 mg tablet, Take 1 tablet (50 mg total) by mouth every 6 (six) hours as needed for moderate pain, Disp: 120 tablet, Rfl: 2  •  warfarin (COUMADIN) 2 mg tablet, take 1 tablet by mouth MONDAY, WEDNESDAY, FRIDAY OR AS DIRECTED BY PCP, Disp: 30 tablet, Rfl: 1  •  warfarin (Coumadin) 3 mg tablet, Take 1 tablet by mouth Tuesday, Thursday, Saturday, Sunday; or as directed by PCP, Disp: 90 tablet, Rfl: 1  No Known Allergies    OBJECTIVE    Vitals:   Vitals:    03/27/24 0836   BP: 126/84   Pulse: 71   Temp: 97.8 °F (36.6 °C)   SpO2: 98%   Weight: 97.9 kg (215 lb 12.8 oz)           Physical Exam  Vitals and nursing note reviewed.   Constitutional:       General: He is not in acute distress.     Appearance: Normal appearance.   HENT:      Head: Normocephalic and atraumatic.      Right Ear: Tympanic membrane, ear canal and external ear normal.      Left Ear: Tympanic membrane, ear canal and external ear normal.      Nose: Nose normal.      Mouth/Throat:      Mouth: Mucous membranes are moist.      Pharynx: No oropharyngeal exudate or posterior oropharyngeal erythema.   Eyes:      Conjunctiva/sclera: Conjunctivae normal.   Cardiovascular:      Rate and Rhythm: Normal rate and regular rhythm.      Pulses: no weak pulses.            Dorsalis pedis pulses are 2+ on the right side and 2+ on the left side.   Pulmonary:      Effort: Pulmonary effort is normal. No respiratory distress.      Breath sounds: Normal breath sounds.   Abdominal:      General: Bowel sounds are normal. There is no distension.      Palpations: Abdomen is soft.      Tenderness: There is no abdominal tenderness.   Musculoskeletal:      Right lower leg: No edema.      Left lower leg: No edema.   Feet:      Right foot:      Skin integrity: Dry skin present. No callus.      Left foot:      Skin integrity: Dry skin present. No callus.   Lymphadenopathy:      Cervical: No cervical adenopathy.   Skin:     General: Skin is warm and dry.   Neurological:      Mental Status: He is alert.      Comments: Grossly intact   Psychiatric:         Mood and Affect: Mood normal.            Patient's shoes and socks removed.    Right Foot/Ankle   Right Foot Inspection  Skin Exam: dry skin. No callus and no callus.     Toe Exam:  no right toe deformity    Sensory   Vibration: intact  Monofilament testing: intact    Vascular  Capillary refills: < 3 seconds  The right DP pulse is 2+.     Left Foot/Ankle  Left Foot Inspection  Skin Exam: dry skin. No callus.     Toe Exam: No left toe deformity.     Sensory   Vibration: intact  Monofilament testing: intact    Vascular  Capillary refills: < 3 seconds  The left DP pulse is 2+.     Assign Risk Category  No deformity present  No loss of protective sensation  No weak pulses  Risk: 0            Adenike Garg DO  [facility]   3/27/2024  10:14 AM

## 2024-03-27 ENCOUNTER — OFFICE VISIT (OUTPATIENT)
Dept: FAMILY MEDICINE CLINIC | Facility: CLINIC | Age: 59
End: 2024-03-27
Payer: MEDICARE

## 2024-03-27 VITALS
TEMPERATURE: 97.8 F | BODY MASS INDEX: 28.47 KG/M2 | OXYGEN SATURATION: 98 % | HEART RATE: 71 BPM | DIASTOLIC BLOOD PRESSURE: 84 MMHG | WEIGHT: 215.8 LBS | SYSTOLIC BLOOD PRESSURE: 126 MMHG

## 2024-03-27 DIAGNOSIS — N18.31 TYPE 2 DIABETES MELLITUS WITH STAGE 3A CHRONIC KIDNEY DISEASE, WITHOUT LONG-TERM CURRENT USE OF INSULIN (HCC): Primary | ICD-10-CM

## 2024-03-27 DIAGNOSIS — N18.31 STAGE 3A CHRONIC KIDNEY DISEASE (HCC): ICD-10-CM

## 2024-03-27 DIAGNOSIS — I71.21 ANEURYSM OF ASCENDING AORTA WITHOUT RUPTURE (HCC): ICD-10-CM

## 2024-03-27 DIAGNOSIS — E11.22 TYPE 2 DIABETES MELLITUS WITH STAGE 3A CHRONIC KIDNEY DISEASE, WITHOUT LONG-TERM CURRENT USE OF INSULIN (HCC): Primary | ICD-10-CM

## 2024-03-27 DIAGNOSIS — E78.2 HYPERLIPIDEMIA, MIXED: ICD-10-CM

## 2024-03-27 LAB — SL AMB POCT HEMOGLOBIN AIC: 9.1 (ref ?–6.5)

## 2024-03-27 PROCEDURE — 83036 HEMOGLOBIN GLYCOSYLATED A1C: CPT | Performed by: FAMILY MEDICINE

## 2024-03-27 PROCEDURE — 99214 OFFICE O/P EST MOD 30 MIN: CPT | Performed by: FAMILY MEDICINE

## 2024-03-27 RX ORDER — INSULIN GLARGINE 100 [IU]/ML
10 INJECTION, SOLUTION SUBCUTANEOUS DAILY
COMMUNITY
End: 2024-03-27

## 2024-03-27 RX ORDER — INSULIN GLARGINE 100 [IU]/ML
10 INJECTION, SOLUTION SUBCUTANEOUS DAILY
COMMUNITY

## 2024-03-27 RX ORDER — BLOOD-GLUCOSE,RECEIVER,CONT
1 EACH MISCELLANEOUS ONCE
Qty: 1 EACH | Refills: 0 | Status: SHIPPED | OUTPATIENT
Start: 2024-03-27 | End: 2024-03-27

## 2024-03-27 RX ORDER — BLOOD-GLUCOSE SENSOR
1 EACH MISCELLANEOUS
Qty: 6 EACH | Refills: 3 | Status: SHIPPED | OUTPATIENT
Start: 2024-03-27

## 2024-04-05 DIAGNOSIS — E78.2 ELEVATED TRIGLYCERIDES WITH HIGH CHOLESTEROL: ICD-10-CM

## 2024-04-05 RX ORDER — FENOFIBRATE 160 MG/1
TABLET ORAL
Qty: 90 TABLET | Refills: 1 | Status: SHIPPED | OUTPATIENT
Start: 2024-04-05

## 2024-04-12 ENCOUNTER — APPOINTMENT (OUTPATIENT)
Dept: LAB | Facility: CLINIC | Age: 59
End: 2024-04-12
Payer: MEDICARE

## 2024-04-16 NOTE — PROGRESS NOTES
"Chavez Newell 1965 male MRN: [id number]      ASSESSMENT/PLAN  Problem List Items Addressed This Visit        Endocrine    Type 2 diabetes mellitus with stage 3a chronic kidney disease, without long-term current use of insulin (HCC) - Primary     Fasting sugars within goal on current regimen. Pt does have significant spikes with PO intake. Reviewed well balanced meals and encouraged to keep diary of what foods he eats/blood sugar readings to monitor for foods that cause significant swings. Otherwise, continue current regimen and will f/u in June to recheck A1c. To call if blood sugars too high or low prior.       Future Appointments   Date Time Provider Department Center   5/23/2024  9:00 AM NICK Solorio SP [facility]   5/30/2024 10:00 AM Jose Baker MD [facility]   7/2/2024  8:00 AM DO ALFONZO Allison  Practice-Nor   1/16/2025  8:30 AM Ju Woodruff PA-C HEM ONC STR Practice-Onc          SUBJECTIVE  CC: Diabetes (Up to 20 units - BS running all over the place.)      HPI:  Chavez Newell is a 58 y.o. male who presents for DM follow up.     A1c poorly controlled at 9.1% -- started insulin titration   Got the CGM -- was using it, but it stopped working after 3-4 days -- just got new supplies a few days ago   Has increased up to 20 units -- fasting sugars were staying around 140-150s, now in 120s; when he eats, it goes into 280s but comes back down quickly; this morning, was 90s-100s   \"I cut back so hard it's unreal\" -- has been eating more fruits/veggies, having celery/peanut butter for snack, eating much less pasta  Denies hypoglycemic symptoms     Review of Systems   Constitutional:  Negative for diaphoresis.   Neurological:  Negative for dizziness and tremors.       Historical Information   The patient history was reviewed and updated as follows:    Past Medical History:   Diagnosis Date   • Anxiety 03/10/2022   • Aorta aneurysm (HCC)     4.3   • Arm DVT (deep " venous thromboembolism), acute (HCC) 2015    complications of cardiac cath   • Asthma    • Blood clot in vein     right leg   • Chronic kidney disease    • CKD (chronic kidney disease), stage III (HCC)    • COPD (chronic obstructive pulmonary disease) (HCC)    • Depression    • Diabetes mellitus (HCC)    • Essential hypertriglyceridemia    • GERD (gastroesophageal reflux disease)    • Headache    • Hiatal hernia    • Hyperlipidemia, mixed 05/07/2018   • Kidney stone    • Liver disease     FATTY LIVER   • Mild episode of recurrent major depressive disorder (HCC) 03/10/2022   • PAC (premature atrial contraction)    • Prediabetes    • Rectus sheath hematoma, initial encounter 08/09/2023   • Renal calculi    • Sleep apnea    • Vestibular migraine      Past Surgical History:   Procedure Laterality Date   • CARPAL TUNNEL RELEASE Left    • COLONOSCOPY     • FL INJECTION RIGHT HIP (ARTHROGRAM)  08/20/2018   • FOOT SURGERY Left     FOREIGN BODY REMOVAL   • HERNIA REPAIR     • [place] ARTHRP ACETBLR/PROX FEM PROSTC AGRFT/ALGRFT Right 09/28/2020    Procedure: ARTHROPLASTY HIP TOTAL;  Surgeon: Jyoti Araiza MD;  [facility];  Service: Orthopedics   • AZ COLONOSCOPY FLX DX W/COLLJ SPEC WHEN PFRMD N/A 08/07/2017    Procedure: COLONOSCOPY;  Surgeon: Anthony France MD;  [facility]: [facility];  Service: Gastroenterology   • AZ ESOPHAGOGASTRODUODENOSCOPY TRANSORAL DIAGNOSTIC N/A 10/31/2017    Procedure: ESOPHAGOGASTRODUODENOSCOPY (EGD);  Surgeon: Anthony France MD;  Location: [facility];  Service: Gastroenterology   • TOTAL HIP ARTHROPLASTY Right 2020     Family History   Problem Relation Age of Onset   • Arthritis Mother    • Heart attack Father    • Clotting disorder Father    • Schizoaffective Disorder  Sister    • Cancer Brother    • Prostate cancer Brother    • Leukemia Brother         his only brother dies from lymphoma or leukemia pt unsure he was only 54   • Aortic aneurysm Other         abdominal      Social  History   Social History     Substance and Sexual Activity   Alcohol Use Not Currently    Comment: occasional beer     Social History     Substance and Sexual Activity   Drug Use No     Social History     Tobacco Use   Smoking Status Some Days   • Types: Cigars   Smokeless Tobacco Never   Tobacco Comments    never inhaled       Medications:     Current Outpatient Medications:   •  acetaminophen (TYLENOL) 500 mg tablet, Take 500 mg by mouth every 6 (six) hours as needed for mild pain, Disp: , Rfl:   •  Continuous Blood Gluc Sensor (FreeStyle Jelly 3 Sensor) MISC, Use 1 each every 14 (fourteen) days, Disp: 6 each, Rfl: 3  •  Exenatide ER (Bydureon BCise) 2 MG/0.85ML AUIJ, Inject 2 mg under the skin once a week, Disp: 10.2 mL, Rfl: 1  •  fenofibrate 160 MG tablet, take 1 tablet by mouth once daily, Disp: 90 tablet, Rfl: 1  •  glucose blood test strip, TEST BS TID, Disp: 300 each, Rfl: 5  •  glucose monitoring kit (FREESTYLE) monitoring kit, Use 1 each daily before breakfast, Disp: 1 each, Rfl: 0  •  Insulin Glargine Solostar (Lantus SoloStar) 100 UNIT/ML SOPN, Inject 20 Units under the skin daily Or as directed by PCP, Disp: , Rfl:   •  Insulin Pen Needle (BD Pen Needle Evelina 2nd Gen) 32G X 4 MM MISC, inject as directed IN THE MORNING, Disp: 100 each, Rfl: 3  •  Lancets MISC, Use daily before breakfast, Disp: 100 each, Rfl: 5  •  pantoprazole (PROTONIX) 40 mg tablet, take 1 tablet by mouth once daily, Disp: 90 tablet, Rfl: 3  •  rosuvastatin (CRESTOR) 5 mg tablet, take 1 tablet by mouth once daily, Disp: 90 tablet, Rfl: 1  •  sildenafil (REVATIO) 20 mg tablet, TAKE 1 TABLET (20 MG TOTAL) BY MOUTH DAILY AS DIRECTED, Disp: 90 tablet, Rfl: 3  •  traMADol (Ultram) 50 mg tablet, Take 1 tablet (50 mg total) by mouth every 6 (six) hours as needed for moderate pain, Disp: 120 tablet, Rfl: 2  •  warfarin (COUMADIN) 2 mg tablet, take 1 tablet by mouth MONDAY, WEDNESDAY, FRIDAY OR AS DIRECTED BY PCP, Disp: 30 tablet, Rfl: 1  •   "warfarin (Coumadin) 3 mg tablet, Take 1 tablet by mouth Tuesday, Thursday, Saturday, Sunday; or as directed by PCP, Disp: 90 tablet, Rfl: 1  No Known Allergies    OBJECTIVE    Vitals:   Vitals:    04/17/24 0824   BP: 112/80   BP Location: Left arm   Patient Position: Sitting   Cuff Size: Large   Pulse: 74   Temp: 97.8 °F (36.6 °C)   SpO2: 100%   Weight: 99.8 kg (220 lb)   Height: 6' 1\" (1.854 m)           Physical Exam  Vitals and nursing note reviewed.   Constitutional:       General: He is not in acute distress.     Appearance: Normal appearance.   HENT:      Head: Normocephalic and atraumatic.   Pulmonary:      Effort: Pulmonary effort is normal. No respiratory distress.   Neurological:      Mental Status: He is alert.      Comments: Grossly intact    Psychiatric:         Mood and Affect: Mood normal.                    Adenike Garg DO  [facility]   4/17/2024  8:44 AM    "

## 2024-04-17 ENCOUNTER — OFFICE VISIT (OUTPATIENT)
Dept: FAMILY MEDICINE CLINIC | Facility: CLINIC | Age: 59
End: 2024-04-17
Payer: MEDICARE

## 2024-04-17 VITALS
SYSTOLIC BLOOD PRESSURE: 112 MMHG | WEIGHT: 220 LBS | DIASTOLIC BLOOD PRESSURE: 80 MMHG | OXYGEN SATURATION: 100 % | HEART RATE: 74 BPM | HEIGHT: 73 IN | BODY MASS INDEX: 29.16 KG/M2 | TEMPERATURE: 97.8 F

## 2024-04-17 DIAGNOSIS — E11.22 TYPE 2 DIABETES MELLITUS WITH STAGE 3A CHRONIC KIDNEY DISEASE, WITHOUT LONG-TERM CURRENT USE OF INSULIN (HCC): Primary | ICD-10-CM

## 2024-04-17 DIAGNOSIS — N18.31 TYPE 2 DIABETES MELLITUS WITH STAGE 3A CHRONIC KIDNEY DISEASE, WITHOUT LONG-TERM CURRENT USE OF INSULIN (HCC): Primary | ICD-10-CM

## 2024-04-17 PROCEDURE — G2211 COMPLEX E/M VISIT ADD ON: HCPCS | Performed by: FAMILY MEDICINE

## 2024-04-17 PROCEDURE — 99213 OFFICE O/P EST LOW 20 MIN: CPT | Performed by: FAMILY MEDICINE

## 2024-04-30 DIAGNOSIS — N18.31 TYPE 2 DIABETES MELLITUS WITH STAGE 3A CHRONIC KIDNEY DISEASE, WITHOUT LONG-TERM CURRENT USE OF INSULIN (HCC): Primary | ICD-10-CM

## 2024-04-30 DIAGNOSIS — E11.22 TYPE 2 DIABETES MELLITUS WITH STAGE 3A CHRONIC KIDNEY DISEASE, WITHOUT LONG-TERM CURRENT USE OF INSULIN (HCC): Primary | ICD-10-CM

## 2024-04-30 NOTE — TELEPHONE ENCOUNTER
Reason for call:   [x] Refill   [] Prior Auth  [] Other:     Office:   [x] PCP/Provider -   [] Specialty/Provider -     Medication: Lantus SoloStar     Dose/Frequency: 100 unit/mL SOPN. Inject 20 units under the skin daily.     [id number] [place] [facility] [place] [phone]     Does the patient have enough for 3 days?   [x] Yes   [] No - Send as HP to POD

## 2024-05-01 ENCOUNTER — TELEPHONE (OUTPATIENT)
Dept: PAIN MEDICINE | Facility: CLINIC | Age: 59
End: 2024-05-01

## 2024-05-01 RX ORDER — INSULIN GLARGINE 100 [IU]/ML
20 INJECTION, SOLUTION SUBCUTANEOUS DAILY
Qty: 15 ML | Refills: 1 | Status: SHIPPED | OUTPATIENT
Start: 2024-05-01

## 2024-05-01 NOTE — TELEPHONE ENCOUNTER
Lm for pt that 5/23 appt with MG was moved to 5/24 told pt to give us a call back if that appt didn't work.

## 2024-05-22 NOTE — PROGRESS NOTES
Pain Medicine Follow-Up Note    Assessment:  1. Chronic pain syndrome    2. Chronic pain of right knee    3. Lumbar spondylosis    4. Long-term current use of opiate analgesic    5. Uncomplicated opioid dependence (HCC)        Plan:  Orders Placed This Encounter   Procedures    FL spine and pain procedure     Standing Status:   Future     Standing Expiration Date:   5/24/2028     Order Specific Question:   Reason for Exam:     Answer:   right knee injection     Order Specific Question:   Anticoagulant hold needed?     Answer:   no       New Medications Ordered This Visit   Medications    traMADol (Ultram) 50 mg tablet     Sig: Take 1 tablet (50 mg total) by mouth every 6 (six) hours as needed for moderate pain Do not start before June 7, 2024.     Dispense:  120 tablet     Refill:  2     Fill on 6/7/2024 and refill on about 7/6/204 and 8/5/2024       My impressions and treatment recommendations were discussed in detail with the patient who verbalized understanding and had no further questions.      The patient continues to report an overall reduction of his pain level and improvement with his functioning without significant side effects using tramadol 50 mg tablet patient takes 1 tablet every 6 hours as needed for moderate pain, therefore I will continue the patient on this medication.  Tramadol 50 mg tablet e-prescribed to the patient's pharmacy with a do not fill until date of 6/7/2024, 7/6/2024 and 8/5/2024.    [place] Prescription Drug Monitoring Program report was reviewed and was appropriate     There are risks associated with opioid medications, including dependence, addiction and tolerance. The patient understands and agrees to use these medications only as prescribed. Potential side effects of the medications include, but are not limited to, constipation, drowsiness, addiction, impaired judgment and risk of fatal overdose if not taken as prescribed. The patient was warned against driving while taking  sedation medications.  Sharing medications is a felony. At this point in time, the patient is showing no signs of addiction, abuse, diversion or suicidal ideation.    Patient also reports worsening right knee pain in the lateral aspect, I recommend that the patient trial a steroid injection into his right knee to reduce his inflammation.  Patient is continue to have ongoing pain relief in the left knee from the previous injection on 12/19/2023. Complete risks and benefits including bleeding, infection, tissue reaction, nerve injury and allergic reaction were discussed. The approach was demonstrated using models and literature was provided. Verbal and written consent was obtained.    Patient also requesting copy of request form for x-ray of the low back, he needs to get it done at a specific time when his glucometer is due to be removed.    Follow-up is planned in 3 months time or sooner as warranted.  Discharge instructions were provided. I personally saw and examined the patient and I agree with the above discussed plan of care.    History of Present Illness:    Chavez Newell is a 59 y.o. male who presents to [facility] for interval re-evaluation of the above stated pain complaints. The patient has a past medical and chronic pain history as outlined in the assessment section. He was last seen on 3/5/2024.    At today's visit patient states that their pain symptoms are worse with a pain score of 3/10 on the verbal numeric pain scale.  The patient's pain is worse in the morning and at night.  The patient's pain is occasional in nature.  And the quality of the patient's pain is described as sharp, pressure like, and shooting.  The patient's pain is located in the low back, right groin and right knee.  The patient states the amount of pain relief he is obtaining from his current pain relievers is not enough to make a difference in his life.  Patient denies any side effects using  tramadol.    Pain Contract Signed:  11/16/23  Last Urine Drug Screen:  03/05/24    Other than as stated above, the patient denies any interval changes in medications, medical condition, mental condition, symptoms, or allergies since the last office visit.         Review of Systems:    Review of Systems   Respiratory:  Negative for shortness of breath.    Cardiovascular:  Negative for chest pain.   Gastrointestinal:  Negative for constipation, diarrhea, nausea and vomiting.   Musculoskeletal:  Positive for arthralgias. Negative for gait problem, joint swelling and myalgias.        DROM  Pain in Extremity Back and Right Knee   Skin:  Negative for rash.   Neurological:  Positive for dizziness. Negative for seizures and weakness.   All other systems reviewed and are negative.        Past Medical History:   Diagnosis Date    Anxiety 03/10/2022    Aorta aneurysm (HCC)     4.3    Arm DVT (deep venous thromboembolism), acute (HCC) 2015    complications of cardiac cath    Asthma     Blood clot in vein     right leg    Chronic kidney disease     CKD (chronic kidney disease), stage III (HCC)     COPD (chronic obstructive pulmonary disease) (HCC)     Depression     Diabetes mellitus (HCC)     Essential hypertriglyceridemia     GERD (gastroesophageal reflux disease)     Headache     Hiatal hernia     Hyperlipidemia, mixed 05/07/2018    Kidney stone     Liver disease     FATTY LIVER    Mild episode of recurrent major depressive disorder (HCC) 03/10/2022    PAC (premature atrial contraction)     Prediabetes     Rectus sheath hematoma, initial encounter 08/09/2023    Renal calculi     Sleep apnea     Vestibular migraine        Past Surgical History:   Procedure Laterality Date    CARPAL TUNNEL RELEASE Left     COLONOSCOPY      FL INJECTION RIGHT HIP (ARTHROGRAM)  08/20/2018    FOOT SURGERY Left     FOREIGN BODY REMOVAL    HERNIA REPAIR      [place] ARTHRP ACETBLR/PROX FEM PROSTC AGRFT/ALGRFT Right 09/28/2020    Procedure: ARTHROPLASTY  HIP TOTAL;  Surgeon: Jyoti Araiza MD;  [facility];  Service: Orthopedics    MI COLONOSCOPY FLX DX W/COLLJ SPEC WHEN PFRMD N/A 08/07/2017    Procedure: COLONOSCOPY;  Surgeon: Anthony France MD;  [facility];  Service: Gastroenterology    MI ESOPHAGOGASTRODUODENOSCOPY TRANSORAL DIAGNOSTIC N/A 10/31/2017    Procedure: ESOPHAGOGASTRODUODENOSCOPY (EGD);  Surgeon: Anthony France MD;  [facility];  Service: Gastroenterology    TOTAL HIP ARTHROPLASTY Right 2020       Family History   Problem Relation Age of Onset    Arthritis Mother     Heart attack Father     Clotting disorder Father     Schizoaffective Disorder  Sister     Cancer Brother     Prostate cancer Brother     Leukemia Brother         his only brother dies from lymphoma or leukemia pt unsure he was only 54    Aortic aneurysm Other         abdominal       Social History     Occupational History    Occupation: unemployed   Tobacco Use    Smoking status: Some Days     Types: Cigars    Smokeless tobacco: Never    Tobacco comments:     never inhaled   Vaping Use    Vaping status: Never Used   Substance and Sexual Activity    Alcohol use: Not Currently     Comment: occasional beer    Drug use: No    Sexual activity: Yes     Partners: Female         Current Outpatient Medications:     acetaminophen (TYLENOL) 500 mg tablet, Take 500 mg by mouth every 6 (six) hours as needed for mild pain, Disp: , Rfl:     Continuous Blood Gluc Sensor (FreeStyle Jelly 3 Sensor) MISC, Use 1 each every 14 (fourteen) days, Disp: 6 each, Rfl: 3    Exenatide ER (Bydureon BCise) 2 MG/0.85ML AUIJ, Inject 2 mg under the skin once a week, Disp: 10.2 mL, Rfl: 1    fenofibrate 160 MG tablet, take 1 tablet by mouth once daily, Disp: 90 tablet, Rfl: 1    glucose blood test strip, TEST BS TID, Disp: 300 each, Rfl: 5    glucose monitoring kit (FREESTYLE) monitoring kit, Use 1 each daily before breakfast, Disp: 1 each, Rfl: 0    Insulin Glargine Solostar (Lantus  "SoloStar) 100 UNIT/ML SOPN, Inject 0.2 mL (20 Units total) under the skin daily Or as directed by PCP, Disp: 15 mL, Rfl: 1    Insulin Pen Needle (BD Pen Needle Evelina 2nd Gen) 32G X 4 MM MISC, inject as directed IN THE MORNING, Disp: 100 each, Rfl: 3    Lancets MISC, Use daily before breakfast, Disp: 100 each, Rfl: 5    pantoprazole (PROTONIX) 40 mg tablet, take 1 tablet by mouth once daily, Disp: 90 tablet, Rfl: 3    rosuvastatin (CRESTOR) 5 mg tablet, take 1 tablet by mouth once daily, Disp: 90 tablet, Rfl: 1    sildenafil (REVATIO) 20 mg tablet, TAKE 1 TABLET (20 MG TOTAL) BY MOUTH DAILY AS DIRECTED, Disp: 90 tablet, Rfl: 3    [START ON 6/7/2024] traMADol (Ultram) 50 mg tablet, Take 1 tablet (50 mg total) by mouth every 6 (six) hours as needed for moderate pain Do not start before June 7, 2024., Disp: 120 tablet, Rfl: 2    warfarin (COUMADIN) 2 mg tablet, take 1 tablet by mouth MONDAY, WEDNESDAY, FRIDAY OR AS DIRECTED BY PCP, Disp: 30 tablet, Rfl: 1    warfarin (Coumadin) 3 mg tablet, Take 1 tablet by mouth Tuesday, Thursday, Saturday, Sunday; or as directed by PCP, Disp: 90 tablet, Rfl: 1    No Known Allergies    Physical Exam:    /78   Pulse 65   Ht 6' 1\" (1.854 m)   BMI 29.03 kg/m²     Constitutional:normal, well developed, well nourished, alert, in no distress and non-toxic and no overt pain behavior.  Eyes:anicteric  HEENT:grossly intact  Neck:supple, symmetric, trachea midline and no masses   Pulmonary:even and unlabored  Cardiovascular:No edema or pitting edema present  Skin:Normal without rashes or lesions and well hydrated  Psychiatric:Mood and affect appropriate  Neurologic:Cranial Nerves II-XII grossly intact  Musculoskeletal:antalgic gait      Imaging  FL spine and pain procedure    (Results Pending)         Orders Placed This Encounter   Procedures    FL spine and pain procedure       This document was created using speech voice recognition software.   Grammatical errors, random word " insertions, pronoun errors, and incomplete sentences are an occasional consequence of this system due to software limitations, ambient noise, and hardware issues.   Any formal questions or concerns about content, text, or information contained within the body of this dictation should be directly addressed to the provider for clarification.

## 2024-05-23 ENCOUNTER — TELEPHONE (OUTPATIENT)
Age: 59
End: 2024-05-23

## 2024-05-23 NOTE — TELEPHONE ENCOUNTER
If his sugars are dropping that low, would suggest lowering dose of Lantus -- can decrease by 2-5 units and monitor, if continues to have low sugars, can decrease further. Should call if sugars do not improve

## 2024-05-23 NOTE — TELEPHONE ENCOUNTER
Yesterday sugar went down to 61 struggle to bring levels back up today 105 before eating now after eating 252 didn't this morning he didn't take insulin afraid number might go down feels tired from being up all night other wise he feels ok can the office call him [phone]

## 2024-05-24 ENCOUNTER — OFFICE VISIT (OUTPATIENT)
Dept: PAIN MEDICINE | Facility: CLINIC | Age: 59
End: 2024-05-24

## 2024-05-24 ENCOUNTER — TELEPHONE (OUTPATIENT)
Dept: NEPHROLOGY | Facility: CLINIC | Age: 59
End: 2024-05-24

## 2024-05-24 ENCOUNTER — PATIENT MESSAGE (OUTPATIENT)
Dept: RADIOLOGY | Facility: CLINIC | Age: 59
End: 2024-05-24

## 2024-05-24 VITALS
DIASTOLIC BLOOD PRESSURE: 78 MMHG | HEART RATE: 65 BPM | BODY MASS INDEX: 29.03 KG/M2 | HEIGHT: 73 IN | SYSTOLIC BLOOD PRESSURE: 130 MMHG

## 2024-05-24 DIAGNOSIS — M47.816 LUMBAR SPONDYLOSIS: ICD-10-CM

## 2024-05-24 DIAGNOSIS — M25.561 CHRONIC PAIN OF RIGHT KNEE: ICD-10-CM

## 2024-05-24 DIAGNOSIS — G89.4 CHRONIC PAIN SYNDROME: Primary | ICD-10-CM

## 2024-05-24 DIAGNOSIS — N18.31 STAGE 3A CHRONIC KIDNEY DISEASE (HCC): Primary | ICD-10-CM

## 2024-05-24 DIAGNOSIS — F11.20 UNCOMPLICATED OPIOID DEPENDENCE (HCC): ICD-10-CM

## 2024-05-24 DIAGNOSIS — Z79.891 LONG-TERM CURRENT USE OF OPIATE ANALGESIC: ICD-10-CM

## 2024-05-24 DIAGNOSIS — G89.29 CHRONIC PAIN OF RIGHT KNEE: ICD-10-CM

## 2024-05-24 RX ORDER — TRAMADOL HYDROCHLORIDE 50 MG/1
50 TABLET ORAL EVERY 6 HOURS PRN
Qty: 120 TABLET | Refills: 2 | Status: SHIPPED | OUTPATIENT
Start: 2024-06-07

## 2024-05-24 NOTE — PATIENT COMMUNICATION
Pt is scheduled for knee injection with Dr Giraldo on 7/9/24    Pt is diabetic and wears a glucose monitor on his arm.  Is aware the nursing team will contact him with any instructions related to this, closer to his injection date.    Injection type does not require med holds.    Pt given instructions in office and via myc.    Have you completed PT/HEP/Chiro in the past 6 months for dedicated area? no  If yes, how long did you complete?  What was the frequency?  Did it provide relief?  If no, reason therapy was not completed?

## 2024-05-24 NOTE — TELEPHONE ENCOUNTER
Called spoke with patient advised patient to get fasting labs done 1 week prior to 05/30/24 follow up appointment with Dr.Umesh Samuel MD. patient verbalized understanding and is okay with it.

## 2024-05-24 NOTE — PATIENT INSTRUCTIONS

## 2024-05-25 ENCOUNTER — APPOINTMENT (OUTPATIENT)
Dept: LAB | Facility: CLINIC | Age: 59
End: 2024-05-25
Payer: MEDICARE

## 2024-05-25 DIAGNOSIS — N18.31 STAGE 3A CHRONIC KIDNEY DISEASE (HCC): ICD-10-CM

## 2024-05-25 LAB
25(OH)D3 SERPL-MCNC: 30 NG/ML (ref 30–100)
ANION GAP SERPL CALCULATED.3IONS-SCNC: 8 MMOL/L (ref 4–13)
BACTERIA UR QL AUTO: ABNORMAL /HPF
BASOPHILS # BLD AUTO: 0.04 THOUSANDS/ÂΜL (ref 0–0.1)
BASOPHILS NFR BLD AUTO: 1 % (ref 0–1)
BILIRUB UR QL STRIP: NEGATIVE
BUN SERPL-MCNC: 18 MG/DL (ref 5–25)
CALCIUM SERPL-MCNC: 9.3 MG/DL (ref 8.4–10.2)
CHLORIDE SERPL-SCNC: 105 MMOL/L (ref 96–108)
CLARITY UR: ABNORMAL
CO2 SERPL-SCNC: 27 MMOL/L (ref 21–32)
COLOR UR: ABNORMAL
CREAT SERPL-MCNC: 1.43 MG/DL (ref 0.6–1.3)
CREAT UR-MCNC: 205.1 MG/DL
EOSINOPHIL # BLD AUTO: 0.11 THOUSAND/ÂΜL (ref 0–0.61)
EOSINOPHIL NFR BLD AUTO: 2 % (ref 0–6)
ERYTHROCYTE [DISTWIDTH] IN BLOOD BY AUTOMATED COUNT: 12.6 % (ref 11.6–15.1)
GFR SERPL CREATININE-BSD FRML MDRD: 52 ML/MIN/1.73SQ M
GLUCOSE P FAST SERPL-MCNC: 156 MG/DL (ref 65–99)
GLUCOSE UR STRIP-MCNC: ABNORMAL MG/DL
HCT VFR BLD AUTO: 45.6 % (ref 36.5–49.3)
HGB BLD-MCNC: 15 G/DL (ref 12–17)
HGB UR QL STRIP.AUTO: NEGATIVE
IMM GRANULOCYTES # BLD AUTO: 0.02 THOUSAND/UL (ref 0–0.2)
IMM GRANULOCYTES NFR BLD AUTO: 0 % (ref 0–2)
KETONES UR STRIP-MCNC: NEGATIVE MG/DL
LEUKOCYTE ESTERASE UR QL STRIP: NEGATIVE
LYMPHOCYTES # BLD AUTO: 1.74 THOUSANDS/ÂΜL (ref 0.6–4.47)
LYMPHOCYTES NFR BLD AUTO: 30 % (ref 14–44)
MCH RBC QN AUTO: 31.8 PG (ref 26.8–34.3)
MCHC RBC AUTO-ENTMCNC: 32.9 G/DL (ref 31.4–37.4)
MCV RBC AUTO: 97 FL (ref 82–98)
MONOCYTES # BLD AUTO: 0.57 THOUSAND/ÂΜL (ref 0.17–1.22)
MONOCYTES NFR BLD AUTO: 10 % (ref 4–12)
MUCOUS THREADS UR QL AUTO: ABNORMAL
NEUTROPHILS # BLD AUTO: 3.32 THOUSANDS/ÂΜL (ref 1.85–7.62)
NEUTS SEG NFR BLD AUTO: 57 % (ref 43–75)
NITRITE UR QL STRIP: NEGATIVE
NON-SQ EPI CELLS URNS QL MICRO: ABNORMAL /HPF
NRBC BLD AUTO-RTO: 0 /100 WBCS
PH UR STRIP.AUTO: 5.5 [PH]
PHOSPHATE SERPL-MCNC: 2.3 MG/DL (ref 2.3–4.1)
PLATELET # BLD AUTO: 227 THOUSANDS/UL (ref 149–390)
PMV BLD AUTO: 11 FL (ref 8.9–12.7)
POTASSIUM SERPL-SCNC: 4.3 MMOL/L (ref 3.5–5.3)
PROT UR STRIP-MCNC: ABNORMAL MG/DL
PROT UR-MCNC: 41 MG/DL
PROT/CREAT UR: 0.2 MG/G{CREAT} (ref 0–0.1)
PTH-INTACT SERPL-MCNC: 48.4 PG/ML (ref 12–88)
RBC # BLD AUTO: 4.72 MILLION/UL (ref 3.88–5.62)
RBC #/AREA URNS AUTO: ABNORMAL /HPF
SODIUM SERPL-SCNC: 140 MMOL/L (ref 135–147)
SP GR UR STRIP.AUTO: 1.02 (ref 1–1.03)
UROBILINOGEN UR STRIP-ACNC: 2 MG/DL
WBC # BLD AUTO: 5.8 THOUSAND/UL (ref 4.31–10.16)
WBC #/AREA URNS AUTO: ABNORMAL /HPF

## 2024-05-25 PROCEDURE — 83970 ASSAY OF PARATHORMONE: CPT

## 2024-05-25 PROCEDURE — 85025 COMPLETE CBC W/AUTO DIFF WBC: CPT

## 2024-05-25 PROCEDURE — 80048 BASIC METABOLIC PNL TOTAL CA: CPT

## 2024-05-25 PROCEDURE — 84156 ASSAY OF PROTEIN URINE: CPT

## 2024-05-25 PROCEDURE — 81001 URINALYSIS AUTO W/SCOPE: CPT

## 2024-05-25 PROCEDURE — 82306 VITAMIN D 25 HYDROXY: CPT

## 2024-05-25 PROCEDURE — 82570 ASSAY OF URINE CREATININE: CPT

## 2024-05-25 PROCEDURE — 84100 ASSAY OF PHOSPHORUS: CPT

## 2024-05-28 ENCOUNTER — ANTICOAG VISIT (OUTPATIENT)
Dept: FAMILY MEDICINE CLINIC | Facility: CLINIC | Age: 59
End: 2024-05-28

## 2024-05-29 ENCOUNTER — TELEPHONE (OUTPATIENT)
Dept: NEPHROLOGY | Facility: CLINIC | Age: 59
End: 2024-05-29

## 2024-05-29 NOTE — TELEPHONE ENCOUNTER
I called and left a message on machine for patient to see if he wanted to move his appointment time up for Thursday 5/30/2024 nephrology follow up appointment with Dr. REBECCA Baker. I did explain that I was leaving his time the same and not changing it unless we here back from him.

## 2024-05-30 ENCOUNTER — OFFICE VISIT (OUTPATIENT)
Dept: NEPHROLOGY | Facility: CLINIC | Age: 59
End: 2024-05-30
Payer: MEDICARE

## 2024-05-30 VITALS
HEIGHT: 73 IN | OXYGEN SATURATION: 95 % | WEIGHT: 223 LBS | RESPIRATION RATE: 16 BRPM | SYSTOLIC BLOOD PRESSURE: 130 MMHG | DIASTOLIC BLOOD PRESSURE: 80 MMHG | HEART RATE: 61 BPM | BODY MASS INDEX: 29.55 KG/M2 | TEMPERATURE: 97.2 F

## 2024-05-30 DIAGNOSIS — N18.31 TYPE 2 DIABETES MELLITUS WITH STAGE 3A CHRONIC KIDNEY DISEASE, WITHOUT LONG-TERM CURRENT USE OF INSULIN (HCC): ICD-10-CM

## 2024-05-30 DIAGNOSIS — N18.31 STAGE 3A CHRONIC KIDNEY DISEASE (HCC): Primary | ICD-10-CM

## 2024-05-30 DIAGNOSIS — Q23.1 BICUSPID AORTIC VALVE: ICD-10-CM

## 2024-05-30 DIAGNOSIS — E11.22 TYPE 2 DIABETES MELLITUS WITH STAGE 3A CHRONIC KIDNEY DISEASE, WITHOUT LONG-TERM CURRENT USE OF INSULIN (HCC): ICD-10-CM

## 2024-05-30 DIAGNOSIS — E83.9 CHRONIC KIDNEY DISEASE-MINERAL AND BONE DISORDER: ICD-10-CM

## 2024-05-30 DIAGNOSIS — M89.9 CHRONIC KIDNEY DISEASE-MINERAL AND BONE DISORDER: ICD-10-CM

## 2024-05-30 DIAGNOSIS — N18.9 CHRONIC KIDNEY DISEASE-MINERAL AND BONE DISORDER: ICD-10-CM

## 2024-05-30 PROCEDURE — 99214 OFFICE O/P EST MOD 30 MIN: CPT | Performed by: INTERNAL MEDICINE

## 2024-05-30 NOTE — ASSESSMENT & PLAN NOTE
Lab Results   Component Value Date    EGFR 52 05/25/2024    EGFR 49 03/18/2024    EGFR 51 12/04/2023    CREATININE 1.43 (H) 05/25/2024    CREATININE 1.52 (H) 03/18/2024    CREATININE 1.48 (H) 12/04/2023   PTH and phosphorus along with vitamin D are within acceptable range and will continue to monitor

## 2024-05-30 NOTE — PROGRESS NOTES
NEPHROLOGY OFFICE FOLLOW UP  Chavez Newell 59 y.o. male MRN: [id number]    Encounter: [id number] 5/30/2024    REASON FOR VISIT: Chavez Newell is a 59 y.o. male who is here on 5/30/2024 for Chronic Kidney Disease and Follow-up  .    HPI:    Chavez came in today for follow-up of CKD.  59-year-old gentleman who is doing well overall.  Does have a problem with the knee pain being monitored by orthopedics    Denies taking nonsteroidal painkiller    No chest pain no palpitation or shortness of breath    No nausea no vomiting        REVIEW OF SYSTEMS:    Review of Systems   Constitutional:  Negative for fatigue.   HENT:  Negative for congestion.    Eyes:  Negative for photophobia and pain.   Respiratory:  Negative for chest tightness and shortness of breath.    Cardiovascular:  Negative for chest pain and palpitations.   Gastrointestinal:  Negative for abdominal distention, abdominal pain and blood in stool.   Endocrine: Negative for polydipsia.   Genitourinary:  Negative for difficulty urinating, dysuria, flank pain, hematuria and urgency.   Musculoskeletal:  Positive for arthralgias. Negative for back pain.   Skin:  Negative for rash.   Neurological:  Negative for dizziness, light-headedness and headaches.   Hematological:  Does not bruise/bleed easily.   Psychiatric/Behavioral:  Negative for behavioral problems. The patient is not nervous/anxious.          PAST MEDICAL HISTORY:  Past Medical History:   Diagnosis Date    Anxiety 03/10/2022    Aorta aneurysm (HCC)     4.3    Arm DVT (deep venous thromboembolism), acute (HCC) 2015    complications of cardiac cath    Asthma     Blood clot in vein     right leg    Chronic kidney disease     CKD (chronic kidney disease), stage III (HCC)     COPD (chronic obstructive pulmonary disease) (HCC)     Depression     Diabetes mellitus (HCC)     Essential hypertriglyceridemia     GERD (gastroesophageal reflux disease)     Headache     Hiatal hernia     Hyperlipidemia, mixed  05/07/2018    Kidney stone     Liver disease     FATTY LIVER    Mild episode of recurrent major depressive disorder (HCC) 03/10/2022    PAC (premature atrial contraction)     Prediabetes     Rectus sheath hematoma, initial encounter 08/09/2023    Renal calculi     Sleep apnea     Vestibular migraine        PAST SURGICAL HISTORY:  Past Surgical History:   Procedure Laterality Date    CARPAL TUNNEL RELEASE Left     COLONOSCOPY      FL INJECTION RIGHT HIP (ARTHROGRAM)  08/20/2018    FOOT SURGERY Left     FOREIGN BODY REMOVAL    HERNIA REPAIR      ID ARTHRP ACETBLR/PROX FEM PROSTC AGRFT/ALGRFT Right 09/28/2020    Procedure: ARTHROPLASTY HIP TOTAL;  Surgeon: Jyoti Araiza MD;  Location: [facility];  Service: Orthopedics    ID COLONOSCOPY FLX DX W/COLLJ SPEC WHEN PFRMD N/A 08/07/2017    Procedure: COLONOSCOPY;  Surgeon: Anthony France MD;  Location: [facility];  Service: Gastroenterology    ID ESOPHAGOGASTRODUODENOSCOPY TRANSORAL DIAGNOSTIC N/A 10/31/2017    Procedure: ESOPHAGOGASTRODUODENOSCOPY (EGD);  Surgeon: Anthony France MD;  Location: [facility];  Service: Gastroenterology    TOTAL HIP ARTHROPLASTY Right 2020       SOCIAL HISTORY:  Social History     Substance and Sexual Activity   Alcohol Use Not Currently    Comment: occasional beer     Social History     Substance and Sexual Activity   Drug Use No     Social History     Tobacco Use   Smoking Status Some Days    Types: Cigars   Smokeless Tobacco Never   Tobacco Comments    never inhaled       FAMILY HISTORY:  Family History   Problem Relation Age of Onset    Arthritis Mother     Heart attack Father     Clotting disorder Father     Schizoaffective Disorder  Sister     Cancer Brother     Prostate cancer Brother     Leukemia Brother         his only brother dies from lymphoma or leukemia pt unsure he was only 54    Aortic aneurysm Other         abdominal       MEDICATIONS:    Current Outpatient Medications:     acetaminophen (TYLENOL) 500 mg tablet, Take  500 mg by mouth every 6 (six) hours as needed for mild pain, Disp: , Rfl:     Continuous Blood Gluc Sensor (FreeStyle Jelly 3 Sensor) MISC, Use 1 each every 14 (fourteen) days, Disp: 6 each, Rfl: 3    Exenatide ER (Bydureon BCise) 2 MG/0.85ML AUIJ, Inject 2 mg under the skin once a week, Disp: 10.2 mL, Rfl: 1    fenofibrate 160 MG tablet, take 1 tablet by mouth once daily, Disp: 90 tablet, Rfl: 1    glucose blood test strip, TEST BS TID, Disp: 300 each, Rfl: 5    glucose monitoring kit (FREESTYLE) monitoring kit, Use 1 each daily before breakfast, Disp: 1 each, Rfl: 0    Insulin Glargine Solostar (Lantus SoloStar) 100 UNIT/ML SOPN, Inject 0.2 mL (20 Units total) under the skin daily Or as directed by PCP, Disp: 15 mL, Rfl: 1    Insulin Pen Needle (BD Pen Needle Evelina 2nd Gen) 32G X 4 MM MISC, inject as directed IN THE MORNING, Disp: 100 each, Rfl: 3    Lancets MISC, Use daily before breakfast, Disp: 100 each, Rfl: 5    pantoprazole (PROTONIX) 40 mg tablet, take 1 tablet by mouth once daily, Disp: 90 tablet, Rfl: 3    rosuvastatin (CRESTOR) 5 mg tablet, take 1 tablet by mouth once daily, Disp: 90 tablet, Rfl: 1    sildenafil (REVATIO) 20 mg tablet, TAKE 1 TABLET (20 MG TOTAL) BY MOUTH DAILY AS DIRECTED, Disp: 90 tablet, Rfl: 3    [START ON 6/7/2024] traMADol (Ultram) 50 mg tablet, Take 1 tablet (50 mg total) by mouth every 6 (six) hours as needed for moderate pain Do not start before June 7, 2024., Disp: 120 tablet, Rfl: 2    warfarin (COUMADIN) 2 mg tablet, take 1 tablet by mouth MONDAY, WEDNESDAY, FRIDAY OR AS DIRECTED BY PCP, Disp: 30 tablet, Rfl: 1    warfarin (Coumadin) 3 mg tablet, Take 1 tablet by mouth Tuesday, Thursday, Saturday, Sunday; or as directed by PCP, Disp: 90 tablet, Rfl: 1    PHYSICAL EXAM:  Vitals:    05/30/24 0953   BP: 130/80   BP Location: Right arm   Patient Position: Sitting   Pulse: 61   Resp: 16   Temp: (!) 97.2 °F (36.2 °C)   TempSrc: Temporal   SpO2: 95%   Weight: 101 kg (223 lb)  "  Height: 6' 1\" (1.854 m)     Body mass index is 29.42 kg/m².    Physical Exam  Constitutional:       General: He is not in acute distress.     Appearance: He is well-developed. He is obese.   HENT:      Head: Normocephalic.      Mouth/Throat:      Mouth: Mucous membranes are moist.   Eyes:      General: No scleral icterus.     Conjunctiva/sclera: Conjunctivae normal.   Neck:      Vascular: No JVD.   Cardiovascular:      Rate and Rhythm: Normal rate.      Heart sounds: Normal heart sounds.   Pulmonary:      Effort: Pulmonary effort is normal.      Breath sounds: No wheezing.   Abdominal:      Palpations: Abdomen is soft.      Tenderness: There is no abdominal tenderness.   Musculoskeletal:         General: Normal range of motion.      Cervical back: Neck supple.   Skin:     General: Skin is warm.      Findings: No rash.   Neurological:      Mental Status: He is alert and oriented to person, place, and time.   Psychiatric:         Behavior: Behavior normal.         LAB RESULTS:  Results for orders placed or performed in visit on 05/25/24   Basic metabolic panel   Result Value Ref Range    Sodium 140 135 - 147 mmol/L    Potassium 4.3 3.5 - 5.3 mmol/L    Chloride 105 96 - 108 mmol/L    CO2 27 21 - 32 mmol/L    ANION GAP 8 4 - 13 mmol/L    BUN 18 5 - 25 mg/dL    Creatinine 1.43 (H) 0.60 - 1.30 mg/dL    Glucose, Fasting 156 (H) 65 - 99 mg/dL    Calcium 9.3 8.4 - 10.2 mg/dL    eGFR 52 ml/min/1.73sq m   CBC and differential   Result Value Ref Range    WBC 5.80 4.31 - 10.16 Thousand/uL    RBC 4.72 3.88 - 5.62 Million/uL    Hemoglobin 15.0 12.0 - 17.0 g/dL    Hematocrit 45.6 36.5 - 49.3 %    MCV 97 82 - 98 fL    MCH 31.8 26.8 - 34.3 pg    MCHC 32.9 31.4 - 37.4 g/dL    RDW 12.6 11.6 - 15.1 %    MPV 11.0 8.9 - 12.7 fL    Platelets 227 149 - 390 Thousands/uL    nRBC 0 /100 WBCs    Segmented % 57 43 - 75 %    Immature Grans % 0 0 - 2 %    Lymphocytes % 30 14 - 44 %    Monocytes % 10 4 - 12 %    Eosinophils Relative 2 0 - 6 %    " Basophils Relative 1 0 - 1 %    Absolute Neutrophils 3.32 1.85 - 7.62 Thousands/µL    Absolute Immature Grans 0.02 0.00 - 0.20 Thousand/uL    Absolute Lymphocytes 1.74 0.60 - 4.47 Thousands/µL    Absolute Monocytes 0.57 0.17 - 1.22 Thousand/µL    Eosinophils Absolute 0.11 0.00 - 0.61 Thousand/µL    Basophils Absolute 0.04 0.00 - 0.10 Thousands/µL   Phosphorus   Result Value Ref Range    Phosphorus 2.3 2.3 - 4.1 mg/dL   Protein / creatinine ratio, urine   Result Value Ref Range    Creatinine, Ur 205.1 Reference range not established. mg/dL    Protein Urine Random 41 Reference range not established. mg/dL    Prot/Creat Ratio, Ur 0.20 (H) 0.00 - 0.10   PTH, intact   Result Value Ref Range    PTH 48.4 12.0 - 88.0 pg/mL   Urinalysis with microscopic   Result Value Ref Range    Color, UA Light Orange     Clarity, UA Extra Turbid     Specific Gravity, UA 1.022 1.003 - 1.030    pH, UA 5.5 4.5, 5.0, 5.5, 6.0, 6.5, 7.0, 7.5, 8.0    Leukocytes, UA Negative Negative    Nitrite, UA Negative Negative    Protein, UA 50 (1+) (A) Negative mg/dl    Glucose, UA 70 (7/100%) (A) Negative mg/dl    Ketones, UA Negative Negative mg/dl    Urobilinogen, UA 2.0 (A) <2.0 mg/dl mg/dl    Bilirubin, UA Negative Negative    Occult Blood, UA Negative Negative    RBC, UA None Seen None Seen, 1-2 /hpf    WBC, UA 1-2 None Seen, 1-2 /hpf    Epithelial Cells None Seen None Seen, Occasional /hpf    Bacteria, UA None Seen None Seen, Occasional /hpf    MUCUS THREADS Occasional (A) None Seen   Vitamin D 25 hydroxy   Result Value Ref Range    Vit D, 25-Hydroxy 30.0 30.0 - 100.0 ng/mL     *Note: Due to a large number of results and/or encounters for the requested time period, some results have not been displayed. A complete set of results can be found in Results Review.       ASSESSMENT and PLAN:      CKD (chronic kidney disease) stage 3, GFR 30-59 ml/min  Lab Results   Component Value Date    EGFR 52 05/25/2024    EGFR 49 03/18/2024    EGFR 51 12/04/2023     "CREATININE 1.43 (H) 05/25/2024    CREATININE 1.52 (H) 03/18/2024    CREATININE 1.48 (H) 12/04/2023   Renal function is quite stable.  Advise hydration and avoiding nephrotoxic medication    Chronic kidney disease-mineral and bone disorder  Lab Results   Component Value Date    EGFR 52 05/25/2024    EGFR 49 03/18/2024    EGFR 51 12/04/2023    CREATININE 1.43 (H) 05/25/2024    CREATININE 1.52 (H) 03/18/2024    CREATININE 1.48 (H) 12/04/2023   PTH and phosphorus along with vitamin D are within acceptable range and will continue to monitor    Type 2 diabetes mellitus with stage 3a chronic kidney disease, without long-term current use of insulin ([facility])    Lab Results   Component Value Date    HGBA1C 9.1 (A) 03/27/2024   Not well-controlled.  Advised to discuss with primary doctor about possible SGLT2 inhibitor like Farxiga or Jardiance.  That would be helpful for kidney management also      Everything discussed at length with the patient.  I will see him back in 6 months.  Will get blood and urine test before that visit        Portions of the record may have been created with voice recognition software. Occasional wrong word or \"sound a like\" substitutions may have occurred due to the inherent limitations of voice recognition software. Read the chart carefully and recognize, using context, where substitutions have occurred.If you have any questions, please contact the dictating provider.   "

## 2024-05-30 NOTE — ASSESSMENT & PLAN NOTE
Lab Results   Component Value Date    HGBA1C 9.1 (A) 03/27/2024   Not well-controlled.  Advised to discuss with primary doctor about possible SGLT2 inhibitor like Farxiga or Jardiance.  That would be helpful for kidney management also

## 2024-05-30 NOTE — ASSESSMENT & PLAN NOTE
Lab Results   Component Value Date    EGFR 52 05/25/2024    EGFR 49 03/18/2024    EGFR 51 12/04/2023    CREATININE 1.43 (H) 05/25/2024    CREATININE 1.52 (H) 03/18/2024    CREATININE 1.48 (H) 12/04/2023   Renal function is quite stable.  Advise hydration and avoiding nephrotoxic medication

## 2024-05-31 ENCOUNTER — TELEPHONE (OUTPATIENT)
Age: 59
End: 2024-05-31

## 2024-05-31 DIAGNOSIS — M25.562 LEFT KNEE PAIN, UNSPECIFIED CHRONICITY: Primary | ICD-10-CM

## 2024-05-31 NOTE — TELEPHONE ENCOUNTER
Spoke with pt -- no openings right now, but placed pt on wait list -- will get a call if opening becomes available sooner

## 2024-05-31 NOTE — TELEPHONE ENCOUNTER
Caller: patient    Doctor: kena    Reason for call: would like to know if there is anything sooner    Call back#:

## 2024-06-01 ENCOUNTER — HOSPITAL ENCOUNTER (EMERGENCY)
Facility: HOSPITAL | Age: 59
Discharge: HOME | End: 2024-06-01
Attending: EMERGENCY MEDICINE
Payer: MEDICARE

## 2024-06-01 ENCOUNTER — APPOINTMENT (EMERGENCY)
Dept: RADIOLOGY | Facility: HOSPITAL | Age: 59
End: 2024-06-01
Payer: MEDICARE

## 2024-06-01 VITALS
HEART RATE: 75 BPM | RESPIRATION RATE: 18 BRPM | WEIGHT: 220.46 LBS | SYSTOLIC BLOOD PRESSURE: 140 MMHG | OXYGEN SATURATION: 98 % | BODY MASS INDEX: 29.09 KG/M2 | DIASTOLIC BLOOD PRESSURE: 78 MMHG

## 2024-06-01 DIAGNOSIS — M25.561 RIGHT KNEE PAIN: Primary | ICD-10-CM

## 2024-06-01 LAB
INR PPP: 2.38 (ref 0.84–1.19)
PROTHROMBIN TIME: 26.1 SECONDS (ref 11.6–14.5)

## 2024-06-01 PROCEDURE — 36415 COLL VENOUS BLD VENIPUNCTURE: CPT | Performed by: EMERGENCY MEDICINE

## 2024-06-01 PROCEDURE — 85610 PROTHROMBIN TIME: CPT | Performed by: EMERGENCY MEDICINE

## 2024-06-01 PROCEDURE — 99283 EMERGENCY DEPT VISIT LOW MDM: CPT

## 2024-06-01 PROCEDURE — 99284 EMERGENCY DEPT VISIT MOD MDM: CPT | Performed by: EMERGENCY MEDICINE

## 2024-06-01 PROCEDURE — 73564 X-RAY EXAM KNEE 4 OR MORE: CPT

## 2024-06-01 RX ORDER — OXYCODONE HYDROCHLORIDE 10 MG/1
10 TABLET ORAL ONCE
Status: COMPLETED | OUTPATIENT
Start: 2024-06-01 | End: 2024-06-01

## 2024-06-01 RX ORDER — OXYCODONE HYDROCHLORIDE 5 MG/1
5 TABLET ORAL EVERY 6 HOURS PRN
Qty: 12 TABLET | Refills: 0 | Status: SHIPPED | OUTPATIENT
Start: 2024-06-01

## 2024-06-01 RX ORDER — ACETAMINOPHEN 325 MG/1
650 TABLET ORAL ONCE
Status: COMPLETED | OUTPATIENT
Start: 2024-06-01 | End: 2024-06-01

## 2024-06-01 RX ADMIN — OXYCODONE HYDROCHLORIDE 10 MG: 10 TABLET ORAL at 21:36

## 2024-06-01 RX ADMIN — DICLOFENAC SODIUM 2 G: 10 GEL TOPICAL at 21:36

## 2024-06-01 RX ADMIN — ACETAMINOPHEN 650 MG: 325 TABLET ORAL at 21:36

## 2024-06-02 DIAGNOSIS — I82.411 ACUTE DEEP VEIN THROMBOSIS (DVT) OF FEMORAL VEIN OF RIGHT LOWER EXTREMITY (HCC): ICD-10-CM

## 2024-06-02 RX ORDER — WARFARIN SODIUM 3 MG/1
TABLET ORAL
Qty: 90 TABLET | Refills: 1 | Status: SHIPPED | OUTPATIENT
Start: 2024-06-02

## 2024-06-02 NOTE — ED PROVIDER NOTES
History  Chief Complaint   Patient presents with    Pain     Right knee pain; hx of dvt on thinner for prevention; reports there was color change that has since resolved      59-year-old male with a history of DVT on Coumadin presents to the emergency department for evaluation of right knee pain.  Patient states he has known arthritis in the right knee, but states that it is increasingly painful compared to his baseline.  Pain is primarily located on the medial aspect of the knee.  He denies any specific trauma, fall, or known injury.  No associated swelling.  No fevers or chills.  No overlying skin changes.  It is not warm to the touch.  He states he does follow-up with pain management for back and hip pain.  Is scheduled to have an injection into the right knee but this has not happened yet.  States he is compliant with his Coumadin and his INR has been therapeutic.        Prior to Admission Medications   Prescriptions Last Dose Informant Patient Reported? Taking?   Continuous Blood Gluc Sensor (FreeStyle Jelly 3 Sensor) MISC  Self No No   Sig: Use 1 each every 14 (fourteen) days   Exenatide ER (Bydureon BCise) 2 MG/0.85ML AUIJ  Self No No   Sig: Inject 2 mg under the skin once a week   Insulin Glargine Solostar (Lantus SoloStar) 100 UNIT/ML SOPN  Self No No   Sig: Inject 0.2 mL (20 Units total) under the skin daily Or as directed by PCP   Insulin Pen Needle (BD Pen Needle Evelina 2nd Gen) 32G X 4 MM MISC  Self No No   Sig: inject as directed IN THE MORNING   Lancets MISC  Self No No   Sig: Use daily before breakfast   acetaminophen (TYLENOL) 500 mg tablet  Self Yes No   Sig: Take 500 mg by mouth every 6 (six) hours as needed for mild pain   fenofibrate 160 MG tablet  Self No No   Sig: take 1 tablet by mouth once daily   glucose blood test strip  Self No No   Sig: TEST BS TID   glucose monitoring kit (FREESTYLE) monitoring kit  Self No No   Sig: Use 1 each daily before breakfast   pantoprazole (PROTONIX) 40 mg tablet   Self No No   Sig: take 1 tablet by mouth once daily   rosuvastatin (CRESTOR) 5 mg tablet  Self No No   Sig: take 1 tablet by mouth once daily   sildenafil (REVATIO) 20 mg tablet  Self No No   Sig: TAKE 1 TABLET (20 MG TOTAL) BY MOUTH DAILY AS DIRECTED   traMADol (Ultram) 50 mg tablet  Self No No   Sig: Take 1 tablet (50 mg total) by mouth every 6 (six) hours as needed for moderate pain Do not start before June 7, 2024.   warfarin (COUMADIN) 2 mg tablet  Self No No   Sig: take 1 tablet by mouth MONDAY, WEDNESDAY, FRIDAY OR AS DIRECTED BY PCP   warfarin (Coumadin) 3 mg tablet  Self No No   Sig: Take 1 tablet by mouth Tuesday, Thursday, Saturday, Sunday; or as directed by PCP      Facility-Administered Medications: None       Past Medical History:   Diagnosis Date    Anxiety 03/10/2022    Aorta aneurysm (HCC)     4.3    Arm DVT (deep venous thromboembolism), acute (HCC) 2015    complications of cardiac cath    Asthma     Blood clot in vein     right leg    Chronic kidney disease     CKD (chronic kidney disease), stage III (HCC)     COPD (chronic obstructive pulmonary disease) (HCC)     Depression     Diabetes mellitus (HCC)     Essential hypertriglyceridemia     GERD (gastroesophageal reflux disease)     Headache     Hiatal hernia     Hyperlipidemia, mixed 05/07/2018    Kidney stone     Liver disease     FATTY LIVER    Mild episode of recurrent major depressive disorder (HCC) 03/10/2022    PAC (premature atrial contraction)     Prediabetes     Rectus sheath hematoma, initial encounter 08/09/2023    Renal calculi     Sleep apnea     Vestibular migraine        Past Surgical History:   Procedure Laterality Date    CARPAL TUNNEL RELEASE Left     COLONOSCOPY      FL INJECTION RIGHT HIP (ARTHROGRAM)  08/20/2018    FOOT SURGERY Left     FOREIGN BODY REMOVAL    HERNIA REPAIR      NE ARTHRP ACETBLR/PROX FEM PROSTC AGRFT/ALGRFT Right 09/28/2020    Procedure: ARTHROPLASTY HIP TOTAL;  Surgeon: Jyoti Araiza MD;  Location: [facility];  Service: Orthopedics    ID COLONOSCOPY FLX DX W/COLLJ SPEC WHEN PFRMD N/A 08/07/2017    Procedure: COLONOSCOPY;  Surgeon: Anthony France MD;  Location: [facility];  Service: Gastroenterology    ID ESOPHAGOGASTRODUODENOSCOPY TRANSORAL DIAGNOSTIC N/A 10/31/2017    Procedure: ESOPHAGOGASTRODUODENOSCOPY (EGD);  Surgeon: Anthony France MD;  Location: [facility];  Service: Gastroenterology    TOTAL HIP ARTHROPLASTY Right 2020       Family History   Problem Relation Age of Onset    Arthritis Mother     Heart attack Father     Clotting disorder Father     Schizoaffective Disorder  Sister     Cancer Brother     Prostate cancer Brother     Leukemia Brother         his only brother dies from lymphoma or leukemia pt unsure he was only 54    Aortic aneurysm Other         abdominal     I have reviewed and agree with the history as documented.    E-Cigarette/Vaping    E-Cigarette Use Never User      E-Cigarette/Vaping Substances    Nicotine No     THC No     CBD No     Flavoring No     Other No     Unknown No      Social History     Tobacco Use    Smoking status: Some Days     Types: Cigars    Smokeless tobacco: Never    Tobacco comments:     never inhaled   Vaping Use    Vaping status: Never Used   Substance Use Topics    Alcohol use: Not Currently     Comment: occasional beer    Drug use: No       Review of Systems   Constitutional:  Negative for chills and fever.   HENT:  Negative for ear pain and sore throat.    Eyes:  Negative for pain and visual disturbance.   Respiratory:  Negative for cough and shortness of breath.    Cardiovascular:  Negative for chest pain and palpitations.   Gastrointestinal:  Negative for abdominal pain and vomiting.   Genitourinary:  Negative for dysuria and hematuria.   Musculoskeletal:  Positive for arthralgias. Negative for back pain.   Skin:  Negative for color change and rash.   Neurological:  Negative for seizures and syncope.   All other systems reviewed and are  negative.      Physical Exam  Physical Exam  Vitals and nursing note reviewed.   Constitutional:       General: He is not in acute distress.  HENT:      Head: Normocephalic and atraumatic.      Right Ear: External ear normal.      Left Ear: External ear normal.      Nose: Nose normal.      Mouth/Throat:      Mouth: Mucous membranes are moist.   Eyes:      Extraocular Movements: Extraocular movements intact.      Conjunctiva/sclera: Conjunctivae normal.      Pupils: Pupils are equal, round, and reactive to light.   Cardiovascular:      Rate and Rhythm: Normal rate and regular rhythm.      Pulses: Normal pulses.   Pulmonary:      Effort: Pulmonary effort is normal. No respiratory distress.      Breath sounds: No stridor.   Musculoskeletal:         General: No deformity. Normal range of motion.      Cervical back: Normal range of motion and neck supple.      Comments: Tenderness to palpation of the medial joint line of the right knee, no joint effusion, no joint laxity, no other areas of tenderness to palpation of the right knee.  No palpable masses.  No overlying skin changes.   Skin:     General: Skin is warm and dry.      Capillary Refill: Capillary refill takes less than 2 seconds.   Neurological:      General: No focal deficit present.      Mental Status: He is alert and oriented to person, place, and time.   Psychiatric:         Mood and Affect: Mood normal.         Behavior: Behavior normal.         Vital Signs  ED Triage Vitals [06/01/24 2115]   Temp Pulse Respirations Blood Pressure SpO2   -- 75 18 140/78 98 %      Temp src Heart Rate Source Patient Position - Orthostatic VS BP Location FiO2 (%)   -- Monitor -- -- --      Pain Score       10 - Worst Possible Pain           Vitals:    06/01/24 2115   BP: 140/78   Pulse: 75         Visual Acuity      ED Medications  Medications   Diclofenac Sodium (VOLTAREN) 1 % topical gel 2 g (2 g Topical Given 6/1/24 2136)   acetaminophen (TYLENOL) tablet 650 mg (650 mg Oral  Given 6/1/24 2136)   oxyCODONE (ROXICODONE) immediate release tablet 10 mg (10 mg Oral Given 6/1/24 2136)       Diagnostic Studies  Results Reviewed       Procedure Component Value Units Date/Time    Protime-INR [[id number]]  (Abnormal) Collected: 06/01/24 2142    Lab Status: Final result Specimen: Blood from Arm, Left Updated: 06/01/24 2203     Protime 26.1 seconds      INR 2.38                   XR knee 4+ vw right injury   ED Interpretation by Truman Zimmer MD (06/01 2146)   No acute osseous abnormality some arthritic changes present                 Procedures  Procedures         ED Course                               SBIRT 20yo+      Flowsheet Row Most Recent Value   Initial Alcohol Screen: US AUDIT-C     1. How often do you have a drink containing alcohol? 0 Filed at: 06/01/2024 2117   2. How many drinks containing alcohol do you have on a typical day you are drinking?  0 Filed at: 06/01/2024 2117   3a. Male UNDER 65: How often do you have five or more drinks on one occasion? 0 Filed at: 06/01/2024 2117   3b. FEMALE Any Age, or MALE 65+: How often do you have 4 or more drinks on one occassion? 0 Filed at: 06/01/2024 2117   Audit-C Score 0 Filed at: 06/01/2024 2117   ABBEY: How many times in the past year have you...    Used an illegal drug or used a prescription medication for non-medical reasons? Never Filed at: 06/01/2024 2117                      Medical Decision Making  59-year-old male with atraumatic right knee pain.  Suspect symptoms likely secondary to arthritis as there was no specific injury.  Unlikely to represent fracture or dislocation.  No signs of joint effusion or hemarthrosis in the setting of anticoagulation.  No signs of infection.  Unlikely to represent new DVT as patient has been compliant with his anticoagulation and there are no clinical signs of DVT.  Will get x-ray, treat with Tylenol, oxycodone, and Voltaren gel.  If x-ray negative and symptoms improved plan to discharge home with  orthopedic and pain management follow-up.    XRAy was negative for any acute osseous abnormality per my interpretation does demonstrate some arthritic changes.  Patient not scheduled for knee injection until July.  As his typical tramadol has not been controlling his symptoms, will provide short prescription for oxycodone instead.  Patient was advised to not take the tramadol while taking oxycodone.  He was also advised to call pain management on Monday for follow-up and to discuss ongoing management of his symptoms.    Problems Addressed:  Right knee pain: acute illness or injury    Amount and/or Complexity of Data Reviewed  External Data Reviewed: notes.  Labs: ordered.  Radiology: ordered and independent interpretation performed.    Risk  OTC drugs.  Prescription drug management.             Disposition  Final diagnoses:   Right knee pain     Time reflects when diagnosis was documented in both MDM as applicable and the Disposition within this note       Time User Action Codes Description Comment    6/1/2024  9:33 PM Mesha, Truman Monzon [M25.561] Right knee pain           ED Disposition       ED Disposition   Discharge    Condition   Stable    Date/Time   Sat Jun 1, 2024 2146    Comment   Chavez Newell discharge to home/self care.                   Follow-up Information       Follow up With Specialties Details Why Contact Info Additional Information    [facility] Emergency Department Emergency Medicine  If symptoms worsen [address] [facility]   [facility] [address] [phone] [facility] Emergency Department, [address], [place] [id number] [place] [address] [facility] [place] [facility] Schedule an appointment as soon as possible for a visit   [address] [place] [address] [phone] [facility] [place], [address] [place] [address], [place], [place],  [address]   [phone]            Patient's Medications   Discharge Prescriptions    OXYCODONE (ROXICODONE) 5 IMMEDIATE RELEASE TABLET    Take 1 tablet (5 mg total) by mouth every 6 (six) hours as needed for moderate pain or severe pain for up to 12 doses Max Daily Amount: 20 mg       Start Date: 6/1/2024  End Date: --       Order Dose: 5 mg       Quantity: 12 tablet    Refills: 0       No discharge procedures on file.    PDMP Review         Value Time User    PDMP Reviewed  Yes 5/24/2024  9:01 AM NICK Solorio            ED Provider  Electronically Signed by             Truman Zimmer MD  06/01/24 8625

## 2024-06-02 NOTE — DISCHARGE INSTRUCTIONS
Apply Voltaren gel 4 times daily to the right knee, recommend Tylenol 1000 mg every 4-6 hours    Start taking 5 mg of oxycodone every 6 hours as needed for moderate to severe pain.  While you are taking this,  Should not take any of your tramadol as this could increase your risk for possible overdose.  Recommend you call your pain management physician on Monday to discuss whether or not you should continue to oxycodone or go back on the tramadol.    Follow-up with pain management and orthopedic surgery, return for any worsening symptoms including worsening pain, fevers, or any other concerning symptoms

## 2024-06-11 NOTE — TELEPHONE ENCOUNTER
Caller: Chavez PICKENS    Doctor: Iman OVERTON    Reason for call: Pt called in regard to his right knee and is having some extreme pain. If the doctor can look at the XRAY. Pt would like a referral to an Ortho doctor      Call back#: [phone]

## 2024-06-11 NOTE — TELEPHONE ENCOUNTER
Narrative & Impression   XR KNEE 4+ VW RIGHT INJURY     INDICATION: pain.     COMPARISON: 11/20/2020     FINDINGS:     No acute fracture or dislocation.     No joint effusion.     Minimal narrowing and spurring in the patellofemoral compartment.     No lytic or blastic osseous lesion.     Unremarkable soft tissues.     IMPRESSION:     No acute osseous abnormality.       I can refer him to Dr. Rivera.  there is nothing for me to say about the x-ray of the knee unfortunately it is not just degenerated arthritis some narrowing and spurring which I do not know since I am not an orthopedic surgeon if that something that would be painful for the patient and what would be needed to help the patient.    If there is a tear of some kind and injection may still be beneficial but he may want to follow-up with Miguel anyway since it might even expedite an injection.

## 2024-06-13 ENCOUNTER — APPOINTMENT (OUTPATIENT)
Dept: RADIOLOGY | Facility: CLINIC | Age: 59
End: 2024-06-13
Payer: MEDICARE

## 2024-06-13 ENCOUNTER — OFFICE VISIT (OUTPATIENT)
Dept: FAMILY MEDICINE CLINIC | Facility: CLINIC | Age: 59
End: 2024-06-13
Payer: MEDICARE

## 2024-06-13 VITALS
BODY MASS INDEX: 29.16 KG/M2 | OXYGEN SATURATION: 98 % | HEIGHT: 73 IN | HEART RATE: 72 BPM | WEIGHT: 220 LBS | TEMPERATURE: 98.6 F | SYSTOLIC BLOOD PRESSURE: 134 MMHG | DIASTOLIC BLOOD PRESSURE: 72 MMHG

## 2024-06-13 DIAGNOSIS — S40.852A: ICD-10-CM

## 2024-06-13 DIAGNOSIS — M79.622 LEFT UPPER ARM PAIN: Primary | ICD-10-CM

## 2024-06-13 DIAGNOSIS — M79.622 LEFT UPPER ARM PAIN: ICD-10-CM

## 2024-06-13 PROCEDURE — G2211 COMPLEX E/M VISIT ADD ON: HCPCS

## 2024-06-13 PROCEDURE — 99213 OFFICE O/P EST LOW 20 MIN: CPT

## 2024-06-13 PROCEDURE — 73030 X-RAY EXAM OF SHOULDER: CPT

## 2024-06-13 PROCEDURE — 73060 X-RAY EXAM OF HUMERUS: CPT

## 2024-06-13 NOTE — PROGRESS NOTES
"Ambulatory Visit  Name: Chavez Newell      : 1965      MRN: [id number]  Encounter Provider: Philomena Russell PA-C  Encounter Date: 2024   Encounter department: [facility]    Assessment & Plan   1. Left upper arm pain  -     XR humerus left; Future; Expected date: 2024  -     XR shoulder 2+ vw left; Future; Expected date: 2024  2. Foreign body of skin of left upper arm  -     XR humerus left; Future; Expected date: 2024  -     XR shoulder 2+ vw left; Future; Expected date: 2024    Patient presents for evaluation of left upper arm pain after what he believes is a \"loose needle\" from his continuous glucose monitor falling off into his arm. On exam, slight pain to palpation of left mid-upper arm with slight swelling. No signs of infection. ROM and strength testing normal; gross sensation and pulses intact. Ordered XR of left humerus and shoulder for evaluation of foreign body as cause of his pain. Otherwise continue supportive care for pain. Will make further tx recommendation once imaging results are received. Advised ER if pain worsens.        History of Present Illness     CC: left arm pain with potential FB     Patient presents for evaluation of left upper arm pain. He believes a needle from his glucose monitor may be stuck in his arm. He reports the monitor \"felt loose\" about two weeks ago; when he took it out the needle was not attached. Ever sine this happened that area of his arm has been very painful; reports pain is worse at night. Pain does not radiate anywhere. Denies any numbness/tingling.   Has esthela taking tylenol which helps.       Review of Systems   Constitutional:  Negative for chills, diaphoresis and fever.   Respiratory:  Negative for chest tightness, shortness of breath and wheezing.    Cardiovascular:  Negative for chest pain and palpitations.   Musculoskeletal:  Positive for arthralgias and myalgias.   Neurological:  Negative for " dizziness, light-headedness and headaches.     Past Medical History:   Diagnosis Date    Anxiety 03/10/2022    Aorta aneurysm (HCC)     4.3    Arm DVT (deep venous thromboembolism), acute (HCC) 2015    complications of cardiac cath    Asthma     Blood clot in vein     right leg    Chronic kidney disease     CKD (chronic kidney disease), stage III (HCC)     COPD (chronic obstructive pulmonary disease) (HCC)     Depression     Diabetes mellitus (HCC)     Essential hypertriglyceridemia     GERD (gastroesophageal reflux disease)     Headache     Hiatal hernia     Hyperlipidemia, mixed 05/07/2018    Kidney stone     Liver disease     FATTY LIVER    Mild episode of recurrent major depressive disorder (HCC) 03/10/2022    PAC (premature atrial contraction)     Prediabetes     Rectus sheath hematoma, initial encounter 08/09/2023    Renal calculi     Sleep apnea     Vestibular migraine      Past Surgical History:   Procedure Laterality Date    CARPAL TUNNEL RELEASE Left     COLONOSCOPY      FL INJECTION RIGHT HIP (ARTHROGRAM)  08/20/2018    FOOT SURGERY Left     FOREIGN BODY REMOVAL    HERNIA REPAIR      NH ARTHRP ACETBLR/PROX FEM PROSTC AGRFT/ALGRFT Right 09/28/2020    Procedure: ARTHROPLASTY HIP TOTAL;  Surgeon: Jyoti Araiza MD;  [facility];  Service: Orthopedics    NH COLONOSCOPY FLX DX W/COLLJ SPEC WHEN PFRMD N/A 08/07/2017    Procedure: COLONOSCOPY;  Surgeon: Anthony France MD;  [facility];  Service: Gastroenterology    NH ESOPHAGOGASTRODUODENOSCOPY TRANSORAL DIAGNOSTIC N/A 10/31/2017    Procedure: ESOPHAGOGASTRODUODENOSCOPY (EGD);  Surgeon: Anthony France MD;  [facility];  Service: Gastroenterology    TOTAL HIP ARTHROPLASTY Right 2020     Family History   Problem Relation Age of Onset    Arthritis Mother     Heart attack Father     Clotting disorder Father     Schizoaffective Disorder  Sister     Cancer Brother     Prostate cancer Brother     Leukemia Brother         his only  brother dies from lymphoma or leukemia pt unsure he was only 54    Aortic aneurysm Other         abdominal     Social History     Tobacco Use    Smoking status: Some Days     Types: Cigars    Smokeless tobacco: Never    Tobacco comments:     never inhaled   Vaping Use    Vaping status: Never Used   Substance and Sexual Activity    Alcohol use: Not Currently     Comment: occasional beer    Drug use: No    Sexual activity: Yes     Partners: Female     Current Outpatient Medications on File Prior to Visit   Medication Sig    acetaminophen (TYLENOL) 500 mg tablet Take 500 mg by mouth every 6 (six) hours as needed for mild pain    Continuous Blood Gluc Sensor (FreeStyle Jelly 3 Sensor) MISC Use 1 each every 14 (fourteen) days    Exenatide ER (Bydureon BCise) 2 MG/0.85ML AUIJ Inject 2 mg under the skin once a week    fenofibrate 160 MG tablet take 1 tablet by mouth once daily    glucose blood test strip TEST BS TID    glucose monitoring kit (FREESTYLE) monitoring kit Use 1 each daily before breakfast    Insulin Glargine Solostar (Lantus SoloStar) 100 UNIT/ML SOPN Inject 0.2 mL (20 Units total) under the skin daily Or as directed by PCP    Insulin Pen Needle (BD Pen Needle Evelina 2nd Gen) 32G X 4 MM MISC inject as directed IN THE MORNING    Lancets MISC Use daily before breakfast    oxyCODONE (Roxicodone) 5 immediate release tablet Take 1 tablet (5 mg total) by mouth every 6 (six) hours as needed for moderate pain or severe pain for up to 12 doses Max Daily Amount: 20 mg    pantoprazole (PROTONIX) 40 mg tablet take 1 tablet by mouth once daily    rosuvastatin (CRESTOR) 5 mg tablet take 1 tablet by mouth once daily    sildenafil (REVATIO) 20 mg tablet TAKE 1 TABLET (20 MG TOTAL) BY MOUTH DAILY AS DIRECTED    traMADol (Ultram) 50 mg tablet Take 1 tablet (50 mg total) by mouth every 6 (six) hours as needed for moderate pain Do not start before June 7, 2024.    warfarin (COUMADIN) 2 mg tablet take 1 tablet by mouth MONDAY,  "WEDNESDAY, FRIDAY OR AS DIRECTED BY PCP    warfarin (COUMADIN) 3 mg tablet take 1 tablet by mouth TUESDAY, THURSDAY, SATURDAY, AND SUNDAY; OR AS DIRECTED BY PCP     No Known Allergies  Immunization History   Administered Date(s) Administered    COVID-19 PFIZER VACCINE 0.3 ML IM 03/12/2021, 04/02/2021, 11/12/2021, 12/18/2021    INFLUENZA 12/07/2016, 10/26/2017    Influenza, injectable, quadrivalent, preservative free 0.5 mL 09/26/2023    Influenza, recombinant, quadrivalent,injectable, preservative free 10/02/2018, 09/17/2019, 09/09/2020, 09/09/2021, 09/14/2022    Influenza, seasonal, injectable 04/15/2016    Pneumococcal Conjugate 13-Valent 12/07/2016    Pneumococcal Polysaccharide PPV23 11/20/2018    Tdap 06/09/2017    Zoster 06/09/2017    Zoster Vaccine Recombinant 03/05/2024, 05/11/2024     Objective     /72   Pulse 72   Temp 98.6 °F (37 °C)   Ht 6' 1\" (1.854 m)   Wt 99.8 kg (220 lb)   SpO2 98%   BMI 29.03 kg/m²     Physical Exam  Vitals reviewed.   Constitutional:       General: He is not in acute distress.     Appearance: Normal appearance. He is not ill-appearing or diaphoretic.   HENT:      Head: Normocephalic and atraumatic.      Right Ear: External ear normal.      Left Ear: External ear normal.      Nose: Nose normal.      Mouth/Throat:      Mouth: Mucous membranes are moist.   Eyes:      General:         Right eye: No discharge.         Left eye: No discharge.      Conjunctiva/sclera: Conjunctivae normal.   Cardiovascular:      Rate and Rhythm: Normal rate and regular rhythm.      Pulses: Normal pulses.      Heart sounds: Normal heart sounds. No murmur heard.  Pulmonary:      Effort: Pulmonary effort is normal. No respiratory distress.      Breath sounds: Normal breath sounds. No wheezing, rhonchi or rales.   Musculoskeletal:         General: Normal range of motion.        Arms:       Cervical back: Normal range of motion.      Right lower leg: No edema.      Left lower leg: No edema.      " Comments: Normal ROM. Strength testing normal. Gross sensation intact. Pulses even and symmetrical.    Skin:     General: Skin is warm.   Neurological:      General: No focal deficit present.      Mental Status: He is alert.      Gait: Gait normal.   Psychiatric:         Mood and Affect: Mood normal.       Administrative Statements

## 2024-06-18 ENCOUNTER — OFFICE VISIT (OUTPATIENT)
Dept: FAMILY MEDICINE CLINIC | Facility: CLINIC | Age: 59
End: 2024-06-18
Payer: MEDICARE

## 2024-06-18 ENCOUNTER — HOSPITAL ENCOUNTER (OUTPATIENT)
Dept: CT IMAGING | Facility: HOSPITAL | Age: 59
Discharge: HOME | End: 2024-06-18
Payer: MEDICARE

## 2024-06-18 ENCOUNTER — TELEPHONE (OUTPATIENT)
Age: 59
End: 2024-06-18

## 2024-06-18 ENCOUNTER — APPOINTMENT (OUTPATIENT)
Dept: RADIOLOGY | Facility: CLINIC | Age: 59
End: 2024-06-18
Payer: MEDICARE

## 2024-06-18 VITALS
DIASTOLIC BLOOD PRESSURE: 82 MMHG | SYSTOLIC BLOOD PRESSURE: 132 MMHG | HEIGHT: 73 IN | HEART RATE: 68 BPM | TEMPERATURE: 98.6 F | WEIGHT: 216.8 LBS | BODY MASS INDEX: 28.73 KG/M2 | OXYGEN SATURATION: 97 %

## 2024-06-18 DIAGNOSIS — R20.0 NUMBNESS AND TINGLING IN LEFT ARM: ICD-10-CM

## 2024-06-18 DIAGNOSIS — M54.2 CERVICAL PAIN (NECK): ICD-10-CM

## 2024-06-18 DIAGNOSIS — S40.852A: ICD-10-CM

## 2024-06-18 DIAGNOSIS — M79.622 LEFT UPPER ARM PAIN: ICD-10-CM

## 2024-06-18 DIAGNOSIS — R20.2 NUMBNESS AND TINGLING IN LEFT ARM: Primary | ICD-10-CM

## 2024-06-18 DIAGNOSIS — R20.2 NUMBNESS AND TINGLING IN LEFT ARM: ICD-10-CM

## 2024-06-18 DIAGNOSIS — R20.0 NUMBNESS AND TINGLING IN LEFT ARM: Primary | ICD-10-CM

## 2024-06-18 PROCEDURE — 99214 OFFICE O/P EST MOD 30 MIN: CPT

## 2024-06-18 PROCEDURE — 73200 CT UPPER EXTREMITY W/O DYE: CPT

## 2024-06-18 PROCEDURE — 72050 X-RAY EXAM NECK SPINE 4/5VWS: CPT

## 2024-06-18 PROCEDURE — 93000 ELECTROCARDIOGRAM COMPLETE: CPT

## 2024-06-18 RX ORDER — GABAPENTIN 100 MG/1
100 CAPSULE ORAL
Qty: 30 CAPSULE | Refills: 0 | Status: SHIPPED | OUTPATIENT
Start: 2024-06-18

## 2024-06-18 RX ORDER — METHOCARBAMOL 500 MG/1
500 TABLET, FILM COATED ORAL 3 TIMES DAILY
Qty: 30 TABLET | Refills: 0 | Status: SHIPPED | OUTPATIENT
Start: 2024-06-18

## 2024-06-18 NOTE — TELEPHONE ENCOUNTER
Pt wanted to let you know he is having a lot more pain in left arm/shoulder and it is covering a bigger area.  Pt is scheduled for U/S 6/27/24.    Please advise

## 2024-06-18 NOTE — TELEPHONE ENCOUNTER
Can he come back in for re-eval? I have some openings and it looks like his PCP does as well for today

## 2024-06-18 NOTE — PROGRESS NOTES
Ambulatory Visit  Name: Chavez Newell      : 1965      MRN: [id number]  Encounter Provider: Pihlomena Russell PA-C  Encounter Date: 2024   Encounter department: [facility]    Assessment & Plan   1. Numbness and tingling in left arm  -     gabapentin (Neurontin) 100 mg capsule; Take 1 capsule (100 mg total) by mouth daily at bedtime  -     methocarbamol (ROBAXIN) 500 mg tablet; Take 1 tablet (500 mg total) by mouth 3 (three) times a day  -     CT upper extremity wo contrast left; Future; Expected date: 2024  -     POCT ECG  2. Cervical pain (neck)  -     XR spine cervical complete 4 or 5 vw non injury; Future; Expected date: 2024  3. Left upper arm pain  -     CT upper extremity wo contrast left; Future; Expected date: 2024  4. Foreign body of skin of left upper arm  -     CT upper extremity wo contrast left; Future; Expected date: 2024    Patient presents for re-evaluation of left upper arm pain, now radiating to his left forearm and at times left side of cervical spine region. Intermittent numbness/tinging of the left arm as well. Tramadol does not help the pain. XR of left humerus and shoulder unremarkable; did not show FB.   Patient still concerned for FB given continuous glucose monitor needle falling off; did discuss that it is possible the pain is not related and it was coincidental. Patient does have chronic cervical spine issue (MRI completed in  showed spondylosis) which could be related, especially with new onset left sided cervical pain intermittently.    Therefore ordered cervical spine XR for updated imaging along with stat CT of his left upper extremity for further evaluation and to definitively rule out fb as cause of symptoms.   Discussed use of prednisone for pain, however pt diabetic with last A1C at 9.1 and elevated sugars, therefore will hold off on steroid at this time.   Will trial gabapentin to help with numbness/tingling before  "bedtime as that is when pain is the worse and robaxin PRN for pain relief. Discussed side effects of both medications.   Will make further tx recommendations once results of imaging and received. Otherwise did advise ER if pain worsens. Pt agreeable.     Of note, did EKG today given the pain being in left arm. EKG showed nonspecific ST wave abnormality with ST depression in inferior leads, however when compared to last EKG in 2022 this is unchanged. Did recommend updated stress test/echo as patient has not had updated cardiac studies in the last few years. He defers for now reporting he will talk to PCP about it at his next visit. Is aware to go to ER if he experience any cardiac symptoms. Do not think this has any relation to his current pain.        History of Present Illness     CC: left arm pain follow up     Patient presents for re-evaluation of left upper arm pain. Patient was initially seen by me on 6/13 for same issue, however he thought he had a \"needle\" from his continuous glucose monitor fall off into the arm. Therefore an XR of the humerus and shoulder was completed, both of which were normal and did not show a FB.   Patient reports the pain has gotten worse and now radiates to his forearm as well and at times the left side of his neck bothers him. Reports intermittent \"tingling\" sensation in the arm as well. Patient does have hx of cervical neck pain which he used to follow with spine management for. Reports he has never had pain radiate to the arm from his neck before.   Patient takes tramadol daily for his chronic lumbar back pain, reports the tramadol has not been helping with the arm pain.   Pain hurts the worse at night when he tries to lay down.       Review of Systems   Constitutional:  Negative for chills, diaphoresis and fever.   Respiratory:  Negative for chest tightness and shortness of breath.    Cardiovascular:  Negative for chest pain and palpitations.   Musculoskeletal:  Positive for " arthralgias (left arm), back pain (chronic lumbar), myalgias and neck pain (left sided). Negative for joint swelling.     Past Medical History:   Diagnosis Date    Anxiety 03/10/2022    Aorta aneurysm (HCC)     4.3    Arm DVT (deep venous thromboembolism), acute (HCC) 2015    complications of cardiac cath    Asthma     Blood clot in vein     right leg    Chronic kidney disease     CKD (chronic kidney disease), stage III (HCC)     COPD (chronic obstructive pulmonary disease) (HCC)     Depression     Diabetes mellitus (HCC)     Essential hypertriglyceridemia     GERD (gastroesophageal reflux disease)     Headache     Hiatal hernia     Hyperlipidemia, mixed 05/07/2018    Kidney stone     Liver disease     FATTY LIVER    Mild episode of recurrent major depressive disorder (HCC) 03/10/2022    PAC (premature atrial contraction)     Prediabetes     Rectus sheath hematoma, initial encounter 08/09/2023    Renal calculi     Sleep apnea     Vestibular migraine      Past Surgical History:   Procedure Laterality Date    CARPAL TUNNEL RELEASE Left     COLONOSCOPY      FL INJECTION RIGHT HIP (ARTHROGRAM)  08/20/2018    FOOT SURGERY Left     FOREIGN BODY REMOVAL    HERNIA REPAIR      OK ARTHRP ACETBLR/PROX FEM PROSTC AGRFT/ALGRFT Right 09/28/2020    Procedure: ARTHROPLASTY HIP TOTAL;  Surgeon: Jyoti Araiza MD;  Location: [facility];  Service: Orthopedics    OK COLONOSCOPY FLX DX W/COLLJ SPEC WHEN PFRMD N/A 08/07/2017    Procedure: COLONOSCOPY;  Surgeon: Anthony France MD;  Location: [facility];  Service: Gastroenterology    OK ESOPHAGOGASTRODUODENOSCOPY TRANSORAL DIAGNOSTIC N/A 10/31/2017    Procedure: ESOPHAGOGASTRODUODENOSCOPY (EGD);  Surgeon: Anthony France MD;  Location: [facility];  Service: Gastroenterology    TOTAL HIP ARTHROPLASTY Right 2020     Family History   Problem Relation Age of Onset    Arthritis Mother     Heart attack Father     Clotting disorder Father     Schizoaffective Disorder  Sister     Cancer  Brother     Prostate cancer Brother     Leukemia Brother         his only brother dies from lymphoma or leukemia pt unsure he was only 54    Aortic aneurysm Other         abdominal     Social History     Tobacco Use    Smoking status: Some Days     Types: Cigars    Smokeless tobacco: Never    Tobacco comments:     never inhaled   Vaping Use    Vaping status: Never Used   Substance and Sexual Activity    Alcohol use: Not Currently     Comment: occasional beer    Drug use: No    Sexual activity: Yes     Partners: Female     Current Outpatient Medications on File Prior to Visit   Medication Sig    acetaminophen (TYLENOL) 500 mg tablet Take 500 mg by mouth every 6 (six) hours as needed for mild pain    Continuous Blood Gluc Sensor (FreeStyle Jelly 3 Sensor) MISC Use 1 each every 14 (fourteen) days    Exenatide ER (Bydureon BCise) 2 MG/0.85ML AUIJ Inject 2 mg under the skin once a week    fenofibrate 160 MG tablet take 1 tablet by mouth once daily    glucose blood test strip TEST BS TID    glucose monitoring kit (FREESTYLE) monitoring kit Use 1 each daily before breakfast    Insulin Glargine Solostar (Lantus SoloStar) 100 UNIT/ML SOPN Inject 0.2 mL (20 Units total) under the skin daily Or as directed by PCP    Insulin Pen Needle (BD Pen Needle Evelina 2nd Gen) 32G X 4 MM MISC inject as directed IN THE MORNING    Lancets MISC Use daily before breakfast    oxyCODONE (Roxicodone) 5 immediate release tablet Take 1 tablet (5 mg total) by mouth every 6 (six) hours as needed for moderate pain or severe pain for up to 12 doses Max Daily Amount: 20 mg    pantoprazole (PROTONIX) 40 mg tablet take 1 tablet by mouth once daily    rosuvastatin (CRESTOR) 5 mg tablet take 1 tablet by mouth once daily    sildenafil (REVATIO) 20 mg tablet TAKE 1 TABLET (20 MG TOTAL) BY MOUTH DAILY AS DIRECTED    traMADol (Ultram) 50 mg tablet Take 1 tablet (50 mg total) by mouth every 6 (six) hours as needed for moderate pain Do not start before June 7,  "2024.    warfarin (COUMADIN) 2 mg tablet take 1 tablet by mouth MONDAY, WEDNESDAY, FRIDAY OR AS DIRECTED BY PCP    warfarin (COUMADIN) 3 mg tablet take 1 tablet by mouth TUESDAY, THURSDAY, SATURDAY, AND SUNDAY; OR AS DIRECTED BY PCP     No Known Allergies  Immunization History   Administered Date(s) Administered    COVID-19 PFIZER VACCINE 0.3 ML IM 03/12/2021, 04/02/2021, 11/12/2021, 12/18/2021    INFLUENZA 12/07/2016, 10/26/2017    Influenza, injectable, quadrivalent, preservative free 0.5 mL 09/26/2023    Influenza, recombinant, quadrivalent,injectable, preservative free 10/02/2018, 09/17/2019, 09/09/2020, 09/09/2021, 09/14/2022    Influenza, seasonal, injectable 04/15/2016    Pneumococcal Conjugate 13-Valent 12/07/2016    Pneumococcal Polysaccharide PPV23 11/20/2018    Tdap 06/09/2017    Zoster 06/09/2017    Zoster Vaccine Recombinant 03/05/2024, 05/11/2024     Objective     /82   Pulse 68   Temp 98.6 °F (37 °C)   Ht 6' 1\" (1.854 m)   Wt 98.3 kg (216 lb 12.8 oz)   SpO2 97%   BMI 28.60 kg/m²     Physical Exam  Vitals reviewed.   Constitutional:       General: He is not in acute distress.     Appearance: Normal appearance. He is not ill-appearing or diaphoretic.   HENT:      Head: Normocephalic and atraumatic.      Right Ear: External ear normal.      Left Ear: External ear normal.      Nose: Nose normal.      Mouth/Throat:      Mouth: Mucous membranes are moist.   Eyes:      General:         Right eye: No discharge.         Left eye: No discharge.      Conjunctiva/sclera: Conjunctivae normal.   Cardiovascular:      Rate and Rhythm: Normal rate and regular rhythm.      Pulses: Normal pulses.      Heart sounds: Normal heart sounds. No murmur heard.  Pulmonary:      Effort: Pulmonary effort is normal. No respiratory distress.      Breath sounds: Normal breath sounds. No wheezing, rhonchi or rales.   Musculoskeletal:         General: Normal range of motion.        Arms:       Cervical back: Normal range of " motion. No swelling, rigidity, spasms, tenderness or bony tenderness. No pain with movement. Normal range of motion.      Right lower leg: No edema.      Left lower leg: No edema.   Skin:     General: Skin is warm.   Neurological:      General: No focal deficit present.      Mental Status: He is alert.      Gait: Gait normal.   Psychiatric:         Mood and Affect: Mood normal.       Administrative Statements

## 2024-07-08 ENCOUNTER — TELEPHONE (OUTPATIENT)
Age: 59
End: 2024-07-08

## 2024-07-08 NOTE — TELEPHONE ENCOUNTER
Pt is having an injection tomorrow done in right knee. Was told to inform office incase doctor wanted to make any changes to insulin prior.  Pt states he is taking the Lantus 20 units daily and glucose numbers have been running high.    Please advise

## 2024-07-08 NOTE — TELEPHONE ENCOUNTER
How high are his sugars running? If they are already high, he may need to hold off on the injection as he tends to have significant increase in sugars when he has them

## 2024-07-08 NOTE — TELEPHONE ENCOUNTER
Ok -- he will likely need to increase his insulin dosing, but it really depends on what his sugars are, so he will need to check them regularly and if consistently >150 when fasting, can increase by 2-5 units per day.

## 2024-07-08 NOTE — TELEPHONE ENCOUNTER
7/8/2024 spoke with pt and he said that his sugars in AM run around 145 and then after he eats sugars are around 175

## 2024-07-09 ENCOUNTER — TELEPHONE (OUTPATIENT)
Age: 59
End: 2024-07-09

## 2024-07-09 NOTE — TELEPHONE ENCOUNTER
JERROD for clinical    Caller: shon Byrne    Doctor: Kristal    Reason for call: pt has a procedure today at 130.  Pt is diabetic and his sugar numbers are all over.  Pt would like to r/s his procedure as suggested by his pcp.    A!C is 9.1 currently    Call back#: [phone]

## 2024-07-10 ENCOUNTER — RA CDI HCC (OUTPATIENT)
Dept: OTHER | Facility: HOSPITAL | Age: 59
End: 2024-07-10

## 2024-07-10 NOTE — TELEPHONE ENCOUNTER
Lm for pt - asked for rcb to reschedule when A1c is stable/lower -- did cancel appt from 7/9 instead of no show

## 2024-07-12 DIAGNOSIS — I82.411 ACUTE DEEP VEIN THROMBOSIS (DVT) OF FEMORAL VEIN OF RIGHT LOWER EXTREMITY (HCC): ICD-10-CM

## 2024-07-12 RX ORDER — WARFARIN SODIUM 2 MG/1
TABLET ORAL
Qty: 30 TABLET | Refills: 0 | Status: SHIPPED | OUTPATIENT
Start: 2024-07-12

## 2024-07-15 DIAGNOSIS — R20.0 NUMBNESS AND TINGLING IN LEFT ARM: ICD-10-CM

## 2024-07-15 DIAGNOSIS — R20.2 NUMBNESS AND TINGLING IN LEFT ARM: ICD-10-CM

## 2024-07-15 PROBLEM — Z86.718 HISTORY OF DVT (DEEP VEIN THROMBOSIS): Status: ACTIVE | Noted: 2023-06-22

## 2024-07-15 RX ORDER — METHOCARBAMOL 500 MG/1
500 TABLET, FILM COATED ORAL 2 TIMES DAILY
Qty: 60 TABLET | Refills: 1 | Status: SHIPPED | OUTPATIENT
Start: 2024-07-15

## 2024-07-15 NOTE — PROGRESS NOTES
"Ambulatory Visit  Name: Chavez Newell      : 1965      MRN: [id number]  Encounter Provider: Adenike Garg DO  Encounter Date: 2024   Encounter department: [facility]    Assessment & Plan   1. Type 2 diabetes mellitus with stage 3a chronic kidney disease, without long-term current use of insulin ([facility])  Assessment & Plan:  A1c 7.7% (from 9.1) -- improved though still above goal. Likely in part due to diet. Pt agreeable to trial of meal-time sliding scale -- provided information for him today. Will f/u in 4 weeks to monitor. Reviewed well balanced meals to encourage more stable sugars/try to avoid large swings he is seeing around PO intake.   Microalbumin/cr ratio WNL   Orders:  -     POCT hemoglobin A1c  2. Numbness and tingling in left arm  Assessment & Plan:  Trial Gabapentin 300 mg qHS as symptoms seem more radicular in nature. If persistent/worsening, f/u with Ortho and/or Spine & Pain for re-evaluation.   Orders:  -     gabapentin (Neurontin) 300 mg capsule; Take 1 capsule (300 mg total) by mouth daily at bedtime       History of Present Illness     HPI    Pt presents for diabetic follow up.   Home sugars are still labile, though improving. Pt notes large spikes after eating. Has had two episodes of sugars in the 60s. Monitoring with CGM. Was supposed to have knee injection, but cancelled due to higher readings.   Still having pain/numbness in LUE, previously evaluated by Philomena -- thought to be radicular. Some improvement with Robaxin, Gabapentin not helping much.     Review of Systems   Musculoskeletal:  Positive for arthralgias and myalgias.   Neurological:  Positive for numbness.     Medical History Reviewed by provider this encounter:  Tobacco  Allergies  Meds  Problems  Med Hx  Surg Hx  Fam Hx       Objective     /76   Pulse 72   Temp 98.2 °F (36.8 °C)   Ht 6' 1\" (1.854 m)   Wt 98.9 kg (218 lb)   SpO2 98%   BMI 28.76 kg/m²     Physical Exam  Vitals " and nursing note reviewed.   Constitutional:       General: He is not in acute distress.     Appearance: Normal appearance.   Pulmonary:      Effort: Pulmonary effort is normal. No respiratory distress.   Neurological:      Mental Status: He is alert.      Comments: Grossly intact   Psychiatric:         Mood and Affect: Mood normal.       Administrative Statements

## 2024-07-15 NOTE — TELEPHONE ENCOUNTER
Patient states he has been taking it twice daily- it helps with his pain especially in the morning         Reason for call:   [x] Refill   [] Prior Auth  [] Other:     Office:   [x] PCP/Provider - Philomena Russell PA-C -NAVID COLINDRES   [] Specialty/Provider -     Medication:     methocarbamol (ROBAXIN) 500 mg tablet         Take 1 tablet (500 mg total) by mouth 3 (three) times a day       Pharmacy: [id number] - [place] - [facility] [place]     Does the patient have enough for 3 days?   [] Yes   [x] No - Send as HP to POD

## 2024-07-16 ENCOUNTER — OFFICE VISIT (OUTPATIENT)
Dept: FAMILY MEDICINE CLINIC | Facility: CLINIC | Age: 59
End: 2024-07-16
Payer: MEDICARE

## 2024-07-16 ENCOUNTER — TELEPHONE (OUTPATIENT)
Dept: FAMILY MEDICINE CLINIC | Facility: CLINIC | Age: 59
End: 2024-07-16

## 2024-07-16 VITALS
HEIGHT: 73 IN | DIASTOLIC BLOOD PRESSURE: 76 MMHG | SYSTOLIC BLOOD PRESSURE: 102 MMHG | OXYGEN SATURATION: 98 % | TEMPERATURE: 98.2 F | WEIGHT: 218 LBS | BODY MASS INDEX: 28.89 KG/M2 | HEART RATE: 72 BPM

## 2024-07-16 DIAGNOSIS — R20.0 NUMBNESS AND TINGLING IN LEFT ARM: ICD-10-CM

## 2024-07-16 DIAGNOSIS — R20.2 NUMBNESS AND TINGLING IN LEFT ARM: ICD-10-CM

## 2024-07-16 DIAGNOSIS — E11.22 TYPE 2 DIABETES MELLITUS WITH STAGE 3A CHRONIC KIDNEY DISEASE, WITHOUT LONG-TERM CURRENT USE OF INSULIN (HCC): Primary | ICD-10-CM

## 2024-07-16 DIAGNOSIS — N18.31 TYPE 2 DIABETES MELLITUS WITH STAGE 3A CHRONIC KIDNEY DISEASE, WITHOUT LONG-TERM CURRENT USE OF INSULIN (HCC): Primary | ICD-10-CM

## 2024-07-16 LAB — SL AMB POCT HEMOGLOBIN AIC: 7.7 (ref ?–6.5)

## 2024-07-16 PROCEDURE — 83036 HEMOGLOBIN GLYCOSYLATED A1C: CPT | Performed by: FAMILY MEDICINE

## 2024-07-16 PROCEDURE — 99214 OFFICE O/P EST MOD 30 MIN: CPT | Performed by: FAMILY MEDICINE

## 2024-07-16 PROCEDURE — 82570 ASSAY OF URINE CREATININE: CPT | Performed by: FAMILY MEDICINE

## 2024-07-16 PROCEDURE — 82043 UR ALBUMIN QUANTITATIVE: CPT | Performed by: FAMILY MEDICINE

## 2024-07-16 RX ORDER — INSULIN ASPART 100 [IU]/ML
INJECTION, SOLUTION INTRAVENOUS; SUBCUTANEOUS
Qty: 15 ML | Refills: 2 | Status: SHIPPED | OUTPATIENT
Start: 2024-07-16 | End: 2024-07-16

## 2024-07-16 RX ORDER — INSULIN LISPRO 100 [IU]/ML
INJECTION, SOLUTION INTRAVENOUS; SUBCUTANEOUS
Qty: 15 ML | Refills: 2 | Status: SHIPPED | OUTPATIENT
Start: 2024-07-16

## 2024-07-16 RX ORDER — GABAPENTIN 300 MG/1
300 CAPSULE ORAL
Qty: 30 CAPSULE | Refills: 1 | Status: SHIPPED | OUTPATIENT
Start: 2024-07-16

## 2024-07-16 NOTE — ASSESSMENT & PLAN NOTE
A1c 7.7% (from 9.1) -- improved though still above goal. Likely in part due to diet. Pt agreeable to trial of meal-time sliding scale -- provided information for him today. Will f/u in 4 weeks to monitor. Reviewed well balanced meals to encourage more stable sugars/try to avoid large swings he is seeing around PO intake.   Microalbumin/cr ratio WNL

## 2024-07-16 NOTE — ASSESSMENT & PLAN NOTE
Trial Gabapentin 300 mg qHS as symptoms seem more radicular in nature. If persistent/worsening, f/u with Ortho and/or Spine & Pain for re-evaluation.    Number Of Coats: 3 Chemical Peel: 15% TCA Detail Level: Zone Frost (0,1+,2+,3+,4+): 0 Consent: Prior to the procedure, written consent was obtained and risks were reviewed, including but not limited to: redness, peeling, blistering, pigmentary change, scarring, infection, and pain. Post-Care Instructions: I reviewed with the patient in detail post-care instructions. Patient should avoid sun exposure and wear sun protection. Prep: The treated area was degreased with pre-peel cleanser, and vaseline was applied for protection of mucous membranes. Erythema: mild Post Peel Care: After the procedure, the treatment area was washed with soap and water, and a post-peel cream was applied. Sun protection and post-care instructions were reviewed with the patient.

## 2024-07-17 LAB — SL AMB POCT UR MICROALBUMIN: <30

## 2024-07-19 DIAGNOSIS — E11.22 TYPE 2 DIABETES MELLITUS WITH STAGE 3A CHRONIC KIDNEY DISEASE, WITHOUT LONG-TERM CURRENT USE OF INSULIN (HCC): ICD-10-CM

## 2024-07-19 DIAGNOSIS — N18.31 TYPE 2 DIABETES MELLITUS WITH STAGE 3A CHRONIC KIDNEY DISEASE, WITHOUT LONG-TERM CURRENT USE OF INSULIN (HCC): ICD-10-CM

## 2024-07-19 RX ORDER — EXENATIDE 2 MG/.85ML
INJECTION, SUSPENSION, EXTENDED RELEASE SUBCUTANEOUS
Qty: 10.2 ML | Refills: 1 | Status: SHIPPED | OUTPATIENT
Start: 2024-07-19

## 2024-08-12 DIAGNOSIS — N18.31 TYPE 2 DIABETES MELLITUS WITH STAGE 3A CHRONIC KIDNEY DISEASE, WITHOUT LONG-TERM CURRENT USE OF INSULIN (HCC): ICD-10-CM

## 2024-08-12 DIAGNOSIS — E11.22 TYPE 2 DIABETES MELLITUS WITH STAGE 3A CHRONIC KIDNEY DISEASE, WITHOUT LONG-TERM CURRENT USE OF INSULIN (HCC): ICD-10-CM

## 2024-08-12 RX ORDER — PEN NEEDLE, DIABETIC 32GX 5/32"
NEEDLE, DISPOSABLE MISCELLANEOUS DAILY
Qty: 100 EACH | Refills: 1 | Status: SHIPPED | OUTPATIENT
Start: 2024-08-12 | End: 2024-08-13 | Stop reason: SDUPTHER

## 2024-08-12 NOTE — TELEPHONE ENCOUNTER
Reason for call:   [x] Refill   [] Prior Auth  [] Other:     Office:   [x] PCP/Provider - Adenike Garg,   [] Specialty/Provider -     Medication: Insulin Pen Needle (BD Pen Needle Evelina 2nd Gen) 32G X 4 MM MISC     Dose/Frequency:  inject as directed IN THE MORNING     Quantity: 100    Pharmacy: [id number] - [place] - [facility] [place]     Does the patient have enough for 3 days?   [] Yes   [x] No - Send as HP to POD

## 2024-08-13 DIAGNOSIS — E11.22 TYPE 2 DIABETES MELLITUS WITH STAGE 3A CHRONIC KIDNEY DISEASE, WITHOUT LONG-TERM CURRENT USE OF INSULIN (HCC): ICD-10-CM

## 2024-08-13 DIAGNOSIS — N18.31 TYPE 2 DIABETES MELLITUS WITH STAGE 3A CHRONIC KIDNEY DISEASE, WITHOUT LONG-TERM CURRENT USE OF INSULIN (HCC): ICD-10-CM

## 2024-08-13 RX ORDER — PEN NEEDLE, DIABETIC 32GX 5/32"
NEEDLE, DISPOSABLE MISCELLANEOUS 4 TIMES DAILY
Qty: 500 EACH | Refills: 5 | Status: SHIPPED | OUTPATIENT
Start: 2024-08-13

## 2024-08-13 NOTE — TELEPHONE ENCOUNTER
Patient called the RX Refill Line. Message is being forwarded to the office.     Patient using Pen Needles differently and need script to change.      Patient is requesting Andrés Atwood script need to be changed, using a total of approx 4 times daily with two different medication Lantus once daily and humalog every time he eats as needed after checking his chart and depending on his numbers.    He is using the needles more than once daily, and state that the pharmacy need the script to change from daily to more for insurance to cover     Please resend to pharmacy patient is running low on needles and need ASAP.       Please contact patient at [phone] with any questions or concerns

## 2024-08-18 NOTE — PROGRESS NOTES
"Ambulatory Visit  Name: Chavez Newell      : 1965      MRN: [id number]  Encounter Provider: Adenike Garg DO  Encounter Date: 2024   Encounter department: [facility]    Assessment & Plan   1. Type 2 diabetes mellitus with stage 3a chronic kidney disease, without long-term current use of insulin ([facility])  Assessment & Plan:  Blood sugars in range about 45% of the time per log -- can trail 2x2 titration of Lantus and can change Humalog to next step up on sliding scale handout provided. Will f/u in 10/2024 for repeat A1c and further monitoring.   Reviewed balanced meals, pt defers DM Ed.   Lab Results   Component Value Date    HGBA1C 7.7 (A) 2024        History of Present Illness     HPI    Pt presents for medication follow up -- added short acting insulin   Pt notes about 3/4 of the time, the short acting insulin \"is not doing anything\"     Of note, pt has an appointment scheduled with Spine & Pain later this week and is hoping to schedule an injection     Review of Systems   Constitutional:  Negative for diaphoresis.   Eyes:  Negative for visual disturbance.   Endocrine: Negative for polyphagia and polyuria.   Neurological:  Negative for dizziness and tremors.     Medical History Reviewed by provider this encounter:  Tobacco  Allergies  Meds  Problems  Med Hx  Surg Hx  Fam Hx       Objective     /70   Pulse 60   Temp 97.5 °F (36.4 °C)   Ht 6' 1\" (1.854 m)   Wt 99.5 kg (219 lb 6.4 oz)   SpO2 94%   BMI 28.95 kg/m²     Physical Exam  Vitals and nursing note reviewed.   Constitutional:       General: He is not in acute distress.     Appearance: Normal appearance.   Pulmonary:      Effort: Pulmonary effort is normal. No respiratory distress.   Neurological:      Mental Status: He is alert.      Comments: Grossly intact   Psychiatric:         Mood and Affect: Mood normal.       Administrative Statements         "

## 2024-08-19 ENCOUNTER — OFFICE VISIT (OUTPATIENT)
Dept: FAMILY MEDICINE CLINIC | Facility: CLINIC | Age: 59
End: 2024-08-19
Payer: MEDICARE

## 2024-08-19 VITALS
BODY MASS INDEX: 29.08 KG/M2 | WEIGHT: 219.4 LBS | HEIGHT: 73 IN | DIASTOLIC BLOOD PRESSURE: 70 MMHG | TEMPERATURE: 97.5 F | OXYGEN SATURATION: 94 % | SYSTOLIC BLOOD PRESSURE: 112 MMHG | HEART RATE: 60 BPM

## 2024-08-19 DIAGNOSIS — E11.22 TYPE 2 DIABETES MELLITUS WITH STAGE 3A CHRONIC KIDNEY DISEASE, WITHOUT LONG-TERM CURRENT USE OF INSULIN (HCC): Primary | ICD-10-CM

## 2024-08-19 DIAGNOSIS — N18.31 TYPE 2 DIABETES MELLITUS WITH STAGE 3A CHRONIC KIDNEY DISEASE, WITHOUT LONG-TERM CURRENT USE OF INSULIN (HCC): Primary | ICD-10-CM

## 2024-08-19 PROCEDURE — 99213 OFFICE O/P EST LOW 20 MIN: CPT | Performed by: FAMILY MEDICINE

## 2024-08-19 PROCEDURE — G2211 COMPLEX E/M VISIT ADD ON: HCPCS | Performed by: FAMILY MEDICINE

## 2024-08-19 NOTE — ASSESSMENT & PLAN NOTE
Blood sugars in range about 45% of the time per log -- can trail 2x2 titration of Lantus and can change Humalog to next step up on sliding scale handout provided. Will f/u in 10/2024 for repeat A1c and further monitoring.   Reviewed balanced meals, pt defers DM Ed.   Lab Results   Component Value Date    HGBA1C 7.7 (A) 07/16/2024

## 2024-08-21 NOTE — PROGRESS NOTES
Pain Medicine Follow-Up Note    Assessment:  1. Chronic pain syndrome    2. Acute pain of left shoulder    3. Lumbar spondylosis    4. Long-term current use of opiate analgesic    5. Uncomplicated opioid dependence (HCC)        Plan:  Orders Placed This Encounter   Procedures    MRI arthrogram left shoulder     Standing Status:   Future     Standing Expiration Date:   8/23/2028     Scheduling Instructions:      There is no preparation for this test. Please leave your jewelry and valuables at home, wedding rings are the exception. All patients will be required to change into a hospital gown and pants.  Street clothes are not permitted in the MRI.  Magnetic nail polish must be removed prior to arrival for your test. Please bring your insurance cards, a form of photo ID and a list of your medications with you. Arrive 15 minutes prior to your appointment time in order to register. Please bring any prior CT or MRI studies of this area that were not performed at a [facility].            To schedule this appointment, please contact Central Scheduling at [phone].            Prior to your appointment, please make sure you complete the MRI Screening Form when you e-Check in for your appointment. This will be available starting 7 days before your appointment in [employer]. You may receive an e-mail with an activation code if you do not have a [employer] account. If you do not have access to a device, we will complete your screening at your appointment.     Order Specific Question:   Reason for Exam     Answer:   suspect Infraspinatus tear, xrays done     Order Specific Question:   What is the patient's sedation requirement?     Answer:   No Sedation     Order Specific Question:   Does the patient need medication for Claustrophobia? If yes, order medication at this point.     Answer:   No     Order Specific Question:   Does the patient wear a life vest, have an implanted cardiac device, a stimulation device, a sleep  apnea stimulator, or a breast tissue expansion device?     Answer:   No     Order Specific Question:   Release to patient through Mychart     Answer:   Immediate    FL injection left shoulder (arthrogram)     Standing Status:   Future     Standing Expiration Date:   8/23/2028     Scheduling Instructions:      There is no prep for this study. Please bring your insurance cards, a form of photo ID and a list of your medications with you. Arrive 15 minutes prior to your appointment time to register. On the day of your test, please bring any prior imaging (CT, MRI,  XRAY) studies of this area with you that were not performed at a [facility].                  To schedule this appointment, please contact Central Scheduling at [phone].           Order Specific Question:   Reason for Exam:     Answer:   Left shoulder Arthrogram    MM ALL_Prescribed Meds and Special Instructions     Order Specific Question:   [facility] Is GABAPENTIN prescribed?     Answer:   Yes     Order Specific Question:   [facility] Is METHOCARBAMOL prescribed?     Answer:   Yes     Order Specific Question:   [facility] Is OXYCODONE prescribed?     Answer:   Yes     Order Specific Question:   [facility] Is TRAMADOL prescribed?     Answer:   Yes    MM DT_Alprazolam Definitive Test    MM DT_Amphetamine Definitive Test    MM DT_Buprenorphine Definitive Test    MM DT_Butalbital Definitive Test    MM DT_Clonazepam Definitive Test    MM DT_Cocaine Definitive Test    MM DT_Codeine Definitive Test    MM DT_Dextromethorphan Definitive Test    MM Diazepam Definitive Test    MM DT_Ethyl Glucuronide/Ethyl Sulfate Definitive Test    MM DT_Fentanyl Definitive Test    MM DT_Heroin Definitive Test    MM DT_Hydrocodone Definitive Test    MM DT_Hydromorphone Definitive Test    MM DT_Kratom Definitive Test    MM DT_Levorphanol Definitive Test    MM DT_MDMA Definitive Test    MM DT_Meperidine Definitive Test    MM DT_Methadone Definitive Test    MM  DT_Methamphetamine Definitive Test    MM DT_Methylphenidate Definitive Test    MM DT_Morphine Definitive Test    MM Lorazepam Definitive Test    MM DT_Oxazepam Definitive Test    MM DT_Oxycodone Definitive Test    MM DT_Oxymorphone Definitive Test    MM DT_Phencyclidine Definitive Test    MM DT_Phenobarbital Definitive Test    MM DT_Phentermine Definitive Test    MM DT_Secobarbital Definitive Test    MM DT_Spice Definitive Test    MM DT_Tapentadol Definitive Test    MM DT_Temazapam Definitive Test    MM DT_THC Definitive Test    MM DT_Tramadol Definitive Test    Ambulatory referral to Orthopedic Surgery     Standing Status:   Future     Standing Expiration Date:   8/23/2025     Referral Priority:   Routine     Referral Type:   Consult - AMB     Referral Reason:   Specialty Services Required     Referred to Provider:   Junior Rivera DO     Requested Specialty:   Orthopedic Surgery     Number of Visits Requested:   1     Expiration Date:   8/23/2025    Ambulatory referral to Physical Therapy     Standing Status:   Future     Standing Expiration Date:   8/25/2025     Referral Priority:   Routine     Referral Type:   Physical Therapy     Referral Reason:   Specialty Services Required     Requested Specialty:   Physical Therapy     Number of Visits Requested:   1     Expiration Date:   8/25/2025       New Medications Ordered This Visit   Medications    traMADol (Ultram) 50 mg tablet     Sig: May take 2 tablets (100 mg total) by mouth daily as needed for moderate pain. May also take 1 tablet (50 mg total) every 8 (eight) hours as needed for moderate pain. Max 5 tablets per day.     Dispense:  150 tablet     Refill:  2     Fill on 8/30/2024 and refill on 9/30/2024 and 10/30/2024    naloxone (NARCAN) 4 mg/0.1 mL nasal spray     Sig: Administer 1 spray into a nostril. If no response after 2-3 minutes, give another dose in the other nostril using a new spray.     Dispense:  1 each     Refill:  1       My impressions and  treatment recommendations were discussed in detail with the patient who verbalized understanding and had no further questions.      Patient returns to the office with severe left shoulder pain.  On exam I suspect an infraspinatus tear, PCP had x-rays done, I will order an MRI of his left shoulder and advised that he follow-up with an orthopedic surgeon.  Referral provided to Dr. Rivera.  Patient advised to set up appointment after MRI.    Patient reports that due to worsening pain tramadol 50 mg tablet 4 times daily is not nearly as effective as it once was.  The patient denies any side effects using this medication.  I recommend that the patient take tramadol 100 mg in the morning as needed for severe pain and then take 50 mg every 6 hours as needed for severe pain for max of 5 tablets in 24 hours.  Tramadol 50 mg tablet e-prescribed to the patient's pharmacy to fill on 8/30/2024.    [place] Prescription Drug Monitoring Program report was reviewed and was appropriate     A urine drug screen was collected at today's office visit as part of our medication management protocol. The point of care testing results were appropriate for what was being prescribed. The specimen will be sent for confirmatory testing. The drug screen is medically necessary because the patient is either dependent on opioid medication or is being considered for opioid medication therapy and the results could impact ongoing or future treatment. The drug screen is to evaluate for the presences or absence of prescribed, non-prescribed, and/or illicit drugs/substances.    There are risks associated with opioid medications, including dependence, addiction and tolerance. The patient understands and agrees to use these medications only as prescribed. Potential side effects of the medications include, but are not limited to, constipation, drowsiness, addiction, impaired judgment and risk of fatal overdose if not taken as prescribed. The patient  was warned against driving while taking sedation medications.  Sharing medications is a felony. At this point in time, the patient is showing no signs of addiction, abuse, diversion or suicidal ideation.    Follow-up is planned in 12 weeks for medications time or sooner as warranted.  Discharge instructions were provided. I personally saw and examined the patient and I agree with the above discussed plan of care.    History of Present Illness:    Chavez Newell is a 59 y.o. male who presents to [facility] for interval re-evaluation of the above stated pain complaints. The patient has a past medical and chronic pain history as outlined in the assessment section. He was last seen on 5/24/2024.    At today's visit patient states that their pain symptoms are worse with a pain score of 7/10 on the verbal numeric pain scale.  The patient's pain is worse in the morning, and evening.  The patient's pain is occasional in nature.  And the quality of the patient's pain is described as sharp, throbbing, pressure-like, and shooting.  The patient's pain is located in the posterior neck and left shoulder radiating into his left deltoid as well as his low back and right groin.  The patient states the amount of pain relief he is currently obtaining from his pain relievers is not enough to make a difference in his life due to his severe ongoing left shoulder pain.    Pain Contract Signed:  11/16/23  Last Urine Drug Screen:  03/05/24  New Urine Drug Screen: 08/23/24  Last dose of opioid medication:  08/23/24    Other than as stated above, the patient denies any interval changes in medications, medical condition, mental condition, symptoms, or allergies since the last office visit.         Review of Systems:    Review of Systems   Respiratory:  Negative for shortness of breath.    Cardiovascular:  Negative for chest pain.   Gastrointestinal:  Positive for nausea. Negative for constipation, diarrhea and  vomiting.   Musculoskeletal:  Positive for arthralgias. Negative for gait problem, joint swelling and myalgias.        DROM  Pain in Extremity    Skin:  Negative for rash.   Neurological:  Negative for dizziness, seizures and weakness.   All other systems reviewed and are negative.        Past Medical History:   Diagnosis Date    Anxiety 03/10/2022    Aorta aneurysm (HCC)     4.3    Arm DVT (deep venous thromboembolism), acute (HCC) 2015    complications of cardiac cath    Asthma     Blood clot in vein     right leg    Chronic kidney disease     CKD (chronic kidney disease), stage III (HCC)     COPD (chronic obstructive pulmonary disease) (HCC)     Depression     Diabetes mellitus (HCC)     Essential hypertriglyceridemia     GERD (gastroesophageal reflux disease)     Headache     Hiatal hernia     Hyperlipidemia, mixed 05/07/2018    Kidney stone     Liver disease     FATTY LIVER    Mild episode of recurrent major depressive disorder (HCC) 03/10/2022    PAC (premature atrial contraction)     Prediabetes     Rectus sheath hematoma, initial encounter 08/09/2023    Renal calculi     Sleep apnea     Vestibular migraine        Past Surgical History:   Procedure Laterality Date    CARPAL TUNNEL RELEASE Left     COLONOSCOPY      FL INJECTION RIGHT HIP (ARTHROGRAM)  08/20/2018    FOOT SURGERY Left     FOREIGN BODY REMOVAL    HERNIA REPAIR      WY ARTHRP ACETBLR/PROX FEM PROSTC AGRFT/ALGRFT Right 09/28/2020    Procedure: ARTHROPLASTY HIP TOTAL;  Surgeon: Jyoti Araiza MD;  Location: [facility];  Service: Orthopedics    WY COLONOSCOPY FLX DX W/COLLJ SPEC WHEN PFRMD N/A 08/07/2017    Procedure: COLONOSCOPY;  Surgeon: Anthony France MD;  Location: [facility];  Service: Gastroenterology    WY ESOPHAGOGASTRODUODENOSCOPY TRANSORAL DIAGNOSTIC N/A 10/31/2017    Procedure: ESOPHAGOGASTRODUODENOSCOPY (EGD);  Surgeon: Anthony France MD;  Location: [facility];  Service: Gastroenterology    TOTAL HIP ARTHROPLASTY Right 2020        Family History   Problem Relation Age of Onset    Arthritis Mother     Heart attack Father     Clotting disorder Father     Schizoaffective Disorder  Sister     Cancer Brother     Prostate cancer Brother     Leukemia Brother         his only brother dies from lymphoma or leukemia pt unsure he was only 54    Aortic aneurysm Other         abdominal       Social History     Occupational History    Occupation: unemployed   Tobacco Use    Smoking status: Some Days     Types: Cigars     Passive exposure: Never    Smokeless tobacco: Never    Tobacco comments:     never inhaled   Vaping Use    Vaping status: Never Used   Substance and Sexual Activity    Alcohol use: Not Currently     Comment: occasional beer    Drug use: No    Sexual activity: Yes     Partners: Female         Current Outpatient Medications:     acetaminophen (TYLENOL) 500 mg tablet, Take 500 mg by mouth every 6 (six) hours as needed for mild pain, Disp: , Rfl:     Bydureon BCise 2 MG/0.85ML AUIJ, inject 2 milligrams subcutaneously weekly, Disp: 10.2 mL, Rfl: 1    Continuous Blood Gluc Sensor (FreeStyle Jelly 3 Sensor) MISC, Use 1 each every 14 (fourteen) days, Disp: 6 each, Rfl: 3    fenofibrate 160 MG tablet, take 1 tablet by mouth once daily, Disp: 90 tablet, Rfl: 1    gabapentin (Neurontin) 300 mg capsule, Take 1 capsule (300 mg total) by mouth daily at bedtime, Disp: 30 capsule, Rfl: 1    glucose blood test strip, TEST BS TID, Disp: 300 each, Rfl: 5    glucose monitoring kit (FREESTYLE) monitoring kit, Use 1 each daily before breakfast, Disp: 1 each, Rfl: 0    Insulin Glargine Solostar (Lantus SoloStar) 100 UNIT/ML SOPN, Inject 0.2 mL (20 Units total) under the skin daily Or as directed by PCP, Disp: 15 mL, Rfl: 1    insulin lispro (HumaLOG KwikPen) 100 units/mL injection pen, Per sliding scale, Max 50 units daily, Disp: 15 mL, Rfl: 2    Insulin Pen Needle (BD Pen Needle Evelina 2nd Gen) 32G X 4 MM MISC, Inject as directed 4 (four) times a day, Disp:  "500 each, Rfl: 5    Lancets MISC, Use daily before breakfast, Disp: 100 each, Rfl: 5    methocarbamol (ROBAXIN) 500 mg tablet, Take 1 tablet (500 mg total) by mouth 2 (two) times a day, Disp: 60 tablet, Rfl: 1    naloxone (NARCAN) 4 mg/0.1 mL nasal spray, Administer 1 spray into a nostril. If no response after 2-3 minutes, give another dose in the other nostril using a new spray., Disp: 1 each, Rfl: 1    pantoprazole (PROTONIX) 40 mg tablet, take 1 tablet by mouth once daily, Disp: 90 tablet, Rfl: 3    rosuvastatin (CRESTOR) 5 mg tablet, take 1 tablet by mouth once daily, Disp: 90 tablet, Rfl: 1    sildenafil (REVATIO) 20 mg tablet, TAKE 1 TABLET (20 MG TOTAL) BY MOUTH DAILY AS DIRECTED, Disp: 90 tablet, Rfl: 3    traMADol (Ultram) 50 mg tablet, May take 2 tablets (100 mg total) by mouth daily as needed for moderate pain. May also take 1 tablet (50 mg total) every 8 (eight) hours as needed for moderate pain. Max 5 tablets per day., Disp: 150 tablet, Rfl: 2    warfarin (COUMADIN) 2 mg tablet, take 1 tablet by mouth MONDAY, WEDNESDAY, FRIDAY OR AS DIRECTED BY PCP, Disp: 30 tablet, Rfl: 0    warfarin (COUMADIN) 3 mg tablet, take 1 tablet by mouth TUESDAY, THURSDAY, SATURDAY, AND SUNDAY; OR AS DIRECTED BY PCP, Disp: 90 tablet, Rfl: 1    oxyCODONE (Roxicodone) 5 immediate release tablet, Take 1 tablet (5 mg total) by mouth every 6 (six) hours as needed for moderate pain or severe pain for up to 12 doses Max Daily Amount: 20 mg, Disp: 12 tablet, Rfl: 0    No Known Allergies    Physical Exam:    /82   Pulse 61   Ht 6' 1\" (1.854 m)   Wt 99.5 kg (219 lb 5.7 oz)   BMI 28.94 kg/m²     Constitutional:normal, well developed, well nourished, alert, in no distress and non-toxic and no overt pain behavior.  Eyes:anicteric  HEENT:grossly intact  Neck: Tenderness to palpation along the left paraspinal muscle  Pulmonary:even and unlabored  Cardiovascular:No edema or pitting edema present  Skin:Normal without rashes or " lesions and well hydrated  Psychiatric:Mood and affect appropriate  Neurologic:Cranial Nerves II-XII grossly intact  Musculoskeletal:normal gait    Shoulder Exam    Appearance:  Normal with no swelling or bruising and no obvious joint deformity  Palpation/Tenderness:  Tenderness with palpation over left AC joint, glenohumeral joint, lateral aspect of left shoulder  Active Range of Motion:  Flexion: No limitation and with pain, Extension: No limitation and with pain, Abduction: Minimally limited and with pain, External Rotation: Minimally limited, and Internal Rotation: No limitation  Special Tests:  Speed's Test:  negative   Infraspinatus scapular rotation to test: Positive  Liftoff test: Negative      Imaging  MRI arthrogram left shoulder    (Results Pending)   FL injection left shoulder (arthrogram)    (Results Pending)         Orders Placed This Encounter   Procedures    MRI arthrogram left shoulder    FL injection left shoulder (arthrogram)    MM ALL_Prescribed Meds and Special Instructions    MM DT_Alprazolam Definitive Test    MM DT_Amphetamine Definitive Test    MM DT_Buprenorphine Definitive Test    MM DT_Butalbital Definitive Test    MM DT_Clonazepam Definitive Test    MM DT_Cocaine Definitive Test    MM DT_Codeine Definitive Test    MM DT_Dextromethorphan Definitive Test    MM Diazepam Definitive Test    MM DT_Ethyl Glucuronide/Ethyl Sulfate Definitive Test    MM DT_Fentanyl Definitive Test    MM DT_Heroin Definitive Test    MM DT_Hydrocodone Definitive Test    MM DT_Hydromorphone Definitive Test    MM DT_Kratom Definitive Test    MM DT_Levorphanol Definitive Test    MM DT_MDMA Definitive Test    MM DT_Meperidine Definitive Test    MM DT_Methadone Definitive Test    MM DT_Methamphetamine Definitive Test    MM DT_Methylphenidate Definitive Test    MM DT_Morphine Definitive Test    MM Lorazepam Definitive Test    MM DT_Oxazepam Definitive Test    MM DT_Oxycodone Definitive Test    MM DT_Oxymorphone  Definitive Test    MM DT_Phencyclidine Definitive Test    MM DT_Phenobarbital Definitive Test    MM DT_Phentermine Definitive Test    MM DT_Secobarbital Definitive Test    MM DT_Spice Definitive Test    MM DT_Tapentadol Definitive Test    MM DT_Temazapam Definitive Test    MM DT_THC Definitive Test    MM DT_Tramadol Definitive Test    Ambulatory referral to Orthopedic Surgery    Ambulatory referral to Physical Therapy       This document was created using speech voice recognition software.   Grammatical errors, random word insertions, pronoun errors, and incomplete sentences are an occasional consequence of this system due to software limitations, ambient noise, and hardware issues.   Any formal questions or concerns about content, text, or information contained within the body of this dictation should be directly addressed to the provider for clarification.

## 2024-08-23 ENCOUNTER — OFFICE VISIT (OUTPATIENT)
Dept: PAIN MEDICINE | Facility: CLINIC | Age: 59
End: 2024-08-23
Payer: MEDICARE

## 2024-08-23 ENCOUNTER — TELEPHONE (OUTPATIENT)
Age: 59
End: 2024-08-23

## 2024-08-23 VITALS
BODY MASS INDEX: 29.07 KG/M2 | DIASTOLIC BLOOD PRESSURE: 82 MMHG | HEART RATE: 61 BPM | SYSTOLIC BLOOD PRESSURE: 118 MMHG | WEIGHT: 219.36 LBS | HEIGHT: 73 IN

## 2024-08-23 DIAGNOSIS — G89.4 CHRONIC PAIN SYNDROME: Primary | ICD-10-CM

## 2024-08-23 DIAGNOSIS — M47.816 LUMBAR SPONDYLOSIS: ICD-10-CM

## 2024-08-23 DIAGNOSIS — M25.512 ACUTE PAIN OF LEFT SHOULDER: ICD-10-CM

## 2024-08-23 DIAGNOSIS — F11.20 UNCOMPLICATED OPIOID DEPENDENCE (HCC): ICD-10-CM

## 2024-08-23 DIAGNOSIS — Z79.891 LONG-TERM CURRENT USE OF OPIATE ANALGESIC: ICD-10-CM

## 2024-08-23 PROCEDURE — 99214 OFFICE O/P EST MOD 30 MIN: CPT

## 2024-08-23 RX ORDER — TRAMADOL HYDROCHLORIDE 50 MG/1
TABLET ORAL
Qty: 150 TABLET | Refills: 2 | Status: SHIPPED | OUTPATIENT
Start: 2024-08-23

## 2024-08-23 NOTE — TELEPHONE ENCOUNTER
Caller: Central scheduling     Doctor: Iman     Reason for call: Central scheduling calling stating FL Injection missing in order for MRI arthrogram please advise     Call back#: [phone]

## 2024-08-25 LAB
6MAM UR QL CFM: NEGATIVE NG/ML
7AMINOCLONAZEPAM UR QL CFM: NEGATIVE NG/ML
A-OH ALPRAZ UR QL CFM: NEGATIVE NG/ML
AMPHET UR QL CFM: NEGATIVE NG/ML
AMPHET UR QL CFM: NEGATIVE NG/ML
BUPRENORPHINE UR QL CFM: NEGATIVE NG/ML
BUTALBITAL UR QL CFM: NEGATIVE NG/ML
BZE UR QL CFM: NEGATIVE NG/ML
CODEINE UR QL CFM: NEGATIVE NG/ML
EDDP UR QL CFM: NEGATIVE NG/ML
ETHYL GLUCURONIDE UR QL CFM: NEGATIVE NG/ML
ETHYL SULFATE UR QL SCN: NEGATIVE NG/ML
FENTANYL UR QL CFM: NEGATIVE NG/ML
GLIADIN IGG SER IA-ACNC: NEGATIVE NG/ML
HYDROCODONE UR QL CFM: NEGATIVE NG/ML
HYDROCODONE UR QL CFM: NEGATIVE NG/ML
HYDROMORPHONE UR QL CFM: NEGATIVE NG/ML
LORAZEPAM UR QL CFM: NEGATIVE NG/ML
MDMA UR QL CFM: NEGATIVE NG/ML
ME-PHENIDATE UR QL CFM: NEGATIVE NG/ML
MEPERIDINE UR QL CFM: NEGATIVE NG/ML
METHADONE UR QL CFM: NEGATIVE NG/ML
METHAMPHET UR QL CFM: NEGATIVE NG/ML
MORPHINE UR QL CFM: NEGATIVE NG/ML
MORPHINE UR QL CFM: NEGATIVE NG/ML
NORBUPRENORPHINE UR QL CFM: NEGATIVE NG/ML
NORDIAZEPAM UR QL CFM: NEGATIVE NG/ML
NORFENTANYL UR QL CFM: NEGATIVE NG/ML
NORHYDROCODONE UR QL CFM: NEGATIVE NG/ML
NORHYDROCODONE UR QL CFM: NEGATIVE NG/ML
NORMEPERIDINE UR QL CFM: NEGATIVE NG/ML
NOROXYCODONE UR QL CFM: ABNORMAL NG/ML
OXAZEPAM UR QL CFM: NEGATIVE NG/ML
OXYCODONE UR QL CFM: ABNORMAL NG/ML
OXYMORPHONE UR QL CFM: ABNORMAL NG/ML
OXYMORPHONE UR QL CFM: ABNORMAL NG/ML
PARA-FLUOROFENTANYL QUANTIFICATION: NORMAL NG/ML
PCP UR QL CFM: NEGATIVE NG/ML
PHENOBARB UR QL CFM: NEGATIVE NG/ML
RESULT ALL_PRESCRIBED MEDS AND SPECIAL INSTRUCTIONS: NORMAL
SECOBARBITAL UR QL CFM: NEGATIVE NG/ML
SL AMB 5F-ADB-M7 METABOLITE QUANTIFICATION: NEGATIVE NG/ML
SL AMB 7-OH-MITRAGYNINE (KRATOM ALKALOID) QUANTIFICATION: NEGATIVE NG/ML
SL AMB AB-FUBINACA-M3 METABOLITE QUANTIFICATION: NEGATIVE NG/ML
SL AMB ACETYL FENTANYL QUANTIFICATION: NORMAL NG/ML
SL AMB ACETYL NORFENTANYL QUANTIFICATION: NORMAL NG/ML
SL AMB ACRYL FENTANYL QUANTIFICATION: NORMAL NG/ML
SL AMB CARFENTANIL QUANTIFICATION: NORMAL NG/ML
SL AMB CTHC (MARIJUANA METABOLITE) QUANTIFICATION: NEGATIVE NG/ML
SL AMB DEXTROMETHORPHAN QUANTIFICATION: NEGATIVE NG/ML
SL AMB DEXTRORPHAN (DEXTROMETHORPHAN METABOLITE) QUANT: NEGATIVE NG/ML
SL AMB DEXTRORPHAN (DEXTROMETHORPHAN METABOLITE) QUANT: NEGATIVE NG/ML
SL AMB JWH018 METABOLITE QUANTIFICATION: NEGATIVE NG/ML
SL AMB JWH073 METABOLITE QUANTIFICATION: NEGATIVE NG/ML
SL AMB MDMB-FUBINACA-M1 METABOLITE QUANTIFICATION: NEGATIVE NG/ML
SL AMB N-DESMETHYL-TRAMADOL QUANTIFICATION: NORMAL NG/ML
SL AMB PHENTERMINE QUANTIFICATION: NEGATIVE NG/ML
SL AMB RCS4 METABOLITE QUANTIFICATION: NEGATIVE NG/ML
SL AMB RITALINIC ACID QUANTIFICATION: NEGATIVE NG/ML
SPECIMEN DRAWN SERPL: NEGATIVE NG/ML
TAPENTADOL UR QL CFM: NEGATIVE NG/ML
TEMAZEPAM UR QL CFM: NEGATIVE NG/ML
TEMAZEPAM UR QL CFM: NEGATIVE NG/ML
TRAMADOL UR QL CFM: NORMAL NG/ML
URATE/CREAT 24H UR: NORMAL NG/ML

## 2024-08-25 NOTE — PATIENT INSTRUCTIONS

## 2024-09-05 DIAGNOSIS — Z79.01 CURRENT USE OF LONG TERM ANTICOAGULATION: Primary | ICD-10-CM

## 2024-09-05 NOTE — NURSING NOTE
Patient returned call to discuss upcoming appointment at [facility] radiology department and complete consultation with patient. Patient is having an arthrogram of his left shoulder utilizing fluoroscopy guidance. Reviewed patient's allergies, current anticoagulant medication (Coumadin) present per patient but not required to stop per periprocedural management of coagulation status and hemostasis risk in percutaneous image guided procedure guidelines but patient requires an INR to be drawn within 30 days of the procedure. Patient made aware and stated he will be able to get lab work done prior. Also discussed the pre and post procedure expectations. Patient made aware of need for  post procedure if anti anxiety medication is taken. Reminded patient of location and time expected for procedure, Patient expressed understanding by verbalizing and repeating instructions.     Request for INR lab order sent by this RN to ordering provider via secure chat.     The following message has been sent to patient via my chart in basket:    Good Afternoon Mr. Newell,     We are located at [address] [place] [address]. Your appointment is scheduled for Friday, September, 13, 2024 at 12pm. You are scheduled to have a left shoulder arthrogram using fluoroscopic guidance and an MRI. You will need to enter [address], come up to the 1st floor and locate the Radiology department. Registration is in the Radiology department, please arrive 15 minutes prior so you may go through the registration process. You will have your Fluoro guided procedure then be guided to our MRI/CT suite for your images. For this test there are no restrictions, you may eat, drink, take all your usual medications and drive yourself to and from the appointment if you are not taking any medication for anti-anxiety for the procedure. If you are taking medications for anxiety for the procedure you will need a .     As discussed on the  phone, an INR lab draw is to be done in the days prior to the procedure due currently being on coumadin. The lab work has been ordered by your provider. Please complete labs in a timely manner so results are available before procedure.     If you have any question or concerns, please feel free to contact me at the number below,   Piper Connell RN  [facility] Radiology RN  [address] [place] [address]  [phone] (Office)  [phone] (Fax)  [email or web address]

## 2024-09-06 ENCOUNTER — APPOINTMENT (OUTPATIENT)
Dept: LAB | Facility: CLINIC | Age: 59
End: 2024-09-06
Payer: MEDICARE

## 2024-09-06 DIAGNOSIS — Z79.01 CURRENT USE OF LONG TERM ANTICOAGULATION: ICD-10-CM

## 2024-09-06 LAB
INR PPP: 2.37 (ref 0.85–1.19)
PROTHROMBIN TIME: 25.8 SECONDS (ref 12.3–15)

## 2024-09-06 PROCEDURE — 85610 PROTHROMBIN TIME: CPT

## 2024-09-06 PROCEDURE — 36415 COLL VENOUS BLD VENIPUNCTURE: CPT

## 2024-09-09 ENCOUNTER — ANTICOAG VISIT (OUTPATIENT)
Dept: FAMILY MEDICINE CLINIC | Facility: CLINIC | Age: 59
End: 2024-09-09

## 2024-09-09 ENCOUNTER — TELEPHONE (OUTPATIENT)
Age: 59
End: 2024-09-09

## 2024-09-09 LAB — INR PPP: 2.37 (ref 0.85–1.19)

## 2024-09-11 DIAGNOSIS — I82.411 ACUTE DEEP VEIN THROMBOSIS (DVT) OF FEMORAL VEIN OF RIGHT LOWER EXTREMITY (HCC): ICD-10-CM

## 2024-09-12 RX ORDER — WARFARIN SODIUM 2 MG/1
TABLET ORAL
Qty: 30 TABLET | Refills: 0 | Status: SHIPPED | OUTPATIENT
Start: 2024-09-12

## 2024-09-13 ENCOUNTER — HOSPITAL ENCOUNTER (OUTPATIENT)
Dept: RADIOLOGY | Facility: HOSPITAL | Age: 59
Discharge: HOME | End: 2024-09-13
Payer: MEDICARE

## 2024-09-13 DIAGNOSIS — M25.512 ACUTE PAIN OF LEFT SHOULDER: ICD-10-CM

## 2024-09-13 DIAGNOSIS — M25.512 SHOULDER PAIN, LEFT: ICD-10-CM

## 2024-09-13 DIAGNOSIS — R20.0 NUMBNESS AND TINGLING IN LEFT ARM: ICD-10-CM

## 2024-09-13 DIAGNOSIS — R20.2 NUMBNESS AND TINGLING IN LEFT ARM: ICD-10-CM

## 2024-09-13 PROCEDURE — 73222 MRI JOINT UPR EXTREM W/DYE: CPT

## 2024-09-13 PROCEDURE — A9585 GADOBUTROL INJECTION: HCPCS

## 2024-09-13 PROCEDURE — 77002 NEEDLE LOCALIZATION BY XRAY: CPT

## 2024-09-13 PROCEDURE — 23350 INJECTION FOR SHOULDER X-RAY: CPT

## 2024-09-13 RX ORDER — ROPIVACAINE HYDROCHLORIDE 2 MG/ML
2 INJECTION, SOLUTION EPIDURAL; INFILTRATION; PERINEURAL ONCE
Status: COMPLETED | OUTPATIENT
Start: 2024-09-13 | End: 2024-09-13

## 2024-09-13 RX ORDER — SODIUM CHLORIDE 9 MG/ML
0.2 INJECTION INTRAVENOUS
Status: COMPLETED | OUTPATIENT
Start: 2024-09-13 | End: 2024-09-13

## 2024-09-13 RX ORDER — GABAPENTIN 300 MG/1
300 CAPSULE ORAL
Qty: 30 CAPSULE | Refills: 1 | Status: SHIPPED | OUTPATIENT
Start: 2024-09-13

## 2024-09-13 RX ORDER — LIDOCAINE HYDROCHLORIDE 10 MG/ML
3 INJECTION, SOLUTION EPIDURAL; INFILTRATION; INTRACAUDAL; PERINEURAL
Status: COMPLETED | OUTPATIENT
Start: 2024-09-13 | End: 2024-09-13

## 2024-09-13 RX ORDER — GADOBUTROL 604.72 MG/ML
0.2 INJECTION INTRAVENOUS
Status: COMPLETED | OUTPATIENT
Start: 2024-09-13 | End: 2024-09-13

## 2024-09-13 RX ADMIN — SODIUM CHLORIDE 0.2 ML: 9 INJECTION, SOLUTION INTRAMUSCULAR; INTRAVENOUS; SUBCUTANEOUS at 12:26

## 2024-09-13 RX ADMIN — IOHEXOL 1 ML: 300 INJECTION, SOLUTION INTRAVENOUS at 12:25

## 2024-09-13 RX ADMIN — LIDOCAINE HYDROCHLORIDE 3 ML: 10 INJECTION, SOLUTION EPIDURAL; INFILTRATION; INTRACAUDAL; PERINEURAL at 12:26

## 2024-09-13 RX ADMIN — GADOBUTROL 0.2 ML: 604.72 INJECTION INTRAVENOUS at 12:24

## 2024-09-13 RX ADMIN — ROPIVACAINE HYDROCHLORIDE 2 ML: 2 INJECTION EPIDURAL; INFILTRATION; PERINEURAL at 12:26

## 2024-09-16 DIAGNOSIS — E11.22 TYPE 2 DIABETES MELLITUS WITH STAGE 3A CHRONIC KIDNEY DISEASE, WITHOUT LONG-TERM CURRENT USE OF INSULIN (HCC): ICD-10-CM

## 2024-09-16 DIAGNOSIS — N18.31 TYPE 2 DIABETES MELLITUS WITH STAGE 3A CHRONIC KIDNEY DISEASE, WITHOUT LONG-TERM CURRENT USE OF INSULIN (HCC): ICD-10-CM

## 2024-09-16 DIAGNOSIS — R20.2 NUMBNESS AND TINGLING IN LEFT ARM: ICD-10-CM

## 2024-09-16 DIAGNOSIS — R20.0 NUMBNESS AND TINGLING IN LEFT ARM: ICD-10-CM

## 2024-09-17 ENCOUNTER — TELEPHONE (OUTPATIENT)
Dept: PAIN MEDICINE | Facility: CLINIC | Age: 59
End: 2024-09-17

## 2024-09-17 RX ORDER — METHOCARBAMOL 500 MG/1
500 TABLET, FILM COATED ORAL 2 TIMES DAILY
Qty: 60 TABLET | Refills: 1 | Status: SHIPPED | OUTPATIENT
Start: 2024-09-17

## 2024-09-17 RX ORDER — INSULIN GLARGINE 100 [IU]/ML
INJECTION, SOLUTION SUBCUTANEOUS
Qty: 15 ML | Refills: 2 | Status: SHIPPED | OUTPATIENT
Start: 2024-09-17

## 2024-09-17 NOTE — TELEPHONE ENCOUNTER
----- Message from NICK Solorio sent at 9/17/2024  1:25 PM EDT -----  Complete insertional tear of the supraspinatus, I will forward imaging to Dr. Rivera since you have an appointment with him on 9/23/2024.

## 2024-09-23 ENCOUNTER — OFFICE VISIT (OUTPATIENT)
Dept: OBGYN CLINIC | Facility: CLINIC | Age: 59
End: 2024-09-23
Payer: MEDICARE

## 2024-09-23 ENCOUNTER — PREP FOR PROCEDURE (OUTPATIENT)
Dept: OBGYN CLINIC | Facility: CLINIC | Age: 59
End: 2024-09-23

## 2024-09-23 ENCOUNTER — HOSPITAL ENCOUNTER (OUTPATIENT)
Dept: RADIOLOGY | Facility: HOSPITAL | Age: 59
Discharge: HOME | End: 2024-09-23
Payer: MEDICARE

## 2024-09-23 ENCOUNTER — OFFICE VISIT (OUTPATIENT)
Dept: LAB | Facility: HOSPITAL | Age: 59
End: 2024-09-23
Payer: MEDICARE

## 2024-09-23 VITALS
WEIGHT: 221.4 LBS | HEART RATE: 63 BPM | DIASTOLIC BLOOD PRESSURE: 79 MMHG | HEIGHT: 73 IN | SYSTOLIC BLOOD PRESSURE: 121 MMHG | BODY MASS INDEX: 29.34 KG/M2

## 2024-09-23 DIAGNOSIS — M25.512 ACUTE PAIN OF LEFT SHOULDER: ICD-10-CM

## 2024-09-23 DIAGNOSIS — M54.12 CERVICAL RADICULOPATHY: ICD-10-CM

## 2024-09-23 DIAGNOSIS — M75.102 TEAR OF LEFT SUPRASPINATUS TENDON: ICD-10-CM

## 2024-09-23 DIAGNOSIS — S46.002A INJURY OF LEFT ROTATOR CUFF, INITIAL ENCOUNTER: ICD-10-CM

## 2024-09-23 DIAGNOSIS — Z01.818 PREOP TESTING: ICD-10-CM

## 2024-09-23 DIAGNOSIS — M75.102 TEAR OF LEFT SUPRASPINATUS TENDON: Primary | ICD-10-CM

## 2024-09-23 DIAGNOSIS — Z01.818 PREOP TESTING: Primary | ICD-10-CM

## 2024-09-23 LAB
ATRIAL RATE: 58 BPM
P AXIS: 44 DEGREES
PR INTERVAL: 176 MS
QRS AXIS: -39 DEGREES
QRSD INTERVAL: 108 MS
QT INTERVAL: 420 MS
QTC INTERVAL: 412 MS
T WAVE AXIS: -32 DEGREES
VENTRICULAR RATE: 58 BPM

## 2024-09-23 PROCEDURE — 99214 OFFICE O/P EST MOD 30 MIN: CPT | Performed by: ORTHOPAEDIC SURGERY

## 2024-09-23 PROCEDURE — 93010 ELECTROCARDIOGRAM REPORT: CPT | Performed by: STUDENT IN AN ORGANIZED HEALTH CARE EDUCATION/TRAINING PROGRAM

## 2024-09-23 PROCEDURE — 93005 ELECTROCARDIOGRAM TRACING: CPT

## 2024-09-23 PROCEDURE — 71046 X-RAY EXAM CHEST 2 VIEWS: CPT

## 2024-09-23 RX ORDER — CHLORHEXIDINE GLUCONATE ORAL RINSE 1.2 MG/ML
15 SOLUTION DENTAL ONCE
OUTPATIENT
Start: 2024-09-23 | End: 2024-09-23

## 2024-09-23 RX ORDER — CHLORHEXIDINE GLUCONATE 40 MG/ML
SOLUTION TOPICAL DAILY PRN
OUTPATIENT
Start: 2024-09-23

## 2024-09-23 NOTE — PROGRESS NOTES
Patient Name:  Chavez Newell  MRN:  [id number]    Assessment & Plan     1. Tear of left supraspinatus tendon  -     Ambulatory Referral to Physical Therapy; Future  -     Case request operating room: REPAIR ROTATOR CUFF  ARTHROSCOPIC and possible open subpectoral biceps tenodesis, Left; Standing  -     Ambulatory referral to Family Practice; Future  -     Ambulatory referral to Cardiology; Future  -     CBC and Platelet; Future  -     Comprehensive metabolic panel; Future  -     EKG 12 lead; Future  -     XR chest pa and lateral; Future; Expected date: 09/23/2024  -     Case request operating room: REPAIR ROTATOR CUFF  ARTHROSCOPIC and possible open subpectoral biceps tenodesis, Left  2. Acute pain of left shoulder  -     Ambulatory referral to Orthopedic Surgery  3. Cervical radiculopathy  4. Injury of left rotator cuff, initial encounter  -     CBC and Platelet; Future      Left shoulder full thickness supraspinatus tear, intermittent cervical radicular symptoms  X-rays and MRI reviewed in office today with patient  Treatment options were discussed including operative and nonoperative management  Nonoperative management includes corticosteroid injection, physical therapy. Risks of nonoperative management include fatty infiltration, increase in tear size, increase in tendon retraction, inability to repair.  Operative management includes left arthroscopic rotator cuff repair, possible biceps tenodesis. Risks of surgery, including but not limited to, infection, nerve and blood vessel injury, postoperative stiffness, persistent pain including pain from cervical spine, retear, DVT/PE, anesthesia complications, and need for subsequent surgery were discussed in detail.  Discussed increased risk of stiffness and infection due to history of diabetes. Discussed risk of wound healing complication or failure of repair due to smoking status.   Advised patient to stop smoking immediately, at least 2 weeks prior to  surgery  Post operative care including immobilization, physical therapy, driving, no active movement discussed in detail with the patient  All questions answered to the patient's satisfaction. He would like to move forward with operative management tentatively scheduled for 10/10/2024  He will see cardiologist for recommendations about coumadin prior to surgery. He will also see PCP, have labs, CXR, EKG  Discussed radiating symptoms are likely related to arthritis in cervical spine. Continue following up with Dr. Giraldo regarding cervical spine.  He will follow up post operatively     Chief Complaint     Left shoulder pain    History of the Present Illness     Chavez Newell is a RHD 59 y.o. male with Left shoulder pain ongoing for about 3 months. He is unsure if injury occurred when he was chopping fire wood or when he fell off the back of a truck. He has not tried physical therapy or injections. He is not working, he is on disability. Pain is managed with Tramadol 100 mg. He admits pain shoots from his shoulder into his thumb. He has pain even at rest. Pain is worse when he goes to bed or wakes up in the morning. Pain is mostly localized to his biceps muscle. Pain while reaching overhead. He admits he sometimes has neck pain. He smokes 4-5 cigars per day.    Review of Systems     Review of Systems   Constitutional:  Negative for chills and fever.   HENT:  Negative for ear pain and sore throat.    Eyes:  Negative for pain and visual disturbance.   Respiratory:  Negative for cough and shortness of breath.    Cardiovascular:  Negative for chest pain and palpitations.   Gastrointestinal:  Negative for abdominal pain and vomiting.   Genitourinary:  Negative for dysuria and hematuria.   Musculoskeletal:  Negative for arthralgias and back pain.   Skin:  Negative for color change and rash.   Neurological:  Negative for seizures and syncope.   All other systems reviewed and are negative.      Physical Exam     /79    "Pulse 63   Ht 6' 1\" (1.854 m)   Wt 100 kg (221 lb 6.4 oz)   BMI 29.21 kg/m²     Left  Shoulder:   Active range of motion   150 degrees forward flexion  150 degrees abduction  30 degrees external rotation   2 level restriction internal rotation    Passive range of motion   160 degrees of forward flexion     There is no tenderness present over the shoulder.   Forward flexion testing 4+/5  External rotation testing 4+/5  Internal rotation testing 5/5  Speed's test is Positive  The patient is neurovascularly intact distally in the extremity.    Cervical Spine:   Active range of motion mild restriction rotation to the left.    There is no midline tenderness.    There is on left paraspinal hypertonicity and tenderness.    Sensation intact to light touch C5 through T1 dermatomes left upper extremity.   Sensation intact to light touch C5 through T1 dermatomes right upper extremity.    Left Motor: 5/5 biceps, 5/5 triceps, 5/5 wrist extension, 5/5 finger flexion, 5/5 finger abduction   Right Motor: 5/5 biceps, 5/5 triceps, 5/5 wrist extension, 5/5 finger flexion, 5/5 finger abduction   Spurling test is  negative  Kaur's sign negative    Eyes:  Anicteric sclerae.  Neck:  Supple.  Lungs:  Normal respiratory effort.  Cardiovascular:  Capillary refill is less than 2 seconds.  Skin:  Intact without erythema.  Neurologic:  Sensation grossly intact to light touch.  Psychiatric:  Mood and affect are appropriate.    Data Review     I have personally reviewed pertinent films in PACS, and my interpretation follows:    X-rays taken 6/13/2024 of Left shoulder demonstrate no acute fracture or dislocation. Well preserved joint spaces.    X-rays cervical spine obtained 6/18/2024 demonstrate bilateral foraminal narrowing C5-6, C6-7, left neural foraminal narrowing C4-5, loss of disc height C4-5, C5-6, C6-7    MRI arthrogram obtained 9/13/2024 demonstrate full thickness insertion tear of supraspinatus tendon retracted to humeral head " apex. No muscle atrophy.     Past Medical History:   Diagnosis Date    Anxiety 03/10/2022    Aorta aneurysm (HCC)     4.3    Arm DVT (deep venous thromboembolism), acute (HCC) 2015    complications of cardiac cath    Asthma     Blood clot in vein     right leg    Chronic kidney disease     CKD (chronic kidney disease), stage III (HCC)     COPD (chronic obstructive pulmonary disease) (HCC)     Depression     Diabetes mellitus (HCC)     Essential hypertriglyceridemia     GERD (gastroesophageal reflux disease)     Headache     Hiatal hernia     Hyperlipidemia, mixed 05/07/2018    Kidney stone     Liver disease     FATTY LIVER    Mild episode of recurrent major depressive disorder (HCC) 03/10/2022    PAC (premature atrial contraction)     Prediabetes     Rectus sheath hematoma, initial encounter 08/09/2023    Renal calculi     Sleep apnea     Vestibular migraine        Past Surgical History:   Procedure Laterality Date    CARPAL TUNNEL RELEASE Left     COLONOSCOPY      FL INJECTION LEFT SHOULDER (ARTHROGRAM)  9/13/2024    FL INJECTION RIGHT HIP (ARTHROGRAM)  08/20/2018    FOOT SURGERY Left     FOREIGN BODY REMOVAL    HERNIA REPAIR      MN ARTHRP ACETBLR/PROX FEM PROSTC AGRFT/ALGRFT Right 09/28/2020    Procedure: ARTHROPLASTY HIP TOTAL;  Surgeon: Jyoti Araiza MD;  Location: [facility];  Service: Orthopedics    MN COLONOSCOPY FLX DX W/COLLJ SPEC WHEN PFRMD N/A 08/07/2017    Procedure: COLONOSCOPY;  Surgeon: Anthony France MD;  Location: [facility];  Service: Gastroenterology    MN ESOPHAGOGASTRODUODENOSCOPY TRANSORAL DIAGNOSTIC N/A 10/31/2017    Procedure: ESOPHAGOGASTRODUODENOSCOPY (EGD);  Surgeon: Anthony France MD;  Location: [facility];  Service: Gastroenterology    TOTAL HIP ARTHROPLASTY Right 2020       No Known Allergies    Current Outpatient Medications on File Prior to Visit   Medication Sig Dispense Refill    acetaminophen (TYLENOL) 500 mg tablet Take 500 mg by mouth every 6 (six) hours as needed for  mild pain      Bydureon BCise 2 MG/0.85ML AUIJ inject 2 milligrams subcutaneously weekly 10.2 mL 1    Continuous Blood Gluc Sensor (FreeStyle Jelly 3 Sensor) MISC Use 1 each every 14 (fourteen) days 6 each 3    fenofibrate 160 MG tablet take 1 tablet by mouth once daily 90 tablet 1    gabapentin (NEURONTIN) 300 mg capsule take 1 capsule by mouth at bedtime 30 capsule 1    glucose blood test strip TEST BS  each 5    glucose monitoring kit (FREESTYLE) monitoring kit Use 1 each daily before breakfast 1 each 0    Insulin Glargine Solostar (Lantus SoloStar) 100 UNIT/ML SOPN inject 20 units subcutaneously daily or as directed by prescriber 15 mL 2    insulin lispro (HumaLOG KwikPen) 100 units/mL injection pen Per sliding scale, Max 50 units daily 15 mL 2    Insulin Pen Needle (BD Pen Needle Evelina 2nd Gen) 32G X 4 MM MISC Inject as directed 4 (four) times a day 500 each 5    Lancets MISC Use daily before breakfast 100 each 5    methocarbamol (ROBAXIN) 500 mg tablet take 1 tablet by mouth twice a day 60 tablet 1    naloxone (NARCAN) 4 mg/0.1 mL nasal spray Administer 1 spray into a nostril. If no response after 2-3 minutes, give another dose in the other nostril using a new spray. 1 each 1    oxyCODONE (Roxicodone) 5 immediate release tablet Take 1 tablet (5 mg total) by mouth every 6 (six) hours as needed for moderate pain or severe pain for up to 12 doses Max Daily Amount: 20 mg 12 tablet 0    pantoprazole (PROTONIX) 40 mg tablet take 1 tablet by mouth once daily 90 tablet 3    rosuvastatin (CRESTOR) 5 mg tablet take 1 tablet by mouth once daily 90 tablet 1    sildenafil (REVATIO) 20 mg tablet TAKE 1 TABLET (20 MG TOTAL) BY MOUTH DAILY AS DIRECTED 90 tablet 3    traMADol (Ultram) 50 mg tablet May take 2 tablets (100 mg total) by mouth daily as needed for moderate pain. May also take 1 tablet (50 mg total) every 8 (eight) hours as needed for moderate pain. Max 5 tablets per day. 150 tablet 2    warfarin (COUMADIN) 2  mg tablet take 1 tablet by mouth MONDAY, WEDNESDAY, FRIDAY OR AS DIRECTED BY PCP 30 tablet 0    warfarin (COUMADIN) 3 mg tablet take 1 tablet by mouth TUESDAY, THURSDAY, SATURDAY, AND SUNDAY; OR AS DIRECTED BY PCP 90 tablet 1     No current facility-administered medications on file prior to visit.       Social History     Tobacco Use    Smoking status: Some Days     Types: Cigars     Passive exposure: Never    Smokeless tobacco: Never    Tobacco comments:     never inhaled   Vaping Use    Vaping status: Never Used   Substance Use Topics    Alcohol use: Not Currently     Comment: occasional beer    Drug use: No       Family History   Problem Relation Age of Onset    Arthritis Mother     Heart attack Father     Clotting disorder Father     Schizoaffective Disorder  Sister     Cancer Brother     Prostate cancer Brother     Leukemia Brother         his only brother dies from lymphoma or leukemia pt unsure he was only 54    Aortic aneurysm Other         abdominal             Procedures Performed     Procedures  None      Junior Rivera DO  Scribe Attestation      I,:  Marisol Macario am acting as a scribe while in the presence of the attending physician.:       I,:  Junior Rivera DO personally performed the services described in this documentation    as scribed in my presence.:

## 2024-09-23 NOTE — H&P (VIEW-ONLY)
Patient Name:  Chavez Newell  MRN:  [id number]    Assessment & Plan     1. Tear of left supraspinatus tendon  -     Ambulatory Referral to Physical Therapy; Future  -     Case request operating room: REPAIR ROTATOR CUFF  ARTHROSCOPIC and possible open subpectoral biceps tenodesis, Left; Standing  -     Ambulatory referral to Family Practice; Future  -     Ambulatory referral to Cardiology; Future  -     CBC and Platelet; Future  -     Comprehensive metabolic panel; Future  -     EKG 12 lead; Future  -     XR chest pa and lateral; Future; Expected date: 09/23/2024  -     Case request operating room: REPAIR ROTATOR CUFF  ARTHROSCOPIC and possible open subpectoral biceps tenodesis, Left  2. Acute pain of left shoulder  -     Ambulatory referral to Orthopedic Surgery  3. Cervical radiculopathy  4. Injury of left rotator cuff, initial encounter  -     CBC and Platelet; Future      Left shoulder full thickness supraspinatus tear, intermittent cervical radicular symptoms  X-rays and MRI reviewed in office today with patient  Treatment options were discussed including operative and nonoperative management  Nonoperative management includes corticosteroid injection, physical therapy. Risks of nonoperative management include fatty infiltration, increase in tear size, increase in tendon retraction, inability to repair.  Operative management includes left arthroscopic rotator cuff repair, possible biceps tenodesis. Risks of surgery, including but not limited to, infection, nerve and blood vessel injury, postoperative stiffness, persistent pain including pain from cervical spine, retear, DVT/PE, anesthesia complications, and need for subsequent surgery were discussed in detail.  Discussed increased risk of stiffness and infection due to history of diabetes. Discussed risk of wound healing complication or failure of repair due to smoking status.   Advised patient to stop smoking immediately, at least 2 weeks prior to  surgery  Post operative care including immobilization, physical therapy, driving, no active movement discussed in detail with the patient  All questions answered to the patient's satisfaction. He would like to move forward with operative management tentatively scheduled for 10/10/2024  He will see cardiologist for recommendations about coumadin prior to surgery. He will also see PCP, have labs, CXR, EKG  Discussed radiating symptoms are likely related to arthritis in cervical spine. Continue following up with Dr. Giraldo regarding cervical spine.  He will follow up post operatively     Chief Complaint     Left shoulder pain    History of the Present Illness     Chavez Newell is a RHD 59 y.o. male with Left shoulder pain ongoing for about 3 months. He is unsure if injury occurred when he was chopping fire wood or when he fell off the back of a truck. He has not tried physical therapy or injections. He is not working, he is on disability. Pain is managed with Tramadol 100 mg. He admits pain shoots from his shoulder into his thumb. He has pain even at rest. Pain is worse when he goes to bed or wakes up in the morning. Pain is mostly localized to his biceps muscle. Pain while reaching overhead. He admits he sometimes has neck pain. He smokes 4-5 cigars per day.    Review of Systems     Review of Systems   Constitutional:  Negative for chills and fever.   HENT:  Negative for ear pain and sore throat.    Eyes:  Negative for pain and visual disturbance.   Respiratory:  Negative for cough and shortness of breath.    Cardiovascular:  Negative for chest pain and palpitations.   Gastrointestinal:  Negative for abdominal pain and vomiting.   Genitourinary:  Negative for dysuria and hematuria.   Musculoskeletal:  Negative for arthralgias and back pain.   Skin:  Negative for color change and rash.   Neurological:  Negative for seizures and syncope.   All other systems reviewed and are negative.      Physical Exam     /79    "Pulse 63   Ht 6' 1\" (1.854 m)   Wt 100 kg (221 lb 6.4 oz)   BMI 29.21 kg/m²     Left  Shoulder:   Active range of motion   150 degrees forward flexion  150 degrees abduction  30 degrees external rotation   2 level restriction internal rotation    Passive range of motion   160 degrees of forward flexion     There is no tenderness present over the shoulder.   Forward flexion testing 4+/5  External rotation testing 4+/5  Internal rotation testing 5/5  Speed's test is Positive  The patient is neurovascularly intact distally in the extremity.    Cervical Spine:   Active range of motion mild restriction rotation to the left.    There is no midline tenderness.    There is on left paraspinal hypertonicity and tenderness.    Sensation intact to light touch C5 through T1 dermatomes left upper extremity.   Sensation intact to light touch C5 through T1 dermatomes right upper extremity.    Left Motor: 5/5 biceps, 5/5 triceps, 5/5 wrist extension, 5/5 finger flexion, 5/5 finger abduction   Right Motor: 5/5 biceps, 5/5 triceps, 5/5 wrist extension, 5/5 finger flexion, 5/5 finger abduction   Spurling test is  negative  Kaur's sign negative    Eyes:  Anicteric sclerae.  Neck:  Supple.  Lungs:  Normal respiratory effort.  Cardiovascular:  Capillary refill is less than 2 seconds.  Skin:  Intact without erythema.  Neurologic:  Sensation grossly intact to light touch.  Psychiatric:  Mood and affect are appropriate.    Data Review     I have personally reviewed pertinent films in PACS, and my interpretation follows:    X-rays taken 6/13/2024 of Left shoulder demonstrate no acute fracture or dislocation. Well preserved joint spaces.    X-rays cervical spine obtained 6/18/2024 demonstrate bilateral foraminal narrowing C5-6, C6-7, left neural foraminal narrowing C4-5, loss of disc height C4-5, C5-6, C6-7    MRI arthrogram obtained 9/13/2024 demonstrate full thickness insertion tear of supraspinatus tendon retracted to humeral head " apex. No muscle atrophy.     Past Medical History:   Diagnosis Date    Anxiety 03/10/2022    Aorta aneurysm (HCC)     4.3    Arm DVT (deep venous thromboembolism), acute (HCC) 2015    complications of cardiac cath    Asthma     Blood clot in vein     right leg    Chronic kidney disease     CKD (chronic kidney disease), stage III (HCC)     COPD (chronic obstructive pulmonary disease) (HCC)     Depression     Diabetes mellitus (HCC)     Essential hypertriglyceridemia     GERD (gastroesophageal reflux disease)     Headache     Hiatal hernia     Hyperlipidemia, mixed 05/07/2018    Kidney stone     Liver disease     FATTY LIVER    Mild episode of recurrent major depressive disorder (HCC) 03/10/2022    PAC (premature atrial contraction)     Prediabetes     Rectus sheath hematoma, initial encounter 08/09/2023    Renal calculi     Sleep apnea     Vestibular migraine        Past Surgical History:   Procedure Laterality Date    CARPAL TUNNEL RELEASE Left     COLONOSCOPY      FL INJECTION LEFT SHOULDER (ARTHROGRAM)  9/13/2024    FL INJECTION RIGHT HIP (ARTHROGRAM)  08/20/2018    FOOT SURGERY Left     FOREIGN BODY REMOVAL    HERNIA REPAIR      ND ARTHRP ACETBLR/PROX FEM PROSTC AGRFT/ALGRFT Right 09/28/2020    Procedure: ARTHROPLASTY HIP TOTAL;  Surgeon: Jyoti Araiza MD;  Location: [facility];  Service: Orthopedics    ND COLONOSCOPY FLX DX W/COLLJ SPEC WHEN PFRMD N/A 08/07/2017    Procedure: COLONOSCOPY;  Surgeon: Anthony France MD;  Location: [facility];  Service: Gastroenterology    ND ESOPHAGOGASTRODUODENOSCOPY TRANSORAL DIAGNOSTIC N/A 10/31/2017    Procedure: ESOPHAGOGASTRODUODENOSCOPY (EGD);  Surgeon: Anthony France MD;  Location: [facility];  Service: Gastroenterology    TOTAL HIP ARTHROPLASTY Right 2020       No Known Allergies    Current Outpatient Medications on File Prior to Visit   Medication Sig Dispense Refill    acetaminophen (TYLENOL) 500 mg tablet Take 500 mg by mouth every 6 (six) hours as needed for  mild pain      Bydureon BCise 2 MG/0.85ML AUIJ inject 2 milligrams subcutaneously weekly 10.2 mL 1    Continuous Blood Gluc Sensor (FreeStyle Jelly 3 Sensor) MISC Use 1 each every 14 (fourteen) days 6 each 3    fenofibrate 160 MG tablet take 1 tablet by mouth once daily 90 tablet 1    gabapentin (NEURONTIN) 300 mg capsule take 1 capsule by mouth at bedtime 30 capsule 1    glucose blood test strip TEST BS  each 5    glucose monitoring kit (FREESTYLE) monitoring kit Use 1 each daily before breakfast 1 each 0    Insulin Glargine Solostar (Lantus SoloStar) 100 UNIT/ML SOPN inject 20 units subcutaneously daily or as directed by prescriber 15 mL 2    insulin lispro (HumaLOG KwikPen) 100 units/mL injection pen Per sliding scale, Max 50 units daily 15 mL 2    Insulin Pen Needle (BD Pen Needle Evelina 2nd Gen) 32G X 4 MM MISC Inject as directed 4 (four) times a day 500 each 5    Lancets MISC Use daily before breakfast 100 each 5    methocarbamol (ROBAXIN) 500 mg tablet take 1 tablet by mouth twice a day 60 tablet 1    naloxone (NARCAN) 4 mg/0.1 mL nasal spray Administer 1 spray into a nostril. If no response after 2-3 minutes, give another dose in the other nostril using a new spray. 1 each 1    oxyCODONE (Roxicodone) 5 immediate release tablet Take 1 tablet (5 mg total) by mouth every 6 (six) hours as needed for moderate pain or severe pain for up to 12 doses Max Daily Amount: 20 mg 12 tablet 0    pantoprazole (PROTONIX) 40 mg tablet take 1 tablet by mouth once daily 90 tablet 3    rosuvastatin (CRESTOR) 5 mg tablet take 1 tablet by mouth once daily 90 tablet 1    sildenafil (REVATIO) 20 mg tablet TAKE 1 TABLET (20 MG TOTAL) BY MOUTH DAILY AS DIRECTED 90 tablet 3    traMADol (Ultram) 50 mg tablet May take 2 tablets (100 mg total) by mouth daily as needed for moderate pain. May also take 1 tablet (50 mg total) every 8 (eight) hours as needed for moderate pain. Max 5 tablets per day. 150 tablet 2    warfarin (COUMADIN) 2  mg tablet take 1 tablet by mouth MONDAY, WEDNESDAY, FRIDAY OR AS DIRECTED BY PCP 30 tablet 0    warfarin (COUMADIN) 3 mg tablet take 1 tablet by mouth TUESDAY, THURSDAY, SATURDAY, AND SUNDAY; OR AS DIRECTED BY PCP 90 tablet 1     No current facility-administered medications on file prior to visit.       Social History     Tobacco Use    Smoking status: Some Days     Types: Cigars     Passive exposure: Never    Smokeless tobacco: Never    Tobacco comments:     never inhaled   Vaping Use    Vaping status: Never Used   Substance Use Topics    Alcohol use: Not Currently     Comment: occasional beer    Drug use: No       Family History   Problem Relation Age of Onset    Arthritis Mother     Heart attack Father     Clotting disorder Father     Schizoaffective Disorder  Sister     Cancer Brother     Prostate cancer Brother     Leukemia Brother         his only brother dies from lymphoma or leukemia pt unsure he was only 54    Aortic aneurysm Other         abdominal             Procedures Performed     Procedures  None      Junior Rivera DO  Scribe Attestation      I,:  Marisol Macario am acting as a scribe while in the presence of the attending physician.:       I,:  Junior Rivera DO personally performed the services described in this documentation    as scribed in my presence.:

## 2024-09-24 NOTE — PROGRESS NOTES
PT Evaluation     Today's date: 2024  Patient name: Chavez Newell  : 1965  MRN: [id number]  Referring provider: Iman Sepulveda CRNP  Dx:   Encounter Diagnosis     ICD-10-CM    1. Tear of left supraspinatus tendon  M75.102           Start Time: 730  Stop Time: 0815  Total time in clinic (min): 45 minutes    Assessment  Impairments: abnormal or restricted ROM, impaired physical strength, lacks appropriate home exercise program and pain with function  Symptom irritability: moderate    Assessment details: Chavez is a 59-year-old male with concerns of left rotator cuff tear of supraspinatus.  He is scheduled to undergo rotator cuff repair surgery on October 10,2024.  He presents today for preop education as well as home exercise program. HEP instruction this date. Verbalized and demonstrated understanding. Written handouts provided.  Patient was also provided with instruction in pendulum exercises as well as active assistive elbow range of motion and  exercises to be performed day 3 postop per protocol once he is cleared by his surgeon to do so.  Reevaluation will be performed at 2 weeks postop per postsurgical protocol unless otherwise directed.    Goals  Short-term goals - pre op  1.  Independent in an ongoing home exercise program      Plan  Patient would benefit from: skilled physical therapy    Planned therapy interventions: therapeutic activities, therapeutic exercise and home exercise program    Frequency: 1x week  Plan of Care beginning date: 2024  Plan of Care expiration date: 10/9/2024  Treatment plan discussed with: patient      Subjective Evaluation    History of Present Illness  Mechanism of injury: Left shoulder pain ongoing for about 3 months. He is unsure if injury occurred when he was chopping fire wood or when he fell off the back of a truck. Pain is managed with Tramadol 100 mg. He admits pain shoots from his shoulder into his thumb. He has pain even at rest. Pain is worse when  he goes to bed  with interrupted sleep or wakes up in the morning. Pain is mostly localized to his biceps muscle. Pain while reaching overhead. Notes occasional neck pain.    Right hand dominant.        Patient Goals  Patient goal: HEP prior to surgery  Pain  Current pain ratin  At best pain ratin  At worst pain ratin  Location: lateral arm; scapular area ; occasional  Quality: sharp  Aggravating factors: overhead activity and sitting    Hand dominance: right      Diagnostic Tests  X-ray: abnormal (foraminal narrowing C5-6, C6-7, left neural foraminal narrowing C4-5, loss of disc height C4-5, C5-6, C6-7)  MRI studies: abnormal (full thickness insertion tear of supraspinatus)        Objective     Postural Observations  Seated posture: fair  Standing posture: fair    Additional Postural Observation Details  Forward head with anterior shoulder rounding    Tenderness     Left Shoulder   Tenderness in the bicipital groove.     Cervical/Thoracic Screen   Cervical range of motion within normal limits with the following exceptions: Limited left cervical rotation and left sidebending by 25% compared to the right    Neurological Testing     Sensation     Shoulder   Left Shoulder   Intact: light touch  Paresthesia: light touch    Right Shoulder   Intact: Light touch    Passive Range of Motion   Left Shoulder   Flexion: 160 degrees   External rotation 90°: 40 degrees   Internal rotation 90°: 50 degrees     Right Shoulder   Flexion: 160 degrees   External rotation 90°: 72 degrees   Internal rotation 90°: 60 degrees     Strength/Myotome Testing     Left Shoulder     Planes of Motion   Flexion: 4+   Abduction: 4-   External rotation at 0°: 4   Internal rotation at 0°: 5     Right Shoulder   Normal muscle strength             Precautions: DM COPD R THR      Manuals             PROM left shoulder                                                    Neuro Re-Ed                                                                                                         Ther Ex             pendulum 10x  HEP            AAROM elbow 10x  HEP                         Wrist AROM HEP                                                                                                                    Scap pinches 10x  HEP            Cerv AROM Pre op HEP            AROM all planes Pre op  HEP            Ther Activity                                       Gait Training                                       Modalities

## 2024-09-25 ENCOUNTER — EVALUATION (OUTPATIENT)
Dept: PHYSICAL THERAPY | Age: 59
End: 2024-09-25
Payer: MEDICARE

## 2024-09-25 ENCOUNTER — APPOINTMENT (OUTPATIENT)
Dept: LAB | Facility: CLINIC | Age: 59
End: 2024-09-25
Payer: MEDICARE

## 2024-09-25 ENCOUNTER — TELEPHONE (OUTPATIENT)
Age: 59
End: 2024-09-25

## 2024-09-25 DIAGNOSIS — M75.102 TEAR OF LEFT SUPRASPINATUS TENDON: Primary | ICD-10-CM

## 2024-09-25 DIAGNOSIS — Z01.818 PREOP TESTING: ICD-10-CM

## 2024-09-25 DIAGNOSIS — M75.102 TEAR OF LEFT SUPRASPINATUS TENDON: ICD-10-CM

## 2024-09-25 DIAGNOSIS — S46.002A INJURY OF LEFT ROTATOR CUFF, INITIAL ENCOUNTER: ICD-10-CM

## 2024-09-25 LAB
ALBUMIN SERPL BCG-MCNC: 4.1 G/DL (ref 3.5–5)
ALP SERPL-CCNC: 47 U/L (ref 34–104)
ALT SERPL W P-5'-P-CCNC: 28 U/L (ref 7–52)
ANION GAP SERPL CALCULATED.3IONS-SCNC: 7 MMOL/L (ref 4–13)
AST SERPL W P-5'-P-CCNC: 26 U/L (ref 13–39)
BASOPHILS # BLD AUTO: 0.04 THOUSANDS/ΜL (ref 0–0.1)
BASOPHILS NFR BLD AUTO: 1 % (ref 0–1)
BILIRUB SERPL-MCNC: 0.64 MG/DL (ref 0.2–1)
BUN SERPL-MCNC: 18 MG/DL (ref 5–25)
CALCIUM SERPL-MCNC: 9.2 MG/DL (ref 8.4–10.2)
CHLORIDE SERPL-SCNC: 103 MMOL/L (ref 96–108)
CO2 SERPL-SCNC: 28 MMOL/L (ref 21–32)
CREAT SERPL-MCNC: 1.44 MG/DL (ref 0.6–1.3)
EOSINOPHIL # BLD AUTO: 0.12 THOUSAND/ΜL (ref 0–0.61)
EOSINOPHIL NFR BLD AUTO: 2 % (ref 0–6)
ERYTHROCYTE [DISTWIDTH] IN BLOOD BY AUTOMATED COUNT: 12.3 % (ref 11.6–15.1)
GFR SERPL CREATININE-BSD FRML MDRD: 52 ML/MIN/1.73SQ M
GLUCOSE P FAST SERPL-MCNC: 162 MG/DL (ref 65–99)
HCT VFR BLD AUTO: 46.2 % (ref 36.5–49.3)
HGB BLD-MCNC: 14.9 G/DL (ref 12–17)
IMM GRANULOCYTES # BLD AUTO: 0.03 THOUSAND/UL (ref 0–0.2)
IMM GRANULOCYTES NFR BLD AUTO: 1 % (ref 0–2)
LYMPHOCYTES # BLD AUTO: 1.63 THOUSANDS/ΜL (ref 0.6–4.47)
LYMPHOCYTES NFR BLD AUTO: 27 % (ref 14–44)
MCH RBC QN AUTO: 31.5 PG (ref 26.8–34.3)
MCHC RBC AUTO-ENTMCNC: 32.3 G/DL (ref 31.4–37.4)
MCV RBC AUTO: 98 FL (ref 82–98)
MONOCYTES # BLD AUTO: 0.67 THOUSAND/ΜL (ref 0.17–1.22)
MONOCYTES NFR BLD AUTO: 11 % (ref 4–12)
NEUTROPHILS # BLD AUTO: 3.65 THOUSANDS/ΜL (ref 1.85–7.62)
NEUTS SEG NFR BLD AUTO: 58 % (ref 43–75)
NRBC BLD AUTO-RTO: 0 /100 WBCS
PLATELET # BLD AUTO: 210 THOUSANDS/UL (ref 149–390)
PMV BLD AUTO: 11.3 FL (ref 8.9–12.7)
POTASSIUM SERPL-SCNC: 4.4 MMOL/L (ref 3.5–5.3)
PROT SERPL-MCNC: 6.5 G/DL (ref 6.4–8.4)
RBC # BLD AUTO: 4.73 MILLION/UL (ref 3.88–5.62)
SODIUM SERPL-SCNC: 138 MMOL/L (ref 135–147)
WBC # BLD AUTO: 6.14 THOUSAND/UL (ref 4.31–10.16)

## 2024-09-25 PROCEDURE — 85025 COMPLETE CBC W/AUTO DIFF WBC: CPT

## 2024-09-25 PROCEDURE — 97161 PT EVAL LOW COMPLEX 20 MIN: CPT | Performed by: PHYSICAL THERAPIST

## 2024-09-25 PROCEDURE — 36415 COLL VENOUS BLD VENIPUNCTURE: CPT

## 2024-09-25 PROCEDURE — 97110 THERAPEUTIC EXERCISES: CPT | Performed by: PHYSICAL THERAPIST

## 2024-09-25 PROCEDURE — 80053 COMPREHEN METABOLIC PANEL: CPT

## 2024-09-26 ENCOUNTER — OFFICE VISIT (OUTPATIENT)
Dept: CARDIOLOGY CLINIC | Facility: CLINIC | Age: 59
End: 2024-09-26
Payer: MEDICARE

## 2024-09-26 VITALS
SYSTOLIC BLOOD PRESSURE: 120 MMHG | DIASTOLIC BLOOD PRESSURE: 72 MMHG | BODY MASS INDEX: 29.18 KG/M2 | WEIGHT: 221.2 LBS | HEART RATE: 68 BPM | OXYGEN SATURATION: 95 %

## 2024-09-26 DIAGNOSIS — I71.21 ANEURYSM OF ASCENDING AORTA WITHOUT RUPTURE (HCC): ICD-10-CM

## 2024-09-26 DIAGNOSIS — Q23.81 BICUSPID AORTIC VALVE: Primary | ICD-10-CM

## 2024-09-26 DIAGNOSIS — E78.2 HYPERLIPIDEMIA, MIXED: ICD-10-CM

## 2024-09-26 DIAGNOSIS — M75.102 TEAR OF LEFT SUPRASPINATUS TENDON: ICD-10-CM

## 2024-09-26 DIAGNOSIS — Z01.810 PRE-OPERATIVE CARDIOVASCULAR EXAMINATION: ICD-10-CM

## 2024-09-26 DIAGNOSIS — Q23.1 BICUSPID AORTIC VALVE: Primary | ICD-10-CM

## 2024-09-26 PROCEDURE — 99214 OFFICE O/P EST MOD 30 MIN: CPT | Performed by: INTERNAL MEDICINE

## 2024-09-26 NOTE — ASSESSMENT & PLAN NOTE
Lipids: , LDL unable to be calculated, HDL 29, and .   Would continue dietary efforts to reduce carbohydrates and sugars in diet to help with TG levels along with continuing on fenofibrate. His fasting sugar ws 110 - suggesting a diagnosis of DM - which could be causing his increase in TG and his symptoms of diaphoresis and increased fatigue.    TG remain elevated 206 and LDL 28 in June 2022 - HgbA1C 6.0% this year.  LDL 17 and triglycerides 361 in January 2024.

## 2024-09-26 NOTE — LETTER
Cardiology Pre Operative Clearance      PRE OPERATIVE CARDIAC RISK ASSESSMENT    09/26/24    Chavez Newell  1965  [id number]    Date of Surgery: 10/10/2024    Type of Surgery:  REPAIR ROTATOR CUFF  ARTHROSCOPIC and possible open subpectoral biceps tenodesis, Left (Left: Shoulder)     Surgeon: Dr. Rivera    No Cardiac Contraindication for Planned Surgical Procedures    Anticoagulation: Coumadin hold can be discussed with his PCP as the indication for use is DVT and we are not managing this medication.    Physician Comment: Proceed with planned surgery without further cardiac work-up.  He does have an echocardiogram ordered, for surveillance, which does not need to be completed prior to surgery.    Electronically Signed: Eric Bo MD

## 2024-09-26 NOTE — ASSESSMENT & PLAN NOTE
Ascending Aortic Aneurysm: measuring 4.3cm on echo, had repeat CTA in 6 months as directed by Dr. Galarza, with stable size.   Stable at 4.3cm in Jan 2017.   He was due again to have this evaluated. April 2019 revealed stable size at 43mm.    Echocardiogram in August 2022 revealed an ascending aortic size of 4.2 cm, stable  Will repeat testing now

## 2024-09-26 NOTE — LETTER
September 26, 2024     Adenike Garg DO  [id number]  [address]  [facility] [address]    Patient: Chavez Neewll   YOB: 1965   Date of Visit: 9/26/2024       Dear Dr. Garg:    Thank you for referring Chavez Newell to me for evaluation. Below are my notes for this consultation.    If you have questions, please do not hesitate to call me. I look forward to following your patient along with you.         Sincerely,        Eric Bo MD        CC: DELMI Liao DO Dwithiya Thomas, MD  9/26/2024  9:01 AM  Sign when Signing Visit  Cardiology Follow Up    Chavez Newell  1965  [id number]  [facility] [place]  [address]  [address]  [facility] [address]  [phone]  [phone]      Assessment & Plan  Bicuspid aortic valve  Bicuspid AV: with no evidence of stenosis or regurgitation of any significance. Continue surveillance with echocardiograms.   Recheck in May 2018 revealed normal LVEF, bicuspid AV with trace AR, no stenosis.   Repeat echo in August 2022 revealed normal LV size and function with bicuspid aortic valve and no significant stenosis or regurgitation of concern  Repeat echocardiogram now  Aneurysm of ascending aorta without rupture (HCC)  Ascending Aortic Aneurysm: measuring 4.3cm on echo, had repeat CTA in 6 months as directed by Dr. Galarza, with stable size.   Stable at 4.3cm in Jan 2017.   He was due again to have this evaluated. April 2019 revealed stable size at 43mm.    Echocardiogram in August 2022 revealed an ascending aortic size of 4.2 cm, stable  Will repeat testing now  Hyperlipidemia, mixed  Lipids: , LDL unable to be calculated, HDL 29, and .   Would continue dietary efforts to reduce carbohydrates and sugars in diet to help with TG levels along with continuing on fenofibrate. His fasting sugar ws 110 - suggesting a diagnosis of DM - which could be causing his increase in TG and his symptoms of  diaphoresis and increased fatigue.    TG remain elevated 206 and LDL 28 in June 2022 - HgbA1C 6.0% this year.  LDL 17 and triglycerides 361 in January 2024.  Pre-operative cardiovascular examination  For intermediate cardiac risk orthopedic surgery with repair of rotator cuff, arthroscopic with possible open surgery  No significant symptoms, physical exam findings, or ECG changes to suggest active ischemia, arrhythmia, or heart failure  Proceed with surgery without further cardiac workup at the current time  He will be getting an echocardiogram for surveillance of his ascending aorta and bicuspid aortic valve, however this does not need to be completed prior to surgery  He is on warfarin therapy for history of DVT, recommendations regarding holding medication for surgery perioperatively to be recommended by PCP       Chief Complaint   Patient presents with   • Pre-op Exam     Surgery: REPAIR ROTATOR CUFF  ARTHROSCOPIC and possible open subpectoral biceps tenodesis, Left (Left: Shoulder) Date: 10/10/2024 Dr. Clemente        Interval History: Patient feels well, without complaints.  No reported chest pain, shortness of breath, palpitations, lightheadedness, syncope, LE edema, orthopnea, PND, or significant weight changes.  Patient remains active without any increased fatigue out of the ordinary.      Blood pressure 120/72, pulse 68, weight 100 kg (221 lb 3.2 oz), SpO2 95%.    EKG:  Outpatient EKG personally reviewed:  Sinus bradycardia with occasional PVCs  Left axis deviation  Incomplete right bundle branch block  Nonspecific T wave abnormality  Abnormal ECG    Review of Systems:  Review of Systems   Constitutional:  Negative for activity change, appetite change, fatigue and fever.   HENT:  Negative for nosebleeds and sore throat.    Eyes:  Negative for photophobia and visual disturbance.   Respiratory:  Negative for cough, chest tightness, shortness of breath and wheezing.    Cardiovascular:  Negative for chest  pain, palpitations and leg swelling.   Gastrointestinal:  Negative for abdominal pain, diarrhea, nausea and vomiting.   Endocrine: Negative for polyuria.   Genitourinary:  Negative for dysuria, frequency and hematuria.   Musculoskeletal:  Negative for arthralgias, back pain and gait problem.   Skin:  Negative for pallor and rash.   Neurological:  Negative for dizziness, syncope, speech difficulty and light-headedness.   Hematological:  Does not bruise/bleed easily.   Psychiatric/Behavioral:  Negative for agitation, behavioral problems and confusion.        Physical Exam:  Physical Exam  Vitals reviewed.   Constitutional:       General: He is not in acute distress.     Appearance: Normal appearance. He is well-developed. He is not diaphoretic.   HENT:      Head: Normocephalic and atraumatic.      Nose: Nose normal.   Eyes:      General: No scleral icterus.     Extraocular Movements: Extraocular movements intact.      Pupils: Pupils are equal, round, and reactive to light.   Neck:      Vascular: No JVD.   Cardiovascular:      Rate and Rhythm: Normal rate and regular rhythm.      Heart sounds: S1 normal and S2 normal. Heart sounds not distant. Murmur heard.      Systolic murmur is present with a grade of 2/6.      No friction rub. No gallop. No S3 sounds.   Pulmonary:      Effort: Pulmonary effort is normal. No respiratory distress.      Breath sounds: Normal breath sounds. No wheezing or rales.   Abdominal:      General: Bowel sounds are normal. There is no distension.      Palpations: Abdomen is soft.   Musculoskeletal:         General: No deformity.      Cervical back: Normal range of motion and neck supple.      Right lower leg: No edema.      Left lower leg: No edema.   Skin:     General: Skin is warm and dry.      Findings: No erythema.   Neurological:      Mental Status: He is alert and oriented to person, place, and time.      Cranial Nerves: No cranial nerve deficit.   Psychiatric:         Mood and Affect:  Mood normal.         Behavior: Behavior normal.         Patient Active Problem List   Diagnosis   • Mild intermittent asthma without complication   • Ascending aortic aneurysm ([facility])   • Bicuspid aortic valve   • Allergic rhinitis   • GERD (gastroesophageal reflux disease)   • Hiatal hernia   • Type 2 diabetes mellitus with stage 3a chronic kidney disease, without long-term current use of insulin ([facility])   • CKD (chronic kidney disease) stage 3, GFR 30-59 ml/min ([facility])   • Hyperlipidemia, mixed   • Numbness and tingling in left arm   • Vitamin D deficiency   • Renal cyst, left   • Hepatic cyst   • Fatty liver disease, nonalcoholic   • Erectile dysfunction   • Carpal tunnel syndrome of right wrist   • Chronic pain of right knee   • Vestibular migraine   • Patellar tendinitis of right knee   • Benign paroxysmal positional vertigo due to bilateral vestibular disorder   • Hip impingement syndrome, right   • Primary osteoarthritis of right hip   • Ulnar neuropathy of both upper extremities   • Lumbosacral strain   • Tarsal tunnel syndrome of right side   • Cubital tunnel syndrome, bilateral   • Groin pain, chronic, right   • Chronic pain syndrome   • Chronic kidney disease-mineral and bone disorder   • Uncomplicated opioid dependence ([facility])   • Arthritis of right hip   • Pre-operative cardiovascular examination   • History of DVT (deep vein thrombosis)   • Wears dentures     Past Medical History:   Diagnosis Date   • Anxiety 03/10/2022   • Aorta aneurysm ([facility])     4.3   • Arm DVT (deep venous thromboembolism), acute ([facility]) 2015    complications of cardiac cath   • Asthma    • Blood clot in vein     right leg   • Chronic kidney disease    • CKD (chronic kidney disease), stage III ([facility])    • COPD (chronic obstructive pulmonary disease) ([facility])    • Depression    • Diabetes mellitus ([facility])    • Essential hypertriglyceridemia    • GERD (gastroesophageal reflux disease)    • Headache    • Hiatal hernia    • Hyperlipidemia, mixed  05/07/2018   • Kidney stone    • Liver disease     FATTY LIVER   • Mild episode of recurrent major depressive disorder (HCC) 03/10/2022   • PAC (premature atrial contraction)    • Prediabetes    • Rectus sheath hematoma, initial encounter 08/09/2023   • Renal calculi    • Sleep apnea    • Vestibular migraine      Social History     Socioeconomic History   • Marital status:      Spouse name: Not on file   • Number of children: 2   • Years of education: Not on file   • Highest education level: Not on file   Occupational History   • Occupation: unemployed   Tobacco Use   • Smoking status: Some Days     Types: Cigars     Passive exposure: Never   • Smokeless tobacco: Never   • Tobacco comments:     never inhaled   Vaping Use   • Vaping status: Never Used   Substance and Sexual Activity   • Alcohol use: Not Currently     Comment: occasional beer   • Drug use: No   • Sexual activity: Yes     Partners: Female   Other Topics Concern   • Not on file   Social History Narrative    Caffeine use    Lives alone     Social Determinants of Health     Financial Resource Strain: Low Risk  (9/26/2023)    Overall Financial Resource Strain (CARDIA)    • Difficulty of Paying Living Expenses: Not hard at all   Food Insecurity: Not on file   Transportation Needs: No Transportation Needs (9/26/2023)    PRAPARE - Transportation    • Lack of Transportation (Medical): No    • Lack of Transportation (Non-Medical): No   Physical Activity: Not on file   Stress: Not on file   Social Connections: Not on file   Intimate Partner Violence: Not on file   Housing Stability: Not on file      Family History   Problem Relation Age of Onset   • Arthritis Mother    • Heart attack Father    • Clotting disorder Father    • Schizoaffective Disorder  Sister    • Cancer Brother    • Prostate cancer Brother    • Leukemia Brother         his only brother dies from lymphoma or leukemia pt unsure he was only 54   • Aortic aneurysm Other         abdominal      Past Surgical History:   Procedure Laterality Date   • CARPAL TUNNEL RELEASE Left    • COLONOSCOPY     • FL INJECTION LEFT SHOULDER (ARTHROGRAM)  9/13/2024   • FL INJECTION RIGHT HIP (ARTHROGRAM)  08/20/2018   • FOOT SURGERY Left     FOREIGN BODY REMOVAL   • HERNIA REPAIR     • NJ ARTHRP ACETBLR/PROX FEM PROSTC AGRFT/ALGRFT Right 09/28/2020    Procedure: ARTHROPLASTY HIP TOTAL;  Surgeon: Jyoti Araiza MD;  Location: [facility];  Service: Orthopedics   • NJ COLONOSCOPY FLX DX W/COLLJ SPEC WHEN PFRMD N/A 08/07/2017    Procedure: COLONOSCOPY;  Surgeon: Anthony France MD;  Location: [facility];  Service: Gastroenterology   • NJ ESOPHAGOGASTRODUODENOSCOPY TRANSORAL DIAGNOSTIC N/A 10/31/2017    Procedure: ESOPHAGOGASTRODUODENOSCOPY (EGD);  Surgeon: Anthony France MD;  Location: [facility];  Service: Gastroenterology   • TOTAL HIP ARTHROPLASTY Right 2020       Current Outpatient Medications:   •  acetaminophen (TYLENOL) 500 mg tablet, Take 500 mg by mouth every 6 (six) hours as needed for mild pain, Disp: , Rfl:   •  Bydureon BCise 2 MG/0.85ML AUIJ, inject 2 milligrams subcutaneously weekly, Disp: 10.2 mL, Rfl: 1  •  Continuous Blood Gluc Sensor (FreeStyle Jelly 3 Sensor) MISC, Use 1 each every 14 (fourteen) days, Disp: 6 each, Rfl: 3  •  fenofibrate 160 MG tablet, take 1 tablet by mouth once daily, Disp: 90 tablet, Rfl: 1  •  gabapentin (NEURONTIN) 300 mg capsule, take 1 capsule by mouth at bedtime, Disp: 30 capsule, Rfl: 1  •  glucose blood test strip, TEST BS TID, Disp: 300 each, Rfl: 5  •  glucose monitoring kit (FREESTYLE) monitoring kit, Use 1 each daily before breakfast, Disp: 1 each, Rfl: 0  •  Insulin Glargine Solostar (Lantus SoloStar) 100 UNIT/ML SOPN, inject 20 units subcutaneously daily or as directed by prescriber, Disp: 15 mL, Rfl: 2  •  insulin lispro (HumaLOG KwikPen) 100 units/mL injection pen, Per sliding scale, Max 50 units daily, Disp: 15 mL, Rfl: 2  •  Insulin Pen Needle (BD Pen Needle  Evelina 2nd Gen) 32G X 4 MM MISC, Inject as directed 4 (four) times a day, Disp: 500 each, Rfl: 5  •  Lancets MISC, Use daily before breakfast, Disp: 100 each, Rfl: 5  •  methocarbamol (ROBAXIN) 500 mg tablet, take 1 tablet by mouth twice a day, Disp: 60 tablet, Rfl: 1  •  naloxone (NARCAN) 4 mg/0.1 mL nasal spray, Administer 1 spray into a nostril. If no response after 2-3 minutes, give another dose in the other nostril using a new spray., Disp: 1 each, Rfl: 1  •  oxyCODONE (Roxicodone) 5 immediate release tablet, Take 1 tablet (5 mg total) by mouth every 6 (six) hours as needed for moderate pain or severe pain for up to 12 doses Max Daily Amount: 20 mg, Disp: 12 tablet, Rfl: 0  •  pantoprazole (PROTONIX) 40 mg tablet, take 1 tablet by mouth once daily, Disp: 90 tablet, Rfl: 3  •  rosuvastatin (CRESTOR) 5 mg tablet, take 1 tablet by mouth once daily, Disp: 90 tablet, Rfl: 1  •  sildenafil (REVATIO) 20 mg tablet, TAKE 1 TABLET (20 MG TOTAL) BY MOUTH DAILY AS DIRECTED, Disp: 90 tablet, Rfl: 3  •  traMADol (Ultram) 50 mg tablet, May take 2 tablets (100 mg total) by mouth daily as needed for moderate pain. May also take 1 tablet (50 mg total) every 8 (eight) hours as needed for moderate pain. Max 5 tablets per day., Disp: 150 tablet, Rfl: 2  •  warfarin (COUMADIN) 2 mg tablet, take 1 tablet by mouth MONDAY, WEDNESDAY, FRIDAY OR AS DIRECTED BY PCP, Disp: 30 tablet, Rfl: 0  •  warfarin (COUMADIN) 3 mg tablet, take 1 tablet by mouth TUESDAY, THURSDAY, SATURDAY, AND SUNDAY; OR AS DIRECTED BY PCP, Disp: 90 tablet, Rfl: 1  No Known Allergies    Labs:  Appointment on 09/25/2024   Component Date Value   • WBC 09/25/2024 6.14    • RBC 09/25/2024 4.73    • Hemoglobin 09/25/2024 14.9    • Hematocrit 09/25/2024 46.2    • MCV 09/25/2024 98    • MCH 09/25/2024 31.5    • MCHC 09/25/2024 32.3    • RDW 09/25/2024 12.3    • MPV 09/25/2024 11.3    • Platelets 09/25/2024 210    • nRBC 09/25/2024 0    • Segmented % 09/25/2024 58    • Immature  Grans % 09/25/2024 1    • Lymphocytes % 09/25/2024 27    • Monocytes % 09/25/2024 11    • Eosinophils Relative 09/25/2024 2    • Basophils Relative 09/25/2024 1    • Absolute Neutrophils 09/25/2024 3.65    • Absolute Immature Grans 09/25/2024 0.03    • Absolute Lymphocytes 09/25/2024 1.63    • Absolute Monocytes 09/25/2024 0.67    • Eosinophils Absolute 09/25/2024 0.12    • Basophils Absolute 09/25/2024 0.04    • Sodium 09/25/2024 138    • Potassium 09/25/2024 4.4    • Chloride 09/25/2024 103    • CO2 09/25/2024 28    • ANION GAP 09/25/2024 7    • BUN 09/25/2024 18    • Creatinine 09/25/2024 1.44 (H)    • Glucose, Fasting 09/25/2024 162 (H)    • Calcium 09/25/2024 9.2    • AST 09/25/2024 26    • ALT 09/25/2024 28    • Alkaline Phosphatase 09/25/2024 47    • Total Protein 09/25/2024 6.5    • Albumin 09/25/2024 4.1    • Total Bilirubin 09/25/2024 0.64    • eGFR 09/25/2024 52    Office Visit on 09/23/2024   Component Date Value   • Ventricular Rate 09/23/2024 58    • Atrial Rate 09/23/2024 58    • TX Interval 09/23/2024 176    • QRSD Interval 09/23/2024 108    • QT Interval 09/23/2024 420    • QTC Interval 09/23/2024 412    • P Axis 09/23/2024 44    • QRS Axis 09/23/2024 -39    • T Wave Axis 09/23/2024 -32    Anticoag visit on 09/09/2024   Component Date Value   • INR 08/06/2024 2.37 (A)    Appointment on 09/06/2024   Component Date Value   • Protime 09/06/2024 25.8 (H)    • INR 09/06/2024 2.37 (H)    Office Visit on 08/23/2024   Component Date Value   • RESULT ALL_PRESC MEDS SP* 08/23/2024 Not Applicable    • Alpha-Hydroxyalprazolam * 08/23/2024 negative    • Amphetamine Quantificati* 08/23/2024 negative    • Buprenorphine Quantifica* 08/23/2024 negative    • Norbuprenorphine Quantif* 08/23/2024 negative    • Butalbital Quantification 08/23/2024 negative    • 7-Amino-Clonazepam Quant* 08/23/2024 negative    • Cocaine metabolite Quant* 08/23/2024 negative    • Codeine Quantification 08/23/2024 negative    • Morphine  Quantification 08/23/2024 negative    • Hydrocodone Quantificati* 08/23/2024 negative    • Norhydrocodone Quantific* 08/23/2024 negative    • Hydromorphone Quantifica* 08/23/2024 negative    • Dextromethorphan Quantif* 08/23/2024 negative    • Dextrorphan (Dextrometho* 08/23/2024 negative    • Nordiazepam Quantificati* 08/23/2024 negative    • Temazepam Quantification 08/23/2024 negative    • Oxazepam Quantification 08/23/2024 negative    • Ethyl Glucuronide Quanti* 08/23/2024 negative    • Ethyl Sulfate Quantifica* 08/23/2024 negative    • Fentanyl Quantification 08/23/2024 negative    • Norfentanyl Quantificati* 08/23/2024 negative    • Acetyl fentanyl Quantifi* 08/23/2024 Fen Neg    • Acetyl norfentanyl Quant* 08/23/2024 Fen Neg    • Acryl fentanyl Quantific* 08/23/2024 Fen Neg    • Carfentanil Quantificati* 08/23/2024 Fen Neg    • Para-fluorofentanyl Polo* 08/23/2024 Fen Neg    • 6-ROMMEL (Heroin metabolite* 08/23/2024 negative    • Hydrocodone Quantificati* 08/23/2024 negative    • Norhydrocodone Quantific* 08/23/2024 negative    • Hydromorphone Quantifica* 08/23/2024 negative    • Hydromorphone Quantifica* 08/23/2024 negative    • Mitragynine (Kratom bill* 08/23/2024 negative    • 8-XN-Yigbczzvfse (Kratom* 08/23/2024 negative    • Dextrorphan (Dextrometho* 08/23/2024 negative    • MDMA Quantification 08/23/2024 negative    • Meperidine Quantification 08/23/2024 negative    • Normeperidine Quantifica* 08/23/2024 negative    • Methadone Quantification 08/23/2024 negative    • EDDP (Methadone metaboli* 08/23/2024 negative    • Amphetamine Quantificati* 08/23/2024 negative    • Methamphetamine Quantifi* 08/23/2024 negative    • Methylphenidate Quantifi* 08/23/2024 negative    • RITALINIC ACID QUANTIFIC* 08/23/2024 negative    • Morphine Quantification 08/23/2024 negative    • Hydromorphone Quantifica* 08/23/2024 negative    • Lorazepam Quantification 08/23/2024 negative    • Oxazepam Quantification 08/23/2024  negative    • Oxycodone Quantification 08/23/2024 negative-I (A)    • Noroxycodone Quantificat* 08/23/2024 negative-I (A)    • Oxymorphone Quantificati* 08/23/2024 negative-I (A)    • Oxymorphone Quantificati* 08/23/2024 negative-I (A)    • Phencyclidine Quantifica* 08/23/2024 negative    • Phenobarbital Quantifica* 08/23/2024 negative    • PHENTERMINE QUANTIFICATI* 08/23/2024 negative    • Secobarbital Quantificat* 08/23/2024 negative    • 5F-ADB-M7 08/23/2024 negative    • EU-THPTKORL-E5 METABOLIT* 08/23/2024 negative    • MKRO-VBATPHCL-Q0 METABOL* 08/23/2024 negative    • MHR761 metabolite Quanti* 08/23/2024 negative    • OHI771 metabolite Quanti* 08/23/2024 negative    • RCS4 METABOLITE QUANTIFI* 08/23/2024 negative    • XLR11/ METABOLITE Q* 08/23/2024 negative    • Tapentadol Quantification 08/23/2024 negative    • Temazepam Quantification 08/23/2024 negative    • Oxazepam Quantification 08/23/2024 negative    • cTHC (Marijuana metaboli* 08/23/2024 negative    • Tramadol Quantification 08/23/2024 positive-> 80730    • O-desmethyl-tramadol Ravi* 08/23/2024 positive-61221.496    • N-DESMETHYL-TRAMADOL RAVI* 08/23/2024 positive-897.928    Office Visit on 07/16/2024   Component Date Value   • Hemoglobin A1C 07/16/2024 7.7 (A)    • UR MICROALBUMIN 07/17/2024 <30    Admission on 06/01/2024, Discharged on 06/01/2024   Component Date Value   • Protime 06/01/2024 26.1 (H)    • INR 06/01/2024 2.38 (H)    Appointment on 05/25/2024   Component Date Value   • Sodium 05/25/2024 140    • Potassium 05/25/2024 4.3    • Chloride 05/25/2024 105    • CO2 05/25/2024 27    • ANION GAP 05/25/2024 8    • BUN 05/25/2024 18    • Creatinine 05/25/2024 1.43 (H)    • Glucose, Fasting 05/25/2024 156 (H)    • Calcium 05/25/2024 9.3    • eGFR 05/25/2024 52    • WBC 05/25/2024 5.80    • RBC 05/25/2024 4.72    • Hemoglobin 05/25/2024 15.0    • Hematocrit 05/25/2024 45.6    • MCV 05/25/2024 97    • MCH 05/25/2024 31.8    • MCHC 05/25/2024  32.9    • RDW 05/25/2024 12.6    • MPV 05/25/2024 11.0    • Platelets 05/25/2024 227    • nRBC 05/25/2024 0    • Segmented % 05/25/2024 57    • Immature Grans % 05/25/2024 0    • Lymphocytes % 05/25/2024 30    • Monocytes % 05/25/2024 10    • Eosinophils Relative 05/25/2024 2    • Basophils Relative 05/25/2024 1    • Absolute Neutrophils 05/25/2024 3.32    • Absolute Immature Grans 05/25/2024 0.02    • Absolute Lymphocytes 05/25/2024 1.74    • Absolute Monocytes 05/25/2024 0.57    • Eosinophils Absolute 05/25/2024 0.11    • Basophils Absolute 05/25/2024 0.04    • Phosphorus 05/25/2024 2.3    • Creatinine, Ur 05/25/2024 205.1    • Protein Urine Random 05/25/2024 41    • Prot/Creat Ratio, Ur 05/25/2024 0.20 (H)    • PTH 05/25/2024 48.4    • Color, UA 05/25/2024 Light Orange    • Clarity, UA 05/25/2024 Extra Turbid    • Specific Gravity, UA 05/25/2024 1.022    • pH, UA 05/25/2024 5.5    • Leukocytes, UA 05/25/2024 Negative    • Nitrite, UA 05/25/2024 Negative    • Protein, UA 05/25/2024 50 (1+) (A)    • Glucose, UA 05/25/2024 70 (7/100%) (A)    • Ketones, UA 05/25/2024 Negative    • Urobilinogen, UA 05/25/2024 2.0 (A)    • Bilirubin, UA 05/25/2024 Negative    • Occult Blood, UA 05/25/2024 Negative    • RBC, UA 05/25/2024 None Seen    • WBC, UA 05/25/2024 1-2    • Epithelial Cells 05/25/2024 None Seen    • Bacteria, UA 05/25/2024 None Seen    • MUCUS THREADS 05/25/2024 Occasional (A)    • Vit D, 25-Hydroxy 05/25/2024 30.0    Ancillary Orders on 04/19/2024   Component Date Value   • Protime 05/25/2024 24.8 (H)    • INR 05/25/2024 2.29 (H)    Ancillary Orders on 04/12/2024   Component Date Value   • Protime 04/12/2024 27.3 (H)    • INR 04/12/2024 2.60 (H)    There may be more visits with results that are not included.     Lab Results   Component Value Date    CHOL 114 03/17/2015    TRIG 361 (H) 01/04/2024    TRIG 222 03/17/2015    HDL 29 (L) 01/04/2024    HDL 32 03/17/2015     Imaging: XR chest pa and lateral    Result  Date: 9/23/2024  Narrative: CHEST INDICATION:   Encounter for other preprocedural examination. COMPARISON:  None. EXAM PERFORMED/VIEWS:  XR CHEST PA AND LATERAL FINDINGS: Cardiomediastinal silhouette appears unremarkable. The descending aorta is very tortuous in appearance. There appears to be retrocardiac paraspinal opacity likely hiatal hernia seen best on the frontal projection, unchanged from the spinal study of 11/9/2020. The lungs are clear.  No pneumothorax or pleural effusion. Osseous structures appear within normal limits for patient age.     Impression: No acute cardiopulmonary disease. Apparent hiatal hernia. Confirmation with barium swallow would be helpful if clinically warranted. Electronically signed: 09/23/2024 01:52 PM Harshil Roca MD    MRI arthrogram left shoulder    Result Date: 9/17/2024  Narrative: MRI ARTHROGRAM LEFT SHOULDER INDICATION:   M25.512: Pain in left shoulder. Suspected infraspinatus tear COMPARISON: Left shoulder plain films from 6/13/2024, and left humerus CT from 6/18/2024. TECHNIQUE:  Multiplanar/multisequence MR of the left shoulder was performed. Scan was performed after intraarticular injection of dilute gadolinium under fluoroscopic guidance into the joint. FINDINGS: Exam mildly limited by patient motion. SUBCUTANEOUS TISSUES: Normal JOINT CAPSULE: Intact, without inferior extravasation of contrast. SUBACROMIAL/SUBDELTOID BURSA: Normal. ACROMION PROCESS: Normal. ROTATOR CUFF: -Supraspinatus: Torn tendon (series 8 images 9-13.) -Thickness: Full thickness. -AP Location: Complete tear. -AP extent (in cm): Complete tear. -Proximal/distal location: Insertional. -Tendon retraction: Torn tendon retracted to level of humeral head apex. -Muscle atrophy: None. -infraspinatus: Intact. -Subscapularis: Intact. -Teres minor: Intact. LONG HEAD OF BICEPS TENDON: Normal. GLENOID LABRUM: Intact. GLENOHUMERAL JOINT: Intact. ACROMIOCLAVICULAR JOINT: There is mild osteoarthritis. BONES:  "Normal.     Impression: Complete insertional tear of the supraspinatus with torn tendon edge retracted to the level of the humeral head apex. No muscle atrophy (series 8 images 9-13.) Workstation performed: [id number]     FL injection left shoulder (arthrogram)    Result Date: 9/13/2024  Narrative: LEFT SHOULDER ARTHROGRAM INDICATION:   M25.512: Pain in left shoulder. Acute on chronic left shoulder pain for several months. Evaluate for underlying rotator and labral pathology. COMPARISON: Left shoulder x-ray on 6/13/2024. CT of the upper extremity without contrast on the left on 6/18/2024. IMAGES: 2 FLUOROSCOPY TIME:   0 minutes and 8 seconds FINDINGS: After the risks and benefits of the procedure were thoroughly explained, informed consent was obtained. The patient verbalized expressed understanding of the above risks and wished to proceed with the procedure. Final standard \"time-out\" procedure performed. The patient was prepped and draped in the usual sterile fashion. 3 mL of 1% lidocaine solution was utilized for local anesthesia.  Intermittent fluoroscopy was utilized for placement of a 20 gauge 1.5 inch needle within the left glenohumeral joint. After positioning was confirmed with 1 mL of Omnipaque 300, 15 mL of 0.2 ml Gadavist/50ml Normal Saline was injected into the joint. 2  mL of 0.2% Ropivacaine was also injected into the joint. The patient tolerated the procedure well.  There were no complications. I asked the patient to call us with any questions, concerns, or acute problems. The patient expressed understanding of the above. The patient will report for MR imaging of the left shoulder. Before the procedure, patient reports 4 out of 10 pain.  Immediately after the injection, the patient reports 0 out of 10 pain.     Impression: Successful shoulder arthrogram with gadolinium injection into the left glenohumeral joint.  MRI of the shoulder is currently pending. With intraarticular injection of local " anesthetic, patient reports improvement in typical pain. I reviewed the above findings and procedure with Dr. Costa Tiwari. PERFORMED, DICTATED AND SIGNED BY: Tammy Richardson PA-C Workstation performed: [id number]

## 2024-09-26 NOTE — PROGRESS NOTES
Cardiology Follow Up    Chavez Newell  1965  [id number]  [facility] [place]  [address]  [address]  [place] [address] [phone]      Assessment & Plan  Bicuspid aortic valve  Bicuspid AV: with no evidence of stenosis or regurgitation of any significance. Continue surveillance with echocardiograms.   Recheck in May 2018 revealed normal LVEF, bicuspid AV with trace AR, no stenosis.   Repeat echo in August 2022 revealed normal LV size and function with bicuspid aortic valve and no significant stenosis or regurgitation of concern  Repeat echocardiogram now  Aneurysm of ascending aorta without rupture (HCC)  Ascending Aortic Aneurysm: measuring 4.3cm on echo, had repeat CTA in 6 months as directed by Dr. Galarza, with stable size.   Stable at 4.3cm in Jan 2017.   He was due again to have this evaluated. April 2019 revealed stable size at 43mm.    Echocardiogram in August 2022 revealed an ascending aortic size of 4.2 cm, stable  Will repeat testing now  Hyperlipidemia, mixed  Lipids: , LDL unable to be calculated, HDL 29, and .   Would continue dietary efforts to reduce carbohydrates and sugars in diet to help with TG levels along with continuing on fenofibrate. His fasting sugar ws 110 - suggesting a diagnosis of DM - which could be causing his increase in TG and his symptoms of diaphoresis and increased fatigue.    TG remain elevated 206 and LDL 28 in June 2022 - HgbA1C 6.0% this year.  LDL 17 and triglycerides 361 in January 2024.  Pre-operative cardiovascular examination  For intermediate cardiac risk orthopedic surgery with repair of rotator cuff, arthroscopic with possible open surgery  No significant symptoms, physical exam findings, or ECG changes to suggest active ischemia, arrhythmia, or heart failure  Proceed with surgery without further cardiac workup at the current time  He will be getting an echocardiogram for surveillance of his ascending  aorta and bicuspid aortic valve, however this does not need to be completed prior to surgery  He is on warfarin therapy for history of DVT, recommendations regarding holding medication for surgery perioperatively to be recommended by PCP       Chief Complaint   Patient presents with    Pre-op Exam     Surgery: REPAIR ROTATOR CUFF  ARTHROSCOPIC and possible open subpectoral biceps tenodesis, Left (Left: Shoulder) Date: 10/10/2024 Dr. Clemente        Interval History: Patient feels well, without complaints.  No reported chest pain, shortness of breath, palpitations, lightheadedness, syncope, LE edema, orthopnea, PND, or significant weight changes.  Patient remains active without any increased fatigue out of the ordinary.      Blood pressure 120/72, pulse 68, weight 100 kg (221 lb 3.2 oz), SpO2 95%.    EKG:  Outpatient EKG personally reviewed:  Sinus bradycardia with occasional PVCs  Left axis deviation  Incomplete right bundle branch block  Nonspecific T wave abnormality  Abnormal ECG    Review of Systems:  Review of Systems   Constitutional:  Negative for activity change, appetite change, fatigue and fever.   HENT:  Negative for nosebleeds and sore throat.    Eyes:  Negative for photophobia and visual disturbance.   Respiratory:  Negative for cough, chest tightness, shortness of breath and wheezing.    Cardiovascular:  Negative for chest pain, palpitations and leg swelling.   Gastrointestinal:  Negative for abdominal pain, diarrhea, nausea and vomiting.   Endocrine: Negative for polyuria.   Genitourinary:  Negative for dysuria, frequency and hematuria.   Musculoskeletal:  Negative for arthralgias, back pain and gait problem.   Skin:  Negative for pallor and rash.   Neurological:  Negative for dizziness, syncope, speech difficulty and light-headedness.   Hematological:  Does not bruise/bleed easily.   Psychiatric/Behavioral:  Negative for agitation, behavioral problems and confusion.        Physical Exam:  Physical  Exam  Vitals reviewed.   Constitutional:       General: He is not in acute distress.     Appearance: Normal appearance. He is well-developed. He is not diaphoretic.   HENT:      Head: Normocephalic and atraumatic.      Nose: Nose normal.   Eyes:      General: No scleral icterus.     Extraocular Movements: Extraocular movements intact.      Pupils: Pupils are equal, round, and reactive to light.   Neck:      Vascular: No JVD.   Cardiovascular:      Rate and Rhythm: Normal rate and regular rhythm.      Heart sounds: S1 normal and S2 normal. Heart sounds not distant. No murmur heard.     No friction rub. No gallop. No S3 sounds.   Pulmonary:      Effort: Pulmonary effort is normal. No respiratory distress.      Breath sounds: Normal breath sounds. No wheezing or rales.   Abdominal:      General: Bowel sounds are normal. There is no distension.      Palpations: Abdomen is soft.   Musculoskeletal:         General: No deformity.      Cervical back: Normal range of motion and neck supple.      Right lower leg: No edema.      Left lower leg: No edema.   Skin:     General: Skin is warm and dry.      Findings: No erythema.   Neurological:      Mental Status: He is alert and oriented to person, place, and time.      Cranial Nerves: No cranial nerve deficit.   Psychiatric:         Mood and Affect: Mood normal.         Behavior: Behavior normal.         Patient Active Problem List   Diagnosis    Mild intermittent asthma without complication    Ascending aortic aneurysm ([facility])    Bicuspid aortic valve    Allergic rhinitis    GERD (gastroesophageal reflux disease)    Hiatal hernia    Type 2 diabetes mellitus with stage 3a chronic kidney disease, without long-term current use of insulin ([facility])    CKD (chronic kidney disease) stage 3, GFR 30-59 ml/min ([facility])    Hyperlipidemia, mixed    Numbness and tingling in left arm    Vitamin D deficiency    Renal cyst, left    Hepatic cyst    Fatty liver disease, nonalcoholic    Erectile  dysfunction    Carpal tunnel syndrome of right wrist    Chronic pain of right knee    Vestibular migraine    Patellar tendinitis of right knee    Benign paroxysmal positional vertigo due to bilateral vestibular disorder    Hip impingement syndrome, right    Primary osteoarthritis of right hip    Ulnar neuropathy of both upper extremities    Lumbosacral strain    Tarsal tunnel syndrome of right side    Cubital tunnel syndrome, bilateral    Groin pain, chronic, right    Chronic pain syndrome    Chronic kidney disease-mineral and bone disorder    Uncomplicated opioid dependence (HCC)    Arthritis of right hip    Pre-operative cardiovascular examination    History of DVT (deep vein thrombosis)    Wears dentures     Past Medical History:   Diagnosis Date    Anxiety 03/10/2022    Aorta aneurysm (HCC)     4.3    Arm DVT (deep venous thromboembolism), acute (HCC) 2015    complications of cardiac cath    Asthma     Blood clot in vein     right leg    Chronic kidney disease     CKD (chronic kidney disease), stage III (HCC)     COPD (chronic obstructive pulmonary disease) (HCC)     Depression     Diabetes mellitus (HCC)     Essential hypertriglyceridemia     GERD (gastroesophageal reflux disease)     Headache     Hiatal hernia     Hyperlipidemia, mixed 05/07/2018    Kidney stone     Liver disease     FATTY LIVER    Mild episode of recurrent major depressive disorder (HCC) 03/10/2022    PAC (premature atrial contraction)     Prediabetes     Rectus sheath hematoma, initial encounter 08/09/2023    Renal calculi     Sleep apnea     Vestibular migraine      Social History     Socioeconomic History    Marital status:      Spouse name: Not on file    Number of children: 2    Years of education: Not on file    Highest education level: Not on file   Occupational History    Occupation: unemployed   Tobacco Use    Smoking status: Some Days     Types: Cigars     Passive exposure: Never    Smokeless tobacco: Never    Tobacco  comments:     never inhaled   Vaping Use    Vaping status: Never Used   Substance and Sexual Activity    Alcohol use: Not Currently     Comment: occasional beer    Drug use: No    Sexual activity: Yes     Partners: Female   Other Topics Concern    Not on file   Social History Narrative    Caffeine use    Lives alone     Social Determinants of Health     Financial Resource Strain: Low Risk  (9/26/2023)    Overall Financial Resource Strain (CARDIA)     Difficulty of Paying Living Expenses: Not hard at all   Food Insecurity: Not on file   Transportation Needs: No Transportation Needs (9/26/2023)    PRAPARE - Transportation     Lack of Transportation (Medical): No     Lack of Transportation (Non-Medical): No   Physical Activity: Not on file   Stress: Not on file   Social Connections: Not on file   Intimate Partner Violence: Not on file   Housing Stability: Not on file      Family History   Problem Relation Age of Onset    Arthritis Mother     Heart attack Father     Clotting disorder Father     Schizoaffective Disorder  Sister     Cancer Brother     Prostate cancer Brother     Leukemia Brother         his only brother dies from lymphoma or leukemia pt unsure he was only 54    Aortic aneurysm Other         abdominal     Past Surgical History:   Procedure Laterality Date    CARPAL TUNNEL RELEASE Left     COLONOSCOPY      FL INJECTION LEFT SHOULDER (ARTHROGRAM)  9/13/2024    FL INJECTION RIGHT HIP (ARTHROGRAM)  08/20/2018    FOOT SURGERY Left     FOREIGN BODY REMOVAL    HERNIA REPAIR      MT ARTHRP ACETBLR/PROX FEM PROSTC AGRFT/ALGRFT Right 09/28/2020    Procedure: ARTHROPLASTY HIP TOTAL;  Surgeon: Jyoti Araiza MD;  Location: [facility];  Service: Orthopedics    MT COLONOSCOPY FLX DX W/COLLJ SPEC WHEN PFRMD N/A 08/07/2017    Procedure: COLONOSCOPY;  Surgeon: Anthony France MD;  Location: [facility];  Service: Gastroenterology    MT ESOPHAGOGASTRODUODENOSCOPY TRANSORAL DIAGNOSTIC N/A 10/31/2017    Procedure:  ESOPHAGOGASTRODUODENOSCOPY (EGD);  Surgeon: Anthony France MD;  Location: [facility];  Service: Gastroenterology    TOTAL HIP ARTHROPLASTY Right 2020       Current Outpatient Medications:     acetaminophen (TYLENOL) 500 mg tablet, Take 500 mg by mouth every 6 (six) hours as needed for mild pain, Disp: , Rfl:     Bydureon BCise 2 MG/0.85ML AUIJ, inject 2 milligrams subcutaneously weekly, Disp: 10.2 mL, Rfl: 1    Continuous Blood Gluc Sensor (FreeStyle Jelly 3 Sensor) MISC, Use 1 each every 14 (fourteen) days, Disp: 6 each, Rfl: 3    fenofibrate 160 MG tablet, take 1 tablet by mouth once daily, Disp: 90 tablet, Rfl: 1    gabapentin (NEURONTIN) 300 mg capsule, take 1 capsule by mouth at bedtime, Disp: 30 capsule, Rfl: 1    glucose blood test strip, TEST BS TID, Disp: 300 each, Rfl: 5    glucose monitoring kit (FREESTYLE) monitoring kit, Use 1 each daily before breakfast, Disp: 1 each, Rfl: 0    Insulin Glargine Solostar (Lantus SoloStar) 100 UNIT/ML SOPN, inject 20 units subcutaneously daily or as directed by prescriber, Disp: 15 mL, Rfl: 2    insulin lispro (HumaLOG KwikPen) 100 units/mL injection pen, Per sliding scale, Max 50 units daily, Disp: 15 mL, Rfl: 2    Insulin Pen Needle (BD Pen Needle Evelina 2nd Gen) 32G X 4 MM MISC, Inject as directed 4 (four) times a day, Disp: 500 each, Rfl: 5    Lancets MISC, Use daily before breakfast, Disp: 100 each, Rfl: 5    methocarbamol (ROBAXIN) 500 mg tablet, take 1 tablet by mouth twice a day, Disp: 60 tablet, Rfl: 1    naloxone (NARCAN) 4 mg/0.1 mL nasal spray, Administer 1 spray into a nostril. If no response after 2-3 minutes, give another dose in the other nostril using a new spray., Disp: 1 each, Rfl: 1    oxyCODONE (Roxicodone) 5 immediate release tablet, Take 1 tablet (5 mg total) by mouth every 6 (six) hours as needed for moderate pain or severe pain for up to 12 doses Max Daily Amount: 20 mg, Disp: 12 tablet, Rfl: 0    pantoprazole (PROTONIX) 40 mg tablet, take 1  tablet by mouth once daily, Disp: 90 tablet, Rfl: 3    rosuvastatin (CRESTOR) 5 mg tablet, take 1 tablet by mouth once daily, Disp: 90 tablet, Rfl: 1    sildenafil (REVATIO) 20 mg tablet, TAKE 1 TABLET (20 MG TOTAL) BY MOUTH DAILY AS DIRECTED, Disp: 90 tablet, Rfl: 3    traMADol (Ultram) 50 mg tablet, May take 2 tablets (100 mg total) by mouth daily as needed for moderate pain. May also take 1 tablet (50 mg total) every 8 (eight) hours as needed for moderate pain. Max 5 tablets per day., Disp: 150 tablet, Rfl: 2    warfarin (COUMADIN) 2 mg tablet, take 1 tablet by mouth MONDAY, WEDNESDAY, FRIDAY OR AS DIRECTED BY PCP, Disp: 30 tablet, Rfl: 0    warfarin (COUMADIN) 3 mg tablet, take 1 tablet by mouth TUESDAY, THURSDAY, SATURDAY, AND SUNDAY; OR AS DIRECTED BY PCP, Disp: 90 tablet, Rfl: 1  No Known Allergies    Labs:  Appointment on 09/25/2024   Component Date Value    WBC 09/25/2024 6.14     RBC 09/25/2024 4.73     Hemoglobin 09/25/2024 14.9     Hematocrit 09/25/2024 46.2     MCV 09/25/2024 98     MCH 09/25/2024 31.5     MCHC 09/25/2024 32.3     RDW 09/25/2024 12.3     MPV 09/25/2024 11.3     Platelets 09/25/2024 210     nRBC 09/25/2024 0     Segmented % 09/25/2024 58     Immature Grans % 09/25/2024 1     Lymphocytes % 09/25/2024 27     Monocytes % 09/25/2024 11     Eosinophils Relative 09/25/2024 2     Basophils Relative 09/25/2024 1     Absolute Neutrophils 09/25/2024 3.65     Absolute Immature Grans 09/25/2024 0.03     Absolute Lymphocytes 09/25/2024 1.63     Absolute Monocytes 09/25/2024 0.67     Eosinophils Absolute 09/25/2024 0.12     Basophils Absolute 09/25/2024 0.04     Sodium 09/25/2024 138     Potassium 09/25/2024 4.4     Chloride 09/25/2024 103     CO2 09/25/2024 28     ANION GAP 09/25/2024 7     BUN 09/25/2024 18     Creatinine 09/25/2024 1.44 (H)     Glucose, Fasting 09/25/2024 162 (H)     Calcium 09/25/2024 9.2     AST 09/25/2024 26     ALT 09/25/2024 28     Alkaline Phosphatase 09/25/2024 47     Total  Protein 09/25/2024 6.5     Albumin 09/25/2024 4.1     Total Bilirubin 09/25/2024 0.64     eGFR 09/25/2024 52    Office Visit on 09/23/2024   Component Date Value    Ventricular Rate 09/23/2024 58     Atrial Rate 09/23/2024 58     AK Interval 09/23/2024 176     QRSD Interval 09/23/2024 108     QT Interval 09/23/2024 420     QTC Interval 09/23/2024 412     P Axis 09/23/2024 44     QRS Axis 09/23/2024 -39     T Wave Axis 09/23/2024 -32    Anticoag visit on 09/09/2024   Component Date Value    INR 08/06/2024 2.37 (A)    Appointment on 09/06/2024   Component Date Value    Protime 09/06/2024 25.8 (H)     INR 09/06/2024 2.37 (H)    Office Visit on 08/23/2024   Component Date Value    RESULT ALL_PRESC MEDS SP* 08/23/2024 Not Applicable     Alpha-Hydroxyalprazolam * 08/23/2024 negative     Amphetamine Quantificati* 08/23/2024 negative     Buprenorphine Quantifica* 08/23/2024 negative     Norbuprenorphine Quantif* 08/23/2024 negative     Butalbital Quantification 08/23/2024 negative     7-Amino-Clonazepam Quant* 08/23/2024 negative     Cocaine metabolite Quant* 08/23/2024 negative     Codeine Quantification 08/23/2024 negative     Morphine Quantification 08/23/2024 negative     Hydrocodone Quantificati* 08/23/2024 negative     Norhydrocodone Quantific* 08/23/2024 negative     Hydromorphone Quantifica* 08/23/2024 negative     Dextromethorphan Quantif* 08/23/2024 negative     Dextrorphan (Dextrometho* 08/23/2024 negative     Nordiazepam Quantificati* 08/23/2024 negative     Temazepam Quantification 08/23/2024 negative     Oxazepam Quantification 08/23/2024 negative     Ethyl Glucuronide Quanti* 08/23/2024 negative     Ethyl Sulfate Quantifica* 08/23/2024 negative     Fentanyl Quantification 08/23/2024 negative     Norfentanyl Quantificati* 08/23/2024 negative     Acetyl fentanyl Quantifi* 08/23/2024 Fen Neg     Acetyl norfentanyl Quant* 08/23/2024 Fen Neg     Acryl fentanyl Quantific* 08/23/2024 Fen Neg     Carfentanil  Quantificati* 08/23/2024 Fen Neg     Para-fluorofentanyl Polo* 08/23/2024 Fen Neg     6-ROMMEL (Heroin metabolite* 08/23/2024 negative     Hydrocodone Quantificati* 08/23/2024 negative     Norhydrocodone Quantific* 08/23/2024 negative     Hydromorphone Quantifica* 08/23/2024 negative     Hydromorphone Quantifica* 08/23/2024 negative     Mitragynine (Kratom bill* 08/23/2024 negative     6-KA-Ukukiambgfj (Kratom* 08/23/2024 negative     Dextrorphan (Dextrometho* 08/23/2024 negative     MDMA Quantification 08/23/2024 negative     Meperidine Quantification 08/23/2024 negative     Normeperidine Quantifica* 08/23/2024 negative     Methadone Quantification 08/23/2024 negative     EDDP (Methadone metaboli* 08/23/2024 negative     Amphetamine Quantificati* 08/23/2024 negative     Methamphetamine Quantifi* 08/23/2024 negative     Methylphenidate Quantifi* 08/23/2024 negative     RITALINIC ACID QUANTIFIC* 08/23/2024 negative     Morphine Quantification 08/23/2024 negative     Hydromorphone Quantifica* 08/23/2024 negative     Lorazepam Quantification 08/23/2024 negative     Oxazepam Quantification 08/23/2024 negative     Oxycodone Quantification 08/23/2024 negative-I (A)     Noroxycodone Quantificat* 08/23/2024 negative-I (A)     Oxymorphone Quantificati* 08/23/2024 negative-I (A)     Oxymorphone Quantificati* 08/23/2024 negative-I (A)     Phencyclidine Quantifica* 08/23/2024 negative     Phenobarbital Quantifica* 08/23/2024 negative     PHENTERMINE QUANTIFICATI* 08/23/2024 negative     Secobarbital Quantificat* 08/23/2024 negative     5F-ADB-M7 08/23/2024 negative     QW-IIVWDVDP-W0 METABOLIT* 08/23/2024 negative     KVEC-[id number]-I3 METABOL* 08/23/2024 negative     MOO786 metabolite Quanti* 08/23/2024 negative     QHF769 metabolite Quanti* 08/23/2024 negative     RCS4 METABOLITE QUANTIFI* 08/23/2024 negative     XLR11/ METABOLITE Q* 08/23/2024 negative     Tapentadol Quantification 08/23/2024 negative     Temazepam  Quantification 08/23/2024 negative     Oxazepam Quantification 08/23/2024 negative     cTHC (Marijuana metaboli* 08/23/2024 negative     Tramadol Quantification 08/23/2024 positive-> [id number]     O-desmethyl-tramadol Ravi* 08/23/2024 positive-[phone]     N-DESMETHYL-TRAMADOL RAVI* 08/23/2024 positive-[phone]    Office Visit on 07/16/2024   Component Date Value    Hemoglobin A1C 07/16/2024 7.7 (A)     UR MICROALBUMIN 07/17/2024 <30    Admission on 06/01/2024, Discharged on 06/01/2024   Component Date Value    Protime 06/01/2024 26.1 (H)     INR 06/01/2024 2.38 (H)    Appointment on 05/25/2024   Component Date Value    Sodium 05/25/2024 140     Potassium 05/25/2024 4.3     Chloride 05/25/2024 105     CO2 05/25/2024 27     ANION GAP 05/25/2024 8     BUN 05/25/2024 18     Creatinine 05/25/2024 1.43 (H)     Glucose, Fasting 05/25/2024 156 (H)     Calcium 05/25/2024 9.3     eGFR 05/25/2024 52     WBC 05/25/2024 5.80     RBC 05/25/2024 4.72     Hemoglobin 05/25/2024 15.0     Hematocrit 05/25/2024 45.6     MCV 05/25/2024 97     MCH 05/25/2024 31.8     MCHC 05/25/2024 32.9     RDW 05/25/2024 12.6     MPV 05/25/2024 11.0     Platelets 05/25/2024 227     nRBC 05/25/2024 0     Segmented % 05/25/2024 57     Immature Grans % 05/25/2024 0     Lymphocytes % 05/25/2024 30     Monocytes % 05/25/2024 10     Eosinophils Relative 05/25/2024 2     Basophils Relative 05/25/2024 1     Absolute Neutrophils 05/25/2024 3.32     Absolute Immature Grans 05/25/2024 0.02     Absolute Lymphocytes 05/25/2024 1.74     Absolute Monocytes 05/25/2024 0.57     Eosinophils Absolute 05/25/2024 0.11     Basophils Absolute 05/25/2024 0.04     Phosphorus 05/25/2024 2.3     Creatinine, Ur 05/25/2024 205.1     Protein Urine Random 05/25/2024 41     Prot/Creat Ratio, Ur 05/25/2024 0.20 (H)     PTH 05/25/2024 48.4     Color, UA 05/25/2024 Light Bonner     Clarity, UA 05/25/2024 Extra Turbid     Specific Gravity, UA 05/25/2024 1.022     pH, UA 05/25/2024 5.5      Leukocytes, UA 05/25/2024 Negative     Nitrite, UA 05/25/2024 Negative     Protein, UA 05/25/2024 50 (1+) (A)     Glucose, UA 05/25/2024 70 (7/100%) (A)     Ketones, UA 05/25/2024 Negative     Urobilinogen, UA 05/25/2024 2.0 (A)     Bilirubin, UA 05/25/2024 Negative     Occult Blood, UA 05/25/2024 Negative     RBC, UA 05/25/2024 None Seen     WBC, UA 05/25/2024 1-2     Epithelial Cells 05/25/2024 None Seen     Bacteria, UA 05/25/2024 None Seen     MUCUS THREADS 05/25/2024 Occasional (A)     Vit D, 25-Hydroxy 05/25/2024 30.0    Ancillary Orders on 04/19/2024   Component Date Value    Protime 05/25/2024 24.8 (H)     INR 05/25/2024 2.29 (H)    Ancillary Orders on 04/12/2024   Component Date Value    Protime 04/12/2024 27.3 (H)     INR 04/12/2024 2.60 (H)    There may be more visits with results that are not included.     Lab Results   Component Value Date    CHOL 114 03/17/2015    TRIG 361 (H) 01/04/2024    TRIG 222 03/17/2015    HDL 29 (L) 01/04/2024    HDL 32 03/17/2015     Imaging: XR chest pa and lateral    Result Date: 9/23/2024  Narrative: CHEST INDICATION:   Encounter for other preprocedural examination. COMPARISON:  None. EXAM PERFORMED/VIEWS:  XR CHEST PA AND LATERAL FINDINGS: Cardiomediastinal silhouette appears unremarkable. The descending aorta is very tortuous in appearance. There appears to be retrocardiac paraspinal opacity likely hiatal hernia seen best on the frontal projection, unchanged from the spinal study of 11/9/2020. The lungs are clear.  No pneumothorax or pleural effusion. Osseous structures appear within normal limits for patient age.     Impression: No acute cardiopulmonary disease. Apparent hiatal hernia. Confirmation with barium swallow would be helpful if clinically warranted. Electronically signed: 09/23/2024 01:52 PM Harshil Roca MD    MRI arthrogram left shoulder    Result Date: 9/17/2024  Narrative: MRI ARTHROGRAM LEFT SHOULDER INDICATION:   M25.512: Pain in left shoulder.  "Suspected infraspinatus tear COMPARISON: Left shoulder plain films from 6/13/2024, and left humerus CT from 6/18/2024. TECHNIQUE:  Multiplanar/multisequence MR of the left shoulder was performed. Scan was performed after intraarticular injection of dilute gadolinium under fluoroscopic guidance into the joint. FINDINGS: Exam mildly limited by patient motion. SUBCUTANEOUS TISSUES: Normal JOINT CAPSULE: Intact, without inferior extravasation of contrast. SUBACROMIAL/SUBDELTOID BURSA: Normal. ACROMION PROCESS: Normal. ROTATOR CUFF: -Supraspinatus: Torn tendon (series 8 images 9-13.) -Thickness: Full thickness. -AP Location: Complete tear. -AP extent (in cm): Complete tear. -Proximal/distal location: Insertional. -Tendon retraction: Torn tendon retracted to level of humeral head apex. -Muscle atrophy: None. -infraspinatus: Intact. -Subscapularis: Intact. -Teres minor: Intact. LONG HEAD OF BICEPS TENDON: Normal. GLENOID LABRUM: Intact. GLENOHUMERAL JOINT: Intact. ACROMIOCLAVICULAR JOINT: There is mild osteoarthritis. BONES: Normal.     Impression: Complete insertional tear of the supraspinatus with torn tendon edge retracted to the level of the humeral head apex. No muscle atrophy (series 8 images 9-13.) Workstation performed: [id number]     FL injection left shoulder (arthrogram)    Result Date: 9/13/2024  Narrative: LEFT SHOULDER ARTHROGRAM INDICATION:   M25.512: Pain in left shoulder. Acute on chronic left shoulder pain for several months. Evaluate for underlying rotator and labral pathology. COMPARISON: Left shoulder x-ray on 6/13/2024. CT of the upper extremity without contrast on the left on 6/18/2024. IMAGES: 2 FLUOROSCOPY TIME:   0 minutes and 8 seconds FINDINGS: After the risks and benefits of the procedure were thoroughly explained, informed consent was obtained. The patient verbalized expressed understanding of the above risks and wished to proceed with the procedure. Final standard \"time-out\" procedure " performed. The patient was prepped and draped in the usual sterile fashion. 3 mL of 1% lidocaine solution was utilized for local anesthesia.  Intermittent fluoroscopy was utilized for placement of a 20 gauge 1.5 inch needle within the left glenohumeral joint. After positioning was confirmed with 1 mL of Omnipaque 300, 15 mL of 0.2 ml Gadavist/50ml Normal Saline was injected into the joint. 2  mL of 0.2% Ropivacaine was also injected into the joint. The patient tolerated the procedure well.  There were no complications. I asked the patient to call us with any questions, concerns, or acute problems. The patient expressed understanding of the above. The patient will report for MR imaging of the left shoulder. Before the procedure, patient reports 4 out of 10 pain.  Immediately after the injection, the patient reports 0 out of 10 pain.     Impression: Successful shoulder arthrogram with gadolinium injection into the left glenohumeral joint.  MRI of the shoulder is currently pending. With intraarticular injection of local anesthetic, patient reports improvement in typical pain. I reviewed the above findings and procedure with Dr. Costa Tiwari. PERFORMED, DICTATED AND SIGNED BY: Tammy Richardson PA-C Workstation performed: [id number]

## 2024-09-26 NOTE — ASSESSMENT & PLAN NOTE
For intermediate cardiac risk orthopedic surgery with repair of rotator cuff, arthroscopic with possible open surgery  No significant symptoms, physical exam findings, or ECG changes to suggest active ischemia, arrhythmia, or heart failure  Proceed with surgery without further cardiac workup at the current time  He will be getting an echocardiogram for surveillance of his ascending aorta and bicuspid aortic valve, however this does not need to be completed prior to surgery  He is on warfarin therapy for history of DVT, recommendations regarding holding medication for surgery perioperatively to be recommended by PCP

## 2024-09-26 NOTE — ASSESSMENT & PLAN NOTE
Bicuspid AV: with no evidence of stenosis or regurgitation of any significance. Continue surveillance with echocardiograms.   Recheck in May 2018 revealed normal LVEF, bicuspid AV with trace AR, no stenosis.   Repeat echo in August 2022 revealed normal LV size and function with bicuspid aortic valve and no significant stenosis or regurgitation of concern  Repeat echocardiogram now

## 2024-09-27 ENCOUNTER — ANESTHESIA EVENT (OUTPATIENT)
Dept: PERIOP | Facility: HOSPITAL | Age: 59
End: 2024-09-27
Payer: MEDICARE

## 2024-09-30 ENCOUNTER — CONSULT (OUTPATIENT)
Dept: FAMILY MEDICINE CLINIC | Facility: CLINIC | Age: 59
End: 2024-09-30
Payer: MEDICARE

## 2024-09-30 VITALS
OXYGEN SATURATION: 97 % | WEIGHT: 223 LBS | DIASTOLIC BLOOD PRESSURE: 68 MMHG | SYSTOLIC BLOOD PRESSURE: 116 MMHG | HEART RATE: 60 BPM | TEMPERATURE: 96.9 F | HEIGHT: 73 IN | BODY MASS INDEX: 29.55 KG/M2

## 2024-09-30 DIAGNOSIS — M75.102 TEAR OF LEFT SUPRASPINATUS TENDON: ICD-10-CM

## 2024-09-30 DIAGNOSIS — N18.31 TYPE 2 DIABETES MELLITUS WITH STAGE 3A CHRONIC KIDNEY DISEASE, WITHOUT LONG-TERM CURRENT USE OF INSULIN (HCC): ICD-10-CM

## 2024-09-30 DIAGNOSIS — E11.22 TYPE 2 DIABETES MELLITUS WITH STAGE 3A CHRONIC KIDNEY DISEASE, WITHOUT LONG-TERM CURRENT USE OF INSULIN (HCC): ICD-10-CM

## 2024-09-30 DIAGNOSIS — Z00.00 MEDICARE ANNUAL WELLNESS VISIT, SUBSEQUENT: Primary | ICD-10-CM

## 2024-09-30 PROBLEM — Z01.810 PRE-OPERATIVE CARDIOVASCULAR EXAMINATION: Status: RESOLVED | Noted: 2020-09-21 | Resolved: 2024-09-30

## 2024-09-30 LAB — SL AMB POCT HEMOGLOBIN AIC: 7.4 (ref ?–6.5)

## 2024-09-30 PROCEDURE — G0439 PPPS, SUBSEQ VISIT: HCPCS

## 2024-09-30 PROCEDURE — 99214 OFFICE O/P EST MOD 30 MIN: CPT

## 2024-09-30 PROCEDURE — 83036 HEMOGLOBIN GLYCOSYLATED A1C: CPT

## 2024-09-30 NOTE — ASSESSMENT & PLAN NOTE
Lab Results   Component Value Date    HGBA1C 7.4 (A) 09/30/2024   A1c 7.4 today  Will hold morning long acting insulin day of surgery  Post-op continue insulin regimen  Recent fasting sugar high, recommended increasing morning Lantus 2 units and checking fasting sugar in evening before dinner for better control 100-120 ideal  Will recheck in 6 months  Orders:    POCT hemoglobin A1c

## 2024-09-30 NOTE — ASSESSMENT & PLAN NOTE
Will hold morning long acting insulin day of surgery 10/10  Post-op continue insulin regimen  Recent fasting sugar high, recommended increasing morning Lantus 2 units and checking fasting sugar in evening before dinner for better control 100-120 ideal  Will recheck in 6 months  Lab Results   Component Value Date    HGBA1C 7.4 (A) 09/30/2024     Orders:    POCT hemoglobin A1c

## 2024-09-30 NOTE — PROGRESS NOTES
Ambulatory Visit  Name: Chavez Newell      : 1965      MRN: [id number]  Encounter Provider: Jair Traylor PA-C  Encounter Date: 2024   Encounter department: [facility]    Assessment & Plan  Medicare annual wellness visit, subsequent         Tear of left supraspinatus tendon  Scheduled for surgery 10/10/24  Orders:    Ambulatory referral to [facility]    Type 2 diabetes mellitus with stage 3a chronic kidney disease, without long-term current use of insulin ([facility])  Will hold morning long acting insulin day of surgery 10/10  Post-op continue insulin regimen  Recent fasting sugar high, recommended increasing morning Lantus 2 units and checking fasting sugar in evening before dinner for better control 100-120 ideal  Will recheck in 6 months  Lab Results   Component Value Date    HGBA1C 7.4 (A) 2024     Orders:    POCT hemoglobin A1c      Depression Screening and Follow-up Plan: Patient was screened for depression during today's encounter. They screened negative with a PHQ-2 score of 0.    Tobacco Cessation Counseling: Tobacco cessation counseling was provided. The patient is sincerely urged to quit consumption of tobacco. He is not ready to quit tobacco. Medication options and side effects of medication discussed. Patient refused medication.       Preventive health issues were discussed with patient, and age appropriate screening tests were ordered as noted in patient's After Visit Summary. Personalized health advice and appropriate referrals for health education or preventive services given if needed, as noted in patient's After Visit Summary.    History of Present Illness     Chavez Newell is a 59 y.o. male with significant Hx of DVT, T2DM, AAA, Bicuspid aortic valve presenting for annual med well. He has no acute complaints today and is presenting for pre-operative exam but is also due for med well. A1c today is 7.4 downtrending. Most recent blood work fasting sugar  is 162. Recommend adjusting lantus and checking evening sugars before dinner to evaluate for optimal fasting sugar.     Exam unremarkable today.  vaccines, screenings, routine labs reviewed at this visit.  Follow up in 6 months for diabetic re-check         Patient Care Team:  Adenike Garg DO as PCP - General (Family Medicine)  Wolf Burnette MD as PCP - PCP-[facility] ([facility] [[email or web address]  Eric Bo MD as Consulting Physician (Cardiology)  STANFORD Galarza MD as Consulting Physician (Cardiothoracic Surgery)  Jared Matthews MD as Consulting Physician (Orthopedic Surgery)  Isaac Gilliam MD as Consulting Physician (Neurology)  Jose Baker MD as Consulting Physician (Nephrology)  Ryan Harris MD (Pain Medicine)  Anthony France MD as Endoscopist  Andrew David    Review of Systems   Constitutional:  Negative for chills and fever.   HENT:  Negative for ear pain and sore throat.    Eyes:  Negative for pain and visual disturbance.   Respiratory:  Negative for cough and shortness of breath.    Cardiovascular:  Negative for chest pain and palpitations.   Gastrointestinal:  Negative for abdominal pain and vomiting.   Genitourinary:  Negative for dysuria and hematuria.   Musculoskeletal:  Negative for arthralgias and back pain.   Skin:  Negative for color change and rash.   Neurological:  Negative for seizures and syncope.   All other systems reviewed and are negative.    Medical History Reviewed by provider this encounter:  Tobacco  Allergies  Meds  Problems  Med Hx  Surg Hx  Fam Hx       Annual Wellness Visit Questionnaire   Chavez is here for his Subsequent Wellness visit.     Health Risk Assessment:   Patient rates overall health as good. Patient feels that their physical health rating is same. Patient is satisfied with their life. Eyesight was rated as same. Hearing was rated as same. Patient feels that their emotional and mental health rating is same. Patients states they are never, rarely angry.  Patient states they are never, rarely unusually tired/fatigued. Pain experienced in the last 7 days has been some. Patient's pain rating has been 3/10. Patient states that he has experienced no weight loss or gain in last 6 months. 10 + at nighttime     Depression Screening:   PHQ-2 Score: 0      Fall Risk Screening:   In the past year, patient has experienced: history of falling in past year    Number of falls: 2 or more  Injured during fall?: Yes    Feels unsteady when standing or walking?: No    Worried about falling?: No      Home Safety:  Patient does not have trouble with stairs inside or outside of their home. Patient has working smoke alarms and has working carbon monoxide detector.     Nutrition:   Current diet is Regular.     Medications:   Patient is currently taking over-the-counter supplements. OTC medications include: see medication list. Patient is able to manage medications.     Activities of Daily Living (ADLs)/Instrumental Activities of Daily Living (IADLs):   Walk and transfer into and out of bed and chair?: Yes  Dress and groom yourself?: Yes    Bathe or shower yourself?: Yes    Feed yourself? Yes  Do your laundry/housekeeping?: Yes  Manage your money, pay your bills and track your expenses?: Yes  Make your own meals?: Yes    Do your own shopping?: Yes    Previous Hospitalizations:   Any hospitalizations or ED visits within the last 12 months?: No      PREVENTIVE SCREENINGS      Cardiovascular Screening:    General: Screening Not Indicated and History Lipid Disorder      Diabetes Screening:     General: Screening Not Indicated and History Diabetes      Colorectal Cancer Screening:     General: Screening Current      Prostate Cancer Screening:    General: Screening Current      Abdominal Aortic Aneurysm (AAA) Screening:    Risk factors include: tobacco use        Lung Cancer Screening:     General: Screening Not Indicated      Hepatitis C Screening:    General: Screening Current    Screening,  "Brief Intervention, and Referral to Treatment (SBIRT)    Screening  Typical number of drinks in a day: 0  Typical number of drinks in a week: 0  Interpretation: Low risk drinking behavior.    Review of Current Opioid Use    Opioid Risk Tool (ORT) Interpretation: Complete Opioid Risk Tool (ORT)    Social Determinants of Health     Financial Resource Strain: Low Risk  (9/26/2023)    Overall Financial Resource Strain (CARDIA)     Difficulty of Paying Living Expenses: Not hard at all   Food Insecurity: No Food Insecurity (9/30/2024)    Hunger Vital Sign     Worried About Running Out of Food in the Last Year: Never true     Ran Out of Food in the Last Year: Never true   Transportation Needs: No Transportation Needs (9/30/2024)    PRAPARE - Transportation     Lack of Transportation (Medical): No     Lack of Transportation (Non-Medical): No   Housing Stability: Low Risk  (9/30/2024)    Housing Stability Vital Sign     Unable to Pay for Housing in the Last Year: No     Number of Times Moved in the Last Year: 1     Homeless in the Last Year: No   Utilities: Not At Risk (9/30/2024)    [facility] Utilities     Threatened with loss of utilities: No     No results found.    Objective     /68   Pulse 60   Temp (!) 96.9 °F (36.1 °C)   Ht 6' 1\" (1.854 m)   Wt 101 kg (223 lb)   SpO2 97%   BMI 29.42 kg/m²     Physical Exam  Vitals and nursing note reviewed.   Constitutional:       General: He is not in acute distress.     Appearance: He is well-developed.   HENT:      Head: Normocephalic and atraumatic.   Eyes:      Conjunctiva/sclera: Conjunctivae normal.   Cardiovascular:      Rate and Rhythm: Normal rate and regular rhythm.      Heart sounds: No murmur heard.  Pulmonary:      Effort: Pulmonary effort is normal. No respiratory distress.      Breath sounds: Normal breath sounds.   Abdominal:      Palpations: Abdomen is soft.      Tenderness: There is no abdominal tenderness.   Musculoskeletal:         General: Tenderness (mild " left shoulder) present. No swelling.      Cervical back: Neck supple.   Skin:     General: Skin is warm and dry.      Capillary Refill: Capillary refill takes less than 2 seconds.   Neurological:      Mental Status: He is alert.   Psychiatric:         Mood and Affect: Mood normal.          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

## 2024-09-30 NOTE — PROGRESS NOTES
Pre-operative Clearance  Name: Chavez Newell      : 1965      MRN: [id number]  Encounter Provider: Jair Traylor PA-C  Encounter Date: 2024   Encounter department: [facility]    Assessment & Plan  Tear of left supraspinatus tendon    Orders:    Ambulatory referral to [facility]      Pre-op Plan     History of Present Illness   {Disappearing Hyperlinks I Encounters * My Last Note * Since Last Visit * History :69220}  HPI  Review of Systems  Past Medical History   Past Medical History:   Diagnosis Date    Anxiety 03/10/2022    Aorta aneurysm (HCC)     4.3    Arm DVT (deep venous thromboembolism), acute (HCC)     complications of cardiac cath    Asthma     Blood clot in vein     right leg    Chronic kidney disease     CKD (chronic kidney disease), stage III (HCC)     COPD (chronic obstructive pulmonary disease) (HCC)     Depression     Diabetes mellitus (HCC)     Essential hypertriglyceridemia     GERD (gastroesophageal reflux disease)     Headache     Hiatal hernia     Hyperlipidemia, mixed 2018    Kidney stone     Liver disease     FATTY LIVER    Mild episode of recurrent major depressive disorder (HCC) 03/10/2022    PAC (premature atrial contraction)     Prediabetes     Rectus sheath hematoma, initial encounter 2023    Renal calculi     Sleep apnea     Vestibular migraine      Past Surgical History:   Procedure Laterality Date    CARPAL TUNNEL RELEASE Left     COLONOSCOPY      FL INJECTION LEFT SHOULDER (ARTHROGRAM)  2024    FL INJECTION RIGHT HIP (ARTHROGRAM)  2018    FOOT SURGERY Left     FOREIGN BODY REMOVAL    HERNIA REPAIR      DC ARTHRP ACETBLR/PROX FEM PROSTC AGRFT/ALGRFT Right 2020    Procedure: ARTHROPLASTY HIP TOTAL;  Surgeon: Jyoti Araiza MD;  Location: [facility];  Service: Orthopedics    DC COLONOSCOPY FLX DX W/COLLJ SPEC WHEN PFRMD N/A 2017    Procedure: COLONOSCOPY;  Surgeon: Anthony France MD;  Location: [facility];  Service: Gastroenterology    MN ESOPHAGOGASTRODUODENOSCOPY TRANSORAL DIAGNOSTIC N/A 10/31/2017    Procedure: ESOPHAGOGASTRODUODENOSCOPY (EGD);  Surgeon: Anthony France MD;  Location: [facility];  Service: Gastroenterology    TOTAL HIP ARTHROPLASTY Right 2020     Family History   Problem Relation Age of Onset    Arthritis Mother     Heart attack Father     Clotting disorder Father     Schizoaffective Disorder  Sister     Cancer Brother     Prostate cancer Brother     Leukemia Brother         his only brother dies from lymphoma or leukemia pt unsure he was only 54    Aortic aneurysm Other         abdominal     Social History     Tobacco Use    Smoking status: Some Days     Types: Cigars     Passive exposure: Never    Smokeless tobacco: Never    Tobacco comments:     never inhaled   Vaping Use    Vaping status: Never Used   Substance and Sexual Activity    Alcohol use: Not Currently     Comment: occasional beer    Drug use: No    Sexual activity: Yes     Partners: Female     Current Outpatient Medications on File Prior to Visit   Medication Sig    acetaminophen (TYLENOL) 500 mg tablet Take 500 mg by mouth every 6 (six) hours as needed for mild pain    Bydureon BCise 2 MG/0.85ML AUIJ inject 2 milligrams subcutaneously weekly    Continuous Blood Gluc Sensor (FreeStyle Jelly 3 Sensor) MISC Use 1 each every 14 (fourteen) days    fenofibrate 160 MG tablet take 1 tablet by mouth once daily    gabapentin (NEURONTIN) 300 mg capsule take 1 capsule by mouth at bedtime    glucose blood test strip TEST BS TID    glucose monitoring kit (FREESTYLE) monitoring kit Use 1 each daily before breakfast    Insulin Glargine Solostar (Lantus SoloStar) 100 UNIT/ML SOPN inject 20 units subcutaneously daily or as directed by prescriber    insulin lispro (HumaLOG KwikPen) 100 units/mL injection pen Per sliding scale, Max 50 units daily    Insulin Pen Needle (BD Pen Needle Evelina 2nd Gen) 32G X 4 MM MISC Inject as directed 4 (four) times a  "day    Lancets MISC Use daily before breakfast    methocarbamol (ROBAXIN) 500 mg tablet take 1 tablet by mouth twice a day    naloxone (NARCAN) 4 mg/0.1 mL nasal spray Administer 1 spray into a nostril. If no response after 2-3 minutes, give another dose in the other nostril using a new spray.    oxyCODONE (Roxicodone) 5 immediate release tablet Take 1 tablet (5 mg total) by mouth every 6 (six) hours as needed for moderate pain or severe pain for up to 12 doses Max Daily Amount: 20 mg    pantoprazole (PROTONIX) 40 mg tablet take 1 tablet by mouth once daily    rosuvastatin (CRESTOR) 5 mg tablet take 1 tablet by mouth once daily    sildenafil (REVATIO) 20 mg tablet TAKE 1 TABLET (20 MG TOTAL) BY MOUTH DAILY AS DIRECTED    traMADol (Ultram) 50 mg tablet May take 2 tablets (100 mg total) by mouth daily as needed for moderate pain. May also take 1 tablet (50 mg total) every 8 (eight) hours as needed for moderate pain. Max 5 tablets per day.    warfarin (COUMADIN) 2 mg tablet take 1 tablet by mouth MONDAY, WEDNESDAY, FRIDAY OR AS DIRECTED BY PCP    warfarin (COUMADIN) 3 mg tablet take 1 tablet by mouth TUESDAY, THURSDAY, SATURDAY, AND SUNDAY; OR AS DIRECTED BY PCP     No Known Allergies  Objective   {Disappearing Hyperlinks   Review Vitals * Enter New Vitals * Results Review * Labs * Imaging * Cardiology * Procedures * Lung Cancer Screening * Surgical eConsent :57117}  /68   Pulse 60   Temp (!) 96.9 °F (36.1 °C)   Ht 6' 1\" (1.854 m)   Wt 101 kg (223 lb)   SpO2 97%   BMI 29.42 kg/m²     Physical Exam  {Administrative / Billing Section (Optional):66849}    Jair Traylor PA-C  "

## 2024-09-30 NOTE — PROGRESS NOTES
Pre-operative Clearance  Name: Chavez Newell      : 1965      MRN: [id number]  Encounter Provider: Jair Traylor PA-C  Encounter Date: 2024   Encounter department: [facility]    Assessment & Plan  Tear of left supraspinatus tendon  Pt has had left shoulder pain chronically  Tried PT did not alleviate pain  Scheduled for surgery 10/10/24  Orders:    Ambulatory referral to [facility]    Pre-operative clearance    Orders:    POCT hemoglobin A1c    Type 2 diabetes mellitus with stage 3a chronic kidney disease, without long-term current use of insulin ([facility])    Lab Results   Component Value Date    HGBA1C 7.4 (A) 2024   A1c 7.4 today  Will hold morning long acting insulin day of surgery  Post-op continue insulin regimen  Recent fasting sugar high, recommended increasing morning Lantus 2 units and checking fasting sugar in evening before dinner for better control 100-120 ideal  Will recheck in 6 months  Orders:    POCT hemoglobin A1c    Pre-operative Clearance:     Revised Cardiac Risk Index:  RCI RISK CLASS II (1 risk factor, risk of major cardiac complications approximately 1.3%)    Clearance:  Patient is medically optimized (CLEARED) for proposed surgery without any additional cardiac testing.             Medication Instructions:   - Avoid herbs or non-directed vitamins one week prior to surgery    - Avoid aspirin containing medications or non-steroidal anti-inflammatory drugs one week preceding surgery    - May take tylenol for pain up until the night before surgery    - Hyperlipidemia meds: Continue to take this medication on your normal schedule.  - Opioids: Continue to take this medication on your normal schedule.  - Warfarin: Stop medication 5 days prior to procedure/surgery. Bridging anticoagulation may be needed if patient has artificial heart valve or if at high risk for stroke.  - Other med instructions: Hold long acting insulin (lantus ) day of surgery        Medicine Instructions for Adults with Diabetes (NO Bowel Prep)    Follow these instructions when a BOWEL PREP is NOT required for your procedure or surgery!    NOTE:  GLP Agonists taken weekly: do not take in the 7 days before your procedure. **Bariatric surgery: do not take 4 weeks prior to your procedure.    SGLT-2 Inhibitors: do not take in the 4 days before your procedure    On the Day Before Surgery/Procedure  If you are having a procedure (e.g., Colonoscopy) or surgery which DOES NOT require a bowel prep, follow the directions below based on the type of medicine you take for your diabetes.  Type of Medicine You Take Examples What to Do   Pre-Mixed Insulin Intermediate  Yajtygj54/25, Crtcftj21/30, Novolog 70/30, Regular Insulin Take 1/2 your regular dose the evening before our procedure.   Rapid/Fast Acting  Insulin and/or Long-Acting Insulin Humalog U200, NovoLog, Apidra,  Lantus, Levemir, Tresiba, Toujeo,  Fias, Basaglar Take your FULL regular dose the day before procedure.   Oral Diabetic Medicines (sulfonylurea) Glipizide/Glimepiride/  Glucotrol Take your regular dose with dinner the evening before your procedure.   Other Oral Diabetic Medicines Metformin, Glucophage, Glucophage  XR, Riomet, Glumetza, Actose,  Avandia, Gl set, Prandin Take your regular dose with dinner the evening before your procedure   GLP Agonists Adlyxin, Byetta, Bydureon,  Ozempic, Soliqua, Tanzeum,  Trulicity, Victoza, Saxenda,  Rybelsus, Wegovy, Mounjaro, Zepbound If taken daily, take as normal  If taken weekly, do not take this medicine for 7 days before your procedure including the day of the procedure (resume taking after the procedure). **Bariatric surgery: do not take 4 weeks prior to procedure   SGLT-2 Inhibitors Jardiance, Invokana, Farxiga, Steglatro, Brenzavvy, Qtern, Segluromet Glyxambi, Synjardy, Synjardy XR, Invokamet, InvokametXR, Trijary XR, Xigduo X Do not take for 4 days before your procedure including the day  of the procedure (resume taking after the procedure)   This educational material has been approved by the Patient Education Advisory Committee.    On the Day of Surgery/Procedure  Follow the directions below based on the type of medicine you take for your diabetes.  Type of Medicine You Take  Examples What to Do   Long-Acting Insulin Lantus, Levemir, Tresiba,  Toujeo, Basaglar, Semglee If you normally take your Long Acting Insulin in the morning, take the full dose as scheduled.   GLP-I Agonists Adlyxin, Byetta, Bydureon,  Ozempic, Soliqua, Tanzeum,  Trulicity, Victoza, Saxenda,  Rybelsus, Mounjaro Do NOT take this medicine on the day of your procedure (resume taking after the procedure)   Except for the morning Long-Acting Insulin, DO NOT take ANY diabetic medicine on the day of your procedure unless you were instructed by the doctor who manages your diabetes medicines.  Continue to check your blood sugars.  If you have an insulin pump, ask your endocrinologist for instructions at least 3 days before your procedure. NOTE: If you are not able to ask your endocrinologist in advance, on the day of the procedure set your insulin pump to your basal rate only. Bring your insulin pump supplies to the hospital.        Depression Screening and Follow-up Plan: Patient was screened for depression during today's encounter. They screened negative with a PHQ-2 score of 0.      History of Present Illness     Pre-op Exam  Surgery: REPAIR ROTATOR CUFF  ARTHROSCOPIC and possible open subpectoral biceps tenodesis  Anticipated Date of Surgery: 10/10/2024  Surgeon: DO Chavez Roth is a 59 y.o. male with Hx of DVT, AAA, DM presenting for pre-operative exam Left shoulder rotator cuff. 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 Had MRI of shoulder and did PT. Scheduled for surgery 10/10/2024. A1c today 7.4  Exam unremarkable today. He is cleared for surgery with holding coumadin 5  days prior to surgery.         Previous history of bleeding disorders or clots?: Yes  Previous Anesthesia reaction?: No  Prolonged steroid use in the last 6 months?: No    Assessment of Cardiac Risk:   - Unstable or severe angina or MI in the last 6 weeks or history of stent placement in the last year?: No   - Decompensated heart failure (e.g. New onset heart failure, NYHA  Class IV heart failure, or worsening existing heart failure)?: No  - Significant arrhythmias such as high grade AV block, symptomatic ventricular arrhythmia, newly recognized ventricular tachycardia, supraventricular tachycardia with resting heart rate >100, or symptomatic bradycardia?: No  - Severe heart valve disease including aortic stenosis or symptomatic mitral stenosis?: No      Pre-operative Risk Factors:  Elevated-risk surgery: No    History of cerebrovascular disease: No    History of ischemic heart disease: No  Pre-operative treatment with insulin: Yes  Pre-operative creatinine >2 mg/dL: No    History of congestive heart failure: No    Medications of Perioperative Concern:   Anti-coagulants (Coumadin, Xarelto, Pradaxa, Eliquis, Lixiana) and Insulin    Review of Systems   Constitutional:  Negative for chills and fever.   HENT:  Negative for ear pain and sore throat.    Eyes:  Negative for pain and visual disturbance.   Respiratory:  Negative for cough and shortness of breath.    Cardiovascular:  Negative for chest pain and palpitations.   Gastrointestinal:  Negative for abdominal pain and vomiting.   Genitourinary:  Negative for dysuria and hematuria.   Musculoskeletal:  Negative for arthralgias and back pain.   Skin:  Negative for color change and rash.   Neurological:  Negative for seizures and syncope.   All other systems reviewed and are negative.    Past Medical History   Past Medical History:   Diagnosis Date    Anxiety 03/10/2022    Aorta aneurysm ([facility])     4.3    Arm DVT (deep venous thromboembolism), acute ([facility]) 2015     complications of cardiac cath    Asthma     Blood clot in vein     right leg    Chronic kidney disease     CKD (chronic kidney disease), stage III (HCC)     COPD (chronic obstructive pulmonary disease) (HCC)     Depression     Diabetes mellitus (HCC)     Essential hypertriglyceridemia     GERD (gastroesophageal reflux disease)     Headache     Hiatal hernia     Hyperlipidemia, mixed 05/07/2018    Kidney stone     Liver disease     FATTY LIVER    Mild episode of recurrent major depressive disorder (HCC) 03/10/2022    PAC (premature atrial contraction)     Prediabetes     Rectus sheath hematoma, initial encounter 08/09/2023    Renal calculi     Sleep apnea     Vestibular migraine      Past Surgical History:   Procedure Laterality Date    CARPAL TUNNEL RELEASE Left     COLONOSCOPY      FL INJECTION LEFT SHOULDER (ARTHROGRAM)  9/13/2024    FL INJECTION RIGHT HIP (ARTHROGRAM)  08/20/2018    FOOT SURGERY Left     FOREIGN BODY REMOVAL    HERNIA REPAIR      CT ARTHRP ACETBLR/PROX FEM PROSTC AGRFT/ALGRFT Right 09/28/2020    Procedure: ARTHROPLASTY HIP TOTAL;  Surgeon: Jyoti Araiza MD;  Location: [facility];  Service: Orthopedics    CT COLONOSCOPY FLX DX W/COLLJ SPEC WHEN PFRMD N/A 08/07/2017    Procedure: COLONOSCOPY;  Surgeon: Anthony France MD;  Location: [facility];  Service: Gastroenterology    CT ESOPHAGOGASTRODUODENOSCOPY TRANSORAL DIAGNOSTIC N/A 10/31/2017    Procedure: ESOPHAGOGASTRODUODENOSCOPY (EGD);  Surgeon: Anthony France MD;  Location: [facility];  Service: Gastroenterology    TOTAL HIP ARTHROPLASTY Right 2020     Family History   Problem Relation Age of Onset    Arthritis Mother     Heart attack Father     Clotting disorder Father     Schizoaffective Disorder  Sister     Cancer Brother     Prostate cancer Brother     Leukemia Brother         his only brother dies from lymphoma or leukemia pt unsure he was only 54    Aortic aneurysm Other         abdominal     Social History     Tobacco Use    Smoking  status: Some Days     Types: Cigars     Passive exposure: Never    Smokeless tobacco: Never    Tobacco comments:     never inhaled   Vaping Use    Vaping status: Never Used   Substance and Sexual Activity    Alcohol use: Not Currently     Comment: occasional beer    Drug use: No    Sexual activity: Yes     Partners: Female     Current Outpatient Medications on File Prior to Visit   Medication Sig    acetaminophen (TYLENOL) 500 mg tablet Take 500 mg by mouth every 6 (six) hours as needed for mild pain    Bydureon BCise 2 MG/0.85ML AUIJ inject 2 milligrams subcutaneously weekly    Continuous Blood Gluc Sensor (FreeStyle Jelly 3 Sensor) MISC Use 1 each every 14 (fourteen) days    fenofibrate 160 MG tablet take 1 tablet by mouth once daily    gabapentin (NEURONTIN) 300 mg capsule take 1 capsule by mouth at bedtime    glucose blood test strip TEST BS TID    glucose monitoring kit (FREESTYLE) monitoring kit Use 1 each daily before breakfast    Insulin Glargine Solostar (Lantus SoloStar) 100 UNIT/ML SOPN inject 20 units subcutaneously daily or as directed by prescriber    insulin lispro (HumaLOG KwikPen) 100 units/mL injection pen Per sliding scale, Max 50 units daily    Insulin Pen Needle (BD Pen Needle Evelina 2nd Gen) 32G X 4 MM MISC Inject as directed 4 (four) times a day    Lancets MISC Use daily before breakfast    methocarbamol (ROBAXIN) 500 mg tablet take 1 tablet by mouth twice a day    naloxone (NARCAN) 4 mg/0.1 mL nasal spray Administer 1 spray into a nostril. If no response after 2-3 minutes, give another dose in the other nostril using a new spray.    oxyCODONE (Roxicodone) 5 immediate release tablet Take 1 tablet (5 mg total) by mouth every 6 (six) hours as needed for moderate pain or severe pain for up to 12 doses Max Daily Amount: 20 mg    pantoprazole (PROTONIX) 40 mg tablet take 1 tablet by mouth once daily    rosuvastatin (CRESTOR) 5 mg tablet take 1 tablet by mouth once daily    sildenafil (REVATIO) 20 mg  "tablet TAKE 1 TABLET (20 MG TOTAL) BY MOUTH DAILY AS DIRECTED    traMADol (Ultram) 50 mg tablet May take 2 tablets (100 mg total) by mouth daily as needed for moderate pain. May also take 1 tablet (50 mg total) every 8 (eight) hours as needed for moderate pain. Max 5 tablets per day.    warfarin (COUMADIN) 2 mg tablet take 1 tablet by mouth MONDAY, WEDNESDAY, FRIDAY OR AS DIRECTED BY PCP    warfarin (COUMADIN) 3 mg tablet take 1 tablet by mouth TUESDAY, THURSDAY, SATURDAY, AND SUNDAY; OR AS DIRECTED BY PCP     No Known Allergies  Objective     /68   Pulse 60   Temp (!) 96.9 °F (36.1 °C)   Ht 6' 1\" (1.854 m)   Wt 101 kg (223 lb)   SpO2 97%   BMI 29.42 kg/m²     Physical Exam  Vitals and nursing note reviewed.   Constitutional:       General: He is not in acute distress.     Appearance: He is well-developed.   HENT:      Head: Normocephalic and atraumatic.   Eyes:      Conjunctiva/sclera: Conjunctivae normal.   Cardiovascular:      Rate and Rhythm: Normal rate and regular rhythm.      Heart sounds: No murmur heard.  Pulmonary:      Effort: Pulmonary effort is normal. No respiratory distress.      Breath sounds: Normal breath sounds.   Abdominal:      Palpations: Abdomen is soft.      Tenderness: There is no abdominal tenderness.   Musculoskeletal:         General: Tenderness (mild left shoulder) present. No swelling.      Cervical back: Neck supple.   Skin:     General: Skin is warm and dry.      Capillary Refill: Capillary refill takes less than 2 seconds.   Neurological:      Mental Status: He is alert.   Psychiatric:         Mood and Affect: Mood normal.       Administrative Statements   I have spent a total time of 25 minutes in caring for this patient on the day of the visit/encounter including Patient and family education, Importance of tx compliance, Impressions, Reviewing / ordering tests, medicine, procedures  , and Obtaining or reviewing history  .    Jair Traylor PA-C  "

## 2024-09-30 NOTE — ASSESSMENT & PLAN NOTE
Pt has had left shoulder pain chronically  Tried PT did not alleviate pain  Scheduled for surgery 10/10/24  Orders:    Ambulatory referral to Family Practice

## 2024-10-01 NOTE — PRE-PROCEDURE INSTRUCTIONS
Pre-Surgery Instructions:   Medication Instructions    acetaminophen (TYLENOL) 500 mg tablet Uses PRN- OK to take day of surgery    Bydureon BCise 2 MG/0.85ML AUIJ Stop taking 7 days prior to surgery.    fenofibrate 160 MG tablet Take day of surgery.    gabapentin (NEURONTIN) 300 mg capsule Take day of surgery.    Insulin Glargine Solostar (Lantus SoloStar) 100 UNIT/ML SOPN Hold day of surgery.    insulin lispro (HumaLOG KwikPen) 100 units/mL injection pen Hold day of surgery.    methocarbamol (ROBAXIN) 500 mg tablet Uses PRN- OK to take day of surgery    pantoprazole (PROTONIX) 40 mg tablet Take day of surgery.    rosuvastatin (CRESTOR) 5 mg tablet Take day of surgery.    traMADol (Ultram) 50 mg tablet Uses PRN- OK to take day of surgery    warfarin (COUMADIN) 2 mg tablet Instructions provided by MD    warfarin (COUMADIN) 3 mg tablet Instructions provided by MD      Medication instructions for day surgery reviewed. Please use only a sip of water to take your instructed medications. Avoid all over the counter vitamins, supplements and NSAIDS for one week prior to surgery per anesthesia guidelines. Tylenol is ok to take as needed.     You will receive a call one business day prior to surgery with an arrival time and hospital directions. If your surgery is scheduled on a Monday, the hospital will be calling you on the Friday prior to your surgery. If you have not heard from anyone by 8pm, please call the hospital supervisor through the hospital  at [phone]. ([place] [phone] or [place] [phone]).    Do not eat or drink anything after midnight the night before your surgery, including candy, mints, lifesavers, or chewing gum. Do not drink alcohol 24hrs before your surgery. Try not to smoke at least 24hrs before your surgery.       Follow the pre surgery showering instructions as listed in the “My Surgical Experience Booklet” or otherwise provided by your surgeon's office. Do not use a blade to  shave the surgical area 1 week before surgery. It is okay to use a clean electric clippers up to 24 hours before surgery. Do not apply any lotions, creams, including makeup, cologne, deodorant, or perfumes after showering on the day of your surgery. Do not use dry shampoo, hair spray, hair gel, or any type of hair products.     No contact lenses, eye make-up, or artificial eyelashes. Remove nail polish, including gel polish, and any artificial, gel, or acrylic nails if possible. Remove all jewelry including rings and body piercing jewelry.     Wear causal clothing that is easy to take on and off. Consider your type of surgery.    Keep any valuables, jewelry, piercings at home. Please bring any specially ordered equipment (sling, braces) if indicated.    Arrange for a responsible person to drive you to and from the hospital on the day of your surgery. Please confirm the visitor policy for the day of your procedure when you receive your phone call with an arrival time.     Call the surgeon's office with any new illnesses, exposures, or additional questions prior to surgery.    Please reference your “My Surgical Experience Booklet” for additional information to prepare for your upcoming surgery.

## 2024-10-03 DIAGNOSIS — E78.2 ELEVATED TRIGLYCERIDES WITH HIGH CHOLESTEROL: ICD-10-CM

## 2024-10-03 RX ORDER — FENOFIBRATE 160 MG/1
TABLET ORAL
Qty: 90 TABLET | Refills: 1 | Status: SHIPPED | OUTPATIENT
Start: 2024-10-03

## 2024-10-06 DIAGNOSIS — K21.9 GASTROESOPHAGEAL REFLUX DISEASE WITHOUT ESOPHAGITIS: ICD-10-CM

## 2024-10-07 RX ORDER — PANTOPRAZOLE SODIUM 40 MG/1
TABLET, DELAYED RELEASE ORAL
Qty: 90 TABLET | Refills: 1 | Status: SHIPPED | OUTPATIENT
Start: 2024-10-07

## 2024-10-10 ENCOUNTER — ANESTHESIA (OUTPATIENT)
Dept: PERIOP | Facility: HOSPITAL | Age: 59
End: 2024-10-10
Payer: MEDICARE

## 2024-10-10 ENCOUNTER — APPOINTMENT (EMERGENCY)
Dept: RADIOLOGY | Facility: HOSPITAL | Age: 59
End: 2024-10-10
Payer: MEDICARE

## 2024-10-10 ENCOUNTER — HOSPITAL ENCOUNTER (OUTPATIENT)
Facility: HOSPITAL | Age: 59
Setting detail: OUTPATIENT SURGERY
Discharge: HOME | End: 2024-10-10
Attending: ORTHOPAEDIC SURGERY | Admitting: ORTHOPAEDIC SURGERY
Payer: MEDICARE

## 2024-10-10 ENCOUNTER — TELEPHONE (OUTPATIENT)
Age: 59
End: 2024-10-10

## 2024-10-10 ENCOUNTER — NURSE TRIAGE (OUTPATIENT)
Dept: OTHER | Facility: OTHER | Age: 59
End: 2024-10-10

## 2024-10-10 ENCOUNTER — HOSPITAL ENCOUNTER (EMERGENCY)
Facility: HOSPITAL | Age: 59
Discharge: HOME | End: 2024-10-11
Attending: EMERGENCY MEDICINE
Payer: MEDICARE

## 2024-10-10 VITALS
TEMPERATURE: 97.1 F | BODY MASS INDEX: 29.13 KG/M2 | SYSTOLIC BLOOD PRESSURE: 130 MMHG | HEIGHT: 73 IN | OXYGEN SATURATION: 97 % | WEIGHT: 219.8 LBS | RESPIRATION RATE: 21 BRPM | HEART RATE: 69 BPM | DIASTOLIC BLOOD PRESSURE: 75 MMHG

## 2024-10-10 DIAGNOSIS — Z98.890 S/P ARTHROSCOPY OF LEFT SHOULDER: Primary | ICD-10-CM

## 2024-10-10 DIAGNOSIS — R73.9 HYPERGLYCEMIA: Primary | ICD-10-CM

## 2024-10-10 DIAGNOSIS — J06.9 URI (UPPER RESPIRATORY INFECTION): ICD-10-CM

## 2024-10-10 DIAGNOSIS — J44.1 COPD EXACERBATION (HCC): ICD-10-CM

## 2024-10-10 LAB
ALBUMIN SERPL BCG-MCNC: 4.3 G/DL (ref 3.5–5)
ALP SERPL-CCNC: 46 U/L (ref 34–104)
ALT SERPL W P-5'-P-CCNC: 28 U/L (ref 7–52)
ANION GAP SERPL CALCULATED.3IONS-SCNC: 8 MMOL/L (ref 4–13)
APTT PPP: 21 SECONDS (ref 23–34)
AST SERPL W P-5'-P-CCNC: 23 U/L (ref 13–39)
BACTERIA UR QL AUTO: NORMAL /HPF
BASE EX.OXY STD BLDV CALC-SCNC: 74.5 % (ref 60–80)
BASE EXCESS BLDV CALC-SCNC: -1.3 MMOL/L
BASOPHILS # BLD AUTO: 0 THOUSANDS/ΜL (ref 0–0.1)
BASOPHILS NFR BLD AUTO: 0 % (ref 0–1)
BILIRUB SERPL-MCNC: 0.38 MG/DL (ref 0.2–1)
BILIRUB UR QL STRIP: NEGATIVE
BUN SERPL-MCNC: 20 MG/DL (ref 5–25)
CALCIUM SERPL-MCNC: 9.6 MG/DL (ref 8.4–10.2)
CARDIAC TROPONIN I PNL SERPL HS: 5 NG/L
CHLORIDE SERPL-SCNC: 104 MMOL/L (ref 96–108)
CLARITY UR: CLEAR
CO2 SERPL-SCNC: 27 MMOL/L (ref 21–32)
COLOR UR: COLORLESS
CREAT SERPL-MCNC: 1.68 MG/DL (ref 0.6–1.3)
D DIMER PPP FEU-MCNC: <0.27 UG/ML FEU
EOSINOPHIL # BLD AUTO: 0 THOUSAND/ΜL (ref 0–0.61)
EOSINOPHIL NFR BLD AUTO: 0 % (ref 0–6)
ERYTHROCYTE [DISTWIDTH] IN BLOOD BY AUTOMATED COUNT: 12.2 % (ref 11.6–15.1)
FLUAV AG UPPER RESP QL IA.RAPID: NEGATIVE
FLUBV AG UPPER RESP QL IA.RAPID: NEGATIVE
GFR SERPL CREATININE-BSD FRML MDRD: 43 ML/MIN/1.73SQ M
GLUCOSE SERPL-MCNC: 150 MG/DL (ref 65–140)
GLUCOSE SERPL-MCNC: 176 MG/DL (ref 65–140)
GLUCOSE SERPL-MCNC: 300 MG/DL (ref 65–140)
GLUCOSE SERPL-MCNC: 337 MG/DL (ref 65–140)
GLUCOSE UR STRIP-MCNC: ABNORMAL MG/DL
HCO3 BLDV-SCNC: 24 MMOL/L (ref 24–30)
HCT VFR BLD AUTO: 43.3 % (ref 36.5–49.3)
HGB BLD-MCNC: 14 G/DL (ref 12–17)
HGB UR QL STRIP.AUTO: NEGATIVE
IMM GRANULOCYTES # BLD AUTO: 0.03 THOUSAND/UL (ref 0–0.2)
IMM GRANULOCYTES NFR BLD AUTO: 0 % (ref 0–2)
INR PPP: 1.02 (ref 0.85–1.19)
INR PPP: 1.02 (ref 0.85–1.19)
KETONES UR STRIP-MCNC: NEGATIVE MG/DL
LEUKOCYTE ESTERASE UR QL STRIP: ABNORMAL
LYMPHOCYTES # BLD AUTO: 0.79 THOUSANDS/ΜL (ref 0.6–4.47)
LYMPHOCYTES NFR BLD AUTO: 8 % (ref 14–44)
MCH RBC QN AUTO: 31.5 PG (ref 26.8–34.3)
MCHC RBC AUTO-ENTMCNC: 32.3 G/DL (ref 31.4–37.4)
MCV RBC AUTO: 98 FL (ref 82–98)
MONOCYTES # BLD AUTO: 0.45 THOUSAND/ΜL (ref 0.17–1.22)
MONOCYTES NFR BLD AUTO: 4 % (ref 4–12)
NEUTROPHILS # BLD AUTO: 9 THOUSANDS/ΜL (ref 1.85–7.62)
NEUTS SEG NFR BLD AUTO: 88 % (ref 43–75)
NITRITE UR QL STRIP: NEGATIVE
NON-SQ EPI CELLS URNS QL MICRO: NORMAL /HPF
NRBC BLD AUTO-RTO: 0 /100 WBCS
O2 CT BLDV-SCNC: 14.9 ML/DL
PCO2 BLDV: 42.3 MM HG (ref 42–50)
PH BLDV: 7.37 [PH] (ref 7.3–7.4)
PH UR STRIP.AUTO: 5 [PH]
PLATELET # BLD AUTO: 226 THOUSANDS/UL (ref 149–390)
PMV BLD AUTO: 10.4 FL (ref 8.9–12.7)
PO2 BLDV: 35.8 MM HG (ref 35–45)
POTASSIUM SERPL-SCNC: 4.4 MMOL/L (ref 3.5–5.3)
PROT SERPL-MCNC: 6.7 G/DL (ref 6.4–8.4)
PROT UR STRIP-MCNC: NEGATIVE MG/DL
PROTHROMBIN TIME: 14.1 SECONDS (ref 12.3–15)
PROTHROMBIN TIME: 14.2 SECONDS (ref 12.3–15)
RBC # BLD AUTO: 4.44 MILLION/UL (ref 3.88–5.62)
RBC #/AREA URNS AUTO: NORMAL /HPF
SARS-COV+SARS-COV-2 AG RESP QL IA.RAPID: NEGATIVE
SODIUM SERPL-SCNC: 139 MMOL/L (ref 135–147)
SP GR UR STRIP.AUTO: 1.02 (ref 1–1.03)
UROBILINOGEN UR STRIP-ACNC: <2 MG/DL
WBC # BLD AUTO: 10.27 THOUSAND/UL (ref 4.31–10.16)
WBC #/AREA URNS AUTO: NORMAL /HPF

## 2024-10-10 PROCEDURE — 81001 URINALYSIS AUTO W/SCOPE: CPT

## 2024-10-10 PROCEDURE — 85379 FIBRIN DEGRADATION QUANT: CPT

## 2024-10-10 PROCEDURE — 83880 ASSAY OF NATRIURETIC PEPTIDE: CPT

## 2024-10-10 PROCEDURE — 82948 REAGENT STRIP/BLOOD GLUCOSE: CPT

## 2024-10-10 PROCEDURE — 82805 BLOOD GASES W/O2 SATURATION: CPT

## 2024-10-10 PROCEDURE — 29826 SHO ARTHRS SRG DECOMPRESSION: CPT | Performed by: ORTHOPAEDIC SURGERY

## 2024-10-10 PROCEDURE — 29826 SHO ARTHRS SRG DECOMPRESSION: CPT | Performed by: PHYSICIAN ASSISTANT

## 2024-10-10 PROCEDURE — C1713 ANCHOR/SCREW BN/BN,TIS/BN: HCPCS | Performed by: ORTHOPAEDIC SURGERY

## 2024-10-10 PROCEDURE — 71045 X-RAY EXAM CHEST 1 VIEW: CPT

## 2024-10-10 PROCEDURE — 29827 SHO ARTHRS SRG RT8TR CUF RPR: CPT | Performed by: ORTHOPAEDIC SURGERY

## 2024-10-10 PROCEDURE — 80053 COMPREHEN METABOLIC PANEL: CPT

## 2024-10-10 PROCEDURE — 85025 COMPLETE CBC W/AUTO DIFF WBC: CPT

## 2024-10-10 PROCEDURE — 85610 PROTHROMBIN TIME: CPT

## 2024-10-10 PROCEDURE — 85730 THROMBOPLASTIN TIME PARTIAL: CPT

## 2024-10-10 PROCEDURE — 36415 COLL VENOUS BLD VENIPUNCTURE: CPT

## 2024-10-10 PROCEDURE — 84484 ASSAY OF TROPONIN QUANT: CPT

## 2024-10-10 PROCEDURE — 99284 EMERGENCY DEPT VISIT MOD MDM: CPT

## 2024-10-10 PROCEDURE — 82010 KETONE BODYS QUAN: CPT

## 2024-10-10 PROCEDURE — 85610 PROTHROMBIN TIME: CPT | Performed by: ANESTHESIOLOGY

## 2024-10-10 PROCEDURE — 87804 INFLUENZA ASSAY W/OPTIC: CPT

## 2024-10-10 PROCEDURE — 93005 ELECTROCARDIOGRAM TRACING: CPT

## 2024-10-10 PROCEDURE — 87811 SARS-COV-2 COVID19 W/OPTIC: CPT

## 2024-10-10 PROCEDURE — C9290 INJ, BUPIVACAINE LIPOSOME: HCPCS | Performed by: ANESTHESIOLOGY

## 2024-10-10 PROCEDURE — 29827 SHO ARTHRS SRG RT8TR CUF RPR: CPT | Performed by: PHYSICIAN ASSISTANT

## 2024-10-10 DEVICE — SELF-PUNCHING TRIPLE LOADED FIBERTAK
Type: IMPLANTABLE DEVICE | Site: SHOULDER | Status: FUNCTIONAL
Brand: ARTHREX®

## 2024-10-10 RX ORDER — ONDANSETRON 2 MG/ML
INJECTION INTRAMUSCULAR; INTRAVENOUS AS NEEDED
Status: DISCONTINUED | OUTPATIENT
Start: 2024-10-10 | End: 2024-10-10

## 2024-10-10 RX ORDER — ONDANSETRON 2 MG/ML
4 INJECTION INTRAMUSCULAR; INTRAVENOUS ONCE AS NEEDED
Status: DISCONTINUED | OUTPATIENT
Start: 2024-10-10 | End: 2024-10-10 | Stop reason: HOSPADM

## 2024-10-10 RX ORDER — BUPIVACAINE HYDROCHLORIDE 5 MG/ML
INJECTION, SOLUTION EPIDURAL; INTRACAUDAL
Status: COMPLETED | OUTPATIENT
Start: 2024-10-10 | End: 2024-10-10

## 2024-10-10 RX ORDER — SODIUM CHLORIDE, SODIUM LACTATE, POTASSIUM CHLORIDE, CALCIUM CHLORIDE 600; 310; 30; 20 MG/100ML; MG/100ML; MG/100ML; MG/100ML
INJECTION, SOLUTION INTRAVENOUS CONTINUOUS PRN
Status: DISCONTINUED | OUTPATIENT
Start: 2024-10-10 | End: 2024-10-10

## 2024-10-10 RX ORDER — FENTANYL CITRATE/PF 50 MCG/ML
50 SYRINGE (ML) INJECTION
Status: DISCONTINUED | OUTPATIENT
Start: 2024-10-10 | End: 2024-10-10 | Stop reason: HOSPADM

## 2024-10-10 RX ORDER — OXYCODONE HYDROCHLORIDE 10 MG/1
10 TABLET ORAL ONCE
Status: CANCELLED | OUTPATIENT
Start: 2024-10-10

## 2024-10-10 RX ORDER — ACETAMINOPHEN 325 MG/1
325 TABLET ORAL ONCE
Status: CANCELLED | OUTPATIENT
Start: 2024-10-10

## 2024-10-10 RX ORDER — CEFAZOLIN SODIUM 2 G/50ML
2000 SOLUTION INTRAVENOUS ONCE
Status: COMPLETED | OUTPATIENT
Start: 2024-10-10 | End: 2024-10-10

## 2024-10-10 RX ORDER — CHLORHEXIDINE GLUCONATE 40 MG/ML
SOLUTION TOPICAL DAILY PRN
Status: DISCONTINUED | OUTPATIENT
Start: 2024-10-10 | End: 2024-10-10 | Stop reason: HOSPADM

## 2024-10-10 RX ORDER — KETAMINE HCL IN NACL, ISO-OSM 100MG/10ML
SYRINGE (ML) INJECTION AS NEEDED
Status: DISCONTINUED | OUTPATIENT
Start: 2024-10-10 | End: 2024-10-10

## 2024-10-10 RX ORDER — DEXAMETHASONE SODIUM PHOSPHATE 10 MG/ML
INJECTION, SOLUTION INTRAMUSCULAR; INTRAVENOUS AS NEEDED
Status: DISCONTINUED | OUTPATIENT
Start: 2024-10-10 | End: 2024-10-10

## 2024-10-10 RX ORDER — OXYCODONE AND ACETAMINOPHEN 10; 325 MG/1; MG/1
1 TABLET ORAL EVERY 4 HOURS PRN
Qty: 20 TABLET | Refills: 0 | Status: SHIPPED | OUTPATIENT
Start: 2024-10-10

## 2024-10-10 RX ORDER — IPRATROPIUM BROMIDE AND ALBUTEROL SULFATE 2.5; .5 MG/3ML; MG/3ML
3 SOLUTION RESPIRATORY (INHALATION) ONCE
Status: COMPLETED | OUTPATIENT
Start: 2024-10-11 | End: 2024-10-11

## 2024-10-10 RX ORDER — MIDAZOLAM HYDROCHLORIDE 2 MG/2ML
INJECTION, SOLUTION INTRAMUSCULAR; INTRAVENOUS AS NEEDED
Status: DISCONTINUED | OUTPATIENT
Start: 2024-10-10 | End: 2024-10-10

## 2024-10-10 RX ORDER — LIDOCAINE HYDROCHLORIDE 10 MG/ML
INJECTION, SOLUTION EPIDURAL; INFILTRATION; INTRACAUDAL; PERINEURAL AS NEEDED
Status: DISCONTINUED | OUTPATIENT
Start: 2024-10-10 | End: 2024-10-10

## 2024-10-10 RX ORDER — ROCURONIUM BROMIDE 10 MG/ML
INJECTION, SOLUTION INTRAVENOUS AS NEEDED
Status: DISCONTINUED | OUTPATIENT
Start: 2024-10-10 | End: 2024-10-10

## 2024-10-10 RX ORDER — PROPOFOL 10 MG/ML
INJECTION, EMULSION INTRAVENOUS AS NEEDED
Status: DISCONTINUED | OUTPATIENT
Start: 2024-10-10 | End: 2024-10-10

## 2024-10-10 RX ORDER — ALBUTEROL SULFATE 90 UG/1
INHALANT RESPIRATORY (INHALATION) AS NEEDED
Status: DISCONTINUED | OUTPATIENT
Start: 2024-10-10 | End: 2024-10-10

## 2024-10-10 RX ORDER — IPRATROPIUM BROMIDE AND ALBUTEROL SULFATE 2.5; .5 MG/3ML; MG/3ML
3 SOLUTION RESPIRATORY (INHALATION) ONCE
Status: COMPLETED | OUTPATIENT
Start: 2024-10-10 | End: 2024-10-10

## 2024-10-10 RX ORDER — SODIUM CHLORIDE, SODIUM LACTATE, POTASSIUM CHLORIDE, AND CALCIUM CHLORIDE .6; .31; .03; .02 G/100ML; G/100ML; G/100ML; G/100ML
IRRIGANT IRRIGATION AS NEEDED
Status: DISCONTINUED | OUTPATIENT
Start: 2024-10-10 | End: 2024-10-10 | Stop reason: HOSPADM

## 2024-10-10 RX ORDER — FENTANYL CITRATE 50 UG/ML
INJECTION, SOLUTION INTRAMUSCULAR; INTRAVENOUS AS NEEDED
Status: DISCONTINUED | OUTPATIENT
Start: 2024-10-10 | End: 2024-10-10

## 2024-10-10 RX ORDER — CHLORHEXIDINE GLUCONATE ORAL RINSE 1.2 MG/ML
15 SOLUTION DENTAL ONCE
Status: COMPLETED | OUTPATIENT
Start: 2024-10-10 | End: 2024-10-10

## 2024-10-10 RX ADMIN — PROPOFOL 200 MG: 10 INJECTION, EMULSION INTRAVENOUS at 08:09

## 2024-10-10 RX ADMIN — BUPIVACAINE HYDROCHLORIDE 15 ML: 5 INJECTION, SOLUTION EPIDURAL; INTRACAUDAL at 07:58

## 2024-10-10 RX ADMIN — SODIUM CHLORIDE, SODIUM LACTATE, POTASSIUM CHLORIDE, AND CALCIUM CHLORIDE: .6; .31; .03; .02 INJECTION, SOLUTION INTRAVENOUS at 09:37

## 2024-10-10 RX ADMIN — Medication 10 MG: at 09:44

## 2024-10-10 RX ADMIN — ROCURONIUM BROMIDE 20 MG: 10 INJECTION, SOLUTION INTRAVENOUS at 09:35

## 2024-10-10 RX ADMIN — MIDAZOLAM HYDROCHLORIDE 2 MG: 1 INJECTION, SOLUTION INTRAMUSCULAR; INTRAVENOUS at 07:56

## 2024-10-10 RX ADMIN — FENTANYL CITRATE 100 MCG: 50 INJECTION, SOLUTION INTRAMUSCULAR; INTRAVENOUS at 08:09

## 2024-10-10 RX ADMIN — SUGAMMADEX 200 MG: 100 INJECTION, SOLUTION INTRAVENOUS at 10:02

## 2024-10-10 RX ADMIN — CEFAZOLIN SODIUM 2000 MG: 2 SOLUTION INTRAVENOUS at 08:20

## 2024-10-10 RX ADMIN — DEXAMETHASONE SODIUM PHOSPHATE 10 MG: 10 INJECTION INTRAMUSCULAR; INTRAVENOUS at 08:18

## 2024-10-10 RX ADMIN — ROCURONIUM BROMIDE 50 MG: 10 INJECTION, SOLUTION INTRAVENOUS at 08:09

## 2024-10-10 RX ADMIN — ONDANSETRON 4 MG: 2 INJECTION INTRAMUSCULAR; INTRAVENOUS at 09:53

## 2024-10-10 RX ADMIN — IPRATROPIUM BROMIDE AND ALBUTEROL SULFATE 3 ML: 2.5; .5 SOLUTION RESPIRATORY (INHALATION) at 10:30

## 2024-10-10 RX ADMIN — SODIUM CHLORIDE, SODIUM LACTATE, POTASSIUM CHLORIDE, AND CALCIUM CHLORIDE: .6; .31; .03; .02 INJECTION, SOLUTION INTRAVENOUS at 08:05

## 2024-10-10 RX ADMIN — SODIUM CHLORIDE 1000 ML: 0.9 INJECTION, SOLUTION INTRAVENOUS at 23:35

## 2024-10-10 RX ADMIN — Medication 10 MG: at 09:17

## 2024-10-10 RX ADMIN — CHLORHEXIDINE GLUCONATE 0.12% ORAL RINSE 15 ML: 1.2 LIQUID ORAL at 07:05

## 2024-10-10 RX ADMIN — BUPIVACAINE 10 ML: 13.3 INJECTION, SUSPENSION, LIPOSOMAL INFILTRATION at 07:58

## 2024-10-10 RX ADMIN — Medication 30 MG: at 08:20

## 2024-10-10 RX ADMIN — PROPOFOL 50 MG: 10 INJECTION, EMULSION INTRAVENOUS at 09:59

## 2024-10-10 RX ADMIN — PHENYLEPHRINE HYDROCHLORIDE 40 MCG/MIN: 10 INJECTION INTRAVENOUS at 09:19

## 2024-10-10 RX ADMIN — LIDOCAINE HYDROCHLORIDE 50 MG: 10 INJECTION, SOLUTION EPIDURAL; INFILTRATION; INTRACAUDAL; PERINEURAL at 08:09

## 2024-10-10 RX ADMIN — ALBUTEROL SULFATE 4 PUFF: 90 AEROSOL, METERED RESPIRATORY (INHALATION) at 10:01

## 2024-10-10 NOTE — INTERVAL H&P NOTE
H&P reviewed. After examining the patient I find no changes in the patients condition since the H&P had been written. Patient was seen and evaluated in the office where continued nonoperative vs surgical management of Left shoulder rotator cuff tear was discussed. In light of patients persistent shoulder pain, difficulty with ADLs and function, patient would like to move forward with surgical intervention. Risks of surgical intervention discussed in the office with patient and detailed consent obtained. Patient will go to OR on 10/10/2024 with Dr Rivera for Left shoulder arthroscopic rotator cuff repair, possible open subpectoral biceps tenodesis.     14 point ROS in office   Musculoskeletal Left shoulder pain      Heart RRR  Lungs CTA BL   Abdomen Present nontender     Left Shoulder:   Active range of motion   150 degrees forward flexion with pain  150 degrees abduction   30 degrees external rotation   2 level restriction internal rotation    Passive range of motion   160 degrees of forward flexion with pain      There is no tenderness present over the shoulder.   Forward flexion testing 4+/5 with pain  External rotation testing 4+/5  Internal rotation testing 5/5  Speed's test is Positive  The patient is neurovascularly intact distally in the extremity.      Vitals:    10/10/24 0648   BP: 131/79   Pulse: 61   Resp: 16   Temp: 97.6 °F (36.4 °C)   SpO2: 98%

## 2024-10-10 NOTE — TELEPHONE ENCOUNTER
S/W pt and advised the same  Pt states he was only given 20 pills of Percocet  Advised pt to call Ortho for refill IF pain in left shoulder continues  Reminded pt that the Tramadol was for his back pain

## 2024-10-10 NOTE — TELEPHONE ENCOUNTER
Please review  Pt had Left rotator cuff repair today and was Rx'd percocet  SPA orders Tramadol, I believe that is his question

## 2024-10-10 NOTE — OP NOTE
3 OPERATIVE REPORT  PATIENT NAME: Chavez Newell    :  1965  MRN: [id number]  Pt Location: [facility] OR ROOM 03    SURGERY DATE: 10/10/2024    Surgeons and Role:     * Junior Rivera,  - Primary     * Shirin Corona PA-C - Assisting    Preop Diagnosis:  Tear of left supraspinatus tendon [M75.102]  Full-thickness supraspinatus tear    Post-Op Diagnosis Codes:     * Tear of left supraspinatus tendon [M75.102]  Full-thickness supraspinatus tear    Procedure(s):  Left shoulder arthroscopy with rotator cuff repair, acromioplasty, debridement    Specimen(s):  * No specimens in log *    Estimated Blood Loss:   Minimal    Drains:  * No LDAs found *    Anesthesia Type:   General w/ Interscalene Block    Operative Indications:  Tear of left supraspinatus tendon [M75.102]  Full-thickness supraspinatus tear with persistent pain and weakness affecting activities of daily living    Operative Findings:  Full-thickness supraspinatus tear left shoulder, degenerative fraying of superior subscapularis tendon and superior labrum      Complications:   None    Procedure and Technique:    Antibiotics: Ancef 2 g  Implants:  Arthrex 2.6 mm triple loaded fiber tack anchor x 2    The patient was seen in the preoperative holding area and the appropriate site of surgery was confirmed and the patient was marked. Upon bringing the patient back to the operating room he was placed supine on the operating room table and general anesthesia was provided. The patient was placed in the lateral decubitus position with the left side up. The patient was held in position with a beanbag reinforce with a seat belt and tape. All bony prominences were appropriately padded and the brachial plexus was offloaded with the appropriate ramp pillow functioning as an axillary roll. An exam under anesthesia was performed and displayed near full passive range of motion without instability. The left upper extremity was prepped and draped in the usual sterile fashion  and placed in 15 lbs of in-line traction. A preoperative time-out was performed to once again confirm the site of surgery and procedure.    A posterior arthroscopic viewing portal was made with an 11 blade. The arthroscopic camera was inserted. Upon entering the glenohumeral joint space an anterior portal was made under direct visualization with the aid of an 18 gauge spinal needle. An anterior cannula was inserted, followed by an arthroscopic probe, and a diagnostic arthroscopy was performed. Diagnostic arthroscopy revealed proximal biceps tendon to be intact.  Degenerative fraying of the superior labrum was noted.  Remainder of the labrum was noted to be intact.  Subscapularis tendon was noted to be intact with degenerative fraying at the superior border.  Full-thickness tearing was noted of supraspinatus tendon with infraspinatus tendon intact.  Arthroscopic shaver was inserted to debride frayed edges of the superior labrum and frayed edges of the subscapularis tendon as well as the undersurface torn portion of the supraspinatus tendon.  Upon completion of arthroscopic work within the glenohumeral joint, the arthroscope was removed and subsequently placed into the subacromial space.    A lateral portal was made 3 cm off of the lateral aspect of the acromion in line with the rotator cuff tear. The arthroscopic shaver was inserted and a bursectomy was performed. Arthroscopic radiofrequency ablation device was used to remove soft tissue off the undersurface of the acromion. At this time, the arthroscopic camera was placed in the lateral portal and the remainder of the bursectomy was performed posteriorly. A large subacromial spur was visualized on the inferior aspect of the anterolateral acromion. An arthroscopic julia was used to perform an acromioplasty. Next, attention was turned to the rotator cuff tear. After visualization of the rotator cuff and associated tear, the tear morphology was characterized.  Accessory  portal was made off of the lateral acromion.  Soft tissue was removed off the footprint on greater tuberosity. An arthroscopic shaver was used to lightly decorticate the rotator cuff footprint. The supraspinatus was properly released with arthroscopic shaver and mobilized to the appropriate point on the rotator cuff footprint without over tensioning.  A 2.6 mm triple loaded fiber tack was inserted anterior in the repair footprint, just posterior to the biceps tendon.  Sutures were retrieved out of the anterior portal and a second anchor was placed posteriorly to the first. At this time sutures were passed through the rotator cuff tendon near the musculotendinous junction.  Sutures were tied in simple fashion from anterior to posterior, noting proper reduction and tensioning of repair.    Final repair was visualized and probed. Proper reduction and tensioning of the rotator cuff repair was confirmed and final arthroscopic pictures were taken.  An awl was used to make small microfracture holes lateral to the repair footprint for improved biology for healing.  Arthroscopic incisions were closed with 3-0 nylon suture. A sterile dressing was applied and the patient was placed in a sling and abduction pillow. The patient tolerated the procedure well and was transferred to the PACU without complication.     I was present for the entire procedure. A qualified resident physician was not available, and A physician assistant, Shirin Corona PA-C was required during the procedure for patient positioning, tissue handling, assistance with arthroscopic camera and equipment due to the minimally invasive nature of surgical case, anchor insertion, and suturing.    Postoperatively the patient will be nonweightbearing left upper extremity with sling and abduction pillow.  He is instructed to resume his home dose of warfarin.  Patient will begin formal physical therapy 2 weeks postoperatively to work on gentle passive range of motion  right shoulder.  No active range of motion until 6-week postoperative marco.  I will see him in the office as scheduled on 10/18/2024.    Patient Disposition:  PACU              SIGNATURE: Junior Rivera DO  DATE: October 10, 2024  TIME: 10:07 AM

## 2024-10-10 NOTE — ANESTHESIA POSTPROCEDURE EVALUATION
Post-Op Assessment Note    CV Status:  Stable  Pain Score: 0    Pain management: adequate       Mental Status:  Alert and awake   Hydration Status:  Euvolemic   PONV Controlled:  Controlled   Airway Patency:  Patent     Post Op Vitals Reviewed: Yes    No anethesia notable event occurred.    Staff: CRNA           Last Filed PACU Vitals:  Vitals Value Taken Time   Temp 97.8    Pulse 61    /77    Resp 16    SpO2 98 2L        Modified Caleb:  No data recorded

## 2024-10-10 NOTE — TELEPHONE ENCOUNTER
Your doctor is responsible for his acute pain for 6 weeks.  This medication will likely help his other pain.  He should hold tramadol until his completion of using the oxycodone.     I hope he is feeling better now that he had surgery

## 2024-10-10 NOTE — ANESTHESIA PROCEDURE NOTES
Peripheral Block    Patient location during procedure: holding area  Start time: 10/10/2024 7:56 AM  Reason for block: at surgeon's request and post-op pain management  Staffing  Performed by: Aroldo Manning MD  Authorized by: Aroldo Manning MD    Preanesthetic Checklist  Completed: patient identified, IV checked, site marked, risks and benefits discussed, surgical consent, monitors and equipment checked, pre-op evaluation and timeout performed  Peripheral Block  Patient position: sitting  Prep: ChloraPrep  Patient monitoring: frequent blood pressure checks, continuous pulse oximetry and heart rate  Block type: Interscalene  Laterality: left  Injection technique: single-shot  Procedures: ultrasound guided, Ultrasound guidance required for the procedure to increase accuracy and safety of medication placement and decrease risk of complications.  Ultrasound permanent image saved  bupivacaine (PF) (MARCAINE) 0.5 % injection 20 mL - Perineural   15 mL - 10/10/2024 7:58:00 AM  bupivacaine liposomal (EXPAREL) 1.3 % injection 20 mL - Perineural   10 mL - 10/10/2024 7:58:00 AM  Needle  Needle type: Stimuplex   Needle gauge: 22 G  Needle length: 2 in  Needle localization: anatomical landmarks and ultrasound guidance  Assessment  Injection assessment: incremental injection, frequent aspiration, injected with ease, negative aspiration, negative for heart rate change, no paresthesia on injection, no symptoms of intraneural/intravenous injection and needle tip visualized at all times  Paresthesia pain: none  Post-procedure:  site cleaned  patient tolerated the procedure well with no immediate complications

## 2024-10-10 NOTE — TELEPHONE ENCOUNTER
Caller: patient    Doctor: TOMASZ    Reason for call: was admitted and the hospital prescribed him    Patient was discharged today    Would like to know if he should be taking oxycodone    Please advise    oxyCODONE-acetaminophen (Percocet)  mg per tablet   Call back#:

## 2024-10-10 NOTE — ANESTHESIA PREPROCEDURE EVALUATION
Procedure:  REPAIR ROTATOR CUFF  ARTHROSCOPIC and possible open subpectoral biceps tenodesis, Left (Left: Shoulder)    Stopped coumadin about 5 days ago   No recent URI  Denies CP or SOB with exertion    Relevant Problems   CARDIO   (+) Ascending aortic aneurysm (HCC)   (+) Hyperlipidemia, mixed   (+) Vestibular migraine      ENDO   (+) Type 2 diabetes mellitus with stage 3a chronic kidney disease, without long-term current use of insulin (HCC)      GI/HEPATIC   (+) Fatty liver disease, nonalcoholic   (+) GERD (gastroesophageal reflux disease)   (+) Hepatic cyst   (+) Hiatal hernia      /RENAL   (+) CKD (chronic kidney disease) stage 3, GFR 30-59 ml/min (HCC)   (+) Chronic kidney disease-mineral and bone disorder   (+) Renal cyst, left      MUSCULOSKELETAL   (+) Arthritis of right hip   (+) Hiatal hernia   (+) Lumbosacral strain   (+) Patellar tendinitis of right knee   (+) Primary osteoarthritis of right hip      NEURO/PSYCH   (+) Chronic pain syndrome   (+) Numbness and tingling in left arm   (+) Vestibular migraine      PULMONARY   (+) Mild intermittent asthma without complication        Physical Exam    Airway    Mallampati score: III  TM Distance: >3 FB  Neck ROM: full     Dental    lower dentures and upper dentures    Cardiovascular  Cardiovascular exam normal    Pulmonary  Pulmonary exam normal     Other Findings      Anesthesia Plan  ASA Score- 3     Anesthesia Type- general with ASA Monitors.         Additional Monitors:     Airway Plan: ETT.           Plan Factors-Exercise tolerance (METS): >4 METS.    Chart reviewed. EKG reviewed.  Existing labs reviewed. Patient summary reviewed.    Patient is not a current smoker.  Patient did not smoke on day of surgery.    Obstructive sleep apnea risk education given perioperatively.        Induction- intravenous.    Postoperative Plan- Plan for postoperative opioid use. Planned trial extubation    Perioperative Resuscitation Plan - Level 1 - Full Code.        Informed Consent- Anesthetic plan and risks discussed with patient.  I personally reviewed this patient with the CRNA. Discussed and agreed on the Anesthesia Plan with the CRNA..

## 2024-10-10 NOTE — ANESTHESIA POSTPROCEDURE EVALUATION
Post-Op Assessment Note    CV Status:  Stable  Pain Score: 0    Pain management: adequate       Mental Status:  Alert   Hydration Status:  Stable   PONV Controlled:  None   Airway Patency:  Patent     Post Op Vitals Reviewed: Yes    No anethesia notable event occurred.    Staff: Anesthesiologist       Last Filed PACU Vitals:  Vitals Value Taken Time   Temp 97 °F (36.1 °C) 10/10/24 1055   Pulse 61 10/10/24 1055   /71 10/10/24 1055   Resp 23 10/10/24 1055   SpO2 96 % 10/10/24 1055       Modified Caleb:  Activity: 2 (10/10/2024 11:30 AM)  Respiration: 2 (10/10/2024 11:30 AM)  Circulation: 2 (10/10/2024 11:30 AM)  Consciousness: 2 (10/10/2024 11:30 AM)  Oxygen Saturation: 2 (10/10/2024 11:30 AM)  Modified Caleb Score: 10 (10/10/2024 11:30 AM)

## 2024-10-10 NOTE — DISCHARGE INSTR - AVS FIRST PAGE
Shoulder Surgery: Postoperative Instructions  Dr. Junior Rivera  Diet    You may resume your regular diet as soon as possible  Medication    Take the pain medication as prescribed   Take pain medication with food   While taking pain medications, you may NOT operate a vehicle, heavy machinery, or appliances   While taking pain medication, you may NOT drink alcoholic beverages   If you have any reactions to your medications, stop taking them and call my office   Please keep in mind that constipation is a very common side effect of taking narcotic pain medication. Take precautions to prevent constipation:  o Drink plenty of water (6-8 glasses of 8 oz. a day)  o Avoid alcohol, caffeine, and dairy products  o Eat plenty of fiber (fruits, vegetables, and whole grains)  o Take an over the counter stool softener (Colace or Dulcolax)  o Patients that have had upper extremity surgery should take frequent walks  Activity    Minimize activity the day of surgery    DO NOT USE HEAT    Please keep ice applied to the shoulder for at least the first 72 hours or as long as pain or swelling persists. Do not apply ice directly to skin, or allow water to leak on your dressing.   Place a pillow behind your elbow while lying down or sleeping. Sleeping in a more upright position (recliner) may be more comfortable initially. You should NOT roll onto the operative shoulder.   You are in an immobilizer or sling and it should remain on at all times except when showering until your first postoperative visit   Open and close your hand 10 times every day for about 1 minute. You may also flex and extend your elbow each day when your sling is removed for showering. Be sure to keep your elbow at your side.   DO NOT actively (on your own) lift your operative arm away from the side of your body or rotate it out away from your body.    DO NOT use exercise equipment unless otherwise instructed.  Sling/ Immobilizer    Use the sling/immobilizer at all  times and while sleeping until your next office appointment.  Showering    You may shower 3  days after surgery unless told otherwise. DO NOT immerse the shoulder under water and DO NOT rub the incision. Place new Band-Aids over the sutures after showering.    You may sponge bathe for the first 72 hours, taking care to keep the dressing clean and dry.  Dressing Care    It is normal to get some bloody wound seepage through the bandage. DO NOT BE ALARMED.    If the dressing gets soaked with wound seepage, place more gauze over the dressing and secure with tape. If this soaks through remove the entire dressing and replace with STERILE gauze and tape    Remove all dressings at 72 hours after surgery. If there is still some wound seepage, apply a fresh STERILE gauze over the incisions and secure with tape.    DO NOT TOUCH OR REMOVE THE SUTURES!!  Emergency/Follow-Up   Please notify my office at [phone] if you develop any fever (101 deg or above), unexpected warmth, redness or swelling. Please call if your fingers become cold, purple, numb, or there is excessive bleeding.   Please call the office within 24 hours at [phone] to schedule a follow up appt within 7-10 days from surgery.

## 2024-10-11 ENCOUNTER — TELEPHONE (OUTPATIENT)
Age: 59
End: 2024-10-11

## 2024-10-11 ENCOUNTER — APPOINTMENT (EMERGENCY)
Dept: CT IMAGING | Facility: HOSPITAL | Age: 59
End: 2024-10-11
Payer: MEDICARE

## 2024-10-11 VITALS
TEMPERATURE: 98.2 F | HEART RATE: 90 BPM | DIASTOLIC BLOOD PRESSURE: 70 MMHG | OXYGEN SATURATION: 96 % | RESPIRATION RATE: 20 BRPM | SYSTOLIC BLOOD PRESSURE: 130 MMHG

## 2024-10-11 LAB
4HR DELTA HS TROPONIN: 0 NG/L
ATRIAL RATE: 73 BPM
B-OH-BUTYR SERPL-MCNC: <0.05 MMOL/L (ref 0.02–0.27)
BNP SERPL-MCNC: 45 PG/ML (ref 0–100)
CARDIAC TROPONIN I PNL SERPL HS: 5 NG/L
P AXIS: 73 DEGREES
PR INTERVAL: 166 MS
QRS AXIS: -27 DEGREES
QRSD INTERVAL: 110 MS
QT INTERVAL: 392 MS
QTC INTERVAL: 431 MS
T WAVE AXIS: 262 DEGREES
VENTRICULAR RATE: 73 BPM

## 2024-10-11 PROCEDURE — 93010 ELECTROCARDIOGRAM REPORT: CPT | Performed by: INTERNAL MEDICINE

## 2024-10-11 PROCEDURE — 71250 CT THORAX DX C-: CPT

## 2024-10-11 PROCEDURE — 84484 ASSAY OF TROPONIN QUANT: CPT

## 2024-10-11 PROCEDURE — 36415 COLL VENOUS BLD VENIPUNCTURE: CPT

## 2024-10-11 RX ORDER — DOXYCYCLINE 100 MG/1
100 CAPSULE ORAL ONCE
Status: COMPLETED | OUTPATIENT
Start: 2024-10-11 | End: 2024-10-11

## 2024-10-11 RX ORDER — IPRATROPIUM BROMIDE AND ALBUTEROL SULFATE 2.5; .5 MG/3ML; MG/3ML
3 SOLUTION RESPIRATORY (INHALATION) ONCE
Status: COMPLETED | OUTPATIENT
Start: 2024-10-11 | End: 2024-10-11

## 2024-10-11 RX ORDER — PREDNISONE 20 MG/1
40 TABLET ORAL ONCE
Status: COMPLETED | OUTPATIENT
Start: 2024-10-11 | End: 2024-10-11

## 2024-10-11 RX ORDER — DOXYCYCLINE 100 MG/1
100 CAPSULE ORAL 2 TIMES DAILY
Qty: 10 CAPSULE | Refills: 0 | Status: SHIPPED | OUTPATIENT
Start: 2024-10-11 | End: 2024-10-14

## 2024-10-11 RX ORDER — PREDNISONE 20 MG/1
40 TABLET ORAL DAILY
Qty: 8 TABLET | Refills: 0 | Status: SHIPPED | OUTPATIENT
Start: 2024-10-11 | End: 2024-10-14

## 2024-10-11 RX ADMIN — DOXYCYCLINE HYCLATE 100 MG: 100 CAPSULE ORAL at 01:51

## 2024-10-11 RX ADMIN — IPRATROPIUM BROMIDE AND ALBUTEROL SULFATE 3 ML: 2.5; .5 SOLUTION RESPIRATORY (INHALATION) at 00:43

## 2024-10-11 RX ADMIN — IPRATROPIUM BROMIDE AND ALBUTEROL SULFATE 3 ML: 2.5; .5 SOLUTION RESPIRATORY (INHALATION) at 01:51

## 2024-10-11 RX ADMIN — CEFTRIAXONE SODIUM 1000 MG: 10 INJECTION, POWDER, FOR SOLUTION INTRAVENOUS at 01:51

## 2024-10-11 RX ADMIN — PREDNISONE 40 MG: 20 TABLET ORAL at 01:51

## 2024-10-11 NOTE — TELEPHONE ENCOUNTER
Pt will take 8:20 appt.    Blood sugar at the moment is 286. He would like to know what kind of insulin adjustments he should make.

## 2024-10-11 NOTE — ED PROVIDER NOTES
Time reflects when diagnosis was documented in both MDM as applicable and the Disposition within this note       Time User Action Codes Description Comment    10/11/2024  2:41 AM Ramon Akers [R73.9] Hyperglycemia     10/11/2024  2:41 AM Ramon Akers [J06.9] URI (upper respiratory infection)     10/11/2024  2:43 AM Ramon Akers [J44.1] COPD exacerbation (HCC)           ED Disposition       ED Disposition   Discharge    Condition   Stable    Date/Time   Fri Oct 11, 2024  2:41 AM    Comment   Chavez SPRINGER Osiris discharge to home/self care.                   Assessment & Plan       Medical Decision Making  The patient is a 59 y.o. male with a history of anxiety, aortic aneurysm, arm DVT, asthma, CKD, COPD, depression, diabetes, GERD, headache, kidney stone, PACs who presents to [facility] with a chief complaint of elevated sugar at home.   Ddx includes but is not limited to DKA, HHS, pneumonia, viral URI, pleural effusion, pneumothorax  Blood reassuring for no hyperglycemic emergency given no anion gap, pH normal, and beta hydroxy normal. Patient satting well on room air and improved with medications, not meeting SIRS. Will treat as a COPD exacerbation and have him closely monitor sugars accounting for slight increase due to prednisone prescription. Patient will follow up with PCP and surgeon. Discussed strict return parameters. Prior to discharge, discharge instructions were discussed with patient at bedside. Patient was provided both verbal and written instructions. Patient is understanding of the discharge instructions and is agreeable to plan of care. Return precautions were discussed with patient bedside, patient verbalized understanding of signs and symptoms that would necessitate return to the ED. All questions were answered. Patient was comfortable with the plan of care and discharged to home.       Problems Addressed:  COPD exacerbation (HCC): acute illness or  injury  Hyperglycemia: acute illness or injury  URI (upper respiratory infection): acute illness or injury    Amount and/or Complexity of Data Reviewed  Labs: ordered. Decision-making details documented in ED Course.  Radiology: ordered.    Risk  Prescription drug management.        ED Course as of 10/11/24 0605   Thu Oct 10, 2024   2315 POC Glucose(!): 337   2352 hs TnI 0hr: 5   2353 D-Dimer, Quant: <0.27   Fri Oct 11, 2024   0006 BNP: 45   0116 Beta- Hydroxybutyrate: <0.05   0145 Delta 4hr hsTnI: 0       Medications   sodium chloride 0.9 % bolus 1,000 mL (0 mL Intravenous Stopped 10/11/24 0104)   ipratropium-albuterol (DUO-NEB) 0.5-2.5 mg/3 mL inhalation solution 3 mL (3 mL Nebulization Given 10/11/24 0043)   predniSONE tablet 40 mg (40 mg Oral Given 10/11/24 0151)   ipratropium-albuterol (DUO-NEB) 0.5-2.5 mg/3 mL inhalation solution 3 mL (3 mL Nebulization Given 10/11/24 0151)   ceftriaxone (ROCEPHIN) 1 g/50 mL in dextrose IVPB (0 mg Intravenous Stopped 10/11/24 0315)   doxycycline hyclate (VIBRAMYCIN) capsule 100 mg (100 mg Oral Given 10/11/24 0151)       ED Risk Strat Scores   HEART Risk Score      Flowsheet Row Most Recent Value   Heart Score Risk Calculator    History 0 Filed at: 10/11/2024 0605   ECG 1 Filed at: 10/11/2024 0605   Age 1 Filed at: 10/11/2024 0605   Risk Factors 1 Filed at: 10/11/2024 0605   Troponin 0 Filed at: 10/11/2024 0605   HEART Score 3 Filed at: 10/11/2024 0605                               SBIRT 22yo+      Flowsheet Row Most Recent Value   Initial Alcohol Screen: US AUDIT-C     1. How often do you have a drink containing alcohol? 0 Filed at: 10/10/2024 2311   2. How many drinks containing alcohol do you have on a typical day you are drinking?  0 Filed at: 10/10/2024 2311   3a. Male UNDER 65: How often do you have five or more drinks on one occasion? 0 Filed at: 10/10/2024 2311   Audit-C Score 0 Filed at: 10/10/2024 2311   ABBEY: How many times in the past year have you...    Used an  illegal drug or used a prescription medication for non-medical reasons? Never Filed at: 10/10/2024 8897                            History of Present Illness       Chief Complaint   Patient presents with    Hyperglycemia - Symptomatic     Pt arrived ambulatory with c/o high blood sugar. Pt had sx on his surgery yesterday and had not taken any meds. Blood sugar at home was up to 500       Past Medical History:   Diagnosis Date    Anxiety 03/10/2022    Aorta aneurysm (HCC)     4.3    Arm DVT (deep venous thromboembolism), acute (HCC) 2015    complications of cardiac cath    Asthma     Blood clot in vein     right leg    Chronic kidney disease     CKD (chronic kidney disease), stage III (HCC)     COPD (chronic obstructive pulmonary disease) (HCC)     Depression     Diabetes mellitus (HCC)     Essential hypertriglyceridemia     GERD (gastroesophageal reflux disease)     Headache     Hiatal hernia     Hyperlipidemia, mixed 05/07/2018    Kidney stone     Liver disease     FATTY LIVER    Mild episode of recurrent major depressive disorder (HCC) 03/10/2022    PAC (premature atrial contraction)     Prediabetes     Rectus sheath hematoma, initial encounter 08/09/2023    Renal calculi     Sleep apnea     Vestibular migraine       Past Surgical History:   Procedure Laterality Date    CARPAL TUNNEL RELEASE Left     COLONOSCOPY      FL INJECTION LEFT SHOULDER (ARTHROGRAM)  9/13/2024    FL INJECTION RIGHT HIP (ARTHROGRAM)  08/20/2018    FOOT SURGERY Left     FOREIGN BODY REMOVAL    HERNIA REPAIR      WY ARTHRP ACETBLR/PROX FEM PROSTC AGRFT/ALGRFT Right 09/28/2020    Procedure: ARTHROPLASTY HIP TOTAL;  Surgeon: Jyoti Araiza MD;  Location: [facility];  Service: Orthopedics    WY COLONOSCOPY FLX DX W/COLLJ SPEC WHEN PFRMD N/A 08/07/2017    Procedure: COLONOSCOPY;  Surgeon: Anthony France MD;  Location: [facility];  Service: Gastroenterology    WY ESOPHAGOGASTRODUODENOSCOPY TRANSORAL DIAGNOSTIC N/A 10/31/2017    Procedure:  ESOPHAGOGASTRODUODENOSCOPY (EGD);  Surgeon: Anthony France MD;  Location: [facility];  Service: Gastroenterology    AL SURGICAL ARTHROSCOPY SHOULDER W/ROTATOR CUFF RPR Left 10/10/2024    Procedure: REPAIR ROTATOR CUFF  ARTHROSCOPIC LEFT ACROMIOPLASTY;  Surgeon: Junior Rivera DO;  Location: [facility];  Service: Orthopedics    TOTAL HIP ARTHROPLASTY Right 2020      Family History   Problem Relation Age of Onset    Arthritis Mother     Heart attack Father     Clotting disorder Father     Schizoaffective Disorder  Sister     Cancer Brother     Prostate cancer Brother     Leukemia Brother         his only brother dies from lymphoma or leukemia pt unsure he was only 54    Aortic aneurysm Other         abdominal      Social History     Tobacco Use    Smoking status: Some Days     Types: Cigars     Passive exposure: Never    Smokeless tobacco: Never    Tobacco comments:     never inhaled-smokes cigars every now and then   Vaping Use    Vaping status: Never Used   Substance Use Topics    Alcohol use: Not Currently     Comment: occasional beer    Drug use: No      E-Cigarette/Vaping    E-Cigarette Use Never User       E-Cigarette/Vaping Substances    Nicotine No     THC No     CBD No     Flavoring No     Other No     Unknown No       I have reviewed and agree with the history as documented.     The patient is a 59 y.o. male with a history of anxiety, aortic aneurysm, arm DVT, asthma, CKD, COPD, depression, diabetes, GERD, headache, kidney stone, PACs who presents to [facility] with a chief complaint of elevated sugar at home. Symptoms began tonight and have been constant since onset. He reports some chest pain. Associated symptoms include frequency, chest pressure, and shortness of breath. Symptoms are aggravated with exertion and alleviating factors include none noted. The patient denies fever, chills, night sweats, cough, sputum, dizziness, lightness, falls, trauma, hemoptysis, hematemesis,  vomiting, nausea, diarrhea, dysuria, leg pain or swelling. No other reported symptoms at this time.  Patient denies allergies to anything  Patient reports that he takes insulin at home for diabetes. He had shoulder surgery and did not take any meds today. Tonight he checked his sugar and it was 498, he called the hospital and they recommended he take insulin. He reports he took insulin and it came down to upper 200s but then went back up near 500 again. On arrival POCT glucose 337        History provided by:  Patient   used: No    Hyperglycemia - Symptomatic  Associated symptoms: shortness of breath    Associated symptoms: no abdominal pain, no chest pain, no dysuria, no fever and no vomiting        Review of Systems   Constitutional:  Negative for chills and fever.   HENT:  Negative for ear pain and sore throat.    Eyes:  Negative for pain and visual disturbance.   Respiratory:  Positive for chest tightness and shortness of breath. Negative for cough.    Cardiovascular:  Negative for chest pain and palpitations.   Gastrointestinal:  Negative for abdominal pain and vomiting.   Genitourinary:  Positive for frequency. Negative for dysuria and hematuria.   Musculoskeletal:  Negative for arthralgias and back pain.   Skin:  Negative for color change and rash.   Neurological:  Negative for seizures and syncope.   All other systems reviewed and are negative.          Objective       ED Triage Vitals [10/10/24 2311]   Temperature Pulse Blood Pressure Respirations SpO2 Patient Position - Orthostatic VS   98 °F (36.7 °C) 85 159/84 20 98 % Sitting      Temp Source Heart Rate Source BP Location FiO2 (%) Pain Score    Oral Monitor Right arm -- --      Vitals      Date and Time Temp Pulse SpO2 Resp BP Pain Score FACES Pain Rating User   10/11/24 0235 -- 90 96 % 20 130/70 -- -- KG   10/10/24 2315 98.2 °F (36.8 °C) 76 97 % 15 159/84 -- -- [facility]   10/10/24 2311 98 °F (36.7 °C) 85 98 % 20 159/84 -- -- [facility]             Physical Exam  Vitals reviewed.   Constitutional:       General: He is not in acute distress.     Appearance: He is not toxic-appearing.   HENT:      Head: Normocephalic.      Mouth/Throat:      Mouth: Mucous membranes are moist.      Pharynx: Oropharynx is clear.   Eyes:      General: No scleral icterus.     Conjunctiva/sclera: Conjunctivae normal.   Neck:      Vascular: No carotid bruit.   Cardiovascular:      Rate and Rhythm: Normal rate.      Pulses: Normal pulses.   Pulmonary:      Effort: Pulmonary effort is normal. No respiratory distress.      Breath sounds: Normal breath sounds. No stridor. No wheezing, rhonchi or rales.   Abdominal:      General: Abdomen is flat. Bowel sounds are normal.      Palpations: Abdomen is soft.      Tenderness: There is no abdominal tenderness. There is no right CVA tenderness, left CVA tenderness or guarding.   Musculoskeletal:         General: No tenderness.      Cervical back: Neck supple. No tenderness.      Right lower leg: No edema.      Left lower leg: No edema.   Skin:     General: Skin is warm and dry.      Capillary Refill: Capillary refill takes less than 2 seconds.      Coloration: Skin is not jaundiced.      Findings: No bruising or lesion.   Neurological:      Mental Status: He is alert and oriented to person, place, and time. Mental status is at baseline.      Cranial Nerves: No cranial nerve deficit.         Results Reviewed       Procedure Component Value Units Date/Time    HS Troponin I 4hr [[id number]]  (Normal) Collected: 10/11/24 0115    Lab Status: Final result Specimen: Blood from Arm, Right Updated: 10/11/24 0143     hs TnI 4hr 5 ng/L      Delta 4hr hsTnI 0 ng/L     Beta Hydroxybutyrate [[id number]]  (Normal) Collected: 10/10/24 2321    Lab Status: Final result Specimen: Blood from Arm, Right Updated: 10/11/24 0114     Beta- Hydroxybutyrate <0.05 mmol/L     B-Type Natriuretic Peptide(BNP) [[id number]]  (Normal) Collected: 10/10/24 2321    Lab Status:  Final result Specimen: Blood from Arm, Right Updated: 10/11/24 0005     BNP 45 pg/mL     Urine Microscopic [[id number]]  (Normal) Collected: 10/10/24 2328    Lab Status: Final result Specimen: Urine, Clean Catch Updated: 10/10/24 2355     RBC, UA None Seen /hpf      WBC, UA None Seen /hpf      Epithelial Cells None Seen /hpf      Bacteria, UA None Seen /hpf     UA w Reflex to Microscopic w Reflex to Culture [[id number]]  (Abnormal) Collected: 10/10/24 2328    Lab Status: Final result Specimen: Urine, Clean Catch Updated: 10/10/24 2354     Color, UA Colorless     Clarity, UA Clear     Specific Gravity, UA 1.025     pH, UA 5.0     Leukocytes, UA Elevated glucose may cause decreased leukocyte values. See urine microscopic for UWBC result     Nitrite, UA Negative     Protein, UA Negative mg/dl      Glucose, UA >=1000 (1%) mg/dl      Ketones, UA Negative mg/dl      Urobilinogen, UA <2.0 mg/dl      Bilirubin, UA Negative     Occult Blood, UA Negative    FLU/COVID Rapid Antigen (30 min. TAT) - Preferred screening test in ED [[id number]]  (Normal) Collected: 10/10/24 2321    Lab Status: Final result Specimen: Nares from Nose Updated: 10/10/24 2353     SARS COV Rapid Antigen Negative     Influenza A Rapid Antigen Negative     Influenza B Rapid Antigen Negative    Narrative:      This test has been performed using the Quidel Sallie 2 FLU+SARS Antigen test under the Emergency Use Authorization (EUA). This test has been validated by the  and verified by the performing laboratory. The Sallie uses lateral flow immunofluorescent sandwich assay to detect SARS-COV, Influenza A and Influenza B Antigen.     The Quidel Sallie 2 SARS Antigen test does not differentiate between SARS-CoV and SARS-CoV-2.     Negative results are presumptive and may be confirmed with a molecular assay, if necessary, for patient management. Negative results do not rule out SARS-CoV-2 or influenza infection and should not be used as the sole basis for  treatment or patient management decisions. A negative test result may occur if the level of antigen in a sample is below the limit of detection of this test.     Positive results are indicative of the presence of viral antigens, but do not rule out bacterial infection or co-infection with other viruses.     All test results should be used as an adjunct to clinical observations and other information available to the provider.    FOR PEDIATRIC PATIENTS - copy/paste COVID Guidelines URL to browser: [email or web address]    Protime-INR [[id number]]  (Normal) Collected: 10/10/24 2321    Lab Status: Final result Specimen: Blood from Arm, Right Updated: 10/10/24 2353     Protime 14.2 seconds      INR 1.02    Narrative:      INR Therapeutic Range    Indication                                             INR Range      Atrial Fibrillation                                               2.0-3.0  Hypercoagulable State                                    2.0.2.3  Left Ventricular Asist Device                            2.0-3.0  Mechanical Heart Valve                                  -    Aortic(with afib, MI, embolism, HF, LA enlargement,    and/or coagulopathy)                                     2.0-3.0 (2.5-3.5)     Mitral                                                             2.5-3.5  Prosthetic/Bioprosthetic Heart Valve               2.0-3.0  Venous thromboembolism (VTE: VT, PE        2.0-3.0    D-dimer, quantitative [[id number]]  (Normal) Collected: 10/10/24 2321    Lab Status: Final result Specimen: Blood from Arm, Right Updated: 10/10/24 2353     D-Dimer, Quant <0.27 ug/ml FEU     Narrative:      In the evaluation for possible pulmonary embolism, in the appropriate (Well's Score of 4 or less) patient, the age adjusted d-dimer cutoff for this patient can be calculated as:    Age x 0.01 (in ug/mL) for Age-adjusted D-dimer exclusion threshold for a patient over 50 years.     APTT [[id number]]  (Abnormal) Collected: 10/10/24 2321    Lab Status: Final result Specimen: Blood from Arm, Right Updated: 10/10/24 2352     PTT 21 seconds     HS Troponin 0hr (reflex protocol) [[id number]]  (Normal) Collected: 10/10/24 2321    Lab Status: Final result Specimen: Blood from Arm, Right Updated: 10/10/24 2352     hs TnI 0hr 5 ng/L     Comprehensive metabolic panel [[id number]]  (Abnormal) Collected: 10/10/24 2321    Lab Status: Final result Specimen: Blood from Arm, Right Updated: 10/10/24 2347     Sodium 139 mmol/L      Potassium 4.4 mmol/L      Chloride 104 mmol/L      CO2 27 mmol/L      ANION GAP 8 mmol/L      BUN 20 mg/dL      Creatinine 1.68 mg/dL      Glucose 300 mg/dL      Calcium 9.6 mg/dL      AST 23 U/L      ALT 28 U/L      Alkaline Phosphatase 46 U/L      Total Protein 6.7 g/dL      Albumin 4.3 g/dL      Total Bilirubin 0.38 mg/dL      eGFR 43 ml/min/1.73sq m     Narrative:      National Kidney Disease Foundation guidelines for Chronic Kidney Disease (CKD):     Stage 1 with normal or high GFR (GFR > 90 mL/min/1.73 square meters)    Stage 2 Mild CKD (GFR = 60-89 mL/min/1.73 square meters)    Stage 3A Moderate CKD (GFR = 45-59 mL/min/1.73 square meters)    Stage 3B Moderate CKD (GFR = 30-44 mL/min/1.73 square meters)    Stage 4 Severe CKD (GFR = 15-29 mL/min/1.73 square meters)    Stage 5 End Stage CKD (GFR <15 mL/min/1.73 square meters)  Note: GFR calculation is accurate only with a steady state creatinine    Blood gas, venous [[id number]] Collected: 10/10/24 2335    Lab Status: Final result Specimen: Blood from Arm, Right Updated: 10/10/24 2342     pH, Shalom 7.372     pCO2, Shalom 42.3 mm Hg      pO2, Shalom 35.8 mm Hg      HCO3, Shalom 24.0 mmol/L      Base Excess, Shalom -1.3 mmol/L      O2 Content, Shalom 14.9 ml/dL      O2 HGB, VENOUS 74.5 %     CBC and differential [[id number]]  (Abnormal) Collected: 10/10/24 2321    Lab Status: Final result Specimen: Blood from Arm, Right Updated: 10/10/24 7540     WBC  10.27 Thousand/uL      RBC 4.44 Million/uL      Hemoglobin 14.0 g/dL      Hematocrit 43.3 %      MCV 98 fL      MCH 31.5 pg      MCHC 32.3 g/dL      RDW 12.2 %      MPV 10.4 fL      Platelets 226 Thousands/uL      nRBC 0 /100 WBCs      Segmented % 88 %      Immature Grans % 0 %      Lymphocytes % 8 %      Monocytes % 4 %      Eosinophils Relative 0 %      Basophils Relative 0 %      Absolute Neutrophils 9.00 Thousands/µL      Absolute Immature Grans 0.03 Thousand/uL      Absolute Lymphocytes 0.79 Thousands/µL      Absolute Monocytes 0.45 Thousand/µL      Eosinophils Absolute 0.00 Thousand/µL      Basophils Absolute 0.00 Thousands/µL     Fingerstick Glucose (POCT) [[id number]]  (Abnormal) Collected: 10/10/24 2311    Lab Status: Final result Specimen: Blood Updated: 10/10/24 2311     POC Glucose 337 mg/dl             CT chest without contrast   Final Interpretation by Jair Blackburn DO (10/11 0115)   No CT evidence of acute findings            Workstation performed: [id number]         XR chest 1 view portable    (Results Pending)       Procedures    ED Medication and Procedure Management   Prior to Admission Medications   Prescriptions Last Dose Informant Patient Reported? Taking?   Bydureon BCise 2 MG/0.85ML AUIJ  Self No No   Sig: inject 2 milligrams subcutaneously weekly   Patient taking differently: Takes Fridays   Continuous Blood Gluc Sensor (FreeStyle Jelly 3 Sensor) MISC  Self No No   Sig: Use 1 each every 14 (fourteen) days   Insulin Glargine Solostar (Lantus SoloStar) 100 UNIT/ML SOPN  Self No No   Sig: inject 20 units subcutaneously daily or as directed by prescriber   Insulin Pen Needle (BD Pen Needle Evelina 2nd Gen) 32G X 4 MM MISC  Self No No   Sig: Inject as directed 4 (four) times a day   Lancets MISC  Self No No   Sig: Use daily before breakfast   acetaminophen (TYLENOL) 500 mg tablet  Self Yes No   Sig: Take 500 mg by mouth every 6 (six) hours as needed for mild pain   fenofibrate 160 MG tablet   No No    Sig: take 1 tablet by mouth once daily   gabapentin (NEURONTIN) 300 mg capsule  Self No No   Sig: take 1 capsule by mouth at bedtime   Patient taking differently: Take 300 mg by mouth daily   glucose blood test strip  Self No No   Sig: TEST BS TID   glucose monitoring kit (FREESTYLE) monitoring kit  Self No No   Sig: Use 1 each daily before breakfast   insulin lispro (HumaLOG KwikPen) 100 units/mL injection pen  Self No No   Sig: Per sliding scale, Max 50 units daily   methocarbamol (ROBAXIN) 500 mg tablet  Self No No   Sig: take 1 tablet by mouth twice a day   naloxone (NARCAN) 4 mg/0.1 mL nasal spray  Self No No   Sig: Administer 1 spray into a nostril. If no response after 2-3 minutes, give another dose in the other nostril using a new spray.   oxyCODONE (Roxicodone) 5 immediate release tablet  Self No No   Sig: Take 1 tablet (5 mg total) by mouth every 6 (six) hours as needed for moderate pain or severe pain for up to 12 doses Max Daily Amount: 20 mg   oxyCODONE-acetaminophen (Percocet)  mg per tablet   No No   Sig: Take 1 tablet by mouth every 4 (four) hours as needed for moderate pain Max Daily Amount: 6 tablets   pantoprazole (PROTONIX) 40 mg tablet   No No   Sig: take 1 tablet by mouth once daily   rosuvastatin (CRESTOR) 5 mg tablet  Self No No   Sig: take 1 tablet by mouth once daily   sildenafil (REVATIO) 20 mg tablet  Self No No   Sig: TAKE 1 TABLET (20 MG TOTAL) BY MOUTH DAILY AS DIRECTED   traMADol (Ultram) 50 mg tablet  Self No No   Sig: May take 2 tablets (100 mg total) by mouth daily as needed for moderate pain. May also take 1 tablet (50 mg total) every 8 (eight) hours as needed for moderate pain. Max 5 tablets per day.   warfarin (COUMADIN) 2 mg tablet  Self No No   Sig: take 1 tablet by mouth MONDAY, WEDNESDAY, FRIDAY OR AS DIRECTED BY PCP   Patient taking differently: Take 1 tablet by mouth Monday, Wednesday, Friday or as directed by PCP  Holding x5 days prior to sx   warfarin (COUMADIN)  3 mg tablet  Self No No   Sig: take 1 tablet by mouth TUESDAY, THURSDAY, SATURDAY, AND SUNDAY; OR AS DIRECTED BY PCP   Patient taking differently: take 1 tablet by mouth TUESDAY, THURSDAY, SATURDAY, AND SUNDAY; OR AS DIRECTED BY PCP  Holding x5 days prior to sx      Facility-Administered Medications: None     Discharge Medication List as of 10/11/2024  2:43 AM        START taking these medications    Details   amoxicillin-clavulanate (AUGMENTIN) 875-125 mg per tablet Take 1 tablet by mouth every 12 (twelve) hours for 5 days, Starting Fri 10/11/2024, Until Wed 10/16/2024, Normal      doxycycline hyclate (VIBRAMYCIN) 100 mg capsule Take 1 capsule (100 mg total) by mouth 2 (two) times a day for 5 days, Starting Fri 10/11/2024, Until Wed 10/16/2024, Normal      predniSONE 20 mg tablet Take 2 tablets (40 mg total) by mouth daily for 4 days, Starting Fri 10/11/2024, Until Tue 10/15/2024, Normal           CONTINUE these medications which have NOT CHANGED    Details   acetaminophen (TYLENOL) 500 mg tablet Take 500 mg by mouth every 6 (six) hours as needed for mild pain, Historical Med      Bydureon BCise 2 MG/0.85ML AUIJ inject 2 milligrams subcutaneously weekly, Normal      Continuous Blood Gluc Sensor (FreeStyle Jelly 3 Sensor) MISC Use 1 each every 14 (fourteen) days, Starting Wed 3/27/2024, Normal      fenofibrate 160 MG tablet take 1 tablet by mouth once daily, Normal      gabapentin (NEURONTIN) 300 mg capsule take 1 capsule by mouth at bedtime, Starting Fri 9/13/2024, Normal      glucose blood test strip TEST BS TID, Normal      glucose monitoring kit (FREESTYLE) monitoring kit Use 1 each daily before breakfast, Starting Fri 12/4/2020, Normal      Insulin Glargine Solostar (Lantus SoloStar) 100 UNIT/ML SOPN inject 20 units subcutaneously daily or as directed by prescriber, Normal      insulin lispro (HumaLOG KwikPen) 100 units/mL injection pen Per sliding scale, Max 50 units daily, Normal      Insulin Pen Needle (BD  Pen Needle Evelina 2nd Gen) 32G X 4 MM MISC Inject as directed 4 (four) times a day, Starting Tue 8/13/2024, Normal      Lancets MISC Use daily before breakfast, Starting Fri 12/4/2020, Normal      methocarbamol (ROBAXIN) 500 mg tablet take 1 tablet by mouth twice a day, Starting Tue 9/17/2024, Normal      naloxone (NARCAN) 4 mg/0.1 mL nasal spray Administer 1 spray into a nostril. If no response after 2-3 minutes, give another dose in the other nostril using a new spray., Normal      oxyCODONE (Roxicodone) 5 immediate release tablet Take 1 tablet (5 mg total) by mouth every 6 (six) hours as needed for moderate pain or severe pain for up to 12 doses Max Daily Amount: 20 mg, Starting Sat 6/1/2024, Normal      oxyCODONE-acetaminophen (Percocet)  mg per tablet Take 1 tablet by mouth every 4 (four) hours as needed for moderate pain Max Daily Amount: 6 tablets, Starting Thu 10/10/2024, Normal      pantoprazole (PROTONIX) 40 mg tablet take 1 tablet by mouth once daily, Normal      rosuvastatin (CRESTOR) 5 mg tablet take 1 tablet by mouth once daily, Starting Tue 12/12/2023, Normal      sildenafil (REVATIO) 20 mg tablet TAKE 1 TABLET (20 MG TOTAL) BY MOUTH DAILY AS DIRECTED, Normal      traMADol (Ultram) 50 mg tablet Multiple Dosages:Starting Fri 8/23/2024May take 2 tablets (100 mg total) by mouth daily as needed for moderate pain. May also take 1 tablet (50 mg total) every 8 (eight) hours as needed for moderate pain. Max 5 tablets per day., Normal      !! warfarin (COUMADIN) 2 mg tablet take 1 tablet by mouth MONDAY, WEDNESDAY, FRIDAY OR AS DIRECTED BY PCP, Normal      !! warfarin (COUMADIN) 3 mg tablet take 1 tablet by mouth TUESDAY, THURSDAY, SATURDAY, AND SUNDAY; OR AS DIRECTED BY PCP, Normal       !! - Potential duplicate medications found. Please discuss with provider.        No discharge procedures on file.  ED SEPSIS DOCUMENTATION   Time reflects when diagnosis was documented in both MDM as applicable and the  Disposition within this note       Time User Action Codes Description Comment    10/11/2024  2:41 AM Ramon Akers [R73.9] Hyperglycemia     10/11/2024  2:41 AM Ramon Akers [J06.9] URI (upper respiratory infection)     10/11/2024  2:43 AM Ramon Akers [J44.1] COPD exacerbation (HCC)                  Ramon Akers PA-C  10/11/24 0605

## 2024-10-11 NOTE — TELEPHONE ENCOUNTER
I can see him at 820 on Monday?     Also can we call and check on how his sugars are doing? I can give him a call over lunch, but would like someone to reach out before then

## 2024-10-11 NOTE — TELEPHONE ENCOUNTER
Spoke with pt -- reviewed to check blood sugars and give short acting insulin prior to eating.     Please add him to my schedule for Monday at 820

## 2024-10-11 NOTE — ED NOTES
Report received from previous shift. Pt is resting at this time.     Shabana Felton, RN  10/11/24 0104

## 2024-10-11 NOTE — PROGRESS NOTES
Ambulatory Visit  Name: Chavez Newell      : 1965      MRN: [id number]  Encounter Provider: Adenike Garg DO  Encounter Date: 10/14/2024   Encounter department: [facility]    Assessment & Plan  Type 2 diabetes mellitus with stage 3a chronic kidney disease, without long-term current use of insulin (HCC)  Hyperglycemia likely multifactorial in setting of body stress from surgery, no insulin on day of surgery, and subsequent prescription for oral prednisone. Reviewed imaging with pt -- no PNA noted. Pt also notes his respiratory symptoms only occur in setting of high sugar, not at baseline when his sugar readings are in normal range. This is less suggestive of COPD exacerbation and more so just symptomatic hyperglycemia. Will stop antibiotics and prednisone as there is no PNA/COPD exacerbation to treat and the prednisone is likely making his hyperglycemic worse. Encouraged to continue good hydration and close monitoring of blood sugars. As he gets further out from surgery and steroid use, blood sugars should continue to improve (they were in good range prior to surgery) and he should be able to wean down the amount of insulin he is requiring. Encouraged to call with update on 10/16 or 10/17 to confirm this is improving (or sooner if needed).   Lab Results   Component Value Date    HGBA1C 7.4 (A) 2024               History of Present Illness     HPI    Pt presents with his girlfriend for ED follow up     [facility] ED 10/11 due to hyperglycemia, shortness of breath. Of note pt was s/p shoulder surgery earlier that day.   EKG: NSR, incomplete RBBB, possible LVH, non-specific T wave, Trop (-) x3, BNP WNL  CXR: LLL atelectasis  CT Chest: Stable 4.6 cm TAA, hiatal hernia, fatty liver, soft tissue emphysema in anterior deltoid from recent nerve block   COVID/Flu (-)  WBC 10   Cr 1.68/GFR 43 (Decreased), LFTs WNL, Glucose 300   UA (+) glucose, otherwise benign     Given Augmentin, Doxy,  "Prednisone     Today:   Pt notes yesterday was the first day his blood sugars were improved into low 100s -- has been taking 24U Lantus, 10-12U Humalog   Feels fatigued easily -- notes he didn't sleep the first few nights after surgery because his sugars were so high and he had urinary frequency   Only notes shortness of breath, urinary frequency when his blood sugars are high   Shoulder overall feels pretty good -- has only had to take a few doses of pain medication       Review of Systems   Respiratory:  Positive for shortness of breath.    Endocrine: Positive for polyuria.   Neurological:  Positive for speech difficulty.     Medical History Reviewed by provider this encounter:           Objective     /82   Pulse 68   Temp 98.5 °F (36.9 °C)   Ht 6' 1\" (1.854 m)   Wt 99.8 kg (220 lb)   SpO2 96%   BMI 29.03 kg/m²     Physical Exam  Vitals and nursing note reviewed.   Constitutional:       General: He is not in acute distress.     Appearance: Normal appearance. He is well-developed.   HENT:      Head: Normocephalic and atraumatic.      Right Ear: Ear canal and external ear normal. There is impacted cerumen.      Left Ear: Tympanic membrane, ear canal and external ear normal.      Nose: Nose normal. No rhinorrhea.      Mouth/Throat:      Mouth: Mucous membranes are moist.      Pharynx: No oropharyngeal exudate or posterior oropharyngeal erythema.   Eyes:      Conjunctiva/sclera: Conjunctivae normal.   Neck:      Thyroid: No thyromegaly.   Cardiovascular:      Rate and Rhythm: Normal rate and regular rhythm.   Pulmonary:      Effort: Pulmonary effort is normal. No respiratory distress.      Breath sounds: Normal breath sounds.   Abdominal:      General: Bowel sounds are normal. There is no distension.      Palpations: Abdomen is soft.      Tenderness: There is no abdominal tenderness.   Musculoskeletal:      Right lower leg: No edema.      Left lower leg: No edema.      Comments: L shoulder in sling  "   Lymphadenopathy:      Cervical: No cervical adenopathy.   Skin:     General: Skin is warm and dry.   Neurological:      Mental Status: He is alert.      Comments: Grossly intact   Psychiatric:         Mood and Affect: Mood normal.

## 2024-10-11 NOTE — TELEPHONE ENCOUNTER
Patient was in the ER last night due to his sugar reading up to 500 at 10 p.m. last night.  He has coughing/wheezing and was told he has pneumonia in his left lung.  He needs an ER followup but only wants Dr. Garg, who does not have anything available in the time frame needed.    Patient also states he took his insulin one hour ago and his sugars wenst from 295 and now up to 324.  He took 10 units of insulin.  He is not sure if he should take more insulin or not.  He would like a call back to discuss.

## 2024-10-11 NOTE — TELEPHONE ENCOUNTER
"Pt held all insulin today for shoulder surgery, including his glargine this am. Glucose was 492 just now- was feeling poorly (polyuria, malaise). Pt gave himself 14 units of lispro (humalog) and called. Communicated with on call who advised pt did the right thing, should check is BG again in 2 hours and give himself an additional 14 units of lispro (humalog) if it is still over 300. Should work on hydration right now with lots of water while waiting. Resume normal insulin regimen tomorrow. Relayed to patient who reports BG already down to 392. Instructions given and patient verbalized understanding. ER and call back precautions given for worsening symptoms.     Reason for Disposition   Blood glucose > 400 mg/dL (22.2 mmol/L)    Answer Assessment - Initial Assessment Questions  1. BLOOD GLUCOSE: \"What is your blood glucose level?\"       492 fingerstick- just gave himself 15 units humalog    2. ONSET: \"When did you check the blood glucose?\"      finger    3. USUAL RANGE: \"What is your glucose level usually?\" (e.g., usual fasting morning value, usual evening value)      Usally much lower    4. KETONES: \"Do you check for ketones (urine or blood test strips)?\" If Yes, ask: \"What does the test show now?\"       no    5. TYPE 1 or 2:  \"Do you know what type of diabetes you have?\"  (e.g., Type 1, Type 2, Gestational; doesn't know)       Type 2    6. INSULIN: \"Do you take insulin?\" \"What type of insulin(s) do you use? What is the mode of delivery? (syringe, pen; injection or pump)?\"       glargine    7. DIABETES PILLS: \"Do you take any pills for your diabetes?\" If Yes, ask: \"Have you missed taking any pills recently?\"      no    8. OTHER SYMPTOMS: \"Do you have any symptoms?\" (e.g., fever, frequent urination, difficulty breathing, dizziness, weakness, vomiting)      tired      polyuria      BG this am 130s    Protocols used: Diabetes - High Blood Sugar-Adult-AH    "

## 2024-10-14 ENCOUNTER — OFFICE VISIT (OUTPATIENT)
Dept: FAMILY MEDICINE CLINIC | Facility: CLINIC | Age: 59
End: 2024-10-14
Payer: MEDICARE

## 2024-10-14 VITALS
HEART RATE: 68 BPM | TEMPERATURE: 98.5 F | BODY MASS INDEX: 29.16 KG/M2 | DIASTOLIC BLOOD PRESSURE: 82 MMHG | HEIGHT: 73 IN | WEIGHT: 220 LBS | SYSTOLIC BLOOD PRESSURE: 144 MMHG | OXYGEN SATURATION: 96 %

## 2024-10-14 DIAGNOSIS — N18.31 TYPE 2 DIABETES MELLITUS WITH STAGE 3A CHRONIC KIDNEY DISEASE, WITHOUT LONG-TERM CURRENT USE OF INSULIN (HCC): Primary | ICD-10-CM

## 2024-10-14 DIAGNOSIS — E11.22 TYPE 2 DIABETES MELLITUS WITH STAGE 3A CHRONIC KIDNEY DISEASE, WITHOUT LONG-TERM CURRENT USE OF INSULIN (HCC): Primary | ICD-10-CM

## 2024-10-14 DIAGNOSIS — Z23 ENCOUNTER FOR IMMUNIZATION: ICD-10-CM

## 2024-10-14 PROCEDURE — 90673 RIV3 VACCINE NO PRESERV IM: CPT

## 2024-10-14 PROCEDURE — G0008 ADMIN INFLUENZA VIRUS VAC: HCPCS

## 2024-10-14 PROCEDURE — 99214 OFFICE O/P EST MOD 30 MIN: CPT | Performed by: FAMILY MEDICINE

## 2024-10-14 NOTE — ASSESSMENT & PLAN NOTE
Hyperglycemia likely multifactorial in setting of body stress from surgery, no insulin on day of surgery, and subsequent prescription for oral prednisone. Reviewed imaging with pt -- no PNA noted. Pt also notes his respiratory symptoms only occur in setting of high sugar, not at baseline when his sugar readings are in normal range. This is less suggestive of COPD exacerbation and more so just symptomatic hyperglycemia. Will stop antibiotics and prednisone as there is no PNA/COPD exacerbation to treat and the prednisone is likely making his hyperglycemic worse. Encouraged to continue good hydration and close monitoring of blood sugars. As he gets further out from surgery and steroid use, blood sugars should continue to improve (they were in good range prior to surgery) and he should be able to wean down the amount of insulin he is requiring. Encouraged to call with update on 10/16 or 10/17 to confirm this is improving (or sooner if needed).   Lab Results   Component Value Date    HGBA1C 7.4 (A) 09/30/2024

## 2024-10-17 ENCOUNTER — TELEPHONE (OUTPATIENT)
Age: 59
End: 2024-10-17

## 2024-10-17 NOTE — TELEPHONE ENCOUNTER
Sugars appear to be improving significantly. Would continue to monitor for the next few days and if they stay stable, he can start to try decreasing his insulin use slowly back to where he was before surgery.

## 2024-10-17 NOTE — TELEPHONE ENCOUNTER
Pt reports he started steroids on the 10th for shoulder surgery. BS-Readings: range from 118 to 195, when pt eats they go up little higher. Pt reports he was supposed to call in to report to PCP of his recent readings.  PCP please further advise.

## 2024-10-18 ENCOUNTER — OFFICE VISIT (OUTPATIENT)
Dept: OBGYN CLINIC | Facility: CLINIC | Age: 59
End: 2024-10-18

## 2024-10-18 VITALS
HEART RATE: 65 BPM | WEIGHT: 220 LBS | BODY MASS INDEX: 29.16 KG/M2 | DIASTOLIC BLOOD PRESSURE: 79 MMHG | HEIGHT: 73 IN | OXYGEN SATURATION: 96 % | SYSTOLIC BLOOD PRESSURE: 125 MMHG

## 2024-10-18 DIAGNOSIS — Z98.890 S/P ARTHROSCOPY OF LEFT SHOULDER: Primary | ICD-10-CM

## 2024-10-18 DIAGNOSIS — Z48.89 AFTERCARE FOLLOWING SURGERY: ICD-10-CM

## 2024-10-18 PROCEDURE — 99024 POSTOP FOLLOW-UP VISIT: CPT | Performed by: ORTHOPAEDIC SURGERY

## 2024-10-18 NOTE — PROGRESS NOTES
Patient Name:  Chavez Newell  MRN:  [id number]    Assessment & Plan     1. S/P arthroscopy of left shoulder  2. Aftercare following surgery    Approximately 8 day s/p Left shoulder arthroscopic rotator cuff repair, acromioplasty performed on 10/10/2024  Overall, patient doing very well s/p surgical intervention  Maintain nonweightbearing status in the sling for 6 weeks. Encouraged patient to continue the sling before bed and while sleeping. Recommended sleeping upright in recliner for comfort. Demonstrated home exercises  Start outpatient PT and will be performing PROM as per protocol  Continue OTC medication for pain relief. If patient's pain persists or worsens, can call office for further discussion of pain regimen in coordination with Pain management.   Follow up 4 weeks for reevaluation     History of the Present Illness   Chavez Newell is a 59 y.o. male approximately 8 day s/p Left shoulder arthroscopic rotator cuff repair, acromioplasty performed on 10/10/2024. Today, patient reports he reported to the ED the night of surgery secondary to high blood glucose from holding his insulin and shortness of breath. Patient admits ED provided him with prednisone for diagnosis of COPD exacerbation. He had follow up with PCP 10/14/2024 and prednisone was discontinued. Overall, patient doing well from surgery. He has not been taking many pain medications. He was in contact with Pain management stating they will monitor pain regimen after 6 weeks post operatively. Patient administers Warfarin from previous DVT.         Review of Systems     Review of Systems   Constitutional:  Negative for chills and fever.   HENT:  Negative for ear pain and sore throat.    Eyes:  Negative for pain and visual disturbance.   Respiratory:  Negative for cough and shortness of breath.    Cardiovascular:  Negative for chest pain and palpitations.   Gastrointestinal:  Negative for abdominal pain and vomiting.   Genitourinary:  Negative for  "dysuria and hematuria.   Musculoskeletal:  Negative for arthralgias and back pain.   Skin:  Negative for color change and rash.   Neurological:  Negative for seizures and syncope.   All other systems reviewed and are negative.      Physical Exam     /79   Pulse 65   Ht 6' 1\" (1.854 m)   Wt 99.8 kg (220 lb)   SpO2 96%   BMI 29.03 kg/m²     Left Shoulder:   Surgical incisions well healing with nylon suture intact  Elbow ROM 10 to approximately 120 degrees  Full composite fist  The patient is neurovascularly intact distally in the extremity.      Data Review     I have personally reviewed pertinent films in PACS, and my interpretation follows.    None    Social History     Tobacco Use    Smoking status: Some Days     Types: Cigars     Passive exposure: Never    Smokeless tobacco: Never    Tobacco comments:     never inhaled-smokes cigars every now and then   Vaping Use    Vaping status: Never Used   Substance Use Topics    Alcohol use: Not Currently     Comment: occasional beer    Drug use: No           Procedures  None     Junior Rivera, DO     "

## 2024-10-24 ENCOUNTER — OFFICE VISIT (OUTPATIENT)
Dept: PHYSICAL THERAPY | Age: 59
End: 2024-10-24
Payer: MEDICARE

## 2024-10-24 DIAGNOSIS — R20.0 NUMBNESS AND TINGLING IN LEFT ARM: ICD-10-CM

## 2024-10-24 DIAGNOSIS — Z98.890 S/P ROTATOR CUFF REPAIR: ICD-10-CM

## 2024-10-24 DIAGNOSIS — M75.102 TEAR OF LEFT SUPRASPINATUS TENDON: Primary | ICD-10-CM

## 2024-10-24 DIAGNOSIS — Z98.890 S/P ARTHROSCOPY OF LEFT SHOULDER: ICD-10-CM

## 2024-10-24 DIAGNOSIS — R20.2 NUMBNESS AND TINGLING IN LEFT ARM: ICD-10-CM

## 2024-10-24 PROCEDURE — 97164 PT RE-EVAL EST PLAN CARE: CPT | Performed by: PHYSICAL THERAPIST

## 2024-10-24 PROCEDURE — 97140 MANUAL THERAPY 1/> REGIONS: CPT | Performed by: PHYSICAL THERAPIST

## 2024-10-24 PROCEDURE — 97110 THERAPEUTIC EXERCISES: CPT | Performed by: PHYSICAL THERAPIST

## 2024-10-24 RX ORDER — OXYCODONE AND ACETAMINOPHEN 10; 325 MG/1; MG/1
1 TABLET ORAL EVERY 4 HOURS PRN
Qty: 20 TABLET | Refills: 0 | Status: CANCELLED | OUTPATIENT
Start: 2024-10-24

## 2024-10-24 NOTE — PROGRESS NOTES
PT Re-Evaluation     Today's date: 10/24/2024  Patient name: Chavez Newell  : 1965  MRN: [id number]  Referring provider: Iman Sepulveda CRNP  Dx:   Encounter Diagnosis     ICD-10-CM    1. Tear of left supraspinatus tendon  M75.102       2. S/P rotator cuff repair  Z98.890                      Assessment  Impairments: abnormal or restricted ROM, impaired physical strength, lacks appropriate home exercise program and pain with function  Symptom irritability: moderate    Assessment details: Chavez is a 59-year-old male with concerns of left rotator cuff tear of supraspinatus.   s/p Left shoulder arthroscopic rotator cuff repair, acromioplasty performed on 10/10/2024. Presents with expected post op impairments as outlined. Patient could benefit from a course of PT to address impairments and functional limitations. Progression of program per post surgical protocol. Reviewed post surgical protocol and precautions regarding healing repair. HEP of pendulum and distal UE ROM reviewed.     Goals  Short-term goals  - 4 weeks  1.  Independent in an ongoing home exercise program  2. PROM flexion 90, ABD 70 and Er 30  3. Pain levels 3/10 or less at most    LTG 6 weeks  1. PROM , ABD 90, ER 45, IR 35  2. Advancement to Phase II of protocol at 6 weeks  3. Revise goals at 6 weeks      Plan  Patient would benefit from: skilled physical therapy    Planned therapy interventions: therapeutic activities, therapeutic exercise, home exercise program, motor coordination training, neuromuscular re-education, activity modification and manual therapy    Frequency: 1-2x week  Plan of Care beginning date: 10/24/2024  Plan of Care expiration date: 2025  Treatment plan discussed with: patient      Subjective Evaluation    History of Present Illness  Mechanism of injury:  s/p Left shoulder arthroscopic rotator cuff repair, acromioplasty performed on 10/10/2024.   To ED that night due to elevated blood sugar. CT positive for  pneumonia left lung. Placed on medication.  No sleep for two days Follow up with family MD and meds stopped. Notes it took a week to get sugar levels to normal.     First post op PT visit. Variable pain.  Notes taking Tylenol generally for pain until yesterday took pain pill due to elevated pain levels.    Sleeping in recliner. Notes feels better in upright versus reclined position.         Right hand dominant.        Patient Goals  Patient goals for therapy: decreased pain, increased motion, increased strength, independence with ADLs/IADLs and return to sport/leisure activities    Pain  Current pain rating: 3  At best pain rating: 3  At worst pain ratin  Location: anterior and superior shoulder into bicep region  Quality: sharp, dull ache and tight  Relieving factors: ice, medications and rest  Aggravating factors: sitting and nothing (reclined position)    Hand dominance: right      Diagnostic Tests  X-ray: abnormal (foraminal narrowing C5-6, C6-7, left neural foraminal narrowing C4-5, loss of disc height C4-5, C5-6, C6-7)  MRI studies: abnormal (full thickness insertion tear of supraspinatus)      Objective     Postural Observations  Seated posture: fair  Standing posture: fair    Additional Postural Observation Details  Forward head with anterior shoulder rounding  Guarded    Observations   Left Shoulder   Positive for incision.     Additional Observation Details  Steri-strips intact  No active drainage  Edema posterior arm  Fading ecchymosis upper arm    Cervical/Thoracic Screen   Cervical range of motion within normal limits with the following exceptions: Limited left cervical rotation and left sidebending by 25% compared to the right    Neurological Testing     Sensation     Shoulder   Left Shoulder   Intact: light touch  Paresthesia: light touch    Right Shoulder   Intact: Light touch    Passive Range of Motion   Left Shoulder   Flexion: 60 degrees   Abduction: 45 degrees   External rotation 0°: 3  degrees     Right Shoulder   Flexion: 160 degrees   External rotation 90°: 70 degrees   Internal rotation 90°: 60 degrees     Additional Passive Range of Motion Details  Left elbow  5-120 ROM  Supination 65  Hand and wrist WNLs    Strength/Myotome Testing     Right Shoulder   Normal muscle strength    Additional Strength Details  No strength testing left due to post surgical contraindication             Precautions: PROTOCOL, sling for 6 weeks DM, COPD    DOS 10/10/24  Manuals 10/23            PROM left shoulder 15                                                   Neuro Re-Ed                                                                                                        Ther Ex             pendulum 5            Elbow AAROM 3            gripping 2                                                                             Ther Activity                                       Gait Training                                       Modalities             CP post 10'

## 2024-10-24 NOTE — TELEPHONE ENCOUNTER
Reason for call: Patient state he recently had shoulder surgery and would like a refill if possible of his pain medication    [x] Refill   [] Prior Auth  [] Other:     Office:   [] PCP/Provider -   [x] Specialty/Provider - Ortho Dr. Rivera     Medication: oxycodone     Dose/Frequency: 10-325mg Q4H PRN     Quantity: 30D    Pharmacy: [facility]    Does the patient have enough for 3 days?   [] Yes   [x] No - Send as HP to POD

## 2024-10-25 NOTE — PROGRESS NOTES
Daily Note     Today's date: 10/28/2024  Patient name: Chavez Newell  : 1965  MRN: [id number]  Referring provider: Iman Sepulveda CRNP  Dx:   Encounter Diagnosis     ICD-10-CM    1. Tear of left supraspinatus tendon  M75.102       2. S/P rotator cuff repair  Z98.890           Start Time: 1030  Stop Time: 1110  Total time in clinic (min): 40 minutes    Subjective: Patient notes he is having a very difficult time sleeping. Using recliner to sleep. Notes getting out of the sling 3 times per day to perform exercises and for hygiene.       Objective: See treatment diary below      Assessment: Tolerated treatment well. Patient would benefit from continued PT PROM progressing per protocol.      Plan: Continue per plan of care.      Precautions: PROTOCOL, sling for 6 weeks DM, COPD    DOS 10/10/24  Manuals 10/23 10/28           PROM left shoulder 15 20           FF  90           ABD  70           ER  5           Neuro Re-Ed                                       Ther Ex             pendulum 5 5           Elbow AAROM 3 3           gripping 2 Red  35x           Scap pinches  10x                                                               Ther Activity                                       Gait Training                                       Modalities             CP post 10' 10'

## 2024-10-25 NOTE — TELEPHONE ENCOUNTER
Patient called the RX Refill Line. Message is being forwarded to the office.     Patient called to request the status refill for their oxyCODONE-acetaminophen (Percocet)  mg per tablet  advised a refill was requested on 10/24/24 and is pending approval. Patient Stated that he only has one tablet left and is concerned because the weekend is here. Please review. Please contact the patient if this refill will not be sent     Please contact patient at [phone]

## 2024-10-28 ENCOUNTER — OFFICE VISIT (OUTPATIENT)
Dept: PHYSICAL THERAPY | Age: 59
End: 2024-10-28
Payer: MEDICARE

## 2024-10-28 DIAGNOSIS — Z98.890 S/P ARTHROSCOPY OF LEFT SHOULDER: Primary | ICD-10-CM

## 2024-10-28 DIAGNOSIS — Z98.890 S/P ROTATOR CUFF REPAIR: ICD-10-CM

## 2024-10-28 DIAGNOSIS — M75.102 TEAR OF LEFT SUPRASPINATUS TENDON: Primary | ICD-10-CM

## 2024-10-28 PROCEDURE — 97110 THERAPEUTIC EXERCISES: CPT | Performed by: PHYSICAL THERAPIST

## 2024-10-28 PROCEDURE — 97140 MANUAL THERAPY 1/> REGIONS: CPT | Performed by: PHYSICAL THERAPIST

## 2024-10-28 RX ORDER — OXYCODONE AND ACETAMINOPHEN 5; 325 MG/1; MG/1
1 TABLET ORAL EVERY 8 HOURS PRN
Qty: 20 TABLET | Refills: 0 | Status: SHIPPED | OUTPATIENT
Start: 2024-10-28

## 2024-10-28 NOTE — PROGRESS NOTES
Daily Note     Today's date: 10/30/2024  Patient name: Chavez Newell  : 1965  MRN: [id number]  Referring provider: Iman Sepulveda CRNP  Dx:   Encounter Diagnosis     ICD-10-CM    1. S/P rotator cuff repair  Z98.890       2. Tear of left supraspinatus tendon  M75.102           Start Time: 0530  Stop Time: 06  Total time in clinic (min): 48 minutes    Subjective: 1/10 at time of treatment. Slept better last night.  Continues to sleep in a recliner. Notes stiffness in antecubital space with occasional tingling in left hand.       Objective: See treatment diary below      Assessment: Tolerated treatment well. Patient would benefit from continued PT. Less guarded to pendulum. Slow gains PROM within protocol parameters.       Plan: Continue per plan of care.  Progress treament per protocol.      Precautions: PROTOCOL, sling for 6 weeks DM, COPD    DOS 10/10/24  Manuals 10/23 10/28 10/30          PROM left shoulder 15 20 20          FF  90 90          ABD  70 70          ER  5 10          Neuro Re-Ed                                       Ther Ex             pendulum 5 5 8          Elbow AAROM 3 3 3          gripping 2 Red  35x Red  35x          Scap pinches  10x 20x                                                              Ther Activity                                       Gait Training                                       Modalities             CP post 10' 10' 10'

## 2024-10-28 NOTE — TELEPHONE ENCOUNTER
Patient called to check the status of the refill. Patient made aware that it was waiting for approval. Patient states that the Oxycodone was going to be reduced to 5mg.   [facility]

## 2024-10-30 ENCOUNTER — OFFICE VISIT (OUTPATIENT)
Dept: PHYSICAL THERAPY | Age: 59
End: 2024-10-30
Payer: MEDICARE

## 2024-10-30 DIAGNOSIS — M75.102 TEAR OF LEFT SUPRASPINATUS TENDON: ICD-10-CM

## 2024-10-30 DIAGNOSIS — Z98.890 S/P ROTATOR CUFF REPAIR: Primary | ICD-10-CM

## 2024-10-30 PROCEDURE — 97110 THERAPEUTIC EXERCISES: CPT | Performed by: PHYSICAL THERAPIST

## 2024-10-30 PROCEDURE — 97140 MANUAL THERAPY 1/> REGIONS: CPT | Performed by: PHYSICAL THERAPIST

## 2024-11-02 NOTE — PROGRESS NOTES
Daily Note     Today's date: 2024  Patient name: Chavez Newell  : 1965  MRN: [id number]  Referring provider: Iman Sepulveda CRNP  Dx:   Encounter Diagnosis     ICD-10-CM    1. S/P rotator cuff repair  Z98.890       2. Tear of left supraspinatus tendon  M75.102                      Subjective:  Notes sleeping a little better depending on the day.  Last night had a rough night sleeping. Taking pain medication as needed for pain. Pain location varies from top of shoulder to anterior upper arm to forearm with occasional numbness in hand. Taking sling off at times when resting.      Objective: See treatment diary below      Assessment: Tolerated treatment well. Patient would benefit from continued PT PROM improving per protocol. Some initial stiffness this AM that eased with ROM.       Plan: Continue per plan of care.  Progress treament per protocol.      Precautions: PROTOCOL, sling for 6 weeks DM, COPD    DOS 10/10/24  Manuals 10/23 10/28 10/30 11/         PROM left shoulder 15 20 20 20         FF  90 90 90         ABD  70 70 70         ER  5 10 15         Neuro Re-Ed                                       Ther Ex             pendulum 5 5 8 8         Elbow AAROM 3 3 3 3         gripping 2 Red  35x Red  35x RED   40x          Scap pinches  10x 20x 45x                                                             Ther Activity                                       Gait Training                                       Modalities             CP post 10' 10' 10' AH

## 2024-11-04 ENCOUNTER — OFFICE VISIT (OUTPATIENT)
Dept: PHYSICAL THERAPY | Age: 59
End: 2024-11-04
Payer: MEDICARE

## 2024-11-04 DIAGNOSIS — M75.102 TEAR OF LEFT SUPRASPINATUS TENDON: ICD-10-CM

## 2024-11-04 DIAGNOSIS — Z98.890 S/P ROTATOR CUFF REPAIR: Primary | ICD-10-CM

## 2024-11-04 PROCEDURE — 97110 THERAPEUTIC EXERCISES: CPT | Performed by: PHYSICAL THERAPIST

## 2024-11-04 PROCEDURE — 97140 MANUAL THERAPY 1/> REGIONS: CPT | Performed by: PHYSICAL THERAPIST

## 2024-11-05 NOTE — PROGRESS NOTES
Daily Note     Today's date: 2024  Patient name: Chavez Newell  : 1965  MRN: [id number]  Referring provider: Iman Sepulveda CRNP  Dx:   Encounter Diagnosis     ICD-10-CM    1. Tear of left supraspinatus tendon  M75.102       2. S/P rotator cuff repair  Z98.890           Start Time: 1130  Stop Time: 1215  Total time in clinic (min): 45 minutes    Subjective:  patient notes he slept better the past two nights. Awoke with stiffness today. Using ice for pain management with reduced use  of pain medication. Noted left wrist discomfort with gripper today. Eased post use.      Objective: See treatment diary below      Assessment: Tolerated treatment well. Patient would benefit from continued PT ROM within protocol parameters. Less guarded.       Plan: Continue per plan of care.      Precautions: PROTOCOL, sling for 6 weeks DM, COPD    DOS 10/10/24  Manuals 10/23 10/28 10/30 11/        PROM left shoulder 15 20 20 20 20        FF  90 90 90 90        ABD  70 70 70 70        ER  5 10 15 25        Neuro Re-Ed                                       Ther Ex             pendulum 5 5 8 8 8        Elbow AAROM 3 3 3 3 3        gripping 2 Red  35x Red  35x RED   40x  40x        Scap pinches  10x 20x 45x 40x                                                            Ther Activity                                       Gait Training                                       Modalities             CP post 10' 10' 10' AH

## 2024-11-06 ENCOUNTER — OFFICE VISIT (OUTPATIENT)
Dept: PHYSICAL THERAPY | Age: 59
End: 2024-11-06
Payer: MEDICARE

## 2024-11-06 DIAGNOSIS — M75.102 TEAR OF LEFT SUPRASPINATUS TENDON: Primary | ICD-10-CM

## 2024-11-06 DIAGNOSIS — Z98.890 S/P ROTATOR CUFF REPAIR: ICD-10-CM

## 2024-11-06 PROCEDURE — 97110 THERAPEUTIC EXERCISES: CPT | Performed by: PHYSICAL THERAPIST

## 2024-11-06 PROCEDURE — 97140 MANUAL THERAPY 1/> REGIONS: CPT | Performed by: PHYSICAL THERAPIST

## 2024-11-09 DIAGNOSIS — R20.2 NUMBNESS AND TINGLING IN LEFT ARM: ICD-10-CM

## 2024-11-09 DIAGNOSIS — R20.0 NUMBNESS AND TINGLING IN LEFT ARM: ICD-10-CM

## 2024-11-10 DIAGNOSIS — R20.2 NUMBNESS AND TINGLING IN LEFT ARM: ICD-10-CM

## 2024-11-10 DIAGNOSIS — R20.0 NUMBNESS AND TINGLING IN LEFT ARM: ICD-10-CM

## 2024-11-10 NOTE — PROGRESS NOTES
Daily Note     Today's date: 2024  Patient name: Chavez Newell  : 1965  MRN: [id number]  Referring provider: Iman Sepulveda CRNP  Dx:   Encounter Diagnosis     ICD-10-CM    1. Tear of left supraspinatus tendon  M75.102       2. S/P rotator cuff repair  Z98.890                      Subjective: Notes had to take a pain pill on Friday in the middle of the night. Notes index finger on left hand painful and numb. Better today. Notes shoulder/arm feel sore.       Objective: See treatment diary below      Assessment: Tolerated treatment well. Patient would benefit from continued PT  ROM per protocol. Advance to 5 week protocol next visit. Cautioned regarding AROM as the shoulder starts feeling better. Reviewed donning coat and UE dressing.       Plan: Continue per plan of care.      Precautions: PROTOCOL, sling for 6 weeks DM, COPD    DOS 10/10/24  Manuals 10/23 10/28 10/30 11/4 11/6 11/11       PROM left shoulder 15 20 20 20 20 20       FF  90 90 90 90 90       ABD  70 70 70 70 70       ER  5 10 15 25 30       Neuro Re-Ed                                       Ther Ex             pendulum 5 5 8 8 8 8       Elbow AAROM 3 3 3 3 3 3       gripping 2 Red  35x Red  35x RED   40x  40x 40x       Scap pinches  10x 20x 45x 40x 10x       Scap rolls      15x                                              Ther Activity                                       Gait Training                                       Modalities             CP post 10' 10' 10' AH

## 2024-11-11 ENCOUNTER — OFFICE VISIT (OUTPATIENT)
Dept: PHYSICAL THERAPY | Age: 59
End: 2024-11-11
Payer: MEDICARE

## 2024-11-11 DIAGNOSIS — M75.102 TEAR OF LEFT SUPRASPINATUS TENDON: Primary | ICD-10-CM

## 2024-11-11 DIAGNOSIS — Z98.890 S/P ROTATOR CUFF REPAIR: ICD-10-CM

## 2024-11-11 PROCEDURE — 97110 THERAPEUTIC EXERCISES: CPT | Performed by: PHYSICAL THERAPIST

## 2024-11-11 PROCEDURE — 97140 MANUAL THERAPY 1/> REGIONS: CPT | Performed by: PHYSICAL THERAPIST

## 2024-11-11 RX ORDER — METHOCARBAMOL 500 MG/1
500 TABLET, FILM COATED ORAL 2 TIMES DAILY PRN
Qty: 60 TABLET | Refills: 1 | Status: SHIPPED | OUTPATIENT
Start: 2024-11-11

## 2024-11-12 RX ORDER — GABAPENTIN 300 MG/1
300 CAPSULE ORAL
Qty: 30 CAPSULE | Refills: 5 | Status: SHIPPED | OUTPATIENT
Start: 2024-11-12

## 2024-11-12 NOTE — PROGRESS NOTES
Daily Note     Today's date: 2024  Patient name: Chavez Newell  : 1965  MRN: [id number]  Referring provider: Iman Sepulveda CRNP  Dx:   Encounter Diagnosis     ICD-10-CM    1. S/P rotator cuff repair  Z98.890       2. Tear of left supraspinatus tendon  M75.102           Start Time: 1130  Stop Time: 1212  Total time in clinic (min): 42 minutes    Subjective: 1/10 shoulder discomfort into anterior lateral arm. Occasional numbness in digits left hand. Looking forward to getting the slin rita and being able to do more. Slept better the past two nights.       Objective: See treatment diary below      Assessment: Tolerated treatment well. Patient would benefit from continued PT Patient progressing per post surgical protocol.  [place] PROM within protocol parameters.       Plan: Continue per plan of care.  Patient to see MD Friday. (5 weeks post op tomorrow)     Precautions: PROTOCOL, sling for 6 weeks DM, COPD    DOS 10/10/24  Manuals 10/23 10/28 10/30 11/4 11/6 11/11 11/13      PROM left shoulder 15 20 20 20 20 20 20      FF  90 90 90 90 90 90      ABD  70 70 70 70 70 70      ER  5 10 15 25 30 30      Neuro Re-Ed                                       Ther Ex             pendulum 5 5 8 8 8 8 8      Elbow AAROM 3 3 3 3 3 3 3      gripping 2 Red  35x Red  35x RED   40x  40x 40x 40x      Scap pinches  10x 20x 45x 40x 10x 10x      Scap rolls      15x 15x                                             Ther Activity                                       Gait Training                                       Modalities             CP post 10' 10' 10'

## 2024-11-13 ENCOUNTER — OFFICE VISIT (OUTPATIENT)
Dept: PHYSICAL THERAPY | Age: 59
End: 2024-11-13
Payer: MEDICARE

## 2024-11-13 DIAGNOSIS — Z98.890 S/P ROTATOR CUFF REPAIR: Primary | ICD-10-CM

## 2024-11-13 DIAGNOSIS — M75.102 TEAR OF LEFT SUPRASPINATUS TENDON: ICD-10-CM

## 2024-11-13 PROCEDURE — 97110 THERAPEUTIC EXERCISES: CPT | Performed by: PHYSICAL THERAPIST

## 2024-11-13 PROCEDURE — 97140 MANUAL THERAPY 1/> REGIONS: CPT | Performed by: PHYSICAL THERAPIST

## 2024-11-14 DIAGNOSIS — E78.2 MIXED HYPERLIPIDEMIA: ICD-10-CM

## 2024-11-14 RX ORDER — ROSUVASTATIN CALCIUM 5 MG/1
5 TABLET, COATED ORAL DAILY
Qty: 90 TABLET | Refills: 1 | Status: SHIPPED | OUTPATIENT
Start: 2024-11-14

## 2024-11-15 ENCOUNTER — OFFICE VISIT (OUTPATIENT)
Dept: OBGYN CLINIC | Facility: CLINIC | Age: 59
End: 2024-11-15

## 2024-11-15 VITALS
WEIGHT: 223.4 LBS | BODY MASS INDEX: 29.61 KG/M2 | SYSTOLIC BLOOD PRESSURE: 122 MMHG | HEART RATE: 77 BPM | HEIGHT: 73 IN | DIASTOLIC BLOOD PRESSURE: 75 MMHG

## 2024-11-15 DIAGNOSIS — I82.411 ACUTE DEEP VEIN THROMBOSIS (DVT) OF FEMORAL VEIN OF RIGHT LOWER EXTREMITY (HCC): ICD-10-CM

## 2024-11-15 DIAGNOSIS — Z98.890 S/P ARTHROSCOPY OF LEFT SHOULDER: Primary | ICD-10-CM

## 2024-11-15 DIAGNOSIS — Z79.01 ANTICOAGULANT LONG-TERM USE: ICD-10-CM

## 2024-11-15 DIAGNOSIS — Z86.718 HISTORY OF DVT (DEEP VEIN THROMBOSIS): Primary | ICD-10-CM

## 2024-11-15 PROCEDURE — 99024 POSTOP FOLLOW-UP VISIT: CPT | Performed by: ORTHOPAEDIC SURGERY

## 2024-11-15 RX ORDER — WARFARIN SODIUM 2 MG/1
TABLET ORAL
Qty: 90 TABLET | Refills: 1 | Status: SHIPPED | OUTPATIENT
Start: 2024-11-15

## 2024-11-15 RX ORDER — WARFARIN SODIUM 2 MG/1
TABLET ORAL
Qty: 30 TABLET | Refills: 0 | Status: SHIPPED | OUTPATIENT
Start: 2024-11-15 | End: 2024-11-15 | Stop reason: SDUPTHER

## 2024-11-15 NOTE — PROGRESS NOTES
Patient Name:  Chavez Newell  MRN:  [id number]    Assessment & Plan     1. S/P arthroscopy of left shoulder      Approximately 5 weeks s/p Left shoulder arthroscopic rotator cuff repair, acromioplasty performed on 10/10/2024  Patient is doing well overall with mild soreness. Possible left carpal tunnel syndrome.   Patient to be non-weightbearing to LUE (Left Upper Extremity)  Patient to continue to progress ROM with physical therapy per protocol  Discussed with patient he is to continue with sling for an additional week. He may begin to take it off while sedentary at home.   Patient to continue with physical therapy as prescribed.   Patient to continue at home analgesic regimen with Tylenol   Patient to follow up in 6 weeks for re-evaluation.        History of the Present Illness   Chavez Newell is a 59 y.o. male approximately 5 weeks s/p Left shoulder arthroscopic rotator cuff repair, acromioplasty performed on 10/10/2024. The patient has soreness present daily. The soreness waxes and wanes. The patient continues to wear his sling daily. He is attending physical therapy twice per week and is making progress. He has occasional numbness and tingling into all fingers. He notes the index finger was numb the other day. This was not present pre surgically. He does have a history of carpal tunnel release but does not recall the laterality.  He is using percocet and Tylenol as needed for symptom management.       Review of Systems     Review of Systems   Constitutional:  Negative for chills and fever.   HENT:  Negative for ear pain and sore throat.    Eyes:  Negative for pain and visual disturbance.   Respiratory:  Negative for cough and shortness of breath.    Cardiovascular:  Negative for chest pain and palpitations.   Gastrointestinal:  Negative for abdominal pain and vomiting.   Genitourinary:  Negative for dysuria and hematuria.   Musculoskeletal:  Negative for arthralgias and back pain.   Skin:  Negative for color  "change and rash.   Neurological:  Negative for seizures and syncope.   All other systems reviewed and are negative.      Physical Exam     /75   Pulse 77   Ht 6' 1\" (1.854 m)   Wt 101 kg (223 lb 6.4 oz)   BMI 29.47 kg/m²     Left Shoulder:   Surgical incisions well healed without signs of infection, drainage or erythema  Active range of motion at elbow WNL  Active assisted range of motion  90 degrees of forward flexion  Passive range of motion   100 degrees of forward flexion   Tinel's is negative at carpal and cubital tunnel  Reverse Phalen's is negative  Carpal tunnel compression is negative  Sensation is intact  The patient is neurovascularly intact distally in the extremity.        Data Review     I have personally reviewed pertinent films in PACS, and my interpretation follows.    None    Social History     Tobacco Use    Smoking status: Some Days     Types: Cigars     Passive exposure: Never    Smokeless tobacco: Never    Tobacco comments:     never inhaled-smokes cigars every now and then   Vaping Use    Vaping status: Never Used   Substance Use Topics    Alcohol use: Not Currently     Comment: occasional beer    Drug use: No           Procedures  None performed    Piper Villagomez   Scribe Attestation      I,:  Piper Villagomez am acting as a scribe while in the presence of the attending physician.:       I,:  Junior Rivera, DO personally performed the services described in this documentation    as scribed in my presence.:             "

## 2024-11-15 NOTE — PROGRESS NOTES
Daily Note     Today's date: 2024  Patient name: Chavez Newell  : 1965  MRN: [id number]  Referring provider: Iman Sepulveda CRNP  Dx:   Encounter Diagnosis     ICD-10-CM    1. S/P rotator cuff repair  Z98.890       2. Tear of left supraspinatus tendon  M75.102                      Subjective: Saw MD last week. Discharge sling at 6 weeks. Notes shoulder discomfort around 2/10. Taking pain medication as needed. Presents to PT today without sling. Still sleeping in a recliner - unable to sleep in bed.      Objective: See treatment diary below      Assessment: Tolerated treatment well. Patient would benefit from continued PT TE additions as noted with good tolerance. Advanced PROM per protocol. Guards ER. Responds well to gentle mobs.      Plan: Continue per plan of care.  Progress treament per protocol.      Precautions: PROTOCOL, sling for 6 weeks DM, COPD    DOS 10/10/24  Manuals 10/23 10/28 10/30 11/4 11/6 11/11 11/13 11/18     PROM left shoulder 15 20 20 20 20 20 20 20     FF  90 90 90 90 90 90 110     ABD  70 70 70 70 70 70 90     ER @ 45 abduction  5 10 15 25 30 30 30     Neuro Re-Ed                                       Ther Ex             pendulum 5 5 8 8 8 8 8 8     Elbow AAROM 3 3 3 3 3 3 3      gripping 2 Red  35x Red  35x RED   40x  40x 40x 40x 40x     Scap pinches  10x 20x 45x 40x 10x 10x 10x     Scap rolls      15x 15x 10x     Pulleys scaption        3'     Ball table slides/ CW/CCW        20x  each                  Ther Activity                                       Gait Training                                       Modalities             CP post 10' 10' 10'

## 2024-11-15 NOTE — TELEPHONE ENCOUNTER
Patient needs updated blood work. Please place orders. A courtesy refill was provided.       Pt needs INR

## 2024-11-18 ENCOUNTER — OFFICE VISIT (OUTPATIENT)
Dept: PHYSICAL THERAPY | Age: 59
End: 2024-11-18
Payer: MEDICARE

## 2024-11-18 DIAGNOSIS — Z98.890 S/P ROTATOR CUFF REPAIR: Primary | ICD-10-CM

## 2024-11-18 DIAGNOSIS — M75.102 TEAR OF LEFT SUPRASPINATUS TENDON: ICD-10-CM

## 2024-11-18 PROCEDURE — 97110 THERAPEUTIC EXERCISES: CPT | Performed by: PHYSICAL THERAPIST

## 2024-11-18 PROCEDURE — 97140 MANUAL THERAPY 1/> REGIONS: CPT | Performed by: PHYSICAL THERAPIST

## 2024-11-18 NOTE — PROGRESS NOTES
Daily Note     Today's date: 2024  Patient name: Chavez Newell  : 1965  MRN: [id number]  Referring provider: Iman Sepulveda CRNP  Dx:   Encounter Diagnosis     ICD-10-CM    1. S/P rotator cuff repair  Z98.890       2. Tear of left supraspinatus tendon  M75.102                      Subjective:  Notes 2/10 general soreness left shoulder upon arrival. Notes increased soreness post PT on Monday that lasted through the night. Better by the next day.      Objective: See treatment diary below      Assessment: Tolerated treatment well. Patient would benefit from continued PT Increased exercises today per protocol with fairly good tolerance. Improved PROM all planes. Verbal cues for relaxation to allow for PROM.      Plan: Continue per plan of care.  D/C sling     POC expires: 25  Date             Visit count 10            FOTO   RE  Eval                  Precautions: PROTOCOL, sling for 6 weeks DM, COPD    DOS 10/10/24  Manuals 10/23 10/28 10/30 11/4 11/6 11/11 11/13 11/18 11/20    PROM left shoulder 15 20 20 20 20 20 20 20 20    FF  90 90 90 90 90 90 110 120    ABD  70 70 70 70 70 70 90 90    ER @ 45 abduction  5 10 15 25 30 30 30 35    Neuro Re-Ed                                       Ther Ex             UBE         3'/2'    pendulum 5 5 8 8 8 8 8 8 5'    Elbow AAROM 3 3 3 3 3 3 3  AH    gripping 2 Red  35x Red  35x RED   40x  40x 40x 40x 40x     Scap pinches  10x 20x 45x 40x 10x 10x 10x     Scap rolls      15x 15x 10x     Pulleys scaption        3' 5'    Ball table slides/ CW/CCW        20x  each 30x                 Ther Activity                                       Gait Training                                       Modalities             CP post 10' 10' 10' AH

## 2024-11-18 NOTE — PROGRESS NOTES
Pain Medicine Follow-Up Note    Assessment:  1. Chronic pain syndrome    2. Lumbar spondylosis    3. S/P arthroscopy of left shoulder    4. Long-term current use of opiate analgesic    5. Opioid dependence, uncomplicated (HCC)        Plan:  Orders Placed This Encounter   Procedures    MM ALL_Prescribed Meds and Special Instructions     [facility] Is GABAPENTIN prescribed?:   Yes     [facility] Is METHOCARBAMOL prescribed?:   Yes     [facility] Is NALOXONE Prescribed?:   Yes     [facility] Is OXYCODONE/APAP prescribed?:   Yes     [facility] Is OXYCODONE prescribed?:   Yes     [facility] Is TRAMADOL prescribed?:   Yes    MM DT_Alprazolam Definitive Test    MM DT_Amphetamine Definitive Test    MM DT_Buprenorphine Definitive Test    MM DT_Butalbital Definitive Test    MM DT_Clonazepam Definitive Test    MM DT_Cocaine Definitive Test    MM DT_Codeine Definitive Test    MM DT_Dextromethorphan Definitive Test    MM Diazepam Definitive Test    MM DT_Ethyl Glucuronide/Ethyl Sulfate Definitive Test    MM DT_Fentanyl Definitive Test    MM DT_Heroin Definitive Test    MM DT_Hydrocodone Definitive Test    MM DT_Hydromorphone Definitive Test    MM DT_Kratom Definitive Test    MM DT_Levorphanol Definitive Test    MM DT_MDMA Definitive Test    MM DT_Meperidine Definitive Test    MM DT_Methadone Definitive Test    MM DT_Methamphetamine Definitive Test    MM DT_Methylphenidate Definitive Test    MM DT_Morphine Definitive Test    MM Lorazepam Definitive Test    MM DT_Oxazepam Definitive Test    MM DT_Oxycodone Definitive Test    MM DT_Oxymorphone Definitive Test    MM DT_Phencyclidine Definitive Test    MM DT_Phenobarbital Definitive Test    MM DT_Phentermine Definitive Test    MM DT_Secobarbital Definitive Test    MM DT_Spice Definitive Test    MM DT_Tapentadol Definitive Test    MM DT_Temazapam Definitive Test    MM DT_THC Definitive Test    MM DT_Tramadol Definitive Test    MM DT_Validity Creatinine       New Medications Ordered  This Visit   Medications    traMADol (Ultram) 50 mg tablet     Sig: May take 2 tablets (100 mg total) by mouth daily as needed for moderate pain. May also take 1 tablet (50 mg total) every 8 (eight) hours as needed for moderate pain. Max 5 tablets per day.     Dispense:  150 tablet     Refill:  2     Fill today and refill on or about 12/20/2024 and 1/19/2024       My impressions and treatment recommendations were discussed in detail with the patient who verbalized understanding and had no further questions.      Patient returns the office after having an arthroscopy of his left shoulder with Dr. Rivera on 10/10/2024.  Patient is reporting significant pain relief following the surgery though he states he has a long road ahead of him with physical therapy and still has limited range of motion.  Patient states that the oxycodone/acetaminophen 5/325 mg tablet that he has been prescribed through Dr. Rivera for the shoulder surgery was helpful, but at this time he feels that the tramadol was more effective and is wishing to rotate back onto his chronic pain medication.  Patient was reporting improvement of functioning and of reduction of pain without significant side effects using tramadol 50 mg tablet patient was taking 2 tablets in the morning as needed for moderate pain and then taking 1 tablet every 8 hours as needed for moderate pain for a total of 5 tablets in a 24-hour period.  Tramadol 50 mg tablet e-prescribed to the patient's pharmacy to be filled today 11/21/2024 and refilled on approximately 12/20/2024 and 1/19/2025.    [place] Prescription Drug Monitoring Program report was reviewed and was appropriate     A urine drug screen was collected at today's office visit as part of our medication management protocol. The point of care testing results were appropriate for what was being prescribed. The specimen will be sent for confirmatory testing. The drug screen is medically necessary because the patient  is either dependent on opioid medication or is being considered for opioid medication therapy and the results could impact ongoing or future treatment. The drug screen is to evaluate for the presences or absence of prescribed, non-prescribed, and/or illicit drugs/substances.    There are risks associated with opioid medications, including dependence, addiction and tolerance. The patient understands and agrees to use these medications only as prescribed. Potential side effects of the medications include, but are not limited to, constipation, drowsiness, addiction, impaired judgment and risk of fatal overdose if not taken as prescribed. The patient was warned against driving while taking sedation medications.  Sharing medications is a felony. At this point in time, the patient is showing no signs of addiction, abuse, diversion or suicidal ideation.    Follow-up is planned in 12 weeks time or sooner as warranted.  Discharge instructions were provided. I personally saw and examined the patient and I agree with the above discussed plan of care.    History of Present Illness:    Chavez Newell is a 59 y.o. male who presents to [facility] for interval re-evaluation of the above stated pain complaints. The patient has a past medical and chronic pain history as outlined in the assessment section. He was last seen on 8/23/2024.    At today's visit patient states that their pain symptoms are the same with a pain score of 3/10 on the verbal numeric pain scale.  The patient's pain is worse in the morning and evening.  The patient's pain is intermittent and occasionally worse in nature.  And the quality of the patient's pain is described as sharp, pressure-like and shooting.  The patient's pain is located in the left neck, left shoulder, low back, right groin and bilateral knees.  The amount of pain relief he is obtaining from his current pain relievers which is oxycodone/acetaminophen 5/325 that was  prescribed by Dr. Rivera for his shoulder is not enough to make a difference in his life.  Patient being rotated back onto tramadol.    Pain Contract Signed:  09/23/24  Last Urine Drug Screen:  08/23/24  New Urine Drug Screen: 11/21/24    Other than as stated above, the patient denies any interval changes in medications, medical condition, mental condition, symptoms, or allergies since the last office visit.         Review of Systems:    Review of Systems   Respiratory:  Negative for shortness of breath.    Cardiovascular:  Negative for chest pain.   Gastrointestinal:  Negative for constipation, diarrhea, nausea and vomiting.   Musculoskeletal:  Positive for arthralgias and gait problem. Negative for joint swelling and myalgias.        DROM   Skin:  Negative for rash.   Neurological:  Negative for dizziness, seizures and weakness.   All other systems reviewed and are negative.        Past Medical History:   Diagnosis Date    Anxiety 03/10/2022    Aorta aneurysm (HCC)     4.3    Arm DVT (deep venous thromboembolism), acute (HCC) 2015    complications of cardiac cath    Asthma     Blood clot in vein     right leg    Chronic kidney disease     CKD (chronic kidney disease), stage III (HCC)     COPD (chronic obstructive pulmonary disease) (HCC)     Depression     Diabetes mellitus (HCC)     Essential hypertriglyceridemia     GERD (gastroesophageal reflux disease)     Headache     Hiatal hernia     Hyperlipidemia, mixed 05/07/2018    Kidney stone     Liver disease     FATTY LIVER    Mild episode of recurrent major depressive disorder (HCC) 03/10/2022    PAC (premature atrial contraction)     Prediabetes     Rectus sheath hematoma, initial encounter 08/09/2023    Renal calculi     Sleep apnea     Vestibular migraine        Past Surgical History:   Procedure Laterality Date    CARPAL TUNNEL RELEASE Left     COLONOSCOPY      FL INJECTION LEFT SHOULDER (ARTHROGRAM)  9/13/2024    FL INJECTION RIGHT HIP (ARTHROGRAM)   08/20/2018    FOOT SURGERY Left     FOREIGN BODY REMOVAL    HERNIA REPAIR      AZ ARTHRP ACETBLR/PROX FEM PROSTC AGRFT/ALGRFT Right 09/28/2020    Procedure: ARTHROPLASTY HIP TOTAL;  Surgeon: Jyoti Araiza MD;  Location: [facility];  Service: Orthopedics    AZ COLONOSCOPY FLX DX W/COLLJ SPEC WHEN PFRMD N/A 08/07/2017    Procedure: COLONOSCOPY;  Surgeon: Anthony France MD;  Location: [facility];  Service: Gastroenterology    AZ ESOPHAGOGASTRODUODENOSCOPY TRANSORAL DIAGNOSTIC N/A 10/31/2017    Procedure: ESOPHAGOGASTRODUODENOSCOPY (EGD);  Surgeon: Anthony France MD;  Location: [facility];  Service: Gastroenterology    AZ SURGICAL ARTHROSCOPY SHOULDER W/ROTATOR CUFF RPR Left 10/10/2024    Procedure: REPAIR ROTATOR CUFF  ARTHROSCOPIC LEFT ACROMIOPLASTY;  Surgeon: Junior Rivera DO;  Location: [facility];  Service: Orthopedics    TOTAL HIP ARTHROPLASTY Right 2020       Family History   Problem Relation Age of Onset    Arthritis Mother     Heart attack Father     Clotting disorder Father     Schizoaffective Disorder  Sister     Cancer Brother     Prostate cancer Brother     Leukemia Brother         his only brother dies from lymphoma or leukemia pt unsure he was only 54    Aortic aneurysm Other         abdominal       Social History     Occupational History    Occupation: unemployed   Tobacco Use    Smoking status: Some Days     Types: Cigars     Passive exposure: Never    Smokeless tobacco: Never    Tobacco comments:     never inhaled-smokes cigars every now and then   Vaping Use    Vaping status: Never Used   Substance and Sexual Activity    Alcohol use: Not Currently     Comment: occasional beer    Drug use: No    Sexual activity: Yes     Partners: Female         Current Outpatient Medications:     acetaminophen (TYLENOL) 500 mg tablet, Take 500 mg by mouth every 6 (six) hours as needed for mild pain, Disp: , Rfl:     Bydureon BCise 2 MG/0.85ML AUIJ, inject 2 milligrams subcutaneously weekly, Disp: 10.2 mL,  Rfl: 1    Continuous Blood Gluc Sensor (FreeStyle Jelly 3 Sensor) MISC, Use 1 each every 14 (fourteen) days, Disp: 6 each, Rfl: 3    fenofibrate 160 MG tablet, take 1 tablet by mouth once daily, Disp: 90 tablet, Rfl: 1    gabapentin (NEURONTIN) 300 mg capsule, take 1 capsule by mouth at bedtime, Disp: 30 capsule, Rfl: 5    glucose blood test strip, TEST BS TID, Disp: 300 each, Rfl: 5    glucose monitoring kit (FREESTYLE) monitoring kit, Use 1 each daily before breakfast, Disp: 1 each, Rfl: 0    Insulin Glargine Solostar (Lantus SoloStar) 100 UNIT/ML SOPN, inject 20 units subcutaneously daily or as directed by prescriber, Disp: 15 mL, Rfl: 2    insulin lispro (HumaLOG KwikPen) 100 units/mL injection pen, Per sliding scale, Max 50 units daily, Disp: 15 mL, Rfl: 2    Insulin Pen Needle (BD Pen Needle Evelina 2nd Gen) 32G X 4 MM MISC, Inject as directed 4 (four) times a day, Disp: 500 each, Rfl: 5    Lancets MISC, Use daily before breakfast, Disp: 100 each, Rfl: 5    methocarbamol (ROBAXIN) 500 mg tablet, Take 1 tablet (500 mg total) by mouth 2 (two) times a day as needed for muscle spasms, Disp: 60 tablet, Rfl: 1    oxyCODONE-acetaminophen (Percocet) 5-325 mg per tablet, Take 1 tablet by mouth every 8 (eight) hours as needed for moderate pain Max Daily Amount: 3 tablets, Disp: 20 tablet, Rfl: 0    pantoprazole (PROTONIX) 40 mg tablet, take 1 tablet by mouth once daily, Disp: 90 tablet, Rfl: 1    rosuvastatin (CRESTOR) 5 mg tablet, take 1 tablet by mouth once daily, Disp: 90 tablet, Rfl: 1    sildenafil (REVATIO) 20 mg tablet, TAKE 1 TABLET (20 MG TOTAL) BY MOUTH DAILY AS DIRECTED, Disp: 90 tablet, Rfl: 3    traMADol (Ultram) 50 mg tablet, May take 2 tablets (100 mg total) by mouth daily as needed for moderate pain. May also take 1 tablet (50 mg total) every 8 (eight) hours as needed for moderate pain. Max 5 tablets per day., Disp: 150 tablet, Rfl: 2    warfarin (COUMADIN) 2 mg tablet, Take 1 tablet by mouth Monday,  "Wednesday, Friday or as directed by PCP, Disp: 90 tablet, Rfl: 1    warfarin (COUMADIN) 3 mg tablet, take 1 tablet by mouth TUESDAY, THURSDAY, SATURDAY, AND SUNDAY; OR AS DIRECTED BY PCP, Disp: 90 tablet, Rfl: 1    naloxone (NARCAN) 4 mg/0.1 mL nasal spray, Administer 1 spray into a nostril. If no response after 2-3 minutes, give another dose in the other nostril using a new spray. (Patient not taking: Reported on 11/21/2024), Disp: 1 each, Rfl: 1    oxyCODONE (Roxicodone) 5 immediate release tablet, Take 1 tablet (5 mg total) by mouth every 6 (six) hours as needed for moderate pain or severe pain for up to 12 doses Max Daily Amount: 20 mg, Disp: 12 tablet, Rfl: 0    No Known Allergies    Physical Exam:    /80 (Patient Position: Sitting, Cuff Size: Large)   Pulse 70   Ht 6' 1\" (1.854 m)   Wt 101 kg (222 lb 10.6 oz)   BMI 29.38 kg/m²     Constitutional:normal, well developed, well nourished, alert, in no distress and non-toxic and no overt pain behavior.  Eyes:anicteric  HEENT:grossly intact  Neck:supple, symmetric, trachea midline and no masses   Pulmonary:even and unlabored  Cardiovascular:No edema or pitting edema present  Skin:Normal without rashes or lesions and well hydrated  Psychiatric:Mood and affect appropriate  Neurologic:Cranial Nerves II-XII grossly intact  Musculoskeletal:antalgic gait        Orders Placed This Encounter   Procedures    MM ALL_Prescribed Meds and Special Instructions    MM DT_Alprazolam Definitive Test    MM DT_Amphetamine Definitive Test    MM DT_Buprenorphine Definitive Test    MM DT_Butalbital Definitive Test    MM DT_Clonazepam Definitive Test    MM DT_Cocaine Definitive Test    MM DT_Codeine Definitive Test    MM DT_Dextromethorphan Definitive Test    MM Diazepam Definitive Test    MM DT_Ethyl Glucuronide/Ethyl Sulfate Definitive Test    MM DT_Fentanyl Definitive Test    MM DT_Heroin Definitive Test    MM DT_Hydrocodone Definitive Test    MM DT_Hydromorphone Definitive " Test    MM DT_Kratom Definitive Test    MM DT_Levorphanol Definitive Test    MM DT_MDMA Definitive Test    MM DT_Meperidine Definitive Test    MM DT_Methadone Definitive Test    MM DT_Methamphetamine Definitive Test    MM DT_Methylphenidate Definitive Test    MM DT_Morphine Definitive Test    MM Lorazepam Definitive Test    MM DT_Oxazepam Definitive Test    MM DT_Oxycodone Definitive Test    MM DT_Oxymorphone Definitive Test    MM DT_Phencyclidine Definitive Test    MM DT_Phenobarbital Definitive Test    MM DT_Phentermine Definitive Test    MM DT_Secobarbital Definitive Test    MM DT_Spice Definitive Test    MM DT_Tapentadol Definitive Test    MM DT_Temazapam Definitive Test    MM DT_THC Definitive Test    MM DT_Tramadol Definitive Test    MM DT_Validity Creatinine       This document was created using speech voice recognition software.   Grammatical errors, random word insertions, pronoun errors, and incomplete sentences are an occasional consequence of this system due to software limitations, ambient noise, and hardware issues.   Any formal questions or concerns about content, text, or information contained within the body of this dictation should be directly addressed to the provider for clarification.

## 2024-11-20 ENCOUNTER — OFFICE VISIT (OUTPATIENT)
Dept: PHYSICAL THERAPY | Age: 59
End: 2024-11-20
Payer: MEDICARE

## 2024-11-20 DIAGNOSIS — Z98.890 S/P ROTATOR CUFF REPAIR: Primary | ICD-10-CM

## 2024-11-20 DIAGNOSIS — M75.102 TEAR OF LEFT SUPRASPINATUS TENDON: ICD-10-CM

## 2024-11-20 PROCEDURE — 97110 THERAPEUTIC EXERCISES: CPT | Performed by: PHYSICAL THERAPIST

## 2024-11-20 PROCEDURE — 97140 MANUAL THERAPY 1/> REGIONS: CPT | Performed by: PHYSICAL THERAPIST

## 2024-11-21 ENCOUNTER — OFFICE VISIT (OUTPATIENT)
Dept: PAIN MEDICINE | Facility: CLINIC | Age: 59
End: 2024-11-21
Payer: MEDICARE

## 2024-11-21 VITALS
DIASTOLIC BLOOD PRESSURE: 80 MMHG | WEIGHT: 222.66 LBS | HEIGHT: 73 IN | BODY MASS INDEX: 29.51 KG/M2 | SYSTOLIC BLOOD PRESSURE: 128 MMHG | HEART RATE: 70 BPM

## 2024-11-21 DIAGNOSIS — Z98.890 S/P ARTHROSCOPY OF LEFT SHOULDER: ICD-10-CM

## 2024-11-21 DIAGNOSIS — M47.816 LUMBAR SPONDYLOSIS: ICD-10-CM

## 2024-11-21 DIAGNOSIS — G89.4 CHRONIC PAIN SYNDROME: Primary | ICD-10-CM

## 2024-11-21 DIAGNOSIS — Z79.891 LONG-TERM CURRENT USE OF OPIATE ANALGESIC: ICD-10-CM

## 2024-11-21 DIAGNOSIS — F11.20 OPIOID DEPENDENCE, UNCOMPLICATED (HCC): ICD-10-CM

## 2024-11-21 PROCEDURE — 99214 OFFICE O/P EST MOD 30 MIN: CPT

## 2024-11-21 RX ORDER — TRAMADOL HYDROCHLORIDE 50 MG/1
TABLET ORAL
Qty: 150 TABLET | Refills: 2 | Status: SHIPPED | OUTPATIENT
Start: 2024-11-21

## 2024-11-21 NOTE — PATIENT INSTRUCTIONS

## 2024-11-23 LAB
6MAM UR QL CFM: NEGATIVE NG/ML
7AMINOCLONAZEPAM UR QL CFM: NEGATIVE NG/ML
A-OH ALPRAZ UR QL CFM: NEGATIVE NG/ML
ACCEPTABLE CREAT UR QL: NORMAL MG/DL
AMPHET UR QL CFM: NEGATIVE NG/ML
AMPHET UR QL CFM: NEGATIVE NG/ML
BUPRENORPHINE UR QL CFM: NEGATIVE NG/ML
BUTALBITAL UR QL CFM: NEGATIVE NG/ML
BZE UR QL CFM: NEGATIVE NG/ML
CODEINE UR QL CFM: NEGATIVE NG/ML
EDDP UR QL CFM: NEGATIVE NG/ML
ETHYL GLUCURONIDE UR QL CFM: NEGATIVE NG/ML
ETHYL SULFATE UR QL SCN: NEGATIVE NG/ML
FENTANYL UR QL CFM: NEGATIVE NG/ML
GLIADIN IGG SER IA-ACNC: NEGATIVE NG/ML
HYDROCODONE UR QL CFM: NEGATIVE NG/ML
HYDROCODONE UR QL CFM: NEGATIVE NG/ML
HYDROMORPHONE UR QL CFM: NEGATIVE NG/ML
LORAZEPAM UR QL CFM: NEGATIVE NG/ML
MDMA UR QL CFM: NEGATIVE NG/ML
ME-PHENIDATE UR QL CFM: NEGATIVE NG/ML
MEPERIDINE UR QL CFM: NEGATIVE NG/ML
METHADONE UR QL CFM: NEGATIVE NG/ML
METHAMPHET UR QL CFM: NEGATIVE NG/ML
MORPHINE UR QL CFM: NEGATIVE NG/ML
MORPHINE UR QL CFM: NEGATIVE NG/ML
NORBUPRENORPHINE UR QL CFM: NEGATIVE NG/ML
NORDIAZEPAM UR QL CFM: NEGATIVE NG/ML
NORFENTANYL UR QL CFM: NEGATIVE NG/ML
NORHYDROCODONE UR QL CFM: NEGATIVE NG/ML
NORHYDROCODONE UR QL CFM: NEGATIVE NG/ML
NORMEPERIDINE UR QL CFM: NEGATIVE NG/ML
NOROXYCODONE UR QL CFM: NEGATIVE NG/ML
OXAZEPAM UR QL CFM: NEGATIVE NG/ML
OXYCODONE UR QL CFM: NORMAL NG/ML
OXYMORPHONE UR QL CFM: NORMAL NG/ML
OXYMORPHONE UR QL CFM: NORMAL NG/ML
PARA-FLUOROFENTANYL QUANTIFICATION: NORMAL NG/ML
PCP UR QL CFM: NEGATIVE NG/ML
PHENOBARB UR QL CFM: NEGATIVE NG/ML
RESULT ALL_PRESCRIBED MEDS AND SPECIAL INSTRUCTIONS: NORMAL
SECOBARBITAL UR QL CFM: NEGATIVE NG/ML
SL AMB 5F-ADB-M7 METABOLITE QUANTIFICATION: NEGATIVE NG/ML
SL AMB 7-OH-MITRAGYNINE (KRATOM ALKALOID) QUANTIFICATION: NEGATIVE NG/ML
SL AMB AB-FUBINACA-M3 METABOLITE QUANTIFICATION: NEGATIVE NG/ML
SL AMB ACETYL FENTANYL QUANTIFICATION: NORMAL NG/ML
SL AMB ACETYL NORFENTANYL QUANTIFICATION: NORMAL NG/ML
SL AMB ACRYL FENTANYL QUANTIFICATION: NORMAL NG/ML
SL AMB CARFENTANIL QUANTIFICATION: NORMAL NG/ML
SL AMB CTHC (MARIJUANA METABOLITE) QUANTIFICATION: NEGATIVE NG/ML
SL AMB DEXTROMETHORPHAN QUANTIFICATION: NEGATIVE NG/ML
SL AMB DEXTRORPHAN (DEXTROMETHORPHAN METABOLITE) QUANT: NEGATIVE NG/ML
SL AMB DEXTRORPHAN (DEXTROMETHORPHAN METABOLITE) QUANT: NEGATIVE NG/ML
SL AMB JWH018 METABOLITE QUANTIFICATION: NEGATIVE NG/ML
SL AMB JWH073 METABOLITE QUANTIFICATION: NEGATIVE NG/ML
SL AMB MDMB-FUBINACA-M1 METABOLITE QUANTIFICATION: NEGATIVE NG/ML
SL AMB N-DESMETHYL-TRAMADOL QUANTIFICATION: ABNORMAL NG/ML
SL AMB PHENTERMINE QUANTIFICATION: NEGATIVE NG/ML
SL AMB RCS4 METABOLITE QUANTIFICATION: NEGATIVE NG/ML
SL AMB RITALINIC ACID QUANTIFICATION: NEGATIVE NG/ML
SPECIMEN DRAWN SERPL: NEGATIVE NG/ML
TAPENTADOL UR QL CFM: NEGATIVE NG/ML
TEMAZEPAM UR QL CFM: NEGATIVE NG/ML
TEMAZEPAM UR QL CFM: NEGATIVE NG/ML
TRAMADOL UR QL CFM: ABNORMAL NG/ML
URATE/CREAT 24H UR: ABNORMAL NG/ML

## 2024-11-24 NOTE — PROGRESS NOTES
Daily Note     Today's date: 2024  Patient name: Chavez Newell  : 1965  MRN: [id number]  Referring provider: Iman Sepulveda CRNP  Dx:   Encounter Diagnosis     ICD-10-CM    1. S/P rotator cuff repair  Z98.890       2. Tear of left supraspinatus tendon  M75.102                      Subjective: ***      Objective: See treatment diary below      Assessment: Tolerated treatment {Tolerated treatment :[id number]}. Patient {assessment:9803032480}      Plan: {PLAN:3999247029}     POC expires: 25  Date             Visit count 10            FOTO   RE  Eval                  Precautions: PROTOCOL, sling for 6 weeks DM, COPD    DOS 10/10/24  Manuals 10/23 10/28 10/30 11/4 11/6 11/11 11/13 11/18 11/20    PROM left shoulder 15 20 20 20 20 20 20 20 20    FF  90 90 90 90 90 90 110 120    ABD  70 70 70 70 70 70 90 90    ER @ 45 abduction  5 10 15 25 30 30 30 35    Neuro Re-Ed                                       Ther Ex             UBE         3'/2'    pendulum 5 5 8 8 8 8 8 8 5'    Elbow AAROM 3 3 3 3 3 3 3  AH    gripping 2 Red  35x Red  35x RED   40x  40x 40x 40x 40x     Scap pinches  10x 20x 45x 40x 10x 10x 10x     Scap rolls      15x 15x 10x     Pulleys scaption        3' 5'    Ball table slides/ CW/CCW        20x  each 30x                 Ther Activity                                       Gait Training                                       Modalities             CP post 10' 10' 10' AH

## 2024-11-25 ENCOUNTER — TELEPHONE (OUTPATIENT)
Dept: NEPHROLOGY | Facility: CLINIC | Age: 59
End: 2024-11-25

## 2024-11-25 ENCOUNTER — OFFICE VISIT (OUTPATIENT)
Dept: PHYSICAL THERAPY | Age: 59
End: 2024-11-25
Payer: MEDICARE

## 2024-11-25 DIAGNOSIS — Z98.890 S/P ROTATOR CUFF REPAIR: Primary | ICD-10-CM

## 2024-11-25 DIAGNOSIS — M75.102 TEAR OF LEFT SUPRASPINATUS TENDON: ICD-10-CM

## 2024-11-25 PROCEDURE — 97110 THERAPEUTIC EXERCISES: CPT | Performed by: PHYSICAL THERAPIST

## 2024-11-25 PROCEDURE — 97140 MANUAL THERAPY 1/> REGIONS: CPT | Performed by: PHYSICAL THERAPIST

## 2024-11-25 NOTE — PROGRESS NOTES
"Daily Note     Today's date: 2024  Patient name: Chavez Newell  : 1965  MRN: [id number]  Referring provider: Iman Sepulveda CRNP  Dx:   Encounter Diagnosis     ICD-10-CM    1. S/P rotator cuff repair  Z98.890       2. Tear of left supraspinatus tendon  M75.102           Start Time: 1022  Stop Time: 1117  Total time in clinic (min): 55 minutes    Subjective: Patient notes he woke up this morning with increased soreness and stiffness in left shoulder.       Objective: See treatment diary below      Assessment: Tolerated treatment well. Patient would benefit from continued PT Initiated AROM in elevation to 90 with verbal and tactile cues to avoid substitution. Fatigued after 5 reps. PROM increasing as per protocol      Plan: Continue per plan of care.  Progress treament per protocol.      POC expires: 25  Date            Visit count 10 11           FOTO   RE  Eval                  Precautions: PROTOCOL, sling for 6 weeks DM, COPD    DOS 10/10/24  Manuals 2720 10/28 10/30 11/4 11/6 11/11 11/13 11/18 11/20 11/25   PROM left shoulder  20 20 20 20 20 20 20 20 20    90 90 90 90 90 90 110 120 127    70 70 70 70 70 70 90 90 100   ER @ 45 abduction 40 5 10 15 25 30 30 30 35 35   Neuro Re-Ed                                       Ther Ex             UBE         3'/2' 4' 2'   pendulum 5 5 8 8 8 8 8 8 5'    Elbow AAROM 3 3 3 3 3 3 3  AH    gripping 2 Red  35x Red  35x RED   40x  40x 40x 40x 40x     Pulleys scaption 4'       3' 5' 5'   Ball table slides/ CW/CCW 20 each       20x  each 30x 20x  each   Wand ER at side 5\"/10x         5\"/10x   TB (tube) row BLUE  2x10         Blue  2x10   TB ext from 45 GRN  2x10         GRN 2x10   TB ER YELW  2x10         Yellow  2x10   TB IR RED  2x10                                                   Ther Activity                                       Gait Training                                       Modalities             CP post 10' 10' 10' AH   "

## 2024-11-25 NOTE — PROGRESS NOTES
PT Re-Evaluation     Today's date: 2024  Patient name: Chavez Newell  : 1965  MRN: [id number]  Referring provider: Iman Sepulveda CRNP  Dx:   Encounter Diagnosis     ICD-10-CM    1. S/P rotator cuff repair  Z98.890       2. Tear of left supraspinatus tendon  M75.102                      Assessment  Impairments: abnormal or restricted ROM, impaired physical strength, lacks appropriate home exercise program and pain with function  Symptom irritability: moderate    Assessment details: Chavez is a 59-year-old male with concerns of left rotator cuff tear of supraspinatus.   s/p Left shoulder arthroscopic rotator cuff repair, acromioplasty performed on 10/10/2024. Presents with expected post op impairments as outlined. Patient could benefit from a course of PT to address impairments and functional limitations. Progression of program per post surgical protocol. Reviewed post surgical protocol and precautions regarding healing repair. HEP of pendulum and distal UE ROM reviewed.     Goals  Short-term goals  - 4 weeks  1.  Independent in an ongoing home exercise program MET  2. PROM flexion 90, ABD 70 and Er 30 MET  3. Pain levels 3/10 or less at most - progressing towards    LTG 6 weeks  1. PROM , ABD 90, ER 45, IR 35 - progressing towards  2. Advancement to Phase II of protocol at 6 weeks - advancing now  3. Revise goals at 6 weeks    New Goals  1. PROM WFL in protocol parameters  2. AROM in parameters with pain increase  3. Advance strengthening per protocol      Plan  Patient would benefit from: skilled physical therapy    Planned therapy interventions: therapeutic activities, therapeutic exercise, home exercise program, motor coordination training, neuromuscular re-education, activity modification and manual therapy    Frequency: 2x week  Plan of Care beginning date: 2024  Plan of Care expiration date: 2025  Treatment plan discussed with: patient        Subjective Evaluation    History of  "Present Illness  Mechanism of injury:  s/p Left shoulder arthroscopic rotator cuff repair, acromioplasty performed on 10/10/2024.   To ED that night due to elevated blood sugar. CT positive for pneumonia left lung. Placed on medication.  No sleep for two days Follow up with family MD and meds stopped. Notes it took a week to get sugar levels to normal.     First post op PT visit. Variable pain.  Notes taking Tylenol generally for pain until yesterday took pain pill due to elevated pain levels.    Sleeping in recliner. Notes feels better in upright versus reclined position.             25  Notes slow gains. \"I can tell it is moving a little better\" Sleeping in recliner. Better sleep. Pain varies from 1/10 to 7/10. Describes ache in bicep region.   Pain left wrist and digits at times. D/Cd sling last week.  Doing light activities at home to tolerance        Patient Goals  Patient goals for therapy: decreased pain, increased motion, increased strength, independence with ADLs/IADLs and return to sport/leisure activities    Pain  Current pain ratin  At best pain ratin  At worst pain ratin  Location: anterior and superior shoulder into bicep region  Quality: sharp, dull ache and tight  Relieving factors: ice, medications and rest  Aggravating factors: sitting and nothing (reclined position)    Hand dominance: right      Diagnostic Tests  X-ray: abnormal (foraminal narrowing C5-6, C6-7, left neural foraminal narrowing C4-5, loss of disc height C4-5, C5-6, C6-7)  MRI studies: abnormal (full thickness insertion tear of supraspinatus)        Objective     Postural Observations  Seated posture: fair  Standing posture: fair    Additional Postural Observation Details  Forward head with anterior shoulder rounding    Cervical/Thoracic Screen   Cervical range of motion within normal limits with the following exceptions: Limited left cervical rotation and left sidebending by 25% compared to the " "right    Neurological Testing     Sensation     Shoulder   Left Shoulder   Intact: light touch  Paresthesia: light touch    Right Shoulder   Intact: Light touch    Passive Range of Motion   Left Shoulder   Flexion: 127 degrees   Abduction: 100 degrees   External rotation 45°: 30 degrees   Internal rotation 45°: 40 degrees     Right Shoulder   Flexion: 160 degrees   External rotation 90°: 70 degrees   Internal rotation 90°: 60 degrees     Strength/Myotome Testing     Right Shoulder   Normal muscle strength    Additional Strength Details  No strength testing left due to post surgical contraindication               POC expires: 1/20/25  Date 11/25            Visit count 10            FOTO   RE  Eval                  Precautions: PROTOCOL, sling for 6 weeks DM, COPD    DOS 10/10/24  Manuals 10/23 10/28 10/30 11/4 11/6 11/11 11/13 11/18 11/20 11/25   PROM left shoulder 15 20 20 20 20 20 20 20 20 20   FF  90 90 90 90 90 90 110 120 127   ABD  70 70 70 70 70 70 90 90 100   ER @ 45 abduction  5 10 15 25 30 30 30 35 35   Neuro Re-Ed                                       Ther Ex             UBE         3'/2' 4' 2'   pendulum 5 5 8 8 8 8 8 8 5'    Elbow AAROM 3 3 3 3 3 3 3  AH    gripping 2 Red  35x Red  35x RED   40x  40x 40x 40x 40x     Scap pinches  10x 20x 45x 40x 10x 10x 10x  D/c   Scap rolls      15x 15x 10x  D/c   Pulleys scaption        3' 5' 5'   Ball table slides/ CW/CCW        20x  each 30x 20x  each   Wand ER at side          5\"/10x   TB (tube) row          Blue  2x10   TB ext from 45          GRN 2x10   TB ER          Yellow  2x10                                          Ther Activity                                       Gait Training                                       Modalities             CP post 10' 10' 10' AH                                     "

## 2024-11-27 ENCOUNTER — OFFICE VISIT (OUTPATIENT)
Dept: PHYSICAL THERAPY | Age: 59
End: 2024-11-27
Payer: MEDICARE

## 2024-11-27 DIAGNOSIS — M75.102 TEAR OF LEFT SUPRASPINATUS TENDON: ICD-10-CM

## 2024-11-27 DIAGNOSIS — Z98.890 S/P ROTATOR CUFF REPAIR: Primary | ICD-10-CM

## 2024-11-27 PROCEDURE — 97140 MANUAL THERAPY 1/> REGIONS: CPT | Performed by: PHYSICAL THERAPIST

## 2024-11-27 PROCEDURE — 97110 THERAPEUTIC EXERCISES: CPT | Performed by: PHYSICAL THERAPIST

## 2024-12-02 NOTE — PROGRESS NOTES
"Daily Note     Today's date: 2024  Patient name: Chavez Newell  : 1965  MRN: [id number]  Referring provider: Iman Sepulveda CRNP  Dx:   Encounter Diagnosis     ICD-10-CM    1. S/P rotator cuff repair  Z98.890       2. Tear of left supraspinatus tendon  M75.102           Start Time: 1028  Stop Time: 1123  Total time in clinic (min): 55 minutes    Subjective: Patient notes pain present daily of varying intensity and frequency. Notes anterior upper arm pain mostly. Sleeping in recliner, but sleeping better. Using arm a bit more but no lifting.      Objective: See treatment diary below      Assessment: Tolerated treatment well. Patient would benefit from continued PT  Increased exercises per protocol. End range tightness all planes. AROM versus gravity challenging. Fatigued by 7th repetition. Advised to add to HEP as well as self flexion ROM in supine.      Plan: Continue per plan of care.  Progress treament per protocol.      POC expires: 25  Date           Visit count 10 11 12          FOTO   RE  Eval                  Precautions: PROTOCOL, sling for 6 weeks DM, COPD    DOS 10/10/24  Manuals 2720   PROM left shoulder  15  20 20 20 20 20 20 20    135 90 90 90 90 90 110 120 127    110 70 70 70 70 70 90 90 100   ER @ 45 abduction 40 45 10 15 25 30 30 30 35 35   Neuro Re-Ed                                       Ther Ex             UBE        3'/2' 4' 2'   pendulum 5 5 8 8 8 8 8 8 5'    Elbow AAROM 3 3 3 3 3 3 3  AH    gripping 2 D/c Red  35x RED   40x  40x 40x 40x 40x     Pulleys scaption 4' 4'      3' 5' 5'   Ball table slides/ CW/CCW 20 each 20x each      20x  each 30x 20x  each   Wand ER at side 5\"/10x 10x        5\"/10x   Wand FF supine  10x           TB (tube) row BLUE  2x10 BLUE  3x10        Blue  2x10   TB ext from 45 GRN  2x10 Blue  3x10        GRN 2x10   TB ER YELW  2x10 RED   2x10        Yellow  2x10   TB IR " RED  2x10 GRN 2x10           AROM FF to 90  10x                                     Ther Activity                                       Gait Training                                       Modalities             CP post 10' 10' 10' AH

## 2024-12-04 ENCOUNTER — OFFICE VISIT (OUTPATIENT)
Dept: PHYSICAL THERAPY | Age: 59
End: 2024-12-04
Payer: MEDICARE

## 2024-12-04 DIAGNOSIS — M75.102 TEAR OF LEFT SUPRASPINATUS TENDON: ICD-10-CM

## 2024-12-04 DIAGNOSIS — Z98.890 S/P ROTATOR CUFF REPAIR: Primary | ICD-10-CM

## 2024-12-04 PROCEDURE — 97140 MANUAL THERAPY 1/> REGIONS: CPT | Performed by: PHYSICAL THERAPIST

## 2024-12-04 PROCEDURE — 97110 THERAPEUTIC EXERCISES: CPT | Performed by: PHYSICAL THERAPIST

## 2024-12-05 ENCOUNTER — RESULTS FOLLOW-UP (OUTPATIENT)
Dept: CARDIOLOGY CLINIC | Facility: CLINIC | Age: 59
End: 2024-12-05

## 2024-12-05 ENCOUNTER — HOSPITAL ENCOUNTER (OUTPATIENT)
Dept: NON INVASIVE DIAGNOSTICS | Facility: MEDICAL CENTER | Age: 59
Discharge: HOME | End: 2024-12-05
Payer: MEDICARE

## 2024-12-05 ENCOUNTER — APPOINTMENT (OUTPATIENT)
Dept: LAB | Facility: MEDICAL CENTER | Age: 59
End: 2024-12-05
Payer: MEDICARE

## 2024-12-05 VITALS
DIASTOLIC BLOOD PRESSURE: 80 MMHG | BODY MASS INDEX: 29.42 KG/M2 | SYSTOLIC BLOOD PRESSURE: 128 MMHG | HEART RATE: 70 BPM | WEIGHT: 222 LBS | HEIGHT: 73 IN

## 2024-12-05 DIAGNOSIS — N18.31 STAGE 3A CHRONIC KIDNEY DISEASE (HCC): ICD-10-CM

## 2024-12-05 DIAGNOSIS — I71.21 ANEURYSM OF ASCENDING AORTA WITHOUT RUPTURE (HCC): ICD-10-CM

## 2024-12-05 DIAGNOSIS — M89.9 CHRONIC KIDNEY DISEASE-MINERAL AND BONE DISORDER: ICD-10-CM

## 2024-12-05 DIAGNOSIS — N18.9 CHRONIC KIDNEY DISEASE-MINERAL AND BONE DISORDER: ICD-10-CM

## 2024-12-05 DIAGNOSIS — E83.9 CHRONIC KIDNEY DISEASE-MINERAL AND BONE DISORDER: ICD-10-CM

## 2024-12-05 DIAGNOSIS — Q23.81 BICUSPID AORTIC VALVE: ICD-10-CM

## 2024-12-05 LAB
25(OH)D3 SERPL-MCNC: 24.6 NG/ML (ref 30–100)
ANION GAP SERPL CALCULATED.3IONS-SCNC: 8 MMOL/L (ref 4–13)
AORTIC ROOT: 3.9 CM
AORTIC VALVE MEAN VELOCITY: 11.6 M/S
APICAL FOUR CHAMBER EJECTION FRACTION: 61 %
ASCENDING AORTA: 4.6 CM
AV AREA BY CONTINUOUS VTI: 1.6 CM2
AV AREA PEAK VELOCITY: 1.6 CM2
AV LVOT MEAN GRADIENT: 2 MMHG
AV LVOT PEAK GRADIENT: 3 MMHG
AV MEAN GRADIENT: 6 MMHG
AV PEAK GRADIENT: 13 MMHG
AV VALVE AREA: 2.33 CM2
AV VELOCITY RATIO: 0.51
BACTERIA UR QL AUTO: NORMAL /HPF
BASOPHILS # BLD AUTO: 0.05 THOUSANDS/ÂΜL (ref 0–0.1)
BASOPHILS NFR BLD AUTO: 1 % (ref 0–1)
BILIRUB UR QL STRIP: NEGATIVE
BSA FOR ECHO PROCEDURE: 2.25 M2
BUN SERPL-MCNC: 18 MG/DL (ref 5–25)
CALCIUM SERPL-MCNC: 10 MG/DL (ref 8.4–10.2)
CHLORIDE SERPL-SCNC: 102 MMOL/L (ref 96–108)
CLARITY UR: CLEAR
CO2 SERPL-SCNC: 28 MMOL/L (ref 21–32)
COLOR UR: ABNORMAL
CREAT SERPL-MCNC: 1.38 MG/DL (ref 0.6–1.3)
CREAT UR-MCNC: 175.7 MG/DL
DOP CALC AO PEAK VEL: 1.78 M/S
DOP CALC AO VTI: 33.24 CM
DOP CALC LVOT AREA: 4.52 CM2
DOP CALC LVOT CARDIAC INDEX: 1.63 L/MIN/M2
DOP CALC LVOT CARDIAC OUTPUT: 3.66 L/MIN
DOP CALC LVOT DIAMETER: 2.4 CM
DOP CALC LVOT PEAK VEL VTI: 17.16 CM
DOP CALC LVOT PEAK VEL: 0.9 M/S
DOP CALC LVOT STROKE INDEX: 24.9 ML/M2
DOP CALC LVOT STROKE VOLUME: 77.59 CM3
E WAVE DECELERATION TIME: 243 MS
E/A RATIO: 1.12
EOSINOPHIL # BLD AUTO: 0.15 THOUSAND/ÂΜL (ref 0–0.61)
EOSINOPHIL NFR BLD AUTO: 3 % (ref 0–6)
ERYTHROCYTE [DISTWIDTH] IN BLOOD BY AUTOMATED COUNT: 12.6 % (ref 11.6–15.1)
FRACTIONAL SHORTENING: 30 (ref 28–44)
GFR SERPL CREATININE-BSD FRML MDRD: 55 ML/MIN/1.73SQ M
GLUCOSE P FAST SERPL-MCNC: 169 MG/DL (ref 65–99)
GLUCOSE UR STRIP-MCNC: ABNORMAL MG/DL
HCT VFR BLD AUTO: 46.6 % (ref 36.5–49.3)
HGB BLD-MCNC: 14.9 G/DL (ref 12–17)
HGB UR QL STRIP.AUTO: NEGATIVE
IMM GRANULOCYTES # BLD AUTO: 0.02 THOUSAND/UL (ref 0–0.2)
IMM GRANULOCYTES NFR BLD AUTO: 0 % (ref 0–2)
INTERVENTRICULAR SEPTUM IN DIASTOLE (PARASTERNAL SHORT AXIS VIEW): 1.1 CM
INTERVENTRICULAR SEPTUM: 1.1 CM (ref 0.6–1.1)
KETONES UR STRIP-MCNC: NEGATIVE MG/DL
LAAS-AP2: 19.1 CM2
LAAS-AP4: 18.5 CM2
LEFT ATRIUM SIZE: 2.9 CM
LEFT ATRIUM VOLUME (MOD BIPLANE): 56 ML
LEFT ATRIUM VOLUME INDEX (MOD BIPLANE): 24.9 ML/M2
LEFT INTERNAL DIMENSION IN SYSTOLE: 3.3 CM (ref 2.1–4)
LEFT VENTRICULAR INTERNAL DIMENSION IN DIASTOLE: 4.7 CM (ref 3.5–6)
LEFT VENTRICULAR POSTERIOR WALL IN END DIASTOLE: 0.9 CM
LEFT VENTRICULAR STROKE VOLUME: 57 ML
LEUKOCYTE ESTERASE UR QL STRIP: NEGATIVE
LVSV (TEICH): 57 ML
LYMPHOCYTES # BLD AUTO: 1.76 THOUSANDS/ÂΜL (ref 0.6–4.47)
LYMPHOCYTES NFR BLD AUTO: 32 % (ref 14–44)
MCH RBC QN AUTO: 31.4 PG (ref 26.8–34.3)
MCHC RBC AUTO-ENTMCNC: 32 G/DL (ref 31.4–37.4)
MCV RBC AUTO: 98 FL (ref 82–98)
MONOCYTES # BLD AUTO: 0.56 THOUSAND/ÂΜL (ref 0.17–1.22)
MONOCYTES NFR BLD AUTO: 10 % (ref 4–12)
MV E'TISSUE VEL-LAT: 13 CM/S
MV E'TISSUE VEL-SEP: 10 CM/S
MV PEAK A VEL: 0.68 M/S
MV PEAK E VEL: 76 CM/S
MV STENOSIS PRESSURE HALF TIME: 70 MS
MV VALVE AREA P 1/2 METHOD: 3.14
NEUTROPHILS # BLD AUTO: 3.05 THOUSANDS/ÂΜL (ref 1.85–7.62)
NEUTS SEG NFR BLD AUTO: 54 % (ref 43–75)
NITRITE UR QL STRIP: NEGATIVE
NON-SQ EPI CELLS URNS QL MICRO: NORMAL /HPF
NRBC BLD AUTO-RTO: 0 /100 WBCS
PH UR STRIP.AUTO: 6 [PH]
PHOSPHATE SERPL-MCNC: 2.3 MG/DL (ref 2.3–4.1)
PLATELET # BLD AUTO: 242 THOUSANDS/UL (ref 149–390)
PMV BLD AUTO: 11.2 FL (ref 8.9–12.7)
POTASSIUM SERPL-SCNC: 4.2 MMOL/L (ref 3.5–5.3)
PROT UR STRIP-MCNC: ABNORMAL MG/DL
PROT UR-MCNC: 12.3 MG/DL
PROT/CREAT UR: 0.1 MG/G{CREAT} (ref 0–0.1)
PTH-INTACT SERPL-MCNC: 34.8 PG/ML (ref 12–88)
RBC # BLD AUTO: 4.75 MILLION/UL (ref 3.88–5.62)
RBC #/AREA URNS AUTO: NORMAL /HPF
RIGHT ATRIUM AREA SYSTOLE A4C: 14 CM2
RIGHT VENTRICLE ID DIMENSION: 3.4 CM
SL CV LEFT ATRIUM LENGTH A2C: 5.4 CM
SL CV LV EF: 60
SL CV PED ECHO LEFT VENTRICLE DIASTOLIC VOLUME (MOD BIPLANE) 2D: 102 ML
SL CV PED ECHO LEFT VENTRICLE SYSTOLIC VOLUME (MOD BIPLANE) 2D: 44 ML
SODIUM SERPL-SCNC: 138 MMOL/L (ref 135–147)
SP GR UR STRIP.AUTO: 1.03 (ref 1–1.03)
TR MAX PG: 27 MMHG
TR PEAK VELOCITY: 2.6 M/S
TRICUSPID ANNULAR PLANE SYSTOLIC EXCURSION: 2.2 CM
TRICUSPID VALVE PEAK REGURGITATION VELOCITY: 2.6 M/S
UROBILINOGEN UR STRIP-ACNC: 2 MG/DL
WBC # BLD AUTO: 5.59 THOUSAND/UL (ref 4.31–10.16)
WBC #/AREA URNS AUTO: NORMAL /HPF

## 2024-12-05 PROCEDURE — 85025 COMPLETE CBC W/AUTO DIFF WBC: CPT

## 2024-12-05 PROCEDURE — 93306 TTE W/DOPPLER COMPLETE: CPT | Performed by: INTERNAL MEDICINE

## 2024-12-05 PROCEDURE — 80048 BASIC METABOLIC PNL TOTAL CA: CPT

## 2024-12-05 PROCEDURE — 93306 TTE W/DOPPLER COMPLETE: CPT

## 2024-12-05 PROCEDURE — 82306 VITAMIN D 25 HYDROXY: CPT

## 2024-12-05 PROCEDURE — 84100 ASSAY OF PHOSPHORUS: CPT

## 2024-12-05 PROCEDURE — 81001 URINALYSIS AUTO W/SCOPE: CPT

## 2024-12-05 PROCEDURE — 82570 ASSAY OF URINE CREATININE: CPT

## 2024-12-05 PROCEDURE — 36415 COLL VENOUS BLD VENIPUNCTURE: CPT

## 2024-12-05 PROCEDURE — 84156 ASSAY OF PROTEIN URINE: CPT

## 2024-12-05 PROCEDURE — 83970 ASSAY OF PARATHORMONE: CPT

## 2024-12-05 NOTE — TELEPHONE ENCOUNTER
Relayed message to patient per Dr. Bo's echo results -   Please call the patient regarding his echo result.  Please let him know that his aorta measured the same size as his CT scans this year and last year, therefore there was no change.  His aortic valve looks good without significant narrowing or leakage.     Patient understood

## 2024-12-05 NOTE — PROGRESS NOTES
"Daily Note     Today's date: 2024  Patient name: Chavez Newell  : 1965  MRN: [id number]  Referring provider: Iman Sepulveda CRNP  Dx:   Encounter Diagnosis     ICD-10-CM    1. S/P rotator cuff repair  Z98.890       2. Tear of left supraspinatus tendon  M75.102                      Subjective: ***      Objective: See treatment diary below      Assessment: Tolerated treatment {Tolerated treatment :8118953294}. Patient {assessment:6583498031}      Plan: {PLAN:0239222245}     POC expires: 25  Date           Visit count 10 11 12          FOTO   RE  Eval                  Precautions: PROTOCOL, sling for 6 weeks DM, COPD    DOS 10/10/24  Manuals 2720   PROM left shoulder  15  20 20 20 20 20 20 20    135 90 90 90 90 90 110 120 127    110 70 70 70 70 70 90 90 100   ER @ 45 abduction 40 45 10 15 25 30 30 30 35 35   Neuro Re-Ed                                       Ther Ex             UBE        3'/2' 4' 2'   pendulum 5 5 8 8 8 8 8 8 5'    Elbow AAROM 3 3 3 3 3 3 3  AH    gripping 2 D/c Red  35x RED   40x  40x 40x 40x 40x     Pulleys scaption 4' 4'      3' 5' 5'   Ball table slides/ CW/CCW 20 each 20x each      20x  each 30x 20x  each   Wand ER at side 5\"/10x 10x        5\"/10x   Wand FF supine  10x           TB (tube) row BLUE  2x10 BLUE  3x10        Blue  2x10   TB ext from 45 GRN  2x10 Blue  3x10        GRN 2x10   TB ER YELW  2x10 RED   2x10        Yellow  2x10   TB IR RED  2x10 GRN 2x10           AROM FF to 90  10x                                     Ther Activity                                       Gait Training                                       Modalities             CP post 10' 10' 10' AH                                         "

## 2024-12-06 ENCOUNTER — APPOINTMENT (OUTPATIENT)
Dept: PHYSICAL THERAPY | Age: 59
End: 2024-12-06
Payer: MEDICARE

## 2024-12-06 ENCOUNTER — TELEPHONE (OUTPATIENT)
Dept: HEMATOLOGY ONCOLOGY | Facility: CLINIC | Age: 59
End: 2024-12-06

## 2024-12-09 ENCOUNTER — OFFICE VISIT (OUTPATIENT)
Dept: NEPHROLOGY | Facility: CLINIC | Age: 59
End: 2024-12-09
Payer: MEDICARE

## 2024-12-09 VITALS
WEIGHT: 227.4 LBS | SYSTOLIC BLOOD PRESSURE: 130 MMHG | RESPIRATION RATE: 18 BRPM | HEART RATE: 95 BPM | BODY MASS INDEX: 30.14 KG/M2 | HEIGHT: 73 IN | DIASTOLIC BLOOD PRESSURE: 80 MMHG | TEMPERATURE: 97.6 F | OXYGEN SATURATION: 97 %

## 2024-12-09 DIAGNOSIS — M89.9 CHRONIC KIDNEY DISEASE-MINERAL AND BONE DISORDER: ICD-10-CM

## 2024-12-09 DIAGNOSIS — N18.9 CHRONIC KIDNEY DISEASE-MINERAL AND BONE DISORDER: ICD-10-CM

## 2024-12-09 DIAGNOSIS — E11.22 TYPE 2 DIABETES MELLITUS WITH STAGE 3A CHRONIC KIDNEY DISEASE, WITHOUT LONG-TERM CURRENT USE OF INSULIN (HCC): ICD-10-CM

## 2024-12-09 DIAGNOSIS — N18.31 TYPE 2 DIABETES MELLITUS WITH STAGE 3A CHRONIC KIDNEY DISEASE, WITHOUT LONG-TERM CURRENT USE OF INSULIN (HCC): ICD-10-CM

## 2024-12-09 DIAGNOSIS — E83.9 CHRONIC KIDNEY DISEASE-MINERAL AND BONE DISORDER: ICD-10-CM

## 2024-12-09 DIAGNOSIS — N18.31 STAGE 3A CHRONIC KIDNEY DISEASE (HCC): Primary | ICD-10-CM

## 2024-12-09 PROCEDURE — 99214 OFFICE O/P EST MOD 30 MIN: CPT | Performed by: INTERNAL MEDICINE

## 2024-12-09 NOTE — ASSESSMENT & PLAN NOTE
Lab Results   Component Value Date    HGBA1C 7.4 (A) 09/30/2024     Not well-controlled.  Importance of diabetic control and effect on kidney disease discussed with the patient

## 2024-12-09 NOTE — ASSESSMENT & PLAN NOTE
Lab Results   Component Value Date    EGFR 55 12/05/2024    EGFR 43 10/10/2024    EGFR 52 09/25/2024    CREATININE 1.38 (H) 12/05/2024    CREATININE 1.68 (H) 10/10/2024    CREATININE 1.44 (H) 09/25/2024   PTH and phosphorus along with vitamin D are within acceptable range and will continue to monitor

## 2024-12-09 NOTE — PROGRESS NOTES
Name: Chavez Newell      : 1965      MRN: [id number]  Encounter Provider: Jose Baker MD  Encounter Date: 2024   Encounter department: [facility]  :  Assessment & Plan  Stage 3a chronic kidney disease (HCC)  Lab Results   Component Value Date    EGFR 55 2024    EGFR 43 10/10/2024    EGFR 52 2024    CREATININE 1.38 (H) 2024    CREATININE 1.68 (H) 10/10/2024    CREATININE 1.44 (H) 2024   Quite stable for now.  Advise hydration and avoiding nephrotoxic medication         Chronic kidney disease-mineral and bone disorder  Lab Results   Component Value Date    EGFR 55 2024    EGFR 43 10/10/2024    EGFR 52 2024    CREATININE 1.38 (H) 2024    CREATININE 1.68 (H) 10/10/2024    CREATININE 1.44 (H) 2024   PTH and phosphorus along with vitamin D are within acceptable range and will continue to monitor         Type 2 diabetes mellitus with stage 3a chronic kidney disease, without long-term current use of insulin (HCC)    Lab Results   Component Value Date    HGBA1C 7.4 (A) 2024     Not well-controlled.  Importance of diabetic control and effect on kidney disease discussed with the patient       Everything discussed with the patient.  I will see him back in 6 months.  Will get blood and urine test before that visit    History of Present Illness   HPI  Chavez Newell is a 59 y.o. male who presents CKD follow-up    Patient doing well overall    Since I saw him last he had a surgery done on his left shoulder    Still has a painful overall getting better    Denies any chest pain palpitation or shortness of breath    No nausea no vomiting    Denies any urinary complaint      Review of Systems   Constitutional:  Negative for fatigue.   HENT:  Negative for congestion.    Eyes:  Negative for photophobia and pain.   Respiratory:  Negative for chest tightness and shortness of breath.    Cardiovascular:  Negative for chest pain and  "palpitations.   Gastrointestinal:  Negative for abdominal distention, abdominal pain and blood in stool.   Endocrine: Negative for polydipsia.   Genitourinary:  Negative for difficulty urinating, dysuria, flank pain, hematuria and urgency.   Musculoskeletal:  Negative for arthralgias and back pain.   Skin:  Negative for rash.   Neurological:  Negative for dizziness, light-headedness and headaches.   Hematological:  Does not bruise/bleed easily.   Psychiatric/Behavioral:  Negative for behavioral problems. The patient is not nervous/anxious.           Objective   Pulse 95   Temp 97.6 °F (36.4 °C) (Temporal)   Resp 18   Ht 6' 1\" (1.854 m)   Wt 103 kg (227 lb 6.4 oz)   SpO2 97%   BMI 30.00 kg/m²      Physical Exam  Constitutional:       General: He is not in acute distress.     Appearance: He is well-developed.   HENT:      Head: Normocephalic.      Mouth/Throat:      Mouth: Mucous membranes are moist.   Eyes:      General: No scleral icterus.     Conjunctiva/sclera: Conjunctivae normal.   Neck:      Vascular: No JVD.   Cardiovascular:      Rate and Rhythm: Normal rate.      Heart sounds: Normal heart sounds.   Pulmonary:      Effort: Pulmonary effort is normal.      Breath sounds: No wheezing.   Abdominal:      Palpations: Abdomen is soft.      Tenderness: There is no abdominal tenderness.   Musculoskeletal:         General: Normal range of motion.      Cervical back: Neck supple.   Skin:     General: Skin is warm.      Findings: No rash.   Neurological:      Mental Status: He is alert and oriented to person, place, and time.   Psychiatric:         Behavior: Behavior normal.         "

## 2024-12-10 NOTE — PROGRESS NOTES
"Daily Note     Today's date: 12/10/2024  Patient name: Chavez Newell  : 1965  MRN: [id number]  Referring provider: Iman Sepulveda CRNP  Dx:   Encounter Diagnosis     ICD-10-CM    1. S/P rotator cuff repair  Z98.890       2. Tear of left supraspinatus tendon  M75.102                      Subjective: ***      Objective: See treatment diary below      Assessment: Tolerated treatment {Tolerated treatment :7838413038}. Patient {assessment:1139954678}      Plan: {PLAN:3154105074}     POC expires: 25  Date           Visit count 10 11 12          FOTO   RE  Eval                  Precautions: PROTOCOL, sling for 6 weeks DM, COPD    DOS 10/10/24  Manuals 2720   PROM left shoulder  15  20 20 20 20 20 20 20    135 90 90 90 90 90 110 120 127    110 70 70 70 70 70 90 90 100   ER @ 45 abduction 40 45 10 15 25 30 30 30 35 35   Neuro Re-Ed                                       Ther Ex             UBE        3'/2' 4' 2'   pendulum 5 5 8 8 8 8 8 8 5'    Elbow AAROM 3 3 3 3 3 3 3  AH    gripping 2 D/c Red  35x RED   40x  40x 40x 40x 40x     Pulleys scaption 4' 4'      3' 5' 5'   Ball table slides/ CW/CCW 20 each 20x each      20x  each 30x 20x  each   Wand ER at side 5\"/10x 10x        5\"/10x   Wand FF supine  10x           TB (tube) row BLUE  2x10 BLUE  3x10        Blue  2x10   TB ext from 45 GRN  2x10 Blue  3x10        GRN 2x10   TB ER YELW  2x10 RED   2x10        Yellow  2x10   TB IR RED  2x10 GRN 2x10           AROM FF to 90  10x                                     Ther Activity                                       Gait Training                                       Modalities             CP post 10' 10' 10' AH                                           "

## 2024-12-11 ENCOUNTER — APPOINTMENT (OUTPATIENT)
Dept: PHYSICAL THERAPY | Age: 59
End: 2024-12-11
Payer: MEDICARE

## 2024-12-13 ENCOUNTER — OFFICE VISIT (OUTPATIENT)
Dept: PHYSICAL THERAPY | Age: 59
End: 2024-12-13
Payer: MEDICARE

## 2024-12-13 DIAGNOSIS — M75.102 TEAR OF LEFT SUPRASPINATUS TENDON: ICD-10-CM

## 2024-12-13 DIAGNOSIS — Z98.890 S/P ROTATOR CUFF REPAIR: Primary | ICD-10-CM

## 2024-12-13 PROCEDURE — 97140 MANUAL THERAPY 1/> REGIONS: CPT | Performed by: PHYSICAL THERAPIST

## 2024-12-13 PROCEDURE — 97110 THERAPEUTIC EXERCISES: CPT | Performed by: PHYSICAL THERAPIST

## 2024-12-13 NOTE — PROGRESS NOTES
"Daily Note     Today's date: 2024  Patient name: Chavez Newell  : 1965  MRN: [id number]  Referring provider: Iman Sepulveda CRNP  Dx:   Encounter Diagnosis     ICD-10-CM    1. S/P rotator cuff repair  Z98.890       2. Tear of left supraspinatus tendon  M75.102                      Subjective: Patient notes he was unable to send therapy due to death in the family and illness.  He reports inability to remain compliant with home exercise program from his last visit.  Patient notes minimal shoulder pain and describes an achiness in the lower arm anteriorly.       Objective: See treatment diary below      Assessment: Tolerated treatment well. Patient would benefit from continued PT advance program today per protocol include home exercises for scapular stabilization.  Active range of motion against gravity to 90 degrees flexion and abduction remains challenging thus weight has not been added.  Endrange tightness through all ranges with increased stiffness noted today.       Plan: Continue per plan of care.  Progress treament per protocol.      POC expires: 25  Date          Visit count 10 11 12 13         FOTO   RE  Eval                  Precautions: PROTOCOL, sling for 6 weeks DM, COPD    DOS 10/10/24  Manuals 2720   PROM left shoulder  15 20  20 20 20 20 20 20    135 140  90 90 90 110 120 127    110 120  70 70 70 90 90 100   ER @ 45 abduction 40 45 45  25 30 30 30 35 35   Neuro Re-Ed                                       Ther Ex             UBE  3/3      3'/2' 4' 2'   pendulum 5 5 5  8 8 8 8 5'    Elbow AAROM 3 3 D/C  3 3 3  AH    gripping 2 D/c   40x 40x 40x 40x     Pulleys scaption 4' 4' 5'     3' 5' 5'   Ball table slides/ CW/CCW 20 each 20x each 20x     20x  each 30x 20x  each   Wand ER at side 5\"/10x 10x 10x       5\"/10x   Wand FF supine  10x 10x          TB (tube) row BLUE  2x10 BLUE  3x10 3x10       " Blue  2x10   TB ext from 45 GRN  2x10 Blue  3x10 3x10       GRN 2x10   TB ER YELW  2x10 RED   2x10 2x10       Yellow  2x10   TB IR RED  2x10 GRN 2x10 2x10                       AROM FF to 90/ scaption  10x 10x each                       Prone horiz abduction   10x          Prone extension   10x          Prone row   10x                                    Ther Activity                                       Gait Training                                       Modalities             CP post 10' 10' 10' AH

## 2024-12-15 NOTE — PROGRESS NOTES
"Daily Note     Today's date: 12/15/2024  Patient name: Chavez Newell  : 1965  MRN: [id number]  Referring provider: Iman Sepulveda CRNP  Dx:   Encounter Diagnosis     ICD-10-CM    1. S/P rotator cuff repair  Z98.890       2. Tear of left supraspinatus tendon  M75.102                      Subjective: ***      Objective: See treatment diary below      Assessment: Tolerated treatment {Tolerated treatment :7907685444}. Patient {assessment:9758574558}      Plan: {PLAN:9410345406}     POC expires: 25  Date          Visit count 10 11 12 13         FOTO   RE  Eval                  Precautions: PROTOCOL, sling for 6 weeks DM, COPD    DOS 10/10/24  Manuals 2720   PROM left shoulder  15 20  20 20 20 20 20 20    135 140  90 90 90 110 120 127    110 120  70 70 70 90 90 100   ER @ 45 abduction 40 45 45  25 30 30 30 35 35   Neuro Re-Ed                                       Ther Ex             UBE /4 4/4 3/3      3'/2' 4' 2'   pendulum 5 5 5  8 8 8 8 5'    Elbow AAROM 3 3 D/C  3 3 3  AH    gripping 2 D/c   40x 40x 40x 40x     Pulleys scaption 4' 4' 5'     3' 5' 5'   Ball table slides/ CW/CCW 20 each 20x each 20x     20x  each 30x 20x  each   Wand ER at side 5\"/10x 10x 10x       5\"/10x   Wand FF supine  10x 10x          TB (tube) row BLUE  2x10 BLUE  3x10 3x10       Blue  2x10   TB ext from 45 GRN  2x10 Blue  3x10 3x10       GRN 2x10   TB ER YELW  2x10 RED   2x10 2x10       Yellow  2x10   TB IR RED  2x10 GRN 2x10 2x10                       AROM FF to 90/ scaption  10x 10x each                       Prone horiz abduction   10x          Prone extension   10x          Prone row   10x                                    Ther Activity                                       Gait Training                                       Modalities             CP post 10' 10' 10' AH                                               "

## 2024-12-16 ENCOUNTER — APPOINTMENT (OUTPATIENT)
Dept: PHYSICAL THERAPY | Age: 59
End: 2024-12-16
Payer: MEDICARE

## 2024-12-16 DIAGNOSIS — M75.102 TEAR OF LEFT SUPRASPINATUS TENDON: ICD-10-CM

## 2024-12-16 DIAGNOSIS — Z98.890 S/P ROTATOR CUFF REPAIR: Primary | ICD-10-CM

## 2024-12-16 NOTE — PROGRESS NOTES
"Name: Chavez Newell      : 1965      MRN: [id number]  Encounter Provider: Adenike Garg DO  Encounter Date: 2024   Encounter department: [facility]  :  Assessment & Plan  Acute non-recurrent sinusitis, unspecified location  Rapid COVID (-). History and exam suggestive of double sickening with sinusitis -- will cover with Augmentin. Reviewed possible ADRs including GI upset, encouraged probiotic. Encouraged supportive care with cough medicine, OTC products. To call if symptoms persist/worsen. Pt at increased risk of complication from infection given co-morbidities, including DM, CKD. No evidence of asthma exacerbation on exam.     Orders:  •  amoxicillin-clavulanate (AUGMENTIN) 875-125 mg per tablet; Take 1 tablet by mouth every 12 (twelve) hours for 7 days  •  promethazine-dextromethorphan (PHENERGAN-DM) 6.25-15 mg/5 mL oral syrup; Take 5 mL by mouth 4 (four) times a day as needed for cough           History of Present Illness     HPI    Pt presents due to acute illness, onset about 1 week ago   Notes his symptoms will start to improve, then worsen again   (+) nasal congestion/rhinorrhea, dry cough, headache   Taking Tylenol, cough medicine   No known sick contacts       Review of Systems   Constitutional:  Negative for fever.   HENT:  Positive for congestion, rhinorrhea and sore throat (\"a little bit\"). Negative for ear pain (once in awhile felt hot) and postnasal drip.    Respiratory:  Positive for cough (sometimes with post-tussive emesis -- dry). Negative for chest tightness, shortness of breath and wheezing.    Gastrointestinal:  Negative for abdominal pain, diarrhea and vomiting.   Musculoskeletal:  Negative for myalgias.   Neurological:  Positive for headaches.       Objective   /78 (BP Location: Left arm, Patient Position: Sitting, Cuff Size: Large)   Pulse 72   Temp 99.3 °F (37.4 °C)   Ht 6' 1\" (1.854 m)   Wt 102 kg (225 lb)   SpO2 97%   BMI 29.69 kg/m² "      Physical Exam  Vitals and nursing note reviewed.   Constitutional:       General: He is not in acute distress.     Appearance: Normal appearance. He is well-developed.   HENT:      Head: Normocephalic and atraumatic.      Right Ear: Tympanic membrane, ear canal and external ear normal. There is impacted cerumen (partial). Tympanic membrane is not erythematous, retracted or bulging.      Left Ear: Tympanic membrane, ear canal and external ear normal. There is impacted cerumen (partial). Tympanic membrane is not erythematous, retracted or bulging.      Nose: Mucosal edema and rhinorrhea present.      Right Sinus: No maxillary sinus tenderness or frontal sinus tenderness.      Left Sinus: No maxillary sinus tenderness or frontal sinus tenderness.      Mouth/Throat:      Mouth: Mucous membranes are moist.      Pharynx: No oropharyngeal exudate or posterior oropharyngeal erythema.   Eyes:      Conjunctiva/sclera: Conjunctivae normal.   Neck:      Thyroid: No thyromegaly.   Cardiovascular:      Rate and Rhythm: Normal rate and regular rhythm.   Pulmonary:      Effort: Pulmonary effort is normal. No respiratory distress.      Breath sounds: Normal breath sounds. No wheezing, rhonchi or rales.   Abdominal:      General: Bowel sounds are normal. There is no distension.      Palpations: Abdomen is soft.      Tenderness: There is no abdominal tenderness.   Lymphadenopathy:      Cervical: No cervical adenopathy.   Skin:     General: Skin is warm and dry.   Neurological:      Mental Status: He is alert.      Comments: Grossly intact   Psychiatric:         Mood and Affect: Mood normal.

## 2024-12-17 ENCOUNTER — OFFICE VISIT (OUTPATIENT)
Dept: FAMILY MEDICINE CLINIC | Facility: CLINIC | Age: 59
End: 2024-12-17
Payer: MEDICARE

## 2024-12-17 VITALS
OXYGEN SATURATION: 97 % | BODY MASS INDEX: 29.82 KG/M2 | HEART RATE: 72 BPM | TEMPERATURE: 99.3 F | DIASTOLIC BLOOD PRESSURE: 78 MMHG | HEIGHT: 73 IN | WEIGHT: 225 LBS | SYSTOLIC BLOOD PRESSURE: 112 MMHG

## 2024-12-17 DIAGNOSIS — J01.90 ACUTE NON-RECURRENT SINUSITIS, UNSPECIFIED LOCATION: Primary | ICD-10-CM

## 2024-12-17 PROCEDURE — G2211 COMPLEX E/M VISIT ADD ON: HCPCS | Performed by: FAMILY MEDICINE

## 2024-12-17 PROCEDURE — 99214 OFFICE O/P EST MOD 30 MIN: CPT | Performed by: FAMILY MEDICINE

## 2024-12-17 RX ORDER — DEXTROMETHORPHAN HYDROBROMIDE AND PROMETHAZINE HYDROCHLORIDE 15; 6.25 MG/5ML; MG/5ML
5 SYRUP ORAL 4 TIMES DAILY PRN
Qty: 118 ML | Refills: 0 | Status: SHIPPED | OUTPATIENT
Start: 2024-12-17

## 2024-12-18 ENCOUNTER — APPOINTMENT (OUTPATIENT)
Dept: PHYSICAL THERAPY | Age: 59
End: 2024-12-18
Payer: MEDICARE

## 2024-12-20 ENCOUNTER — OFFICE VISIT (OUTPATIENT)
Dept: PHYSICAL THERAPY | Age: 59
End: 2024-12-20
Payer: MEDICARE

## 2024-12-20 DIAGNOSIS — Z98.890 S/P ROTATOR CUFF REPAIR: Primary | ICD-10-CM

## 2024-12-20 DIAGNOSIS — M75.102 TEAR OF LEFT SUPRASPINATUS TENDON: ICD-10-CM

## 2024-12-20 PROCEDURE — 97110 THERAPEUTIC EXERCISES: CPT | Performed by: PHYSICAL THERAPIST

## 2024-12-20 PROCEDURE — 97140 MANUAL THERAPY 1/> REGIONS: CPT | Performed by: PHYSICAL THERAPIST

## 2024-12-20 NOTE — PROGRESS NOTES
"Daily Note     Today's date: 2024  Patient name: Chavez Newell  : 1965  MRN: [id number]  Referring provider: Iman Sepulveda CRNP  Dx:   Encounter Diagnosis     ICD-10-CM    1. S/P rotator cuff repair  Z98.890       2. Tear of left supraspinatus tendon  M75.102                      Subjective: Patient notes he has been sick now for 12 days. Now on antibiotic. Did not attend PT earlier this week due to illness. Limited activity/shoulder exercise at home due to illness. Notes distal arm pain is reduced. Now sleeping in bed. Unable to tolerate left SL position.      Objective: See treatment diary below      Assessment: Tolerated treatment well. Patient would benefit from continued PT End range stiffness in all planes. Encouraged self ROM at home. Increased exercises with added 1# to PREs. Improved PROM post prolonged manuals today.       Plan: Continue per plan of care.  Progress treament per protocol.      POC expires: 25  Date         Visit count 10 11 12 13 14        FOTO   RE  Eval                  Precautions: PROTOCOL, sling for 6 weeks DM, COPD    DOS 10/10/24  Manuals 2720   PROM left shoulder  15 20 20  20 20 20 20 20    135 140 140  90 90 110 120 127    110 120 130  70 70 90 90 100   ER @ 45 abduction 40 45 45 45 @ 90  30 30 30 35 35   Neuro Re-Ed                                       Ther Ex             UBE / 4/ 3/3 3/3     3'/2' 4' 2'   pendulum 5 5 5 2 8 8 8 8 5'    Elbow AAROM 3 3 D/C  3 3 3  AH    gripping 2 D/c   40x 40x 40x 40x     Pulleys scaption 4' 4' 5' 5'    3' 5' 5'   Ball table slides/ CW/CCW 20 each 20x each 20x nt    20x  each 30x 20x  each   Wand ER at side 5\"/10x 10x 10x 10x      5\"/10x   Wand FF supine  10x 10x 10x         TB (tube) row BLUE  2x10 BLUE  3x10 3x10 3x10      Blue  2x10   TB ext from 45 GRN  2x10 Blue  3x10 3x10 3x10      GRN 2x10   TB ER YELW  2x10 RED   2x10 " 2x10 2x10      Yellow  2x10   TB IR RED  2x10 GRN 2x10 2x10 2x10         ladder    3x         AROM FF to 90/ scaption  10x 10x each 1# FF  0# scap                      Prone horiz abduction   10x 2x10         Prone extension   10x 2x10         Prone row   10x 2x10                                                                          Ther Activity                                       Gait Training                                       Modalities             CP post 10' 10' 10' AH

## 2024-12-20 NOTE — PROGRESS NOTES
"Daily Note     Today's date: 2024  Patient name: Chavez Newell  : 1965  MRN: [id number]  Referring provider: Iman Sepulveda CRNP  Dx:   Encounter Diagnosis     ICD-10-CM    1. S/P rotator cuff repair  Z98.890       2. Tear of left supraspinatus tendon  M75.102           Start Time: 1324  Stop Time: 1420  Total time in clinic (min): 56 minutes    Subjective: Patient notes varying shoulder and bicep discomfort. Notes LBP pain increase over the weekend. Presents with slow guarded ambulation. Notes doing little exercise over the weekend.  Notes still having some difficulty turning the steering wheel with the left. Car crank window easier.       Objective: See treatment diary below      Assessment: Tolerated treatment fairly well. Patient would benefit from continued PT. Initial stiffness eases with prolonged stretch. Encouraged continued ROM at home. Increased PRE where able.       Plan: Continue per plan of care.  Progress treament per protocol.      POC expires: 25  Date        Visit count 10 11 12 13 14 15       FOTO   RE  Eval                  Precautions: PROTOCOL, sling for 6 weeks DM, COPD    DOS 10/10/24  Manuals 2720   PROM left shoulder  15 20 20 20   20 20 20    135 140 140 140   110 120 127    110 120 130 130   90 90 100   ER @ 45 abduction 40 45 45 45 @ 90 50   30 35 35   Neuro Re-Ed                                       Ther Ex             UBE 4/4 4/4 3/3 3/3 3/3    3'/2' 4' 2'   pendulum 5 5 5 2 2   8 5'    Pulleys scaption 4' 4' 5' 5' 4'   3' 5' 5'   Ball table slides/ CW/CCW 20 each 20x each 20x nt    20x  each 30x 20x  each   Wand ER at side 5\"/10x 10x 10x 10x 20x     5\"/10x   Wand FF supine  10x 10x 10x 20x        TB (tube) row BLUE  2x10 BLUE  3x10 3x10 3x10 3x10     Blue  2x10   TB ext from 45 GRN  2x10 Blue  3x10 3x10 3x10 3x10     GRN 2x10   TB ER YELW  2x10 RED   2x10 2x10 2x10 3x10  "    Yellow  2x10   TB IR RED  2x10 GRN 2x10 2x10 2x10 3x10        ladder    3x 3x        AROM FF to 90/ scaption  10x 10x each 1# FF  0# scap 1#  2x10  each                     Prone horiz abduction   10x 2x10 210        Prone extension   10x 2x10 1#  2x10        Prone row   10x 2x10 2#  2x10                                                                         Ther Activity                                       Gait Training                                       Modalities             CP post 10' 10' 10' AH

## 2024-12-23 ENCOUNTER — OFFICE VISIT (OUTPATIENT)
Dept: PHYSICAL THERAPY | Age: 59
End: 2024-12-23
Payer: MEDICARE

## 2024-12-23 DIAGNOSIS — E11.22 TYPE 2 DIABETES MELLITUS WITH STAGE 3A CHRONIC KIDNEY DISEASE, WITHOUT LONG-TERM CURRENT USE OF INSULIN (HCC): ICD-10-CM

## 2024-12-23 DIAGNOSIS — N18.31 TYPE 2 DIABETES MELLITUS WITH STAGE 3A CHRONIC KIDNEY DISEASE, WITHOUT LONG-TERM CURRENT USE OF INSULIN (HCC): ICD-10-CM

## 2024-12-23 DIAGNOSIS — Z98.890 S/P ROTATOR CUFF REPAIR: Primary | ICD-10-CM

## 2024-12-23 DIAGNOSIS — M75.102 TEAR OF LEFT SUPRASPINATUS TENDON: ICD-10-CM

## 2024-12-23 PROCEDURE — 97140 MANUAL THERAPY 1/> REGIONS: CPT | Performed by: PHYSICAL THERAPIST

## 2024-12-23 PROCEDURE — 97110 THERAPEUTIC EXERCISES: CPT | Performed by: PHYSICAL THERAPIST

## 2024-12-24 RX ORDER — EXENATIDE 2 MG/.85ML
INJECTION, SUSPENSION, EXTENDED RELEASE SUBCUTANEOUS
Qty: 10.2 ML | Refills: 1 | Status: SHIPPED | OUTPATIENT
Start: 2024-12-24

## 2024-12-26 ENCOUNTER — OFFICE VISIT (OUTPATIENT)
Dept: PHYSICAL THERAPY | Age: 59
End: 2024-12-26
Payer: MEDICARE

## 2024-12-26 DIAGNOSIS — Z98.890 S/P ROTATOR CUFF REPAIR: Primary | ICD-10-CM

## 2024-12-26 DIAGNOSIS — M75.102 TEAR OF LEFT SUPRASPINATUS TENDON: ICD-10-CM

## 2024-12-26 PROCEDURE — 97110 THERAPEUTIC EXERCISES: CPT | Performed by: PHYSICAL THERAPIST

## 2024-12-26 PROCEDURE — 97140 MANUAL THERAPY 1/> REGIONS: CPT | Performed by: PHYSICAL THERAPIST

## 2024-12-26 NOTE — PROGRESS NOTES
"Daily Note     Today's date: 2024  Patient name: Chavez Newell  : 1965  MRN: [id number]  Referring provider: Iman Sepulveda CRNP  Dx:   Encounter Diagnosis     ICD-10-CM    1. S/P rotator cuff repair  Z98.890       2. Tear of left supraspinatus tendon  M75.102                      Subjective:  0/10 - 4/10 at most. Reduced bicep area pain. Sleeping better. Trying to use more overall.       Objective: See treatment diary below      Assessment: Tolerated treatment well. Patient demonstrated fatigue post treatment and would benefit from continued PT Most limitation in ER/IR PROM. Responds well to prolonged stretch and open pack GH mobs. Slow gains strength with reduced substitution.       Plan: Continue per plan of care.  Progress treament per protocol.      POC expires: 25  Date       Visit count 10 11 12 13 14 15 16      FOTO   RE  Eval                  Precautions: PROTOCOL, sling for 6 weeks DM, COPD    DOS 10/10/24  Manuals 2720   PROM left shoulder  15 20 20 20 15  20 20 20    135 140 140 140 145  110 120 127    110 120 130 130 130  90 90 100   ER @ 45 abduction 40 45 45 45 @ 90 50 55  30 35 35   Neuro Re-Ed                                       Ther Ex             UBE  4/ 3/3 3/3 3/3 4/4   3'/2' 4' 2'   pendulum 5 5 5 2 2 1  8 5'    Pulleys scaption 4' 4' 5' 5' 4' 4'  3' 5' 5'   Ball table slides/ CW/CCW 20 each 20x each 20x nt    20x  each 30x 20x  each   Wand ER at side 5\"/10x 10x 10x 10x 20x 20x    5\"/10x   Wand FF supine  10x 10x 10x 20x 2#  15x       TB (tube) row BLUE  2x10 BLUE  3x10 3x10 3x10 3x10 30x    Blue  2x10   TB ext from 45 GRN  2x10 Blue  3x10 3x10 3x10 3x10 30x    GRN 2x10   TB ER YELW  2x10 RED   2x10 2x10 2x10 3x10 30x    Yellow  2x10   TB IR RED  2x10 GRN 2x10 2x10 2x10 3x10 30x       ladder    3x 3x        AROM FF to 90/ scaption  10x 10x each 1# FF  0# scap " "1#  2x10  each 2#  2x10  1# for scap                    Prone horiz abduction   10x 2x10 210 1#  2x10       Prone extension   10x 2x10 1#  2x10 2#  3x10       Prone row   10x 2x10 2#  2x10 2#  3x10       Serratus punch      2#/30                    Seated ER stretch      20\"/3x  HEP       BTB IR stretch      1x  HEP                    Ther Activity                                       Gait Training                                       Modalities             CP post 10' 10' 10' AH                                                     "

## 2024-12-27 ENCOUNTER — OFFICE VISIT (OUTPATIENT)
Dept: OBGYN CLINIC | Facility: CLINIC | Age: 59
End: 2024-12-27

## 2024-12-27 VITALS — HEIGHT: 73 IN | BODY MASS INDEX: 29.82 KG/M2 | WEIGHT: 225 LBS

## 2024-12-27 DIAGNOSIS — Z98.890 S/P ARTHROSCOPY OF LEFT SHOULDER: Primary | ICD-10-CM

## 2024-12-27 PROCEDURE — 99024 POSTOP FOLLOW-UP VISIT: CPT | Performed by: ORTHOPAEDIC SURGERY

## 2024-12-27 NOTE — PROGRESS NOTES
"Patient Name:  Chavez Newell  MRN:  [id number]    Assessment & Plan     1. S/P arthroscopy of left shoulder  -     Ambulatory Referral to Physical Therapy; Future      Approximately 11 weeks s/p Left shoulder arthroscopic rotator cuff repair, acromioplasty performed on 10/10/2024  Overall, the patient is doing well s/p operative management  He should continue physical therapy and home exercise plan as directed focused on restoring range of motion. Wall climbs were demonstrated in the office today.  OTC analgesics as needed for symptom management  Avoid heavy lifting at this time  Follow up 3 months      History of the Present Illness   Chavez Newell is a 59 y.o. male approximately 11 weeks s/p Left shoulder arthroscopic rotator cuff repair, acromioplasty performed on 10/10/2024. Today the patient reports he is doing much better. He is able to sleep with less pain and is no longer complaining of pain in his biceps. He continues outpatient physical therapy and home exercises as directed. He reports pain with passive motion at PT.       Review of Systems     Review of Systems   Constitutional:  Negative for chills and fever.   HENT:  Negative for ear pain and sore throat.    Eyes:  Negative for pain and visual disturbance.   Respiratory:  Negative for cough and shortness of breath.    Cardiovascular:  Negative for chest pain and palpitations.   Gastrointestinal:  Negative for abdominal pain and vomiting.   Genitourinary:  Negative for dysuria and hematuria.   Musculoskeletal:  Negative for arthralgias and back pain.   Skin:  Negative for color change and rash.   Neurological:  Negative for seizures and syncope.   All other systems reviewed and are negative.      Physical Exam     Ht 6' 1\" (1.854 m)   Wt 102 kg (225 lb)   BMI 29.69 kg/m²     Left Shoulder:   Surgical incisions well healed without signs of infection, drainage or erythema   Active range of motion at elbow WNL  Active assisted range of motion  110 " degrees of forward flexion  90 degrees abduction  40 degrees external rotation  Greater trochanter internal rotation  Passive range of motion   130 degrees of forward flexion   4+/5 forward flexion  4+/5 external rotation  Sensation is intact  The patient is neurovascularly intact distally in the extremity.        Data Review     I have personally reviewed pertinent films in PACS, and my interpretation follows.    None    Social History     Tobacco Use   • Smoking status: Some Days     Types: Cigars     Passive exposure: Never   • Smokeless tobacco: Never   • Tobacco comments:     never inhaled-smokes cigars every now and then   Vaping Use   • Vaping status: Never Used   Substance Use Topics   • Alcohol use: Not Currently     Comment: occasional beer   • Drug use: No           Procedures  None performed    Marisol Macario   Scribe Attestation    I,:  Marisol Maacrio am acting as a scribe while in the presence of the attending physician.:       I,:  Junior Rivera, DO personally performed the services described in this documentation    as scribed in my presence.:

## 2024-12-30 ENCOUNTER — OFFICE VISIT (OUTPATIENT)
Dept: PHYSICAL THERAPY | Age: 59
End: 2024-12-30
Payer: MEDICARE

## 2024-12-30 DIAGNOSIS — Z98.890 S/P ROTATOR CUFF REPAIR: Primary | ICD-10-CM

## 2024-12-30 DIAGNOSIS — M75.102 TEAR OF LEFT SUPRASPINATUS TENDON: ICD-10-CM

## 2024-12-30 PROCEDURE — 97140 MANUAL THERAPY 1/> REGIONS: CPT | Performed by: PHYSICAL THERAPIST

## 2024-12-30 PROCEDURE — 97110 THERAPEUTIC EXERCISES: CPT | Performed by: PHYSICAL THERAPIST

## 2024-12-30 NOTE — PROGRESS NOTES
"Daily Note     Today's date: 2024  Patient name: Chavez Newell  : 1965  MRN: [id number]  Referring provider: Iman Sepulveda CRNP  Dx:   Encounter Diagnosis     ICD-10-CM    1. S/P rotator cuff repair  Z98.890       2. Tear of left supraspinatus tendon  M75.102           Start Time: 1325  Stop Time: 1425  Total time in clinic (min): 60 minutes    Subjective: Patient notes he was seen by MD who advised continued therapy and continued stretching at home. Notes fatigue today.  Notes shoulder 2/10 today. Has been doing more at home as able. Avoiding heavy lifting. Driving easier.       Objective: See treatment diary below      Assessment: Tolerated treatment well. Patient demonstrated fatigue post treatment and would benefit from continued PT Stiffness all end ranges.       Plan: Continue per plan of care.  Progress treatment as tolerated.   Progress treament per protocol.      POC expires: 25  Date      Visit count 10 11 12 13 14 15 16 17     FOTO   RE  Eval                  Precautions: PROTOCOL, sling for 6 weeks DM, COPD    DOS 10/10/24  Manuals 2720      PROM left shoulder  15 20 20 20 15 15       135 140 140 140 145        110 120 130 130 130       ER @ 45 abduction 40 45 45 45 @ 90 50 55       Neuro Re-Ed                                       Ther Ex             UBE  4/4 3/3 3/3 3/3 4/4 3/3      pendulum 5 5 5 2 2 1 1      Pulleys scaption 4' 4' 5' 5' 4' 4' 3      Ball table slides/ CW/CCW 20 each 20x each 20x nt         Wand ER at side 5\"/10x 10x 10x 10x 20x 20x 10\"/  15x      Wand FF supine  10x 10x 10x 20x 2#  15x 2#  10\"  15x      TB (tube) row BLUE  2x10 BLUE  3x10 3x10 3x10 3x10 30x BTB  30x      TB ext from 45 GRN  2x10 Blue  3x10 3x10 3x10 3x10 30x BTB  30x      TB ER YELW  2x10 RED   2x10 2x10 2x10 3x10 30x RTB  30x      TB IR RED  2x10 GRN 2x10 2x10 2x10 3x10 30x RTB  30x      ladder  " "  3x 3x        AROM FF to 90/ scaption  10x 10x each 1# FF  0# scap 1#  2x10  each 2#  2x10  1# for scap 2#  2x10      BTH Er stretch       10x      Prone horiz abduction   10x 2x10 210 1#  2x10 1#  3x10      Prone extension   10x 2x10 1#  2x10 2#  3x10 2#/  3x10      Prone row   10x 2x10 2#  2x10 2#  3x10 2  3x10      Serratus punch      2#/30 2#  3x10                   Seated ER stretch      20\"/3x  HEP HEP      BTB IR stretch      1x  HEP Towel  10x                   Ther Activity                                       Gait Training                                       Modalities             CP post 10' 10' 10' AH                                                       "

## 2025-01-02 ENCOUNTER — OFFICE VISIT (OUTPATIENT)
Dept: PHYSICAL THERAPY | Age: 60
End: 2025-01-02
Payer: MEDICARE

## 2025-01-02 DIAGNOSIS — Z98.890 S/P ROTATOR CUFF REPAIR: Primary | ICD-10-CM

## 2025-01-02 DIAGNOSIS — M75.102 TEAR OF LEFT SUPRASPINATUS TENDON: ICD-10-CM

## 2025-01-02 PROCEDURE — 97110 THERAPEUTIC EXERCISES: CPT | Performed by: PHYSICAL THERAPIST

## 2025-01-02 PROCEDURE — 97140 MANUAL THERAPY 1/> REGIONS: CPT | Performed by: PHYSICAL THERAPIST

## 2025-01-02 NOTE — PROGRESS NOTES
"Daily Note     Today's date: 2025  Patient name: Chavez Newell  : 1965  MRN: [id number]  Referring provider: Iman Sepulveda CRNP  Dx:   Encounter Diagnosis     ICD-10-CM    1. S/P rotator cuff repair  Z98.890       2. Tear of left supraspinatus tendon  M75.102           Start Time: 0958  Stop Time: 1055  Total time in clinic (min): 57 minutes    Subjective: Patient notes bilateral shoulder pain and achiness over the past two days. No known contributing factor other than putting [employer] decorations away. Took pain medication the past two days.       Objective: See treatment diary below      Assessment: Tolerated treatment well. Patient would benefit from continued PT. Improved PROM with prolonged self and therapist directed stretch.      Plan: Continue per plan of care.  Progress treament per protocol.      POC expires: 25  Date     Visit count 10 11 12 13 14 15 16 17 18    FOTO   RE  Eval                  Precautions: PROTOCOL, sling for 6 weeks DM, COPD    DOS 10/10/24  Manuals 2720     PROM left shoulder  15 20 20 20 15 15 15      135 140 140 140 145        110 120 130 130 130       ER @ 45 abduction 40 45 45 45 @ 90 50 55       Neuro Re-Ed                                       Ther Ex             UBE / 3/3 3/3 3/3 4/4 3/3 3/3     pendulum 5 5 5 2 2 1 1 1     Pulleys scaption 4' 4' 5' 5' 4' 4' 3 3'     Ball table slides/ CW/CCW 20 each 20x each 20x nt         Wand ER at side 5\"/10x 10x 10x 10x 20x 20x 10\"/  15x 10\"/  15x     Wand BTB/IR        10x  10\"     Wand FF supine  10x 10x 10x 20x 2#  15x 2#  10\"  15x 3#  10\"/15x     TB (tube) row BLUE  2x10 BLUE  3x10 3x10 3x10 3x10 30x BTB  30x BTB  30x     TB ext from 45 GRN  2x10 Blue  3x10 3x10 3x10 3x10 30x BTB  30x BTB  30x     TB ER YELW  2x10 RED   2x10 2x10 2x10 3x10 30x RTB  30x RTB  30x     TB IR RED  2x10 GRN 2x10 " "2x10 2x10 3x10 30x RTB  30x RTB  30x     ladder    3x 3x        AROM FF to 90/ scaption  10x 10x each 1# FF  0# scap 1#  2x10  each 2#  2x10  1# for scap 2#  2x10 2#  3x10     BTH Er stretch       10x 10x     Prone horiz abduction   10x 2x10 210 1#  2x10 1#  3x10 2#  2x10     Prone extension   10x 2x10 1#  2x10 2#  3x10 2#/  3x10 2#3x10     Prone row   10x 2x10 2#  2x10 2#  3x10 2  3x10 3#  3x10       Serratus punch      2#/30 2#  3x10 3#  3x10                  Seated ER stretch      20\"/3x  HEP HEP      BTB IR stretch      1x  HEP Towel  10x wand                  Ther Activity                                       Gait Training                                       Modalities             CP post 10' 10' 10' AH                                                         "

## 2025-01-06 ENCOUNTER — APPOINTMENT (OUTPATIENT)
Dept: PHYSICAL THERAPY | Age: 60
End: 2025-01-06
Payer: COMMERCIAL

## 2025-01-07 ENCOUNTER — TELEPHONE (OUTPATIENT)
Dept: HEMATOLOGY ONCOLOGY | Facility: CLINIC | Age: 60
End: 2025-01-07

## 2025-01-08 ENCOUNTER — APPOINTMENT (OUTPATIENT)
Dept: PHYSICAL THERAPY | Age: 60
End: 2025-01-08
Payer: COMMERCIAL

## 2025-01-13 ENCOUNTER — TELEPHONE (OUTPATIENT)
Age: 60
End: 2025-01-13

## 2025-01-13 ENCOUNTER — APPOINTMENT (OUTPATIENT)
Dept: PHYSICAL THERAPY | Age: 60
End: 2025-01-13
Payer: COMMERCIAL

## 2025-01-13 NOTE — TELEPHONE ENCOUNTER
Patient calling to let Dr. Garg know that he is still not feeling well since last seen.  Sinus congestion, coughing.  Almost done with cough medicine prescribed.  Please contact patient and advise.  Thank you.

## 2025-01-14 ENCOUNTER — OFFICE VISIT (OUTPATIENT)
Dept: FAMILY MEDICINE CLINIC | Facility: CLINIC | Age: 60
End: 2025-01-14
Payer: MEDICARE

## 2025-01-14 VITALS
HEART RATE: 72 BPM | DIASTOLIC BLOOD PRESSURE: 88 MMHG | BODY MASS INDEX: 29.55 KG/M2 | SYSTOLIC BLOOD PRESSURE: 130 MMHG | OXYGEN SATURATION: 95 % | WEIGHT: 223 LBS | HEIGHT: 73 IN | TEMPERATURE: 97.7 F

## 2025-01-14 DIAGNOSIS — J44.9 CHRONIC OBSTRUCTIVE PULMONARY DISEASE, UNSPECIFIED COPD TYPE (HCC): ICD-10-CM

## 2025-01-14 DIAGNOSIS — J02.9 SORE THROAT: ICD-10-CM

## 2025-01-14 DIAGNOSIS — J10.1 INFLUENZA A: Primary | ICD-10-CM

## 2025-01-14 DIAGNOSIS — N18.31 TYPE 2 DIABETES MELLITUS WITH STAGE 3A CHRONIC KIDNEY DISEASE, WITHOUT LONG-TERM CURRENT USE OF INSULIN (HCC): ICD-10-CM

## 2025-01-14 DIAGNOSIS — F11.20 OPIOID DEPENDENCE, UNCOMPLICATED (HCC): ICD-10-CM

## 2025-01-14 DIAGNOSIS — I71.21 ANEURYSM OF ASCENDING AORTA WITHOUT RUPTURE (HCC): ICD-10-CM

## 2025-01-14 DIAGNOSIS — E11.22 TYPE 2 DIABETES MELLITUS WITH STAGE 3A CHRONIC KIDNEY DISEASE, WITHOUT LONG-TERM CURRENT USE OF INSULIN (HCC): ICD-10-CM

## 2025-01-14 DIAGNOSIS — N18.31 STAGE 3A CHRONIC KIDNEY DISEASE (HCC): ICD-10-CM

## 2025-01-14 PROBLEM — J44.1 COPD EXACERBATION (HCC): Status: ACTIVE | Noted: 2025-01-14

## 2025-01-14 LAB
SARS-COV-2 AG UPPER RESP QL IA: NEGATIVE
SL AMB POCT RAPID FLU A: POSITIVE
SL AMB POCT RAPID FLU B: NEGATIVE
VALID CONTROL: NORMAL

## 2025-01-14 PROCEDURE — 87804 INFLUENZA ASSAY W/OPTIC: CPT | Performed by: FAMILY MEDICINE

## 2025-01-14 PROCEDURE — 87811 SARS-COV-2 COVID19 W/OPTIC: CPT | Performed by: FAMILY MEDICINE

## 2025-01-14 PROCEDURE — 99214 OFFICE O/P EST MOD 30 MIN: CPT | Performed by: FAMILY MEDICINE

## 2025-01-14 RX ORDER — OSELTAMIVIR PHOSPHATE 75 MG/1
75 CAPSULE ORAL EVERY 12 HOURS SCHEDULED
Qty: 10 CAPSULE | Refills: 0 | Status: SHIPPED | OUTPATIENT
Start: 2025-01-14 | End: 2025-01-19

## 2025-01-14 RX ORDER — OSELTAMIVIR PHOSPHATE 30 MG/1
30 CAPSULE ORAL EVERY 12 HOURS SCHEDULED
Qty: 10 CAPSULE | Refills: 0 | Status: SHIPPED | OUTPATIENT
Start: 2025-01-14 | End: 2025-01-14 | Stop reason: SDUPTHER

## 2025-01-14 RX ORDER — BENZONATATE 200 MG/1
200 CAPSULE ORAL 3 TIMES DAILY PRN
Qty: 30 CAPSULE | Refills: 0 | Status: SHIPPED | OUTPATIENT
Start: 2025-01-14

## 2025-01-14 NOTE — PROGRESS NOTES
Name: Chavez Newell      : 1965      MRN: [id number]  Encounter Provider: Adenike Garg DO  Encounter Date: 2025   Encounter department: [facility]  :  Assessment & Plan  Influenza A  Flu A (+). Pt is outside normal Tamiflu window; however, given increased risk of complication from infection due to co-morbidities (including DM, COPD), reasonable to start at this time. Reviewed supportive care with fluids, rest, OTC products. Will trial Tessalon for cough. To call if symptoms persist/worsen.   Orders:  •  benzonatate (TESSALON) 200 MG capsule; Take 1 capsule (200 mg total) by mouth 3 (three) times a day as needed for cough  •  oseltamivir (TAMIFLU) 75 mg capsule; Take 1 capsule (75 mg total) by mouth every 12 (twelve) hours for 5 days    Sore throat    Orders:  •  POCT Rapid Covid Ag  •  POCT rapid flu A and B    Chronic obstructive pulmonary disease, unspecified COPD type (HCC)  No exacerbation on exam        Opioid dependence, uncomplicated (HCC)  Tramadol managed by [facility] for chronic chronic symptoms -- agreement on file, last visit 2024        Aneurysm of ascending aorta without rupture (HCC)  Monitored yearly, stable on most recent imaging in 10/2024        Type 2 diabetes mellitus with stage 3a chronic kidney disease, without long-term current use of insulin (HCC)  Close to goal -- continue current regimen + lifestyle management   Lab Results   Component Value Date    HGBA1C 7.4 (A) 2024          Stage 3a chronic kidney disease (HCC)  Stable on most recent labs, continue good hydration               History of Present Illness     HPI    Pt presents due to acute illness  Of note, pt seen  due to acute illness x1 week. COVID (-). Given Augmentin for sinusitis. States his symptoms improved, though then had various cold symptoms off and on since. Then, had significant symptom onset 1/10 as below   (+) dry cough, sore throat, headache, myalgia   Taking  "Tylenol \"all day long\", Nyquil        Review of Systems   Constitutional:  Negative for fever.   HENT:  Positive for ear pain (intermittent), postnasal drip (only once yesterday) and sore throat. Negative for congestion and rhinorrhea.    Respiratory:  Positive for cough (dry) and chest tightness (sometimes). Negative for shortness of breath.    Gastrointestinal:  Positive for vomiting (post-tussive). Negative for abdominal pain and diarrhea.   Musculoskeletal:  Positive for myalgias.   Neurological:  Positive for headaches.       Objective   /88   Pulse 72   Temp 97.7 °F (36.5 °C)   Ht 6' 1\" (1.854 m)   Wt 101 kg (223 lb)   SpO2 95%   BMI 29.42 kg/m²      Physical Exam  Vitals and nursing note reviewed.   Constitutional:       General: He is not in acute distress.     Appearance: Normal appearance. He is well-developed.   HENT:      Head: Normocephalic and atraumatic.      Right Ear: Tympanic membrane, ear canal and external ear normal. Tympanic membrane is not erythematous, retracted or bulging.      Left Ear: Tympanic membrane, ear canal and external ear normal. Tympanic membrane is not erythematous, retracted or bulging.      Nose: Rhinorrhea present.      Right Sinus: No maxillary sinus tenderness or frontal sinus tenderness.      Left Sinus: No maxillary sinus tenderness or frontal sinus tenderness.      Mouth/Throat:      Mouth: Mucous membranes are moist.      Pharynx: No oropharyngeal exudate or posterior oropharyngeal erythema.   Eyes:      Conjunctiva/sclera: Conjunctivae normal.   Neck:      Thyroid: No thyromegaly.   Cardiovascular:      Rate and Rhythm: Normal rate and regular rhythm.   Pulmonary:      Effort: Pulmonary effort is normal. No respiratory distress.      Breath sounds: Normal breath sounds. No wheezing, rhonchi or rales.   Abdominal:      General: Bowel sounds are normal. There is no distension.      Palpations: Abdomen is soft.      Tenderness: There is no abdominal " tenderness.   Lymphadenopathy:      Cervical: No cervical adenopathy.   Skin:     General: Skin is warm and dry.   Neurological:      Mental Status: He is alert.      Comments: Grossly intact   Psychiatric:         Mood and Affect: Mood normal.

## 2025-01-14 NOTE — ASSESSMENT & PLAN NOTE
Close to goal -- continue current regimen + lifestyle management   Lab Results   Component Value Date    HGBA1C 7.4 (A) 09/30/2024

## 2025-01-17 ENCOUNTER — APPOINTMENT (OUTPATIENT)
Dept: PHYSICAL THERAPY | Age: 60
End: 2025-01-17
Payer: COMMERCIAL

## 2025-01-20 ENCOUNTER — APPOINTMENT (OUTPATIENT)
Dept: PHYSICAL THERAPY | Age: 60
End: 2025-01-20
Payer: COMMERCIAL

## 2025-01-23 ENCOUNTER — APPOINTMENT (OUTPATIENT)
Dept: PHYSICAL THERAPY | Age: 60
End: 2025-01-23
Payer: COMMERCIAL

## 2025-01-27 ENCOUNTER — APPOINTMENT (OUTPATIENT)
Dept: PHYSICAL THERAPY | Age: 60
End: 2025-01-27
Payer: COMMERCIAL

## 2025-01-29 ENCOUNTER — RA CDI HCC (OUTPATIENT)
Dept: OTHER | Facility: HOSPITAL | Age: 60
End: 2025-01-29

## 2025-01-31 ENCOUNTER — APPOINTMENT (OUTPATIENT)
Dept: PHYSICAL THERAPY | Age: 60
End: 2025-01-31
Payer: COMMERCIAL

## 2025-02-03 DIAGNOSIS — N18.31 TYPE 2 DIABETES MELLITUS WITH STAGE 3A CHRONIC KIDNEY DISEASE, WITHOUT LONG-TERM CURRENT USE OF INSULIN (HCC): ICD-10-CM

## 2025-02-03 DIAGNOSIS — E11.22 TYPE 2 DIABETES MELLITUS WITH STAGE 3A CHRONIC KIDNEY DISEASE, WITHOUT LONG-TERM CURRENT USE OF INSULIN (HCC): ICD-10-CM

## 2025-02-03 PROBLEM — E11.69 HYPERLIPIDEMIA ASSOCIATED WITH TYPE 2 DIABETES MELLITUS  (HCC): Status: ACTIVE | Noted: 2018-05-07

## 2025-02-03 PROBLEM — E78.5 HYPERLIPIDEMIA ASSOCIATED WITH TYPE 2 DIABETES MELLITUS  (HCC): Status: ACTIVE | Noted: 2018-05-07

## 2025-02-03 NOTE — ASSESSMENT & PLAN NOTE
A1c 7.1% (from 7.4) -- improved and close to goal. Continue current regimen     Lab Results   Component Value Date    HGBA1C 7.1 (A) 02/04/2025     Orders:  •  POCT hemoglobin A1c  •  Comprehensive metabolic panel; Future  •  CBC and differential; Future  •  Albumin / creatinine urine ratio; Future  •  Lipid Panel with Direct LDL reflex; Future  •  TSH, 3rd generation with Free T4 reflex; Future

## 2025-02-03 NOTE — ASSESSMENT & PLAN NOTE
In setting of chronic pain, managed by [facility]. UDS + contract UTD -- continue f/u as scheduled

## 2025-02-03 NOTE — PROGRESS NOTES
Name: Chavez Newell      : 1965      MRN: [id number]  Encounter Provider: Adenike Garg DO  Encounter Date: 2025   Encounter department: [facility]  :  Assessment & Plan  Type 2 diabetes mellitus with stage 3a chronic kidney disease, without long-term current use of insulin (HCC)  A1c 7.1% (from 7.4) -- improved and close to goal. Continue current regimen     Lab Results   Component Value Date    HGBA1C 7.1 (A) 2025     Orders:  •  POCT hemoglobin A1c  •  Comprehensive metabolic panel; Future  •  CBC and differential; Future  •  Albumin / creatinine urine ratio; Future  •  Lipid Panel with Direct LDL reflex; Future  •  TSH, 3rd generation with Free T4 reflex; Future    Hyperlipidemia associated with type 2 diabetes mellitus  (HCC)  Update lipids, continue statin   Lab Results   Component Value Date    HGBA1C 7.1 (A) 2025     Orders:  •  Comprehensive metabolic panel; Future  •  CBC and differential; Future  •  Lipid Panel with Direct LDL reflex; Future    Chronic obstructive pulmonary disease, unspecified COPD type (HCC)  No exacerbation on exam, continue PRN RUDY   Orders:  •  CBC and differential; Future    Stage 3a chronic kidney disease (HCC)  Kidney function stable on most recent labs. Continue good hydration, f/u with Nephro as scheduled   Orders:  •  Comprehensive metabolic panel; Future  •  Albumin / creatinine urine ratio; Future    Uncomplicated opioid dependence (HCC)  In setting of chronic pain, managed by [facility]. UDS + contract UTD -- continue f/u as scheduled        Chronic pain syndrome  See opioid use        Aneurysm of ascending aorta without rupture (HCC)  Stable on most recent imaging -- will be due for repeat in 2025        Healthcare maintenance  BP WNL   CRC UTD   PSA DUE -- ordered   Lung CA UTD   Vaccinations: Pneumo UTD, TDap UTD, Shingles UTD, Flu UTD, COVID defers  Encouraged regular physical activity, varied diet, and regular  "dental/eye exams          Screening for prostate cancer    Orders:  •  PSA, Total Screen; Future           History of Present Illness   HPI    Pt presents for chronic follow up, annual physical     DM: Home sugars are \"up and down\", sometimes in 250-280s after eating, with fasting 140-200s   HLD: On statin   CKD: \"Probably not enough water\"   COPD/Asthma: Breathing is \"good\"   Chronic Pain on Opioids: Follows with Pain Management   AAA: 4.6 on CT in 10/2024     Review of Systems   Constitutional:  Negative for unexpected weight change.   HENT:  Negative for congestion, ear pain, rhinorrhea and sore throat.    Eyes:  Negative for visual disturbance.   Respiratory:  Positive for cough (sometimes wakes up at night coughing). Negative for shortness of breath.    Cardiovascular:  Negative for chest pain, palpitations and leg swelling.   Gastrointestinal:  Negative for abdominal pain, blood in stool, constipation and diarrhea.        (+) reflux if he lays down after eating   Endocrine: Negative for polyuria.   Genitourinary:  Negative for dysuria and hematuria.   Musculoskeletal:  Positive for arthralgias (doing exercises at home with shoulder, has f/u with Ortho next month).   Neurological:  Positive for headaches (\"now and then\" \"I think that's from girlfriend's daughter\"). Negative for dizziness.   Psychiatric/Behavioral:  Negative for sleep disturbance (gets 4-5 hours, wakes up for a few hours, then goes back to sleep).        Objective   /74   Pulse 82   Temp 99.2 °F (37.3 °C)   Ht 6' 1\" (1.854 m)   Wt 102 kg (225 lb 12.8 oz)   SpO2 96%   BMI 29.79 kg/m²      Physical Exam  Vitals and nursing note reviewed.   Constitutional:       General: He is not in acute distress.     Appearance: Normal appearance. He is well-developed.   HENT:      Head: Normocephalic and atraumatic.      Right Ear: Tympanic membrane, ear canal and external ear normal.      Left Ear: Tympanic membrane, ear canal and external ear " normal.      Nose: Nose normal. No rhinorrhea.      Mouth/Throat:      Mouth: Mucous membranes are moist.      Pharynx: No oropharyngeal exudate or posterior oropharyngeal erythema.   Eyes:      Conjunctiva/sclera: Conjunctivae normal.   Neck:      Thyroid: No thyromegaly.   Cardiovascular:      Rate and Rhythm: Normal rate and regular rhythm.   Pulmonary:      Effort: Pulmonary effort is normal. No respiratory distress.      Breath sounds: Normal breath sounds.   Abdominal:      General: Bowel sounds are normal. There is no distension.      Palpations: Abdomen is soft.      Tenderness: There is no abdominal tenderness.   Musculoskeletal:      Right lower leg: No edema.      Left lower leg: No edema.   Lymphadenopathy:      Cervical: No cervical adenopathy.   Skin:     General: Skin is warm and dry.   Neurological:      Mental Status: He is alert.      Comments: Grossly intact   Psychiatric:         Mood and Affect: Mood normal.

## 2025-02-03 NOTE — ASSESSMENT & PLAN NOTE
Kidney function stable on most recent labs. Continue good hydration, f/u with Nephro as scheduled   Orders:  •  Comprehensive metabolic panel; Future  •  Albumin / creatinine urine ratio; Future

## 2025-02-03 NOTE — ASSESSMENT & PLAN NOTE
Update lipids, continue statin   Lab Results   Component Value Date    HGBA1C 7.1 (A) 02/04/2025     Orders:  •  Comprehensive metabolic panel; Future  •  CBC and differential; Future  •  Lipid Panel with Direct LDL reflex; Future

## 2025-02-04 ENCOUNTER — OFFICE VISIT (OUTPATIENT)
Dept: FAMILY MEDICINE CLINIC | Facility: CLINIC | Age: 60
End: 2025-02-04
Payer: COMMERCIAL

## 2025-02-04 VITALS
SYSTOLIC BLOOD PRESSURE: 130 MMHG | TEMPERATURE: 99.2 F | OXYGEN SATURATION: 96 % | HEIGHT: 73 IN | DIASTOLIC BLOOD PRESSURE: 74 MMHG | BODY MASS INDEX: 29.93 KG/M2 | WEIGHT: 225.8 LBS | HEART RATE: 82 BPM

## 2025-02-04 DIAGNOSIS — Z00.00 HEALTHCARE MAINTENANCE: ICD-10-CM

## 2025-02-04 DIAGNOSIS — N18.31 STAGE 3A CHRONIC KIDNEY DISEASE (HCC): ICD-10-CM

## 2025-02-04 DIAGNOSIS — F11.20 UNCOMPLICATED OPIOID DEPENDENCE (HCC): ICD-10-CM

## 2025-02-04 DIAGNOSIS — E11.22 TYPE 2 DIABETES MELLITUS WITH STAGE 3A CHRONIC KIDNEY DISEASE, WITHOUT LONG-TERM CURRENT USE OF INSULIN (HCC): Primary | ICD-10-CM

## 2025-02-04 DIAGNOSIS — E78.5 HYPERLIPIDEMIA ASSOCIATED WITH TYPE 2 DIABETES MELLITUS  (HCC): ICD-10-CM

## 2025-02-04 DIAGNOSIS — N18.31 TYPE 2 DIABETES MELLITUS WITH STAGE 3A CHRONIC KIDNEY DISEASE, WITHOUT LONG-TERM CURRENT USE OF INSULIN (HCC): Primary | ICD-10-CM

## 2025-02-04 DIAGNOSIS — J44.9 CHRONIC OBSTRUCTIVE PULMONARY DISEASE, UNSPECIFIED COPD TYPE (HCC): ICD-10-CM

## 2025-02-04 DIAGNOSIS — Z12.5 SCREENING FOR PROSTATE CANCER: ICD-10-CM

## 2025-02-04 DIAGNOSIS — E11.69 HYPERLIPIDEMIA ASSOCIATED WITH TYPE 2 DIABETES MELLITUS  (HCC): ICD-10-CM

## 2025-02-04 DIAGNOSIS — G89.4 CHRONIC PAIN SYNDROME: ICD-10-CM

## 2025-02-04 DIAGNOSIS — I71.21 ANEURYSM OF ASCENDING AORTA WITHOUT RUPTURE (HCC): ICD-10-CM

## 2025-02-04 LAB — SL AMB POCT HEMOGLOBIN AIC: 7.1 (ref ?–6.5)

## 2025-02-04 PROCEDURE — 99396 PREV VISIT EST AGE 40-64: CPT | Performed by: FAMILY MEDICINE

## 2025-02-04 PROCEDURE — 83036 HEMOGLOBIN GLYCOSYLATED A1C: CPT | Performed by: FAMILY MEDICINE

## 2025-02-04 PROCEDURE — 99214 OFFICE O/P EST MOD 30 MIN: CPT | Performed by: FAMILY MEDICINE

## 2025-02-04 RX ORDER — INSULIN LISPRO 100 [IU]/ML
INJECTION, SOLUTION INTRAVENOUS; SUBCUTANEOUS
Qty: 15 ML | Refills: 2 | Status: SHIPPED | OUTPATIENT
Start: 2025-02-04

## 2025-02-08 DIAGNOSIS — I82.411 ACUTE DEEP VEIN THROMBOSIS (DVT) OF FEMORAL VEIN OF RIGHT LOWER EXTREMITY (HCC): ICD-10-CM

## 2025-02-10 RX ORDER — WARFARIN SODIUM 3 MG/1
TABLET ORAL
Qty: 90 TABLET | Refills: 0 | Status: SHIPPED | OUTPATIENT
Start: 2025-02-10

## 2025-02-17 NOTE — PROGRESS NOTES
Pain Medicine Follow-Up Note    Assessment:  1. Chronic pain syndrome    2. Lumbar spondylosis    3. Opioid dependence, uncomplicated (HCC)    4. S/P arthroscopy of left shoulder    5. Chronic right hip pain    6. Long-term current use of opiate analgesic    7. Uncomplicated opioid dependence (HCC)        Plan:  Orders Placed This Encounter   Procedures   • MM ALL_Prescribed Meds and Special Instructions     [facility] Is GABAPENTIN prescribed?:   Yes     [facility] Is NALOXONE Prescribed?:   Yes     [facility] Is PROMETHAZINE prescribed?:   Yes     [facility] Is TRAMADOL prescribed?:   Yes   • MM DT_Alprazolam Definitive Test   • MM DT_Amphetamine Definitive Test   • MM DT_Buprenorphine Definitive Test   • MM DT_Butalbital Definitive Test   • MM DT_Clonazepam Definitive Test   • MM DT_Cocaine Definitive Test   • MM DT_Codeine Definitive Test   • MM DT_Dextromethorphan Definitive Test   • MM Diazepam Definitive Test   • MM DT_Ethyl Glucuronide/Ethyl Sulfate Definitive Test   • MM DT_Fentanyl Definitive Test   • MM DT_Heroin Definitive Test   • MM DT_Hydrocodone Definitive Test   • MM DT_Hydromorphone Definitive Test   • MM DT_Kratom Definitive Test   • MM DT_Levorphanol Definitive Test   • MM DT_MDMA Definitive Test   • MM DT_Meperidine Definitive Test   • MM DT_Methadone Definitive Test   • MM DT_Methamphetamine Definitive Test   • MM DT_Methylphenidate Definitive Test   • MM DT_Morphine Definitive Test   • MM Lorazepam Definitive Test   • MM DT_Oxazepam Definitive Test   • MM DT_Oxycodone Definitive Test   • MM DT_Oxymorphone Definitive Test   • MM DT_Phencyclidine Definitive Test   • MM DT_Phenobarbital Definitive Test   • MM DT_Phentermine Definitive Test   • MM DT_Secobarbital Definitive Test   • MM DT_Spice Definitive Test   • MM DT_Tapentadol Definitive Test   • MM DT_Temazapam Definitive Test   • MM DT_THC Definitive Test   • MM DT_Tramadol Definitive Test   • MM DT_Validity Creatinine       New Medications  Ordered This Visit   Medications   • traMADol (Ultram) 50 mg tablet     Sig: May take 2 tablets (100 mg total) by mouth daily as needed for moderate pain. May also take 1 tablet (50 mg total) every 8 (eight) hours as needed for moderate pain. Max 5 tablets per day.     Dispense:  150 tablet     Refill:  2     Fill on 2/25/2025 and refill on or about 3/25/2025 and 4/24/2025       My impressions and treatment recommendations were discussed in detail with the patient who verbalized understanding and had no further questions.      Patient is status post left shoulder arthroscopy 10/10/2024 patient reports improvement but still continues with significant pain.  Patient states unfortunately he had to discontinue physical therapy due to it being a financial burden but he continues with a home exercise plan.    The patient continues to report an overall reduction of his pain level and improvement with his functioning without significant side effects using tramadol patient takes 2 tablets in the morning and then takes 1 tablet every 8 hours as needed for moderate pain, therefore I will continue the patient on this medication.  Tramadol 50 mg tablet e-prescribed to the patient's pharmacy with a do not fill until date of 2/25/2025 and refill on or about 3/25/2025 and 4/24/2025.     [place] Prescription Drug Monitoring Program report was reviewed and was appropriate     A urine drug screen was collected at today's office visit as part of our medication management protocol. The point of care testing results were appropriate for what was being prescribed. The specimen will be sent for confirmatory testing. The drug screen is medically necessary because the patient is either dependent on opioid medication or is being considered for opioid medication therapy and the results could impact ongoing or future treatment. The drug screen is to evaluate for the presences or absence of prescribed, non-prescribed, and/or illicit  drugs/substances.    There are risks associated with opioid medications, including dependence, addiction and tolerance. The patient understands and agrees to use these medications only as prescribed. Potential side effects of the medications include, but are not limited to, constipation, drowsiness, addiction, impaired judgment and risk of fatal overdose if not taken as prescribed. The patient was warned against driving while taking sedation medications.  Sharing medications is a felony. At this point in time, the patient is showing no signs of addiction, abuse, diversion or suicidal ideation.    An opioid contract was reviewed with the patient.  The patient was made aware they are only to receive opioid medication from our office, and must take the medication as prescribed.  If the medication is lost or stolen, it will not be replaced.  We also do not condone the use of illegal substances or alcohol with opioid medication.  Random urine drug screens and pill counts will also be performed at office visits. Lastly, the patient was informed that office visits are needed for refills.  Patient was agreeable and signed the contract.      Follow-up is planned in 12 weeks time or sooner as warranted.  Discharge instructions were provided. I personally saw and examined the patient and I agree with the above discussed plan of care.    History of Present Illness:    Chavez Newell is a 59 y.o. male who presents to [facility] for interval re-evaluation of the above stated pain complaints. The patient has a past medical and chronic pain history as outlined in the assessment section. He was last seen on 11/21/2024.    At today's visit patient states that their pain symptoms are the same with a pain score of 3/10 on the verbal numeric pain scale.  The patient's pain is worse in the morning and in the evening.  The patient's pain is occasional in nature.  And the quality of the patient's pain is described  as sharp, pressure-like, and shooting.  The patient's pain is located in the left shoulder, bilateral/mid low back, right hip, and left knee.  Patient states the amount of pain relief he is obtaining from his current pain relievers is sometimes enough to make a difference in his life by taking the edge off.  Patient denies any side effects using tramadol.    Pain Contract Signed:  11/16/23  Last Urine Drug Screen:  11/21/24  New Urine Drug Screen: 02/20/25  Last dose of opioid medication:  02/19/25    Other than as stated above, the patient denies any interval changes in medications, medical condition, mental condition, symptoms, or allergies since the last office visit.         Review of Systems:    Review of Systems   Respiratory:  Negative for shortness of breath.    Cardiovascular:  Negative for chest pain.   Gastrointestinal:  Negative for constipation, diarrhea, nausea and vomiting.   Musculoskeletal:  Positive for arthralgias and myalgias. Negative for gait problem and joint swelling.        DROM   Skin:  Negative for rash.   Neurological:  Negative for dizziness, seizures and weakness.   All other systems reviewed and are negative.        Past Medical History:   Diagnosis Date   • Anxiety 03/10/2022   • Aorta aneurysm ([facility])     4.3   • Arm DVT (deep venous thromboembolism), acute ([facility]) 2015    complications of cardiac cath   • Asthma    • Blood clot in vein     right leg   • Chronic kidney disease    • CKD (chronic kidney disease), stage III ([facility])    • COPD (chronic obstructive pulmonary disease) ([facility])    • Depression    • Diabetes mellitus ([facility])    • Essential hypertriglyceridemia    • GERD (gastroesophageal reflux disease)    • Headache    • Hiatal hernia    • Hyperlipidemia, mixed 05/07/2018   • Kidney stone    • Liver disease     FATTY LIVER   • Mild episode of recurrent major depressive disorder ([facility]) 03/10/2022   • PAC (premature atrial contraction)    • Prediabetes    • Rectus sheath hematoma, initial  encounter 08/09/2023   • Renal calculi    • Sleep apnea    • Vestibular migraine        Past Surgical History:   Procedure Laterality Date   • CARPAL TUNNEL RELEASE Left    • COLONOSCOPY     • FL INJECTION LEFT SHOULDER (ARTHROGRAM)  9/13/2024   • FL INJECTION RIGHT HIP (ARTHROGRAM)  08/20/2018   • FOOT SURGERY Left     FOREIGN BODY REMOVAL   • HERNIA REPAIR     • NE ARTHRP ACETBLR/PROX FEM PROSTC AGRFT/ALGRFT Right 09/28/2020    Procedure: ARTHROPLASTY HIP TOTAL;  Surgeon: Jyoti Araiza MD;  Location: [facility];  Service: Orthopedics   • NE COLONOSCOPY FLX DX W/COLLJ SPEC WHEN PFRMD N/A 08/07/2017    Procedure: COLONOSCOPY;  Surgeon: Anthony France MD;  Location: [facility];  Service: Gastroenterology   • NE ESOPHAGOGASTRODUODENOSCOPY TRANSORAL DIAGNOSTIC N/A 10/31/2017    Procedure: ESOPHAGOGASTRODUODENOSCOPY (EGD);  Surgeon: Anthony France MD;  Location: [facility];  Service: Gastroenterology   • NE SURGICAL ARTHROSCOPY SHOULDER W/ROTATOR CUFF RPR Left 10/10/2024    Procedure: REPAIR ROTATOR CUFF  ARTHROSCOPIC LEFT ACROMIOPLASTY;  Surgeon: Junior Rivera DO;  Location: [facility];  Service: Orthopedics   • TOTAL HIP ARTHROPLASTY Right 2020       Family History   Problem Relation Age of Onset   • Arthritis Mother    • Heart attack Father    • Clotting disorder Father    • Schizoaffective Disorder  Sister    • Cancer Brother    • Prostate cancer Brother    • Leukemia Brother         his only brother dies from lymphoma or leukemia pt unsure he was only 54   • Aortic aneurysm Other         abdominal       Social History     Occupational History   • Occupation: unemployed   Tobacco Use   • Smoking status: Some Days     Types: Cigars     Passive exposure: Never   • Smokeless tobacco: Never   • Tobacco comments:     never inhaled-smokes cigars every now and then   Vaping Use   • Vaping status: Never Used   Substance and Sexual Activity   • Alcohol use: Not Currently     Comment: occasional beer   • Drug  use: No   • Sexual activity: Yes     Partners: Female         Current Outpatient Medications:   •  acetaminophen (TYLENOL) 500 mg tablet, Take 500 mg by mouth every 6 (six) hours as needed for mild pain, Disp: , Rfl:   •  Bydureon BCise 2 MG/0.85ML AUIJ, inject 2 milligrams subcutaneously weekly, Disp: 10.2 mL, Rfl: 1  •  Continuous Blood Gluc Sensor (FreeStyle Jelly 3 Sensor) MISC, Use 1 each every 14 (fourteen) days, Disp: 6 each, Rfl: 3  •  fenofibrate 160 MG tablet, take 1 tablet by mouth once daily, Disp: 90 tablet, Rfl: 1  •  gabapentin (NEURONTIN) 300 mg capsule, take 1 capsule by mouth at bedtime, Disp: 30 capsule, Rfl: 5  •  glucose blood test strip, TEST BS TID, Disp: 300 each, Rfl: 5  •  glucose monitoring kit (FREESTYLE) monitoring kit, Use 1 each daily before breakfast, Disp: 1 each, Rfl: 0  •  Insulin Glargine Solostar (Lantus SoloStar) 100 UNIT/ML SOPN, inject 20 units subcutaneously daily or as directed by prescriber, Disp: 15 mL, Rfl: 2  •  insulin lispro (HumaLOG KwikPen) 100 units/mL injection pen, PER SLIDING SCALE inject subcutaneously A MAX OF 50 UNITS DAILY, Disp: 15 mL, Rfl: 2  •  Insulin Pen Needle (BD Pen Needle Evelina 2nd Gen) 32G X 4 MM MISC, Inject as directed 4 (four) times a day, Disp: 500 each, Rfl: 5  •  Lancets MISC, Use daily before breakfast, Disp: 100 each, Rfl: 5  •  pantoprazole (PROTONIX) 40 mg tablet, take 1 tablet by mouth once daily, Disp: 90 tablet, Rfl: 1  •  rosuvastatin (CRESTOR) 5 mg tablet, take 1 tablet by mouth once daily, Disp: 90 tablet, Rfl: 1  •  sildenafil (REVATIO) 20 mg tablet, TAKE 1 TABLET (20 MG TOTAL) BY MOUTH DAILY AS DIRECTED, Disp: 90 tablet, Rfl: 3  •  traMADol (Ultram) 50 mg tablet, May take 2 tablets (100 mg total) by mouth daily as needed for moderate pain. May also take 1 tablet (50 mg total) every 8 (eight) hours as needed for moderate pain. Max 5 tablets per day., Disp: 150 tablet, Rfl: 2  •  warfarin (COUMADIN) 2 mg tablet, Take 1 tablet by mouth  "Monday, Wednesday, Friday or as directed by PCP, Disp: 90 tablet, Rfl: 1  •  warfarin (COUMADIN) 3 mg tablet, take 1 tablet by mouth TUESDAY, THURSDAY, SATURDAY, AND SUNDAY; OR AS DIRECTED BY PCP, Disp: 90 tablet, Rfl: 0  •  benzonatate (TESSALON) 200 MG capsule, Take 1 capsule (200 mg total) by mouth 3 (three) times a day as needed for cough, Disp: 30 capsule, Rfl: 0  •  naloxone (NARCAN) 4 mg/0.1 mL nasal spray, Administer 1 spray into a nostril. If no response after 2-3 minutes, give another dose in the other nostril using a new spray. (Patient not taking: Reported on 2/20/2025), Disp: 1 each, Rfl: 1  •  promethazine-dextromethorphan (PHENERGAN-DM) 6.25-15 mg/5 mL oral syrup, Take 5 mL by mouth 4 (four) times a day as needed for cough, Disp: 118 mL, Rfl: 0    No Known Allergies    Physical Exam:    Ht 6' 1\" (1.854 m)   Wt 102 kg (224 lb 13.9 oz)   BMI 29.67 kg/m²     Constitutional:normal, well developed, well nourished, alert, in no distress and non-toxic and no overt pain behavior.  Eyes:anicteric  HEENT:grossly intact  Neck:supple, symmetric, trachea midline and no masses   Pulmonary:even and unlabored  Cardiovascular:No edema or pitting edema present  Skin:Normal without rashes or lesions and well hydrated  Psychiatric:Mood and affect appropriate  Neurologic:Cranial Nerves II-XII grossly intact  Musculoskeletal:antalgic gait      Orders Placed This Encounter   Procedures   • MM ALL_Prescribed Meds and Special Instructions   • MM DT_Alprazolam Definitive Test   • MM DT_Amphetamine Definitive Test   • MM DT_Buprenorphine Definitive Test   • MM DT_Butalbital Definitive Test   • MM DT_Clonazepam Definitive Test   • MM DT_Cocaine Definitive Test   • MM DT_Codeine Definitive Test   • MM DT_Dextromethorphan Definitive Test   • MM Diazepam Definitive Test   • MM DT_Ethyl Glucuronide/Ethyl Sulfate Definitive Test   • MM DT_Fentanyl Definitive Test   • MM DT_Heroin Definitive Test   • MM DT_Hydrocodone Definitive " Test   • MM DT_Hydromorphone Definitive Test   • MM DT_Kratom Definitive Test   • MM DT_Levorphanol Definitive Test   • MM DT_MDMA Definitive Test   • MM DT_Meperidine Definitive Test   • MM DT_Methadone Definitive Test   • MM DT_Methamphetamine Definitive Test   • MM DT_Methylphenidate Definitive Test   • MM DT_Morphine Definitive Test   • MM Lorazepam Definitive Test   • MM DT_Oxazepam Definitive Test   • MM DT_Oxycodone Definitive Test   • MM DT_Oxymorphone Definitive Test   • MM DT_Phencyclidine Definitive Test   • MM DT_Phenobarbital Definitive Test   • MM DT_Phentermine Definitive Test   • MM DT_Secobarbital Definitive Test   • MM DT_Spice Definitive Test   • MM DT_Tapentadol Definitive Test   • MM DT_Temazapam Definitive Test   • MM DT_THC Definitive Test   • MM DT_Tramadol Definitive Test   • MM DT_Validity Creatinine       This document was created using speech voice recognition software.   Grammatical errors, random word insertions, pronoun errors, and incomplete sentences are an occasional consequence of this system due to software limitations, ambient noise, and hardware issues.   Any formal questions or concerns about content, text, or information contained within the body of this dictation should be directly addressed to the provider for clarification.

## 2025-02-20 ENCOUNTER — OFFICE VISIT (OUTPATIENT)
Dept: PAIN MEDICINE | Facility: CLINIC | Age: 60
End: 2025-02-20
Payer: COMMERCIAL

## 2025-02-20 VITALS — BODY MASS INDEX: 29.8 KG/M2 | WEIGHT: 224.87 LBS | HEIGHT: 73 IN

## 2025-02-20 DIAGNOSIS — Z98.890 S/P ARTHROSCOPY OF LEFT SHOULDER: ICD-10-CM

## 2025-02-20 DIAGNOSIS — G89.29 CHRONIC RIGHT HIP PAIN: ICD-10-CM

## 2025-02-20 DIAGNOSIS — Z79.891 LONG-TERM CURRENT USE OF OPIATE ANALGESIC: ICD-10-CM

## 2025-02-20 DIAGNOSIS — M25.551 CHRONIC RIGHT HIP PAIN: ICD-10-CM

## 2025-02-20 DIAGNOSIS — F11.20 UNCOMPLICATED OPIOID DEPENDENCE (HCC): ICD-10-CM

## 2025-02-20 DIAGNOSIS — F11.20 OPIOID DEPENDENCE, UNCOMPLICATED (HCC): ICD-10-CM

## 2025-02-20 DIAGNOSIS — M47.816 LUMBAR SPONDYLOSIS: ICD-10-CM

## 2025-02-20 DIAGNOSIS — G89.4 CHRONIC PAIN SYNDROME: Primary | ICD-10-CM

## 2025-02-20 PROCEDURE — 99214 OFFICE O/P EST MOD 30 MIN: CPT

## 2025-02-20 RX ORDER — TRAMADOL HYDROCHLORIDE 50 MG/1
TABLET ORAL
Qty: 150 TABLET | Refills: 2 | Status: SHIPPED | OUTPATIENT
Start: 2025-02-20

## 2025-02-20 NOTE — PATIENT INSTRUCTIONS

## 2025-02-22 LAB
6MAM UR QL CFM: NEGATIVE NG/ML
7AMINOCLONAZEPAM UR QL CFM: NEGATIVE NG/ML
A-OH ALPRAZ UR QL CFM: NEGATIVE NG/ML
ACCEPTABLE CREAT UR QL: NORMAL MG/DL
AMPHET UR QL CFM: NEGATIVE NG/ML
AMPHET UR QL CFM: NEGATIVE NG/ML
BUPRENORPHINE UR QL CFM: NEGATIVE NG/ML
BUTALBITAL UR QL CFM: NEGATIVE NG/ML
BZE UR QL CFM: NEGATIVE NG/ML
CODEINE UR QL CFM: NEGATIVE NG/ML
EDDP UR QL CFM: NEGATIVE NG/ML
ETHYL GLUCURONIDE UR QL CFM: NEGATIVE NG/ML
ETHYL SULFATE UR QL SCN: NEGATIVE NG/ML
FENTANYL UR QL CFM: NEGATIVE NG/ML
GLIADIN IGG SER IA-ACNC: NEGATIVE NG/ML
HYDROCODONE UR QL CFM: NEGATIVE NG/ML
HYDROCODONE UR QL CFM: NEGATIVE NG/ML
HYDROMORPHONE UR QL CFM: NEGATIVE NG/ML
LORAZEPAM UR QL CFM: NEGATIVE NG/ML
MDMA UR QL CFM: NEGATIVE NG/ML
ME-PHENIDATE UR QL CFM: NEGATIVE NG/ML
MEPERIDINE UR QL CFM: NEGATIVE NG/ML
METHADONE UR QL CFM: NEGATIVE NG/ML
METHAMPHET UR QL CFM: NEGATIVE NG/ML
MORPHINE UR QL CFM: NEGATIVE NG/ML
MORPHINE UR QL CFM: NEGATIVE NG/ML
NORBUPRENORPHINE UR QL CFM: NEGATIVE NG/ML
NORDIAZEPAM UR QL CFM: NEGATIVE NG/ML
NORFENTANYL UR QL CFM: NEGATIVE NG/ML
NORHYDROCODONE UR QL CFM: NEGATIVE NG/ML
NORHYDROCODONE UR QL CFM: NEGATIVE NG/ML
NORMEPERIDINE UR QL CFM: NEGATIVE NG/ML
NOROXYCODONE UR QL CFM: NEGATIVE NG/ML
OXAZEPAM UR QL CFM: NEGATIVE NG/ML
OXYCODONE UR QL CFM: NEGATIVE NG/ML
OXYMORPHONE UR QL CFM: NEGATIVE NG/ML
OXYMORPHONE UR QL CFM: NEGATIVE NG/ML
PARA-FLUOROFENTANYL QUANTIFICATION: NORMAL NG/ML
PCP UR QL CFM: NEGATIVE NG/ML
PHENOBARB UR QL CFM: NEGATIVE NG/ML
RESULT ALL_PRESCRIBED MEDS AND SPECIAL INSTRUCTIONS: NORMAL
SECOBARBITAL UR QL CFM: NEGATIVE NG/ML
SL AMB 5F-ADB-M7 METABOLITE QUANTIFICATION: NEGATIVE NG/ML
SL AMB 7-OH-MITRAGYNINE (KRATOM ALKALOID) QUANTIFICATION: NEGATIVE NG/ML
SL AMB AB-FUBINACA-M3 METABOLITE QUANTIFICATION: NEGATIVE NG/ML
SL AMB ACETYL FENTANYL QUANTIFICATION: NORMAL NG/ML
SL AMB ACETYL NORFENTANYL QUANTIFICATION: NORMAL NG/ML
SL AMB ACRYL FENTANYL QUANTIFICATION: NORMAL NG/ML
SL AMB CARFENTANIL QUANTIFICATION: NORMAL NG/ML
SL AMB CTHC (MARIJUANA METABOLITE) QUANTIFICATION: NEGATIVE NG/ML
SL AMB DEXTROMETHORPHAN QUANTIFICATION: NEGATIVE NG/ML
SL AMB DEXTRORPHAN (DEXTROMETHORPHAN METABOLITE) QUANT: NEGATIVE NG/ML
SL AMB DEXTRORPHAN (DEXTROMETHORPHAN METABOLITE) QUANT: NEGATIVE NG/ML
SL AMB JWH018 METABOLITE QUANTIFICATION: NEGATIVE NG/ML
SL AMB JWH073 METABOLITE QUANTIFICATION: NEGATIVE NG/ML
SL AMB MDMB-FUBINACA-M1 METABOLITE QUANTIFICATION: NEGATIVE NG/ML
SL AMB N-DESMETHYL-TRAMADOL QUANTIFICATION: NEGATIVE NG/ML
SL AMB PHENTERMINE QUANTIFICATION: NEGATIVE NG/ML
SL AMB RCS4 METABOLITE QUANTIFICATION: NEGATIVE NG/ML
SL AMB RITALINIC ACID QUANTIFICATION: NEGATIVE NG/ML
SPECIMEN DRAWN SERPL: NEGATIVE NG/ML
TAPENTADOL UR QL CFM: NEGATIVE NG/ML
TEMAZEPAM UR QL CFM: NEGATIVE NG/ML
TEMAZEPAM UR QL CFM: NEGATIVE NG/ML
TRAMADOL UR QL CFM: NORMAL NG/ML
URATE/CREAT 24H UR: NORMAL NG/ML

## 2025-02-27 ENCOUNTER — APPOINTMENT (OUTPATIENT)
Dept: LAB | Facility: CLINIC | Age: 60
End: 2025-02-27
Payer: COMMERCIAL

## 2025-02-27 DIAGNOSIS — Z86.718 HISTORY OF DVT (DEEP VEIN THROMBOSIS): ICD-10-CM

## 2025-02-27 DIAGNOSIS — Z79.01 ANTICOAGULANT LONG-TERM USE: ICD-10-CM

## 2025-02-27 LAB
INR PPP: 1.69 (ref 0.85–1.19)
PROTHROMBIN TIME: 20.1 SECONDS (ref 12.3–15)

## 2025-02-27 PROCEDURE — 85610 PROTHROMBIN TIME: CPT

## 2025-02-27 PROCEDURE — 36415 COLL VENOUS BLD VENIPUNCTURE: CPT

## 2025-02-28 ENCOUNTER — RESULTS FOLLOW-UP (OUTPATIENT)
Dept: FAMILY MEDICINE CLINIC | Facility: CLINIC | Age: 60
End: 2025-02-28

## 2025-02-28 ENCOUNTER — ANTICOAG VISIT (OUTPATIENT)
Dept: FAMILY MEDICINE CLINIC | Facility: CLINIC | Age: 60
End: 2025-02-28

## 2025-03-06 ENCOUNTER — APPOINTMENT (OUTPATIENT)
Dept: LAB | Facility: CLINIC | Age: 60
End: 2025-03-06
Payer: COMMERCIAL

## 2025-03-06 DIAGNOSIS — Z79.01 ANTICOAGULANT LONG-TERM USE: ICD-10-CM

## 2025-03-06 DIAGNOSIS — Z86.718 HISTORY OF DVT (DEEP VEIN THROMBOSIS): ICD-10-CM

## 2025-03-06 LAB
INR PPP: 2.2 (ref 0.85–1.19)
PROTHROMBIN TIME: 24.4 SECONDS (ref 12.3–15)

## 2025-03-06 PROCEDURE — 36415 COLL VENOUS BLD VENIPUNCTURE: CPT

## 2025-03-06 PROCEDURE — 85610 PROTHROMBIN TIME: CPT

## 2025-03-07 ENCOUNTER — ANTICOAG VISIT (OUTPATIENT)
Dept: FAMILY MEDICINE CLINIC | Facility: CLINIC | Age: 60
End: 2025-03-07

## 2025-03-14 ENCOUNTER — APPOINTMENT (OUTPATIENT)
Dept: LAB | Facility: CLINIC | Age: 60
End: 2025-03-14
Payer: COMMERCIAL

## 2025-03-14 DIAGNOSIS — Z79.01 ANTICOAGULANT LONG-TERM USE: ICD-10-CM

## 2025-03-14 DIAGNOSIS — Z86.718 HISTORY OF DVT (DEEP VEIN THROMBOSIS): ICD-10-CM

## 2025-03-14 LAB
INR PPP: 2.51 (ref 0.85–1.19)
PROTHROMBIN TIME: 26.9 SECONDS (ref 12.3–15)

## 2025-03-14 PROCEDURE — 85610 PROTHROMBIN TIME: CPT

## 2025-03-14 PROCEDURE — 36415 COLL VENOUS BLD VENIPUNCTURE: CPT

## 2025-03-17 ENCOUNTER — ANTICOAG VISIT (OUTPATIENT)
Dept: FAMILY MEDICINE CLINIC | Facility: CLINIC | Age: 60
End: 2025-03-17

## 2025-03-20 ENCOUNTER — APPOINTMENT (OUTPATIENT)
Dept: LAB | Facility: CLINIC | Age: 60
End: 2025-03-20
Payer: COMMERCIAL

## 2025-03-20 DIAGNOSIS — Z79.01 ANTICOAGULANT LONG-TERM USE: ICD-10-CM

## 2025-03-20 DIAGNOSIS — Z86.718 HISTORY OF DVT (DEEP VEIN THROMBOSIS): ICD-10-CM

## 2025-03-20 LAB
INR PPP: 2.21 (ref 0.85–1.19)
PROTHROMBIN TIME: 24.5 SECONDS (ref 12.3–15)

## 2025-03-20 PROCEDURE — 36415 COLL VENOUS BLD VENIPUNCTURE: CPT

## 2025-03-20 PROCEDURE — 85610 PROTHROMBIN TIME: CPT

## 2025-03-21 ENCOUNTER — ANTICOAG VISIT (OUTPATIENT)
Dept: FAMILY MEDICINE CLINIC | Facility: CLINIC | Age: 60
End: 2025-03-21

## 2025-03-28 DIAGNOSIS — E11.22 TYPE 2 DIABETES MELLITUS WITH STAGE 3A CHRONIC KIDNEY DISEASE, WITHOUT LONG-TERM CURRENT USE OF INSULIN (HCC): ICD-10-CM

## 2025-03-28 DIAGNOSIS — N18.31 TYPE 2 DIABETES MELLITUS WITH STAGE 3A CHRONIC KIDNEY DISEASE, WITHOUT LONG-TERM CURRENT USE OF INSULIN (HCC): ICD-10-CM

## 2025-03-28 RX ORDER — INSULIN GLARGINE 100 [IU]/ML
22 INJECTION, SOLUTION SUBCUTANEOUS DAILY
Qty: 15 ML | Refills: 2 | Status: SHIPPED | OUTPATIENT
Start: 2025-03-28

## 2025-04-01 DIAGNOSIS — E78.2 ELEVATED TRIGLYCERIDES WITH HIGH CHOLESTEROL: ICD-10-CM

## 2025-04-02 RX ORDER — FENOFIBRATE 160 MG/1
160 TABLET ORAL DAILY
Qty: 90 TABLET | Refills: 0 | Status: SHIPPED | OUTPATIENT
Start: 2025-04-02

## 2025-04-02 NOTE — TELEPHONE ENCOUNTER
CM reviewed chart. Expected DCP . CM will continue to follow. Left message on machine to call back with any questions otherwise his script was sent to the pharmacy as requested and he has labs to complete before his next appointment.

## 2025-04-06 DIAGNOSIS — K21.9 GASTROESOPHAGEAL REFLUX DISEASE WITHOUT ESOPHAGITIS: ICD-10-CM

## 2025-04-07 ENCOUNTER — APPOINTMENT (OUTPATIENT)
Dept: LAB | Facility: MEDICAL CENTER | Age: 60
End: 2025-04-07
Payer: COMMERCIAL

## 2025-04-07 ENCOUNTER — RESULTS FOLLOW-UP (OUTPATIENT)
Dept: FAMILY MEDICINE CLINIC | Facility: CLINIC | Age: 60
End: 2025-04-07

## 2025-04-07 DIAGNOSIS — Z12.5 SCREENING FOR PROSTATE CANCER: ICD-10-CM

## 2025-04-07 DIAGNOSIS — N18.31 STAGE 3A CHRONIC KIDNEY DISEASE (HCC): ICD-10-CM

## 2025-04-07 DIAGNOSIS — E11.22 TYPE 2 DIABETES MELLITUS WITH STAGE 3A CHRONIC KIDNEY DISEASE, WITHOUT LONG-TERM CURRENT USE OF INSULIN (HCC): ICD-10-CM

## 2025-04-07 DIAGNOSIS — N18.31 TYPE 2 DIABETES MELLITUS WITH STAGE 3A CHRONIC KIDNEY DISEASE, WITHOUT LONG-TERM CURRENT USE OF INSULIN (HCC): ICD-10-CM

## 2025-04-07 DIAGNOSIS — E11.69 HYPERLIPIDEMIA ASSOCIATED WITH TYPE 2 DIABETES MELLITUS  (HCC): ICD-10-CM

## 2025-04-07 DIAGNOSIS — J44.9 CHRONIC OBSTRUCTIVE PULMONARY DISEASE, UNSPECIFIED COPD TYPE (HCC): ICD-10-CM

## 2025-04-07 DIAGNOSIS — E78.5 HYPERLIPIDEMIA ASSOCIATED WITH TYPE 2 DIABETES MELLITUS  (HCC): ICD-10-CM

## 2025-04-07 LAB
ALBUMIN SERPL BCG-MCNC: 4.3 G/DL (ref 3.5–5)
ALP SERPL-CCNC: 49 U/L (ref 34–104)
ALT SERPL W P-5'-P-CCNC: 37 U/L (ref 7–52)
ANION GAP SERPL CALCULATED.3IONS-SCNC: 9 MMOL/L (ref 4–13)
AST SERPL W P-5'-P-CCNC: 36 U/L (ref 13–39)
BASOPHILS # BLD AUTO: 0.05 THOUSANDS/ÂΜL (ref 0–0.1)
BASOPHILS NFR BLD AUTO: 1 % (ref 0–1)
BILIRUB SERPL-MCNC: 0.56 MG/DL (ref 0.2–1)
BUN SERPL-MCNC: 16 MG/DL (ref 5–25)
CALCIUM SERPL-MCNC: 9.6 MG/DL (ref 8.4–10.2)
CHLORIDE SERPL-SCNC: 103 MMOL/L (ref 96–108)
CHOLEST SERPL-MCNC: 143 MG/DL (ref ?–200)
CO2 SERPL-SCNC: 28 MMOL/L (ref 21–32)
CREAT SERPL-MCNC: 1.4 MG/DL (ref 0.6–1.3)
CREAT UR-MCNC: 177.6 MG/DL
EOSINOPHIL # BLD AUTO: 0.15 THOUSAND/ÂΜL (ref 0–0.61)
EOSINOPHIL NFR BLD AUTO: 3 % (ref 0–6)
ERYTHROCYTE [DISTWIDTH] IN BLOOD BY AUTOMATED COUNT: 12.7 % (ref 11.6–15.1)
GFR SERPL CREATININE-BSD FRML MDRD: 54 ML/MIN/1.73SQ M
GLUCOSE P FAST SERPL-MCNC: 191 MG/DL (ref 65–99)
HCT VFR BLD AUTO: 48.6 % (ref 36.5–49.3)
HDLC SERPL-MCNC: 28 MG/DL
HGB BLD-MCNC: 15.8 G/DL (ref 12–17)
IMM GRANULOCYTES # BLD AUTO: 0.02 THOUSAND/UL (ref 0–0.2)
IMM GRANULOCYTES NFR BLD AUTO: 0 % (ref 0–2)
LDLC SERPL DIRECT ASSAY-MCNC: 68 MG/DL (ref 0–100)
LYMPHOCYTES # BLD AUTO: 1.86 THOUSANDS/ÂΜL (ref 0.6–4.47)
LYMPHOCYTES NFR BLD AUTO: 31 % (ref 14–44)
MCH RBC QN AUTO: 31.4 PG (ref 26.8–34.3)
MCHC RBC AUTO-ENTMCNC: 32.5 G/DL (ref 31.4–37.4)
MCV RBC AUTO: 97 FL (ref 82–98)
MICROALBUMIN UR-MCNC: 10.5 MG/L
MICROALBUMIN/CREAT 24H UR: 6 MG/G CREATININE (ref 0–30)
MONOCYTES # BLD AUTO: 0.57 THOUSAND/ÂΜL (ref 0.17–1.22)
MONOCYTES NFR BLD AUTO: 10 % (ref 4–12)
NEUTROPHILS # BLD AUTO: 3.36 THOUSANDS/ÂΜL (ref 1.85–7.62)
NEUTS SEG NFR BLD AUTO: 55 % (ref 43–75)
NRBC BLD AUTO-RTO: 0 /100 WBCS
PLATELET # BLD AUTO: 243 THOUSANDS/UL (ref 149–390)
PMV BLD AUTO: 10.9 FL (ref 8.9–12.7)
POTASSIUM SERPL-SCNC: 4.5 MMOL/L (ref 3.5–5.3)
PROT SERPL-MCNC: 7.1 G/DL (ref 6.4–8.4)
PSA SERPL-MCNC: 0.39 NG/ML (ref 0–4)
RBC # BLD AUTO: 5.03 MILLION/UL (ref 3.88–5.62)
SODIUM SERPL-SCNC: 140 MMOL/L (ref 135–147)
TRIGL SERPL-MCNC: 407 MG/DL (ref ?–150)
TSH SERPL DL<=0.05 MIU/L-ACNC: 2.52 UIU/ML (ref 0.45–4.5)
WBC # BLD AUTO: 6.01 THOUSAND/UL (ref 4.31–10.16)

## 2025-04-07 PROCEDURE — 80053 COMPREHEN METABOLIC PANEL: CPT

## 2025-04-07 PROCEDURE — 80061 LIPID PANEL: CPT

## 2025-04-07 PROCEDURE — 36415 COLL VENOUS BLD VENIPUNCTURE: CPT

## 2025-04-07 PROCEDURE — 83721 ASSAY OF BLOOD LIPOPROTEIN: CPT

## 2025-04-07 PROCEDURE — 82570 ASSAY OF URINE CREATININE: CPT

## 2025-04-07 PROCEDURE — 85025 COMPLETE CBC W/AUTO DIFF WBC: CPT

## 2025-04-07 PROCEDURE — G0103 PSA SCREENING: HCPCS

## 2025-04-07 PROCEDURE — 84443 ASSAY THYROID STIM HORMONE: CPT

## 2025-04-07 PROCEDURE — 82043 UR ALBUMIN QUANTITATIVE: CPT

## 2025-04-07 RX ORDER — PANTOPRAZOLE SODIUM 40 MG/1
40 TABLET, DELAYED RELEASE ORAL DAILY
Qty: 90 TABLET | Refills: 1 | Status: SHIPPED | OUTPATIENT
Start: 2025-04-07

## 2025-04-10 DIAGNOSIS — E78.2 MIXED HYPERLIPIDEMIA: ICD-10-CM

## 2025-04-10 RX ORDER — ROSUVASTATIN CALCIUM 5 MG/1
5 TABLET, COATED ORAL DAILY
Qty: 90 TABLET | Refills: 1 | Status: SHIPPED | OUTPATIENT
Start: 2025-04-10

## 2025-04-18 ENCOUNTER — TELEPHONE (OUTPATIENT)
Dept: FAMILY MEDICINE CLINIC | Facility: CLINIC | Age: 60
End: 2025-04-18

## 2025-04-18 DIAGNOSIS — I82.411 ACUTE DEEP VEIN THROMBOSIS (DVT) OF FEMORAL VEIN OF RIGHT LOWER EXTREMITY (HCC): ICD-10-CM

## 2025-04-18 RX ORDER — WARFARIN SODIUM 3 MG/1
TABLET ORAL
Qty: 90 TABLET | Refills: 1 | Status: SHIPPED | OUTPATIENT
Start: 2025-04-18

## 2025-04-18 RX ORDER — WARFARIN SODIUM 3 MG/1
TABLET ORAL
Qty: 90 TABLET | Refills: 0 | Status: CANCELLED | OUTPATIENT
Start: 2025-04-18

## 2025-04-18 NOTE — TELEPHONE ENCOUNTER
Reason for call:   [x] Refill   [] Prior Auth  [] Other:     Office:   [] PCP/Provider -   [] Specialty/Provider -     Medication: warfarin     Dose/Frequency: 3 mg take daily- pt stated dr increased this     Quantity: 90    Pharmacy: [facility]   Does the patient have enough for 3 days?   [] Yes   [x] No - Send as HP to POD- he only has 1 pill left     Mail Away Pharmacy   Does the patient have enough for 10 days?   [] Yes   [] No - Send as HP to POD

## 2025-05-14 ENCOUNTER — APPOINTMENT (OUTPATIENT)
Dept: LAB | Facility: CLINIC | Age: 60
End: 2025-05-14
Payer: COMMERCIAL

## 2025-05-14 DIAGNOSIS — Z79.01 ANTICOAGULANT LONG-TERM USE: ICD-10-CM

## 2025-05-14 DIAGNOSIS — Z86.718 HISTORY OF DVT (DEEP VEIN THROMBOSIS): ICD-10-CM

## 2025-05-14 LAB
INR PPP: 2.72 (ref 0.85–1.19)
PROTHROMBIN TIME: 28.6 SECONDS (ref 12.3–15)

## 2025-05-14 PROCEDURE — 36415 COLL VENOUS BLD VENIPUNCTURE: CPT

## 2025-05-14 PROCEDURE — 85610 PROTHROMBIN TIME: CPT

## 2025-05-15 ENCOUNTER — RESULTS FOLLOW-UP (OUTPATIENT)
Dept: FAMILY MEDICINE CLINIC | Facility: CLINIC | Age: 60
End: 2025-05-15

## 2025-05-15 ENCOUNTER — OFFICE VISIT (OUTPATIENT)
Dept: PAIN MEDICINE | Facility: CLINIC | Age: 60
End: 2025-05-15
Payer: COMMERCIAL

## 2025-05-15 ENCOUNTER — ANTICOAG VISIT (OUTPATIENT)
Dept: FAMILY MEDICINE CLINIC | Facility: CLINIC | Age: 60
End: 2025-05-15

## 2025-05-15 VITALS — WEIGHT: 224 LBS | HEIGHT: 73 IN | BODY MASS INDEX: 29.69 KG/M2

## 2025-05-15 DIAGNOSIS — G89.4 CHRONIC PAIN SYNDROME: Primary | ICD-10-CM

## 2025-05-15 DIAGNOSIS — M25.561 CHRONIC PAIN OF BOTH KNEES: ICD-10-CM

## 2025-05-15 DIAGNOSIS — M47.816 LUMBAR SPONDYLOSIS: ICD-10-CM

## 2025-05-15 DIAGNOSIS — M25.851 HIP IMPINGEMENT SYNDROME, RIGHT: ICD-10-CM

## 2025-05-15 DIAGNOSIS — G89.29 CHRONIC PAIN OF BOTH KNEES: ICD-10-CM

## 2025-05-15 DIAGNOSIS — Z79.891 LONG-TERM CURRENT USE OF OPIATE ANALGESIC: ICD-10-CM

## 2025-05-15 DIAGNOSIS — M25.562 CHRONIC PAIN OF BOTH KNEES: ICD-10-CM

## 2025-05-15 DIAGNOSIS — M16.11 PRIMARY OSTEOARTHRITIS OF RIGHT HIP: ICD-10-CM

## 2025-05-15 DIAGNOSIS — F11.20 UNCOMPLICATED OPIOID DEPENDENCE (HCC): ICD-10-CM

## 2025-05-15 PROCEDURE — 99214 OFFICE O/P EST MOD 30 MIN: CPT

## 2025-05-15 RX ORDER — TRAMADOL HYDROCHLORIDE 50 MG/1
TABLET ORAL
Qty: 150 TABLET | Refills: 2 | Status: SHIPPED | OUTPATIENT
Start: 2025-05-29

## 2025-05-15 NOTE — PATIENT INSTRUCTIONS

## 2025-05-15 NOTE — PROGRESS NOTES
Pain Medicine Follow-Up Note    Assessment:  No diagnosis found.    Plan:  No orders of the defined types were placed in this encounter.      No orders of the defined types were placed in this encounter.      My impressions and treatment recommendations were discussed in detail with the patient who verbalized understanding and had no further questions.      ***    {PDMP Statement:02199}    {UDS Statement:94221}    {Opioid Statement:91656}    {Oral Swab Statement:30602}    {Pain Management Procedure Risk Statement:62737}      Follow-up is planned in *** time or sooner as warranted.  Discharge instructions were provided. I personally saw and examined the patient and I agree with the above discussed plan of care.    History of Present Illness:    Chavez Newell is a 59 y.o. male who presents to [facility] for interval re-evaluation of the above stated pain complaints. The patient has a past medical and chronic pain history as outlined in the assessment section. {He/she (caps):68359} was last seen on ***.    At today's visit patient states that their pain symptoms are *** with a pain score of ***/10 on the verbal numeric pain scale.  The patient's pain is worse ***.  The patient's pain is *** in nature.  And the quality of the patient's pain is described as ***.  The patient's pain is located in the ***.      Pain Contract Signed:  ***  Last Urine Drug Screen:  ***  New Urine Drug Screen: ***  Last dose of opioid medication:  ***    Other than as stated above, the patient denies any interval changes in medications, medical condition, mental condition, symptoms, or allergies since the last office visit.         Review of Systems:    Review of Systems      Past Medical History:   Diagnosis Date    Anxiety 03/10/2022    Aorta aneurysm (HCC)     4.3    Arm DVT (deep venous thromboembolism), acute (HCC) 2015    complications of cardiac cath    Asthma     Blood clot in vein     right leg    Chronic  kidney disease     CKD (chronic kidney disease), stage III (HCC)     COPD (chronic obstructive pulmonary disease) (HCC)     Depression     Diabetes mellitus (HCC)     Essential hypertriglyceridemia     GERD (gastroesophageal reflux disease)     Headache     Hiatal hernia     Hyperlipidemia, mixed 05/07/2018    Kidney stone     Liver disease     FATTY LIVER    Mild episode of recurrent major depressive disorder (HCC) 03/10/2022    PAC (premature atrial contraction)     Prediabetes     Rectus sheath hematoma, initial encounter 08/09/2023    Renal calculi     Sleep apnea     Vestibular migraine        Past Surgical History:   Procedure Laterality Date    CARPAL TUNNEL RELEASE Left     COLONOSCOPY      FL INJECTION LEFT SHOULDER (ARTHROGRAM)  9/13/2024    FL INJECTION RIGHT HIP (ARTHROGRAM)  08/20/2018    FOOT SURGERY Left     FOREIGN BODY REMOVAL    HERNIA REPAIR      AK ARTHRP ACETBLR/PROX FEM PROSTC AGRFT/ALGRFT Right 09/28/2020    Procedure: ARTHROPLASTY HIP TOTAL;  Surgeon: Jyoti Araiza MD;  [facility];  Service: Orthopedics    AK COLONOSCOPY FLX DX W/COLLJ SPEC WHEN PFRMD N/A 08/07/2017    Procedure: COLONOSCOPY;  Surgeon: Anthony France MD;  [facility];  Service: Gastroenterology    AK ESOPHAGOGASTRODUODENOSCOPY TRANSORAL DIAGNOSTIC N/A 10/31/2017    Procedure: ESOPHAGOGASTRODUODENOSCOPY (EGD);  Surgeon: Anthony France MD;  [facility];  Service: Gastroenterology    AK SURGICAL ARTHROSCOPY SHOULDER W/ROTATOR CUFF RPR Left 10/10/2024    Procedure: REPAIR ROTATOR CUFF  ARTHROSCOPIC LEFT ACROMIOPLASTY;  Surgeon: Junior Rivera DO;  [facility];  Service: Orthopedics    TOTAL HIP ARTHROPLASTY Right 2020       Family History   Problem Relation Age of Onset    Arthritis Mother     Heart attack Father     Clotting disorder Father     Schizoaffective Disorder  Sister     Cancer Brother     Prostate cancer Brother     Leukemia Brother         his only brother dies from  "lymphoma or leukemia pt unsure he was only 54    Aortic aneurysm Other         abdominal       Social History     Occupational History    Occupation: unemployed   Tobacco Use    Smoking status: Some Days     Types: Cigars     Passive exposure: Never    Smokeless tobacco: Never    Tobacco comments:     never inhaled-smokes cigars every now and then   Vaping Use    Vaping status: Never Used   Substance and Sexual Activity    Alcohol use: Not Currently     Comment: occasional beer    Drug use: No    Sexual activity: Yes     Partners: Female       Current Medications[1]    Allergies[2]    Physical Exam:    There were no vitals taken for this visit.    Constitutional:{General Appearance:37163::\"normal, well developed, well nourished, alert, in no distress and non-toxic and no overt pain behavior.\"}  Eyes:{Sclera:98222::\"anicteric\"}  HEENT:{Hearin::\"grossly intact\"}  Neck:{Neck:77324::\"supple, symmetric, trachea midline and no masses \"}  Pulmonary:{Respiratory effort:28637::\"even and unlabored\"}  Cardiovascular:{Examination of Extremities:48816::\"No edema or pitting edema present\"}  Skin:{Skin and Subcutaneous tissues:43381::\"Normal without rashes or lesions and well hydrated\"}  Psychiatric:{Mood and Affect:45602::\"Mood and affect appropriate\"}  Neurologic:{Cranial Nerves:65657::\"Cranial Nerves II-XII grossly intact\"}  Musculoskeletal:{[place]:36394::\"normal\"}      Imaging  No orders to display         No orders of the defined types were placed in this encounter.      This document was created using speech voice recognition software.   Grammatical errors, random word insertions, pronoun errors, and incomplete sentences are an occasional consequence of this system due to software limitations, ambient noise, and hardware issues.   Any formal questions or concerns about content, text, or information contained within the body of this dictation should be directly addressed to the provider for clarification.   "     [1]   Current Outpatient Medications:     acetaminophen (TYLENOL) 500 mg tablet, Take 500 mg by mouth every 6 (six) hours as needed for mild pain, Disp: , Rfl:     benzonatate (TESSALON) 200 MG capsule, Take 1 capsule (200 mg total) by mouth 3 (three) times a day as needed for cough, Disp: 30 capsule, Rfl: 0    Bydureon BCise 2 MG/0.85ML AUIJ, inject 2 milligrams subcutaneously weekly, Disp: 10.2 mL, Rfl: 1    Continuous Blood Gluc Sensor (FreeStyle Jelly 3 Sensor) MISC, Use 1 each every 14 (fourteen) days, Disp: 6 each, Rfl: 3    fenofibrate 160 MG tablet, take 1 tablet by mouth once daily, Disp: 90 tablet, Rfl: 0    gabapentin (NEURONTIN) 300 mg capsule, take 1 capsule by mouth at bedtime, Disp: 30 capsule, Rfl: 5    glucose blood test strip, TEST BS TID, Disp: 300 each, Rfl: 5    glucose monitoring kit (FREESTYLE) monitoring kit, Use 1 each daily before breakfast, Disp: 1 each, Rfl: 0    Insulin Glargine Solostar (Lantus SoloStar) 100 UNIT/ML SOPN, Inject 0.22 mL (22 Units total) under the skin daily, Disp: 15 mL, Rfl: 2    insulin lispro (HumaLOG KwikPen) 100 units/mL injection pen, PER SLIDING SCALE inject subcutaneously A MAX OF 50 UNITS DAILY, Disp: 15 mL, Rfl: 2    Insulin Pen Needle (BD Pen Needle Evelina 2nd Gen) 32G X 4 MM MISC, Inject as directed 4 (four) times a day, Disp: 500 each, Rfl: 5    Lancets MISC, Use daily before breakfast, Disp: 100 each, Rfl: 5    naloxone (NARCAN) 4 mg/0.1 mL nasal spray, Administer 1 spray into a nostril. If no response after 2-3 minutes, give another dose in the other nostril using a new spray. (Patient not taking: Reported on 2/20/2025), Disp: 1 each, Rfl: 1    pantoprazole (PROTONIX) 40 mg tablet, take 1 tablet by mouth once daily, Disp: 90 tablet, Rfl: 1    promethazine-dextromethorphan (PHENERGAN-DM) 6.25-15 mg/5 mL oral syrup, Take 5 mL by mouth 4 (four) times a day as needed for cough, Disp: 118 mL, Rfl: 0    rosuvastatin (CRESTOR) 5 mg tablet, take 1 tablet by  mouth once daily, Disp: 90 tablet, Rfl: 1    sildenafil (REVATIO) 20 mg tablet, TAKE 1 TABLET (20 MG TOTAL) BY MOUTH DAILY AS DIRECTED, Disp: 90 tablet, Rfl: 3    traMADol (Ultram) 50 mg tablet, May take 2 tablets (100 mg total) by mouth daily as needed for moderate pain. May also take 1 tablet (50 mg total) every 8 (eight) hours as needed for moderate pain. Max 5 tablets per day., Disp: 150 tablet, Rfl: 2    warfarin (COUMADIN) 2 mg tablet, Take 1 tablet by mouth Monday, Wednesday, Friday or as directed by PCP, Disp: 90 tablet, Rfl: 1    warfarin (COUMADIN) 3 mg tablet, Take 1 tablet by mouth daily or as directed by PCP, Disp: 90 tablet, Rfl: 1  [2] No Known Allergies

## 2025-05-15 NOTE — PROGRESS NOTES
Pain Medicine Follow-Up Note    Assessment:  1. Chronic pain syndrome    2. Lumbar spondylosis    3. Primary osteoarthritis of right hip    4. Hip impingement syndrome, right    5. Chronic pain of both knees    6. Long-term current use of opiate analgesic    7. Uncomplicated opioid dependence (HCC)        Plan:    New Medications Ordered This Visit   Medications   • traMADol (Ultram) 50 mg tablet     Sig: May take 2 tablets (100 mg total) by mouth daily as needed for moderate pain. May also take 1 tablet (50 mg total) every 8 (eight) hours as needed for moderate pain. Max 5 tablets per day. Do not start before May 29, 2025.     Dispense:  150 tablet     Refill:  2     Fill on 5/29/2025 and refill on or about 6/27/2025 and 7/25/2025       My impressions and treatment recommendations were discussed in detail with the patient who verbalized understanding and had no further questions.      The patient continues to report an overall reduction of his pain level and improvement with his functioning without significant side effects using tramadol patient takes 2 tablets in the morning and then takes 1 tablet every 8 hours as needed for moderate pain, therefore I will continue the patient on this medication.  Tramadol 50 mg tablet e-prescribed to the patient's pharmacy with a do not fill until date of 5/29/2025, and refill on 6/27/2025 and 7/25/2025.    Patient also reports that he averages 6 tablets of Tylenol 650 mg continuous release tablets daily, states that some days he will even use 9.  Educated the patient that the maximum amount he should be using on severe days would be 4000 mg daily, patient states that nephrologist is aware of his Tylenol consumption.  Advised him not to exceed 6 tablets daily.  If he finds that he needs to exceed 6 tablets then we would make further adjustments to his tramadol.  Patient verbalized understanding.    [place] Prescription Drug Monitoring Program report was reviewed and was  appropriate     There are risks associated with opioid medications, including dependence, addiction and tolerance. The patient understands and agrees to use these medications only as prescribed. Potential side effects of the medications include, but are not limited to, constipation, drowsiness, addiction, impaired judgment and risk of fatal overdose if not taken as prescribed. The patient was warned against driving while taking sedation medications.  Sharing medications is a felony. At this point in time, the patient is showing no signs of addiction, abuse, diversion or suicidal ideation.    Follow-up is planned in 3 months time or sooner as warranted.  Discharge instructions were provided. I personally saw and examined the patient and I agree with the above discussed plan of care.    History of Present Illness:    Chavez Newell is a 59 y.o. male who presents to [facility] for interval re-evaluation of the above stated pain complaints. The patient has a past medical and chronic pain history as outlined in the assessment section. He was last seen on 2/20/2025.    At today's visit patient states that their pain symptoms are the same with a pain score of 4/10 on the verbal numeric pain scale.  The patient's pain is worse in the morning.  The patient's pain is occasional in nature.  And the quality of the patient's pain is described as sharp and shooting.  The patient's pain is located in the mid low back, right groin, and bilateral knees.  Patient states he Lisbet pain relief he is obtained from his current pain relievers is not enough to make a difference in his life, when discussed in office patient does not wish to escalate his dosage of tramadol.  Patient denies any side effects using tramadol.    Pain Contract Signed:  2/20/25  Last Urine Drug Screen:  2/20/25    Other than as stated above, the patient denies any interval changes in medications, medical condition, mental condition,  symptoms, or allergies since the last office visit.         Review of Systems:    Review of Systems   Respiratory:  Negative for shortness of breath.    Cardiovascular:  Negative for chest pain.   Gastrointestinal:  Positive for diarrhea. Negative for constipation, nausea and vomiting.   Musculoskeletal:  Negative for arthralgias, gait problem, joint swelling and myalgias.        Decreased range of motion, joint stiffness   Skin:  Negative for rash.   Neurological:  Positive for dizziness. Negative for seizures and weakness.   All other systems reviewed and are negative.        Past Medical History:   Diagnosis Date   • Anxiety 03/10/2022   • Aorta aneurysm (HCC)     4.3   • Arm DVT (deep venous thromboembolism), acute (HCC) 2015    complications of cardiac cath   • Asthma    • Blood clot in vein     right leg   • Chronic kidney disease    • CKD (chronic kidney disease), stage III (HCC)    • COPD (chronic obstructive pulmonary disease) (HCC)    • Depression    • Diabetes mellitus (HCC)    • Essential hypertriglyceridemia    • GERD (gastroesophageal reflux disease)    • Headache    • Hiatal hernia    • Hyperlipidemia, mixed 05/07/2018   • Kidney stone    • Liver disease     FATTY LIVER   • Mild episode of recurrent major depressive disorder (HCC) 03/10/2022   • PAC (premature atrial contraction)    • Prediabetes    • Rectus sheath hematoma, initial encounter 08/09/2023   • Renal calculi    • Sleep apnea    • Vestibular migraine        Past Surgical History:   Procedure Laterality Date   • CARPAL TUNNEL RELEASE Left    • COLONOSCOPY     • FL INJECTION LEFT SHOULDER (ARTHROGRAM)  9/13/2024   • FL INJECTION RIGHT HIP (ARTHROGRAM)  08/20/2018   • FOOT SURGERY Left     FOREIGN BODY REMOVAL   • HERNIA REPAIR     • UT ARTHRP ACETBLR/PROX FEM PROSTC AGRFT/ALGRFT Right 09/28/2020    Procedure: ARTHROPLASTY HIP TOTAL;  Surgeon: Jyoti Araiza MD;  [facility];  Service: Orthopedics   • UT COLONOSCOPY FLX DX W/COLLJ  "SPEC WHEN PFRMD N/A 08/07/2017    Procedure: COLONOSCOPY;  Surgeon: Anthony France MD;  [facility];  Service: Gastroenterology   • OK ESOPHAGOGASTRODUODENOSCOPY TRANSORAL DIAGNOSTIC N/A 10/31/2017    Procedure: ESOPHAGOGASTRODUODENOSCOPY (EGD);  Surgeon: Anthony France MD;  [facility];  Service: Gastroenterology   • OK SURGICAL ARTHROSCOPY SHOULDER W/ROTATOR CUFF RPR Left 10/10/2024    Procedure: REPAIR ROTATOR CUFF  ARTHROSCOPIC LEFT ACROMIOPLASTY;  Surgeon: Jnuior Rivera DO;  [facility];  Service: Orthopedics   • TOTAL HIP ARTHROPLASTY Right 2020       Family History   Problem Relation Age of Onset   • Arthritis Mother    • Heart attack Father    • Clotting disorder Father    • Schizoaffective Disorder  Sister    • Cancer Brother    • Prostate cancer Brother    • Leukemia Brother         his only brother dies from lymphoma or leukemia pt unsure he was only 54   • Aortic aneurysm Other         abdominal       Social History     Occupational History   • Occupation: unemployed   Tobacco Use   • Smoking status: Some Days     Types: Cigars     Passive exposure: Never   • Smokeless tobacco: Never   • Tobacco comments:     never inhaled-smokes cigars every now and then   Vaping Use   • Vaping status: Never Used   Substance and Sexual Activity   • Alcohol use: Not Currently     Comment: occasional beer   • Drug use: No   • Sexual activity: Yes     Partners: Female       Current Medications[1]    Allergies[2]    Physical Exam:    Ht 6' 1\" (1.854 m)   Wt 102 kg (224 lb)   BMI 29.55 kg/m²     Constitutional:normal, well developed, well nourished, alert, in no distress and non-toxic and no overt pain behavior.  Eyes:anicteric  HEENT:grossly intact  Neck:supple, symmetric, trachea midline and no masses   Pulmonary:even and unlabored  Cardiovascular:No edema or pitting edema present  Skin:Normal without rashes or lesions and well hydrated  Psychiatric:Mood and affect " appropriate  Neurologic:Cranial Nerves II-XII grossly intact  Musculoskeletal:antalgic gait      This document was created using speech voice recognition software.   Grammatical errors, random word insertions, pronoun errors, and incomplete sentences are an occasional consequence of this system due to software limitations, ambient noise, and hardware issues.   Any formal questions or concerns about content, text, or information contained within the body of this dictation should be directly addressed to the provider for clarification.           [1]    Current Outpatient Medications:   •  acetaminophen (TYLENOL) 500 mg tablet, Take 500 mg by mouth every 6 (six) hours as needed for mild pain, Disp: , Rfl:   •  benzonatate (TESSALON) 200 MG capsule, Take 1 capsule (200 mg total) by mouth 3 (three) times a day as needed for cough, Disp: 30 capsule, Rfl: 0  •  Bydureon BCise 2 MG/0.85ML AUIJ, inject 2 milligrams subcutaneously weekly, Disp: 10.2 mL, Rfl: 1  •  Continuous Blood Gluc Sensor (FreeStyle Jelly 3 Sensor) MISC, Use 1 each every 14 (fourteen) days, Disp: 6 each, Rfl: 3  •  fenofibrate 160 MG tablet, take 1 tablet by mouth once daily, Disp: 90 tablet, Rfl: 0  •  gabapentin (NEURONTIN) 300 mg capsule, take 1 capsule by mouth at bedtime, Disp: 30 capsule, Rfl: 5  •  glucose blood test strip, TEST BS TID, Disp: 300 each, Rfl: 5  •  glucose monitoring kit (FREESTYLE) monitoring kit, Use 1 each daily before breakfast, Disp: 1 each, Rfl: 0  •  Insulin Glargine Solostar (Lantus SoloStar) 100 UNIT/ML SOPN, Inject 0.22 mL (22 Units total) under the skin daily, Disp: 15 mL, Rfl: 2  •  insulin lispro (HumaLOG KwikPen) 100 units/mL injection pen, PER SLIDING SCALE inject subcutaneously A MAX OF 50 UNITS DAILY, Disp: 15 mL, Rfl: 2  •  Insulin Pen Needle (BD Pen Needle Evelina 2nd Gen) 32G X 4 MM MISC, Inject as directed 4 (four) times a day, Disp: 500 each, Rfl: 5  •  Lancets MISC, Use daily before breakfast, Disp: 100 each, Rfl:  5  •  naloxone (NARCAN) 4 mg/0.1 mL nasal spray, Administer 1 spray into a nostril. If no response after 2-3 minutes, give another dose in the other nostril using a new spray., Disp: 1 each, Rfl: 1  •  pantoprazole (PROTONIX) 40 mg tablet, take 1 tablet by mouth once daily, Disp: 90 tablet, Rfl: 1  •  promethazine-dextromethorphan (PHENERGAN-DM) 6.25-15 mg/5 mL oral syrup, Take 5 mL by mouth 4 (four) times a day as needed for cough, Disp: 118 mL, Rfl: 0  •  rosuvastatin (CRESTOR) 5 mg tablet, take 1 tablet by mouth once daily, Disp: 90 tablet, Rfl: 1  •  sildenafil (REVATIO) 20 mg tablet, TAKE 1 TABLET (20 MG TOTAL) BY MOUTH DAILY AS DIRECTED, Disp: 90 tablet, Rfl: 3  •  [START ON 5/29/2025] traMADol (Ultram) 50 mg tablet, May take 2 tablets (100 mg total) by mouth daily as needed for moderate pain. May also take 1 tablet (50 mg total) every 8 (eight) hours as needed for moderate pain. Max 5 tablets per day. Do not start before May 29, 2025., Disp: 150 tablet, Rfl: 2  •  warfarin (COUMADIN) 3 mg tablet, Take 1 tablet by mouth daily or as directed by PCP, Disp: 90 tablet, Rfl: 1  •  warfarin (COUMADIN) 2 mg tablet, Take 1 tablet by mouth Monday, Wednesday, Friday or as directed by PCP (Patient not taking: Reported on 5/15/2025), Disp: 90 tablet, Rfl: 1[2]  No Known Allergies

## 2025-06-03 NOTE — ASSESSMENT & PLAN NOTE
A1c 7.6% (from 7.1) -- a bit increased from last check and sub-optimally controlled on current regimen. Would likely benefit from alternative GLP that we can up-titrate. Will send in Mounjaro for prior auth in place of Bydureon. Continue insulin regimen and lifestyle management.   Foot exam as below   Eye exam collected   Urine testing UTD     Also discussed CT calcium score for CAD evaluation given co-morbidities and (+)FHx -- pt agreeable, is aware it is not covered by insurance and will be $99 out of pocket  Lab Results   Component Value Date    HGBA1C 7.6 (A) 06/04/2025       Orders:  •  IRIS Diabetic eye exam  •  POCT hemoglobin A1c  •  Insulin Glargine Solostar (Lantus SoloStar) 100 UNIT/ML SOPN; Inject 0.22 mL (22 Units total) under the skin daily  •  CT coronary calcium score; Future  •  Comprehensive metabolic panel; Future  •  CBC and differential; Future  •  Lipid Panel with Direct LDL reflex; Future  •  TSH, 3rd generation with Free T4 reflex; Future  •  Hemoglobin A1C; Future  •  Tirzepatide (Mounjaro) 2.5 MG/0.5ML SOAJ; Inject 2.5 mg under the skin once a week

## 2025-06-03 NOTE — PROGRESS NOTES
Name: Chavez Newell      : 1965      MRN: [id number]  Encounter Provider: Adenike Garg DO  Encounter Date: 2025   Encounter department: [facility]  :  Assessment & Plan  Type 2 diabetes mellitus with stage 3a chronic kidney disease, without long-term current use of insulin (HCC)  A1c 7.6% (from 7.1) -- a bit increased from last check and sub-optimally controlled on current regimen. Would likely benefit from alternative GLP that we can up-titrate. Will send in Mounjaro for prior auth in place of Bydureon. Continue insulin regimen and lifestyle management.   Foot exam as below   Eye exam collected   Urine testing UTD     Also discussed CT calcium score for CAD evaluation given co-morbidities and (+)FHx -- pt agreeable, is aware it is not covered by insurance and will be $99 out of pocket  Lab Results   Component Value Date    HGBA1C 7.6 (A) 2025       Orders:  •  IRIS Diabetic eye exam  •  POCT hemoglobin A1c  •  Insulin Glargine Solostar (Lantus SoloStar) 100 UNIT/ML SOPN; Inject 0.22 mL (22 Units total) under the skin daily  •  CT coronary calcium score; Future  •  Comprehensive metabolic panel; Future  •  CBC and differential; Future  •  Lipid Panel with Direct LDL reflex; Future  •  TSH, 3rd generation with Free T4 reflex; Future  •  Hemoglobin A1C; Future  •  Tirzepatide (Mounjaro) 2.5 MG/0.5ML SOAJ; Inject 2.5 mg under the skin once a week    Hyperlipidemia associated with type 2 diabetes mellitus  (HCC)  Update lipids prior to next visit and continue statin + Tricor   Lab Results   Component Value Date    HGBA1C 7.6 (A) 2025       Orders:  •  CT coronary calcium score; Future  •  Comprehensive metabolic panel; Future  •  CBC and differential; Future  •  Lipid Panel with Direct LDL reflex; Future    Chronic obstructive pulmonary disease, unspecified COPD type (HCC)  No exacerbation on exam -- will continue to monitor   Orders:  •  CBC and differential;  "Future    Stage 3a chronic kidney disease (HCC)  Continue good hydration, will monitor with next labs     Orders:  •  Comprehensive metabolic panel; Future           History of Present Illness   HPI    Pt presents for chronic follow up     Home sugars are \"up down\" -- infrequently, will go into 260-280s after eating/130-160s when fasting. Is having some lows at night (60s) though \"not too often\". Does take short-acting insulin after he eats because he doesn't feel like it works well if he takes it before eating. Is having some discomfort/lumps under his skin with injections   Staying hydrated       Review of Systems   Eyes:  Negative for visual disturbance.   Respiratory:  Positive for cough (every now and then, wakes up coughing (sometimes to the point of post-tussive emesis)). Negative for shortness of breath.    Cardiovascular:  Negative for chest pain, palpitations and leg swelling.   Gastrointestinal:  Negative for abdominal pain, blood in stool, constipation and diarrhea.   Endocrine: Negative for polyuria.   Genitourinary:  Negative for dysuria and hematuria.   Neurological:  Negative for dizziness and headaches.       Objective   /84   Pulse 83   Temp 98 °F (36.7 °C)   Ht 6' 1\" (1.854 m)   Wt 102 kg (224 lb)   SpO2 97%   BMI 29.55 kg/m²      Physical Exam    Cardiovascular:      Pulses: no weak pulses.           Dorsalis pedis pulses are 2+ on the right side and 2+ on the left side.   Feet:      Right foot:      Skin integrity: Dry skin present. No callus.      Left foot:      Skin integrity: Dry skin present. No callus.         Patient's shoes and socks removed.    Right Foot/Ankle   Right Foot Inspection  Skin Exam: dry skin. No callus and no callus.     Toe Exam: ROM and strength within normal limits.  no right toe deformity    Sensory   Vibration: intact  Monofilament testing: intact    Vascular  Capillary refills: < 3 seconds  The right DP pulse is 2+.     Left Foot/Ankle  Left Foot " Inspection  Skin Exam: dry skin. No callus.     Toe Exam: ROM and strength within normal limits. No left toe deformity.     Sensory   Vibration: intact  Monofilament testing: intact    Vascular  Capillary refills: < 3 seconds  The left DP pulse is 2+.     Assign Risk Category  No deformity present  No loss of protective sensation  No weak pulses  Risk: 0

## 2025-06-03 NOTE — ASSESSMENT & PLAN NOTE
Update lipids prior to next visit and continue statin + Tricor   Lab Results   Component Value Date    HGBA1C 7.6 (A) 06/04/2025       Orders:  •  CT coronary calcium score; Future  •  Comprehensive metabolic panel; Future  •  CBC and differential; Future  •  Lipid Panel with Direct LDL reflex; Future

## 2025-06-03 NOTE — ASSESSMENT & PLAN NOTE
Continue good hydration, will monitor with next labs     Orders:  •  Comprehensive metabolic panel; Future

## 2025-06-04 ENCOUNTER — RESULTS FOLLOW-UP (OUTPATIENT)
Dept: FAMILY MEDICINE CLINIC | Facility: CLINIC | Age: 60
End: 2025-06-04

## 2025-06-04 ENCOUNTER — OFFICE VISIT (OUTPATIENT)
Dept: FAMILY MEDICINE CLINIC | Facility: CLINIC | Age: 60
End: 2025-06-04
Payer: COMMERCIAL

## 2025-06-04 VITALS
HEART RATE: 83 BPM | HEIGHT: 73 IN | BODY MASS INDEX: 29.69 KG/M2 | SYSTOLIC BLOOD PRESSURE: 118 MMHG | TEMPERATURE: 98 F | WEIGHT: 224 LBS | DIASTOLIC BLOOD PRESSURE: 84 MMHG | OXYGEN SATURATION: 97 %

## 2025-06-04 DIAGNOSIS — J44.9 CHRONIC OBSTRUCTIVE PULMONARY DISEASE, UNSPECIFIED COPD TYPE (HCC): ICD-10-CM

## 2025-06-04 DIAGNOSIS — E11.69 HYPERLIPIDEMIA ASSOCIATED WITH TYPE 2 DIABETES MELLITUS  (HCC): ICD-10-CM

## 2025-06-04 DIAGNOSIS — E11.22 TYPE 2 DIABETES MELLITUS WITH STAGE 3A CHRONIC KIDNEY DISEASE, WITHOUT LONG-TERM CURRENT USE OF INSULIN (HCC): Primary | ICD-10-CM

## 2025-06-04 DIAGNOSIS — I71.21 ANEURYSM OF ASCENDING AORTA WITHOUT RUPTURE (HCC): Primary | ICD-10-CM

## 2025-06-04 DIAGNOSIS — E78.5 HYPERLIPIDEMIA ASSOCIATED WITH TYPE 2 DIABETES MELLITUS  (HCC): ICD-10-CM

## 2025-06-04 DIAGNOSIS — N18.31 TYPE 2 DIABETES MELLITUS WITH STAGE 3A CHRONIC KIDNEY DISEASE, WITHOUT LONG-TERM CURRENT USE OF INSULIN (HCC): Primary | ICD-10-CM

## 2025-06-04 DIAGNOSIS — N18.31 STAGE 3A CHRONIC KIDNEY DISEASE (HCC): ICD-10-CM

## 2025-06-04 LAB
LEFT EYE DIABETIC RETINOPATHY: NORMAL
LEFT EYE IMAGE QUALITY: NORMAL
LEFT EYE MACULAR EDEMA: NORMAL
LEFT EYE OTHER RETINOPATHY: NORMAL
RIGHT EYE DIABETIC RETINOPATHY: NORMAL
RIGHT EYE IMAGE QUALITY: NORMAL
RIGHT EYE MACULAR EDEMA: NORMAL
RIGHT EYE OTHER RETINOPATHY: NORMAL
SEVERITY (EYE EXAM): NORMAL
SL AMB POCT HEMOGLOBIN AIC: 7.6 (ref ?–6.5)

## 2025-06-04 PROCEDURE — 99214 OFFICE O/P EST MOD 30 MIN: CPT | Performed by: FAMILY MEDICINE

## 2025-06-04 PROCEDURE — 83036 HEMOGLOBIN GLYCOSYLATED A1C: CPT | Performed by: FAMILY MEDICINE

## 2025-06-04 RX ORDER — INSULIN GLARGINE 100 [IU]/ML
22 INJECTION, SOLUTION SUBCUTANEOUS DAILY
Qty: 15 ML | Refills: 2 | Status: SHIPPED | OUTPATIENT
Start: 2025-06-04

## 2025-06-04 RX ORDER — TIRZEPATIDE 2.5 MG/.5ML
2.5 INJECTION, SOLUTION SUBCUTANEOUS WEEKLY
Qty: 2 ML | Refills: 0 | Status: SHIPPED | OUTPATIENT
Start: 2025-06-04

## 2025-06-05 NOTE — ASSESSMENT & PLAN NOTE
Lab Results   Component Value Date    EGFR 55 12/05/2024    EGFR 43 10/10/2024    EGFR 52 09/25/2024    CREATININE 1.38 (H) 12/05/2024    CREATININE 1.68 (H) 10/10/2024    CREATININE 1.44 (H) 09/25/2024   Quite stable for now.  Advise hydration and avoiding nephrotoxic medication          [Maximal Pain Intensity: 0/10] : 0/10 [90: Able to carry normal activity; minor signs or symptoms of disease.] : 90: Able to carry normal activity; minor signs or symptoms of disease.

## 2025-06-11 ENCOUNTER — HOSPITAL ENCOUNTER (OUTPATIENT)
Dept: CT IMAGING | Facility: HOSPITAL | Age: 60
Discharge: HOME | End: 2025-06-11
Attending: FAMILY MEDICINE
Payer: COMMERCIAL

## 2025-06-11 DIAGNOSIS — E11.69 HYPERLIPIDEMIA ASSOCIATED WITH TYPE 2 DIABETES MELLITUS  (HCC): ICD-10-CM

## 2025-06-11 DIAGNOSIS — N18.31 TYPE 2 DIABETES MELLITUS WITH STAGE 3A CHRONIC KIDNEY DISEASE, WITHOUT LONG-TERM CURRENT USE OF INSULIN (HCC): ICD-10-CM

## 2025-06-11 DIAGNOSIS — E11.22 TYPE 2 DIABETES MELLITUS WITH STAGE 3A CHRONIC KIDNEY DISEASE, WITHOUT LONG-TERM CURRENT USE OF INSULIN (HCC): ICD-10-CM

## 2025-06-11 DIAGNOSIS — E78.5 HYPERLIPIDEMIA ASSOCIATED WITH TYPE 2 DIABETES MELLITUS  (HCC): ICD-10-CM

## 2025-06-11 PROCEDURE — 75571 CT HRT W/O DYE W/CA TEST: CPT

## 2025-06-13 ENCOUNTER — TELEPHONE (OUTPATIENT)
Age: 60
End: 2025-06-13

## 2025-06-13 NOTE — TELEPHONE ENCOUNTER
Pt called to let his PCP know that he went and had his testing done and asked to please call when resulted

## 2025-06-25 ENCOUNTER — TELEPHONE (OUTPATIENT)
Dept: NEPHROLOGY | Facility: CLINIC | Age: 60
End: 2025-06-25

## 2025-06-30 ENCOUNTER — ANTICOAG VISIT (OUTPATIENT)
Dept: FAMILY MEDICINE CLINIC | Facility: CLINIC | Age: 60
End: 2025-06-30

## 2025-06-30 ENCOUNTER — APPOINTMENT (OUTPATIENT)
Dept: LAB | Facility: MEDICAL CENTER | Age: 60
End: 2025-06-30
Attending: FAMILY MEDICINE
Payer: COMMERCIAL

## 2025-06-30 DIAGNOSIS — E83.9 CHRONIC KIDNEY DISEASE-MINERAL AND BONE DISORDER: ICD-10-CM

## 2025-06-30 DIAGNOSIS — Z79.01 ANTICOAGULANT LONG-TERM USE: ICD-10-CM

## 2025-06-30 DIAGNOSIS — N18.9 CHRONIC KIDNEY DISEASE-MINERAL AND BONE DISORDER: ICD-10-CM

## 2025-06-30 DIAGNOSIS — M89.9 CHRONIC KIDNEY DISEASE-MINERAL AND BONE DISORDER: ICD-10-CM

## 2025-06-30 DIAGNOSIS — Z86.718 HISTORY OF DVT (DEEP VEIN THROMBOSIS): ICD-10-CM

## 2025-06-30 DIAGNOSIS — N18.31 STAGE 3A CHRONIC KIDNEY DISEASE (HCC): ICD-10-CM

## 2025-06-30 LAB
25(OH)D3 SERPL-MCNC: 32.6 NG/ML (ref 30–100)
ANION GAP SERPL CALCULATED.3IONS-SCNC: 8 MMOL/L (ref 4–13)
BASOPHILS # BLD AUTO: 0.04 THOUSANDS/ÂΜL (ref 0–0.1)
BASOPHILS NFR BLD AUTO: 1 % (ref 0–1)
BILIRUB UR QL STRIP: NEGATIVE
BUN SERPL-MCNC: 16 MG/DL (ref 5–25)
CALCIUM SERPL-MCNC: 9.5 MG/DL (ref 8.4–10.2)
CHLORIDE SERPL-SCNC: 104 MMOL/L (ref 96–108)
CLARITY UR: CLEAR
CO2 SERPL-SCNC: 29 MMOL/L (ref 21–32)
COLOR UR: ABNORMAL
CREAT SERPL-MCNC: 1.33 MG/DL (ref 0.6–1.3)
CREAT UR-MCNC: 165.7 MG/DL
EOSINOPHIL # BLD AUTO: 0.13 THOUSAND/ÂΜL (ref 0–0.61)
EOSINOPHIL NFR BLD AUTO: 2 % (ref 0–6)
ERYTHROCYTE [DISTWIDTH] IN BLOOD BY AUTOMATED COUNT: 12.9 % (ref 11.6–15.1)
GFR SERPL CREATININE-BSD FRML MDRD: 57 ML/MIN/1.73SQ M
GLUCOSE P FAST SERPL-MCNC: 141 MG/DL (ref 65–99)
GLUCOSE UR STRIP-MCNC: ABNORMAL MG/DL
HCT VFR BLD AUTO: 46.3 % (ref 36.5–49.3)
HGB BLD-MCNC: 15 G/DL (ref 12–17)
HGB UR QL STRIP.AUTO: NEGATIVE
IMM GRANULOCYTES # BLD AUTO: 0.02 THOUSAND/UL (ref 0–0.2)
IMM GRANULOCYTES NFR BLD AUTO: 0 % (ref 0–2)
INR PPP: 2.78 (ref 0.85–1.19)
KETONES UR STRIP-MCNC: NEGATIVE MG/DL
LEUKOCYTE ESTERASE UR QL STRIP: NEGATIVE
LYMPHOCYTES # BLD AUTO: 2.04 THOUSANDS/ÂΜL (ref 0.6–4.47)
LYMPHOCYTES NFR BLD AUTO: 29 % (ref 14–44)
MCH RBC QN AUTO: 32.3 PG (ref 26.8–34.3)
MCHC RBC AUTO-ENTMCNC: 32.4 G/DL (ref 31.4–37.4)
MCV RBC AUTO: 100 FL (ref 82–98)
MONOCYTES # BLD AUTO: 0.77 THOUSAND/ÂΜL (ref 0.17–1.22)
MONOCYTES NFR BLD AUTO: 11 % (ref 4–12)
NEUTROPHILS # BLD AUTO: 4.15 THOUSANDS/ÂΜL (ref 1.85–7.62)
NEUTS SEG NFR BLD AUTO: 57 % (ref 43–75)
NITRITE UR QL STRIP: NEGATIVE
NRBC BLD AUTO-RTO: 0 /100 WBCS
PH UR STRIP.AUTO: 5.5 [PH]
PHOSPHATE SERPL-MCNC: 2.3 MG/DL (ref 2.3–4.1)
PLATELET # BLD AUTO: 219 THOUSANDS/UL (ref 149–390)
PMV BLD AUTO: 11 FL (ref 8.9–12.7)
POTASSIUM SERPL-SCNC: 4.2 MMOL/L (ref 3.5–5.3)
PROT UR STRIP-MCNC: NEGATIVE MG/DL
PROT UR-MCNC: 11.5 MG/DL
PROT/CREAT UR: 0.1 MG/G{CREAT}
PROTHROMBIN TIME: 29.1 SECONDS (ref 12.3–15)
PTH-INTACT SERPL-MCNC: 38.4 PG/ML (ref 12–88)
RBC # BLD AUTO: 4.64 MILLION/UL (ref 3.88–5.62)
SODIUM SERPL-SCNC: 141 MMOL/L (ref 135–147)
SP GR UR STRIP.AUTO: 1.02 (ref 1–1.03)
UROBILINOGEN UR STRIP-ACNC: 2 MG/DL
WBC # BLD AUTO: 7.15 THOUSAND/UL (ref 4.31–10.16)

## 2025-06-30 PROCEDURE — 84100 ASSAY OF PHOSPHORUS: CPT

## 2025-06-30 PROCEDURE — 82570 ASSAY OF URINE CREATININE: CPT

## 2025-06-30 PROCEDURE — 36415 COLL VENOUS BLD VENIPUNCTURE: CPT

## 2025-06-30 PROCEDURE — 85610 PROTHROMBIN TIME: CPT

## 2025-06-30 PROCEDURE — 80048 BASIC METABOLIC PNL TOTAL CA: CPT

## 2025-06-30 PROCEDURE — 85025 COMPLETE CBC W/AUTO DIFF WBC: CPT

## 2025-06-30 PROCEDURE — 84156 ASSAY OF PROTEIN URINE: CPT

## 2025-06-30 PROCEDURE — 83970 ASSAY OF PARATHORMONE: CPT

## 2025-06-30 PROCEDURE — 82306 VITAMIN D 25 HYDROXY: CPT

## 2025-07-07 ENCOUNTER — OFFICE VISIT (OUTPATIENT)
Dept: NEPHROLOGY | Facility: CLINIC | Age: 60
End: 2025-07-07
Payer: COMMERCIAL

## 2025-07-07 VITALS
OXYGEN SATURATION: 99 % | HEART RATE: 79 BPM | HEIGHT: 73 IN | TEMPERATURE: 97.5 F | SYSTOLIC BLOOD PRESSURE: 120 MMHG | DIASTOLIC BLOOD PRESSURE: 80 MMHG | BODY MASS INDEX: 29.42 KG/M2 | RESPIRATION RATE: 16 BRPM | WEIGHT: 222 LBS

## 2025-07-07 DIAGNOSIS — N18.9 CHRONIC KIDNEY DISEASE-MINERAL AND BONE DISORDER: ICD-10-CM

## 2025-07-07 DIAGNOSIS — E83.9 CHRONIC KIDNEY DISEASE-MINERAL AND BONE DISORDER: ICD-10-CM

## 2025-07-07 DIAGNOSIS — E11.22 TYPE 2 DIABETES MELLITUS WITH STAGE 3A CHRONIC KIDNEY DISEASE, WITHOUT LONG-TERM CURRENT USE OF INSULIN (HCC): ICD-10-CM

## 2025-07-07 DIAGNOSIS — N18.31 TYPE 2 DIABETES MELLITUS WITH STAGE 3A CHRONIC KIDNEY DISEASE, WITHOUT LONG-TERM CURRENT USE OF INSULIN (HCC): ICD-10-CM

## 2025-07-07 DIAGNOSIS — M89.9 CHRONIC KIDNEY DISEASE-MINERAL AND BONE DISORDER: ICD-10-CM

## 2025-07-07 DIAGNOSIS — N18.31 STAGE 3A CHRONIC KIDNEY DISEASE (HCC): Primary | ICD-10-CM

## 2025-07-07 PROCEDURE — 99214 OFFICE O/P EST MOD 30 MIN: CPT | Performed by: INTERNAL MEDICINE

## 2025-07-07 NOTE — ASSESSMENT & PLAN NOTE
Lab Results   Component Value Date    EGFR 57 06/30/2025    EGFR 54 04/07/2025    EGFR 55 12/05/2024    CREATININE 1.33 (H) 06/30/2025    CREATININE 1.40 (H) 04/07/2025    CREATININE 1.38 (H) 12/05/2024   PTH and phosphorus along with vitamin D are within acceptable range and will continue to monitor

## 2025-07-07 NOTE — PROGRESS NOTES
NEPHROLOGY OFFICE FOLLOW UP  Chavez Newell 60 y.o.male YOB: 1965 MRN: [id number]    Encounter: [id number] DATE: 7/7/2025    REASON FOR VISIT: Chavez Newell is a 60 y.o. male who is here on 7/7/2025 for Chronic Kidney Disease and Follow-up      ASSESSMENT and PLAN:  Chavez was seen today for chronic kidney disease and follow-up.    Diagnoses and all orders for this visit:    Stage 3a chronic kidney disease (HCC)  -     Basic metabolic panel; Future  -     CBC and differential; Future  -     Phosphorus; Future  -     Protein / creatinine ratio, urine; Future  -     PTH, intact; Future  -     UA (URINE) with reflex to Scope; Future  -     Vitamin D 25 hydroxy; Future    Chronic kidney disease-mineral and bone disorder  -     Basic metabolic panel; Future  -     CBC and differential; Future  -     Phosphorus; Future  -     Protein / creatinine ratio, urine; Future  -     PTH, intact; Future  -     UA (URINE) with reflex to Scope; Future  -     Vitamin D 25 hydroxy; Future    Type 2 diabetes mellitus with stage 3a chronic kidney disease, without long-term current use of insulin ([facility])      Assessment & Plan  Stage 3a chronic kidney disease (HCC)  Lab Results   Component Value Date    EGFR 57 06/30/2025    EGFR 54 04/07/2025    EGFR 55 12/05/2024    CREATININE 1.33 (H) 06/30/2025    CREATININE 1.40 (H) 04/07/2025    CREATININE 1.38 (H) 12/05/2024   Renal function is quite stable.  Advise hydration and avoiding nephrotoxic medication  Chronic kidney disease-mineral and bone disorder  Lab Results   Component Value Date    EGFR 57 06/30/2025    EGFR 54 04/07/2025    EGFR 55 12/05/2024    CREATININE 1.33 (H) 06/30/2025    CREATININE 1.40 (H) 04/07/2025    CREATININE 1.38 (H) 12/05/2024   PTH and phosphorus along with vitamin D are within acceptable range and will continue to monitor  Type 2 diabetes mellitus with stage 3a chronic kidney disease, without long-term current use of insulin ([facility])    Lab Results    Component Value Date    HGBA1C 7.6 (A) 06/04/2025     Not well-controlled.  Importance of diabetic control and effect on kidneys discussed with the patient    Everything discussed with patient.  I will see him back in 6 months.  Will get blood and urine test before that visit    HPI:    Patient came back for follow-up of CKD.  Doing well no acute complaint    No chest pain no palpitation    No nausea no vomiting    Denies any urinary complaint        REVIEW OF SYSTEMS:    Review of Systems   Constitutional:  Negative for fatigue.   HENT:  Negative for congestion.    Eyes:  Negative for photophobia and pain.   Respiratory:  Negative for chest tightness and shortness of breath.    Cardiovascular:  Negative for chest pain and palpitations.   Gastrointestinal:  Negative for abdominal distention, abdominal pain and blood in stool.   Endocrine: Negative for polydipsia.   Genitourinary:  Negative for difficulty urinating, dysuria, flank pain, hematuria and urgency.   Musculoskeletal:  Negative for arthralgias and back pain.   Skin:  Negative for rash.   Neurological:  Negative for dizziness, light-headedness and headaches.   Hematological:  Does not bruise/bleed easily.   Psychiatric/Behavioral:  Negative for behavioral problems. The patient is not nervous/anxious.          PAST MEDICAL HISTORY:  Past Medical History[1]    PAST SURGICAL HISTORY:  Past Surgical History[2]    SOCIAL HISTORY:  Social History     Substance and Sexual Activity   Alcohol Use Not Currently    Comment: occasional beer     Social History     Substance and Sexual Activity   Drug Use No     Tobacco Use History[3]    FAMILY HISTORY:  Family History[4]    ALLERGY:  Allergies[5]    MEDICATIONS:  Current Medications[6]    PHYSICAL EXAM:  Vitals:    07/07/25 0957   BP: 120/80   BP Location: Right arm   Patient Position: Sitting   Cuff Size: Standard   Pulse: 79   Resp: 16   Temp: 97.5 °F (36.4 °C)   TempSrc: Temporal   SpO2: 99%   Weight: 101 kg (222  "lb)   Height: 6' 1\" (1.854 m)     Body mass index is 29.29 kg/m².    Physical Exam  Constitutional:       General: He is not in acute distress.     Appearance: He is well-developed.   HENT:      Head: Normocephalic.      Mouth/Throat:      Mouth: Mucous membranes are moist.     Eyes:      General: No scleral icterus.     Conjunctiva/sclera: Conjunctivae normal.     Neck:      Vascular: No JVD.     Cardiovascular:      Rate and Rhythm: Normal rate.      Heart sounds: Normal heart sounds.   Pulmonary:      Effort: Pulmonary effort is normal.      Breath sounds: No wheezing.   Abdominal:      Palpations: Abdomen is soft.      Tenderness: There is no abdominal tenderness.     Musculoskeletal:         General: Normal range of motion.      Cervical back: Neck supple.     Skin:     General: Skin is warm.      Findings: No rash.     Neurological:      Mental Status: He is alert and oriented to person, place, and time.     Psychiatric:         Behavior: Behavior normal.         LAB RESULTS:  Results for orders placed or performed in visit on 06/30/25   Protime-INR   Result Value Ref Range    Protime 29.1 (H) 12.3 - 15.0 seconds    INR 2.78 (H) 0.85 - 1.19   Basic metabolic panel   Result Value Ref Range    Sodium 141 135 - 147 mmol/L    Potassium 4.2 3.5 - 5.3 mmol/L    Chloride 104 96 - 108 mmol/L    CO2 29 21 - 32 mmol/L    ANION GAP 8 4 - 13 mmol/L    BUN 16 5 - 25 mg/dL    Creatinine 1.33 (H) 0.60 - 1.30 mg/dL    Glucose, Fasting 141 (H) 65 - 99 mg/dL    Calcium 9.5 8.4 - 10.2 mg/dL    eGFR 57 ml/min/1.73sq m   CBC and differential   Result Value Ref Range    WBC 7.15 4.31 - 10.16 Thousand/uL    RBC 4.64 3.88 - 5.62 Million/uL    Hemoglobin 15.0 12.0 - 17.0 g/dL    Hematocrit 46.3 36.5 - 49.3 %     (H) 82 - 98 fL    MCH 32.3 26.8 - 34.3 pg    MCHC 32.4 31.4 - 37.4 g/dL    RDW 12.9 11.6 - 15.1 %    MPV 11.0 8.9 - 12.7 fL    Platelets 219 149 - 390 Thousands/uL    nRBC 0 /100 WBCs    Segmented % 57 43 - 75 %    " "Immature Grans % 0 0 - 2 %    Lymphocytes % 29 14 - 44 %    Monocytes % 11 4 - 12 %    Eosinophils Relative 2 0 - 6 %    Basophils Relative 1 0 - 1 %    Absolute Neutrophils 4.15 1.85 - 7.62 Thousands/µL    Absolute Immature Grans 0.02 0.00 - 0.20 Thousand/uL    Absolute Lymphocytes 2.04 0.60 - 4.47 Thousands/µL    Absolute Monocytes 0.77 0.17 - 1.22 Thousand/µL    Eosinophils Absolute 0.13 0.00 - 0.61 Thousand/µL    Basophils Absolute 0.04 0.00 - 0.10 Thousands/µL   Phosphorus   Result Value Ref Range    Phosphorus 2.3 2.3 - 4.1 mg/dL   Protein / creatinine ratio, urine   Result Value Ref Range    Creatinine, Ur 165.7 Reference range not established. mg/dL    Protein Urine Random 11.5 Reference range not established. mg/dL    Prot/Creat Ratio, Ur 0.1 <=0.2   PTH, intact   Result Value Ref Range    PTH 38.4 12.0 - 88.0 pg/mL   UA (URINE) with reflex to Scope   Result Value Ref Range    Color, UA Light Yellow     Clarity, UA Clear     Specific Gravity, UA 1.024 1.003 - 1.030    pH, UA 5.5 4.5, 5.0, 5.5, 6.0, 6.5, 7.0, 7.5, 8.0    Leukocytes, UA Negative Negative    Nitrite, UA Negative Negative    Protein, UA Negative Negative mg/dl    Glucose, UA Trace (A) Negative mg/dl    Ketones, UA Negative Negative mg/dl    Urobilinogen, UA 2.0 (A) <2.0 mg/dl mg/dl    Bilirubin, UA Negative Negative    Occult Blood, UA Negative Negative   Vitamin D 25 hydroxy   Result Value Ref Range    Vit D, 25-Hydroxy 32.6 30.0 - 100.0 ng/mL     *Note: Due to a large number of results and/or encounters for the requested time period, some results have not been displayed. A complete set of results can be found in Results Review.         Portions of the record may have been created with voice recognition software. Occasional wrong word or \"sound a like\" substitutions may have occurred due to the inherent limitations of voice recognition software. Read the chart carefully and recognize, using context, where substitutions have occurred. If you " have any questions, please contact the dictating provider.        [1]   Past Medical History:  Diagnosis Date    Anxiety 03/10/2022    Aorta aneurysm (HCC)     4.3    Arm DVT (deep venous thromboembolism), acute (HCC) 2015    complications of cardiac cath    Asthma     Blood clot in vein     right leg    Chronic kidney disease     CKD (chronic kidney disease), stage III (HCC)     COPD (chronic obstructive pulmonary disease) (HCC)     Depression     Diabetes mellitus (HCC)     Essential hypertriglyceridemia     GERD (gastroesophageal reflux disease)     Headache     Hiatal hernia     Hyperlipidemia, mixed 05/07/2018    Kidney stone     Liver disease     FATTY LIVER    Mild episode of recurrent major depressive disorder (HCC) 03/10/2022    PAC (premature atrial contraction)     Prediabetes     Rectus sheath hematoma, initial encounter 08/09/2023    Renal calculi     Sleep apnea     Vestibular migraine    [2]   Past Surgical History:  Procedure Laterality Date    CARPAL TUNNEL RELEASE Left     COLONOSCOPY      FL INJECTION LEFT SHOULDER (ARTHROGRAM)  9/13/2024    FL INJECTION RIGHT HIP (ARTHROGRAM)  08/20/2018    FOOT SURGERY Left     FOREIGN BODY REMOVAL    HERNIA REPAIR      KS ARTHRP ACETBLR/PROX FEM PROSTC AGRFT/ALGRFT Right 09/28/2020    Procedure: ARTHROPLASTY HIP TOTAL;  Surgeon: Jyoti Araiza MD;  Location: [facility];  Service: Orthopedics    KS COLONOSCOPY FLX DX W/COLLJ SPEC WHEN PFRMD N/A 08/07/2017    Procedure: COLONOSCOPY;  Surgeon: Anthony France MD;  Location: [facility];  Service: Gastroenterology    KS ESOPHAGOGASTRODUODENOSCOPY TRANSORAL DIAGNOSTIC N/A 10/31/2017    Procedure: ESOPHAGOGASTRODUODENOSCOPY (EGD);  Surgeon: Anthony Frnace MD;  Location: [facility];  Service: Gastroenterology    KS SURGICAL ARTHROSCOPY SHOULDER W/ROTATOR CUFF RPR Left 10/10/2024    Procedure: REPAIR ROTATOR CUFF  ARTHROSCOPIC LEFT ACROMIOPLASTY;  Surgeon: Junior Rivera DO;  Location: [facility];  Service:  Orthopedics    TOTAL HIP ARTHROPLASTY Right 2020   [3]   Social History  Tobacco Use   Smoking Status Some Days    Types: Cigars    Passive exposure: Never   Smokeless Tobacco Never   Tobacco Comments    never inhaled-smokes cigars every now and then   [4]   Family History  Problem Relation Name Age of Onset    Arthritis Mother      Heart attack Father      Clotting disorder Father      Schizoaffective Disorder  Sister      Cancer Brother      Prostate cancer Brother      Leukemia Brother          his only brother dies from lymphoma or leukemia pt unsure he was only 54    Aortic aneurysm Other grandfather         abdominal   [5] No Known Allergies  [6]   Current Outpatient Medications:     acetaminophen (TYLENOL) 500 mg tablet, Take 500 mg by mouth every 6 (six) hours as needed for mild pain, Disp: , Rfl:     benzonatate (TESSALON) 200 MG capsule, Take 1 capsule (200 mg total) by mouth 3 (three) times a day as needed for cough, Disp: 30 capsule, Rfl: 0    Bydureon BCise 2 MG/0.85ML AUIJ, inject 2 milligrams subcutaneously weekly, Disp: 10.2 mL, Rfl: 1    Continuous Blood Gluc Sensor (FreeStyle Jelly 3 Sensor) MISC, Use 1 each every 14 (fourteen) days, Disp: 6 each, Rfl: 3    fenofibrate 160 MG tablet, take 1 tablet by mouth once daily, Disp: 90 tablet, Rfl: 0    gabapentin (NEURONTIN) 300 mg capsule, take 1 capsule by mouth at bedtime, Disp: 30 capsule, Rfl: 5    glucose blood test strip, TEST BS TID, Disp: 300 each, Rfl: 5    glucose monitoring kit (FREESTYLE) monitoring kit, Use 1 each daily before breakfast, Disp: 1 each, Rfl: 0    Insulin Glargine Solostar (Lantus SoloStar) 100 UNIT/ML SOPN, Inject 0.22 mL (22 Units total) under the skin daily, Disp: 15 mL, Rfl: 2    insulin lispro (HumaLOG KwikPen) 100 units/mL injection pen, PER SLIDING SCALE inject subcutaneously A MAX OF 50 UNITS DAILY, Disp: 15 mL, Rfl: 2    Insulin Pen Needle (BD Pen Needle Evelina 2nd Gen) 32G X 4 MM MISC, Inject as directed 4 (four) times a  day, Disp: 500 each, Rfl: 5    Lancets MISC, Use daily before breakfast, Disp: 100 each, Rfl: 5    naloxone (NARCAN) 4 mg/0.1 mL nasal spray, Administer 1 spray into a nostril. If no response after 2-3 minutes, give another dose in the other nostril using a new spray., Disp: 1 each, Rfl: 1    pantoprazole (PROTONIX) 40 mg tablet, take 1 tablet by mouth once daily, Disp: 90 tablet, Rfl: 1    promethazine-dextromethorphan (PHENERGAN-DM) 6.25-15 mg/5 mL oral syrup, Take 5 mL by mouth 4 (four) times a day as needed for cough, Disp: 118 mL, Rfl: 0    rosuvastatin (CRESTOR) 5 mg tablet, take 1 tablet by mouth once daily, Disp: 90 tablet, Rfl: 1    sildenafil (REVATIO) 20 mg tablet, TAKE 1 TABLET (20 MG TOTAL) BY MOUTH DAILY AS DIRECTED, Disp: 90 tablet, Rfl: 3    Tirzepatide (Mounjaro) 2.5 MG/0.5ML SOAJ, Inject 2.5 mg under the skin once a week, Disp: 2 mL, Rfl: 0    traMADol (Ultram) 50 mg tablet, May take 2 tablets (100 mg total) by mouth daily as needed for moderate pain. May also take 1 tablet (50 mg total) every 8 (eight) hours as needed for moderate pain. Max 5 tablets per day. Do not start before May 29, 2025., Disp: 150 tablet, Rfl: 2    warfarin (COUMADIN) 3 mg tablet, Take 1 tablet by mouth daily or as directed by PCP, Disp: 90 tablet, Rfl: 1    warfarin (COUMADIN) 2 mg tablet, Take 1 tablet by mouth Monday, Wednesday, Friday or as directed by PCP (Patient not taking: Reported on 5/15/2025), Disp: 90 tablet, Rfl: 1

## 2025-07-07 NOTE — ASSESSMENT & PLAN NOTE
Lab Results   Component Value Date    EGFR 57 06/30/2025    EGFR 54 04/07/2025    EGFR 55 12/05/2024    CREATININE 1.33 (H) 06/30/2025    CREATININE 1.40 (H) 04/07/2025    CREATININE 1.38 (H) 12/05/2024   Renal function is quite stable.  Advise hydration and avoiding nephrotoxic medication

## 2025-07-11 DIAGNOSIS — E11.22 TYPE 2 DIABETES MELLITUS WITH STAGE 3A CHRONIC KIDNEY DISEASE, WITHOUT LONG-TERM CURRENT USE OF INSULIN (HCC): ICD-10-CM

## 2025-07-11 DIAGNOSIS — N18.31 TYPE 2 DIABETES MELLITUS WITH STAGE 3A CHRONIC KIDNEY DISEASE, WITHOUT LONG-TERM CURRENT USE OF INSULIN (HCC): ICD-10-CM

## 2025-07-11 RX ORDER — INSULIN LISPRO 100 [IU]/ML
INJECTION, SOLUTION INTRAVENOUS; SUBCUTANEOUS
Qty: 15 ML | Refills: 2 | Status: SHIPPED | OUTPATIENT
Start: 2025-07-11

## 2025-07-11 RX ORDER — TIRZEPATIDE 2.5 MG/.5ML
2.5 INJECTION, SOLUTION SUBCUTANEOUS WEEKLY
Qty: 2 ML | Refills: 2 | Status: SHIPPED | OUTPATIENT
Start: 2025-07-11

## 2025-07-11 NOTE — TELEPHONE ENCOUNTER
Pharmacy Change.   (Mounjaro - 90 day supply if appropriate)    Reason for call:   [x] Refill   [] Prior Auth  [] Other:     Office:   [x] PCP/Provider - Adenike Garg DO   [] Specialty/Provider -     Medication:    Tirzepatide (Mounjaro) 2.5 MG/0.5ML SOAJ / Inject 2.5 mg under the skin once a week     insulin lispro (HumaLOG KwikPen) 100 units/mL injection pen / PER SLIDING SCALE     Pharmacy: [id number] - [place] - [address]     Local Pharmacy   Does the patient have enough for 3 days?   [] Yes   [x] No - Send as HP to POD

## 2025-07-23 DIAGNOSIS — I82.411 ACUTE DEEP VEIN THROMBOSIS (DVT) OF FEMORAL VEIN OF RIGHT LOWER EXTREMITY (HCC): ICD-10-CM

## 2025-07-23 NOTE — TELEPHONE ENCOUNTER
Reason for call: Pharmacy Change  [x] Refill   [] Prior Auth  [] Other:     Office:   [x] PCP/Provider - Adenike Garg DO / [place] [facility]   [] Specialty/Provider -     Medication: warfarin (COUMADIN) 3 mg tablet      Dose/Frequency:  Take 1 tablet by mouth daily or as directed by PCP     Quantity: 90    Pharmacy: [id number] - [place] - [address]     Local Pharmacy   Does the patient have enough for 3 days?   [x] Yes   [] No - Send as HP to POD    Mail Away Pharmacy   Does the patient have enough for 10 days?   [] Yes   [] No - Send as HP to POD

## 2025-07-25 RX ORDER — WARFARIN SODIUM 3 MG/1
TABLET ORAL
Qty: 90 TABLET | Refills: 1 | Status: SHIPPED | OUTPATIENT
Start: 2025-07-25

## 2025-08-05 DIAGNOSIS — E11.22 TYPE 2 DIABETES MELLITUS WITH STAGE 3A CHRONIC KIDNEY DISEASE, WITHOUT LONG-TERM CURRENT USE OF INSULIN (HCC): ICD-10-CM

## 2025-08-05 DIAGNOSIS — K21.9 GASTROESOPHAGEAL REFLUX DISEASE WITHOUT ESOPHAGITIS: ICD-10-CM

## 2025-08-05 DIAGNOSIS — N18.31 TYPE 2 DIABETES MELLITUS WITH STAGE 3A CHRONIC KIDNEY DISEASE, WITHOUT LONG-TERM CURRENT USE OF INSULIN (HCC): ICD-10-CM

## 2025-08-05 RX ORDER — PANTOPRAZOLE SODIUM 40 MG/1
40 TABLET, DELAYED RELEASE ORAL DAILY
Qty: 90 TABLET | Refills: 1 | Status: SHIPPED | OUTPATIENT
Start: 2025-08-05

## 2025-08-05 RX ORDER — PEN NEEDLE, DIABETIC 32GX 5/32"
NEEDLE, DISPOSABLE MISCELLANEOUS 4 TIMES DAILY
Qty: 400 EACH | Refills: 1 | Status: SHIPPED | OUTPATIENT
Start: 2025-08-05

## 2025-08-14 ENCOUNTER — OFFICE VISIT (OUTPATIENT)
Dept: PAIN MEDICINE | Facility: CLINIC | Age: 60
End: 2025-08-14
Payer: COMMERCIAL

## 2025-08-14 ENCOUNTER — APPOINTMENT (OUTPATIENT)
Dept: RADIOLOGY | Facility: CLINIC | Age: 60
End: 2025-08-14
Payer: COMMERCIAL

## 2025-08-18 ENCOUNTER — APPOINTMENT (OUTPATIENT)
Dept: LAB | Facility: CLINIC | Age: 60
End: 2025-08-18
Payer: COMMERCIAL

## 2025-08-18 DIAGNOSIS — E78.2 ELEVATED TRIGLYCERIDES WITH HIGH CHOLESTEROL: ICD-10-CM

## 2025-08-18 DIAGNOSIS — Z79.01 ANTICOAGULANT LONG-TERM USE: ICD-10-CM

## 2025-08-18 DIAGNOSIS — Z86.718 HISTORY OF DVT (DEEP VEIN THROMBOSIS): ICD-10-CM

## 2025-08-18 LAB
INR PPP: 2.6 (ref 0.85–1.19)
PROTHROMBIN TIME: 27.7 SECONDS (ref 12.3–15)

## 2025-08-18 PROCEDURE — 85610 PROTHROMBIN TIME: CPT

## 2025-08-18 PROCEDURE — 36415 COLL VENOUS BLD VENIPUNCTURE: CPT

## 2025-08-19 ENCOUNTER — ANTICOAG VISIT (OUTPATIENT)
Dept: FAMILY MEDICINE CLINIC | Facility: CLINIC | Age: 60
End: 2025-08-19

## 2025-08-20 RX ORDER — FENOFIBRATE 160 MG/1
160 TABLET ORAL DAILY
Qty: 90 TABLET | Refills: 1 | Status: SHIPPED | OUTPATIENT
Start: 2025-08-20

## 2025-08-20 RX ORDER — FENOFIBRATE 160 MG/1
160 TABLET ORAL DAILY
Qty: 90 TABLET | Refills: 0 | OUTPATIENT
Start: 2025-08-20

## (undated) DEVICE — BIPOLAR SEALER 23-301-1 AQM MBS: Brand: AQUAMANTYS™

## (undated) DEVICE — SUT FIBERWIRE #2 1/2 CIRCLE T-5 38IN AR-7200

## (undated) DEVICE — GLOVE SRG BIOGEL ECLIPSE 7

## (undated) DEVICE — NEEDLE SUT SCORPION MEGALOADER

## (undated) DEVICE — GAUZE SPONGES,16 PLY: Brand: CURITY

## (undated) DEVICE — IMPERVIOUS STOCKINETTE: Brand: DEROYAL

## (undated) DEVICE — SUT VICRYL 0 CT-1 27 IN J340H

## (undated) DEVICE — BLADE SHAVER DISSECTOR 4MM 13CM COOLCUT

## (undated) DEVICE — ASTOUND SURGICAL GOWN, XXX LARGE, X-LONG: Brand: CONVERTORS

## (undated) DEVICE — 450 ML BOTTLE OF 0.05% CHLORHEXIDINE GLUCONATE IN 99.95% STERILE WATER FOR IRRIGATION, USP AND APPLICATOR.: Brand: IRRISEPT ANTIMICROBIAL WOUND LAVAGE

## (undated) DEVICE — CAPIT HIP UPCHRG  GRIPTION CUP

## (undated) DEVICE — 3M™ MICROFOAM™ SURGICAL TAPE 4 ROLLS/CARTON 6 CARTONS/CASE 1528-3: Brand: 3M™ MICROFOAM™

## (undated) DEVICE — INTENDED FOR TISSUE SEPARATION, AND OTHER PROCEDURES THAT REQUIRE A SHARP SURGICAL BLADE TO PUNCTURE OR CUT.: Brand: BARD-PARKER ® CARBON RIB-BACK BLADES

## (undated) DEVICE — DRAPE C-ARMOUR

## (undated) DEVICE — OCCLUSIVE GAUZE STRIP,3% BISMUTH TRIBROMOPHENATE IN PETROLATUM BLEND: Brand: XEROFORM

## (undated) DEVICE — TUBING SUCTION 5MM X 12 FT

## (undated) DEVICE — GLOVE INDICATOR PI UNDERGLOVE SZ 7.5 BLUE

## (undated) DEVICE — DRESSING MEPILEX AG BORDER 4 X 10 IN

## (undated) DEVICE — DUAL CUT SAGITTAL BLADE

## (undated) DEVICE — INTENDED FOR TISSUE SEPARATION, AND OTHER PROCEDURES THAT REQUIRE A SHARP SURGICAL BLADE TO PUNCTURE OR CUT.: Brand: BARD-PARKER SAFETY BLADES SIZE 10, STERILE

## (undated) DEVICE — DRAPE SHEET THREE QUARTER

## (undated) DEVICE — DRAPE EQUIPMENT RF WAND

## (undated) DEVICE — FIBERTAK ROTATOR CUFF DISPOSABLES KIT

## (undated) DEVICE — PLUMEPEN PRO 10FT

## (undated) DEVICE — DRAPE C-ARM X-RAY

## (undated) DEVICE — HOOD: Brand: FLYTE, SURGICOOL

## (undated) DEVICE — SUT PDS II 0 CT-1 27 IN Z340H

## (undated) DEVICE — COBAN 4 IN STERILE

## (undated) DEVICE — U-DRAPE: Brand: CONVERTORS

## (undated) DEVICE — BETHLEHEM TOTAL HIP, KIT: Brand: CARDINAL HEALTH

## (undated) DEVICE — GLOVE INDICATOR PI UNDERGLOVE SZ 9 BLUE

## (undated) DEVICE — 4-PORT MANIFOLD: Brand: NEPTUNE 2

## (undated) DEVICE — INTENT TO BE USED WITH SUTURE MATERIAL FOR TISSUE CLOSURE: Brand: RICHARD-ALLAN®  NEEDLE 1/2 CIRCLE REVERSE CUTTING

## (undated) DEVICE — SCD SEQUENTIAL COMPRESSION COMFORT SLEEVE MEDIUM KNEE LENGTH: Brand: KENDALL SCD

## (undated) DEVICE — NEEDLE 18 G X 1 1/2 SAFETY

## (undated) DEVICE — TUBING ARTHROSCOPIC WAVE  MAIN PUMP

## (undated) DEVICE — BAG DECANTER

## (undated) DEVICE — HEWSON SUTURE RETRIEVER: Brand: HEWSON SUTURE RETRIEVER

## (undated) DEVICE — LIGHT HANDLE COVER SLEEVE DISP BLUE STELLAR

## (undated) DEVICE — SUT ETHILON 3-0 PS-1 18 IN 1663H

## (undated) DEVICE — CHLORAPREP HI-LITE 26ML ORANGE

## (undated) DEVICE — 3M™ IOBAN™ 2 ANTIMICROBIAL INCISE DRAPE 6650EZ: Brand: IOBAN™ 2

## (undated) DEVICE — GLOVE SRG BIOGEL 9

## (undated) DEVICE — GLOVE INDICATOR PI UNDERGLOVE SZ 7 BLUE

## (undated) DEVICE — SHOULDER SUSPENSION KIT 6 PER BOX

## (undated) DEVICE — THREADED CLEAR CANNULA WITH OBTURATOR 8.5MM X 75MM

## (undated) DEVICE — 3M™ STERI-STRIP™ REINFORCED ADHESIVE SKIN CLOSURES, R1547, 1/2 IN X 4 IN (12 MM X 100 MM), 6 STRIPS/ENVELOPE: Brand: 3M™ STERI-STRIP™

## (undated) DEVICE — THE SIMPULSE SOLO SYSTEM WITH ULTREX RETRACTABLE SPLASH SHIELD TIP: Brand: SIMPULSE SOLO

## (undated) DEVICE — CAPIT HIP COP -CERAMIC ON POLY

## (undated) DEVICE — DRESSING MEPILEX BORDER 4 X 10 IN

## (undated) DEVICE — CANNULA ARTHRO LOPRFL 5MM X 7CM

## (undated) DEVICE — SYRINGE 50ML LL

## (undated) DEVICE — BETHLEHEM UNIVERSAL  ARTHRO PK: Brand: CARDINAL HEALTH

## (undated) DEVICE — PROBE ABLATION  APOLLO RF 90 DEG MULTI PORT

## (undated) DEVICE — ABDOMINAL PAD: Brand: DERMACEA

## (undated) DEVICE — SUT VICRYL 2-0 CT-1 27 IN J259H

## (undated) DEVICE — TRAY FOLEY 16FR URIMETER SURESTEP

## (undated) DEVICE — BURR  OVAL 4MM 13CM 8 FLUTE COOLCUT

## (undated) DEVICE — ELECTRODE BLADE MOD  E-Z CLEAN 6.5IN -0014M

## (undated) DEVICE — MEDI-VAC TUBING CONNECTOR 6-IN-1 STRAIGHT: Brand: CARDINAL HEALTH